# Patient Record
Sex: FEMALE | Race: WHITE | NOT HISPANIC OR LATINO | URBAN - METROPOLITAN AREA
[De-identification: names, ages, dates, MRNs, and addresses within clinical notes are randomized per-mention and may not be internally consistent; named-entity substitution may affect disease eponyms.]

---

## 2021-03-04 ENCOUNTER — IMMUNIZATIONS (OUTPATIENT)
Dept: FAMILY MEDICINE CLINIC | Facility: HOSPITAL | Age: 76
End: 2021-03-04

## 2021-03-04 DIAGNOSIS — Z23 ENCOUNTER FOR IMMUNIZATION: Primary | ICD-10-CM

## 2021-03-04 PROCEDURE — 91300 SARS-COV-2 / COVID-19 MRNA VACCINE (PFIZER-BIONTECH) 30 MCG: CPT

## 2021-03-04 PROCEDURE — 0001A SARS-COV-2 / COVID-19 MRNA VACCINE (PFIZER-BIONTECH) 30 MCG: CPT

## 2021-03-23 ENCOUNTER — IMMUNIZATIONS (OUTPATIENT)
Dept: FAMILY MEDICINE CLINIC | Facility: HOSPITAL | Age: 76
End: 2021-03-23

## 2021-03-23 DIAGNOSIS — Z23 ENCOUNTER FOR IMMUNIZATION: Primary | ICD-10-CM

## 2021-03-23 PROCEDURE — 91300 SARS-COV-2 / COVID-19 MRNA VACCINE (PFIZER-BIONTECH) 30 MCG: CPT

## 2021-03-23 PROCEDURE — 0002A SARS-COV-2 / COVID-19 MRNA VACCINE (PFIZER-BIONTECH) 30 MCG: CPT

## 2023-04-27 ENCOUNTER — HOSPITAL ENCOUNTER (INPATIENT)
Facility: HOSPITAL | Age: 78
LOS: 15 days | End: 2023-05-13
Attending: STUDENT IN AN ORGANIZED HEALTH CARE EDUCATION/TRAINING PROGRAM | Admitting: SURGERY

## 2023-04-27 ENCOUNTER — APPOINTMENT (EMERGENCY)
Dept: RADIOLOGY | Facility: HOSPITAL | Age: 78
End: 2023-04-27

## 2023-04-27 ENCOUNTER — APPOINTMENT (OUTPATIENT)
Dept: RADIOLOGY | Facility: HOSPITAL | Age: 78
End: 2023-04-27
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
## 2023-04-27 DIAGNOSIS — Z45.02 AICD DISCHARGE: ICD-10-CM

## 2023-04-27 DIAGNOSIS — L89.150 PRESSURE INJURY OF SACRAL REGION, UNSTAGEABLE (HCC): ICD-10-CM

## 2023-04-27 DIAGNOSIS — S42.201A CLOSED FRACTURE OF PROXIMAL END OF RIGHT HUMERUS, UNSPECIFIED FRACTURE MORPHOLOGY, INITIAL ENCOUNTER: Primary | ICD-10-CM

## 2023-04-27 DIAGNOSIS — S22.41XA FRACTURE OF MULTIPLE RIBS OF RIGHT SIDE: ICD-10-CM

## 2023-04-27 PROBLEM — S42.309A HUMERUS FRACTURE: Status: ACTIVE | Noted: 2023-04-27

## 2023-04-27 PROBLEM — G89.11 ACUTE PAIN DUE TO TRAUMA: Status: ACTIVE | Noted: 2023-04-27

## 2023-04-27 PROBLEM — Z95.2 H/O MECHANICAL AORTIC VALVE REPLACEMENT: Status: ACTIVE | Noted: 2023-04-27

## 2023-04-27 PROBLEM — S01.111A LACERATION OF RIGHT EYEBROW: Status: ACTIVE | Noted: 2023-04-27

## 2023-04-27 LAB
ALBUMIN SERPL BCP-MCNC: 3.6 G/DL (ref 3.5–5)
ALP SERPL-CCNC: 123 U/L (ref 46–116)
ALT SERPL W P-5'-P-CCNC: 24 U/L (ref 12–78)
ANION GAP SERPL CALCULATED.3IONS-SCNC: 4 MMOL/L (ref 4–13)
AST SERPL W P-5'-P-CCNC: 34 U/L (ref 5–45)
BASE EXCESS BLDA CALC-SCNC: 2 MMOL/L (ref -2–3)
BASOPHILS # BLD AUTO: 0.02 THOUSANDS/ÂΜL (ref 0–0.1)
BASOPHILS NFR BLD AUTO: 1 % (ref 0–1)
BILIRUB SERPL-MCNC: 0.97 MG/DL (ref 0.2–1)
BUN SERPL-MCNC: 23 MG/DL (ref 5–25)
CA-I BLD-SCNC: 1.23 MMOL/L (ref 1.12–1.32)
CALCIUM SERPL-MCNC: 10 MG/DL (ref 8.3–10.1)
CFFMA (FUNCTIONAL FIBRINOGEN MAX AMPLITUDE): 20.1 MM (ref 15–32)
CHLORIDE SERPL-SCNC: 104 MMOL/L (ref 96–108)
CKLY30: 0.3 % (ref 0–2.6)
CKR(REACTION TIME): 7.6 MIN (ref 4.6–9.1)
CO2 SERPL-SCNC: 26 MMOL/L (ref 21–32)
CREAT SERPL-MCNC: 0.82 MG/DL (ref 0.6–1.3)
CRTMA(RAPIDTEG MAX AMPLITUDE): 55.9 MM (ref 52–70)
EOSINOPHIL # BLD AUTO: 0.06 THOUSAND/ÂΜL (ref 0–0.61)
EOSINOPHIL NFR BLD AUTO: 2 % (ref 0–6)
ERYTHROCYTE [DISTWIDTH] IN BLOOD BY AUTOMATED COUNT: 14.5 % (ref 11.6–15.1)
GFR SERPL CREATININE-BSD FRML MDRD: 69 ML/MIN/1.73SQ M
GLUCOSE SERPL-MCNC: 120 MG/DL (ref 65–140)
GLUCOSE SERPL-MCNC: 121 MG/DL (ref 65–140)
GLUCOSE SERPL-MCNC: 121 MG/DL (ref 65–140)
GLUCOSE SERPL-MCNC: 151 MG/DL (ref 65–140)
HCO3 BLDA-SCNC: 27.1 MMOL/L (ref 24–30)
HCT VFR BLD AUTO: 36.1 % (ref 34.8–46.1)
HCT VFR BLD CALC: 37 % (ref 34.8–46.1)
HGB BLD-MCNC: 12.2 G/DL (ref 11.5–15.4)
HGB BLDA-MCNC: 12.6 G/DL (ref 11.5–15.4)
HOLD SPECIMEN: NORMAL
HOLD SPECIMEN: YES
INR PPP: 2.99 (ref 0.84–1.19)
LYMPHOCYTES # BLD AUTO: 0.7 THOUSANDS/ÂΜL (ref 0.6–4.47)
LYMPHOCYTES NFR BLD AUTO: 17 % (ref 14–44)
MCH RBC QN AUTO: 34 PG (ref 26.8–34.3)
MCHC RBC AUTO-ENTMCNC: 33.8 G/DL (ref 31.4–37.4)
MCV RBC AUTO: 101 FL (ref 82–98)
MONOCYTES # BLD AUTO: 0.28 THOUSAND/ÂΜL (ref 0.17–1.22)
MONOCYTES NFR BLD AUTO: 7 % (ref 4–12)
NEUTROPHILS # BLD AUTO: 2.93 THOUSANDS/ÂΜL (ref 1.85–7.62)
NEUTS SEG NFR BLD AUTO: 73 % (ref 43–75)
NRBC BLD AUTO-RTO: 0 /100 WBCS
PCO2 BLD: 28 MMOL/L (ref 21–32)
PCO2 BLD: 41.6 MM HG (ref 42–50)
PH BLD: 7.42 [PH] (ref 7.3–7.4)
PLATELET # BLD AUTO: 121 THOUSANDS/UL (ref 149–390)
PMV BLD AUTO: 10.8 FL (ref 8.9–12.7)
PO2 BLD: 26 MM HG (ref 35–45)
POTASSIUM BLD-SCNC: 4.4 MMOL/L (ref 3.5–5.3)
POTASSIUM SERPL-SCNC: 4.2 MMOL/L (ref 3.5–5.3)
PROT SERPL-MCNC: 8.1 G/DL (ref 6.4–8.4)
PROTHROMBIN TIME: 31.3 SECONDS (ref 11.6–14.5)
RBC # BLD AUTO: 3.59 MILLION/UL (ref 3.81–5.12)
SAO2 % BLD FROM PO2: 50 % (ref 60–85)
SODIUM BLD-SCNC: 139 MMOL/L (ref 136–145)
SODIUM SERPL-SCNC: 134 MMOL/L (ref 135–147)
SPECIMEN SOURCE: ABNORMAL
WBC # BLD AUTO: 4.03 THOUSAND/UL (ref 4.31–10.16)

## 2023-04-27 RX ORDER — HEPARIN SODIUM 5000 [USP'U]/ML
5000 INJECTION, SOLUTION INTRAVENOUS; SUBCUTANEOUS EVERY 8 HOURS SCHEDULED
Status: DISCONTINUED | OUTPATIENT
Start: 2023-04-27 | End: 2023-04-27

## 2023-04-27 RX ORDER — OXYBUTYNIN CHLORIDE 5 MG/1
5 TABLET, EXTENDED RELEASE ORAL DAILY
Status: DISCONTINUED | OUTPATIENT
Start: 2023-04-28 | End: 2023-05-13 | Stop reason: HOSPADM

## 2023-04-27 RX ORDER — WARFARIN SODIUM 5 MG/1
5 TABLET ORAL
Status: ON HOLD | COMMUNITY

## 2023-04-27 RX ORDER — ALLOPURINOL 100 MG/1
100 TABLET ORAL DAILY
Status: DISCONTINUED | OUTPATIENT
Start: 2023-04-28 | End: 2023-05-13 | Stop reason: HOSPADM

## 2023-04-27 RX ORDER — HYDROMORPHONE HCL IN WATER/PF 6 MG/30 ML
0.2 PATIENT CONTROLLED ANALGESIA SYRINGE INTRAVENOUS EVERY 4 HOURS PRN
Status: DISCONTINUED | OUTPATIENT
Start: 2023-04-27 | End: 2023-05-01

## 2023-04-27 RX ORDER — ATORVASTATIN CALCIUM 40 MG/1
40 TABLET, FILM COATED ORAL DAILY
Status: ON HOLD | COMMUNITY

## 2023-04-27 RX ORDER — METOPROLOL SUCCINATE 50 MG/1
50 TABLET, EXTENDED RELEASE ORAL DAILY
Status: DISCONTINUED | OUTPATIENT
Start: 2023-04-28 | End: 2023-05-03

## 2023-04-27 RX ORDER — INSULIN LISPRO 100 [IU]/ML
3 INJECTION, SOLUTION INTRAVENOUS; SUBCUTANEOUS
Status: DISCONTINUED | OUTPATIENT
Start: 2023-04-27 | End: 2023-04-28

## 2023-04-27 RX ORDER — MONTELUKAST SODIUM 10 MG/1
10 TABLET ORAL
Status: DISCONTINUED | OUTPATIENT
Start: 2023-04-28 | End: 2023-05-13 | Stop reason: HOSPADM

## 2023-04-27 RX ORDER — INSULIN LISPRO 100 [IU]/ML
3 INJECTION, SOLUTION INTRAVENOUS; SUBCUTANEOUS
Status: DISCONTINUED | OUTPATIENT
Start: 2023-04-28 | End: 2023-04-28

## 2023-04-27 RX ORDER — OXYCODONE HYDROCHLORIDE 5 MG/1
5 TABLET ORAL EVERY 4 HOURS PRN
Status: DISCONTINUED | OUTPATIENT
Start: 2023-04-27 | End: 2023-04-28

## 2023-04-27 RX ORDER — WARFARIN SODIUM 5 MG/1
5 TABLET ORAL
Status: DISCONTINUED | OUTPATIENT
Start: 2023-04-27 | End: 2023-04-29

## 2023-04-27 RX ORDER — OXYBUTYNIN CHLORIDE 5 MG/1
5 TABLET, EXTENDED RELEASE ORAL DAILY
Status: ON HOLD | COMMUNITY

## 2023-04-27 RX ORDER — LIDOCAINE 50 MG/G
1 PATCH TOPICAL DAILY
Status: DISCONTINUED | OUTPATIENT
Start: 2023-04-27 | End: 2023-05-13 | Stop reason: HOSPADM

## 2023-04-27 RX ORDER — ALLOPURINOL 100 MG/1
100 TABLET ORAL DAILY
Status: ON HOLD | COMMUNITY

## 2023-04-27 RX ORDER — ALENDRONATE SODIUM 70 MG/1
70 TABLET ORAL
Status: ON HOLD | COMMUNITY

## 2023-04-27 RX ORDER — GABAPENTIN 100 MG/1
100 CAPSULE ORAL
Status: DISCONTINUED | OUTPATIENT
Start: 2023-04-27 | End: 2023-05-13 | Stop reason: HOSPADM

## 2023-04-27 RX ORDER — LISINOPRIL 5 MG/1
5 TABLET ORAL DAILY
COMMUNITY
End: 2023-05-13

## 2023-04-27 RX ORDER — FUROSEMIDE 40 MG/1
40 TABLET ORAL DAILY
Status: DISCONTINUED | OUTPATIENT
Start: 2023-04-28 | End: 2023-05-13 | Stop reason: HOSPADM

## 2023-04-27 RX ORDER — FUROSEMIDE 20 MG/1
40 TABLET ORAL DAILY
Status: ON HOLD | COMMUNITY

## 2023-04-27 RX ORDER — MAGNESIUM 30 MG
64 TABLET ORAL 3 TIMES DAILY
Status: ON HOLD | COMMUNITY

## 2023-04-27 RX ORDER — WARFARIN SODIUM 5 MG/1
5 TABLET ORAL WEEKLY
Status: DISCONTINUED | OUTPATIENT
Start: 2023-04-28 | End: 2023-04-27 | Stop reason: SDUPTHER

## 2023-04-27 RX ORDER — DIPHENOXYLATE HYDROCHLORIDE AND ATROPINE SULFATE 2.5; .025 MG/1; MG/1
1 TABLET ORAL DAILY
Status: ON HOLD | COMMUNITY

## 2023-04-27 RX ORDER — ACETAMINOPHEN 325 MG/1
975 TABLET ORAL EVERY 8 HOURS SCHEDULED
Status: DISCONTINUED | OUTPATIENT
Start: 2023-04-27 | End: 2023-05-13 | Stop reason: HOSPADM

## 2023-04-27 RX ORDER — WARFARIN SODIUM 2.5 MG/1
2.5 TABLET ORAL 2 TIMES WEEKLY
Status: DISCONTINUED | OUTPATIENT
Start: 2023-04-29 | End: 2023-04-29

## 2023-04-27 RX ORDER — ONDANSETRON 2 MG/ML
4 INJECTION INTRAMUSCULAR; INTRAVENOUS EVERY 6 HOURS PRN
Status: DISCONTINUED | OUTPATIENT
Start: 2023-04-27 | End: 2023-05-13 | Stop reason: HOSPADM

## 2023-04-27 RX ORDER — DOCUSATE SODIUM 100 MG/1
100 CAPSULE, LIQUID FILLED ORAL 2 TIMES DAILY
Status: DISCONTINUED | OUTPATIENT
Start: 2023-04-27 | End: 2023-05-13 | Stop reason: HOSPADM

## 2023-04-27 RX ORDER — INSULIN LISPRO 100 [IU]/ML
1-6 INJECTION, SOLUTION INTRAVENOUS; SUBCUTANEOUS
Status: DISCONTINUED | OUTPATIENT
Start: 2023-04-27 | End: 2023-05-10

## 2023-04-27 RX ORDER — METOPROLOL SUCCINATE 50 MG/1
50 TABLET, EXTENDED RELEASE ORAL DAILY
COMMUNITY
End: 2023-05-13

## 2023-04-27 RX ORDER — MONTELUKAST SODIUM 10 MG/1
10 TABLET ORAL
Status: ON HOLD | COMMUNITY

## 2023-04-27 RX ORDER — CITALOPRAM 20 MG/1
20 TABLET ORAL DAILY
Status: ON HOLD | COMMUNITY

## 2023-04-27 RX ORDER — ATORVASTATIN CALCIUM 40 MG/1
40 TABLET, FILM COATED ORAL
Status: DISCONTINUED | OUTPATIENT
Start: 2023-04-27 | End: 2023-05-13 | Stop reason: HOSPADM

## 2023-04-27 RX ORDER — DILTIAZEM HYDROCHLORIDE 120 MG/1
120 CAPSULE, COATED, EXTENDED RELEASE ORAL DAILY
Status: DISCONTINUED | OUTPATIENT
Start: 2023-04-28 | End: 2023-05-03

## 2023-04-27 RX ORDER — INSULIN LISPRO 100 [IU]/ML
1-5 INJECTION, SOLUTION INTRAVENOUS; SUBCUTANEOUS
Status: DISCONTINUED | OUTPATIENT
Start: 2023-04-27 | End: 2023-05-13 | Stop reason: HOSPADM

## 2023-04-27 RX ORDER — LISINOPRIL 5 MG/1
5 TABLET ORAL DAILY
Status: DISCONTINUED | OUTPATIENT
Start: 2023-04-28 | End: 2023-04-30

## 2023-04-27 RX ORDER — DILTIAZEM HYDROCHLORIDE 120 MG/1
120 CAPSULE, COATED, EXTENDED RELEASE ORAL DAILY
COMMUNITY
End: 2023-05-13

## 2023-04-27 RX ORDER — FUROSEMIDE 20 MG/1
20 TABLET ORAL DAILY
Status: DISCONTINUED | OUTPATIENT
Start: 2023-04-28 | End: 2023-04-27

## 2023-04-27 RX ORDER — ENOXAPARIN SODIUM 100 MG/ML
30 INJECTION SUBCUTANEOUS EVERY 12 HOURS
Status: DISCONTINUED | OUTPATIENT
Start: 2023-04-27 | End: 2023-04-27

## 2023-04-27 RX ORDER — HYDROMORPHONE HCL/PF 1 MG/ML
0.5 SYRINGE (ML) INJECTION ONCE
Status: COMPLETED | OUTPATIENT
Start: 2023-04-27 | End: 2023-04-27

## 2023-04-27 RX ORDER — SENNOSIDES 8.6 MG
2 TABLET ORAL DAILY
Status: DISCONTINUED | OUTPATIENT
Start: 2023-04-27 | End: 2023-05-13 | Stop reason: HOSPADM

## 2023-04-27 RX ORDER — CITALOPRAM 20 MG/1
20 TABLET ORAL DAILY
Status: DISCONTINUED | OUTPATIENT
Start: 2023-04-28 | End: 2023-05-13 | Stop reason: HOSPADM

## 2023-04-27 RX ADMIN — INSULIN LISPRO 1 UNITS: 100 INJECTION, SOLUTION INTRAVENOUS; SUBCUTANEOUS at 21:36

## 2023-04-27 RX ADMIN — GABAPENTIN 100 MG: 100 CAPSULE ORAL at 21:35

## 2023-04-27 RX ADMIN — ATORVASTATIN CALCIUM 40 MG: 40 TABLET, FILM COATED ORAL at 17:47

## 2023-04-27 RX ADMIN — IOHEXOL 100 ML: 350 INJECTION, SOLUTION INTRAVENOUS at 13:29

## 2023-04-27 RX ADMIN — DOCUSATE SODIUM 100 MG: 100 CAPSULE, LIQUID FILLED ORAL at 17:47

## 2023-04-27 RX ADMIN — ACETAMINOPHEN 975 MG: 325 TABLET ORAL at 21:33

## 2023-04-27 RX ADMIN — LIDOCAINE 5% 1 PATCH: 700 PATCH TOPICAL at 17:47

## 2023-04-27 RX ADMIN — OXYCODONE HYDROCHLORIDE 5 MG: 5 TABLET ORAL at 17:47

## 2023-04-27 RX ADMIN — INSULIN DETEMIR 10 UNITS: 100 INJECTION, SOLUTION SUBCUTANEOUS at 21:35

## 2023-04-27 RX ADMIN — ACETAMINOPHEN 975 MG: 325 TABLET ORAL at 14:28

## 2023-04-27 RX ADMIN — HYDROMORPHONE HYDROCHLORIDE 0.5 MG: 1 INJECTION, SOLUTION INTRAMUSCULAR; INTRAVENOUS; SUBCUTANEOUS at 13:01

## 2023-04-27 RX ADMIN — WARFARIN SODIUM 5 MG: 5 TABLET ORAL at 21:33

## 2023-04-27 RX ADMIN — TETANUS TOXOID, REDUCED DIPHTHERIA TOXOID AND ACELLULAR PERTUSSIS VACCINE, ADSORBED 0.5 ML: 5; 2.5; 8; 8; 2.5 SUSPENSION INTRAMUSCULAR at 21:34

## 2023-04-27 NOTE — H&P
[address] [facility]  H&P  Name: Joycelyn Walls 68 y o  female I MRN: [id number]  Unit/Bed#: ED 06 I Date of Admission: 4/27/2023   Date of Service: 4/27/2023 I Hospital Day: 0      Assessment/Plan   Fall  Assessment & Plan  - mechanical fall   - sustained below stated injuries  - PT/OT evaluations  - Geriatrics evaluation  - CM for disposition planning    Humerus fracture  Assessment & Plan  - proximal humerus fracture on the left   - NVI distally  - pain control with multi modal analgesia  - orthopedics consultation placed, will f/u recommendations  - NWB LUE   - Sling to the LUE    Laceration of right eyebrow  Assessment & Plan  - two 1 cm lacerations to the R eye brow  - steri strips applied  - local wound care    Acute pain due to trauma  Assessment & Plan  - Acute pain secondary to traumatic injuries  - Multi modal pain regimen  - Bowel regimen as long as using opioids   - Continue to monitor pain and adjust regimen as indicated  H/O mechanical aortic valve replacement  Assessment & Plan  - hold coumadin for now pending evaluation for humerus fracture  - F/u INR  - Goal INR reportedly 2 5 to 3 5    Fracture of multiple ribs of right side  Assessment & Plan  - Multiple right-sided rib fractures (8th-9th), present on admission   - Continue rib fracture protocol   - Continue to encourage incentive spirometer use and adequate pulmonary hygiene  - Continue multimodal analgesic regimen     - Supplemental oxygen via nasal cannula as needed to maintain saturations greater than or equal to 94%  - PT and OT evaluation and treatment as indicated  - Outpatient follow-up in the trauma clinic for re-evaluation in approximately 2 weeks  Patient's daughters were at bedside and updated on the plan of care as well       Trauma Alert: Level B   Model of Arrival: Ambulance    Trauma Team: Attending Marcela Piedra and DENISSE Fuller  Consultants:     Orthopedics: routine consult; Epic consult order "placed and consultant notified (via phone/text @ time TT at 1330); History of Present Illness     Chief Complaint: \"My arm hurts\"  Mechanism:Fall     HPI:    Michelle Steel is a 68 y o  female who presents s/p mechanical fall while at the grocery store  She reports bending forward at the grocery store and continued to fall forward, striking her face on the ground  She believes her glasses cut her eyebrow  She did not lose consciousness  She is complaining of pain in the left arm and in the right chest wall  She has some bleeding from her right eyebrow as well  She reports taking coumadin for a h/o a mechanical aortic valve  She reports having some balance issues at baseline  She does use a cane for ambulation  Cervical Collar Clearance: The patient had a CT scan of the cervical spine demonstrating no acute injury  On exam, the patient had no midline point tenderness or paresthesias/numbness/weakness in the extremities  The patient had full range of motion (was then able to flex, extend, and rotate head laterally) without pain  There were no distracting injuries and the patient was not intoxicated  The patient's cervical spine was cleared radiologically and clinically  Cervical collar removed at this time  Traci Hylton PA-C  4/27/2023 2:51 PM       Review of Systems   Constitutional: Negative  HENT: Negative  Eyes: Negative  Respiratory: Negative  Negative for chest tightness  Cardiovascular: Negative  Negative for chest pain  Gastrointestinal: Negative  Negative for abdominal pain, nausea and vomiting  Genitourinary: Negative  Musculoskeletal: Negative for neck pain         + left arm pain  + R chest wall pain   Skin: Positive for wound  + bleeding from right eyelid wound   Neurological: Negative  Negative for dizziness, weakness, light-headedness, numbness and headaches  Psychiatric/Behavioral: Negative        12-point, complete review of systems " was reviewed and negative except as stated above  Historical Information     Past Medical History:   Diagnosis Date   • Asthma    • Diabetes ([facility] [address] )    • Hypertension      Past Surgical History:   Procedure Laterality Date   • MECHANICAL AORTIC VALVE REPLACEMENT     • REPLACEMENT TOTAL KNEE        Social History     Tobacco Use   • Smoking status: Never   • Smokeless tobacco: Never   Substance Use Topics   • Alcohol use: Yes     Comment: socially   • Drug use: Never     Last Tetanus: updated today, 4/27/23  Family History: Non-contributory    1  Before the illness or injury that brought you to the Emergency, did you need someone to help you on a regular basis? 1=No   2  Since the illness or injury that brought you to the Emergency, have you needed more help than usual to take care of yourself? 1=Yes   3  Have you been hospitalized for one or more nights during the past 6 months (excluding a stay in the Emergency Department)? 0=No   4  In general, do you see well? 0=Yes   5  In general, do you have serious problems with your memory? 0=No   6  Do you take more than three different medications everyday? 1=Yes   TOTAL   2     Did you order a geriatric consult if the score was 2 or greater?: yes     Meds/Allergies   all current active meds have been reviewed and PTA meds:   Prior to Admission Medications   Prescriptions Last Dose Informant Patient Reported? Taking?    alendronate (FOSAMAX) 70 mg tablet   Yes Yes   Sig: Take 70 mg by mouth every 7 days   allopurinol (ZYLOPRIM) 100 mg tablet   Yes Yes   Sig: Take 100 mg by mouth daily   atorvastatin (LIPITOR) 40 mg tablet   Yes Yes   Sig: Take 40 mg by mouth daily   citalopram (CeleXA) 20 mg tablet   Yes Yes   Sig: Take 20 mg by mouth daily   diltiazem (CARDIZEM CD) 120 mg 24 hr capsule   Yes Yes   Sig: Take 120 mg by mouth daily   furosemide (LASIX) 20 mg tablet   Yes Yes   Sig: Take 20 mg by mouth daily   lisinopril (ZESTRIL) 5 mg tablet   Yes Yes   Sig: Take 5 mg by mouth daily   metoprolol succinate (TOPROL-XL) 50 mg 24 hr tablet   Yes Yes   Sig: Take 50 mg by mouth daily   montelukast (SINGULAIR) 10 mg tablet   Yes Yes   Sig: Take 10 mg by mouth daily at bedtime   oxybutynin (DITROPAN-XL) 5 mg 24 hr tablet   Yes Yes   Sig: Take 5 mg by mouth daily   sitaGLIPtin-metFORMIN (JANUMET)  MG per tablet   Yes Yes   Sig: Take 1 tablet by mouth 2 (two) times a day with meals   warfarin (COUMADIN) 5 mg tablet   Yes Yes   Sig: Take 5 mg by mouth daily      Facility-Administered Medications: None     Allergies: fentanyl    Objective   Initial Vitals:   Temperature: 97 8 °F (36 6 °C) (04/27/23 1255)  Pulse: 84 (04/27/23 1255)  Respirations: 22 (04/27/23 1255)  Blood Pressure: 125/79 (04/27/23 1255)    Primary Survey:   Airway:        Status: patent;        Pre-hospital Interventions: none        Hospital Interventions: none  Breathing:        Pre-hospital Interventions: none       Effort: normal       Right breath sounds: normal       Left breath sounds: normal  Circulation:        Rhythm: regular       Rate: regular   Right Pulses Left Pulses    R radial: 2+  R femoral: 2+  R pedal: 2+  R carotid: 2+  R popliteal: 2+ L radial: 2+  L femoral: 2+  L pedal: 2+  L carotid: 2+  L popliteal: 2+   Disability:        GCS: Eye: 4; Verbal: 5 Motor: 6 Total: 15       Right Pupil: round;  reactive         Left Pupil:  round;  reactive      R Motor Strength L Motor Strength    R : 5/5  R dorsiflex: 5/5  R plantarflex: 5/5 L : 5/5  L dorsiflex: 5/5  L plantarflex: 5/5        Sensory:  No sensory deficit  Exposure:       Completed: Yes      Secondary Survey:  Physical Exam  Constitutional:       Appearance: Normal appearance  HENT:      Head: Normocephalic  Comments: + two 1 cm lacerations to the right eyebrow  Wounds were cleansed with sterile saline and no bleeding or oozing noted  Three steri strips were placed  No further bleeding noted and wounds were clean and dry        Right Ear: Tympanic membrane normal       Left Ear: Tympanic membrane normal       Nose: Nose normal       Mouth/Throat:      Mouth: Mucous membranes are moist    Eyes:      Pupils: Pupils are equal, round, and reactive to light  Cardiovascular:      Rate and Rhythm: Normal rate and regular rhythm  Pulses: Normal pulses  Pulmonary:      Effort: Pulmonary effort is normal  No respiratory distress  Breath sounds: Normal breath sounds  Abdominal:      General: Abdomen is flat  There is no distension  Palpations: Abdomen is soft  Tenderness: There is no abdominal tenderness  There is no guarding  Genitourinary:     General: Normal vulva  Musculoskeletal:         General: No swelling  Normal range of motion  Cervical back: Normal range of motion and neck supple  No tenderness  Comments: + tenderness and deformity of the left humerus  + right anterior chest wall tenderness   Skin:     General: Skin is warm and dry  Capillary Refill: Capillary refill takes less than 2 seconds  Neurological:      General: No focal deficit present  Mental Status: She is alert and oriented to person, place, and time  Sensory: No sensory deficit  Motor: No weakness     Psychiatric:         Behavior: Behavior normal          Invasive Devices     Peripheral Intravenous Line  Duration           Peripheral IV 04/27/23 Right Antecubital <1 day              Lab Results:   Results: I have personally reviewed all pertinent laboratory/tests results, BMP/CMP:   Lab Results   Component Value Date    SODIUM 134 (L) 04/27/2023    K 4 2 04/27/2023     04/27/2023    CO2 26 04/27/2023    CO2 28 04/27/2023    BUN 23 04/27/2023    CREATININE 0 82 04/27/2023    GLUCOSE 121 04/27/2023    CALCIUM 10 0 04/27/2023    AST 34 04/27/2023    ALT 24 04/27/2023    ALKPHOS 123 (H) 04/27/2023    EGFR 69 04/27/2023   , CBC:   Lab Results   Component Value Date    WBC 4 03 (L) 04/27/2023    HGB 12 2 04/27/2023 HCT 36 1 04/27/2023     (H) 04/27/2023     (L) 04/27/2023    MCH 34 0 04/27/2023    MCHC 33 8 04/27/2023    RDW 14 5 04/27/2023    MPV 10 8 04/27/2023    NRBC 0 04/27/2023    and Coagulation:   Lab Results   Component Value Date    INR 2 99 (H) 04/27/2023       Imaging Results: I have personally reviewed pertinent reports  FAST neg  XR shoulder 2+ vw left    Result Date: 4/27/2023  Impression: Obliquely oriented, overriding proximal humeral shaft fracture  Workstation performed: [id number]     XR humerus left    Result Date: 4/27/2023  Impression: Obliquely oriented, overriding proximal humeral shaft fracture  Workstation performed: [id number]     XR elbow 2 vw left    Result Date: 4/27/2023  Impression: Obliquely oriented, overriding proximal humeral shaft fracture  Workstation performed: [id number]     TRAUMA - CT head wo contrast    Result Date: 4/27/2023  Impression: No acute intracranial hemorrhage  No mass effect or midline shift  I personally discussed this study with Corrinne Sato on 4/27/2023 2:30 PM at 4/27/2023 2:30 PM  Workstation performed: [id number]     TRAUMA - CT spine cervical wo contrast    Result Date: 4/27/2023  Impression: No acute compression collapse of the vertebra   Multilevel degenerative disc disease 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 with severe central canal narrowing  Workstation performed: [id number]     TRAUMA - CT chest abdomen pelvis w contrast    Result Date: 4/27/2023  Impression: No solid visceral injury seen Comminuted fracture left humerus Incidentally noted is a incarcerated/partially obstructed periumbilic ventral hernia, (image 146 series 9) Septated cyst anterior aspect of the right kidney, consider evaluation with ultrasound or MRI for further characterization (image 136 series 9) Dilated ascending aorta measuring 4 6 cm, evaluation with cardiothoracic surgery on nonemergent basis Deformity of the right eighth and ninth rib may be due to motion, raise concern for fracture in appropriate clinical setting Evaluation of the right humerus is limited due to motion radiograph may be considered if concern for any right humerus injury  I personally discussed this study with [address] on 4/27/2023 2:28 PM at 4/27/2023 2:28 PM  Workstation performed: [id number]     XR Trauma multiple ([facility] trauma bay ONLY)    Result Date: 4/27/2023  Impression: Angulated proximal left humeral shaft fracture  The study was marked in [facility] for immediate notification  Workstation performed: [id number]       Other Studies: no new    Code Status: Level 1 - Full Code  Advance Directive and Living Will:      Power of :    POLST:    I have spent 40 minutes with Patient  today in which greater than 50% of this time was spent in counseling/coordination of care regarding Diagnostic results, Prognosis, Patient and family education, Impressions and Counseling / Coordination of care

## 2023-04-27 NOTE — ASSESSMENT & PLAN NOTE
- proximal humerus fracture on the left   - NVI distally  - pain control with multi modal analgesia  - orthopedics consultation placed, will f/u recommendations  - NWCHET LUE   - Sling to the LUE

## 2023-04-27 NOTE — TRAUMA DOCUMENTATION
Addendum: Patient did not arrive at [phone] [id number] as charted, entered in error  Pt  Arrived to trauma bay at trauma start time charted, [id number]

## 2023-04-27 NOTE — ASSESSMENT & PLAN NOTE
- Acute pain secondary to traumatic injuries  - Multi modal pain regimen  - Bowel regimen as long as using opioids   - Continue to monitor pain and adjust regimen as indicated

## 2023-04-27 NOTE — ASSESSMENT & PLAN NOTE
- Multiple right-sided rib fractures (8th-9th), present on admission   - Continue rib fracture protocol   - Continue to encourage incentive spirometer use and adequate pulmonary hygiene  - Continue multimodal analgesic regimen     - Supplemental oxygen via nasal cannula as needed to maintain saturations greater than or equal to 94%  - PT and OT evaluation and treatment as indicated  - Outpatient follow-up in the trauma clinic for re-evaluation in approximately 2 weeks

## 2023-04-27 NOTE — CONSULTS
Orthopedics   Jyoti Irby 68 y o  female MRN: [id number]  Unit/Bed#: ED 6      Chief Complaint:   left arm and shoulder pain    HPI:   68 y o  right hand dominant female status post mechanical fall complaining of left shoulder pain  Patient reports that she was bending down to reach an item she had dropped on the floor, which rolled away from her, and she tried to reach it while at the grocery store  + Headstrike, No LOC, Takes coumadin for Blood thinners  Pain is sharp in character, Located left proximal arm, acute in onset, constant in duration, severe in intensity  Exacerbating factors ROM and palpation, remitting factors rest  Nonradiating, no numbness, no tingling, no open wounds noted  No other complaints at this time  PMH significant for asthma, DM, HLD, HTN, aortic valve insufficiency s/p replacement  Occupation retired  Review Of Systems:   · Skin: See HPI  · Neuro: See HPI  · Musculoskeletal: See HPI  · 14 point review of systems negative except as stated above     Past Medical History:   No past medical history on file  Past Surgical History:   No past surgical history on file  Family History:  Family history reviewed and non-contributory  No family history on file      Social History:  Social History     Socioeconomic History   • Marital status: Not on file     Spouse name: Not on file   • Number of children: Not on file   • Years of education: Not on file   • Highest education level: Not on file   Occupational History   • Not on file   Tobacco Use   • Smoking status: Not on file   • Smokeless tobacco: Not on file   Substance and Sexual Activity   • Alcohol use: Not on file   • Drug use: Not on file   • Sexual activity: Not on file   Other Topics Concern   • Not on file   Social History Narrative   • Not on file     Social Determinants of Health     Financial Resource Strain: Not on file   Food Insecurity: Not on file   Transportation Needs: Not on file   Physical Activity: Not on file Stress: Not on file   Social Connections: Not on file   Intimate Partner Violence: Not on file   Housing Stability: Not on file       Allergies:   Not on File        Labs:  0   Lab Value Date/Time    HCT 37 04/27/2023 1303    HGB 12 6 04/27/2023 1303       Meds:  No current facility-administered medications for this encounter  No current outpatient medications on file  Blood Culture:   No results found for: BLOODCX    Wound Culture:   No results found for: WOUNDCULT    Ins and Outs:  No intake/output data recorded  Physical Exam:   /79   Pulse 80   Temp 97 8 °F (36 6 °C) (Tympanic)   Resp 20   Wt 84 6 kg (186 lb 8 2 oz)   SpO2 98%   Gen: No acute distress, resting comfortably in bed  HEENT: Eyes clear, moist mucus membranes, hearing intact  Respiratory: No audible wheezing or stridor  Cardiovascular: Well Perfused peripherally, 2+ distal pulse  Abdomen: nondistended, no peritoneal signs  Musculoskeletal: left upper extremity  · Skin intact, ecchymosis and swelling noted over the shoulder and proximal arm  · Tender to palpation over shoulder and upper arm  · Sensation intact to radial, ulna, median, musculocutaneous, and axillary nerve distributions  · Motor intact to  radial, ulnar, median, musculocutaneous, and axillary nerve distributions  · 2+ radial and ulnar pulse  · Musculature is soft and compressible, no pain with passive stretch    Radiology:   I personally reviewed the films  X-rays AP/Lateral and oblique views left humerus shows a spiral proximal humerus fracture with valgus angulation and extension to shaft  Assessment:  68 y o  female S/P mechanical fall with left proximal to midshaft humerus fracture  Plan:   · Sling placed  · NWB LUE in coaptation splint  · Analgesics for pain  · NonOp management of left humeral fracture  · There is no height or weight on file to calculate BMI     · Dispo: Ortho will follow    Olga Razo MD

## 2023-04-27 NOTE — ASSESSMENT & PLAN NOTE
- hold coumadin for now pending evaluation for humerus fracture  - F/u INR  - Goal INR reportedly 2 5 to 3 5

## 2023-04-27 NOTE — CONSULTS
Consultation - Geriatric Medicine   Ehsan Espana 68 y o  female MRN: [id number]  Unit/Bed#: ED 06 Encounter: [id number]      Assessment/Plan     Fall  Assessment & Plan  -s/p mechanical fall at grocery store on 4/27 +headstrike with no LOC  -trauma Xray series and CTs remarkable for left proximal humeral shaft fx, right 8th and 9th rib fx  -hx of 1 mechanical fall in the past 3 years ago, s/p vertebroplasty for vertebral fx sustained in that fall     Plan:   · PT/OT consult   · Injury management per trauma, orthopedics teams     Fracture of multiple ribs of right side  Assessment & Plan  -management per trauma, orthopedics teams    H/O mechanical aortic valve replacement  Assessment & Plan  -on warfarin 5mg 5 days/week and 2 5mg on Weds, Sat   -INR on arrival: 2 99     Plan:  · Trend PT-INR   · Management per primary team    Humerus fracture  Assessment & Plan  -splint in place for left proximal humeral shaft fx     Plan:  · Management per orthopedics team          Home medication review    Personally confirmed with patient  History of Present Illness   Physician Requesting Consult: Erick Estevez DO  Reason for Consult / Principal Problem: Fall  Hx and PE limited by: N/A  Additional history obtained from: Chart review and patient evaluation      HPI: Ehsan Espana is a 68y o  year old female who presents after a mechanical fall earlier today  Pt explains she was at the grocery store, and went to reach forward for a carton of berries, but lost her balance  She tried to regain her balance by grabbing onto her shopping cart, but it rolled out from under her, causing her to fall onto her right side  There was a headstrike but no LOC, and she sustained a facial laceration due to her glasses breaking during the fall  Pt reports she has not had a fall in over 3 years, last fall was also mechanical  S/p vertebroplasty for vertebral fractures sustained in that fall   At baseline, she ambulates with a ceron without any difficulty  She lives in a 1 floor apartment, but frequently stays with her long-time partner in a 3-story house  She carries out all of her activities of daily living on her own, including shopping, meal prep, bathing and dressing, and cleaning  She also drives  Social support network includes two daughters who live nearby  Seen at bedside with her daughters present  Complains of pain in left arm, ribs  Inpatient consult to Gerontology  Consult performed by: Elaine Andrade DO  Consult ordered by: Wesley Bowman PA-C          Inpatient consult to Gerontology     Performed by  Elaine Andrade DO     Authorized by Wesley Bowman PA-C              Review of Systems   Constitutional: Negative for chills and fever  HENT: Negative for ear pain and sore throat  Eyes: Negative for pain and visual disturbance  Respiratory: Negative for cough and shortness of breath  Cardiovascular: Negative for chest pain and palpitations  Gastrointestinal: Negative for abdominal pain and vomiting  Genitourinary: Negative for dysuria and hematuria  Musculoskeletal: Positive for arthralgias, back pain and myalgias  Skin: Positive for wound  Negative for color change and rash  Neurological: Negative for seizures and syncope  All other systems reviewed and are negative        Historical Information   Past Medical History:   Diagnosis Date   • Asthma    • Diabetes ([facility] [address] )    • Hypertension      Past Surgical History:   Procedure Laterality Date   • MECHANICAL AORTIC VALVE REPLACEMENT     • REPLACEMENT TOTAL KNEE       Social History   Social History     Substance and Sexual Activity   Alcohol Use Yes    Comment: socially     Social History     Substance and Sexual Activity   Drug Use Never     Social History     Tobacco Use   Smoking Status Never   Smokeless Tobacco Never     Family History: non-contributory    Meds/Allergies   all current active meds have been reviewed    Not on File    Objective   No intake or output data in the 24 hours ending 04/27/23 1544  Invasive Devices     Peripheral Intravenous Line  Duration           Peripheral IV 04/27/23 Right Antecubital <1 day                Physical Exam  Constitutional:       General: She is not in acute distress  Appearance: She is not ill-appearing or toxic-appearing  HENT:      Head:        Right Ear: External ear normal       Left Ear: External ear normal       Nose: Nose normal    Eyes:      Conjunctiva/sclera: Conjunctivae normal    Pulmonary:      Effort: No respiratory distress  Abdominal:      General: Bowel sounds are normal       Palpations: Abdomen is soft  Tenderness: There is no abdominal tenderness  There is no guarding  Musculoskeletal:      Comments: Left arm splint in place   Skin:     Findings: Bruising and lesion present  Neurological:      General: No focal deficit present  Mental Status: She is alert and oriented to person, place, and time  GCS: GCS eye subscore is 4  GCS verbal subscore is 5  GCS motor subscore is 6  Psychiatric:         Attention and Perception: Attention and perception normal          Mood and Affect: Mood and affect normal          Speech: Speech normal          Behavior: Behavior normal          Lab Results:   I have personally reviewed pertinent lab results including the following:  CBC, CMP, PT-INR    I have personally reviewed the following imaging study reports in PACS:  XR Trauma series, CT spine, CT Abd/Pelv, XR left humerus       Personal interpretation of imaging studies mentioned above:       Therapies:   PT: consult pending   OT: consult pending     VTE Prophylaxis: Heparin    Code Status: Level 1 - Full Code  Advance Directive and Living Will:      Power of :    POLST:      Family and Social Support: Two daughters live nearby, both present at bedside   No data recorded    Goals of Care: Stabilize for discharge to home pending PT/OT evals

## 2023-04-27 NOTE — ASSESSMENT & PLAN NOTE
-s/p mechanical fall at grocery store on 4/27 +headstrike with no LOC  -trauma Xray series and CTs remarkable for left proximal humeral shaft fx, right 8th and 9th rib fx  -hx of 1 mechanical fall in the past 3 years ago, s/p vertebroplasty for vertebral fx sustained in that fall     Plan:   · PT/OT consult   · Injury management per trauma, orthopedics teams

## 2023-04-27 NOTE — ASSESSMENT & PLAN NOTE
- mechanical fall   - sustained below stated injuries  - PT/OT evaluations  - Geriatrics evaluation  - CM for disposition planning

## 2023-04-27 NOTE — PROCEDURES
POC FAST US    Date/Time: 4/27/2023 1:30 PM  Performed by: Nishant Reynoso PA-C  Authorized by: Nishant Reynoso PA-C     Patient location:  Trauma  Procedure details:     Exam Type:  Diagnostic    Indications: blunt abdominal trauma and blunt chest trauma      Assess for:  Intra-abdominal fluid and pericardial effusion    Technique: FAST      Views obtained:  Heart - Pericardial sac, LUQ - Splenorenal space, Suprapubic - Pouch of Alexi and RUQ - Styles's Pouch    Image quality: diagnostic      Image availability:  Images available in PACS and video obtained  FAST Findings:     RUQ (Hepatorenal) free fluid: absent      LUQ (Splenorenal) free fluid: absent      Suprapubic free fluid: absent      Cardiac wall motion: identified      Pericardial effusion: absent    Interpretation:     Impressions: negative

## 2023-04-27 NOTE — DISCHARGE INSTR - AVS FIRST PAGE
Discharge Instructions - Orthopedics  Ehsan Espana 68 y o  female MRN: [id number]  Unit/Bed#: [facility] 629-01    Weight Bearing Status:                                           Non-weight bearing left upper extremity  Pain:  Continue analgesics as directed    Dressing Instructions:   Please keep clean, dry and intact until follow up     Appt Instructions: If you do not have your appointment, please call the clinic at [phone] t  Otherwise followup as scheduled     Contact the office sooner if you experience any increased numbness/tingling in the extremities          MEDICATION INSTRUCTIONS:  -Take amiodarone 400 mg twice daily through 5/11, then decrease to 200 mg daily moving forward starting 5/12  -Decrease Toprol-XL to 25 mg daily  -Discontinue diltiazem

## 2023-04-27 NOTE — ED PROVIDER NOTES
Emergency Department Airway Evaluation and Management Form    History  Obtained from: EMS/Patient   Review of patient's allergies indicates no known allergies  Chief Complaint:  Trauma Alert    HPI: Pt is a 68 y o  female presents s/p mechanical fall at store  Patient struck head on the ground  Complaining of shoulder/arm pain  +coumadin      I have reviewed and agree with the history as documented  Physical Exam    Vitals:    04/27/23 1300   BP: 144/94   Pulse: 79   Resp: 20   Temp:    SpO2: 98%     Supplemental Oxygen:none    GCS: 15    Neuro: Alert and oriented  Psych: not combative, not anxious, cooperative for exam  Neck: In collar, No JVD, No midline tenderness  Cardio:  Normal  Respiratory: Normal  Mouth:  Normal  Pharynx: Normal    Monitor:  NSR      ED Medications    No current facility-administered medications for this encounter  No current outpatient medications on file        Intubation    No intubation required    Final Diagnosis:  Closed Head Injury  Arm Injury    ED Provider  Electronically Signed by         Paulie Nevarez MD  04/27/23 1369

## 2023-04-27 NOTE — RESPIRATORY THERAPY NOTE
"RT Protocol Note  Buel Class 68 y o  female MRN: [id number]  Unit/Bed#: [facility] 629-01 Encounter: [id number]    Assessment    Active Problems:    Fall    Humerus fracture    Acute pain due to trauma    H/O mechanical aortic valve replacement    Laceration of right eyebrow    Fracture of multiple ribs of right side      Home Pulmonary Medications:  None       Past Medical History:   Diagnosis Date    Asthma     Diabetes ([address] )     Hypertension      Social History     Socioeconomic History    Marital status:      Spouse name: None    Number of children: None    Years of education: None    Highest education level: None   Occupational History    None   Tobacco Use    Smoking status: Never    Smokeless tobacco: Never   Substance and Sexual Activity    Alcohol use: Yes     Comment: socially    Drug use: Never    Sexual activity: None   Other Topics Concern    None   Social History Narrative    None     Social Determinants of Health     Financial Resource Strain: Not on file   Food Insecurity: Not on file   Transportation Needs: Not on file   Physical Activity: Not on file   Stress: Not on file   Social Connections: Not on file   Intimate Partner Violence: Not on file   Housing Stability: Not on file       Subjective         Objective    Physical Exam:   Assessment Type: Assess only  General Appearance: Alert, Awake  Respiratory Pattern: Normal  Chest Assessment: Chest expansion symmetrical  Bilateral Breath Sounds: Clear    Vitals:  Blood pressure 120/70, pulse 78, temperature 99 1 °F (37 3 °C), resp  rate 18, height 4' 10\" (1 473 m), weight 84 6 kg (186 lb 8 2 oz), SpO2 98 %  Imaging and other studies: I have personally reviewed pertinent reports  Plan       Airway Clearance Plan: Incentive Spirometer     Resp Comments: Pt had a fall in a store with multiple fractures including rib fractures  Pt shown how to use IS and pt displayed proper technique   Will continue with is per acp and rib " fracture protocol

## 2023-04-27 NOTE — ASSESSMENT & PLAN NOTE
-on warfarin 5mg 5 days/week and 2 5mg on Weds, Sat   -INR on arrival: 2 99     Plan:  · Trend PT-INR   · Management per primary team

## 2023-04-27 NOTE — CASE MANAGEMENT
Case Management Progress Note    Patient name Michelle Spindle  Location TR 02/TR 02 MRN [id number]  : 1945 Date 2023       LOS (days): 0  Geometric Mean LOS (GMLOS) (days):   Days to GMLOS:        OBJECTIVE:        Current admission status: Emergency  Preferred Pharmacy: No Pharmacies Listed  Primary Care Provider: No primary care provider on file  Primary Insurance:   Secondary Insurance:     PROGRESS NOTE:    CM responded to trauma alert  Pt was brought to the ED via [facility] s/p fall at grocery store  Pt c/o + head strike, -LOC, right cheek hematoma, left upper extremity pain, and right flank pain  Pt's friend, who was with her shopping, is en route

## 2023-04-27 NOTE — PLAN OF CARE
Problem: Potential for Falls  Goal: Patient will remain free of falls  Description: INTERVENTIONS:  - Educate patient/family on patient safety including physical limitations  - Instruct patient to call for assistance with activity   - Consult OT/PT to assist with strengthening/mobility   - Keep Call bell within reach  - Keep bed low and locked with side rails adjusted as appropriate  - Keep care items and personal belongings within reach  - Initiate and maintain comfort rounds  - Make Fall Risk Sign visible to staff  - Apply yellow socks and bracelet for high fall risk patients  - Consider moving patient to room near nurses station  Outcome: Progressing     Problem: RESPIRATORY - ADULT  Goal: Achieves optimal ventilation and oxygenation  Description: INTERVENTIONS:  - Assess for changes in respiratory status  - Assess for changes in mentation and behavior  - Position to facilitate oxygenation and minimize respiratory effort  - Oxygen administered by appropriate delivery if ordered  - Initiate smoking cessation education as indicated  - Encourage broncho-pulmonary hygiene including cough, deep breathe, Incentive Spirometry  - Assess the need for suctioning and aspirate as needed  - Assess and instruct to report SOB or any respiratory difficulty  - Respiratory Therapy support as indicated  Outcome: Progressing     Problem: SKIN/TISSUE INTEGRITY - ADULT  Goal: Incision(s), wounds(s) or drain site(s) healing without S/S of infection  Description: INTERVENTIONS  - Assess and document dressing, incision, wound bed, drain sites and surrounding tissue  - Provide patient and family education  Outcome: Progressing     Problem: MUSCULOSKELETAL - ADULT  Goal: Maintain or return mobility to safest level of function  Description: INTERVENTIONS:  - Assess patient's ability to carry out ADLs; assess patient's baseline for ADL function and identify physical deficits which impact ability to perform ADLs (bathing, care of mouth/teeth, toileting, grooming, dressing, etc )  - Assess/evaluate cause of self-care deficits   - Assess range of motion  - Assess patient's mobility  - Assess patient's need for assistive devices and provide as appropriate  - Encourage maximum independence but intervene and supervise when necessary  - Involve family in performance of ADLs  - Assess for home care needs following discharge   - Consider OT consult to assist with ADL evaluation and planning for discharge  - Provide patient education as appropriate  Outcome: Progressing  Goal: Maintain proper alignment of affected body part  Description: INTERVENTIONS:  - Support, maintain and protect limb and body alignment  - Provide patient/ family with appropriate education  Outcome: Progressing     Problem: PAIN - ADULT  Goal: Verbalizes/displays adequate comfort level or baseline comfort level  Description: Interventions:  - Encourage patient to monitor pain and request assistance  - Assess pain using appropriate pain scale  - Administer analgesics based on type and severity of pain and evaluate response  - Implement non-pharmacological measures as appropriate and evaluate response  - Consider cultural and social influences on pain and pain management  - Notify physician/advanced practitioner if interventions unsuccessful or patient reports new pain  Outcome: Progressing     Problem: SAFETY ADULT  Goal: Patient will remain free of falls  Description: INTERVENTIONS:  - Educate patient/family on patient safety including physical limitations  - Instruct patient to call for assistance with activity   - Consult OT/PT to assist with strengthening/mobility   - Keep Call bell within reach  - Keep bed low and locked with side rails adjusted as appropriate  - Keep care items and personal belongings within reach  - Initiate and maintain comfort rounds  - Make Fall Risk Sign visible to staff  - Apply yellow socks and bracelet for high fall risk patients  - Consider moving patient to room near nurses station  Outcome: Progressing  Goal: Maintain or return to baseline ADL function  Description: INTERVENTIONS:  -  Assess patient's ability to carry out ADLs; assess patient's baseline for ADL function and identify physical deficits which impact ability to perform ADLs (bathing, care of mouth/teeth, toileting, grooming, dressing, etc )  - Assess/evaluate cause of self-care deficits   - Assess range of motion  - Assess patient's mobility; develop plan if impaired  - Assess patient's need for assistive devices and provide as appropriate  - Encourage maximum independence but intervene and supervise when necessary  - Involve family in performance of ADLs  - Assess for home care needs following discharge   - Consider OT consult to assist with ADL evaluation and planning for discharge  - Provide patient education as appropriate  Outcome: Progressing  Goal: Maintains/Returns to pre admission functional level  Description: INTERVENTIONS:  - Perform BMAT or MOVE assessment daily    - Set and communicate daily mobility goal to care team and patient/family/caregiver  - Collaborate with rehabilitation services on mobility goals if consulted  - Perform Range of Motion 3 times a day  - Reposition patient every 2 hours    - Dangle patient 3 times a day  - Stand patient 3 times a day  - Ambulate patient 3 times a day  - Out of bed to chair 3 times a day   - Out of bed for meals 3 times a day  - Out of bed for toileting  - Record patient progress and toleration of activity level   Outcome: Progressing     Problem: DISCHARGE PLANNING  Goal: Discharge to home or other facility with appropriate resources  Description: INTERVENTIONS:  - Identify barriers to discharge w/patient and caregiver  - Arrange for needed discharge resources and transportation as appropriate  - Identify discharge learning needs (meds, wound care, etc )  - Arrange for interpretive services to assist at discharge as needed  - Refer to Case Management Department for coordinating discharge planning if the patient needs post-hospital services based on physician/advanced practitioner order or complex needs related to functional status, cognitive ability, or social support system  Outcome: Progressing     Problem: Knowledge Deficit  Goal: Patient/family/caregiver demonstrates understanding of disease process, treatment plan, medications, and discharge instructions  Description: Complete learning assessment and assess knowledge base    Interventions:  - Provide teaching at level of understanding  - Provide teaching via preferred learning methods  Outcome: Progressing

## 2023-04-28 ENCOUNTER — APPOINTMENT (OUTPATIENT)
Dept: RADIOLOGY | Facility: HOSPITAL | Age: 78
End: 2023-04-28

## 2023-04-28 PROBLEM — E11.9 TYPE 2 DIABETES MELLITUS (HCC): Status: ACTIVE | Noted: 2023-04-28

## 2023-04-28 LAB
ANION GAP SERPL CALCULATED.3IONS-SCNC: 2 MMOL/L (ref 4–13)
APTT PPP: 40 SECONDS (ref 23–37)
BASOPHILS # BLD AUTO: 0.02 THOUSANDS/ÂΜL (ref 0–0.1)
BASOPHILS NFR BLD AUTO: 0 % (ref 0–1)
BUN SERPL-MCNC: 20 MG/DL (ref 5–25)
CALCIUM SERPL-MCNC: 10 MG/DL (ref 8.3–10.1)
CHLORIDE SERPL-SCNC: 107 MMOL/L (ref 96–108)
CO2 SERPL-SCNC: 26 MMOL/L (ref 21–32)
CREAT SERPL-MCNC: 0.52 MG/DL (ref 0.6–1.3)
EOSINOPHIL # BLD AUTO: 0.08 THOUSAND/ÂΜL (ref 0–0.61)
EOSINOPHIL NFR BLD AUTO: 2 % (ref 0–6)
ERYTHROCYTE [DISTWIDTH] IN BLOOD BY AUTOMATED COUNT: 14.6 % (ref 11.6–15.1)
GFR SERPL CREATININE-BSD FRML MDRD: 92 ML/MIN/1.73SQ M
GLUCOSE SERPL-MCNC: 105 MG/DL (ref 65–140)
GLUCOSE SERPL-MCNC: 139 MG/DL (ref 65–140)
GLUCOSE SERPL-MCNC: 161 MG/DL (ref 65–140)
GLUCOSE SERPL-MCNC: 58 MG/DL (ref 65–140)
GLUCOSE SERPL-MCNC: 60 MG/DL (ref 65–140)
GLUCOSE SERPL-MCNC: 88 MG/DL (ref 65–140)
HCT VFR BLD AUTO: 32.8 % (ref 34.8–46.1)
HGB BLD-MCNC: 10.9 G/DL (ref 11.5–15.4)
IMM GRANULOCYTES # BLD AUTO: 0.03 THOUSAND/UL (ref 0–0.2)
IMM GRANULOCYTES NFR BLD AUTO: 1 % (ref 0–2)
INR PPP: 2.68 (ref 0.84–1.19)
LYMPHOCYTES # BLD AUTO: 0.95 THOUSANDS/ÂΜL (ref 0.6–4.47)
LYMPHOCYTES NFR BLD AUTO: 18 % (ref 14–44)
MCH RBC QN AUTO: 33.9 PG (ref 26.8–34.3)
MCHC RBC AUTO-ENTMCNC: 33.2 G/DL (ref 31.4–37.4)
MCV RBC AUTO: 102 FL (ref 82–98)
MONOCYTES # BLD AUTO: 0.58 THOUSAND/ÂΜL (ref 0.17–1.22)
MONOCYTES NFR BLD AUTO: 11 % (ref 4–12)
NEUTROPHILS # BLD AUTO: 3.7 THOUSANDS/ÂΜL (ref 1.85–7.62)
NEUTS SEG NFR BLD AUTO: 68 % (ref 43–75)
NRBC BLD AUTO-RTO: 0 /100 WBCS
PLATELET # BLD AUTO: 107 THOUSANDS/UL (ref 149–390)
PMV BLD AUTO: 10.6 FL (ref 8.9–12.7)
POTASSIUM SERPL-SCNC: 3.5 MMOL/L (ref 3.5–5.3)
PROTHROMBIN TIME: 28.7 SECONDS (ref 11.6–14.5)
RBC # BLD AUTO: 3.22 MILLION/UL (ref 3.81–5.12)
SODIUM SERPL-SCNC: 135 MMOL/L (ref 135–147)
WBC # BLD AUTO: 5.36 THOUSAND/UL (ref 4.31–10.16)

## 2023-04-28 RX ORDER — METHOCARBAMOL 500 MG/1
500 TABLET, FILM COATED ORAL EVERY 6 HOURS SCHEDULED
Status: DISCONTINUED | OUTPATIENT
Start: 2023-04-28 | End: 2023-04-29

## 2023-04-28 RX ORDER — OXYCODONE HYDROCHLORIDE 5 MG/1
5 TABLET ORAL EVERY 4 HOURS PRN
Status: DISCONTINUED | OUTPATIENT
Start: 2023-04-28 | End: 2023-05-13 | Stop reason: HOSPADM

## 2023-04-28 RX ADMIN — WARFARIN SODIUM 5 MG: 5 TABLET ORAL at 17:34

## 2023-04-28 RX ADMIN — DOCUSATE SODIUM 100 MG: 100 CAPSULE, LIQUID FILLED ORAL at 08:06

## 2023-04-28 RX ADMIN — OXYCODONE HYDROCHLORIDE 5 MG: 5 TABLET ORAL at 14:07

## 2023-04-28 RX ADMIN — METOPROLOL SUCCINATE 50 MG: 50 TABLET, EXTENDED RELEASE ORAL at 08:06

## 2023-04-28 RX ADMIN — ATORVASTATIN CALCIUM 40 MG: 40 TABLET, FILM COATED ORAL at 17:34

## 2023-04-28 RX ADMIN — HYDROMORPHONE HYDROCHLORIDE 0.2 MG: 0.2 INJECTION, SOLUTION INTRAMUSCULAR; INTRAVENOUS; SUBCUTANEOUS at 15:38

## 2023-04-28 RX ADMIN — ACETAMINOPHEN 975 MG: 325 TABLET ORAL at 21:01

## 2023-04-28 RX ADMIN — OXYBUTYNIN 5 MG: 5 TABLET, FILM COATED, EXTENDED RELEASE ORAL at 08:05

## 2023-04-28 RX ADMIN — INSULIN DETEMIR 10 UNITS: 100 INJECTION, SOLUTION SUBCUTANEOUS at 21:00

## 2023-04-28 RX ADMIN — INSULIN LISPRO 1 UNITS: 100 INJECTION, SOLUTION INTRAVENOUS; SUBCUTANEOUS at 21:01

## 2023-04-28 RX ADMIN — ACETAMINOPHEN 975 MG: 325 TABLET ORAL at 13:54

## 2023-04-28 RX ADMIN — FUROSEMIDE 40 MG: 40 TABLET ORAL at 08:06

## 2023-04-28 RX ADMIN — GABAPENTIN 100 MG: 100 CAPSULE ORAL at 21:02

## 2023-04-28 RX ADMIN — LIDOCAINE 5% 1 PATCH: 700 PATCH TOPICAL at 08:06

## 2023-04-28 RX ADMIN — STANDARDIZED SENNA CONCENTRATE 17.2 MG: 8.6 TABLET ORAL at 08:06

## 2023-04-28 RX ADMIN — METHOCARBAMOL 500 MG: 500 TABLET ORAL at 17:33

## 2023-04-28 RX ADMIN — OXYCODONE HYDROCHLORIDE 5 MG: 5 TABLET ORAL at 04:50

## 2023-04-28 RX ADMIN — MONTELUKAST 10 MG: 10 TABLET, FILM COATED ORAL at 21:01

## 2023-04-28 RX ADMIN — LISINOPRIL 5 MG: 5 TABLET ORAL at 08:06

## 2023-04-28 RX ADMIN — CITALOPRAM HYDROBROMIDE 20 MG: 20 TABLET ORAL at 08:06

## 2023-04-28 RX ADMIN — HYDROMORPHONE HYDROCHLORIDE 0.2 MG: 0.2 INJECTION, SOLUTION INTRAMUSCULAR; INTRAVENOUS; SUBCUTANEOUS at 08:06

## 2023-04-28 RX ADMIN — DILTIAZEM HYDROCHLORIDE 120 MG: 120 CAPSULE, COATED, EXTENDED RELEASE ORAL at 08:06

## 2023-04-28 RX ADMIN — DOCUSATE SODIUM 100 MG: 100 CAPSULE, LIQUID FILLED ORAL at 17:34

## 2023-04-28 RX ADMIN — ACETAMINOPHEN 975 MG: 325 TABLET ORAL at 05:11

## 2023-04-28 RX ADMIN — Medication 7.5 MG: at 20:59

## 2023-04-28 RX ADMIN — ALLOPURINOL 100 MG: 100 TABLET ORAL at 08:06

## 2023-04-28 NOTE — UTILIZATION REVIEW
Initial Clinical Review    Admission: Date/Time/Statement:   Admission Orders (From admission, onward)     Ordered        04/27/23 1449  Place in Observation  Once                      Orders Placed This Encounter   Procedures   • Place in Observation     Standing Status:   Standing     Number of Occurrences:   1     Order Specific Question:   Level of Care     Answer:   Med Surg [16]     Order Specific Question:   Bed Type     Answer:   Trauma [7]     ED Arrival Information     Expected   -    Arrival   4/27/2023 12:45    Acuity   Emergent            Means of arrival   Ambulance    Escorted by   [facility]    Service   Trauma    Admission type   Emergency            Arrival complaint   Trauma           Chief Complaint   Patient presents with   • Trauma       Initial Presentation: 68 y o  female to ED presents for mechanical Fall with left arm and right chest wall pain  Per pt, she was bending forward at the grocery store and continued to fall forward, striking her face on the ground  She believes her glasses cut her eyebrow  No LOC  On Coumadin for history of mechanical aortic valve  Per pt, she had balance issues at baseline  Uses cane for ambulation  Admit Observation level of care Fall with Humerus fracture, Fracture of multiple right side ribs, Laceration of right eyebrow, Acute pain due to trauma  Two 1 cm lacerations to R eyebrow, steri strips applied  Orthopedic consult  Incentive spirometry and pulmonary consult  Multimodal pain control  4/27  Orthopedic cons; S/p mechanical fall with Left proximal to midshaft humerus fracture  NWB LUE in coaptation splint  Analgesics for pain  Non operative management of left humeral fracture  4/28  Progress notes; Pain control  PT/OT for safe discharge plan  APS; Continue current regimen  Start Robaxin q8h       ED Triage Vitals   Temperature Pulse Respirations Blood Pressure SpO2   04/27/23 1255 04/27/23 1255 04/27/23 1255 04/27/23 1255 04/27/23 1255   97 8 °F (36 6 °C) 84 22 125/79 97 %      Temp Source Heart Rate Source Patient Position - Orthostatic VS BP Location FiO2 (%)   04/27/23 1255 04/27/23 1255 04/27/23 1255 -- --   Tympanic Monitor Lying        Pain Score       04/27/23 1428       6          Wt Readings from Last 1 Encounters:   04/27/23 85 3 kg (188 lb)     Additional Vital Signs:   04/28/23 08:04:32 -- 82 -- 119/68 85 96 % None (Room air) --   04/28/23 06:30:01 98 9 °F (37 2 °C) 80 18 96/59 71 95 % -- --   04/28/23 03:05:15 98 2 °F (36 8 °C) 78 12 95/60 72 96 % -- --   04/27/23 23:01:26 98 7 °F (37 1 °C) 79 14 96/60 72 94 % -- --   04/27/23 2000 -- -- -- -- -- -- None (Room air) --   04/27/23 19:58:35 99 4 °F (37 4 °C) 80 -- 98/59 72 90 % -- --   04/27/23 19:43:04 100 2 °F (37 9 °C) 81 12 88/51   Abnormal  63   Abnormal  91 % -- --   04/27/23 1919 -- -- -- -- -- 94 % None (Room air)      Pertinent Labs/Diagnostic Test Results:         Results from last 7 days   Lab Units 04/28/23  0635 04/27/23  1306 04/27/23  1303   WBC Thousand/uL 5 36 4 03*  --    HEMOGLOBIN g/dL 10 9* 12 2  --    I STAT HEMOGLOBIN g/dl  --   --  12 6   HEMATOCRIT % 32 8* 36 1  --    HEMATOCRIT, ISTAT %  --   --  37   PLATELETS Thousands/uL 107* 121*  --    NEUTROS ABS Thousands/µL 3 70 2 93  --          Results from last 7 days   Lab Units 04/28/23  0508 04/27/23  1306 04/27/23  1303   SODIUM mmol/L 135 134*  --    POTASSIUM mmol/L 3 5 4 2  --    CHLORIDE mmol/L 107 104  --    CO2 mmol/L 26 26  --    CO2, I-STAT mmol/L  --   --  28   ANION GAP mmol/L 2* 4  --    BUN mg/dL 20 23  --    CREATININE mg/dL 0 52* 0 82  --    EGFR ml/min/1 73sq m 92 69  --    CALCIUM mg/dL 10 0 10 0  --    CALCIUM, IONIZED, ISTAT mmol/L  --   --  1 23     Results from last 7 days   Lab Units 04/27/23  1306   AST U/L 34   ALT U/L 24   ALK PHOS U/L 123*   TOTAL PROTEIN g/dL 8 1   ALBUMIN g/dL 3 6   TOTAL BILIRUBIN mg/dL 0 97     Results from last 7 days   Lab Units 04/28/23  0801 04/28/23  0731 04/27/23 2049 04/27/23  1749   POC GLUCOSE mg/dl 88 58* 151* 120     Results from last 7 days   Lab Units 04/28/23  0508 04/27/23  1306   GLUCOSE RANDOM mg/dL 60* 121       Results from last 7 days   Lab Units 04/27/23  1303   PH, ROZINA I-STAT  7 422*   PCO2, ROZINA ISTAT mm HG 41 6*   PO2, ROZINA ISTAT mm HG 26 0*   HCO3, ROZINA ISTAT mmol/L 27 1   I STAT BASE EXC mmol/L 2   I STAT O2 SAT % 50*                 Results from last 7 days   Lab Units 04/28/23  0508 04/27/23  1306   PROTIME seconds 28 7* 31 3*   INR  2 68* 2 99*   PTT seconds 40*  --        ED Treatment:   Medication Administration from 04/27/2023 1244 to 04/27/2023 1710       Date/Time Order Dose Route Action     04/27/2023 1301 EDT HYDROmorphone (DILAUDID) injection 0 5 mg 0 5 mg Intravenous Given     04/27/2023 1329 EDT iohexol (OMNIPAQUE) 350 MG/ML injection (MULTI-DOSE) 100 mL 100 mL Intravenous Given     04/27/2023 1428 EDT acetaminophen (TYLENOL) tablet 975 mg 975 mg Oral Given        Past Medical History:   Diagnosis Date   • Asthma    • Diabetes ([facility] [address] )    • Hypertension      Present on Admission:  **None**      Admitting Diagnosis: Fall [W19  XXXA]  Fracture of multiple ribs of right side [S22 41XA]  Closed fracture of proximal end of right humerus, unspecified fracture morphology, initial encounter [S42 201A]  Other injury of unspecified body region, initial encounter [T14  8XXA]  Age/Sex: 68 y o  female     Admission Orders:  Scheduled Medications:  acetaminophen, 975 mg, Oral, Q8H DAVY  allopurinol, 100 mg, Oral, Daily  atorvastatin, 40 mg, Oral, Daily With Dinner  citalopram, 20 mg, Oral, Daily  diltiazem, 120 mg, Oral, Daily  docusate sodium, 100 mg, Oral, BID  furosemide, 40 mg, Oral, Daily  gabapentin, 100 mg, Oral, HS  insulin detemir, 10 Units, Subcutaneous, HS  insulin lispro, 1-5 Units, Subcutaneous, HS  insulin lispro, 1-6 Units, Subcutaneous, TID AC  lidocaine, 1 patch, Topical, Daily  lisinopril, 5 mg, Oral, Daily  metoprolol succinate, 50 mg, Oral, Daily  montelukast, 10 mg, Oral, HS  oxybutynin, 5 mg, Oral, Daily  senna, 2 tablet, Oral, Daily  [START ON 4/29/2023] warfarin, 2 5 mg, Oral, Once per day on Wed Sat  warfarin, 5 mg, Oral, Once per day on Sun Mon Tue Thu Fri      Continuous IV Infusions:     PRN Meds:  HYDROmorphone, 0 2 mg, Intravenous, Q4H PRN  naloxone, 0 04 mg, Intravenous, Q1MIN PRN  ondansetron, 4 mg, Intravenous, Q6H PRN  oxyCODONE, 5 mg, Oral, Q4H PRN  oxyCODONE, 2 5 mg, Oral, Q4H PRN      Neuro checks q4h  IP CONSULT TO ORTHOPEDIC SURGERY  IP CONSULT TO GERONTOLOGY  IP CONSULT TO CASE MANAGEMENT  IP CONSULT TO ACUTE PAIN SERVICE    Network Utilization Review Department  ATTENTION: Please call with any questions or concerns to [phone] and carefully listen to the prompts so that you are directed to the right person  All voicemails are confidential   Merle Eugene all requests for admission clinical reviews, approved or denied determinations and any other requests to dedicated fax number below belonging to the campus where the patient is receiving treatment   List of dedicated fax numbers for the Facilities:  [address] [place] [address] DENIALS (Administrative/Medical Necessity) [phone]   [address] (Maternity/NICU/Pediatrics) [phone]   [address] [phone]   [facility] 77 [phone]   [address] [place] [address] Umair Oakes 28 [phone]   [facility] 134 [address] [facility] [phone]

## 2023-04-28 NOTE — PROGRESS NOTES
Progress Note - Orthopedics   Emma Lopez 68 y o  female MRN: [id number]  [facility]      Subjective:    68 y  o female with left proximal humerus fracture with extension into humeral shaft  No acute events, no new orthopedic complaints  Patient doing well  Pain well controlled  Patient requesting to be discharged home  Patient describing gas pains on exam this morning      Labs:  0   Lab Value Date/Time    HCT 36 1 04/27/2023 1306    HCT 37 04/27/2023 1303    HGB 12 2 04/27/2023 1306    HGB 12 6 04/27/2023 1303    INR 2 99 (H) 04/27/2023 1306    WBC 4 03 (L) 04/27/2023 1306       Meds:    Current Facility-Administered Medications:   •  acetaminophen (TYLENOL) tablet 975 mg, 975 mg, Oral, Q8H [address], Nirali Fuller PA-C, 975 mg at 04/27/23 2133  •  [START ON 4/28/2023] allopurinol (ZYLOPRIM) tablet 100 mg, 100 mg, Oral, Daily, Nirali Fuller PA-C  •  atorvastatin (LIPITOR) tablet 40 mg, 40 mg, Oral, Daily With Dinner, Brittany Fuller PA-C, 40 mg at 04/27/23 1747  •  [START ON 4/28/2023] citalopram (CeleXA) tablet 20 mg, 20 mg, Oral, Daily, Nirali Fuller PA-C  •  [START ON 4/28/2023] diltiazem (CARDIZEM CD) 24 hr capsule 120 mg, 120 mg, Oral, Daily, Nirali Fuller PA-C  •  docusate sodium (COLACE) capsule 100 mg, 100 mg, Oral, BID, Nirali Fuller PA-C, 100 mg at 04/27/23 1747  •  [START ON 4/28/2023] furosemide (LASIX) tablet 40 mg, 40 mg, Oral, Daily, Nirali Fuller PA-C  •  gabapentin (NEURONTIN) capsule 100 mg, 100 mg, Oral, HS, Nirali Fuller PA-C, 100 mg at 04/27/23 2135  •  HYDROmorphone HCl (DILAUDID) injection 0 2 mg, 0 2 mg, Intravenous, Q4H PRN, Jean Marie Shaw PA-C  •  insulin detemir (LEVEMIR) subcutaneous injection 10 Units, 10 Units, Subcutaneous, HS, Nirali Fuller PA-C, 10 Units at 04/27/23 2135  •  insulin lispro (HumaLOG) 100 units/mL subcutaneous injection 1-5 Units, 1-5 Units, Subcutaneous, HS, Jasmeet Bang PA-C, 1 Units at 04/27/23 2136  •  insulin lispro (HumaLOG) 100 units/mL subcutaneous injection 1-6 Units, 1-6 Units, Subcutaneous, TID AC **AND** Fingerstick Glucose (POCT), , , TID AC, Nirali Arreguin PA-C  •  [START ON 4/28/2023] insulin lispro (HumaLOG) 100 units/mL subcutaneous injection 3 Units, 3 Units, Subcutaneous, Daily With Breakfast, Nirali Fuller PA-C  •  insulin lispro (HumaLOG) 100 units/mL subcutaneous injection 3 Units, 3 Units, Subcutaneous, Daily With Lunch, Aure Fuller PA-C  •  insulin lispro (HumaLOG) 100 units/mL subcutaneous injection 3 Units, 3 Units, Subcutaneous, Daily With Dinner, Aure Fuller PA-C  •  lidocaine (LIDODERM) 5 % patch 1 patch, 1 patch, Topical, Daily, Nirali Fuller PA-C, 1 patch at 04/27/23 1747  •  [START ON 4/28/2023] lisinopril (ZESTRIL) tablet 5 mg, 5 mg, Oral, Daily, Nirali Fuller PA-C  •  [START ON 4/28/2023] metoprolol succinate (TOPROL-XL) 24 hr tablet 50 mg, 50 mg, Oral, Daily, Nirali Fuller PA-C  •  [START ON 4/28/2023] montelukast (SINGULAIR) tablet 10 mg, 10 mg, Oral, HS, Nirali Fuller PA-C  •  naloxone (NARCAN) 0 04 mg/mL syringe 0 04 mg, 0 04 mg, Intravenous, Q1MIN PRN, Jasmeet Bang PA-C  •  ondansetron [facility]) injection 4 mg, 4 mg, Intravenous, Q6H PRN, Jasmeet Bang PA-C  •  [START ON 4/28/2023] oxybutynin (DITROPAN-XL) 24 hr tablet 5 mg, 5 mg, Oral, Daily, Nirali Fuller PA-C  •  oxyCODONE (ROXICODONE) IR tablet 5 mg, 5 mg, Oral, Q4H PRN, Jasmeet Bang PA-C, 5 mg at 04/27/23 1747  •  oxyCODONE (ROXICODONE) split tablet 2 5 mg, 2 5 mg, Oral, Q4H PRN, Jasmeet Bang PA-C  •  senna (SENOKOT) tablet 17 2 mg, 2 tablet, Oral, Daily, Nirali Fuller PA-C  •  [START ON 4/29/2023] warfarin (COUMADIN) tablet 2 5 mg, 2 5 mg, Oral, Once per day on Wed Sat, Angle Pierson MD  •  warfarin (COUMADIN) tablet 5 mg, 5 mg, Oral, Once per day on Sun Mon Tue Thu Fri, Cherylene Bishop, MD, 5 mg at 04/27/23 2133    Blood Culture:   No results found for: BLOODCX    Wound Culture:   No results found for: WOUNDCULT    Ins and Outs:  No intake/output data recorded  Physical:  Vitals:    04/27/23 1958   BP: 98/59   Pulse: 80   Resp:    Temp: 99 4 °F (37 4 °C)   SpO2: 90%     Musculoskeletal: left Upper Extremity  · Skin intact surrounding coaptation splint  No erythema or ecchymosis  · Dressing C/D/I  · TTP proximal humerus and shoulder  · Sensation intact to median/radial/ulnar nerve distribution   · Motor intact anterior interosseous nerve/posterior interosseous nerve/median/radial/ulnar nerve distributions  · Digits warm and well perfused  · Capillary refill < 2 seconds    Assessment:    68 y  o female with left proximal humerus fracture with extension into humeral shaft  Patient doing well  Patient will benefit from non-operative management of this fracture      Plan:  · NWB LUE in coaptation splint  · PT/OT  · Pain control  · DVT ppx per primary  · Rest of care per primary  · Dispo: Ortho will follow    Jessica Morris MD

## 2023-04-28 NOTE — PLAN OF CARE
Problem: Potential for Falls  Goal: Patient will remain free of falls  Description: INTERVENTIONS:  - Educate patient/family on patient safety including physical limitations  - Instruct patient to call for assistance with activity   - Consult OT/PT to assist with strengthening/mobility   - Keep Call bell within reach  - Keep bed low and locked with side rails adjusted as appropriate  - Keep care items and personal belongings within reach  - Initiate and maintain comfort rounds  - Make Fall Risk Sign visible to staff  - Offer Toileting every 2 Hours, in advance of need  - Initiate/Maintain bed alarm  - Obtain necessary fall risk management equipment:   - Apply yellow socks and bracelet for high fall risk patients  - Consider moving patient to room near nurses station  Outcome: Progressing     Problem: RESPIRATORY - ADULT  Goal: Achieves optimal ventilation and oxygenation  Description: INTERVENTIONS:  - Assess for changes in respiratory status  - Assess for changes in mentation and behavior  - Position to facilitate oxygenation and minimize respiratory effort  - Oxygen administered by appropriate delivery if ordered  - Initiate smoking cessation education as indicated  - Encourage broncho-pulmonary hygiene including cough, deep breathe, Incentive Spirometry  - Assess the need for suctioning and aspirate as needed  - Assess and instruct to report SOB or any respiratory difficulty  - Respiratory Therapy support as indicated  Outcome: Progressing     Problem: SKIN/TISSUE INTEGRITY - ADULT  Goal: Incision(s), wounds(s) or drain site(s) healing without S/S of infection  Description: INTERVENTIONS  - Assess and document dressing, incision, wound bed, drain sites and surrounding tissue  - Provide patient and family education  - Perform skin care/dressing changes every shift  Outcome: Progressing     Problem: MUSCULOSKELETAL - ADULT  Goal: Maintain or return mobility to safest level of function  Description: INTERVENTIONS:  - Assess patient's ability to carry out ADLs; assess patient's baseline for ADL function and identify physical deficits which impact ability to perform ADLs (bathing, care of mouth/teeth, toileting, grooming, dressing, etc )  - Assess/evaluate cause of self-care deficits   - Assess range of motion  - Assess patient's mobility  - Assess patient's need for assistive devices and provide as appropriate  - Encourage maximum independence but intervene and supervise when necessary  - Involve family in performance of ADLs  - Assess for home care needs following discharge   - Consider OT consult to assist with ADL evaluation and planning for discharge  - Provide patient education as appropriate  Outcome: Progressing  Goal: Maintain proper alignment of affected body part  Description: INTERVENTIONS:  - Support, maintain and protect limb and body alignment  - Provide patient/ family with appropriate education  Outcome: Progressing     Problem: PAIN - ADULT  Goal: Verbalizes/displays adequate comfort level or baseline comfort level  Description: Interventions:  - Encourage patient to monitor pain and request assistance  - Assess pain using appropriate pain scale  - Administer analgesics based on type and severity of pain and evaluate response  - Implement non-pharmacological measures as appropriate and evaluate response  - Consider cultural and social influences on pain and pain management  - Notify physician/advanced practitioner if interventions unsuccessful or patient reports new pain  Outcome: Progressing     Problem: SAFETY ADULT  Goal: Patient will remain free of falls  Description: INTERVENTIONS:  - Educate patient/family on patient safety including physical limitations  - Instruct patient to call for assistance with activity   - Consult OT/PT to assist with strengthening/mobility   - Keep Call bell within reach  - Keep bed low and locked with side rails adjusted as appropriate  - Keep care items and personal belongings within reach  - Initiate and maintain comfort rounds  - Make Fall Risk Sign visible to staff  - Offer Toileting every 2 Hours, in advance of need  - Initiate/Maintain bed alarm  - Obtain necessary fall risk management equipment:   - Apply yellow socks and bracelet for high fall risk patients  - Consider moving patient to room near nurses station  Outcome: Progressing  Goal: Maintain or return to baseline ADL function  Description: INTERVENTIONS:  -  Assess patient's ability to carry out ADLs; assess patient's baseline for ADL function and identify physical deficits which impact ability to perform ADLs (bathing, care of mouth/teeth, toileting, grooming, dressing, etc )  - Assess/evaluate cause of self-care deficits   - Assess range of motion  - Assess patient's mobility; develop plan if impaired  - Assess patient's need for assistive devices and provide as appropriate  - Encourage maximum independence but intervene and supervise when necessary  - Involve family in performance of ADLs  - Assess for home care needs following discharge   - Consider OT consult to assist with ADL evaluation and planning for discharge  - Provide patient education as appropriate  Outcome: Progressing  Goal: Maintains/Returns to pre admission functional level  Description: INTERVENTIONS:  - Perform BMAT or MOVE assessment daily    - Set and communicate daily mobility goal to care team and patient/family/caregiver  - Collaborate with rehabilitation services on mobility goals if consulted  - Perform Range of Motion 3 times a day  - Reposition patient every 2 hours    - Dangle patient 3 times a day  - Stand patient 3 times a day  - Ambulate patient 3 times a day  - Out of bed to chair 3 times a day   - Out of bed for meals 3 times a day  - Out of bed for toileting  - Record patient progress and toleration of activity level   Outcome: Progressing     Problem: DISCHARGE PLANNING  Goal: Discharge to home or other facility with appropriate resources  Description: INTERVENTIONS:  - Identify barriers to discharge w/patient and caregiver  - Arrange for needed discharge resources and transportation as appropriate  - Identify discharge learning needs (meds, wound care, etc )  - Arrange for interpretive services to assist at discharge as needed  - Refer to Case Management Department for coordinating discharge planning if the patient needs post-hospital services based on physician/advanced practitioner order or complex needs related to functional status, cognitive ability, or social support system  Outcome: Progressing     Problem: Knowledge Deficit  Goal: Patient/family/caregiver demonstrates understanding of disease process, treatment plan, medications, and discharge instructions  Description: Complete learning assessment and assess knowledge base    Interventions:  - Provide teaching at level of understanding  - Provide teaching via preferred learning methods  Outcome: Progressing     Problem: MOBILITY - ADULT  Goal: Maintain or return to baseline ADL function  Description: INTERVENTIONS:  -  Assess patient's ability to carry out ADLs; assess patient's baseline for ADL function and identify physical deficits which impact ability to perform ADLs (bathing, care of mouth/teeth, toileting, grooming, dressing, etc )  - Assess/evaluate cause of self-care deficits   - Assess range of motion  - Assess patient's mobility; develop plan if impaired  - Assess patient's need for assistive devices and provide as appropriate  - Encourage maximum independence but intervene and supervise when necessary  - Involve family in performance of ADLs  - Assess for home care needs following discharge   - Consider OT consult to assist with ADL evaluation and planning for discharge  - Provide patient education as appropriate  Outcome: Progressing  Goal: Maintains/Returns to pre admission functional level  Description: INTERVENTIONS:  - Perform BMAT or MOVE assessment daily    - Set and communicate daily mobility goal to care team and patient/family/caregiver  - Collaborate with rehabilitation services on mobility goals if consulted  - Perform Range of Motion 3 times a day  - Reposition patient every 2 hours    - Dangle patient 3 times a day  - Stand patient 3 times a day  - Ambulate patient 3 times a day  - Out of bed to chair 3 times a day   - Out of bed for meals 3 times a day  - Out of bed for toileting  - Record patient progress and toleration of activity level   Outcome: Progressing

## 2023-04-28 NOTE — PLAN OF CARE
Problem: Potential for Falls  Goal: Patient will remain free of falls  Description: INTERVENTIONS:  - Educate patient/family on patient safety including physical limitations  - Instruct patient to call for assistance with activity   - Consult OT/PT to assist with strengthening/mobility   - Keep Call bell within reach  - Keep bed low and locked with side rails adjusted as appropriate  - Keep care items and personal belongings within reach  - Initiate and maintain comfort rounds  - Make Fall Risk Sign visible to staff  - Offer Toileting every two hours, in advance of need  - Initiate/Maintain bed  alarm  - Obtain necessary fall risk management equipment  - Apply yellow socks and bracelet for high fall risk patients  - Consider moving patient to room near nurses station  Outcome: Progressing     Problem: PAIN - ADULT  Goal: Verbalizes/displays adequate comfort level or baseline comfort level  Description: Interventions:  - Encourage patient to monitor pain and request assistance  - Assess pain using appropriate pain scale  - Administer analgesics based on type and severity of pain and evaluate response  - Implement non-pharmacological measures as appropriate and evaluate response  - Consider cultural and social influences on pain and pain management  - Notify physician/advanced practitioner if interventions unsuccessful or patient reports new pain  Outcome: Progressing     Problem: INFECTION - ADULT  Goal: Absence or prevention of progression during hospitalization  Description: INTERVENTIONS:  - Assess and monitor for signs and symptoms of infection  - Monitor lab/diagnostic results  - Monitor all insertion sites, i e  indwelling lines, tubes, and drains  - [place] appropriate cooling/warming therapies per order  - Administer medications as ordered  - Instruct and encourage patient and family to use good hand hygiene technique  - Identify and instruct in appropriate isolation precautions for identified infection/condition  Outcome: Progressing     Problem: RESPIRATORY - ADULT  Goal: Achieves optimal ventilation and oxygenation  Description: INTERVENTIONS:  - Assess for changes in respiratory status  - Assess for changes in mentation and behavior  - Position to facilitate oxygenation and minimize respiratory effort  - Oxygen administered by appropriate delivery if ordered  - Initiate smoking cessation education as indicated  - Encourage broncho-pulmonary hygiene including cough, deep breathe, Incentive Spirometry  - Assess and instruct to report SOB or any respiratory difficulty  - Respiratory Therapy support as indicated  Outcome: Progressing

## 2023-04-28 NOTE — OCCUPATIONAL THERAPY NOTE
Occupational Therapy Evaluation     Patient Name: Elayne Yoon  YCAMF'U Date: 4/28/2023  Problem List  Active Problems:    Fall    Humerus fracture    Acute pain due to trauma    H/O mechanical aortic valve replacement    Laceration of right eyebrow    Fracture of multiple ribs of right side    Type 2 diabetes mellitus ([facility] )    Past Medical History  Past Medical History:   Diagnosis Date    Asthma     Diabetes ([facility] )     Hypertension      Past Surgical History  Past Surgical History:   Procedure Laterality Date    MECHANICAL AORTIC VALVE REPLACEMENT      REPLACEMENT TOTAL KNEE           04/28/23 1425   OT Last Visit   OT Visit Date 04/28/23   Note Type   Note type Evaluation   Pain Assessment   Pain Assessment Tool 0-10   Pain Score 10 - Worst Possible Pain   Pain Location/Orientation Orientation: Left; Location: Shoulder; Location: Rib Cage   Hospital Pain Intervention(s) Repositioned; Ambulation/increased activity; Emotional support;Relaxation technique   Restrictions/Precautions   Weight Bearing Precautions Per Order Yes   RUE Weight Bearing Per Order WBAT   LUE Weight Bearing Per Order (S)  NWB   RLE Weight Bearing Per Order WBAT   LLE Weight Bearing Per Order WBAT   Braces or Orthoses Sling;Splint   Other Precautions Fall Risk;Pain;WBS;Multiple lines   Home Living   Type of [facility] One level   Additional Comments pt reports she has her own single level apt but frequently stays with s/o in 3 story home with 2nd floor bed/bath   Prior Function   Level of [place] Independent with ADLs; Independent with functional mobility; Independent with IADLS   Lives With Alone   Receives Help From Family   IADLs Independent with meal prep; Independent with medication management   Falls in the last 6 months 1 to 4   Vocational Retired   Comments pt lives alone in her own apt however reports she frequently stays with s/o in multilevel home with 2nd floor bed/bath   Lifestyle   Autonomy I adls and mobility - "i iadls - shares homemaking with s/o   Reciprocal Relationships supportive family - reports she has 2 local dtrs and her s/o also has local dtr who are able to assist   Service to Others retired   Intrinsic Gratification enjoys spending time with friends   Subjective   Subjective \"I'm a 10 again\"   ADL   Eating Assistance 5  Supervision/Setup   Grooming Assistance 4  Minimal Assistance   UB Bathing Assistance 2  Maximal Assistance   LB [address] 2  Maximal Assistance   [address] 2  Maximal Assistance    [facility] 2  Maximal [address]  2  Maximal Assistance   Bed Mobility   Additional Comments oob in chair   Transfers   Sit to Stand 3  Moderate assistance   Stand to Sit 3  Moderate assistance   Functional Mobility   Functional Mobility 3  Moderate assistance   Additional items SPC   Balance   Static Sitting Fair   Dynamic Sitting Fair -   Static Standing Poor +   Dynamic Standing Poor   Ambulatory Poor   Activity Tolerance   Activity Tolerance Patient limited by fatigue;Patient limited by pain   RUE Assessment   RUE Assessment WFL   LUE Assessment   LUE Assessment X  (in splint/sling)   Cognition   Overall Cognitive Status WFL   Assessment   Limitation Decreased ADL status; Decreased UE ROM; Decreased endurance;Decreased self-care trans;Decreased high-level ADLs; Non-func L UE   Prognosis Good   Assessment Pt is a 68 y o  female who was admitted to [facility] on 4/27/2023 with L humeral fx and L rib fx s/p fall   Pt's problem list also includes PMH of DM, HTN, previous surgery and asthma  At baseline pt was completing adls and mobility independently with use of SPC - I iadls  Pt lives alone in apt however spends majority of her time with s/o in 3 story home with 2nd floor bed/bath  Currently pt requires max assist for overall ADLS and mod assist for functional mobility/transfers   Pt currently presents with impairments in the following categories -steps " to enter environment, difficulty performing ADLS, difficulty performing IADLS  and environment activity tolerance, endurance, standing balance/tolerance, UE strength and UE ROM  These impairments, as well as pt's fatigue, pain, orthopedic restricitions , WBS , risk for falls and home environment  limit pt's ability to safely engage in all baseline areas of occupation, includingbathing, dressing, toileting, functional mobility/transfers, community mobility, laundry , house maintenance, meal prep, cleaning, social participation  and leisure activities  From OT standpoint, recommend inpt rehab upon D/C  OT will continue to follow to address the below stated goals  Goals   Patient Goals have less pain   LTG Time Frame 10-14   Long Term Goal #1 refer to established goals below   Plan   Treatment Interventions ADL retraining;Functional transfer training;UE strengthening/ROM; Endurance training;Patient/family training;Equipment evaluation/education; Compensatory technique education; Activityengagement   Goal Expiration Date 05/12/23   OT Frequency 2-3x/wk   Recommendation   OT Discharge Recommendation Post acute rehabilitation services   AM-PAC Daily Activity Inpatient   Lower Body Dressing 2   Bathing 2   Toileting 2   Upper Body Dressing 2   Grooming 3   Eating 3   Daily Activity Raw Score 14   Daily Activity Standardized Score (Calc for Raw Score >=11) 33 39   AM-PAC Applied Cognition Inpatient   Following a Speech/Presentation 4   Understanding Ordinary Conversation 4   Taking Medications 4   Remembering Where Things Are Placed or Put Away 4   Remembering List of 4-5 Errands 3   Taking Care of Complicated Tasks 3   Applied Cognition Raw Score 22   Applied Cognition Standardized Score 47 83   End of Consult   Education Provided Yes   Patient Position at End of Consult Bedside chair; All needs within reach   Nurse Communication Nurse aware of consult       OCCUPATIONAL THERAPY GOALS:      *S feeding/grooming after setup  *Min a adls after setup with use of 1 handed techniques PRN  *Min a toileting and clothing management  *Min a bed mobility with fair to fair+ sitting balance on EOB to engage in light grooming/self care and enjoyable activities  *Min a transfers on/off all surfaces with fair to fair+ balance/safety  *Demonstrate fair to fair+ carryover with safe use of AD and carryover with NWB LUE during functional tasks  *Increase dynamic balance to fair for improved safety during participation in adls and iadl tasks   *Increase activity tolerance to 25-30 min for participation in adls and enjoyable activities  *Assess DME needs  *Assist with safe d/c recommendations        The patient's raw score on the AM-PAC Daily Activity Inpatient Short Form is 14  A raw score of less than 19 suggests the patient may benefit from discharge to post-acute rehabilitation services  Please refer to the recommendation of the Occupational Therapist for safe discharge planning        [facility]

## 2023-04-28 NOTE — PROGRESS NOTES
Progress Note - Geriatric Medicine   Corazon Francis 68 y o  female MRN: [id number]  Unit/Bed#: [facility] 629-01 Encounter: [id number]      Assessment/Plan:    Ambulatory dysfunction with fall  -reportedly mechanical fall 4/27  -injuries as below   -injuries as outlined below  -at increased risk future falls, cont fall precautions  -PT/OT following     Right sided rib fractures (8 and 9)  -s/p fall as above  -noted on CT chest obtained on admission  -no pneumothorax noted  -currently requiring supplemental O2 not required at baseline   -continue acute pain  -encourage aggressive pulm toilet and ISS    Left humerus fracture   -s/p fall as above  -s/p reduction and splint by Ortho on admit  -NWB LUE in splint and sling  -Ortho rec non-op management   -acute pain control  -neurovascular checks per protocol     Acute pain due to trauma   -continue acute multimodal pain control  -Tylenol scheduled, Oxy 5mg and 7 5mg Q4H PRN  -dilaudid for breakthrough  -recommend addition of robaxin for antispasmodic effect  -bowel regimen to re    Hx mechanical aortic valve replacement   -maintained on chronic systemic anticoagulation with coumadin   -INR 2 68    Impaired Vision  -recommend use of corrective lenses at all appropriate times  -encourage adequate lighting and encourage use of assistance with ambulation  -keep personal belongings close to person to avoid reaching  -encourage appropriate footwear at all times  -consider large font for printed materials provided to patient     Delirium precautions  -Patient is high risk of delirium due to age, fall, traumatic injuries, acute pain, hospitalization   -continue delirium precautions  -maintain normal sleep/wake cycle  -minimize overnight interruptions, group overnight vitals/labs/nursing checks as possible  -dim lights, close blinds and turn off tv to minimize stimulation and encourage sleep environment in evenings  -ensure that pain is well controlled   -monitor for fecal and urinary "retention which may precipitate delirium  -encourage early mobilization and ambulation with assist once cleared to safely do so   -provide frequent reorientation and redirection as indicated and appropriate  -Minimize use of medications which may precipitate or worsen delirium such as tramadol, benzodiazepine, anticholinergics, and benadryl whenever possible  -encourage hydration and nutrition   -redirect unwanted behaviors as first line    Frailty syndrome in geriatric patient  -Clinical frailty scale stage IV, vulnerable   -Multifactorial including age, DM-II, hx valve replacement and multiple chronic medical co-morbidities now fall and traumatic injuries in elderly individual with limited physiologic and metabolic reserves   -Continue optimization of chronic conditions and address acute metabolic derangements as arise     Care coordination: rounded with Elysia Perdomo (RN) and Destini Hines (Trauma AP)    Subjective:     Jennifer Molina is seen and examined at bedside, she reports severe right sided rib pain and spasm worse with deep breathing and repositioning with minimal improvement on current analgesic regimen  Review of Systems   Constitutional: Negative  Negative for appetite change, chills and fever  HENT: Negative  Eyes: Negative  Respiratory: Positive for shortness of breath  Cardiovascular: Negative  Gastrointestinal: Negative  Genitourinary: Negative  Musculoskeletal: Positive for arthralgias and gait problem  Left arm and right rib pain   Skin: Negative  Neurological: Positive for weakness (generalized )  Hematological: Bruises/bleeds easily (due to coumadin )  Psychiatric/Behavioral: Positive for sleep disturbance (due to pain)  All other systems reviewed and are negative  Objective:     Vitals: Blood pressure 112/75, pulse 80, temperature (!) 97 3 °F (36 3 °C), resp  rate 19, height 4' 10 5\" (1 486 m), weight 85 3 kg (188 lb), SpO2 97 %  ,Body mass index is 38 62 " kg/m²  Intake/Output Summary (Last 24 hours) at 4/28/2023 1514  Last data filed at 4/28/2023 1256  Gross per 24 hour   Intake 960 ml   Output --   Net 960 ml     Current Medications: Reviewed    Physical Exam:   Physical Exam  Vitals and nursing note reviewed  Constitutional:       Comments: Elderly female appears unvomfortable   HENT:      Head: Normocephalic  Comments: Extensive right facial ecchymosis      Nose: Nose normal       Comments: O2 via NC      Mouth/Throat:      Mouth: Mucous membranes are dry  Comments: Dentition in tact   Eyes:      General:         Right eye: No discharge  Left eye: No discharge  Conjunctiva/sclera: Conjunctivae normal    Neck:      Comments: Trachea midline, phonation normal  Cardiovascular:      Rate and Rhythm: Normal rate  Pulmonary:      Comments: Shallow respirations   Abdominal:      General: Bowel sounds are normal       Palpations: Abdomen is soft  Tenderness: There is no abdominal tenderness  Musculoskeletal:      Cervical back: Neck supple  Right lower leg: Edema present  Left lower leg: Edema present  Comments: LUE in sling    Skin:     General: Skin is warm and dry  Neurological:      Mental Status: She is alert  Mental status is at baseline  Comments: Awake and alert, oriented, answers questions appropriately   Psychiatric:      Comments: Mood and affect appropriate for circumstances         Invasive Devices     Peripheral Intravenous Line  Duration           Peripheral IV 04/27/23 Right Antecubital 1 day              Lab, Imaging and other studies: I have personally reviewed pertinent reports

## 2023-04-28 NOTE — PHYSICAL THERAPY NOTE
"Physical Therapy Evaluation     Patient's Name: Elayne Yoon    Admitting Diagnosis  Fall [W19  XXXA]  Fracture of multiple ribs of right side [S22 41XA]  Closed fracture of proximal end of right humerus, unspecified fracture morphology, initial encounter [S42 201A]  Other injury of unspecified body region, initial encounter [T14  8XXA]    Problem List  Patient Active Problem List   Diagnosis    Fall    Humerus fracture    Acute pain due to trauma    H/O mechanical aortic valve replacement    Laceration of right eyebrow    Fracture of multiple ribs of right side    Type 2 diabetes mellitus ([facility] [address] )       Past Medical History  Past Medical History:   Diagnosis Date    Asthma     Diabetes ([facility] [address] )     Hypertension        Past Surgical History  Past Surgical History:   Procedure Laterality Date    MECHANICAL AORTIC VALVE REPLACEMENT      REPLACEMENT TOTAL KNEE          04/28/23 1426   PT Last Visit   PT Visit Date 04/28/23   Note Type   Note type Evaluation   Pain Assessment   Pain Assessment Tool 0-10   Pain Score 10 - Worst Possible Pain   Pain Location/Orientation Orientation: Left; Location: Shoulder; Location: Rib Cage   Hospital Pain Intervention(s) Repositioned; Ambulation/increased activity   Restrictions/Precautions   Weight Bearing Precautions Per Order Yes   LUE Weight Bearing Per Order (S)  NWB   Braces or Orthoses Sling;Splint   Other Precautions Fall Risk;Pain   Home Living   Type of [address] One level   Prior Function   Level of [place] Independent with functional mobility   Lives With [facility] [address] in the last 6 months 1 to 4   Vocational Retired   Comments pt reports she has an apt without LIZ and on single floor   Reports she frequently stays with s/o in 3 sotry house with 2nd floor setup   General   Family/Caregiver Present No   Subjective   Subjective Pt willing and agreeable to PT sesssion \"I just wish the pain did not creep back up to 10\"   RLE Assessment " RLE Assessment   (grossly 3/5 with movement)   LLE Assessment   LLE Assessment   (grossly 3/5 with movement)   Coordination   Movements are Fluid and Coordinated 0   Coordination and Movement Description slow and guarded   Bed Mobility   Additional Comments Pt found resting in chair, left resting in chair as requested   Transfers   Sit to Stand 3  Moderate assistance   Stand to Sit 3  Moderate assistance   Ambulation/Elevation   Gait pattern Short stride; Excessively slow;Decreased foot clearance;R Foot drag;L Foot drag; Forward Flexion; Wide NAZIA;Shuffling   Gait Assistance 3  Moderate assist   Additional items Assist x 1   Assistive Device Straight cane   Distance 10   Balance   Static Sitting Fair   Dynamic Sitting Fair -   Static Standing Poor +   Dynamic Standing Poor   Ambulatory Poor   Endurance Deficit   Endurance Deficit Yes   Endurance Deficit Description limited by pain, SOB, [place], fatigue, and weakness compared to baseline mobility   Activity Tolerance   Activity Tolerance Patient limited by fatigue;Patient limited by pain   Medical Staff Made Aware OT and CM for D/C planning   Nurse Made Aware yes, nsg gave clearance to work with pt   Assessment   Prognosis Good   Problem List Decreased strength;Decreased range of motion;Decreased endurance; Impaired balance;Decreased mobility; Decreased coordination;Decreased cognition;Decreased safety awareness;Pain;Orthopedic restrictions   Assessment Pt is 68 y o  female seen for PT evaluation s/p admit to [facility]/27/2023 w/ fall with humerus fx and multiple rib fx  PT consulted to assess pt's functional mobility and d/c needs  Order placed for PT eval and tx, w/ up w/ A and NWB LUE order  Comorbidities affecting pt's physical performance at time of assessment include:  has a past medical history of Asthma, Diabetes ([address] ), and Hypertension  PTA, pt was has single level apartment although normally stays with s/o in 3 story home with 2nd floor setup  Personal factors affecting pt at time of IE include: inaccessible home environment, ambulating w/ assistive device, stairs to enter home, inability to ambulate household distances, unable to perform dynamic tasks in community, positive fall history, decreased initiation and engagement, impulsivity, unable to perform physical activity, limited insight into impairments, inability to perform IADLs and inability to perform ADLs  Please find objective findings from PT assessment regarding body systems outlined above with impairments and limitations including weakness, decreased ROM, impaired balance, decreased endurance, impaired coordination, gait deviations, pain, decreased activity tolerance, decreased functional mobility tolerance, decreased safety awareness, fall risk, SOB upon exertion and decreased cognition  Pt required increased time to complete transfers with decreased strength and balance  Ambulated with very slow, moderately unsteady gait  Noted decreased foot clearance, forward flexion, and hip ER  Activity tolerance limited by KING[place]ON and pain  May benefit from trial with more supportive one handed device as pt requires significant A with SPC  The following objective measures performed on IE also reveal limitations: The patient's AM-PAC Basic Mobility Inpatient Short Form Raw Score is 12, Standardized Score is 32 23  A standardized score less than 42 9 suggests the patient may benefit from discharge to post-acute rehabilitation services  Please also refer to the recommendation of the Physical Therapist for safe discharge planning  Pt's clinical presentation is currently unstable/unpredictable seen in pt's presentation of pain, ongoing medical workup  Pt to benefit from continued PT tx to address deficits as defined above and maximize level of functional independent mobility and consistency   From PT/mobility standpoint, recommendation at time of d/c would be post acute rehabilitation services pending progress Barriers to Discharge Inaccessible home environment;Decreased caregiver support   Goals   Patient Goals To decrease pain   STG Expiration Date 05/10/23   Short Term Goal #1 1  Complete bed mobility and transfers I to decrease need for caregiver in home  2  Ambulate 300' I to complete household and community mobility without A  3  Improve dynamic balance to good to decrease need for UE support during ambulation  4  Be educated & demonstate 12 steps to be able to enter home without A  Plan   Treatment/Interventions OT; Spoke to nursing;Spoke to case management;Gait training;Bed mobility; Patient/family training; Endurance training;LE strengthening/ROM; Functional transfer training   PT Frequency 3-5x/wk   Recommendation   PT Discharge Recommendation Post acute rehabilitation services   Equipment Recommended   (TBD)   AM-PAC Basic Mobility Inpatient   Turning in Flat Bed Without Bedrails 2   Lying on Back to Sitting on Edge of Flat Bed Without Bedrails 2   Moving Bed to Chair 2   Standing Up From Chair Using Arms 2   Walk in Room 2   Climb 3-5 Stairs With Railing 2   Basic Mobility Inpatient Raw Score 12   Basic Mobility Standardized Score 32 23   Highest Level Of Mobility   JH-HLM Goal 4: Move to chair/commode   JH-HLM Achieved 4: Move to chair/commode           Felipe Hernandez, PT

## 2023-04-28 NOTE — PLAN OF CARE
Problem: PHYSICAL THERAPY ADULT  Goal: Performs mobility at highest level of function for planned discharge setting  See evaluation for individualized goals  Description: Treatment/Interventions: OT, Spoke to nursing, Spoke to case management, Gait training, Bed mobility, Patient/family training, Endurance training, LE strengthening/ROM, Functional transfer training  Equipment Recommended:  (TBD)       See flowsheet documentation for full assessment, interventions and recommendations  Note: Prognosis: Good  Problem List: Decreased strength, Decreased range of motion, Decreased endurance, Impaired balance, Decreased mobility, Decreased coordination, Decreased cognition, Decreased safety awareness, Pain, Orthopedic restrictions  Assessment: Pt is 68 y o  female seen for PT evaluation s/p admit to [facility] on 4/27/2023 w/ fall with humerus fx and multiple rib fx  PT consulted to assess pt's functional mobility and d/c needs  Order placed for PT eval and tx, w/ up w/ A and NWB LUE order  Comorbidities affecting pt's physical performance at time of assessment include:  has a past medical history of Asthma, Diabetes ([facility] [address] ), and Hypertension  PTA, pt was has single level apartment although normally stays with s/o in 3 story home with 2nd floor setup  Personal factors affecting pt at time of IE include: inaccessible home environment, ambulating w/ assistive device, stairs to enter home, inability to ambulate household distances, unable to perform dynamic tasks in community, positive fall history, decreased initiation and engagement, impulsivity, unable to perform physical activity, limited insight into impairments, inability to perform IADLs and inability to perform ADLs   Please find objective findings from PT assessment regarding body systems outlined above with impairments and limitations including weakness, decreased ROM, impaired balance, decreased endurance, impaired coordination, gait deviations, pain, decreased activity tolerance, decreased functional mobility tolerance, decreased safety awareness, fall risk, SOB upon exertion and decreased cognition  Pt required increased time to complete transfers with decreased strength and balance  Ambulated with very slow, moderately unsteady gait  Noted decreased foot clearance, forward flexion, and hip ER  Activity tolerance limited by [place] and pain  May benefit from trial with more supportive one handed device as pt requires significant A with SPC  The following objective measures performed on IE also reveal limitations: The patient's AM-PAC Basic Mobility Inpatient Short Form Raw Score is 12, Standardized Score is 32 23  A standardized score less than 42 9 suggests the patient may benefit from discharge to post-acute rehabilitation services  Please also refer to the recommendation of the Physical Therapist for safe discharge planning  Pt's clinical presentation is currently unstable/unpredictable seen in pt's presentation of pain, ongoing medical workup  Pt to benefit from continued PT tx to address deficits as defined above and maximize level of functional independent mobility and consistency  From PT/mobility standpoint, recommendation at time of d/c would be post acute rehabilitation services pending progress  Barriers to Discharge: Inaccessible home environment, Decreased caregiver support     PT Discharge Recommendation: Post acute rehabilitation services    See flowsheet documentation for full assessment

## 2023-04-28 NOTE — PLAN OF CARE
Problem: OCCUPATIONAL THERAPY ADULT  Goal: Performs self-care activities at highest level of function for planned discharge setting  See evaluation for individualized goals  Description: Treatment Interventions: ADL retraining, Functional transfer training, UE strengthening/ROM, Endurance training, Patient/family training, Equipment evaluation/education, Compensatory technique education, Activityengagement          See flowsheet documentation for full assessment, interventions and recommendations  Note: Limitation: Decreased ADL status, Decreased UE ROM, Decreased endurance, Decreased self-care trans, Decreased high-level ADLs, Non-func L UE  Prognosis: Good  Assessment: Pt is a 68 y o  female who was admitted to [facility] on 4/27/2023 with L humeral fx and L rib fx s/p fall   Pt's problem list also includes PMH of DM, HTN, previous surgery and asthma  At baseline pt was completing adls and mobility independently with use of SPC - I iadls  Pt lives alone in apt however spends majority of her time with s/o in 3 story home with 2nd floor bed/bath  Currently pt requires max assist for overall ADLS and mod assist for functional mobility/transfers  Pt currently presents with impairments in the following categories -steps to enter environment, difficulty performing ADLS, difficulty performing IADLS  and environment activity tolerance, endurance, standing balance/tolerance, UE strength and UE ROM  These impairments, as well as pt's fatigue, pain, orthopedic restricitions , WBS , risk for falls and home environment  limit pt's ability to safely engage in all baseline areas of occupation, includingbathing, dressing, toileting, functional mobility/transfers, community mobility, laundry , house maintenance, meal prep, cleaning, social participation  and leisure activities  From OT standpoint, recommend inpt rehab upon D/C  OT will continue to follow to address the below stated goals       OT Discharge Recommendation: Post acute rehabilitation services

## 2023-04-28 NOTE — CONSULTS
Consult Note- Acute Pain Service   Evonne Blizzard 68 y o  female MRN: [id number]  Unit/Bed#: [facility] 629-01 Encounter: [id number]               Assessment/Plan     Assessment:   Patient Active Problem List   Diagnosis   • Fall   • Humerus fracture   • Acute pain due to trauma   • H/O mechanical aortic valve replacement   • Laceration of right eyebrow   • Fracture of multiple ribs of right side   • Type 2 diabetes mellitus (HCC)      Evonne Blizzard is a 68 y o  female with PMHx of mechanical AVR (on home coumadin, INR 2 68) who presented after mechanical fall found to have left proximal humerus fracture (nonoperative management per orthopedic surgery), multiple right sided rib fractures #8-9, laceration of right eyebrow, and     Today, patient was resting comfortably in the chair at bedside when I examined her  She reports that currently her pain is tolerable, noting the pain of her LUE is more bothersome than the rib fractures  She is currently tolerating her pain medications without side effects such as oversedation, mental fogging, nausea, or vomiting  She reports hx of oxycodone use in the past in the setting of back and neck pain  She remains on RA without respiratory distress  Demonstrated pulling 1000cc on IS  She is looking forward to being discharged home where she has help through her significant other and her daughters      Plan:    - discussed possible nerve blocks however patient is not interested at this time given hx of intermittent numbness and tingling with her extremities   - epidural is not indicated at this time, in addition patient on home coumadin   - consider increasing oxycodone to 5mg/7 5mg PO q4h PRN moderate/severe pain given hx of oxycodone use in the past for chronic neck/back pain   - continue dilaudid 0 2mg IV q4h PRN breakthrough pain    Multimodal Analgesia:   - continue Tylenol 975 mg PO q8hrs standing   - continue Gabapentin 100 mg PO QHS   - continue Lidocaine patches to affected areas 12 hours on, 12 hours off   - start robaxin 500mg PO q8h scheduled    - consider starting NSAID such as ibuprofen 600mg PO    Bowel Regimen:  - Docusate (Colace) 100 mg PO twice daily  - Senna 1 tablet PO qhs    APS will continue to follow  Please contact Acute Pain Service - [facility] via [employer] from [phone] with additional questions or concerns  See [employer] or Reed for additional contacts and after hours information  History of Present Illness    Admit Date:  4/27/2023  Hospital Day:  1 day  Primary Service:  Trauma  Attending Provider:  No att  providers found  Reason for Consult / Principal Problem: posttraumatic pain management    Current pain location(s): LUE  Pain Scale:   8/10  Quality: aching, soreness, throbbing  Current Analgesic regimen:  Oxycodone 2 5mg/5mg PO, dilaudid 0 2mg IV PRN, Tylenol, gabapentin qhs, lidocaine patch    Pain History: none  Pain Management Provider:  none    I have reviewed the patient's controlled substance dispensing history in the Prescription Drug Monitoring Program in compliance with the [facility] regulations before prescribing any controlled substances  Inpatient consult to Acute Pain Service (see Comments)  Consult performed by: Dozier Prader, MD  Consult ordered by: Radha Schneider PA-C        Review of Systems   Constitutional: Positive for fatigue  Negative for chills, diaphoresis and fever  HENT: Negative for nosebleeds, sore throat, tinnitus and trouble swallowing  Eyes: Negative for photophobia and visual disturbance  Respiratory: Negative for cough, choking, chest tightness, shortness of breath, wheezing and stridor  Cardiovascular: Positive for chest pain  Negative for leg swelling  Gastrointestinal: Negative for abdominal distention, abdominal pain, diarrhea, nausea and vomiting  Endocrine: Negative for polydipsia, polyphagia and polyuria  Genitourinary: Negative for flank pain and hematuria     Musculoskeletal: Positive for arthralgias  Negative for back pain, neck pain and neck stiffness  Skin: Positive for wound  Neurological: Negative for seizures, syncope, speech difficulty and weakness  Psychiatric/Behavioral: Negative for confusion and hallucinations  The patient is not nervous/anxious and is not hyperactive  All other systems reviewed and are negative        Historical Information   Past Medical History:   Diagnosis Date   • Asthma    • Diabetes ([facility] [address] )    • Hypertension      Past Surgical History:   Procedure Laterality Date   • MECHANICAL AORTIC VALVE REPLACEMENT     • REPLACEMENT TOTAL KNEE       Social History   Social History     Substance and Sexual Activity   Alcohol Use Yes    Comment: socially     Social History     Substance and Sexual Activity   Drug Use Never     Social History     Tobacco Use   Smoking Status Never   Smokeless Tobacco Never     Family History: non-contributory    Meds/Allergies   all current active meds have been reviewed and current meds:   Current Facility-Administered Medications   Medication Dose Route Frequency   • acetaminophen (TYLENOL) tablet 975 mg  975 mg Oral Q8H [address]   • allopurinol (ZYLOPRIM) tablet 100 mg  100 mg Oral Daily   • atorvastatin (LIPITOR) tablet 40 mg  40 mg Oral Daily With Dinner   • citalopram (CeleXA) tablet 20 mg  20 mg Oral Daily   • diltiazem (CARDIZEM CD) 24 hr capsule 120 mg  120 mg Oral Daily   • docusate sodium (COLACE) capsule 100 mg  100 mg Oral BID   • furosemide (LASIX) tablet 40 mg  40 mg Oral Daily   • gabapentin (NEURONTIN) capsule 100 mg  100 mg Oral HS   • HYDROmorphone HCl (DILAUDID) injection 0 2 mg  0 2 mg Intravenous Q4H PRN   • insulin detemir (LEVEMIR) subcutaneous injection 10 Units  10 Units Subcutaneous HS   • insulin lispro (HumaLOG) 100 units/mL subcutaneous injection 1-5 Units  1-5 Units Subcutaneous HS   • insulin lispro (HumaLOG) 100 units/mL subcutaneous injection 1-6 Units  1-6 Units Subcutaneous TID AC   • lidocaine (LIDODERM) 5 % patch 1 patch  1 patch Topical Daily   • lisinopril (ZESTRIL) tablet 5 mg  5 mg Oral Daily   • metoprolol succinate (TOPROL-XL) 24 hr tablet 50 mg  50 mg Oral Daily   • montelukast (SINGULAIR) tablet 10 mg  10 mg Oral HS   • naloxone (NARCAN) 0 04 mg/mL syringe 0 04 mg  0 04 mg Intravenous Q1MIN PRN   • ondansetron (ZOFRAN) injection 4 mg  4 mg Intravenous Q6H PRN   • oxybutynin (DITROPAN-XL) 24 hr tablet 5 mg  5 mg Oral Daily   • oxyCODONE (ROXICODONE) IR tablet 5 mg  5 mg Oral Q4H PRN   • oxyCODONE (ROXICODONE) split tablet 2 5 mg  2 5 mg Oral Q4H PRN   • senna (SENOKOT) tablet 17 2 mg  2 tablet Oral Daily   • [START ON 4/29/2023] warfarin (COUMADIN) tablet 2 5 mg  2 5 mg Oral Once per day on Wed Sat   • warfarin (COUMADIN) tablet 5 mg  5 mg Oral Once per day on Sun Mon Tue Thu Fri       Allergies   Allergen Reactions   • Fentanyl Confusion       Objective   Temp:  [97 8 °F (36 6 °C)-100 2 °F (37 9 °C)] 98 9 °F (37 2 °C)  HR:  [78-84] 82  Resp:  [12-22] 18  BP: ()/(51-94) 119/68    Intake/Output Summary (Last 24 hours) at 4/28/2023 0930  Last data filed at 4/28/2023 1060  Gross per 24 hour   Intake 480 ml   Output --   Net 480 ml       Physical Exam  Vitals and nursing note reviewed  Constitutional:       General: She is not in acute distress  Appearance: Normal appearance  She is not ill-appearing, toxic-appearing or diaphoretic  HENT:      Head: Normocephalic and atraumatic  Nose: Nose normal       Mouth/Throat:      Mouth: Mucous membranes are moist       Pharynx: Oropharynx is clear  Eyes:      General: No scleral icterus  Conjunctiva/sclera: Conjunctivae normal       Comments: Laceration over right eyebrow   Cardiovascular:      Rate and Rhythm: Normal rate and regular rhythm  Pulses: Normal pulses  Heart sounds: Murmur heard  Pulmonary:      Effort: Pulmonary effort is normal  No respiratory distress  Breath sounds: Normal breath sounds  No wheezing     Chest: Chest wall: Tenderness present  Abdominal:      General: Abdomen is flat  There is no distension  Palpations: Abdomen is soft  Tenderness: There is no abdominal tenderness  Musculoskeletal:         General: Swelling and signs of injury present  Cervical back: Normal range of motion and neck supple  No rigidity or tenderness  Right lower leg: No edema  Left lower leg: No edema  Comments: LUE in sling   Skin:     General: Skin is warm and dry  Coloration: Skin is not jaundiced or pale  Neurological:      General: No focal deficit present  Mental Status: She is alert and oriented to person, place, and time  Mental status is at baseline  Sensory: No sensory deficit  Motor: No weakness  Psychiatric:         Mood and Affect: Mood normal          Behavior: Behavior normal          Thought Content: Thought content normal          Judgment: Judgment normal          Lab Results:   I have personally reviewed pertinent labs  , CBC:   Lab Results   Component Value Date    WBC 5 36 04/28/2023    HGB 10 9 (L) 04/28/2023    HCT 32 8 (L) 04/28/2023     (H) 04/28/2023     (L) 04/28/2023    MCH 33 9 04/28/2023    MCHC 33 2 04/28/2023    RDW 14 6 04/28/2023    MPV 10 6 04/28/2023    NRBC 0 04/28/2023   , CMP:   Lab Results   Component Value Date    SODIUM 135 04/28/2023    K 3 5 04/28/2023     04/28/2023    CO2 26 04/28/2023    CO2 28 04/27/2023    BUN 20 04/28/2023    CREATININE 0 52 (L) 04/28/2023    GLUCOSE 121 04/27/2023    CALCIUM 10 0 04/28/2023    AST 34 04/27/2023    ALT 24 04/27/2023    ALKPHOS 123 (H) 04/27/2023    EGFR 92 04/28/2023   , PT/PTT:  Lab Results   Component Value Date    PTT 40 (H) 04/28/2023       Imaging Studies: I have personally reviewed pertinent reports  EKG, Pathology, and Other Studies: I have personally reviewed pertinent reports        Counseling / Coordination of Care  Total floor / unit time spent today Level 1 = 20 minutes  Greater than 50% of total time was spent with the patient and / or family counseling and / or coordination of care  Please note that the APS provides consultative services regarding pain management only  With the exception of ketamine and epidural infusions and except when indicated, final decisions regarding starting or changing doses of analgesic medications are at the discretion of the consulting service  Off hours consultation and/or medication management is generally not available      Savita Ludwig MD  Acute Pain Service

## 2023-04-28 NOTE — PROGRESS NOTES
[facility]  Progress Note  Name: Sally Barajas  MRN: [id number]  Unit/Bed#: [facility] 020-94 I Date of Admission: 4/27/2023   Date of Service: 4/28/2023 I Hospital Day: 1    Assessment/Plan   Type 2 diabetes mellitus [facility])  Assessment & Plan  No results found for: HGBA1C    Recent Labs     04/27/23  1749 04/27/23  2049 04/28/23  0731 04/28/23  0801   POCGLU 120 151* 58* 88       Blood Sugar Average: Last 72 hrs:  (P) 104 25     - Continue current medication regimen   - Outpatient follow-up with PCP  Fracture of multiple ribs of right side  Assessment & Plan  - Multiple right-sided rib fractures (8th-9th), present on admission   - Continue rib fracture protocol   - Continue to encourage incentive spirometer use and adequate pulmonary hygiene  - Continue multimodal analgesic regimen     - Supplemental oxygen via nasal cannula as needed to maintain saturations greater than or equal to 94%  - PT and OT evaluation and treatment as indicated  - Outpatient follow-up in the trauma clinic for re-evaluation in approximately 2 weeks  Laceration of right eyebrow  Assessment & Plan  - two 1 cm lacerations to the R eye brow  - steri strips applied  - local wound care    H/O mechanical aortic valve replacement  Assessment & Plan  - hold coumadin for now pending evaluation for humerus fracture  - F/u INR  - Goal INR reportedly 2 5 to 3 5    Acute pain due to trauma  Assessment & Plan  - Acute pain secondary to traumatic injuries  - Multi modal pain regimen  - Bowel regimen as long as using opioids   - Continue to monitor pain and adjust regimen as indicated        Humerus fracture  Assessment & Plan  - proximal humerus fracture on the left   - NVI distally  - pain control with multi modal analgesia  - orthopedics consultation placed, will f/u recommendations  - NWB LUE   - Sling to the LUE    Fall  Assessment & Plan  - mechanical fall   - sustained below stated injuries  - PT/OT evaluations  - Geriatrics evaluation  - CM for disposition planning       DVT prophylaxis: SCDs and Coumadin  PT and OT: eval and treat    Disposition: D/C planning  Follow-up PT and OT  Prn pain management  TRAUMA TERTIARY SURVEY NOTE    Code status:  Level 1 - Full Code    Consultants: IP CONSULT TO ORTHOPEDIC SURGERY  IP CONSULT TO GERONTOLOGY  IP CONSULT TO CASE MANAGEMENT  IP CONSULT TO ACUTE PAIN SERVICE    Subjective   Transfer from: not a transfer    Mechanism of Injury:Fall     Chief Complaint: No new complaints    HPI/Last 24 hour events: Patient offering no new complaints  States she is doing well today  Objective   Vitals:   Temp:  [97 8 °F (36 6 °C)-100 2 °F (37 9 °C)] 98 9 °F (37 2 °C)  HR:  [78-84] 82  Resp:  [12-22] 18  BP: ()/(51-94) 119/68    I/O       04/26 0701  04/27 0700 04/27 0701 04/28 0700 04/28 0701 04/29 0700           Unmeasured Urine Occurrence  1 x            Physical Exam:   GENERAL APPEARANCE: NAD  NEURO: GCS 15  HEENT: Normocephalic  CV: RRR  LUNGS: CTA b/l  GI: Non-tender, non-distended  : no brothers  MSK: moving all extremities, sling on RUE; sensation  SKIN: warm, dry, intact    Invasive Devices     Peripheral Intravenous Line  Duration           Peripheral IV 04/27/23 Right Antecubital <1 day                   1  Before the illness or injury that brought you to the Emergency, did you need someone to help you on a regular basis? 0=No   2  Since the illness or injury that brought you to the Emergency, have you needed more help than usual to take care of yourself? 1=Yes   3  Have you been hospitalized for one or more nights during the past 6 months (excluding a stay in the Emergency Department)? 1=Yes   4  In general, do you see well? 0=Yes   5  In general, do you have serious problems with your memory? 0=No   6  Do you take more than three different medications everyday?  1=Yes   TOTAL   3     Did you order a geriatric consult if the score was 2 or greater?: yes PIC Score  PIC Pain Score: 1 (4/28/2023  8:17 AM)  PIC Incentive Spirometry Score: 3 (4/28/2023  8:17 AM)  PIC Cough Description: 2 (4/28/2023  8:17 AM)  [facility] Total Score: 6 (4/28/2023  8:17 AM)       If the Total PIC Score </=5, did you consult APS and evaluate patient for further intervention?: no      Pain:    Incentive Spirometry  Cough  3 = Controlled  4 = Above goal volume 3 = Strong  2 = Moderate  3 = Goal to alert volume 2 = Weak  1 = Severe  2 = Below alert volume 1 = Absent     1 = Unable to perform IS      Lab Results:   Results: I have personally reviewed all pertinent laboratory/tests results, BMP/CMP:   Lab Results   Component Value Date    SODIUM 135 04/28/2023    K 3 5 04/28/2023     04/28/2023    CO2 26 04/28/2023    CO2 28 04/27/2023    BUN 20 04/28/2023    CREATININE 0 52 (L) 04/28/2023    GLUCOSE 121 04/27/2023    CALCIUM 10 0 04/28/2023    AST 34 04/27/2023    ALT 24 04/27/2023    ALKPHOS 123 (H) 04/27/2023    EGFR 92 04/28/2023   , CBC:   Lab Results   Component Value Date    WBC 5 36 04/28/2023    HGB 10 9 (L) 04/28/2023    HCT 32 8 (L) 04/28/2023     (H) 04/28/2023     (L) 04/28/2023    MCH 33 9 04/28/2023    MCHC 33 2 04/28/2023    RDW 14 6 04/28/2023    MPV 10 6 04/28/2023    NRBC 0 04/28/2023    and Coagulation:   Lab Results   Component Value Date    INR 2 68 (H) 04/28/2023       Imaging Results: I have personally reviewed pertinent reports      Chest Xray(s): negative for acute findings   FAST exam(s): N/A   CT Scan(s): positive for acute findings: rib fractures   Additional Xray(s): positive for acute findings: left humeral fracture     Other Studies: no other studies

## 2023-04-28 NOTE — ASSESSMENT & PLAN NOTE
No results found for: HGBA1C    Recent Labs     04/27/23  1749 04/27/23  2049 04/28/23  0731 04/28/23  0801   POCGLU 120 151* 58* 88       Blood Sugar Average: Last 72 hrs:  (P) 104 25     - Continue current medication regimen   - Outpatient follow-up with PCP

## 2023-04-28 NOTE — UTILIZATION REVIEW
Continued Stay Review    Observation on 4/27 @ 1449 and changed to Inpatient on 4/28 @ 1630  Pt requiring continued stay for Fall with Humerus fracture, Fracture of multiple right side ribs, Laceration of right eyebrow, Acute pain due to trauma/ Pain control and safe d/c plan  Admission Orders (From admission, onward)     Ordered        04/28/23 1630  Inpatient Admission  Once            04/27/23 1449  Place in Observation  Once                      Orders Placed This Encounter   Procedures   • Inpatient Admission     Standing Status:   Standing     Number of Occurrences:   1     Order Specific Question:   Level of Care     Answer:   Med Surg [16]     Order Specific Question:   Estimated length of stay     Answer:   More than 2 Midnights     Order Specific Question:   Certification     Answer:   I certify that inpatient services are medically necessary for this patient for a duration of greater than two midnights  See H&P and MD Progress Notes for additional information about the patient's course of treatment  Date: 4/28                         Current Patient Class: Inpatient  Current Level of Care: Med surg    HPI:77 y o  female initially admitted on 4/28    Assessment/Plan:   4/28  Progress notes; Continue incentive spirometry and pulmonary toilet  Pain control  PT/OT for safe discharge plan  Non-operative management of this fracture per Orthopedic  APS; Per pt, currently her pain is tolerable, noting the pain of her LUE is more bothersome than the rib fractures  Pulling 1000 cc on incentive spirometry  Continue current regimen  Start Robaxin q8h  No indication for Epidural at this time, pt on home Coumadin   Pt     Vital Signs:   04/28/23 1547 -- -- -- -- -- 96 % 36 4 L/min Nasal cannula --   04/28/23 14:11:14 97 3 °F (36 3 °C)   Abnormal  -- 19 112/75 87 -- -- -- -- --   04/28/23 10:18:44 98 °F (36 7 °C) 80 18 115/69 84 97 % -- -- --      Pertinent Labs/Diagnostic Results:       Results from last 7 days   Lab Units 04/28/23  0635 04/27/23  1306 04/27/23  1303   WBC Thousand/uL 5 36 4 03*  --    HEMOGLOBIN g/dL 10 9* 12 2  --    I STAT HEMOGLOBIN g/dl  --   --  12 6   HEMATOCRIT % 32 8* 36 1  --    HEMATOCRIT, ISTAT %  --   --  37   PLATELETS Thousands/uL 107* 121*  --    NEUTROS ABS Thousands/µL 3 70 2 93  --          Results from last 7 days   Lab Units 04/28/23  0508 04/27/23  1306 04/27/23  1303   SODIUM mmol/L 135 134*  --    POTASSIUM mmol/L 3 5 4 2  --    CHLORIDE mmol/L 107 104  --    CO2 mmol/L 26 26  --    CO2, I-STAT mmol/L  --   --  28   ANION GAP mmol/L 2* 4  --    BUN mg/dL 20 23  --    CREATININE mg/dL 0 52* 0 82  --    EGFR ml/min/1 73sq m 92 69  --    CALCIUM mg/dL 10 0 10 0  --    CALCIUM, IONIZED, ISTAT mmol/L  --   --  1 23     Results from last 7 days   Lab Units 04/27/23  1306   AST U/L 34   ALT U/L 24   ALK PHOS U/L 123*   TOTAL PROTEIN g/dL 8 1   ALBUMIN g/dL 3 6   TOTAL BILIRUBIN mg/dL 0 97     Results from last 7 days   Lab Units 04/28/23  1121 04/28/23  0801 04/28/23  0731 04/27/23  2049 04/27/23  1749   POC GLUCOSE mg/dl 105 88 58* 151* 120     Results from last 7 days   Lab Units 04/28/23  0508 04/27/23  1306   GLUCOSE RANDOM mg/dL 60* 121         Results from last 7 days   Lab Units 04/27/23  1303   PH, ROZINA I-STAT  7 422*   PCO2, ROZINA ISTAT mm HG 41 6*   PO2, ROZINA ISTAT mm HG 26 0*   HCO3, ROZINA ISTAT mmol/L 27 1   I STAT BASE EXC mmol/L 2   I STAT O2 SAT % 50*                 Results from last 7 days   Lab Units 04/28/23  0508 04/27/23  1306   PROTIME seconds 28 7* 31 3*   INR  2 68* 2 99*   PTT seconds 40*  --        Medications:   Scheduled Medications:  acetaminophen, 975 mg, Oral, Q8H DAVY  allopurinol, 100 mg, Oral, Daily  atorvastatin, 40 mg, Oral, Daily With Dinner  citalopram, 20 mg, Oral, Daily  diltiazem, 120 mg, Oral, Daily  docusate sodium, 100 mg, Oral, BID  furosemide, 40 mg, Oral, Daily  gabapentin, 100 mg, Oral, HS  insulin detemir, 10 Units, Subcutaneous, HS  insulin lispro, 1-5 Units, Subcutaneous, HS  insulin lispro, 1-6 Units, Subcutaneous, TID AC  lidocaine, 1 patch, Topical, Daily  lisinopril, 5 mg, Oral, Daily  methocarbamol, 500 mg, Oral, Q6H DAVY  metoprolol succinate, 50 mg, Oral, Daily  montelukast, 10 mg, Oral, HS  oxybutynin, 5 mg, Oral, Daily  senna, 2 tablet, Oral, Daily  [START ON 4/29/2023] warfarin, 2 5 mg, Oral, Once per day on Wed Sat  warfarin, 5 mg, Oral, Once per day on Sun Mon Tue Thu Fri      Continuous IV Infusions:     PRN Meds:  HYDROmorphone, 0 2 mg, Intravenous, Q4H PRN  naloxone, 0 04 mg, Intravenous, Q1MIN PRN  ondansetron, 4 mg, Intravenous, Q6H PRN  oxyCODONE, 5 mg, Oral, Q4H PRN  oxyCODONE, 7 5 mg, Oral, Q4H PRN        Discharge Plan: D    Network Utilization Review Department  ATTENTION: Please call with any questions or concerns to [phone] and carefully listen to the prompts so that you are directed to the right person  All voicemails are confidential   Merle Eugene all requests for admission clinical reviews, approved or denied determinations and any other requests to dedicated fax number below belonging to the campus where the patient is receiving treatment   List of dedicated fax numbers for the Facilities:  [address] DENIALS (Administrative/Medical Necessity) [phone]   [address] (Maternity/NICU/Pediatrics) [phone]   [address] [phone]   [facility] [address] [phone]   [address] [address] [address] [place] [place] [address] [address] [facility] [phone]   [facility] [phone]   [address] [phone]

## 2023-04-29 LAB
ANION GAP SERPL CALCULATED.3IONS-SCNC: 3 MMOL/L (ref 4–13)
BASOPHILS # BLD AUTO: 0.02 THOUSANDS/ÂΜL (ref 0–0.1)
BASOPHILS NFR BLD AUTO: 0 % (ref 0–1)
BUN SERPL-MCNC: 25 MG/DL (ref 5–25)
CALCIUM SERPL-MCNC: 9.2 MG/DL (ref 8.3–10.1)
CHLORIDE SERPL-SCNC: 107 MMOL/L (ref 96–108)
CO2 SERPL-SCNC: 24 MMOL/L (ref 21–32)
CREAT SERPL-MCNC: 0.76 MG/DL (ref 0.6–1.3)
EOSINOPHIL # BLD AUTO: 0.09 THOUSAND/ÂΜL (ref 0–0.61)
EOSINOPHIL NFR BLD AUTO: 2 % (ref 0–6)
ERYTHROCYTE [DISTWIDTH] IN BLOOD BY AUTOMATED COUNT: 14.6 % (ref 11.6–15.1)
GFR SERPL CREATININE-BSD FRML MDRD: 75 ML/MIN/1.73SQ M
GLUCOSE SERPL-MCNC: 110 MG/DL (ref 65–140)
GLUCOSE SERPL-MCNC: 116 MG/DL (ref 65–140)
GLUCOSE SERPL-MCNC: 127 MG/DL (ref 65–140)
GLUCOSE SERPL-MCNC: 133 MG/DL (ref 65–140)
GLUCOSE SERPL-MCNC: 139 MG/DL (ref 65–140)
HCT VFR BLD AUTO: 39 % (ref 34.8–46.1)
HCT VFR BLD AUTO: 40.5 % (ref 34.8–46.1)
HGB BLD-MCNC: 12.2 G/DL (ref 11.5–15.4)
HGB BLD-MCNC: 12.9 G/DL (ref 11.5–15.4)
IMM GRANULOCYTES # BLD AUTO: 0.03 THOUSAND/UL (ref 0–0.2)
IMM GRANULOCYTES NFR BLD AUTO: 1 % (ref 0–2)
INR PPP: 4.3 (ref 0.84–1.19)
LYMPHOCYTES # BLD AUTO: 0.57 THOUSANDS/ÂΜL (ref 0.6–4.47)
LYMPHOCYTES NFR BLD AUTO: 10 % (ref 14–44)
MCH RBC QN AUTO: 33.1 PG (ref 26.8–34.3)
MCHC RBC AUTO-ENTMCNC: 31.3 G/DL (ref 31.4–37.4)
MCV RBC AUTO: 106 FL (ref 82–98)
MONOCYTES # BLD AUTO: 0.61 THOUSAND/ÂΜL (ref 0.17–1.22)
MONOCYTES NFR BLD AUTO: 10 % (ref 4–12)
NEUTROPHILS # BLD AUTO: 4.52 THOUSANDS/ÂΜL (ref 1.85–7.62)
NEUTS SEG NFR BLD AUTO: 77 % (ref 43–75)
NRBC BLD AUTO-RTO: 0 /100 WBCS
PLATELET # BLD AUTO: 87 THOUSANDS/UL (ref 149–390)
PMV BLD AUTO: 11.2 FL (ref 8.9–12.7)
POTASSIUM SERPL-SCNC: 4 MMOL/L (ref 3.5–5.3)
PROTHROMBIN TIME: 41.5 SECONDS (ref 11.6–14.5)
RBC # BLD AUTO: 3.69 MILLION/UL (ref 3.81–5.12)
SODIUM SERPL-SCNC: 134 MMOL/L (ref 135–147)
WBC # BLD AUTO: 5.84 THOUSAND/UL (ref 4.31–10.16)

## 2023-04-29 RX ORDER — SODIUM CHLORIDE, SODIUM GLUCONATE, SODIUM ACETATE, POTASSIUM CHLORIDE, MAGNESIUM CHLORIDE, SODIUM PHOSPHATE, DIBASIC, AND POTASSIUM PHOSPHATE .53; .5; .37; .037; .03; .012; .00082 G/100ML; G/100ML; G/100ML; G/100ML; G/100ML; G/100ML; G/100ML
500 INJECTION, SOLUTION INTRAVENOUS ONCE
Status: COMPLETED | OUTPATIENT
Start: 2023-04-29 | End: 2023-04-29

## 2023-04-29 RX ORDER — WARFARIN SODIUM 2.5 MG/1
2.5 TABLET ORAL
Status: DISCONTINUED | OUTPATIENT
Start: 2023-04-29 | End: 2023-04-30

## 2023-04-29 RX ORDER — METHOCARBAMOL 500 MG/1
250 TABLET, FILM COATED ORAL EVERY 6 HOURS SCHEDULED
Status: DISCONTINUED | OUTPATIENT
Start: 2023-04-29 | End: 2023-05-13 | Stop reason: HOSPADM

## 2023-04-29 RX ADMIN — Medication 7.5 MG: at 17:30

## 2023-04-29 RX ADMIN — WARFARIN SODIUM 2.5 MG: 2.5 TABLET ORAL at 17:25

## 2023-04-29 RX ADMIN — HYDROMORPHONE HYDROCHLORIDE 0.2 MG: 0.2 INJECTION, SOLUTION INTRAMUSCULAR; INTRAVENOUS; SUBCUTANEOUS at 09:13

## 2023-04-29 RX ADMIN — ACETAMINOPHEN 975 MG: 325 TABLET ORAL at 15:22

## 2023-04-29 RX ADMIN — MONTELUKAST 10 MG: 10 TABLET, FILM COATED ORAL at 21:51

## 2023-04-29 RX ADMIN — ATORVASTATIN CALCIUM 40 MG: 40 TABLET, FILM COATED ORAL at 17:25

## 2023-04-29 RX ADMIN — SODIUM CHLORIDE, SODIUM GLUCONATE, SODIUM ACETATE, POTASSIUM CHLORIDE, MAGNESIUM CHLORIDE, SODIUM PHOSPHATE, DIBASIC, AND POTASSIUM PHOSPHATE 500 ML: .53; .5; .37; .037; .03; .012; .00082 INJECTION, SOLUTION INTRAVENOUS at 02:02

## 2023-04-29 RX ADMIN — METHOCARBAMOL 250 MG: 500 TABLET ORAL at 21:53

## 2023-04-29 RX ADMIN — STANDARDIZED SENNA CONCENTRATE 17.2 MG: 8.6 TABLET ORAL at 08:51

## 2023-04-29 RX ADMIN — GABAPENTIN 100 MG: 100 CAPSULE ORAL at 21:50

## 2023-04-29 RX ADMIN — METHOCARBAMOL 250 MG: 500 TABLET ORAL at 15:22

## 2023-04-29 RX ADMIN — DOCUSATE SODIUM 100 MG: 100 CAPSULE, LIQUID FILLED ORAL at 08:51

## 2023-04-29 RX ADMIN — ACETAMINOPHEN 975 MG: 325 TABLET ORAL at 05:29

## 2023-04-29 RX ADMIN — ACETAMINOPHEN 975 MG: 325 TABLET ORAL at 21:50

## 2023-04-29 RX ADMIN — OXYCODONE HYDROCHLORIDE 5 MG: 5 TABLET ORAL at 13:02

## 2023-04-29 RX ADMIN — METHOCARBAMOL 500 MG: 500 TABLET ORAL at 05:29

## 2023-04-29 RX ADMIN — LIDOCAINE 5% 1 PATCH: 700 PATCH TOPICAL at 08:50

## 2023-04-29 RX ADMIN — CITALOPRAM HYDROBROMIDE 20 MG: 20 TABLET ORAL at 08:51

## 2023-04-29 RX ADMIN — OXYCODONE HYDROCHLORIDE 5 MG: 5 TABLET ORAL at 05:29

## 2023-04-29 RX ADMIN — ALLOPURINOL 100 MG: 100 TABLET ORAL at 08:51

## 2023-04-29 RX ADMIN — SODIUM CHLORIDE, SODIUM GLUCONATE, SODIUM ACETATE, POTASSIUM CHLORIDE, MAGNESIUM CHLORIDE, SODIUM PHOSPHATE, DIBASIC, AND POTASSIUM PHOSPHATE 500 ML: .53; .5; .37; .037; .03; .012; .00082 INJECTION, SOLUTION INTRAVENOUS at 11:23

## 2023-04-29 RX ADMIN — INSULIN DETEMIR 10 UNITS: 100 INJECTION, SOLUTION SUBCUTANEOUS at 21:50

## 2023-04-29 RX ADMIN — OXYBUTYNIN 5 MG: 5 TABLET, FILM COATED, EXTENDED RELEASE ORAL at 08:50

## 2023-04-29 RX ADMIN — HYDROMORPHONE HYDROCHLORIDE 0.2 MG: 0.2 INJECTION, SOLUTION INTRAMUSCULAR; INTRAVENOUS; SUBCUTANEOUS at 19:50

## 2023-04-29 RX ADMIN — DOCUSATE SODIUM 100 MG: 100 CAPSULE, LIQUID FILLED ORAL at 17:25

## 2023-04-29 NOTE — PLAN OF CARE
Problem: Potential for Falls  Goal: Patient will remain free of falls  Description: INTERVENTIONS:  - Educate patient/family on patient safety including physical limitations  - Instruct patient to call for assistance with activity   - Consult OT/PT to assist with strengthening/mobility   - Keep Call bell within reach  - Keep bed low and locked with side rails adjusted as appropriate  - Keep care items and personal belongings within reach  - Initiate and maintain comfort rounds  - Make Fall Risk Sign visible to staff  - Offer Toileting every 2 Hours, in advance of need  - Initiate/Maintain bed alarm  - Obtain necessary fall risk management equipment:   - Apply yellow socks and bracelet for high fall risk patients  - Consider moving patient to room near nurses station  Outcome: Progressing     Problem: RESPIRATORY - ADULT  Goal: Achieves optimal ventilation and oxygenation  Description: INTERVENTIONS:  - Assess for changes in respiratory status  - Assess for changes in mentation and behavior  - Position to facilitate oxygenation and minimize respiratory effort  - Oxygen administered by appropriate delivery if ordered  - Initiate smoking cessation education as indicated  - Encourage broncho-pulmonary hygiene including cough, deep breathe, Incentive Spirometry  - Assess the need for suctioning and aspirate as needed  - Assess and instruct to report SOB or any respiratory difficulty  - Respiratory Therapy support as indicated  Outcome: Progressing        Problem: MUSCULOSKELETAL - ADULT  Goal: Maintain or return mobility to safest level of function  Description: INTERVENTIONS:  - Assess patient's ability to carry out ADLs; assess patient's baseline for ADL function and identify physical deficits which impact ability to perform ADLs (bathing, care of mouth/teeth, toileting, grooming, dressing, etc )  - Assess/evaluate cause of self-care deficits   - Assess range of motion  - Assess patient's mobility  - Assess patient's need for assistive devices and provide as appropriate  - Encourage maximum independence but intervene and supervise when necessary  - Involve family in performance of ADLs  - Assess for home care needs following discharge   - Consider OT consult to assist with ADL evaluation and planning for discharge  - Provide patient education as appropriate  Outcome: Progressing  Goal: Maintain proper alignment of affected body part  Description: INTERVENTIONS:  - Support, maintain and protect limb and body alignment  - Provide patient/ family with appropriate education  Outcome: Progressing     Problem: PAIN - ADULT  Goal: Verbalizes/displays adequate comfort level or baseline comfort level  Description: Interventions:  - Encourage patient to monitor pain and request assistance  - Assess pain using appropriate pain scale  - Administer analgesics based on type and severity of pain and evaluate response  - Implement non-pharmacological measures as appropriate and evaluate response  - Consider cultural and social influences on pain and pain management  - Notify physician/advanced practitioner if interventions unsuccessful or patient reports new pain  Outcome: Progressing     Problem: SAFETY ADULT  Goal: Patient will remain free of falls  Description: INTERVENTIONS:  - Educate patient/family on patient safety including physical limitations  - Instruct patient to call for assistance with activity   - Consult OT/PT to assist with strengthening/mobility   - Keep Call bell within reach  - Keep bed low and locked with side rails adjusted as appropriate  - Keep care items and personal belongings within reach  - Initiate and maintain comfort rounds  - Make Fall Risk Sign visible to staff  - Offer Toileting every 2 Hours, in advance of need  - Initiate/Maintain bedalarm  - Obtain necessary fall risk management equipment:   - Apply yellow socks and bracelet for high fall risk patients  - Consider moving patient to room near nurses station  Outcome: Progressing  Goal: Maintain or return to baseline ADL function  Description: INTERVENTIONS:  -  Assess patient's ability to carry out ADLs; assess patient's baseline for ADL function and identify physical deficits which impact ability to perform ADLs (bathing, care of mouth/teeth, toileting, grooming, dressing, etc )  - Assess/evaluate cause of self-care deficits   - Assess range of motion  - Assess patient's mobility; develop plan if impaired  - Assess patient's need for assistive devices and provide as appropriate  - Encourage maximum independence but intervene and supervise when necessary  - Involve family in performance of ADLs  - Assess for home care needs following discharge   - Consider OT consult to assist with ADL evaluation and planning for discharge  - Provide patient education as appropriate  Outcome: Progressing  Goal: Maintains/Returns to pre admission functional level  Description: INTERVENTIONS:  - Perform BMAT or MOVE assessment daily    - Set and communicate daily mobility goal to care team and patient/family/caregiver  - Collaborate with rehabilitation services on mobility goals if consulted  - Perform Range of Motion 3 times a day  - Reposition patient every 2 hours    - Dangle patient 3 times a day  - Stand patient 3 times a day  - Ambulate patient 3 times a day  - Out of bed to chair 3 times a day   - Out of bed for meals 3 times a day  - Out of bed for toileting  - Record patient progress and toleration of activity level   Outcome: Progressing     Problem: DISCHARGE PLANNING  Goal: Discharge to home or other facility with appropriate resources  Description: INTERVENTIONS:  - Identify barriers to discharge w/patient and caregiver  - Arrange for needed discharge resources and transportation as appropriate  - Identify discharge learning needs (meds, wound care, etc )  - Arrange for interpretive services to assist at discharge as needed  - Refer to Case Management Department for coordinating discharge planning if the patient needs post-hospital services based on physician/advanced practitioner order or complex needs related to functional status, cognitive ability, or social support system  Outcome: Progressing     Problem: Knowledge Deficit  Goal: Patient/family/caregiver demonstrates understanding of disease process, treatment plan, medications, and discharge instructions  Description: Complete learning assessment and assess knowledge base  Interventions:  - Provide teaching at level of understanding  - Provide teaching via preferred learning methods  Outcome: Progressing     Problem: MOBILITY - ADULT  Goal: Maintain or return to baseline ADL function  Description: INTERVENTIONS:  -  Assess patient's ability to carry out ADLs; assess patient's baseline for ADL function and identify physical deficits which impact ability to perform ADLs (bathing, care of mouth/teeth, toileting, grooming, dressing, etc )  - Assess/evaluate cause of self-care deficits   - Assess range of motion  - Assess patient's mobility; develop plan if impaired  - Assess patient's need for assistive devices and provide as appropriate  - Encourage maximum independence but intervene and supervise when necessary  - Involve family in performance of ADLs  - Assess for home care needs following discharge   - Consider OT consult to assist with ADL evaluation and planning for discharge  - Provide patient education as appropriate  Outcome: Progressing  Goal: Maintains/Returns to pre admission functional level  Description: INTERVENTIONS:  - Perform BMAT or MOVE assessment daily    - Set and communicate daily mobility goal to care team and patient/family/caregiver  - Collaborate with rehabilitation services on mobility goals if consulted  - Perform Range of Motion 3 times a day  - Reposition patient every 2 hours    - Dangle patient 3 times a day  - Stand patient 3 times a day  - Ambulate patient 3 times a day  - Out of bed to chair 3 times a day   - Out of bed for meals 3 times a day  - Out of bed for toileting  - Record patient progress and toleration of activity level   Outcome: Progressing     Problem: INFECTION - ADULT  Goal: Absence or prevention of progression during hospitalization  Description: INTERVENTIONS:  - Assess and monitor for signs and symptoms of infection  - Monitor lab/diagnostic results  - Monitor all insertion sites, i e  indwelling lines, tubes, and drains  - Monitor endotracheal if appropriate and nasal secretions for changes in amount and color  - [place] appropriate cooling/warming therapies per order  - Administer medications as ordered  - Instruct and encourage patient and family to use good hand hygiene technique  - Identify and instruct in appropriate isolation precautions for identified infection/condition  Outcome: Progressing  Goal: Absence of fever/infection during neutropenic period  Description: INTERVENTIONS:  - Monitor WBC    Outcome: Progressing

## 2023-04-29 NOTE — PROGRESS NOTES
[facility]  Progress Note  Name: Macy Nguyen  MRN: [id number]  [facility] Date of Admission: 4/27/2023   Date of Service: 4/29/2023 I Hospital Day: 2    Assessment/Plan   Type 2 diabetes mellitus [facility])  Assessment & Plan  No results found for: HGBA1C    Recent Labs     04/28/23  1658 04/28/23  2047 04/29/23  0849 04/29/23  1105   POCGLU 139 161* 116 133       Blood Sugar Average: Last 72 hrs:  (P) 119     - Continue current medication regimen   - Outpatient follow-up with PCP  Fracture of multiple ribs of right side  Assessment & Plan  - Multiple right-sided rib fractures (8th-9th), present on admission   - Continue rib fracture protocol   - Continue to encourage incentive spirometer use and adequate pulmonary hygiene  - Continue multimodal analgesic regimen     - Supplemental oxygen via nasal cannula as needed to maintain saturations greater than or equal to 94%  - PT and OT evaluation and treatment as indicated  - Outpatient follow-up in the trauma clinic for re-evaluation in approximately 2 weeks  Laceration of right eyebrow  Assessment & Plan  - two 1 cm lacerations to the R eye brow  - steri strips applied  - local wound care    H/O mechanical aortic valve replacement  Assessment & Plan  - hold coumadin for now pending evaluation for humerus fracture  - F/u INR  - Goal INR reportedly 2 5 to 3 5    Acute pain due to trauma  Assessment & Plan  - Acute pain secondary to traumatic injuries  - Multi modal pain regimen  - Bowel regimen as long as using opioids   - Continue to monitor pain and adjust regimen as indicated        Humerus fracture  Assessment & Plan  - proximal humerus fracture on the left   - NVI distally  - pain control with multi modal analgesia  - orthopedics consultation placed, will f/u recommendations  - NWB LUE   - Sling to the LUE    Fall  Assessment & Plan  - mechanical fall   - sustained below stated injuries  - PT/OT evaluations  - Geriatrics evaluation  - CM for disposition planning       DVT Prophylaxis: SCDs and Coumadin  PT and OT: eval and treat    Disposition: Family updated at bedside today  We will continue to trend blood pressures  Blood pressures this morning appear stable with appropriate maps  Small fluid bolus given this afternoon; patient responded appropriately with systolic in 284H  We will continue to monitor exam and assess for any acute changes  We will repeat labs in AM   Continue current level of care  Subjective   Chief Complaint: No new complaints    Subjective: Patient offering no new complaints and on presentation  Currently resting out of bed to chair  Objective   Vitals:   Temp:  [98 °F (36 7 °C)-99 1 °F (37 3 °C)] 98 2 °F (36 8 °C)  HR:  [48-85] 85  Resp:  [19-20] 20  BP: ()/(47-71) 124/71    I/O       04/27 0701  04/28 0700 04/28 0701  04/29 0700 04/29 0701 04/30 0700    P  O   960     I V  (mL/kg)   500 (5 9)    Total Intake(mL/kg)  960 (11 3) 500 (5 9)    Net  +960 +500           Unmeasured Urine Occurrence 1 x 2 x            Physical Exam:   GENERAL APPEARANCE: No acute distress  NEURO: GCS 15  HEENT: Normocephalic  CV: Regular rate and rhythm  LUNGS: CTA bilaterally  GI: Nontender, nondistended  : No [place]  MSK: +2 pulses on extremities  SKIN: Warm, dry, intact    Invasive Devices     Peripheral Intravenous Line  Duration           Peripheral IV 04/27/23 Right Antecubital 2 days                 PIC Score  PIC Pain Score: 1 (4/29/2023  1:02 PM)  PIC Incentive Spirometry Score: 3 (4/28/2023  4:00 PM)  PIC Cough Description: 3 (4/28/2023  4:00 PM)  PIC Total Score: 7 (4/28/2023  4:00 PM)       If the Total PIC Score </=5, did you consult APS and evaluate patient for further intervention?: yes      Pain:    Incentive Spirometry  Cough  3 = Controlled  4 = Above goal volume 3 = Strong  2 = Moderate  3 = Goal to alert volume 2 = Weak  1 = Severe  2 = Below alert volume 1 = Absent     1 = Unable to perform IS         Lab Results:   Results: I have personally reviewed all pertinent laboratory/tests results, BMP/CMP:   Lab Results   Component Value Date    SODIUM 134 (L) 04/29/2023    K 4 0 04/29/2023     04/29/2023    CO2 24 04/29/2023    BUN 25 04/29/2023    CREATININE 0 76 04/29/2023    CALCIUM 9 2 04/29/2023    EGFR 75 04/29/2023   , CBC:   Lab Results   Component Value Date    WBC 5 84 04/29/2023    HGB 12 2 04/29/2023    HCT 39 0 04/29/2023     (H) 04/29/2023    PLT 87 (L) 04/29/2023    MCH 33 1 04/29/2023    MCHC 31 3 (L) 04/29/2023    RDW 14 6 04/29/2023    MPV 11 2 04/29/2023    NRBC 0 04/29/2023    and Coagulation:   Lab Results   Component Value Date    INR 4 30 (H) 04/29/2023     Imaging: I have personally reviewed pertinent reports       Other Studies: no other studies

## 2023-04-29 NOTE — ASSESSMENT & PLAN NOTE
No results found for: HGBA1C    Recent Labs     04/28/23  1658 04/28/23  2047 04/29/23  0849 04/29/23  1105   POCGLU 139 161* 116 133       Blood Sugar Average: Last 72 hrs:  (P) 119     - Continue current medication regimen   - Outpatient follow-up with PCP

## 2023-04-29 NOTE — PLAN OF CARE
Problem: Potential for Falls  Goal: Patient will remain free of falls  Description: INTERVENTIONS:  - Educate patient/family on patient safety including physical limitations  - Instruct patient to call for assistance with activity   - Consult OT/PT to assist with strengthening/mobility   - Keep Call bell within reach  - Keep bed low and locked with side rails adjusted as appropriate  - Keep care items and personal belongings within reach  - Initiate and maintain comfort rounds  - Make Fall Risk Sign visible to staff  - Offer Toileting every 2 Hours, in advance of need  - Initiate/Maintain bed alarm  - Obtain necessary fall risk management equipment:   - Apply yellow socks and bracelet for high fall risk patients  - Consider moving patient to room near nurses station  Outcome: Progressing     Problem: RESPIRATORY - ADULT  Goal: Achieves optimal ventilation and oxygenation  Description: INTERVENTIONS:  - Assess for changes in respiratory status  - Assess for changes in mentation and behavior  - Position to facilitate oxygenation and minimize respiratory effort  - Oxygen administered by appropriate delivery if ordered  - Initiate smoking cessation education as indicated  - Encourage broncho-pulmonary hygiene including cough, deep breathe, Incentive Spirometry  - Assess the need for suctioning and aspirate as needed  - Assess and instruct to report SOB or any respiratory difficulty  - Respiratory Therapy support as indicated  Outcome: Progressing     Problem: SKIN/TISSUE INTEGRITY - ADULT  Goal: Incision(s), wounds(s) or drain site(s) healing without S/S of infection  Description: INTERVENTIONS  - Assess and document dressing, incision, wound bed, drain sites and surrounding tissue  - Provide patient and family education  - Perform skin care/dressing changes every shift  Outcome: Progressing     Problem: MUSCULOSKELETAL - ADULT  Goal: Maintain or return mobility to safest level of function  Description: INTERVENTIONS:  - Assess patient's ability to carry out ADLs; assess patient's baseline for ADL function and identify physical deficits which impact ability to perform ADLs (bathing, care of mouth/teeth, toileting, grooming, dressing, etc )  - Assess/evaluate cause of self-care deficits   - Assess range of motion  - Assess patient's mobility  - Assess patient's need for assistive devices and provide as appropriate  - Encourage maximum independence but intervene and supervise when necessary  - Involve family in performance of ADLs  - Assess for home care needs following discharge   - Consider OT consult to assist with ADL evaluation and planning for discharge  - Provide patient education as appropriate  Outcome: Progressing  Goal: Maintain proper alignment of affected body part  Description: INTERVENTIONS:  - Support, maintain and protect limb and body alignment  - Provide patient/ family with appropriate education  Outcome: Progressing     Problem: PAIN - ADULT  Goal: Verbalizes/displays adequate comfort level or baseline comfort level  Description: Interventions:  - Encourage patient to monitor pain and request assistance  - Assess pain using appropriate pain scale  - Administer analgesics based on type and severity of pain and evaluate response  - Implement non-pharmacological measures as appropriate and evaluate response  - Consider cultural and social influences on pain and pain management  - Notify physician/advanced practitioner if interventions unsuccessful or patient reports new pain  Outcome: Progressing     Problem: SAFETY ADULT  Goal: Patient will remain free of falls  Description: INTERVENTIONS:  - Educate patient/family on patient safety including physical limitations  - Instruct patient to call for assistance with activity   - Consult OT/PT to assist with strengthening/mobility   - Keep Call bell within reach  - Keep bed low and locked with side rails adjusted as appropriate  - Keep care items and personal belongings within reach  - Initiate and maintain comfort rounds  - Make Fall Risk Sign visible to staff  - Offer Toileting every 2 Hours, in advance of need  - Initiate/Maintain bed alarm  - Obtain necessary fall risk management equipment:   - Apply yellow socks and bracelet for high fall risk patients  - Consider moving patient to room near nurses station  Outcome: Progressing  Goal: Maintain or return to baseline ADL function  Description: INTERVENTIONS:  -  Assess patient's ability to carry out ADLs; assess patient's baseline for ADL function and identify physical deficits which impact ability to perform ADLs (bathing, care of mouth/teeth, toileting, grooming, dressing, etc )  - Assess/evaluate cause of self-care deficits   - Assess range of motion  - Assess patient's mobility; develop plan if impaired  - Assess patient's need for assistive devices and provide as appropriate  - Encourage maximum independence but intervene and supervise when necessary  - Involve family in performance of ADLs  - Assess for home care needs following discharge   - Consider OT consult to assist with ADL evaluation and planning for discharge  - Provide patient education as appropriate  Outcome: Progressing  Goal: Maintains/Returns to pre admission functional level  Description: INTERVENTIONS:  - Perform BMAT or MOVE assessment daily    - Set and communicate daily mobility goal to care team and patient/family/caregiver  - Collaborate with rehabilitation services on mobility goals if consulted  - Perform Range of Motion 3 times a day  - Reposition patient every 2 hours    - Dangle patient 3 times a day  - Stand patient 3 times a day  - Ambulate patient 3 times a day  - Out of bed to chair 3 times a day   - Out of bed for meals 3 times a day  - Out of bed for toileting  - Record patient progress and toleration of activity level   Outcome: Progressing     Problem: DISCHARGE PLANNING  Goal: Discharge to home or other facility with appropriate resources  Description: INTERVENTIONS:  - Identify barriers to discharge w/patient and caregiver  - Arrange for needed discharge resources and transportation as appropriate  - Identify discharge learning needs (meds, wound care, etc )  - Arrange for interpretive services to assist at discharge as needed  - Refer to Case Management Department for coordinating discharge planning if the patient needs post-hospital services based on physician/advanced practitioner order or complex needs related to functional status, cognitive ability, or social support system  Outcome: Progressing     Problem: Knowledge Deficit  Goal: Patient/family/caregiver demonstrates understanding of disease process, treatment plan, medications, and discharge instructions  Description: Complete learning assessment and assess knowledge base    Interventions:  - Provide teaching at level of understanding  - Provide teaching via preferred learning methods  Outcome: Progressing     Problem: MOBILITY - ADULT  Goal: Maintain or return to baseline ADL function  Description: INTERVENTIONS:  -  Assess patient's ability to carry out ADLs; assess patient's baseline for ADL function and identify physical deficits which impact ability to perform ADLs (bathing, care of mouth/teeth, toileting, grooming, dressing, etc )  - Assess/evaluate cause of self-care deficits   - Assess range of motion  - Assess patient's mobility; develop plan if impaired  - Assess patient's need for assistive devices and provide as appropriate  - Encourage maximum independence but intervene and supervise when necessary  - Involve family in performance of ADLs  - Assess for home care needs following discharge   - Consider OT consult to assist with ADL evaluation and planning for discharge  - Provide patient education as appropriate  Outcome: Progressing  Goal: Maintains/Returns to pre admission functional level  Description: INTERVENTIONS:  - Perform BMAT or MOVE assessment daily    - Set and communicate daily mobility goal to care team and patient/family/caregiver  - Collaborate with rehabilitation services on mobility goals if consulted  - Perform Range of Motion 3 times a day  - Reposition patient every 2 hours    - Dangle patient 3 times a day  - Stand patient 3 times a day  - Ambulate patient 3 times a day  - Out of bed to chair 3 times a day   - Out of bed for meals 3 times a day  - Out of bed for toileting  - Record patient progress and toleration of activity level   Outcome: Progressing     Problem: INFECTION - ADULT  Goal: Absence or prevention of progression during hospitalization  Description: INTERVENTIONS:  - Assess and monitor for signs and symptoms of infection  - Monitor lab/diagnostic results  - Monitor all insertion sites, i e  indwelling lines, tubes, and drains  - Monitor endotracheal if appropriate and nasal secretions for changes in amount and color  - [place] appropriate cooling/warming therapies per order  - Administer medications as ordered  - Instruct and encourage patient and family to use good hand hygiene technique  - Identify and instruct in appropriate isolation precautions for identified infection/condition  Outcome: Progressing  Goal: Absence of fever/infection during neutropenic period  Description: INTERVENTIONS:  - Monitor WBC    Outcome: Progressing

## 2023-04-29 NOTE — PLAN OF CARE
Problem: Potential for Falls  Goal: Patient will remain free of falls  Description: INTERVENTIONS:  - Educate patient/family on patient safety including physical limitations  - Instruct patient to call for assistance with activity   - Consult OT/PT to assist with strengthening/mobility   - Keep Call bell within reach  - Keep bed low and locked with side rails adjusted as appropriate  - Keep care items and personal belongings within reach  - Initiate and maintain comfort rounds  - Make Fall Risk Sign visible to staff  - Offer Toileting every two hours, in advance of need  - Initiate/Maintain bed  alarm  - Obtain necessary fall risk management equipment  - Apply yellow socks and bracelet for high fall risk patients  - Consider moving patient to room near nurses station  Outcome: Progressing     Problem: RESPIRATORY - ADULT  Goal: Achieves optimal ventilation and oxygenation  Description: INTERVENTIONS:  - Assess for changes in respiratory status  - Assess for changes in mentation and behavior  - Position to facilitate oxygenation and minimize respiratory effort  - Oxygen administered by appropriate delivery if ordered  - Encourage broncho-pulmonary hygiene including cough, deep breathe, Incentive Spirometry  - Assess the need for suctioning and aspirate as needed  - Assess and instruct to report SOB or any respiratory difficulty  - Respiratory Therapy support as indicated  Outcome: Progressing     Problem: PAIN - ADULT  Goal: Verbalizes/displays adequate comfort level or baseline comfort level  Description: Interventions:  - Encourage patient to monitor pain and request assistance  - Assess pain using appropriate pain scale  - Administer analgesics based on type and severity of pain and evaluate response  - Implement non-pharmacological measures as appropriate and evaluate response  - Consider cultural and social influences on pain and pain management  - Notify physician/advanced practitioner if interventions unsuccessful or patient reports new pain  Outcome: Progressing     Problem: SAFETY ADULT  Goal: Patient will remain free of falls  Description: INTERVENTIONS:  - Educate patient/family on patient safety including physical limitations  - Instruct patient to call for assistance with activity   - Consult OT/PT to assist with strengthening/mobility   - Keep Call bell within reach  - Keep bed low and locked with side rails adjusted as appropriate  - Keep care items and personal belongings within reach  - Initiate and maintain comfort rounds  - Make Fall Risk Sign visible to staff  - Offer Toileting every two hours, in advance of need  - Initiate/Maintain bed  alarm  - Obtain necessary fall risk management equipment  - Apply yellow socks and bracelet for high fall risk patients  - Consider moving patient to room near nurses station  Outcome: Progressing

## 2023-04-30 LAB
ANION GAP SERPL CALCULATED.3IONS-SCNC: 1 MMOL/L (ref 4–13)
BASOPHILS # BLD AUTO: 0.03 THOUSANDS/ÂΜL (ref 0–0.1)
BASOPHILS NFR BLD AUTO: 0 % (ref 0–1)
BUN SERPL-MCNC: 20 MG/DL (ref 5–25)
CALCIUM SERPL-MCNC: 8.6 MG/DL (ref 8.3–10.1)
CHLORIDE SERPL-SCNC: 104 MMOL/L (ref 96–108)
CO2 SERPL-SCNC: 27 MMOL/L (ref 21–32)
CREAT SERPL-MCNC: 0.56 MG/DL (ref 0.6–1.3)
EOSINOPHIL # BLD AUTO: 0.1 THOUSAND/ÂΜL (ref 0–0.61)
EOSINOPHIL NFR BLD AUTO: 2 % (ref 0–6)
ERYTHROCYTE [DISTWIDTH] IN BLOOD BY AUTOMATED COUNT: 14.3 % (ref 11.6–15.1)
GFR SERPL CREATININE-BSD FRML MDRD: 90 ML/MIN/1.73SQ M
GLUCOSE SERPL-MCNC: 124 MG/DL (ref 65–140)
GLUCOSE SERPL-MCNC: 133 MG/DL (ref 65–140)
GLUCOSE SERPL-MCNC: 159 MG/DL (ref 65–140)
GLUCOSE SERPL-MCNC: 195 MG/DL (ref 65–140)
GLUCOSE SERPL-MCNC: 62 MG/DL (ref 65–140)
GLUCOSE SERPL-MCNC: 94 MG/DL (ref 65–140)
HCT VFR BLD AUTO: 33 % (ref 34.8–46.1)
HGB BLD-MCNC: 10.8 G/DL (ref 11.5–15.4)
IMM GRANULOCYTES # BLD AUTO: 0.05 THOUSAND/UL (ref 0–0.2)
IMM GRANULOCYTES NFR BLD AUTO: 1 % (ref 0–2)
INR PPP: 6.34 (ref 0.84–1.19)
LYMPHOCYTES # BLD AUTO: 0.64 THOUSANDS/ÂΜL (ref 0.6–4.47)
LYMPHOCYTES NFR BLD AUTO: 10 % (ref 14–44)
MCH RBC QN AUTO: 33.6 PG (ref 26.8–34.3)
MCHC RBC AUTO-ENTMCNC: 32.7 G/DL (ref 31.4–37.4)
MCV RBC AUTO: 103 FL (ref 82–98)
MONOCYTES # BLD AUTO: 0.66 THOUSAND/ÂΜL (ref 0.17–1.22)
MONOCYTES NFR BLD AUTO: 10 % (ref 4–12)
NEUTROPHILS # BLD AUTO: 5.25 THOUSANDS/ÂΜL (ref 1.85–7.62)
NEUTS SEG NFR BLD AUTO: 77 % (ref 43–75)
NRBC BLD AUTO-RTO: 0 /100 WBCS
PLATELET # BLD AUTO: 94 THOUSANDS/UL (ref 149–390)
PMV BLD AUTO: 11.2 FL (ref 8.9–12.7)
POTASSIUM SERPL-SCNC: 4.1 MMOL/L (ref 3.5–5.3)
PROTHROMBIN TIME: 56.1 SECONDS (ref 11.6–14.5)
RBC # BLD AUTO: 3.21 MILLION/UL (ref 3.81–5.12)
SODIUM SERPL-SCNC: 132 MMOL/L (ref 135–147)
WBC # BLD AUTO: 6.73 THOUSAND/UL (ref 4.31–10.16)

## 2023-04-30 RX ADMIN — METHOCARBAMOL 250 MG: 500 TABLET ORAL at 17:23

## 2023-04-30 RX ADMIN — CITALOPRAM HYDROBROMIDE 20 MG: 20 TABLET ORAL at 08:38

## 2023-04-30 RX ADMIN — ALLOPURINOL 100 MG: 100 TABLET ORAL at 08:38

## 2023-04-30 RX ADMIN — INSULIN LISPRO 1 UNITS: 100 INJECTION, SOLUTION INTRAVENOUS; SUBCUTANEOUS at 21:58

## 2023-04-30 RX ADMIN — LIDOCAINE 5% 1 PATCH: 700 PATCH TOPICAL at 08:40

## 2023-04-30 RX ADMIN — GABAPENTIN 100 MG: 100 CAPSULE ORAL at 21:57

## 2023-04-30 RX ADMIN — METHOCARBAMOL 250 MG: 500 TABLET ORAL at 05:54

## 2023-04-30 RX ADMIN — Medication 7.5 MG: at 01:34

## 2023-04-30 RX ADMIN — INSULIN DETEMIR 10 UNITS: 100 INJECTION, SOLUTION SUBCUTANEOUS at 21:57

## 2023-04-30 RX ADMIN — METHOCARBAMOL 250 MG: 500 TABLET ORAL at 12:19

## 2023-04-30 RX ADMIN — MONTELUKAST 10 MG: 10 TABLET, FILM COATED ORAL at 21:58

## 2023-04-30 RX ADMIN — DOCUSATE SODIUM 100 MG: 100 CAPSULE, LIQUID FILLED ORAL at 17:23

## 2023-04-30 RX ADMIN — ACETAMINOPHEN 975 MG: 325 TABLET ORAL at 05:54

## 2023-04-30 RX ADMIN — Medication 7.5 MG: at 16:19

## 2023-04-30 RX ADMIN — Medication 7.5 MG: at 06:45

## 2023-04-30 RX ADMIN — ATORVASTATIN CALCIUM 40 MG: 40 TABLET, FILM COATED ORAL at 16:20

## 2023-04-30 RX ADMIN — ACETAMINOPHEN 975 MG: 325 TABLET ORAL at 16:20

## 2023-04-30 RX ADMIN — INSULIN LISPRO 1 UNITS: 100 INJECTION, SOLUTION INTRAVENOUS; SUBCUTANEOUS at 17:22

## 2023-04-30 RX ADMIN — OXYBUTYNIN 5 MG: 5 TABLET, FILM COATED, EXTENDED RELEASE ORAL at 08:38

## 2023-04-30 NOTE — ASSESSMENT & PLAN NOTE
- hold coumadin for now pending evaluation for humerus fracture  - F/u INR  - Goal INR reportedly 2 5 to 3 5  - INR currently 6 35; will hold Coumadin at this time

## 2023-04-30 NOTE — PLAN OF CARE
Problem: Potential for Falls  Goal: Patient will remain free of falls  Description: INTERVENTIONS:  - Educate patient/family on patient safety including physical limitations  - Instruct patient to call for assistance with activity   - Consult OT/PT to assist with strengthening/mobility   - Keep Call bell within reach  - Keep bed low and locked with side rails adjusted as appropriate  - Keep care items and personal belongings within reach  - Initiate and maintain comfort rounds  - Make Fall Risk Sign visible to staff  - Offer Toileting every two hours, in advance of need  - Initiate/Maintain bed  alarm  - Obtain necessary fall risk management equipment  - Apply yellow socks and bracelet for high fall risk patients  - Consider moving patient to room near nurses station  Outcome: Progressing     Problem: RESPIRATORY - ADULT  Goal: Achieves optimal ventilation and oxygenation  Description: INTERVENTIONS:  - Assess for changes in respiratory status  - Assess for changes in mentation and behavior  - Position to facilitate oxygenation and minimize respiratory effort  - Oxygen administered by appropriate delivery if ordered  - Initiate smoking cessation education as indicated  - Encourage broncho-pulmonary hygiene including cough, deep breathe, Incentive Spirometry  - Assess and instruct to report SOB or any respiratory difficulty  - Respiratory Therapy support as indicated  Outcome: Progressing     Problem: PAIN - ADULT  Goal: Verbalizes/displays adequate comfort level or baseline comfort level  Description: Interventions:  - Encourage patient to monitor pain and request assistance  - Assess pain using appropriate pain scale  - Administer analgesics based on type and severity of pain and evaluate response  - Implement non-pharmacological measures as appropriate and evaluate response  - Consider cultural and social influences on pain and pain management  - Notify physician/advanced practitioner if interventions unsuccessful or patient reports new pain  Outcome: Progressing     Problem: INFECTION - ADULT  Goal: Absence or prevention of progression during hospitalization  Description: INTERVENTIONS:  - Assess and monitor for signs and symptoms of infection  - Monitor lab/diagnostic results  - Monitor all insertion sites, i e  indwelling lines, tubes, and drains  - Administer medications as ordered  - Instruct and encourage patient and family to use good hand hygiene technique  - Identify and instruct in appropriate isolation precautions for identified infection/condition  Outcome: Progressing

## 2023-04-30 NOTE — PROGRESS NOTES
[facility] [facility]  Progress Note  Name: Reji Bowers  MRN: [id number]  [facility] Date of Admission: 4/27/2023   Date of Service: 4/30/2023 I Hospital Day: 3    Assessment/Plan   Type 2 diabetes mellitus [facility])  Assessment & Plan  No results found for: HGBA1C    Recent Labs     04/29/23  2139 04/30/23  0734 04/30/23  0841 04/30/23  1113   POCGLU 127 62* 94 124       Blood Sugar Average: Last 72 hrs:  (P) 115 5     - Continue current medication regimen   - Outpatient follow-up with PCP  Fracture of multiple ribs of right side  Assessment & Plan  - Multiple right-sided rib fractures (8th-9th), present on admission   - Continue rib fracture protocol   - Continue to encourage incentive spirometer use and adequate pulmonary hygiene  - Continue multimodal analgesic regimen     - Supplemental oxygen via nasal cannula as needed to maintain saturations greater than or equal to 94%  - PT and OT evaluation and treatment as indicated  - Outpatient follow-up in the trauma clinic for re-evaluation in approximately 2 weeks  Laceration of right eyebrow  Assessment & Plan  - two 1 cm lacerations to the R eye brow  - steri strips applied  - local wound care    H/O mechanical aortic valve replacement  Assessment & Plan  - hold coumadin for now pending evaluation for humerus fracture  - F/u INR  - Goal INR reportedly 2 5 to 3 5  - INR currently 6 35; will hold Coumadin at this time    Acute pain due to trauma  Assessment & Plan  - Acute pain secondary to traumatic injuries  - Multi modal pain regimen  - Bowel regimen as long as using opioids   - Continue to monitor pain and adjust regimen as indicated        Humerus fracture  Assessment & Plan  - proximal humerus fracture on the left   - NVI distally  - pain control with multi modal analgesia  - orthopedics consultation placed, will f/u recommendations  - NWB LUE   - Sling to the LUE    Fall  Assessment & Plan  - mechanical fall   - sustained below stated injuries  - PT/OT evaluations  - Geriatrics evaluation  - CM for disposition planning       DVT prophylaxis: SCDs and Coumadin  PT and OT: eval and treat    Disposition: Placement pending  Will hold Coumadin at this time secondary to supratherapeutic INR  Continue prn pain support  Subjective   Chief Complaint: No complaints  Subjective: Patient offering no new complaints  Resting in bed  No pain at this time  Objective   Vitals:   Temp:  [98 °F (36 7 °C)-98 5 °F (36 9 °C)] 98 4 °F (36 9 °C)  HR:  [79-94] 86  Resp:  [18-22] 18  BP: (108-124)/(66-74) 112/66    I/O       04/28 0701 04/29 0700 04/29 0701 04/30 0700 04/30 0701  05/01 0700    P  O  960  480    I V  (mL/kg)  500 (5 9)     Total Intake(mL/kg) 960 (11 3) 500 (5 9) 480 (5 6)    Net +960 +500 +480           Unmeasured Urine Occurrence 2 x 4 x     Unmeasured Stool Occurrence  1 x            Physical Exam:   GENERAL APPEARANCE: NAD  NEURO: GCS 15  HEENT: Normocephalic  CV: RRR  LUNGS: CTA b/l  GI: non-tender, non-distended  : no brothers  MSK: moving all extremities, Sling on LUE  SKIN: warm, dry, intact    Invasive Devices     Peripheral Intravenous Line  Duration           Peripheral IV 04/27/23 Right Antecubital 2 days                 PIC Score  PIC Pain Score: 2 (4/30/2023  6:45 AM)  PIC Incentive Spirometry Score: 3 (4/30/2023  4:00 AM)  PIC Cough Description: 3 (4/30/2023  4:00 AM)  PIC Total Score: 8 (4/30/2023  4:00 AM)       If the Total PIC Score </=5, did you consult APS and evaluate patient for further intervention?: no      Pain:    Incentive Spirometry  Cough  3 = Controlled  4 = Above goal volume 3 = Strong  2 = Moderate  3 = Goal to alert volume 2 = Weak  1 = Severe  2 = Below alert volume 1 = Absent     1 = Unable to perform IS         Lab Results:   Results: I have personally reviewed all pertinent laboratory/tests results, BMP/CMP:   Lab Results   Component Value Date    SODIUM 132 (L) 04/30/2023 K 4 1 04/30/2023     04/30/2023    CO2 27 04/30/2023    BUN 20 04/30/2023    CREATININE 0 56 (L) 04/30/2023    CALCIUM 8 6 04/30/2023    EGFR 90 04/30/2023   , CBC:   Lab Results   Component Value Date    WBC 6 73 04/30/2023    HGB 10 8 (L) 04/30/2023    HCT 33 0 (L) 04/30/2023     (H) 04/30/2023    PLT 94 (L) 04/30/2023    MCH 33 6 04/30/2023    MCHC 32 7 04/30/2023    RDW 14 3 04/30/2023    MPV 11 2 04/30/2023    NRBC 0 04/30/2023    and Coagulation:   Lab Results   Component Value Date    INR 6 34 (HH) 04/30/2023     Imaging: I have personally reviewed pertinent reports       Other Studies: no other studies

## 2023-04-30 NOTE — ASSESSMENT & PLAN NOTE
No results found for: HGBA1C    Recent Labs     04/29/23  2139 04/30/23  0734 04/30/23  0841 04/30/23  1113   POCGLU 127 62* 94 124       Blood Sugar Average: Last 72 hrs:  (P) 115 5     - Continue current medication regimen   - Outpatient follow-up with PCP

## 2023-05-01 LAB
ABO GROUP BLD: NORMAL
ANION GAP SERPL CALCULATED.3IONS-SCNC: -1 MMOL/L (ref 4–13)
BASOPHILS # BLD AUTO: 0.02 THOUSANDS/ÂΜL (ref 0–0.1)
BASOPHILS NFR BLD AUTO: 0 % (ref 0–1)
BLD GP AB SCN SERPL QL: NEGATIVE
BUN SERPL-MCNC: 15 MG/DL (ref 5–25)
CALCIUM SERPL-MCNC: 8.5 MG/DL (ref 8.3–10.1)
CHLORIDE SERPL-SCNC: 106 MMOL/L (ref 96–108)
CO2 SERPL-SCNC: 29 MMOL/L (ref 21–32)
CREAT SERPL-MCNC: 0.49 MG/DL (ref 0.6–1.3)
EOSINOPHIL # BLD AUTO: 0.08 THOUSAND/ÂΜL (ref 0–0.61)
EOSINOPHIL NFR BLD AUTO: 1 % (ref 0–6)
ERYTHROCYTE [DISTWIDTH] IN BLOOD BY AUTOMATED COUNT: 14.4 % (ref 11.6–15.1)
GFR SERPL CREATININE-BSD FRML MDRD: 94 ML/MIN/1.73SQ M
GLUCOSE SERPL-MCNC: 111 MG/DL (ref 65–140)
GLUCOSE SERPL-MCNC: 125 MG/DL (ref 65–140)
GLUCOSE SERPL-MCNC: 129 MG/DL (ref 65–140)
GLUCOSE SERPL-MCNC: 137 MG/DL (ref 65–140)
GLUCOSE SERPL-MCNC: 147 MG/DL (ref 65–140)
GLUCOSE SERPL-MCNC: 60 MG/DL (ref 65–140)
GLUCOSE SERPL-MCNC: 79 MG/DL (ref 65–140)
GLUCOSE SERPL-MCNC: 93 MG/DL (ref 65–140)
HCT VFR BLD AUTO: 35.7 % (ref 34.8–46.1)
HGB BLD-MCNC: 11.8 G/DL (ref 11.5–15.4)
IMM GRANULOCYTES # BLD AUTO: 0.04 THOUSAND/UL (ref 0–0.2)
IMM GRANULOCYTES NFR BLD AUTO: 1 % (ref 0–2)
INR PPP: 4.23 (ref 0.84–1.19)
LYMPHOCYTES # BLD AUTO: 0.6 THOUSANDS/ÂΜL (ref 0.6–4.47)
LYMPHOCYTES NFR BLD AUTO: 8 % (ref 14–44)
MCH RBC QN AUTO: 33.7 PG (ref 26.8–34.3)
MCHC RBC AUTO-ENTMCNC: 33.1 G/DL (ref 31.4–37.4)
MCV RBC AUTO: 102 FL (ref 82–98)
MONOCYTES # BLD AUTO: 0.54 THOUSAND/ÂΜL (ref 0.17–1.22)
MONOCYTES NFR BLD AUTO: 7 % (ref 4–12)
NEUTROPHILS # BLD AUTO: 6.25 THOUSANDS/ÂΜL (ref 1.85–7.62)
NEUTS SEG NFR BLD AUTO: 83 % (ref 43–75)
NRBC BLD AUTO-RTO: 0 /100 WBCS
PLATELET # BLD AUTO: 129 THOUSANDS/UL (ref 149–390)
PMV BLD AUTO: 10.4 FL (ref 8.9–12.7)
POTASSIUM SERPL-SCNC: 4.1 MMOL/L (ref 3.5–5.3)
PROTHROMBIN TIME: 41 SECONDS (ref 11.6–14.5)
RBC # BLD AUTO: 3.5 MILLION/UL (ref 3.81–5.12)
RH BLD: POSITIVE
SODIUM SERPL-SCNC: 134 MMOL/L (ref 135–147)
SPECIMEN EXPIRATION DATE: NORMAL
WBC # BLD AUTO: 7.53 THOUSAND/UL (ref 4.31–10.16)

## 2023-05-01 RX ORDER — SODIUM CHLORIDE, SODIUM GLUCONATE, SODIUM ACETATE, POTASSIUM CHLORIDE, MAGNESIUM CHLORIDE, SODIUM PHOSPHATE, DIBASIC, AND POTASSIUM PHOSPHATE .53; .5; .37; .037; .03; .012; .00082 G/100ML; G/100ML; G/100ML; G/100ML; G/100ML; G/100ML; G/100ML
500 INJECTION, SOLUTION INTRAVENOUS ONCE
Status: COMPLETED | OUTPATIENT
Start: 2023-05-01 | End: 2023-05-01

## 2023-05-01 RX ADMIN — METHOCARBAMOL 250 MG: 500 TABLET ORAL at 06:35

## 2023-05-01 RX ADMIN — CITALOPRAM HYDROBROMIDE 20 MG: 20 TABLET ORAL at 08:11

## 2023-05-01 RX ADMIN — LIDOCAINE 5% 1 PATCH: 700 PATCH TOPICAL at 08:12

## 2023-05-01 RX ADMIN — METHOCARBAMOL 250 MG: 500 TABLET ORAL at 23:47

## 2023-05-01 RX ADMIN — ALLOPURINOL 100 MG: 100 TABLET ORAL at 08:11

## 2023-05-01 RX ADMIN — INSULIN DETEMIR 7 UNITS: 100 INJECTION, SOLUTION SUBCUTANEOUS at 22:03

## 2023-05-01 RX ADMIN — Medication 7.5 MG: at 08:14

## 2023-05-01 RX ADMIN — STANDARDIZED SENNA CONCENTRATE 17.2 MG: 8.6 TABLET ORAL at 08:10

## 2023-05-01 RX ADMIN — FUROSEMIDE 40 MG: 40 TABLET ORAL at 08:11

## 2023-05-01 RX ADMIN — METHOCARBAMOL 250 MG: 500 TABLET ORAL at 17:22

## 2023-05-01 RX ADMIN — METHOCARBAMOL 250 MG: 500 TABLET ORAL at 01:00

## 2023-05-01 RX ADMIN — OXYBUTYNIN 5 MG: 5 TABLET, FILM COATED, EXTENDED RELEASE ORAL at 08:10

## 2023-05-01 RX ADMIN — GABAPENTIN 100 MG: 100 CAPSULE ORAL at 22:03

## 2023-05-01 RX ADMIN — ACETAMINOPHEN 975 MG: 325 TABLET ORAL at 08:09

## 2023-05-01 RX ADMIN — ACETAMINOPHEN 975 MG: 325 TABLET ORAL at 01:00

## 2023-05-01 RX ADMIN — Medication 7.5 MG: at 13:16

## 2023-05-01 RX ADMIN — METHOCARBAMOL 250 MG: 500 TABLET ORAL at 13:16

## 2023-05-01 RX ADMIN — ACETAMINOPHEN 975 MG: 325 TABLET ORAL at 16:37

## 2023-05-01 RX ADMIN — METOPROLOL SUCCINATE 50 MG: 50 TABLET, EXTENDED RELEASE ORAL at 08:10

## 2023-05-01 RX ADMIN — ATORVASTATIN CALCIUM 40 MG: 40 TABLET, FILM COATED ORAL at 16:37

## 2023-05-01 RX ADMIN — SODIUM CHLORIDE, SODIUM GLUCONATE, SODIUM ACETATE, POTASSIUM CHLORIDE, MAGNESIUM CHLORIDE, SODIUM PHOSPHATE, DIBASIC, AND POTASSIUM PHOSPHATE 500 ML: .53; .5; .37; .037; .03; .012; .00082 INJECTION, SOLUTION INTRAVENOUS at 20:18

## 2023-05-01 RX ADMIN — ACETAMINOPHEN 975 MG: 325 TABLET ORAL at 23:47

## 2023-05-01 RX ADMIN — Medication 7.5 MG: at 17:22

## 2023-05-01 RX ADMIN — DILTIAZEM HYDROCHLORIDE 120 MG: 120 CAPSULE, COATED, EXTENDED RELEASE ORAL at 08:11

## 2023-05-01 RX ADMIN — MONTELUKAST 10 MG: 10 TABLET, FILM COATED ORAL at 22:03

## 2023-05-01 NOTE — CASE MANAGEMENT
Case Management Discharge Planning Note    Patient name Chilo Cordova  Location [address] [place] [address] [address] 396[address]79 MRN [id number]  : 1945 Date 2023       Current Admission Date: 2023  Current Admission Diagnosis:Fall   Patient Active Problem List    Diagnosis Date Noted   • Type 2 diabetes mellitus ([facility] [address] ) 2023   • Fall 2023   • Humerus fracture 2023   • Acute pain due to trauma 2023   • H/O mechanical aortic valve replacement 2023   • Laceration of right eyebrow 2023   • Fracture of multiple ribs of right side 2023      LOS (days): 4  Geometric Mean LOS (GMLOS) (days):   Days to GMLOS:     OBJECTIVE:  Risk of Unplanned Readmission Score: 10 62         Current admission status: Inpatient   Preferred Pharmacy:   [place] [address] [address] [address]  Phone: [phone] Fax: [phone]    Primary Care Provider: Tommie Desir DO    Primary Insurance: [employer] REP  Secondary Insurance:     DISCHARGE DETAILS:                    Pt's preference is [facility] or [facility] for rehab  CM placed referrals for both  CM will follow up    Pt is stable and can d/c

## 2023-05-01 NOTE — PROGRESS NOTES
Progress Note - Geriatric Medicine   Blas Casillas 68 y o  female MRN: [id number]  Unit/Bed#: [facility] 629-01 Encounter: [id number]      Assessment/Plan:    Ambulatory dysfunction with fall  -reportedly mechanical fall PTA 4/27  -injuries as outlined below   -remains at increased risk future falls, cont fall precautions   -PT/OT following      Right sided rib fractures (8 and 9)  -s/p fall as above  -noted on CT chest obtained on admission  -no pneumothorax noted  -currently requiring supplemental O2 not required at baseline   -continue acute pain, improved with topical lido patch and robaxin   -encourage aggressive pulm toilet and ISS     Left humerus fracture   -s/p fall as above  -s/p reduction and splint by Ortho on admission   -NWB LUE in splint and sling  -Ortho evaluated and recommend non-op management    -continue neurovascular checks per protocol   -acute pain now better controlled     Acute pain due to trauma   -continue acute multimodal pain control  -Tylenol scheduled, Oxy 5mg and 7 5mg Q4H PRN  -dilaudid for breakthrough  -noted improvement with addition of topical lido patch and Robaxin   -bowel regimen to reduce risk constipation      Hx mechanical aortic valve replacement   -maintained on chronic systemic anticoagulation with coumadin   -currently supratherapeutic at 4 23, dose on hold, f/u INR      Impaired Vision  -encourage use of corrective lenses at all appropriate times  -encourage adequate lighting and encourage use of assistance with ambulation  -keep personal belongings close to person to avoid reaching  -encourage appropriate footwear at all times  -consider large font for printed materials provided to patient      High risk developing delirium  -Patient is high risk of delirium due to age, fall, traumatic injuries, acute pain, and hospitalization   -continue delirium precautions  -maintain normal sleep/wake cycle  -dim lights, close blinds and turn off tv to minimize stimulation and encourage "sleep environment in evenings  -monitor for fecal and urinary retention which may precipitate delirium, no BM recorded during admission   -encourage hydration and nutrition   -redirect unwanted behaviors as first line     Frailty syndrome in geriatric patient  -Clinical frailty scale stage IV, vulnerable   -Multifactorial including age, DM-II, hx valve replacement and multiple chronic medical co-morbidities now fall and traumatic injuries in elderly individual with limited physiologic and metabolic reserves   -Continue optimization of chronic conditions and address acute metabolic derangements as arise     Care coordination: rounded with Michael Arroyo (RN)    Subjective:     Coral Rene is seen and examined at bedside where she is sitting resting with her family at her side, her appetite is improving and pain is now well controlled, she offers no additional acute complaints at this time  Review of Systems   Constitutional: Negative  Negative for appetite change  HENT: Negative  Eyes: Negative  Respiratory: Negative  Cardiovascular: Negative  Gastrointestinal: Negative  Musculoskeletal: Negative  Skin: Negative  Neurological: Negative for numbness  Hematological: Negative  Psychiatric/Behavioral: Negative  Negative for sleep disturbance  All other systems reviewed and are negative  Objective:     Vitals: Blood pressure 130/74, pulse 85, temperature 98 4 °F (36 9 °C), resp  rate 17, height 4' 10 5\" (1 486 m), weight 85 3 kg (188 lb), SpO2 95 %  ,Body mass index is 38 62 kg/m²  Intake/Output Summary (Last 24 hours) at 5/1/2023 1524  Last data filed at 5/1/2023 1319  Gross per 24 hour   Intake 478 ml   Output 400 ml   Net 78 ml     Current Medications: Reviewed    Physical Exam:   Physical Exam  Vitals and nursing note reviewed  Constitutional:       General: She is not in acute distress  Appearance: Normal appearance  She is not toxic-appearing  HENT:      Head: Normocephalic        " Comments: Right face ecchymosis      Nose: Nose normal       Mouth/Throat:      Mouth: Mucous membranes are dry  Eyes:      General: No scleral icterus  Right eye: No discharge  Left eye: No discharge  Conjunctiva/sclera: Conjunctivae normal    Neck:      Comments: Phonation norm   Cardiovascular:      Rate and Rhythm: Normal rate  Pulmonary:      Effort: Pulmonary effort is normal  No respiratory distress  Breath sounds: No wheezing  Comments: O2 via NC  Abdominal:      General: There is no distension  Palpations: Abdomen is soft  Tenderness: There is no abdominal tenderness  Musculoskeletal:      Cervical back: Neck supple  Comments: Obese body habitus     LUE in sling    Skin:     General: Skin is warm and dry  Coloration: Skin is pale  Neurological:      Mental Status: She is alert  Comments: Awake and alert, oriented, answers questions appropriately, speech clear fluent    Psychiatric:         Mood and Affect: Mood normal          Behavior: Behavior normal         Invasive Devices     Peripheral Intravenous Line  Duration           Peripheral IV 04/27/23 Right Antecubital 4 days          Drain  Duration           External Urinary Catheter <1 day              Lab, Imaging and other studies: I have personally reviewed pertinent reports

## 2023-05-01 NOTE — ASSESSMENT & PLAN NOTE
- hold coumadin for now pending evaluation for humerus fracture  - F/u INR  - Goal INR reportedly 2 5 to 3 5  - INR currently 4 23; will hold Coumadin at this time

## 2023-05-01 NOTE — [facility] ADMISSION
Patients case reviewed with [facility] MD and patient is approved pending medical clearance/stabilization of INR as well as updated PT/OT notes for prior insurance auth  CM updated regarding same

## 2023-05-01 NOTE — CASE MANAGEMENT
Case Management Discharge Planning Note    Patient name Thomas Alert  Location [address] 629/PPHP 197-56 MRN [id number]  : 1945 Date 2023       Current Admission Date: 2023  Current Admission Diagnosis:Fall   Patient Active Problem List    Diagnosis Date Noted   • Type 2 diabetes mellitus ([facility] [address] ) 2023   • Fall 2023   • Humerus fracture 2023   • Acute pain due to trauma 2023   • H/O mechanical aortic valve replacement 2023   • Laceration of right eyebrow 2023   • Fracture of multiple ribs of right side 2023      LOS (days): 4  Geometric Mean LOS (GMLOS) (days): 2 70  Days to GMLOS:-0 2     OBJECTIVE:  Risk of Unplanned Readmission Score: 10 53         Current admission status: Inpatient   Preferred Pharmacy:   [address] [address]  Phone: [phone] Fax: [phone]    Primary Care Provider: Nicole Peck DO    Primary Insurance: [employer] [facility]  Secondary Insurance:     DISCHARGE DETAILS:    [facility] will clinically approve   Pt will need updated therapy notes tomorrow morning, and then CM will send for auth

## 2023-05-01 NOTE — [facility] ADMISSION
Referral received for admission to [facility]  Will continue to follow and notify CM when determination has been made

## 2023-05-01 NOTE — PLAN OF CARE
Problem: Potential for Falls  Goal: Patient will remain free of falls  Description: INTERVENTIONS:  - Educate patient/family on patient safety including physical limitations  - Instruct patient to call for assistance with activity   - Consult OT/PT to assist with strengthening/mobility   - Keep Call bell within reach  - Keep bed low and locked with side rails adjusted as appropriate  - Keep care items and personal belongings within reach  - Initiate and maintain comfort rounds  - Make Fall Risk Sign visible to staff  - Offer Toileting every  Hours, in advance of need  - Initiate/Maintain alarm  - Obtain necessary fall risk management equipment:   - Apply yellow socks and bracelet for high fall risk patients  - Consider moving patient to room near nurses station  Outcome: Progressing     Problem: RESPIRATORY - ADULT  Goal: Achieves optimal ventilation and oxygenation  Description: INTERVENTIONS:  - Assess for changes in respiratory status  - Assess for changes in mentation and behavior  - Position to facilitate oxygenation and minimize respiratory effort  - Oxygen administered by appropriate delivery if ordered  - Initiate smoking cessation education as indicated  - Encourage broncho-pulmonary hygiene including cough, deep breathe, Incentive Spirometry  - Assess the need for suctioning and aspirate as needed  - Assess and instruct to report SOB or any respiratory difficulty  - Respiratory Therapy support as indicated  Outcome: Progressing     Problem: SKIN/TISSUE INTEGRITY - ADULT  Goal: Incision(s), wounds(s) or drain site(s) healing without S/S of infection  Description: INTERVENTIONS  - Assess and document dressing, incision, wound bed, drain sites and surrounding tissue  - Provide patient and family education  - Perform skin care/dressing changes every   Outcome: Progressing     Problem: MUSCULOSKELETAL - ADULT  Goal: Maintain or return mobility to safest level of function  Description: INTERVENTIONS:  - Assess patient's ability to carry out ADLs; assess patient's baseline for ADL function and identify physical deficits which impact ability to perform ADLs (bathing, care of mouth/teeth, toileting, grooming, dressing, etc )  - Assess/evaluate cause of self-care deficits   - Assess range of motion  - Assess patient's mobility  - Assess patient's need for assistive devices and provide as appropriate  - Encourage maximum independence but intervene and supervise when necessary  - Involve family in performance of ADLs  - Assess for home care needs following discharge   - Consider OT consult to assist with ADL evaluation and planning for discharge  - Provide patient education as appropriate  Outcome: Progressing  Goal: Maintain proper alignment of affected body part  Description: INTERVENTIONS:  - Support, maintain and protect limb and body alignment  - Provide patient/ family with appropriate education  Outcome: Progressing     Problem: PAIN - ADULT  Goal: Verbalizes/displays adequate comfort level or baseline comfort level  Description: Interventions:  - Encourage patient to monitor pain and request assistance  - Assess pain using appropriate pain scale  - Administer analgesics based on type and severity of pain and evaluate response  - Implement non-pharmacological measures as appropriate and evaluate response  - Consider cultural and social influences on pain and pain management  - Notify physician/advanced practitioner if interventions unsuccessful or patient reports new pain  Outcome: Progressing     Problem: SAFETY ADULT  Goal: Patient will remain free of falls  Description: INTERVENTIONS:  - Educate patient/family on patient safety including physical limitations  - Instruct patient to call for assistance with activity   - Consult OT/PT to assist with strengthening/mobility   - Keep Call bell within reach  - Keep bed low and locked with side rails adjusted as appropriate  - Keep care items and personal belongings within reach  - Initiate and maintain comfort rounds  - Make Fall Risk Sign visible to staff  - Offer Toileting every  Hours, in advance of need  - Initiate/Maintain alarm  - Obtain necessary fall risk management equipment:   - Apply yellow socks and bracelet for high fall risk patients  - Consider moving patient to room near nurses station  Outcome: Progressing  Goal: Maintain or return to baseline ADL function  Description: INTERVENTIONS:  -  Assess patient's ability to carry out ADLs; assess patient's baseline for ADL function and identify physical deficits which impact ability to perform ADLs (bathing, care of mouth/teeth, toileting, grooming, dressing, etc )  - Assess/evaluate cause of self-care deficits   - Assess range of motion  - Assess patient's mobility; develop plan if impaired  - Assess patient's need for assistive devices and provide as appropriate  - Encourage maximum independence but intervene and supervise when necessary  - Involve family in performance of ADLs  - Assess for home care needs following discharge   - Consider OT consult to assist with ADL evaluation and planning for discharge  - Provide patient education as appropriate  Outcome: Progressing  Goal: Maintains/Returns to pre admission functional level  Description: INTERVENTIONS:  - Perform BMAT or MOVE assessment daily    - Set and communicate daily mobility goal to care team and patient/family/caregiver  - Collaborate with rehabilitation services on mobility goals if consulted  - Perform Range of Motion  times a day  - Reposition patient every  hours    - Dangle patient  times a day  - Stand patient  times a day  - Ambulate patient times a day  - Out of bed to chair  times a day   - Out of bed for meals  times a day  - Out of bed for toileting  - Record patient progress and toleration of activity level   Outcome: Progressing     Problem: DISCHARGE PLANNING  Goal: Discharge to home or other facility with appropriate resources  Description: INTERVENTIONS:  - Identify barriers to discharge w/patient and caregiver  - Arrange for needed discharge resources and transportation as appropriate  - Identify discharge learning needs (meds, wound care, etc )  - Arrange for interpretive services to assist at discharge as needed  - Refer to Case Management Department for coordinating discharge planning if the patient needs post-hospital services based on physician/advanced practitioner order or complex needs related to functional status, cognitive ability, or social support system  Outcome: Progressing     Problem: Knowledge Deficit  Goal: Patient/family/caregiver demonstrates understanding of disease process, treatment plan, medications, and discharge instructions  Description: Complete learning assessment and assess knowledge base    Interventions:  - Provide teaching at level of understanding  - Provide teaching via preferred learning methods  Outcome: Progressing     Problem: MOBILITY - ADULT  Goal: Maintain or return to baseline ADL function  Description: INTERVENTIONS:  -  Assess patient's ability to carry out ADLs; assess patient's baseline for ADL function and identify physical deficits which impact ability to perform ADLs (bathing, care of mouth/teeth, toileting, grooming, dressing, etc )  - Assess/evaluate cause of self-care deficits   - Assess range of motion  - Assess patient's mobility; develop plan if impaired  - Assess patient's need for assistive devices and provide as appropriate  - Encourage maximum independence but intervene and supervise when necessary  - Involve family in performance of ADLs  - Assess for home care needs following discharge   - Consider OT consult to assist with ADL evaluation and planning for discharge  - Provide patient education as appropriate  Outcome: Progressing  Goal: Maintains/Returns to pre admission functional level  Description: INTERVENTIONS:  - Perform BMAT or MOVE assessment daily    - Set and communicate daily mobility goal to care team and patient/family/caregiver  - Collaborate with rehabilitation services on mobility goals if consulted  - Perform Range of Motion  times a day  - Reposition patient every  hours    - Dangle patient  times a day  - Stand patient  times a day  - Ambulate patient  times a day  - Out of bed to chair  times a day   - Out of bed for meals  times a day  - Out of bed for toileting  - Record patient progress and toleration of activity level   Outcome: Progressing     Problem: INFECTION - ADULT  Goal: Absence or prevention of progression during hospitalization  Description: INTERVENTIONS:  - Assess and monitor for signs and symptoms of infection  - Monitor lab/diagnostic results  - Monitor all insertion sites, i e  indwelling lines, tubes, and drains  - Monitor endotracheal if appropriate and nasal secretions for changes in amount and color  - [place] appropriate cooling/warming therapies per order  - Administer medications as ordered  - Instruct and encourage patient and family to use good hand hygiene technique  - Identify and instruct in appropriate isolation precautions for identified infection/condition  Outcome: Progressing  Goal: Absence of fever/infection during neutropenic period  Description: INTERVENTIONS:  - Monitor WBC    Outcome: Progressing

## 2023-05-01 NOTE — PROGRESS NOTES
Progress Note - Acute Pain Service    Antonio Class 68 y o  female MRN: [id number]  Unit/Bed#: [facility] 629-01 Encounter: [id number]      Assessment:   Active Problems:    Fall    Humerus fracture    Acute pain due to trauma    H/O mechanical aortic valve replacement    Laceration of right eyebrow    Fracture of multiple ribs of right side    Type 2 diabetes mellitus ([address] )    Antonio Class is a 68 y o  female with a past medical history significant for type 2 diabetes mellitus with most recent hemoglobin A1c of 6 4%, mechanical aortic valve replacement with home warfarin therapy with current supratherapeutic INR who presents after mechanical fall found to have left proximal humerus fracture which is nonoperative per orthopedic, multiple right-sided rib fractures numbers 8 through 9 and a right eyebrow laceration  Patient was evaluated by the acute pain service per rib fracture protocol  At the time it was discussed that patient is not a candidate for thoracic epidural given warfarin therapy with therapeutic INR, which has since become supratherapeutic  Patient was not interested in peripheral nerve blocks at the time  On evaluation, patient appears to be resting comfortably in bed  She reports the pain in her left upper extremity is worse than the pain in her chest   She feels as if she is able to breathe well though does report some mild pain with deep inspiration  She is able to cough  Her oxygen demands are going down and she is now on 1 L by nasal cannula  Incentive spirometry is 750 cc which is appropriate for the patient that is 4 foot 10 5 inches      Plan:   Continue acetaminophen 975 mg p o  every 8 hours scheduled  Continue oxycodone 5 mg p o  every 4 hours as needed for moderate pain  Continue oxycodone 7 5 mg p o  every 4 hours as needed for severe pain  Discontinue intravenous hydromorphone  Continue gabapentin 100 mg p o  at bedtime  Continue methocarbamol 250 mg p o  every 6 hours scheduled  Continue naloxone as needed for opioid reversal/respiratory depression  Continue lidocaine patch x1 to the right chest wall 12 hours on/12 hours off  Bowel regimen per primary team to avoid opioid-induced constipation  At this time, risks of thoracic epidural outweigh benefits given supratherapeutic INR and good pain control with oral opioid analgesics and multimodal regimen  APS will sign off at this time  Thank you for the consult  All opioids and other analgesics to be written at discretion of primary team  Please contact Acute Pain Service - [facility] via [employer] from [phone] with additional questions or concerns  See [employer] or [facility] for additional contacts and after hours information  Pain History  Current pain location(s): Primarily left upper extremity but also some pain in the right chest wall with deep inspiration  Pain Scale:   7  Quality: Sore  24 hour history: Patient is remained hemodynamically stable with some mild tachycardia at times and has been afebrile but did have a Tmax of 100 3 °F   Patient is currently requiring 1 L of supplemental oxygen via nasal cannula  Labs from today significant for sodium 134 (135 when corrected for hyperglycemia), creatinine 0 49 with EGFR 94, INR 4 23, thrombocytopenia with platelet count 241  Patient has had 3 bowel movements in the past day  Opioid requirement previous 24 hours:   Oxycodone 50 mg p o  Meds/Allergies   all current active meds have been reviewed    Allergies   Allergen Reactions   • Fentanyl Confusion       Objective     Temp:  [98 3 °F (36 8 °C)-100 3 °F (37 9 °C)] 100 3 °F (37 9 °C)  HR:  [] 88  Resp:  [16-19] 17  BP: (112-146)/() 131/74    Physical Exam  Vitals and nursing note reviewed  Constitutional:       General: She is not in acute distress  Appearance: Normal appearance  She is not ill-appearing or toxic-appearing  HENT:      Head: Normocephalic and atraumatic     Eyes:      General: No scleral icterus  Conjunctiva/sclera: Conjunctivae normal    Cardiovascular:      Rate and Rhythm: Normal rate  Pulmonary:      Effort: Pulmonary effort is normal  No respiratory distress  Chest:      Chest wall: Tenderness present  Musculoskeletal:         General: Tenderness (LUE) present  Skin:     General: Skin is warm and dry  Neurological:      Mental Status: She is alert  Comments: Awake, alert and oriented to person place time situation  POSS 1   Psychiatric:         Thought Content: Thought content normal          Lab Results:   Results from last 7 days   Lab Units 05/01/23  0636   WBC Thousand/uL 7 53   HEMOGLOBIN g/dL 11 8   HEMATOCRIT % 35 7   PLATELETS Thousands/uL 129*      Results from last 7 days   Lab Units 05/01/23  0809 04/28/23  0508 04/27/23  1306 04/27/23  1303   POTASSIUM mmol/L 4 1   < > 4 2  --    CHLORIDE mmol/L 106   < > 104  --    CO2 mmol/L 29   < > 26  --    CO2, I-STAT mmol/L  --   --   --  28   BUN mg/dL 15   < > 23  --    CREATININE mg/dL 0 49*   < > 0 82  --    CALCIUM mg/dL 8 5   < > 10 0  --    ALK PHOS U/L  --   --  123*  --    ALT U/L  --   --  24  --    AST U/L  --   --  34  --    GLUCOSE, ISTAT mg/dl  --   --   --  121    < > = values in this interval not displayed  Imaging Studies: I have personally reviewed pertinent reports  EKG, Pathology, and Other Studies: I have personally reviewed pertinent reports  Please note that the APS provides consultative services regarding pain management only  With the exception of ketamine and epidural infusions and except when indicated, final decisions regarding starting or changing doses of analgesic medications are at the discretion of the consulting service  Off hours consultation and/or medication management is generally not available      Bill Santiago MD  Acute Pain Service

## 2023-05-01 NOTE — PROGRESS NOTES
[facility]  Progress Note  Name: Rasheed Hemphill  MRN: [id number]  Unit/Bed#: [facility] 988-06 I Date of Admission: 4/27/2023   Date of Service: 5/1/2023 I Hospital Day: 4    Assessment/Plan   Type 2 diabetes mellitus [facility])  Assessment & Plan  No results found for: HGBA1C    Recent Labs     04/30/23 2057 05/01/23  0601 05/01/23  0618 05/01/23  0633   POCGLU 195* 60* 79 111       Blood Sugar Average: Last 72 hrs:  (P) 822 [id number]     - Continue current medication regimen   - Outpatient follow-up with PCP  Fracture of multiple ribs of right side  Assessment & Plan  - Multiple right-sided rib fractures (8th-9th), present on admission   - Continue rib fracture protocol   - Continue to encourage incentive spirometer use and adequate pulmonary hygiene  - Continue multimodal analgesic regimen     - Supplemental oxygen via nasal cannula as needed to maintain saturations greater than or equal to 94%  - PT and OT evaluation and treatment as indicated  - Outpatient follow-up in the trauma clinic for re-evaluation in approximately 2 weeks  Laceration of right eyebrow  Assessment & Plan  - two 1 cm lacerations to the R eye brow  - steri strips applied  - local wound care    H/O mechanical aortic valve replacement  Assessment & Plan  - hold coumadin for now pending evaluation for humerus fracture  - F/u INR  - Goal INR reportedly 2 5 to 3 5  - INR currently 4 23; will hold Coumadin at this time    Acute pain due to trauma  Assessment & Plan  - Acute pain secondary to traumatic injuries  - Multi modal pain regimen  - Bowel regimen as long as using opioids   - Continue to monitor pain and adjust regimen as indicated        Humerus fracture  Assessment & Plan  - proximal humerus fracture on the left   - NVI distally  - pain control with multi modal analgesia  - orthopedics consultation placed, will f/u recommendations  - NWB LUE   - Sling to the LUE    Fall  Assessment & "Plan  - mechanical fall   - sustained below stated injuries  - PT/OT evaluations  - Geriatrics evaluation  - CM for disposition planning     DVT prophylaxis: SCDs and Coumadin  PT and OT: eval and treat    Disposition: Will hold Coumadin this AM; can look to restart tomorrow; pending INR  Reduce Basal insulin to 7 units tonight and continue to hold the meal time insulin  Awaiting placement  Subjective   Chief Complaint: \"No new complaints  \"    Subjective: Patient offering no new complaints today on presentation  Reports that she is feeling well  Objective   Vitals:   Temp:  [98 3 °F (36 8 °C)-100 3 °F (37 9 °C)] 98 4 °F (36 9 °C)  HR:  [] 85  Resp:  [16-19] 17  BP: (113-146)/() 130/74    I/O       04/29 0701  04/30 0700 04/30 0701  05/01 0700 05/01 0701  05/02 0700    P  O   1080     I V  (mL/kg) 500 (5 9)      Total Intake(mL/kg) 500 (5 9) 1080 (12 7)     Urine (mL/kg/hr)   400 (7 1)    Total Output   400    Net +500 +1080 -400           Unmeasured Urine Occurrence 4 x 4 x     Unmeasured Stool Occurrence 1 x 3 x            Physical Exam:   GENERAL APPEARANCE: NAD  NEURO: GCS 15  HEENT: Normocephalic  CV: RRR  LUNGS: CTA b/l  GI: Non-tender, non-distended  : no brothers  MSK: moving all extremities  SKIN: warm, dry, intact    Invasive Devices     Peripheral Intravenous Line  Duration           Peripheral IV 04/27/23 Right Antecubital 3 days          Drain  Duration           External Urinary Catheter <1 day                 PIC Score  PIC Pain Score: 2 (5/1/2023  8:14 AM)  PIC Incentive Spirometry Score: 2 (4/30/2023  4:00 PM)  PIC Cough Description: 3 (4/30/2023  4:00 PM)  PIC Total Score: 6 (4/30/2023  4:00 PM)       If the Total PIC Score </=5, did you consult APS and evaluate patient for further intervention?: yes      Pain:    Incentive Spirometry  Cough  3 = Controlled  4 = Above goal volume 3 = Strong  2 = Moderate  3 = Goal to alert volume 2 = Weak  1 = Severe  2 = Below alert volume 1 = " Absent     1 = Unable to perform IS         Lab Results:   Results: I have personally reviewed all pertinent laboratory/tests results, BMP/CMP:   Lab Results   Component Value Date    SODIUM 134 (L) 05/01/2023    K 4 1 05/01/2023     05/01/2023    CO2 29 05/01/2023    BUN 15 05/01/2023    CREATININE 0 49 (L) 05/01/2023    CALCIUM 8 5 05/01/2023    EGFR 94 05/01/2023   , CBC:   Lab Results   Component Value Date    WBC 7 53 05/01/2023    HGB 11 8 05/01/2023    HCT 35 7 05/01/2023     (H) 05/01/2023     (L) 05/01/2023    MCH 33 7 05/01/2023    MCHC 33 1 05/01/2023    RDW 14 4 05/01/2023    MPV 10 4 05/01/2023    NRBC 0 05/01/2023    and Coagulation:   Lab Results   Component Value Date    INR 4 23 (H) 05/01/2023     Imaging: I have personally reviewed pertinent reports       Other Studies: no other studies

## 2023-05-01 NOTE — ASSESSMENT & PLAN NOTE
No results found for: HGBA1C    Recent Labs     04/30/23 2057 05/01/23  0601 05/01/23  0618 05/01/23  0633   POCGLU 195* 60* 79 111       Blood Sugar Average: Last 72 hrs:  (P) 630 [id number]     - Continue current medication regimen   - Outpatient follow-up with PCP

## 2023-05-02 ENCOUNTER — APPOINTMENT (INPATIENT)
Dept: RADIOLOGY | Facility: HOSPITAL | Age: 78
End: 2023-05-02

## 2023-05-02 LAB
ABO GROUP BLD: NORMAL
BASE EXCESS BLDA CALC-SCNC: 1 MMOL/L (ref -2–3)
CA-I BLD-SCNC: 1.2 MMOL/L (ref 1.12–1.32)
ERYTHROCYTE [DISTWIDTH] IN BLOOD BY AUTOMATED COUNT: 14.4 % (ref 11.6–15.1)
GLUCOSE SERPL-MCNC: 128 MG/DL (ref 65–140)
GLUCOSE SERPL-MCNC: 149 MG/DL (ref 65–140)
GLUCOSE SERPL-MCNC: 150 MG/DL (ref 65–140)
GLUCOSE SERPL-MCNC: 178 MG/DL (ref 65–140)
GLUCOSE SERPL-MCNC: 191 MG/DL (ref 65–140)
GLUCOSE SERPL-MCNC: 94 MG/DL (ref 65–140)
HCO3 BLDA-SCNC: 29.3 MMOL/L (ref 24–30)
HCT VFR BLD AUTO: 31.3 % (ref 34.8–46.1)
HCT VFR BLD CALC: 36 % (ref 34.8–46.1)
HGB BLD-MCNC: 10.4 G/DL (ref 11.5–15.4)
HGB BLDA-MCNC: 12.2 G/DL (ref 11.5–15.4)
INR PPP: 2.73 (ref 0.84–1.19)
MCH RBC QN AUTO: 33.4 PG (ref 26.8–34.3)
MCHC RBC AUTO-ENTMCNC: 33.2 G/DL (ref 31.4–37.4)
MCV RBC AUTO: 101 FL (ref 82–98)
PCO2 BLD: 31 MMOL/L (ref 21–32)
PCO2 BLD: 61.2 MM HG (ref 42–50)
PH BLD: 7.29 [PH] (ref 7.3–7.4)
PLATELET # BLD AUTO: 122 THOUSANDS/UL (ref 149–390)
PMV BLD AUTO: 10.2 FL (ref 8.9–12.7)
PO2 BLD: 17 MM HG (ref 35–45)
POTASSIUM BLD-SCNC: 5.3 MMOL/L (ref 3.5–5.3)
PROTHROMBIN TIME: 29.2 SECONDS (ref 11.6–14.5)
RBC # BLD AUTO: 3.11 MILLION/UL (ref 3.81–5.12)
RH BLD: POSITIVE
SAO2 % BLD FROM PO2: 19 % (ref 60–85)
SODIUM BLD-SCNC: 134 MMOL/L (ref 136–145)
SPECIMEN SOURCE: ABNORMAL
WBC # BLD AUTO: 8.78 THOUSAND/UL (ref 4.31–10.16)

## 2023-05-02 RX ORDER — METOPROLOL TARTRATE 5 MG/5ML
2.5 INJECTION INTRAVENOUS ONCE
Status: COMPLETED | OUTPATIENT
Start: 2023-05-03 | End: 2023-05-02

## 2023-05-02 RX ORDER — WARFARIN SODIUM 5 MG/1
5 TABLET ORAL
Status: DISCONTINUED | OUTPATIENT
Start: 2023-05-02 | End: 2023-05-05

## 2023-05-02 RX ADMIN — METHOCARBAMOL 250 MG: 500 TABLET ORAL at 05:15

## 2023-05-02 RX ADMIN — Medication 7.5 MG: at 08:49

## 2023-05-02 RX ADMIN — METHOCARBAMOL 250 MG: 500 TABLET ORAL at 12:27

## 2023-05-02 RX ADMIN — OXYBUTYNIN 5 MG: 5 TABLET, FILM COATED, EXTENDED RELEASE ORAL at 08:41

## 2023-05-02 RX ADMIN — ACETAMINOPHEN 975 MG: 325 TABLET ORAL at 17:16

## 2023-05-02 RX ADMIN — OXYCODONE HYDROCHLORIDE 5 MG: 5 TABLET ORAL at 18:17

## 2023-05-02 RX ADMIN — METOPROLOL TARTRATE 2.5 MG: 1 INJECTION, SOLUTION INTRAVENOUS at 23:52

## 2023-05-02 RX ADMIN — METHOCARBAMOL 250 MG: 500 TABLET ORAL at 17:15

## 2023-05-02 RX ADMIN — ACETAMINOPHEN 975 MG: 325 TABLET ORAL at 08:41

## 2023-05-02 RX ADMIN — GABAPENTIN 100 MG: 100 CAPSULE ORAL at 22:31

## 2023-05-02 RX ADMIN — OXYCODONE HYDROCHLORIDE 5 MG: 5 TABLET ORAL at 22:49

## 2023-05-02 RX ADMIN — WARFARIN SODIUM 5 MG: 5 TABLET ORAL at 17:15

## 2023-05-02 RX ADMIN — MONTELUKAST 10 MG: 10 TABLET, FILM COATED ORAL at 22:31

## 2023-05-02 RX ADMIN — ALLOPURINOL 100 MG: 100 TABLET ORAL at 08:42

## 2023-05-02 RX ADMIN — CITALOPRAM HYDROBROMIDE 20 MG: 20 TABLET ORAL at 08:42

## 2023-05-02 RX ADMIN — INSULIN DETEMIR 7 UNITS: 100 INJECTION, SOLUTION SUBCUTANEOUS at 22:31

## 2023-05-02 RX ADMIN — INSULIN LISPRO 1 UNITS: 100 INJECTION, SOLUTION INTRAVENOUS; SUBCUTANEOUS at 12:27

## 2023-05-02 RX ADMIN — ATORVASTATIN CALCIUM 40 MG: 40 TABLET, FILM COATED ORAL at 17:15

## 2023-05-02 RX ADMIN — LIDOCAINE 5% 1 PATCH: 700 PATCH TOPICAL at 08:43

## 2023-05-02 NOTE — CASE MANAGEMENT
Edward Moore 50 received request for authorization from Care Manager    Authorization request for: [address] Name: [facility] [place] NPI: [id number]  Facility MD: Stefani Aldrich NPI: [id number]  Authorization initiated by contacting insurance: Robert Roberson Via: [employer]   Pending Reference #: [id number]   Clinicals submitted via: Vinny Real

## 2023-05-02 NOTE — ASSESSMENT & PLAN NOTE
No results found for: HGBA1C    Recent Labs     05/01/23  1722 05/01/23  2053 05/02/23  0751 05/02/23  1146   POCGLU 93 147* 94 178*       Blood Sugar Average: Last 72 hrs:  (P) 120 [id number]     - Continue current medication regimen   - Outpatient follow-up with PCP

## 2023-05-02 NOTE — PHYSICAL THERAPY NOTE
"   PT Treatment       05/02/23 0932   PT Last Visit   PT Visit Date 05/02/23   Note Type   Note Type Treatment   Pain Assessment   Pain Assessment Tool 0-10   Pain Score No Pain   Restrictions/Precautions   Weight Bearing Precautions Per Order Yes   LUE Weight Bearing Per Order (S)  NWB   Braces or Orthoses Sling;Splint   Other Precautions Pain; Fall Risk   General   Chart Reviewed Yes   Response to Previous Treatment Patient with no complaints from previous session  Family/Caregiver Present No   Cognition   Overall Cognitive Status WFL   Arousal/Participation Alert   Attention Within functional limits   Orientation Level Oriented X4   Memory Within functional limits   Following Commands Follows all commands and directions without difficulty   Comments pleasant, cooperative, motivated   Subjective   Subjective \"I want to get better quickly\"   Bed Mobility   Additional Comments patient received in chair with RN present because patient requesting to use bathroom  post treatment session patient in chair with OT present  Transfers   Sit to Stand 3  Moderate assistance   Additional items Assist x 1; Increased time required;Verbal cues   Stand to Sit 3  Moderate assistance   Additional items Assist x 1; Increased time required;Verbal cues   Toilet transfer 3  Moderate assistance   Additional items Assist x 1; Increased time required;Armrests;Commode   Additional Comments multiple sit<-->stand transfers from chair and commode  1 from chair, 2 from commode, 2 from chair all with mod-AX1 (patient pushing from arm rest with R UE and therapist assisting under R UE and waist)  VC to encourage rocking to gain momentum  Patient able to maintain stance approx 30 seconds each time with min-AX1 with use of SPC in R UE while sherley-care performed/underwear and pad donned/soft care cushion placed on chair  Ambulation/Elevation   Gait pattern Excessively slow; Short stride;Decreased foot clearance; Foward flexed   Gait Assistance 3  " Moderate assist   Additional items Assist x 1  (SBA of 2nd person for safety)   Assistive Device [facility]   Distance 5 feet x 2 (chair<-->bathroom)   Ambulation/Elevation Additional Comments VC during gait training: reduced speed for improved safety (patient rushing to bathroom) + increase LE clearance   Balance   Static Sitting Fair   Static Standing Poor +   Ambulatory Poor   Endurance Deficit   Endurance Deficit Yes   Endurance Deficit Description fatigue   Activity Tolerance   Activity Tolerance Patient tolerated treatment well   Nurse Made Aware RN present upon arrival and PCA SBA for ambulation back to chair   Exercises   Neuro re-ed 4 sit<-->stand transfers w/ use of SPC and VC as described above   Assessment   Prognosis Good   Problem List Decreased strength;Decreased endurance; Impaired balance;Decreased mobility;Obesity;Orthopedic restrictions   Assessment PT initiated treatment session in order to assist patient in achieving goals to improve transfers, gait training, and overall activity tolerance  Patient expressing goal to utilize bathroom and deferred bedside commode- motivated to ambulate into bathroom  She required mod-AX1 for sit<-->stand transfers and for ambulation  Patient tolerated walking 5 feet x 2 with use of SPC  Gait is unsteady with reduced speed  Throughout treatment session patient required both verbal and tactile cuing to improve safety, efficiency, and mechanics of mobility in addition to hands on assistance for all aspects of functional mobility  Additionally, she required increased time to execute specific mobility tasks with rest breaks in between secondary to gross fatigue and weakness  PT d/c recommendation is for rehab  Patient will continue to benefit from continued skilled PT this admission to achieve maximal function and safety     Goals   Patient Goals to be independent again   STG Expiration Date 05/10/23   Plan   Treatment/Interventions Functional transfer training;LE "strengthening/ROM; Therapeutic exercise; Endurance training;Patient/family training;Equipment eval/education; Bed mobility;Gait training;Spoke to nursing   Progress Progressing toward goals   PT Frequency 3-5x/wk   Recommendation   PT Discharge Recommendation Post acute rehabilitation services   Equipment Recommended [facility] Basic Mobility Inpatient   Turning in Flat Bed Without Bedrails 2   Lying on Back to Sitting on Edge of Flat Bed Without Bedrails 2   Moving Bed to Chair 2   Standing Up From Chair Using Arms 2   Walk in Room 2   Climb 3-5 Stairs With Railing 2   Basic Mobility Inpatient Raw Score 12   Basic Mobility Standardized Score 32 23   Highest Level Of Mobility   -[facility] Goal 4: Move to chair/commode   -[facility] Achieved 6: Walk 10 steps or more        05/02/23 0932   PT Last Visit   PT Visit Date 05/02/23   Note Type   Note Type Treatment   Pain Assessment   Pain Assessment Tool 0-10   Pain Score No Pain   Restrictions/Precautions   Weight Bearing Precautions Per Order Yes   LUE Weight Bearing Per Order (S)  NWB   Braces or Orthoses Sling;Splint   Other Precautions Pain; Fall Risk   General   Chart Reviewed Yes   Response to Previous Treatment Patient with no complaints from previous session  Family/Caregiver Present No   Cognition   Overall Cognitive Status WFL   Arousal/Participation Alert   Attention Within functional limits   Orientation Level Oriented X4   Memory Within functional limits   Following Commands Follows all commands and directions without difficulty   Comments pleasant, cooperative, motivated   Subjective   Subjective \"I want to get better quickly\"   Bed Mobility   Additional Comments patient received in chair with RN present because patient requesting to use bathroom  post treatment session patient in chair with OT present  Transfers   Sit to Stand 3  Moderate assistance   Additional items Assist x 1; Increased time required;Verbal cues   Stand to Sit 3  Moderate assistance " Additional items Assist x 1; Increased time required;Verbal cues   Toilet transfer 3  Moderate assistance   Additional items Assist x 1; Increased time required;Armrests;Commode   Additional Comments multiple sit<-->stand transfers from chair and commode  1 from chair, 2 from commode, 2 from chair all with mod-AX1 (patient pushing from arm rest with R UE and therapist assisting under R UE and waist)  VC to encourage rocking to gain momentum  Patient able to maintain stance approx 30 seconds each time with min-AX1 with use of SPC in R UE while sherley-care performed/underwear and pad donned/soft care cushion placed on chair  Ambulation/Elevation   Gait pattern Excessively slow; Short stride;Decreased foot clearance; Foward flexed   Gait Assistance 3  Moderate assist   Additional items Assist x 1  (SBA of 2nd person for safety)   Assistive Device [facility]   Distance 5 feet x 2 (chair<-->bathroom)   Ambulation/Elevation Additional Comments VC during gait training: reduced speed for improved safety (patient rushing to bathroom) + increase LE clearance   Balance   Static Sitting Fair   Static Standing Poor +   Ambulatory Poor   Endurance Deficit   Endurance Deficit Yes   Endurance Deficit Description fatigue   Activity Tolerance   Activity Tolerance Patient tolerated treatment well   Nurse Made Aware RN present upon arrival and PCA SBA for ambulation back to chair   Exercises   Neuro re-ed 4 sit<-->stand transfers w/ use of SPC and VC as described above   Assessment   Prognosis Good   Problem List Decreased strength;Decreased endurance; Impaired balance;Decreased mobility;Obesity;Orthopedic restrictions   Assessment PT initiated treatment session in order to assist patient in achieving goals to improve transfers, gait training, and overall activity tolerance  Patient expressing goal to utilize bathroom and deferred bedside commode- motivated to ambulate into bathroom   She required mod-AX1 for sit<-->stand transfers and for ambulation  Patient tolerated walking 5 feet x 2 with use of SPC  Gait is unsteady with reduced speed  Throughout treatment session patient required both verbal and tactile cuing to improve safety, efficiency, and mechanics of mobility in addition to hands on assistance for all aspects of functional mobility  Additionally, she required increased time to execute specific mobility tasks with rest breaks in between secondary to gross fatigue and weakness  PT d/c recommendation is for rehab  Patient will continue to benefit from continued skilled PT this admission to achieve maximal function and safety  Goals   Patient Goals to be independent again   STG Expiration Date 05/10/23   Plan   Treatment/Interventions Functional transfer training;LE strengthening/ROM; Therapeutic exercise; Endurance training;Patient/family training;Equipment eval/education; Bed mobility;Gait training;Spoke to nursing   Progress Progressing toward goals   PT Frequency 3-5x/wk   Recommendation   PT Discharge Recommendation Post acute rehabilitation services   Equipment Recommended Cane   AM-PAC Basic Mobility Inpatient   Turning in Flat Bed Without Bedrails 2   Lying on Back to Sitting on Edge of Flat Bed Without Bedrails 2   Moving Bed to Chair 2   Standing Up From Chair Using Arms 2   Walk in Room 2   Climb 3-5 Stairs With Railing 2   Basic Mobility Inpatient Raw Score 12   Basic Mobility Standardized Score 32 23   Highest Level Of Mobility   [facility] Goal 4: Move to chair/commode   [facility] Achieved 6: Walk 10 steps or more       The patient's AM-PAC Basic Mobility Inpatient Standardized Score is less than 42 9, suggesting this patient may benefit from discharge to post-acute rehabilitation services  Please also refer to the recommendation of the Physical Therapist for safe discharge planning      NICOL MCNEILL PT, DPT

## 2023-05-02 NOTE — PROGRESS NOTES
[facility]  Progress Note  Name: Sally Barajas  MRN: [id number]  Unit/Bed#: [facility] 090-81 I Date of Admission: 4/27/2023   Date of Service: 5/2/2023 I Hospital Day: 5    Assessment/Plan   Type 2 diabetes mellitus [facility])  Assessment & Plan  No results found for: HGBA1C    Recent Labs     05/01/23  1722 05/01/23  2053 05/02/23  0751 05/02/23  1146   POCGLU 93 147* 94 178*       Blood Sugar Average: Last 72 hrs:  (P) 120 [id number]     - Continue current medication regimen   - Outpatient follow-up with PCP  Fracture of multiple ribs of right side  Assessment & Plan  - Multiple right-sided rib fractures (8th-9th), present on admission   - Continue rib fracture protocol   - Continue to encourage incentive spirometer use and adequate pulmonary hygiene  - Continue multimodal analgesic regimen     - Supplemental oxygen via nasal cannula as needed to maintain saturations greater than or equal to 94%  - PT and OT evaluation and treatment as indicated  - Outpatient follow-up in the trauma clinic for re-evaluation in approximately 2 weeks  Laceration of right eyebrow  Assessment & Plan  - two 1 cm lacerations to the R eye brow  - steri strips applied  - local wound care    H/O mechanical aortic valve replacement  Assessment & Plan  - Coumadin held while being evaluated for humerus fracture  - Goal INR reportedly 2 5 to 3 5  - INR initially supratherapeutic, now improved, INR 2 73 (from 4 23)  - Restart home dose of coumadin tonight 5mg daily    Acute pain due to trauma  Assessment & Plan  - Acute pain secondary to traumatic injuries  - Multi modal pain regimen  - Bowel regimen as long as using opioids   - Continue to monitor pain and adjust regimen as indicated        Humerus fracture  Assessment & Plan  - proximal humerus fracture on the left   - NVI distally  - pain control with multi modal analgesia  - orthopedics consultation, appreciate recommendations  - Non-op  - NWB LUE  - Sling to the LUE    Fall  Assessment & Plan  - mechanical fall   - sustained below stated injuries  - PT/OT evaluations  - Geriatrics evaluation  - CM for disposition planning             Bowel Regimen: Colace/Senokot  VTE Prophylaxis:Warfarin (Coumadin)     Disposition: Accepted to [facility], awaiting insurance authorization    Subjective     Subjective: One episode of hypotension overnight 80's/50's, resolved after half of a 500cc crystalloid bolus, remained asymptomatic from this, tolerating diabetic diet without nausea or vomiting, having bowel function, pain controlled, denies numbness and weakness in LUE     Objective   Vitals:   Temp:  [97 7 °F (36 5 °C)-98 3 °F (36 8 °C)] 98 2 °F (36 8 °C)  HR:  [64-90] 76  Resp:  [16] 16  BP: ()/(45-78) 102/78    I/O       04/30 0701  05/01 0700 05/01 0701  05/02 0700 05/02 0701  05/03 0700    P  O  1080 118     I V  (mL/kg)       Total Intake(mL/kg) 1080 (12 7) 118 (1 4)     Urine (mL/kg/hr)  400 (0 2)     Total Output  400     Net +1080 -282            Unmeasured Urine Occurrence 4 x      Unmeasured Stool Occurrence 3 x             Physical Exam:   Gen:    NAD  CV:      warm, well-perfused  Lungs: No respiratory distress  Abd:     soft, NT/ND  Ext:      LUE in coaptation splint and sling, 2+ radial pulse  Neuro: A&Ox3, motor and sensory intact in LUE      Invasive Devices     Peripheral Intravenous Line  Duration           Peripheral IV 04/27/23 Right Antecubital 5 days          Drain  Duration           External Urinary Catheter 1 day                 PIC Score  PIC Pain Score: 3 (5/2/2023  9:32 AM)       If the Total PIC Score </=5, did you consult APS and evaluate patient for further intervention?: yes      Pain:    Incentive Spirometry  Cough  3 = Controlled  4 = Above goal volume 3 = Strong  2 = Moderate  3 = Goal to alert volume 2 = Weak  1 = Severe  2 = Below alert volume 1 = Absent     1 = Unable to perform IS         Lab Results: Results: I have personally reviewed all pertinent laboratory/tests results  Imaging: I have personally reviewed pertinent reports       Other Studies: None

## 2023-05-02 NOTE — CASE MANAGEMENT
Case Management Discharge Planning Note    Patient name Imtiaz Noguera  Location [address] 6[address]/[facility] 429-80 MRN [id number]  : 1945 Date 2023       Current Admission Date: 2023  Current Admission Diagnosis:Fall   Patient Active Problem List    Diagnosis Date Noted   • Type 2 diabetes mellitus ([address] ) 2023   • Fall 2023   • Humerus fracture 2023   • Acute pain due to trauma 2023   • H/O mechanical aortic valve replacement 2023   • Laceration of right eyebrow 2023   • Fracture of multiple ribs of right side 2023      LOS (days): 5  Geometric Mean LOS (GMLOS) (days): 2 70  Days to GMLOS:-1 2     OBJECTIVE:  Risk of Unplanned Readmission Score: 10 7         Current admission status: Inpatient   Preferred Pharmacy:   [place] [address] [address] [address]  Phone: [phone] Fax: [phone]    Primary Care Provider: Rosana Silvestre DO    Primary Insurance: [facility]  Secondary Insurance:     DISCHARGE DETAILS:    Authorization submitted for acute rehab

## 2023-05-02 NOTE — ASSESSMENT & PLAN NOTE
- proximal humerus fracture on the left   - NVI distally  - pain control with multi modal analgesia  - orthopedics consultation, appreciate recommendations  - Non-op  - NWB LUE  - Sling to the LUE

## 2023-05-02 NOTE — ASSESSMENT & PLAN NOTE
- Coumadin held while being evaluated for humerus fracture  - Goal INR reportedly 2 5 to 3 5  - INR initially supratherapeutic, now improved, INR 2 73 (from 4 23)  - Restart home dose of coumadin tonight 5mg daily

## 2023-05-02 NOTE — PROGRESS NOTES
Progress Note - Geriatric Medicine   Thomas Alert 68 y o  female MRN: [id number]  Unit/Bed#: [facility] 629-01 Encounter: [id number]      Assessment/Plan:    Ambulatory dysfunction with fall  -reportedly mechanical fall PTA 4/27  -injuries as outlined below   -pt at increased risk future falls, cont fall precautions and preventive measures as possible  -following recovery from acute injuries consider community based fall prevention program such as matter of balance or silver sneakers in effort to reduce risk of future falls   -PT/OT following      Right sided rib fractures (8 and 9)  -s/p fall as above  -noted on CT chest obtained on admission, no pneumothorax   -initially requiring supplemental O2 now saturating well on room air   -acute pain remains well controlled with current regimen, taper as improves with healing   -continue to encourage aggressive pulm toilet and ISS     Left humerus fracture   -s/p fall as above  -s/p reduction and splint by Ortho on admission   -NWB LUE in splint and sling  -Ortho evaluated and recommend non-op management    -continue neurovascular checks per protocol   -acute pain now remains controlled      Acute pain due to trauma   -continue acute multimodal pain control  -Tylenol scheduled, Oxy 5mg and 7 5mg Q4H PRN  -dilaudid for breakthrough dc'd last week as not requiring   -significant improvement with addition of topical lido patch and Robaxin   -bowel regimen to reduce risk constipation, last BM recorded 4/30     Hx mechanical aortic valve replacement   -maintained on chronic systemic anticoagulation with coumadin   -INR had been supratherapeutic earlier in admit likely due to other changes in medication regimen including addition of scheduled tylenol  -monitor INR closely with any medication or dietary changes      Impaired Vision  -encourage use of corrective lenses at all appropriate times  -consider large font for printed materials provided to patient      High risk of developing "delirium  -Patient remains high risk of delirium due to age, fall, traumatic injuries, acute pain, hospitalization and disrupted daily routine   -continue delirium precautions  -maintain normal sleep/wake cycle  -reorient frequently as appropriate      Frailty syndrome in geriatric patient  -Clinical frailty scale stage IV, vulnerable   -Multifactorial including age, DM-II, hx valve replacement and multiple chronic medical co-morbidities now fall and traumatic injuries in elderly individual with limited physiologic and metabolic reserves   -continue optimization of chronic conditions and address acute metabolic derangements as arise   -good psychosocial supports     Care coordination: rounded with Maria Guadalupe (RN)    Subjective:     Dayana Mcbride is seen and examined at bedside where she is sitting resting, she reports feeling markedly improved overall since getting washed up earlier this morning  Pain continues to be well controlled on current regimen and appetite is good, no other acute complaints, nursing reports no acute events overnight  Review of Systems   Constitutional: Negative  Negative for appetite change, chills and fever  HENT: Negative  Eyes: Negative  Respiratory: Negative  Negative for shortness of breath  Cardiovascular: Negative  Gastrointestinal: Negative  Genitourinary: Negative  Musculoskeletal:        Left arm and right rib pain remain well controlled with current regimen    Skin: Negative  Neurological: Negative for dizziness, weakness, light-headedness and headaches  Hematological: Bruises/bleeds easily (due to coumadin )  Psychiatric/Behavioral: Negative  Negative for sleep disturbance  All other systems reviewed and are negative  Objective:     Vitals: Blood pressure 99/76, pulse 90, temperature 98 3 °F (36 8 °C), resp  rate 16, height 4' 10 5\" (1 486 m), weight 85 3 kg (188 lb), SpO2 97 %  ,Body mass index is 38 62 kg/m²        Intake/Output Summary (Last 24 hours) " at 5/2/2023 1030  Last data filed at 5/1/2023 1319  Gross per 24 hour   Intake 118 ml   Output --   Net 118 ml       Current Medications: Reviewed    Physical Exam:   Physical Exam  Vitals and nursing note reviewed  Constitutional:       General: She is not in acute distress  Appearance: She is not toxic-appearing  HENT:      Head: Normocephalic  Comments: Facial ecchymosis improving      Nose: Nose normal       Mouth/Throat:      Mouth: Mucous membranes are moist    Eyes:      General: No scleral icterus  Right eye: No discharge  Left eye: No discharge  Conjunctiva/sclera: Conjunctivae normal    Neck:      Comments: Phonation norm   Cardiovascular:      Rate and Rhythm: Normal rate  Pulmonary:      Effort: Pulmonary effort is normal  No respiratory distress  Breath sounds: No wheezing  Comments: No accessory muscle use, sat well on room air   Abdominal:      Tenderness: There is no abdominal tenderness  Comments: Bowel sounds present    Musculoskeletal:      Cervical back: Neck supple  Comments: LUE in sling        Skin:     General: Skin is warm and dry  Neurological:      Mental Status: She is alert  Mental status is at baseline  Comments: Awake and alert, oriented, answers questions appropriately   Psychiatric:         Mood and Affect: Mood normal          Behavior: Behavior normal         Invasive Devices     Peripheral Intravenous Line  Duration           Peripheral IV 04/27/23 Right Antecubital 4 days          Drain  Duration           External Urinary Catheter 1 day              Lab, Imaging and other studies: I have personally reviewed pertinent reports

## 2023-05-02 NOTE — PLAN OF CARE
Problem: OCCUPATIONAL THERAPY ADULT  Goal: Performs self-care activities at highest level of function for planned discharge setting  See evaluation for individualized goals  Description: Treatment Interventions: ADL retraining, Functional transfer training, UE strengthening/ROM, Endurance training, Patient/family training, Equipment evaluation/education, Compensatory technique education, Activityengagement          See flowsheet documentation for full assessment, interventions and recommendations  Outcome: Progressing  Note: Limitation: Decreased ADL status, Decreased UE ROM, Decreased endurance, Decreased self-care trans, Decreased high-level ADLs, Non-func L UE  Prognosis: Good  Assessment: PT PARTICIPATED IN AM SESSION AND WAS SEEN FOCUSING ON ADLS SEATED IN CHAIR AFTER WLAKING WITH P T    PT REQUIRED MOD ASST FOR BATHING AND MAX ASST FOR BOTH UB AND LB DRESSING  PT HAS A STOCKENETTE SLING WRIST TO NECK WITH RIGID ELBOW SLING MAKING IT DIFFICULT FOR DRESSING  PT USED R UE FOR ADLS AS ABLE, INITIATED INSTRUCTING PT IN ONE HANDED TECHNIQUES  PT REQUIRES MIN TO MOD FOR HEAVIER GROOMING TASKS AND SBA FOR LIGHT IE HAIR COMBING AND ORAL AFTER SET UP MINUS DENTURE CARE  PT WITH AROM L HAND AND L UE WAS POSITIONED  WITH SLIGHT ELEVATION L FOREARM AND HAND  MINIMAL SWELLING NTOED  PT IS COOPERATIVE MOTIVATED AND AGREEABLE TO PARTICIPATE IN TASKS  WILL FOLLOW FOCUSING ON GOALS FROM IE   PT WITH STERI STRIPS R EYE/ EYE BROW     OT Discharge Recommendation: Post acute rehabilitation services     April A Storm

## 2023-05-02 NOTE — PLAN OF CARE
Problem: PHYSICAL THERAPY ADULT  Goal: Performs mobility at highest level of function for planned discharge setting  See evaluation for individualized goals  Description: Treatment/Interventions: OT, Spoke to nursing, Spoke to case management, Gait training, Bed mobility, Patient/family training, Endurance training, LE strengthening/ROM, Functional transfer training  Equipment Recommended:  (TBD)       See flowsheet documentation for full assessment, interventions and recommendations  Outcome: Progressing  Note: Prognosis: Good  Problem List: Decreased strength, Decreased endurance, Impaired balance, Decreased mobility, Obesity, Orthopedic restrictions  Assessment: PT initiated treatment session in order to assist patient in achieving goals to improve transfers, gait training, and overall activity tolerance  Patient expressing goal to utilize bathroom and deferred bedside commode- motivated to ambulate into bathroom  She required mod-AX1 for sit<-->stand transfers and for ambulation  Patient tolerated walking 5 feet x 2 with use of SPC  Gait is unsteady with reduced speed  Throughout treatment session patient required both verbal and tactile cuing to improve safety, efficiency, and mechanics of mobility in addition to hands on assistance for all aspects of functional mobility  Additionally, she required increased time to execute specific mobility tasks with rest breaks in between secondary to gross fatigue and weakness  PT d/c recommendation is for rehab  Patient will continue to benefit from continued skilled PT this admission to achieve maximal function and safety  Barriers to Discharge: Inaccessible home environment, Decreased caregiver support     PT Discharge Recommendation: Post acute rehabilitation services    See flowsheet documentation for full assessment

## 2023-05-02 NOTE — ARC ADMISSION
Auth can be submitted for ARC  Our NPI is  and Dr Tan Yancey is   CM made aware through [address]      Benefits checked: $0 copay and 0% co-insurance

## 2023-05-02 NOTE — OCCUPATIONAL THERAPY NOTE
Occupational Therapy Treatment Note:      05/02/23 1010   OT Last Visit   OT Visit Date 05/02/23   Note Type   Note Type Treatment   Pain Assessment   Pain Assessment Tool FLACC   Pain Rating: FLACC (Rest) - Face 0   Pain Rating: FLACC (Rest) - Legs 0   Pain Rating: FLACC (Rest) - Activity 0   Pain Rating: FLACC (Rest) - Cry 0   Pain Rating: FLACC (Rest) - Consolability 0   Score: FLACC (Rest) 0   Pain Rating: FLACC (Activity) - Face 1  (when moving l ue during bathing process)   Pain Rating: FLACC (Activity) - Legs 0   Pain Rating: FLACC (Activity) - Activity 0   Pain Rating: FLACC (Activity) - Cry 0   Pain Rating: FLACC (Activity) - Consolability 0   Score: FLACC (Activity) 1   Restrictions/Precautions   LUE Weight Bearing Per Order NWB   Braces or Orthoses Sling;Splint   Other Precautions Fall Risk   ADL   Where Assessed Chair   Grooming Assistance 4  Minimal Assistance   Grooming Comments asdst to brush partial, s oral care otherwise  she required asst to apply deodoraNT AND POWDER UNDER B BREASTS  UB Bathing Assistance 3  Moderate Assistance   LB Bathing Assistance 3  Moderate Assistance   UB Dressing Assistance 3  Moderate Assistance   LB Dressing Assistance 2  Maximal Assistance   Toileting Assistance  2  Maximal Assistance   Toileting Comments PT REPORTS URGENCY OF URINE PTA   Cognition   Overall Cognitive Status WFL   Activity Tolerance   Activity Tolerance Patient tolerated treatment well   Assessment   Assessment PT PARTICIPATED IN AM SESSION AND WAS SEEN FOCUSING ON ADLS SEATED IN CHAIR AFTER WLAKING WITH P T    PT REQUIRED MOD ASST FOR BATHING AND MAX ASST FOR BOTH UB AND LB DRESSING  PT HAS A STOCKENETTE SLING WRIST TO NECK WITH RIGID ELBOW SLING MAKING IT DIFFICULT FOR DRESSING  PT USED R UE FOR ADLS AS ABLE, INITIATED INSTRUCTING PT IN ONE HANDED TECHNIQUES  PT REQUIRES MIN TO MOD FOR HEAVIER GROOMING TASKS AND SBA FOR LIGHT IE HAIR COMBING AND ORAL AFTER SET UP MINUS DENTURE CARE    PT WITH AROM L HAND AND L UE WAS POSITIONED  WITH SLIGHT ELEVATION L FOREARM AND HAND  MINIMAL SWELLING NTOED  PT IS COOPERATIVE MOTIVATED AND AGREEABLE TO PARTICIPATE IN TASKS  WILL FOLLOW FOCUSING ON GOALS FROM IE  PT WITH STERI STRIPS R EYE/ EYE BROW   Plan   Treatment Interventions ADL retraining;Functional transfer training; Endurance training;Patient/family training;Equipment evaluation/education; Activityengagement   Goal Expiration Date 05/12/23   OT Treatment Day 1   OT Frequency 2-3x/wk   Recommendation   OT Discharge Recommendation Post acute rehabilitation services   AM-PAC Daily Activity Inpatient   Lower Body Dressing 2   Bathing 2   Toileting 2   Upper Body Dressing 2   Grooming 3   Eating 3   Daily Activity Raw Score 14   Daily Activity Standardized Score (Calc for Raw Score >=11) 33 39   AM-PAC Applied Cognition Inpatient   Following a Speech/Presentation 4   Understanding Ordinary Conversation 4   Taking Medications 4   Remembering Where Things Are Placed or Put Away 4   Remembering List of 4-5 Errands 4   Taking Care of Complicated Tasks 3   Applied Cognition Raw Score 23   Applied Cognition Standardized Score 53 08     April A Storm

## 2023-05-03 ENCOUNTER — APPOINTMENT (INPATIENT)
Dept: NON INVASIVE DIAGNOSTICS | Facility: HOSPITAL | Age: 78
End: 2023-05-03

## 2023-05-03 LAB
ANION GAP SERPL CALCULATED.3IONS-SCNC: 2 MMOL/L (ref 4–13)
ANION GAP SERPL CALCULATED.3IONS-SCNC: 6 MMOL/L (ref 4–13)
AORTIC ROOT: 3.4 CM
APICAL FOUR CHAMBER EJECTION FRACTION: 69 %
ASCENDING AORTA: 4.8 CM
ATRIAL RATE: 187 BPM
ATRIAL RATE: 340 BPM
ATRIAL RATE: 68 BPM
ATRIAL RATE: 72 BPM
ATRIAL RATE: 78 BPM
BACTERIA UR QL AUTO: ABNORMAL /HPF
BASOPHILS # BLD AUTO: 0.02 THOUSANDS/ÂΜL (ref 0–0.1)
BASOPHILS NFR BLD AUTO: 0 % (ref 0–1)
BILIRUB UR QL STRIP: NEGATIVE
BUN SERPL-MCNC: 19 MG/DL (ref 5–25)
BUN SERPL-MCNC: 19 MG/DL (ref 5–25)
CALCIUM SERPL-MCNC: 7.7 MG/DL (ref 8.3–10.1)
CALCIUM SERPL-MCNC: 9.7 MG/DL (ref 8.3–10.1)
CHLORIDE SERPL-SCNC: 105 MMOL/L (ref 96–108)
CHLORIDE SERPL-SCNC: 99 MMOL/L (ref 96–108)
CLARITY UR: ABNORMAL
CO2 SERPL-SCNC: 24 MMOL/L (ref 21–32)
CO2 SERPL-SCNC: 24 MMOL/L (ref 21–32)
COLOR UR: YELLOW
CREAT SERPL-MCNC: 0.45 MG/DL (ref 0.6–1.3)
CREAT SERPL-MCNC: 0.62 MG/DL (ref 0.6–1.3)
DOP CALC LVOT AREA: 3.46 CM2
DOP CALC LVOT DIAMETER: 2.1 CM
E WAVE DECELERATION TIME: 267 MS
EOSINOPHIL # BLD AUTO: 0.09 THOUSAND/ÂΜL (ref 0–0.61)
EOSINOPHIL NFR BLD AUTO: 1 % (ref 0–6)
ERYTHROCYTE [DISTWIDTH] IN BLOOD BY AUTOMATED COUNT: 14.2 % (ref 11.6–15.1)
ERYTHROCYTE [DISTWIDTH] IN BLOOD BY AUTOMATED COUNT: 14.4 % (ref 11.6–15.1)
FRACTIONAL SHORTENING: 22 % (ref 28–44)
GFR SERPL CREATININE-BSD FRML MDRD: 87 ML/MIN/1.73SQ M
GFR SERPL CREATININE-BSD FRML MDRD: 96 ML/MIN/1.73SQ M
GLUCOSE SERPL-MCNC: 103 MG/DL (ref 65–140)
GLUCOSE SERPL-MCNC: 111 MG/DL (ref 65–140)
GLUCOSE SERPL-MCNC: 119 MG/DL (ref 65–140)
GLUCOSE SERPL-MCNC: 130 MG/DL (ref 65–140)
GLUCOSE SERPL-MCNC: 154 MG/DL (ref 65–140)
GLUCOSE SERPL-MCNC: 176 MG/DL (ref 65–140)
GLUCOSE SERPL-MCNC: 191 MG/DL (ref 65–140)
GLUCOSE UR STRIP-MCNC: NEGATIVE MG/DL
HCT VFR BLD AUTO: 29.8 % (ref 34.8–46.1)
HCT VFR BLD AUTO: 32.5 % (ref 34.8–46.1)
HGB BLD-MCNC: 10.6 G/DL (ref 11.5–15.4)
HGB BLD-MCNC: 9.9 G/DL (ref 11.5–15.4)
HGB UR QL STRIP.AUTO: ABNORMAL
IMM GRANULOCYTES # BLD AUTO: 0.02 THOUSAND/UL (ref 0–0.2)
IMM GRANULOCYTES NFR BLD AUTO: 0 % (ref 0–2)
INR PPP: 2.56 (ref 0.84–1.19)
INTERVENTRICULAR SEPTUM IN DIASTOLE (PARASTERNAL SHORT AXIS VIEW): 1.2 CM
INTERVENTRICULAR SEPTUM: 1.2 CM (ref 0.6–1.1)
KETONES UR STRIP-MCNC: NEGATIVE MG/DL
LAAS-AP4: 26.3 CM2
LACTATE SERPL-SCNC: 1.3 MMOL/L (ref 0.5–2)
LACTATE SERPL-SCNC: 4.4 MMOL/L (ref 0.5–2)
LEFT ATRIUM SIZE: 5.9 CM
LEFT INTERNAL DIMENSION IN SYSTOLE: 3.9 CM (ref 2.1–4)
LEFT VENTRICULAR INTERNAL DIMENSION IN DIASTOLE: 5 CM (ref 3.5–6)
LEFT VENTRICULAR POSTERIOR WALL IN END DIASTOLE: 1.2 CM
LEFT VENTRICULAR STROKE VOLUME: 54 ML
LEUKOCYTE ESTERASE UR QL STRIP: ABNORMAL
LVSV (TEICH): 54 ML
LYMPHOCYTES # BLD AUTO: 1.13 THOUSANDS/ÂΜL (ref 0.6–4.47)
LYMPHOCYTES NFR BLD AUTO: 17 % (ref 14–44)
MAGNESIUM SERPL-MCNC: 1.7 MG/DL (ref 1.6–2.6)
MCH RBC QN AUTO: 33.9 PG (ref 26.8–34.3)
MCH RBC QN AUTO: 34.1 PG (ref 26.8–34.3)
MCHC RBC AUTO-ENTMCNC: 32.6 G/DL (ref 31.4–37.4)
MCHC RBC AUTO-ENTMCNC: 33.2 G/DL (ref 31.4–37.4)
MCV RBC AUTO: 103 FL (ref 82–98)
MCV RBC AUTO: 104 FL (ref 82–98)
MONOCYTES # BLD AUTO: 0.18 THOUSAND/ÂΜL (ref 0.17–1.22)
MONOCYTES NFR BLD AUTO: 3 % (ref 4–12)
MV E'TISSUE VEL-SEP: 6 CM/S
MV PEAK A VEL: 0.55 M/S
MV PEAK E VEL: 78 CM/S
MV STENOSIS PRESSURE HALF TIME: 77 MS
MV VALVE AREA P 1/2 METHOD: 2.86 CM2
NEUTROPHILS # BLD AUTO: 5.3 THOUSANDS/ÂΜL (ref 1.85–7.62)
NEUTS SEG NFR BLD AUTO: 79 % (ref 43–75)
NITRITE UR QL STRIP: POSITIVE
NON-SQ EPI CELLS URNS QL MICRO: ABNORMAL /HPF
NRBC BLD AUTO-RTO: 0 /100 WBCS
P AXIS: 224 DEGREES
PH UR STRIP.AUTO: 5.5 [PH]
PHOSPHATE SERPL-MCNC: 2.9 MG/DL (ref 2.3–4.1)
PLATELET # BLD AUTO: 119 THOUSANDS/UL (ref 149–390)
PLATELET # BLD AUTO: 142 THOUSANDS/UL (ref 149–390)
PMV BLD AUTO: 10.7 FL (ref 8.9–12.7)
PMV BLD AUTO: 10.9 FL (ref 8.9–12.7)
POTASSIUM SERPL-SCNC: 4.2 MMOL/L (ref 3.5–5.3)
POTASSIUM SERPL-SCNC: 4.5 MMOL/L (ref 3.5–5.3)
PR INTERVAL: 308 MS
PROT UR STRIP-MCNC: ABNORMAL MG/DL
PROTHROMBIN TIME: 27.8 SECONDS (ref 11.6–14.5)
QRS AXIS: 102 DEGREES
QRS AXIS: 121 DEGREES
QRS AXIS: 124 DEGREES
QRS AXIS: 131 DEGREES
QRS AXIS: 147 DEGREES
QRSD INTERVAL: 80 MS
QRSD INTERVAL: 92 MS
QRSD INTERVAL: 96 MS
QRSD INTERVAL: 96 MS
QRSD INTERVAL: 98 MS
QT INTERVAL: 270 MS
QT INTERVAL: 278 MS
QT INTERVAL: 370 MS
QT INTERVAL: 376 MS
QT INTERVAL: 379 MS
QTC INTERVAL: 424 MS
QTC INTERVAL: 431 MS
QTC INTERVAL: 432 MS
QTC INTERVAL: 472 MS
QTC INTERVAL: 478 MS
RA PRESSURE ESTIMATED: 8 MMHG
RBC # BLD AUTO: 2.9 MILLION/UL (ref 3.81–5.12)
RBC # BLD AUTO: 3.13 MILLION/UL (ref 3.81–5.12)
RBC #/AREA URNS AUTO: ABNORMAL /HPF
RIGHT ATRIUM AREA SYSTOLE A4C: 33.9 CM2
RIGHT VENTRICLE ID DIMENSION: 6.9 CM
RV PSP: 25 MMHG
SL CV LV EF: 60
SL CV PED ECHO LEFT VENTRICLE DIASTOLIC VOLUME (MOD BIPLANE) 2D: 119 ML
SL CV PED ECHO LEFT VENTRICLE SYSTOLIC VOLUME (MOD BIPLANE) 2D: 64 ML
SODIUM SERPL-SCNC: 129 MMOL/L (ref 135–147)
SODIUM SERPL-SCNC: 131 MMOL/L (ref 135–147)
SP GR UR STRIP.AUTO: 1.01 (ref 1–1.03)
T WAVE AXIS: -1 DEGREES
T WAVE AXIS: -2 DEGREES
T WAVE AXIS: 0 DEGREES
T WAVE AXIS: 16 DEGREES
T WAVE AXIS: 6 DEGREES
TR MAX PG: 17 MMHG
TR PEAK VELOCITY: 2.1 M/S
TRICUSPID ANNULAR PLANE SYSTOLIC EXCURSION: 0.9 CM
TRICUSPID VALVE PEAK REGURGITATION VELOCITY: 2.08 M/S
UROBILINOGEN UR STRIP-ACNC: <2 MG/DL
VENTRICULAR RATE: 178 BPM
VENTRICULAR RATE: 184 BPM
VENTRICULAR RATE: 78 BPM
VENTRICULAR RATE: 79 BPM
VENTRICULAR RATE: 79 BPM
WBC # BLD AUTO: 6.74 THOUSAND/UL (ref 4.31–10.16)
WBC # BLD AUTO: 9.57 THOUSAND/UL (ref 4.31–10.16)
WBC #/AREA URNS AUTO: ABNORMAL /HPF

## 2023-05-03 RX ORDER — DILTIAZEM HYDROCHLORIDE 120 MG/1
120 CAPSULE, COATED, EXTENDED RELEASE ORAL DAILY
Status: DISCONTINUED | OUTPATIENT
Start: 2023-05-03 | End: 2023-05-08

## 2023-05-03 RX ORDER — AMIODARONE HYDROCHLORIDE 200 MG/1
200 TABLET ORAL
Status: DISCONTINUED | OUTPATIENT
Start: 2023-05-03 | End: 2023-05-04

## 2023-05-03 RX ORDER — VANCOMYCIN HYDROCHLORIDE 1 G/200ML
1000 INJECTION, SOLUTION INTRAVENOUS EVERY 12 HOURS
Status: DISCONTINUED | OUTPATIENT
Start: 2023-05-03 | End: 2023-05-03

## 2023-05-03 RX ORDER — MAGNESIUM SULFATE HEPTAHYDRATE 40 MG/ML
2 INJECTION, SOLUTION INTRAVENOUS ONCE
Status: COMPLETED | OUTPATIENT
Start: 2023-05-03 | End: 2023-05-04

## 2023-05-03 RX ORDER — METOPROLOL SUCCINATE 50 MG/1
50 TABLET, EXTENDED RELEASE ORAL DAILY
Status: DISCONTINUED | OUTPATIENT
Start: 2023-05-03 | End: 2023-05-08

## 2023-05-03 RX ORDER — MAGNESIUM SULFATE HEPTAHYDRATE 40 MG/ML
2 INJECTION, SOLUTION INTRAVENOUS ONCE
Status: COMPLETED | OUTPATIENT
Start: 2023-05-03 | End: 2023-05-03

## 2023-05-03 RX ORDER — WARFARIN SODIUM 2.5 MG/1
2.5 TABLET ORAL
Status: DISCONTINUED | OUTPATIENT
Start: 2023-05-03 | End: 2023-05-05

## 2023-05-03 RX ADMIN — INSULIN LISPRO 1 UNITS: 100 INJECTION, SOLUTION INTRAVENOUS; SUBCUTANEOUS at 21:46

## 2023-05-03 RX ADMIN — FUROSEMIDE 40 MG: 40 TABLET ORAL at 08:29

## 2023-05-03 RX ADMIN — CEFEPIME 2000 MG: 2 INJECTION, POWDER, FOR SOLUTION INTRAVENOUS at 05:18

## 2023-05-03 RX ADMIN — DOCUSATE SODIUM 100 MG: 100 CAPSULE, LIQUID FILLED ORAL at 08:39

## 2023-05-03 RX ADMIN — VANCOMYCIN HYDROCHLORIDE 1500 MG: 1 INJECTION, POWDER, LYOPHILIZED, FOR SOLUTION INTRAVENOUS at 02:07

## 2023-05-03 RX ADMIN — METOPROLOL SUCCINATE 50 MG: 50 TABLET, EXTENDED RELEASE ORAL at 08:31

## 2023-05-03 RX ADMIN — METHOCARBAMOL 250 MG: 500 TABLET ORAL at 19:31

## 2023-05-03 RX ADMIN — DILTIAZEM HYDROCHLORIDE 120 MG: 120 CAPSULE, COATED, EXTENDED RELEASE ORAL at 08:28

## 2023-05-03 RX ADMIN — OXYBUTYNIN 5 MG: 5 TABLET, FILM COATED, EXTENDED RELEASE ORAL at 08:41

## 2023-05-03 RX ADMIN — AMIODARONE HYDROCHLORIDE 200 MG: 200 TABLET ORAL at 16:11

## 2023-05-03 RX ADMIN — GABAPENTIN 100 MG: 100 CAPSULE ORAL at 21:48

## 2023-05-03 RX ADMIN — CEFTRIAXONE SODIUM 1000 MG: 10 INJECTION, POWDER, FOR SOLUTION INTRAVENOUS at 12:30

## 2023-05-03 RX ADMIN — STANDARDIZED SENNA CONCENTRATE 17.2 MG: 8.6 TABLET ORAL at 08:28

## 2023-05-03 RX ADMIN — INSULIN DETEMIR 7 UNITS: 100 INJECTION, SOLUTION SUBCUTANEOUS at 21:47

## 2023-05-03 RX ADMIN — ATORVASTATIN CALCIUM 40 MG: 40 TABLET, FILM COATED ORAL at 16:11

## 2023-05-03 RX ADMIN — METHOCARBAMOL 250 MG: 500 TABLET ORAL at 08:29

## 2023-05-03 RX ADMIN — AMIODARONE HYDROCHLORIDE 200 MG: 200 TABLET ORAL at 12:16

## 2023-05-03 RX ADMIN — ACETAMINOPHEN 975 MG: 325 TABLET ORAL at 00:07

## 2023-05-03 RX ADMIN — METHOCARBAMOL 250 MG: 500 TABLET ORAL at 01:49

## 2023-05-03 RX ADMIN — METHOCARBAMOL 250 MG: 500 TABLET ORAL at 12:16

## 2023-05-03 RX ADMIN — LIDOCAINE 5% 1 PATCH: 700 PATCH TOPICAL at 09:36

## 2023-05-03 RX ADMIN — CITALOPRAM HYDROBROMIDE 20 MG: 20 TABLET ORAL at 08:28

## 2023-05-03 RX ADMIN — MONTELUKAST 10 MG: 10 TABLET, FILM COATED ORAL at 21:47

## 2023-05-03 RX ADMIN — MAGNESIUM SULFATE HEPTAHYDRATE 2 G: 40 INJECTION, SOLUTION INTRAVENOUS at 11:06

## 2023-05-03 RX ADMIN — DOCUSATE SODIUM 100 MG: 100 CAPSULE, LIQUID FILLED ORAL at 19:31

## 2023-05-03 RX ADMIN — MAGNESIUM SULFATE HEPTAHYDRATE 2 G: 40 INJECTION, SOLUTION INTRAVENOUS at 02:35

## 2023-05-03 RX ADMIN — ACETAMINOPHEN 975 MG: 325 TABLET ORAL at 16:11

## 2023-05-03 RX ADMIN — Medication 7.5 MG: at 07:17

## 2023-05-03 RX ADMIN — Medication 7.5 MG: at 17:07

## 2023-05-03 RX ADMIN — INSULIN LISPRO 1 UNITS: 100 INJECTION, SOLUTION INTRAVENOUS; SUBCUTANEOUS at 18:09

## 2023-05-03 RX ADMIN — WARFARIN SODIUM 2.5 MG: 2.5 TABLET ORAL at 19:31

## 2023-05-03 RX ADMIN — AMIODARONE HYDROCHLORIDE 1 MG/MIN: 50 INJECTION, SOLUTION INTRAVENOUS at 14:09

## 2023-05-03 RX ADMIN — ACETAMINOPHEN 975 MG: 325 TABLET ORAL at 07:17

## 2023-05-03 RX ADMIN — Medication 7.5 MG: at 12:16

## 2023-05-03 RX ADMIN — Medication 7.5 MG: at 21:48

## 2023-05-03 RX ADMIN — ALLOPURINOL 100 MG: 100 TABLET ORAL at 08:28

## 2023-05-03 NOTE — CASE MANAGEMENT
[employer] Acute [employer]hab e-mail group was messaged to check authorization  DCS lead was also made aware of authorization status  Authorization pending according to [employer]  CM notified

## 2023-05-03 NOTE — PROGRESS NOTES
Progress Note - Trauma ICU Transfer   and Tertiary Survery   Imtiaz Noguera 68 y o  female [id number]   Unit/Bed#: ICU 04 Encounter: [id number]     Assessment & Plan   Summary of Diagnosed Injuries:   · Mechanical fall   · Left proximal humeral shaft fracture  · Right rib fractures 8-9   · VF/VT s/p ICD shock      PLAN:  Neuro: Multimodal pain regimen - Tylenol 975mg q8h, gabapentin 100mg qHS, robaxin 250mg q6h, lidoderm patch, oxycodone 5-7 5mg q4h PRN,   CV: Cardiology consulted, ICD settings adjusted  Amiodarone infusion 1mg/min + amiodarone 200mg TID, diltiazem 120mg q24h, metoprolol succinate 50mg daily, lasix 40mg daily, optimize electrolytes: K > 4 0, Mag > 2 0, telemetry monitoring  Coumadin for mAVR  Pulm: Monitor on room air  Maintain SpO2 > 90%, pulm toilet, rib fracture protocol  Singulair daily  GI: Bowel regimen, diabetic diet  : BMP in MA  Heme: Warfarin for mAVR, CBC in AM  ID: Ceftriaxone x 3 days  Trend WBC and fever curve  MSK: ROB JUAREZ, PT/OT     VTE Prophylaxis: Warfarin (Coumadin)     Disposition: MS Tele    Code status:  Level 1 - Full Code    Consultants:  IP CONSULT TO ORTHOPEDIC SURGERY  IP CONSULT TO GERONTOLOGY  IP CONSULT TO CASE MANAGEMENT  IP CONSULT TO ACUTE PAIN SERVICE  IP CONSULT TO CARDIOLOGY  IP CONSULT TO PHARMACY     Subjective     Mechanism of Injury: Fall     Reason for ICU admission: ICD shock     Summary of ICU clinical course: Cardiology consulted for ICD interrogation  Given additional electrolytes, started on amiodarone infusion and oral amio  Resumed home AV cathy blockers  Recent or scheduled procedures: None     Outstanding/pending diagnostics: none        Objective   Vitals:   Temp:  [97 8 °F (36 6 °C)-102 5 °F (39 2 °C)] 97 9 °F (36 6 °C)  HR:  [70-96] 74  Resp:  [16-38] 26  BP: ()/() 101/51    I/O       05/01 0701  05/02 0700 05/02 0701  05/03 0700 05/03 0701  05/04 0700    P  O  118  180    I V  (mL/kg)   30 (0 4)    IV Piggyback   100    Total Intake(mL/kg) 118 (1 4)  310 (3 6)    Urine (mL/kg/hr) 400 (0 2) 250 (0 1) 600 (0 7)    Total Output 400 250 600    Net -282 -250 -290           Unmeasured Urine Occurrence  1 x 2 x           Physical Exam  Vitals reviewed  Constitutional:       General: She is not in acute distress  Appearance: Normal appearance  She is normal weight  HENT:      Head: Normocephalic  Eyes:      Conjunctiva/sclera: Conjunctivae normal       Pupils: Pupils are equal, round, and reactive to light  Cardiovascular:      Rate and Rhythm: Normal rate and regular rhythm  Pulses:           Radial pulses are 2+ on the right side and 2+ on the left side  Dorsalis pedis pulses are 2+ on the right side and 2+ on the left side  Heart sounds: Normal heart sounds  Pulmonary:      Effort: Pulmonary effort is normal       Breath sounds: Examination of the right-lower field reveals decreased breath sounds  Examination of the left-lower field reveals decreased breath sounds  Decreased breath sounds present  Abdominal:      General: There is no distension  Palpations: Abdomen is soft  Tenderness: There is no abdominal tenderness  Musculoskeletal:      Cervical back: Full passive range of motion without pain  Skin:     General: Skin is warm and dry  Capillary Refill: Capillary refill takes less than 2 seconds  Neurological:      General: No focal deficit present  Mental Status: She is alert  GCS: GCS eye subscore is 4  GCS verbal subscore is 5  GCS motor subscore is 6  Motor: Motor function is intact  Psychiatric:         Behavior: Behavior is cooperative  Invasive Devices     Peripheral Intravenous Line  Duration           Peripheral IV 05/03/23 Right;Upper;Ventral (anterior) Arm <1 day    Peripheral IV 05/03/23 Right;Ventral (anterior) Foot <1 day          Drain  Duration           External Urinary Catheter 2 days               Rationale for remaining devices:     1  Before the illness or injury that brought you to the Emergency, did you need someone to help you on a regular basis? 0=No   2  Since the illness or injury that brought you to the Emergency, have you needed more help than usual to take care of yourself? 1=Yes   3  Have you been hospitalized for one or more nights during the past 6 months (excluding a stay in the Emergency Department)? 0=No   4  In general, do you see well? 0=Yes   5  In general, do you have serious problems with your memory? 0=No   6  Do you take more than three different medications everyday?  1=Yes   TOTAL   2     Did you order a geriatric consult if the score was 2 or greater?: yes     PIC Score  PIC Pain Score: 1 (5/3/2023  5:07 PM)  PIC Incentive Spirometry Score: 2 (5/3/2023  9:00 AM)  PIC Cough Description: 1 (5/3/2023  9:00 AM)  PIC Total Score: 5 (5/3/2023  9:00 AM)       If the Total PIC Score </=5, did you consult APS and evaluate patient for further intervention?: yes      Pain:    Incentive Spirometry  Cough  3 = Controlled  4 = Above goal volume 3 = Strong  2 = Moderate  3 = Goal to alert volume 2 = Weak  1 = Severe  2 = Below alert volume 1 = Absent     1 = Unable to perform IS      Lab Results:   BMP/CMP:   Lab Results   Component Value Date    SODIUM 131 (L) 05/03/2023    K 4 2 05/03/2023     05/03/2023    CO2 24 05/03/2023    CO2 31 05/02/2023    BUN 19 05/03/2023    CREATININE 0 45 (L) 05/03/2023    GLUCOSE 149 (H) 05/02/2023    CALCIUM 7 7 (L) 05/03/2023    EGFR 96 05/03/2023   , CBC:   Lab Results   Component Value Date    WBC 9 57 05/03/2023    HGB 9 9 (L) 05/03/2023    HCT 29 8 (L) 05/03/2023     (H) 05/03/2023     (L) 05/03/2023    MCH 34 1 05/03/2023    MCHC 33 2 05/03/2023    RDW 14 2 05/03/2023    MPV 10 9 05/03/2023    NRBC 0 05/02/2023    and Coagulation:   Lab Results   Component Value Date    INR 2 56 (H) 05/03/2023         Code Status: Level 1 - Full Code       Patient seen and evaluated by Critical Care today and deemed to be appropriate for transfer to Med Surg with Telemetry  Spoke to Dr Abdias Retana from Trauma service regarding transfer  Critical Care can be contacted via [employer] with any questions or concerns

## 2023-05-03 NOTE — CONSULTS
[address]  Consult: Critical Care  Name: Stephan Byrne 68 y o  female I MRN: [id number]  Unit/Bed#: [facility] 882-55 I Date of Admission: 4/27/2023   Date of Service: 5/3/2023 I Hospital Day: 6      Consults  Assessment/Plan   Neuro:   · Diagnosis: analgesia/sedation  · Plan: scheduled tylenol robaxin gabapentin lidoderm patch  · PRN oxys  · Delirium precautions       CV:   · Diagnosis: Hx mechanical aortic valve replacement with AICD - pt was rapid response on floors had event 5/2 @2300 felt her device fire  Had SVT which converted to Afib with sustained runs of Vtach, then became junctional tachycardia  · Plan: tele monitoring to assess for further arrhythmias  · On coumadin  · Restart metoprolol and diltiazem   · Cardiology to interrogate device - consult in   · F/u trop, lactic, cbc, bmp, coags  · Pt febrile - monitor hemodynamics for signs of shock  · Diagnosis: HTN  · Plan: restart metoprolol and diltiazem  · Diagnosis: HLD  · Plan: statin      Pulm:  · Diagnosis: hx asthma   · Plan: o2 sat >92%   Pt on 2L NC  · singulair  · Encourage deep inspirations as tolerated 2/2 rib fx      GI:   · Diagnosis: no acute issues  · Plan: carb controlled diet   · PRN zofran for nausea      :   · Diagnosis: no acute issues  · Plan: f/u BMP, UA  · Monitor Is/Os      F/E/N:   · Diagnosis: FEN  · Plan: replete e-lytes as needed  · Carb controlled diet      Heme/Onc:   · Diagnosis: DVT ppx  · Plan: SCDs  · Pt on coumadin for aortic valve replacement      Endo:   · Diagnosis: T2DM  · Plan: SSI      ID:   · Diagnosis: febrile 102 5 on 5/3  · Plan: f/u cxr, UA, blood cx  · Start empiric vanc and cefepime - deescalate as able pending cx and infectious w/u  · Monitor fever and wbc  · Tylenol for anti-pyretics      MSK/Skin:   · Diagnosis: s/p fall  · Rib fxs  · Humerus fx  · Plan: PT/OT   · Analgesia   · Fall precautions       Disposition: Stepdown Level 1     History of Present Illness     HPI: "Heide Maher is a 68 y o  female admitted to the medical floor status post fall with humerus fracture who presents from floor with arrhythmia  Patient was a rapid response states that she felt her AICD fire  Was shivering during evaluation  Found to be in SVT on the monitor which then progressed to A-fib with runs of V  tach  Patient then was in junctional tachycardia  Received 2 5mg metoprolol which converted pt to sinus rhythm  Cardiology consulted to interrogate AICD  Patient is hemodynamically stable in the unit currently  Arrhythmia resolved  On telemetry monitoring overnight  Patient endorses chills described as \"shaking episodes\" however denies preceding chest pain palpitation shortness of breath lightheadedness dizziness nausea or any other complaints  Pt febrile, infectious workup started, empiric abx initiated     History obtained from chart review and the patient  Review of Systems   Constitutional: Positive for chills  Negative for fever  Respiratory: Negative for shortness of breath  Cardiovascular: Negative for chest pain  Felt AICD fire   Gastrointestinal: Negative for abdominal pain  Neurological: Negative for syncope  All other systems reviewed and are negative      Historical Information   Past Medical History:  No date: Asthma  No date: Diabetes ([facility] [address] )  No date: Hypertension Past Surgical History:  No date: MECHANICAL AORTIC VALVE REPLACEMENT  No date: REPLACEMENT TOTAL KNEE   Current Outpatient Medications   Medication Instructions   • alendronate (FOSAMAX) 70 mg, Oral, Every 7 days   • allopurinol (ZYLOPRIM) 100 mg, Oral, Daily   • atorvastatin (LIPITOR) 40 mg, Oral, Daily   • citalopram (CELEXA) 20 mg, Oral, Daily   • diltiazem (CARDIZEM CD) 120 mg, Oral, Daily   • furosemide (LASIX) 40 mg, Oral, Daily   • lisinopril (ZESTRIL) 5 mg, Oral, Daily   • magnesium 64 mg, Oral, 3 times daily, Two pills in the AM, one at lunch, and two with dinner   • metoprolol succinate " (TOPROL-XL) 50 mg, Oral, Daily   • montelukast (SINGULAIR) 10 mg, Oral, Daily at bedtime   • multivitamin (THERAGRAN) TABS 1 tablet, Oral, Daily   • oxybutynin (DITROPAN-XL) 5 mg, Oral, Daily   • sitaGLIPtin-metFORMIN (JANUMET)  MG per tablet 1 tablet, Oral, 2 times daily with meals   • warfarin (COUMADIN) 5 mg, Oral, Daily (warfarin), 2 5mg on Wed and Sat, 5mg all other days    Allergies   Allergen Reactions   • Fentanyl Confusion      Social History     Tobacco Use   • Smoking status: Never   • Smokeless tobacco: Never   Substance Use Topics   • Alcohol use: Yes     Comment: socially   • Drug use: Never    Family History   Problem Relation Age of Onset   • Hypertension Other           Objective                            Vitals I/O      Most Recent Min/Max in 24hrs   Temp (!) 102 5 °F (39 2 °C) Temp  Min: 97 8 °F (36 6 °C)  Max: 102 5 °F (39 2 °C)   Pulse 96 Pulse  Min: 71  Max: 96   Resp 16 Resp  Min: 16  Max: 16   /54 BP  Min: 99/76  Max: 174/110   O2 Sat 100 % SpO2  Min: 92 %  Max: 100 %    No intake or output data in the 24 hours ending 05/03/23 0046      Diet Beau/CHO Controlled; Consistent Carbohydrate Diet Level 1 (4 carb servings/60 grams CHO/meal)     Invasive Monitoring Physical exam    Physical Exam  Constitutional:       Appearance: Normal appearance  She is obese  HENT:      Head: Normocephalic  Comments: Ecchymosis to right side of face     Mouth/Throat:      Mouth: Mucous membranes are moist    Eyes:      Extraocular Movements: Extraocular movements intact  Cardiovascular:      Rate and Rhythm: Normal rate and regular rhythm  Pulmonary:      Effort: Pulmonary effort is normal       Breath sounds: Normal breath sounds  Abdominal:      Palpations: Abdomen is soft  Tenderness: There is no abdominal tenderness  Musculoskeletal:         General: Normal range of motion  Cervical back: Normal range of motion  Skin:     General: Skin is warm and dry     Neurological: General: No focal deficit present  Mental Status: She is alert and oriented to person, place, and time  Diagnostic Studies    EKG: n/a  Imaging: CXR I have personally reviewed pertinent reports  and I have personally reviewed pertinent films in PACS     Medications:  Scheduled PRN   acetaminophen, 975 mg, Q8H [facility]  allopurinol, 100 mg, Daily  atorvastatin, 40 mg, Daily With Dinner  citalopram, 20 mg, Daily  diltiazem, 120 mg, Daily  docusate sodium, 100 mg, BID  furosemide, 40 mg, Daily  gabapentin, 100 mg, HS  insulin detemir, 7 Units, HS  insulin lispro, 1-5 Units, HS  insulin lispro, 1-6 Units, TID AC  lidocaine, 1 patch, Daily  methocarbamol, 250 mg, Q6H [facility]  metoprolol succinate, 50 mg, Daily  montelukast, 10 mg, HS  oxybutynin, 5 mg, Daily  senna, 2 tablet, Daily  warfarin, 5 mg, Once per day on Sun Mon Tue Thu Fri      naloxone, 0 04 mg, Q1MIN PRN  ondansetron, 4 mg, Q6H PRN  oxyCODONE, 5 mg, Q4H PRN  oxyCODONE, 7 5 mg, Q4H PRN       Continuous          Labs:      CBC    Recent Labs     05/01/23  0636 05/02/23  1225 05/02/23  2338   WBC 7 53 8 78  --    HGB 11 8 10 4* 12 2   HCT 35 7 31 3* 36   * 122*  --      BMP    Recent Labs     05/01/23  0809 05/02/23  2338   SODIUM 134*  --    K 4 1  --      --    CO2 29 31   AGAP -1*  --    BUN 15  --    CREATININE 0 49*  --    CALCIUM 8 5  --        Coags    Recent Labs     05/01/23  0636 05/02/23  1225   INR 4 23* 2 73*        Additional Electrolytes  Recent Labs     05/02/23  2338   CAIONIZED 1 20          Blood Gas    No recent results  No recent results LFTs  No recent results    Infectious  No recent results  Glucose  Recent Labs     05/01/23  0809   GLUC 137              Critical Care Time Delivered: Upon my evaluation, this patient had a high probability of imminent or life-threatening deterioration due to Arrhythmia, which required my direct attention, intervention, and personal management    I have personally provided 20 minutes of critical care time, exclusive of procedures, teaching, family meetings, and any prior time recorded by providers other than myself       Kimberly Lainez, DO

## 2023-05-03 NOTE — RAPID RESPONSE
Rapid Response Note  Rexanne Oppenheim 68 y o  female MRN: [id number]  [facility] Encounter: [id number]    Rapid Response Notification(s):   Response called date/time:  5/2/2023 11:22 PM  Response team arrival date/time:  5/2/2023 11:23 PM  Response end date/time:  5/3/2023 12:09 AM  Level of care:  ICU  Rapid response location:  [facility]  Primary reason for rapid response call: Other (comment) (AICD fired and shivering)    Rapid Response Intervention(s):   Airway:  None  Breathing:  None  Circulation:  Electrocardiogram  Fluids administered:  None  Medications administered: Other (comment) (Metoprolol)       Assessment:   · AICD fired, pt  In SVT on monitor, which then became A-fib with runs of V-tach, and then accelerated junctional  Cards called to interrogate AICD, Full set of labs sent, pt  Upgraded to Step down level 1  Plan:   · Upgrade to step down level on  Please see ICU admit note  Rapid Response Outcome:   Transfer:  Transfer to stepdown 1  Primary service notified of transfer: Yes    Code Status: Level 1 (Full Code)      Family notified of transfer: no  Family member contacted: Not called during rapid[facility]     Background/Situation:   Rexanne Oppenheim is a 68 y o  female who presents s/p mechanical fall while at the grocery store  She reports bending forward at the grocery store and continued to fall forward, striking her face on the ground  She believes her glasses cut her eyebrow  She did not lose consciousness  She is complaining of pain in the left arm and in the right chest wall  She has some bleeding from her right eyebrow as well  She reports taking coumadin for a h/o a mechanical aortic valve  She reports having some balance issues at baseline  She does use a cane for ambulation  Review of Systems   Constitutional: Positive for chills  HENT: Negative  Eyes: Negative  Respiratory: Negative      Cardiovascular:        PT  States she felt her AICD "fire   Gastrointestinal: Negative  Endocrine: Negative  Genitourinary: Negative  Musculoskeletal: Negative  Neurological: Positive for tremors  Hematological: Negative  Psychiatric/Behavioral: Negative  Objective:   Vitals:    05/02/23 2236 05/02/23 2325 05/02/23 2335 05/03/23 0000   BP: 109/75 (!) 164/133 (!) 174/110 104/54   Pulse: 84 95 71 80   Resp: 16      Temp: 97 9 °F (36 6 °C) 97 8 °F (36 6 °C)  (!) 102 5 °F (39 2 °C)   TempSrc: Oral   Oral   SpO2: 97% 93% 100%    Weight:       Height:         Physical Exam  Constitutional:       Appearance: She is obese  HENT:      Head: Normocephalic and atraumatic  Nose: Nose normal       Mouth/Throat:      Mouth: Mucous membranes are dry  Pharynx: Oropharynx is clear  Eyes:      Pupils: Pupils are equal, round, and reactive to light  Cardiovascular:      Rate and Rhythm: Tachycardia present  Rhythm irregular  Pulses: Normal pulses  Heart sounds: Normal heart sounds  Pulmonary:      Effort: Pulmonary effort is normal    Abdominal:      Palpations: Abdomen is soft  Comments: Obese   Musculoskeletal:         General: Swelling present  Right lower leg: Edema present  Left lower leg: Edema present  Skin:     Capillary Refill: Capillary refill takes less than 2 seconds  Neurological:      Mental Status: She is alert and oriented to person, place, and time  Portions of the record may have been created with voice recognition software  Occasional wrong word or \"sound a like\" substitutions may have occurred due to the inherent limitations of voice recognition software  Read the chart carefully and recognize, using context, where substitutions have occurred      JOSEPHINE Burleson    "

## 2023-05-03 NOTE — PLAN OF CARE
Problem: Potential for Falls  Goal: Patient will remain free of falls  Description: INTERVENTIONS:  - Educate patient/family on patient safety including physical limitations  - Instruct patient to call for assistance with activity   - Consult OT/PT to assist with strengthening/mobility   - Keep Call bell within reach  - Keep bed low and locked with side rails adjusted as appropriate  - Keep care items and personal belongings within reach  - Initiate and maintain comfort rounds  - Make Fall Risk Sign visible to staff  - Offer Toileting every 2 Hours, in advance of need  - Initiate/Maintain bedalarm  - Obtain necessary fall risk management equipment:   - Apply yellow socks and bracelet for high fall risk patients  - Consider moving patient to room near nurses station  Outcome: Progressing     Problem: RESPIRATORY - ADULT  Goal: Achieves optimal ventilation and oxygenation  Description: INTERVENTIONS:  - Assess for changes in respiratory status  - Assess for changes in mentation and behavior  - Position to facilitate oxygenation and minimize respiratory effort  - Oxygen administered by appropriate delivery if ordered  - Initiate smoking cessation education as indicated  - Encourage broncho-pulmonary hygiene including cough, deep breathe, Incentive Spirometry  - Assess the need for suctioning and aspirate as needed  - Assess and instruct to report SOB or any respiratory difficulty  - Respiratory Therapy support as indicated  Outcome: Progressing     Problem: MUSCULOSKELETAL - ADULT  Goal: Maintain or return mobility to safest level of function  Description: INTERVENTIONS:  - Assess patient's ability to carry out ADLs; assess patient's baseline for ADL function and identify physical deficits which impact ability to perform ADLs (bathing, care of mouth/teeth, toileting, grooming, dressing, etc )  - Assess/evaluate cause of self-care deficits   - Assess range of motion  - Assess patient's mobility  - Assess patient's need for assistive devices and provide as appropriate  - Encourage maximum independence but intervene and supervise when necessary  - Involve family in performance of ADLs  - Assess for home care needs following discharge   - Consider OT consult to assist with ADL evaluation and planning for discharge  - Provide patient education as appropriate  Outcome: Progressing  Goal: Maintain proper alignment of affected body part  Description: INTERVENTIONS:  - Support, maintain and protect limb and body alignment  - Provide patient/ family with appropriate education  Outcome: Progressing     Problem: PAIN - ADULT  Goal: Verbalizes/displays adequate comfort level or baseline comfort level  Description: Interventions:  - Encourage patient to monitor pain and request assistance  - Assess pain using appropriate pain scale  - Administer analgesics based on type and severity of pain and evaluate response  - Implement non-pharmacological measures as appropriate and evaluate response  - Consider cultural and social influences on pain and pain management  - Notify physician/advanced practitioner if interventions unsuccessful or patient reports new pain  Outcome: Progressing     Goal: Maintain or return to baseline ADL function  Description: INTERVENTIONS:  -  Assess patient's ability to carry out ADLs; assess patient's baseline for ADL function and identify physical deficits which impact ability to perform ADLs (bathing, care of mouth/teeth, toileting, grooming, dressing, etc )  - Assess/evaluate cause of self-care deficits   - Assess range of motion  - Assess patient's mobility; develop plan if impaired  - Assess patient's need for assistive devices and provide as appropriate  - Encourage maximum independence but intervene and supervise when necessary  - Involve family in performance of ADLs  - Assess for home care needs following discharge   - Consider OT consult to assist with ADL evaluation and planning for discharge  - Provide patient education as appropriate  Outcome: Progressing  Goal: Maintains/Returns to pre admission functional level  Description: INTERVENTIONS:  - Perform BMAT or MOVE assessment daily    - Set and communicate daily mobility goal to care team and patient/family/caregiver  - Collaborate with rehabilitation services on mobility goals if consulted  - Perform Range of Motion 3 times a day  - Reposition patient every 2 hours  - Dangle patient 3 times a day  - Stand patient 3 times a day  - Ambulate patient 3 times a day  - Out of bed to chair 3 times a day   - Out of bed for meals 3 times a day  - Out of bed for toileting  - Record patient progress and toleration of activity level   Outcome: Progressing     Problem: DISCHARGE PLANNING  Goal: Discharge to home or other facility with appropriate resources  Description: INTERVENTIONS:  - Identify barriers to discharge w/patient and caregiver  - Arrange for needed discharge resources and transportation as appropriate  - Identify discharge learning needs (meds, wound care, etc )  - Arrange for interpretive services to assist at discharge as needed  - Refer to Case Management Department for coordinating discharge planning if the patient needs post-hospital services based on physician/advanced practitioner order or complex needs related to functional status, cognitive ability, or social support system  Outcome: Progressing     Problem: Knowledge Deficit  Goal: Patient/family/caregiver demonstrates understanding of disease process, treatment plan, medications, and discharge instructions  Description: Complete learning assessment and assess knowledge base    Interventions:  - Provide teaching at level of understanding  - Provide teaching via preferred learning methods  Outcome: Progressing     Problem: MOBILITY - ADULT  Goal: Maintain or return to baseline ADL function  Description: INTERVENTIONS:  -  Assess patient's ability to carry out ADLs; assess patient's baseline for ADL function and identify physical deficits which impact ability to perform ADLs (bathing, care of mouth/teeth, toileting, grooming, dressing, etc )  - Assess/evaluate cause of self-care deficits   - Assess range of motion  - Assess patient's mobility; develop plan if impaired  - Assess patient's need for assistive devices and provide as appropriate  - Encourage maximum independence but intervene and supervise when necessary  - Involve family in performance of ADLs  - Assess for home care needs following discharge   - Consider OT consult to assist with ADL evaluation and planning for discharge  - Provide patient education as appropriate  Outcome: Progressing     Problem: INFECTION - ADULT  Goal: Absence or prevention of progression during hospitalization  Description: INTERVENTIONS:  - Assess and monitor for signs and symptoms of infection  - Monitor lab/diagnostic results  - Monitor all insertion sites, i e  indwelling lines, tubes, and drains  - Monitor endotracheal if appropriate and nasal secretions for changes in amount and color  - [place] appropriate cooling/warming therapies per order  - Administer medications as ordered  - Instruct and encourage patient and family to use good hand hygiene technique  - Identify and instruct in appropriate isolation precautions for identified infection/condition  Outcome: Progressing  Goal: Absence of fever/infection during neutropenic period  Description: INTERVENTIONS:  - Monitor WBC    Outcome: Progressing

## 2023-05-03 NOTE — PROGRESS NOTES
Jennifer Spicer is a 68 y o  female who is currently ordered Vancomycin IV with management by the Pharmacy Consult service  Relevant clinical data and objective / subjective history reviewed  Vancomycin Assessment:  Indication and Goal AUC/Trough: Bacteremia (goal -600, trough >10)  Clinical Status:  Micro:   pending  Renal Function:  SCr: 0 49 mg/dL  CrCl: 89 4 mL/min  Renal replacement: Not on dialysis  Days of Therapy: 1  Current Dose: 1500mg loading dose followed by 1000mg iv q12  Vancomycin Plan:  New Dosing:    Estimated AUC: 412 mcg*hr/mL  Estimated Trough: 12 4 mcg/mL  Next Level: 05/04/23 0600 AM labs  Renal Function Monitoring: Daily BMP and [place] will continue to follow closely for s/sx of nephrotoxicity, infusion reactions and appropriateness of therapy  BMP and CBC will be ordered per protocol  We will continue to follow the patient’s culture results and clinical progress daily      Jos Eastman, Pharmacist

## 2023-05-03 NOTE — PROGRESS NOTES
Pastoral Care Progress Note    5/3/2023  Patient: May Rivero : 1945  Admission Date & Time: 2023 1253  MRN: [id number] CSN: [id number]                     Rapid response protocol  Med staff at bedside  No family present  Follow up as needed or requested

## 2023-05-03 NOTE — PLAN OF CARE
Problem: Potential for Falls  Goal: Patient will remain free of falls  Description: INTERVENTIONS:  - Educate patient/family on patient safety including physical limitations  - Instruct patient to call for assistance with activity   - Consult OT/PT to assist with strengthening/mobility   - Keep Call bell within reach  - Keep bed low and locked with side rails adjusted as appropriate  - Keep care items and personal belongings within reach  - Initiate and maintain comfort rounds  - Make Fall Risk Sign visible to staff  - Offer Toileting every 2 Hours, in advance of need  - Initiate/Maintain alarm  - Obtain necessary fall risk management equipment:   - Apply yellow socks and bracelet for high fall risk patients  - Consider moving patient to room near nurses station  Outcome: Progressing     Problem: RESPIRATORY - ADULT  Goal: Achieves optimal ventilation and oxygenation  Description: INTERVENTIONS:  - Assess for changes in respiratory status  - Assess for changes in mentation and behavior  - Position to facilitate oxygenation and minimize respiratory effort  - Oxygen administered by appropriate delivery if ordered  - Initiate smoking cessation education as indicated  - Encourage broncho-pulmonary hygiene including cough, deep breathe, Incentive Spirometry  - Assess the need for suctioning and aspirate as needed  - Assess and instruct to report SOB or any respiratory difficulty  - Respiratory Therapy support as indicated  Outcome: Progressing     Problem: MUSCULOSKELETAL - ADULT  Goal: Maintain or return mobility to safest level of function  Description: INTERVENTIONS:  - Assess patient's ability to carry out ADLs; assess patient's baseline for ADL function and identify physical deficits which impact ability to perform ADLs (bathing, care of mouth/teeth, toileting, grooming, dressing, etc )  - Assess/evaluate cause of self-care deficits   - Assess range of motion  - Assess patient's mobility  - Assess patient's need for assistive devices and provide as appropriate  - Encourage maximum independence but intervene and supervise when necessary  - Involve family in performance of ADLs  - Assess for home care needs following discharge   - Consider OT consult to assist with ADL evaluation and planning for discharge  - Provide patient education as appropriate  Outcome: Progressing  Goal: Maintain proper alignment of affected body part  Description: INTERVENTIONS:  - Support, maintain and protect limb and body alignment  - Provide patient/ family with appropriate education  Outcome: Progressing     Problem: SAFETY ADULT  Goal: Patient will remain free of falls  Description: INTERVENTIONS:  - Educate patient/family on patient safety including physical limitations  - Instruct patient to call for assistance with activity   - Consult OT/PT to assist with strengthening/mobility   - Keep Call bell within reach  - Keep bed low and locked with side rails adjusted as appropriate  - Keep care items and personal belongings within reach  - Initiate and maintain comfort rounds  - Make Fall Risk Sign visible to staff  - Offer Toileting every 2 Hours, in advance of need  - Initiate/Maintain alarm  - Obtain necessary fall risk management equipment:   - Apply yellow socks and bracelet for high fall risk patients  - Consider moving patient to room near nurses station  Outcome: Progressing     Problem: DISCHARGE PLANNING  Goal: Discharge to home or other facility with appropriate resources  Description: INTERVENTIONS:  - Identify barriers to discharge w/patient and caregiver  - Arrange for needed discharge resources and transportation as appropriate  - Identify discharge learning needs (meds, wound care, etc )  - Arrange for interpretive services to assist at discharge as needed  - Refer to Case Management Department for coordinating discharge planning if the patient needs post-hospital services based on physician/advanced practitioner order or complex needs related to functional status, cognitive ability, or social support system  Outcome: Progressing     Problem: Prexisting or High Potential for Compromised Skin Integrity  Goal: Skin integrity is maintained or improved  Description: INTERVENTIONS:  - Identify patients at risk for skin breakdown  - Assess and monitor skin integrity  - Assess and monitor nutrition and hydration status  - Monitor labs   - Assess for incontinence   - Turn and reposition patient  - Assist with mobility/ambulation  - Relieve pressure over bony prominences  - Avoid friction and shearing  - Provide appropriate hygiene as needed including keeping skin clean and dry  - Evaluate need for skin moisturizer/barrier cream  - Collaborate with interdisciplinary team   - Patient/family teaching  - Consider wound care consult   Outcome: Progressing

## 2023-05-03 NOTE — QUICK NOTE
Notified by nursing at 11:19PM that patient began to shiver, complained of feeling cold and feeling as though her AICD shocked her  Rapid response called at 11:22PM     On my evaluation, patient denied chest pain, or SOB, complaining only of shivering  Temp recorded to be 98F  , HR initially inappropriately reading 45 on bedside monitor  Patient awake alert and mentating  iStat showed slight hypercarbia, otherwise  wnl  Patient placed on telemetry, narrow complex tachycardia visible, reading HR in 180s  Patient given 2 5mg IV metoprolol with near immediate response and conversion to NSR, HR 69-70  Cardiology contacted for STAT pacemaker interrogation and recommendations  Patient upgrade to Level 1 stepdown

## 2023-05-03 NOTE — CONSULTS
Consultation - Electrophysiology Cardiology   Blas Casillas 68 y o  female MRN: [id number]  Unit/Bed#: ICU 04 Encounter: [id number]      Assessment/PLAN: Blas Casillas is a 68y o  year old female past medical history of aortic stenosis status post mechanical aortic valve placed in 2011, VT/VF status post single-chamber ICD placed in 2011 intra-abdominal, hypertension, paroxysmal atrial fibrillation that presented as a trauma evaluation after mechanical fall in the grocery store  Electrophysiology has been consulted for VF status post ICD shock  VT/VF status post successful shock: Appears patient possibly had some a-fib RVR that may have degenerated into VT and then subsequent VF status post a successful shock    -Personally reprogrammed her device to have a VT monitoring zone at 160 with ATP therapies with burst cycle length at 81  Also, I increased her VF zone to 200  Patient has a single-chamber abdominal [place] ICD (patient stated they have difficult left sided access when they attempted to place the device; however it is unclear why they did not place the device on the right)  -Would obtain transthoracic echo considering episode as above and help identify any other participating factor, especially considering she has had a mechanical aortic valve  Per recent transthoracic echo reports it sounds like her mechanical aortic valve was functioning well  Also, a TTE should be done considering fevers, chills, and rigors especially in a patient with a mechanical aortic valve and abdominal ICD  -Follow-up blood cultures  -Antibiotics per primary team  -Would restart her home metoprolol 50 mg daily  -Would start Amio load for antiarrhythmics (please start oral Amio 200 mg 3 times a day plus Amio drip for crossover  No need for Amio IV bolus)  -Continue warfarin with a goal of 2 5-3 5  Her warfarin will have to be closely monitored outpatient consider we are starting Amio      Paroxysmal Atrial fibrillation: -unsure of her longterm burden   -currently on tele appears to be in sinus   -continue AC as above   -BB and AAD as above    DEVICE:  -Joanna   -single chamber ICD abdominal   -Personally reprogrammed her device to have a VT monitoring zone at 160 with ATP therapies with burst cycle length at 81  Also, I increased her VF zone to 200  Patient has a single-chamber abdominal [place] ICD (patient stated they have difficult left sided access when they attempted to place the device; however it is unclear why they did not place the device on the right)    AS s/p Mechanical AVR:  -INR goal 2 5-3 5         History of Present Illness   Physician Requesting Consult: Gina Israel,*  Reason for Consult / Principal Problem: VT/VF s/p Shock   HPI: Corazon Francis is a 68y o  year old female past medical history of aortic stenosis status post mechanical aortic valve placed in 2011, VT/VF status post single-chamber ICD placed in 2011 intra-abdominal, hypertension, paroxysmal atrial fibrillation that presented as a trauma evaluation after mechanical fall in the grocery store  Electrophysiology has been consulted for VF status post ICD shock  She states that she was walking in the grocery store and trying to reach for 9 home that was on her reach  Subsequently she tripped and fractured her left humerus and multiple ribs  She was brought in via EMS and was a trauma evaluation  She was on the floor when a rapid response was called and she was found to have VT and then subsequent VF with status post successful ICD shock  Before this hospitalization she states over the past couple of days that she was having some chills and rigors  Denies nausea/vomiting, abdominal pain, diarrhea, cough, chest pain, shortness of breath, PND, orthopnea, presyncopal symptoms, syncopal episodes      She states that she was possibly stopped on her beta-blocker by her cardiologist   She thinks it was stopped possibly due to her blood "pressure but she is unsure  I do not see any recent notes that actually detail this medication change  Inpatient consult to Cardiology     Performed by  Jose Espinoza MD     Authorized by Last Soni MD              Review of Systems:  Review of Systems    14 systems reviewed and negative with the exception of the above and the following    Historical Information   Past Medical History:   Diagnosis Date   • Asthma    • Diabetes ([address] )    • Hypertension      Past Surgical History:   Procedure Laterality Date   • MECHANICAL AORTIC VALVE REPLACEMENT     • REPLACEMENT TOTAL KNEE       Social History     Substance and Sexual Activity   Alcohol Use Yes    Comment: socially     Social History     Substance and Sexual Activity   Drug Use Never     Social History     Tobacco Use   Smoking Status Never   Smokeless Tobacco Never     Family History: non-contributory    Meds/Allergies   all current active meds have been reviewed  Allergies   Allergen Reactions   • Fentanyl Confusion       Objective   Vitals: Blood pressure 99/67, pulse 70, temperature 97 9 °F (36 6 °C), temperature source Oral, resp  rate (!) 25, height 4' 10\" (1 473 m), weight 85 3 kg (188 lb), SpO2 97 %  , Body mass index is 39 29 kg/m² ,   Orthostatic Blood Pressures    Flowsheet Row Most Recent Value   Blood Pressure 99/67 filed at 05/03/2023 1122   Patient Position - Orthostatic VS Lying filed at 05/03/2023 0400            Intake/Output Summary (Last 24 hours) at 5/3/2023 1214  Last data filed at 5/3/2023 1001  Gross per 24 hour   Intake 180 ml   Output 450 ml   Net -270 ml       Invasive Devices     Peripheral Intravenous Line  Duration           Peripheral IV 05/03/23 Right;Upper;Ventral (anterior) Arm <1 day    Peripheral IV 05/03/23 Right;Ventral (anterior) Foot <1 day          Drain  Duration           External Urinary Catheter 2 days                    Physical Exam:  Physical Exam    Gen: No acute distress  HEENT: Right eye laceration, " anicteric, mucous membranes moist  Neck: supple, no jugular venous distention, or carotid bruit  Heart: JVP 6, regular, normal s1 and s2, no murmur/rub or gallop  Lungs :clear to auscultation bilaterally, no rales/rhonchi or wheeze  Abdomen: soft nontender, normoactive bowel sounds, no organomegaly  Ext:Left arm cast  warm and perfused, normal femoral pulses, no edema, clubbing  Skin: warm, no rashes  Neuro: AAO x 3, no focal findings  Psychiatric: normal affect  Musculoskeletal: no obvious joint deformities  Lab Results:     No results found for: CKTOTAL, CKMB, CKMBINDEX, TROPONINI    Lab Results   Component Value Date    GLUCOSE 149 (H) 05/02/2023    CALCIUM 7 7 (L) 05/03/2023    K 4 2 05/03/2023    CO2 24 05/03/2023     05/03/2023    BUN 19 05/03/2023    CREATININE 0 45 (L) 05/03/2023       Lab Results   Component Value Date    WBC 9 57 05/03/2023    HGB 9 9 (L) 05/03/2023    HCT 29 8 (L) 05/03/2023     (H) 05/03/2023     (L) 05/03/2023       No results found for: CHOL  No results found for: HDL  No results found for: LDLCALC  No results found for: TRIG    Lab Results   Component Value Date    ALT 24 04/27/2023    AST 34 04/27/2023       Results from last 7 days   Lab Units 05/03/23  0410   INR  2 56*         Imaging: I have personally reviewed pertinent reports        EKG:

## 2023-05-03 NOTE — CODE DOCUMENTATION
Pt's HR still very irregular, MD's bedside, decision made to upgrade pt to SD 1 and transfer to the ICU

## 2023-05-04 PROBLEM — N39.0 UTI (URINARY TRACT INFECTION): Status: ACTIVE | Noted: 2023-05-04

## 2023-05-04 PROBLEM — I49.9 VENTRICULAR ARRHYTHMIA: Status: ACTIVE | Noted: 2023-05-04

## 2023-05-04 LAB
GLUCOSE SERPL-MCNC: 129 MG/DL (ref 65–140)
GLUCOSE SERPL-MCNC: 138 MG/DL (ref 65–140)
GLUCOSE SERPL-MCNC: 168 MG/DL (ref 65–140)
GLUCOSE SERPL-MCNC: 194 MG/DL (ref 65–140)

## 2023-05-04 RX ORDER — AMIODARONE HYDROCHLORIDE 200 MG/1
400 TABLET ORAL 2 TIMES DAILY WITH MEALS
Status: COMPLETED | OUTPATIENT
Start: 2023-05-04 | End: 2023-05-11

## 2023-05-04 RX ORDER — AMIODARONE HYDROCHLORIDE 200 MG/1
200 TABLET ORAL
Status: DISCONTINUED | OUTPATIENT
Start: 2023-05-12 | End: 2023-05-13 | Stop reason: HOSPADM

## 2023-05-04 RX ORDER — CALCIUM CARBONATE 200(500)MG
500 TABLET,CHEWABLE ORAL 3 TIMES DAILY PRN
Status: DISCONTINUED | OUTPATIENT
Start: 2023-05-04 | End: 2023-05-13 | Stop reason: HOSPADM

## 2023-05-04 RX ADMIN — METHOCARBAMOL 250 MG: 500 TABLET ORAL at 11:41

## 2023-05-04 RX ADMIN — ALLOPURINOL 100 MG: 100 TABLET ORAL at 08:38

## 2023-05-04 RX ADMIN — DOCUSATE SODIUM 100 MG: 100 CAPSULE, LIQUID FILLED ORAL at 17:32

## 2023-05-04 RX ADMIN — Medication 7.5 MG: at 20:08

## 2023-05-04 RX ADMIN — METOPROLOL SUCCINATE 50 MG: 50 TABLET, EXTENDED RELEASE ORAL at 08:38

## 2023-05-04 RX ADMIN — CEFTRIAXONE SODIUM 1000 MG: 10 INJECTION, POWDER, FOR SOLUTION INTRAVENOUS at 13:58

## 2023-05-04 RX ADMIN — DILTIAZEM HYDROCHLORIDE 120 MG: 120 CAPSULE, COATED, EXTENDED RELEASE ORAL at 08:38

## 2023-05-04 RX ADMIN — AMIODARONE HYDROCHLORIDE 200 MG: 200 TABLET ORAL at 11:41

## 2023-05-04 RX ADMIN — ACETAMINOPHEN 975 MG: 325 TABLET ORAL at 08:38

## 2023-05-04 RX ADMIN — GABAPENTIN 100 MG: 100 CAPSULE ORAL at 22:14

## 2023-05-04 RX ADMIN — INSULIN LISPRO 1 UNITS: 100 INJECTION, SOLUTION INTRAVENOUS; SUBCUTANEOUS at 17:35

## 2023-05-04 RX ADMIN — Medication 7.5 MG: at 05:22

## 2023-05-04 RX ADMIN — ATORVASTATIN CALCIUM 40 MG: 40 TABLET, FILM COATED ORAL at 17:32

## 2023-05-04 RX ADMIN — AMIODARONE HYDROCHLORIDE 1 MG/MIN: 50 INJECTION, SOLUTION INTRAVENOUS at 06:13

## 2023-05-04 RX ADMIN — AMIODARONE HYDROCHLORIDE 200 MG: 200 TABLET ORAL at 08:37

## 2023-05-04 RX ADMIN — WARFARIN SODIUM 5 MG: 5 TABLET ORAL at 17:32

## 2023-05-04 RX ADMIN — METHOCARBAMOL 250 MG: 500 TABLET ORAL at 05:23

## 2023-05-04 RX ADMIN — Medication 7.5 MG: at 15:39

## 2023-05-04 RX ADMIN — CITALOPRAM HYDROBROMIDE 20 MG: 20 TABLET ORAL at 08:38

## 2023-05-04 RX ADMIN — DOCUSATE SODIUM 100 MG: 100 CAPSULE, LIQUID FILLED ORAL at 08:38

## 2023-05-04 RX ADMIN — Medication 7.5 MG: at 11:41

## 2023-05-04 RX ADMIN — AMIODARONE HYDROCHLORIDE 400 MG: 200 TABLET ORAL at 17:32

## 2023-05-04 RX ADMIN — INSULIN LISPRO 2 UNITS: 100 INJECTION, SOLUTION INTRAVENOUS; SUBCUTANEOUS at 11:42

## 2023-05-04 RX ADMIN — ACETAMINOPHEN 975 MG: 325 TABLET ORAL at 23:38

## 2023-05-04 RX ADMIN — OXYBUTYNIN 5 MG: 5 TABLET, FILM COATED, EXTENDED RELEASE ORAL at 08:37

## 2023-05-04 RX ADMIN — FUROSEMIDE 40 MG: 40 TABLET ORAL at 08:38

## 2023-05-04 RX ADMIN — CALCIUM CARBONATE (ANTACID) CHEW TAB 500 MG 500 MG: 500 CHEW TAB at 22:14

## 2023-05-04 RX ADMIN — LIDOCAINE 5% 1 PATCH: 700 PATCH TOPICAL at 08:37

## 2023-05-04 RX ADMIN — MONTELUKAST 10 MG: 10 TABLET, FILM COATED ORAL at 22:14

## 2023-05-04 RX ADMIN — INSULIN DETEMIR 7 UNITS: 100 INJECTION, SOLUTION SUBCUTANEOUS at 22:13

## 2023-05-04 RX ADMIN — METHOCARBAMOL 250 MG: 500 TABLET ORAL at 00:13

## 2023-05-04 RX ADMIN — ACETAMINOPHEN 975 MG: 325 TABLET ORAL at 15:39

## 2023-05-04 RX ADMIN — METHOCARBAMOL 250 MG: 500 TABLET ORAL at 23:38

## 2023-05-04 RX ADMIN — ACETAMINOPHEN 975 MG: 325 TABLET ORAL at 00:12

## 2023-05-04 RX ADMIN — METHOCARBAMOL 250 MG: 500 TABLET ORAL at 17:32

## 2023-05-04 NOTE — CASE MANAGEMENT
Case Management Discharge Planning Note    Patient name Faye Carranza  [facility] [id number]  : 1945 Date 2023       Current Admission Date: 2023  Current Admission Diagnosis:Fall   Patient Active Problem List    Diagnosis Date Noted   • UTI (urinary tract infection) 2023   • Ventricular arrhythmia 2023   • Type 2 diabetes mellitus ([address] ) 2023   • Fall 2023   • Humerus fracture 2023   • Acute pain due to trauma 2023   • H/O mechanical aortic valve replacement 2023   • Laceration of right eyebrow 2023   • Fracture of multiple ribs of right side 2023      LOS (days): 7  Geometric Mean LOS (GMLOS) (days): 2 70  Days to GMLOS:-3 3     OBJECTIVE:  Risk of Unplanned Readmission Score: 18 68         Current admission status: Inpatient   Preferred Pharmacy:   [place] [address] [address] [address]  Phone: [phone] Fax: [phone]    Primary Care Provider: Noe Donahue DO    Primary Insurance: Tammy Armendariz [facility]  Secondary Insurance:     DISCHARGE DETAILS: Per P2P team, due to medical instability, P2P on hold

## 2023-05-04 NOTE — PLAN OF CARE
Problem: Potential for Falls  Goal: Patient will remain free of falls  Description: INTERVENTIONS:  - Educate patient/family on patient safety including physical limitations  - Instruct patient to call for assistance with activity   - Consult OT/PT to assist with strengthening/mobility   - Keep Call bell within reach  - Keep bed low and locked with side rails adjusted as appropriate  - Keep care items and personal belongings within reach  - Initiate and maintain comfort rounds  - Make Fall Risk Sign visible to staff  - Offer Toileting every 2 Hours, in advance of need  - Initiate/Maintain bed alarm  - Obtain necessary fall risk management equipment:   - Apply yellow socks and bracelet for high fall risk patients  - Consider moving patient to room near nurses station  Outcome: Progressing     Problem: RESPIRATORY - ADULT  Goal: Achieves optimal ventilation and oxygenation  Description: INTERVENTIONS:  - Assess for changes in respiratory status  - Assess for changes in mentation and behavior  - Position to facilitate oxygenation and minimize respiratory effort  - Oxygen administered by appropriate delivery if ordered  - Initiate smoking cessation education as indicated  - Encourage broncho-pulmonary hygiene including cough, deep breathe, Incentive Spirometry  - Assess the need for suctioning and aspirate as needed  - Assess and instruct to report SOB or any respiratory difficulty  - Respiratory Therapy support as indicated  Outcome: Progressing     Problem: SKIN/TISSUE INTEGRITY - ADULT  Goal: Incision(s), wounds(s) or drain site(s) healing without S/S of infection  Description: INTERVENTIONS  - Assess and document dressing, incision, wound bed, drain sites and surrounding tissue  - Provide patient and family education  - Perform skin care/dressing changes every shift  Outcome: Progressing     Problem: MUSCULOSKELETAL - ADULT  Goal: Maintain or return mobility to safest level of function  Description: INTERVENTIONS:  - Assess patient's ability to carry out ADLs; assess patient's baseline for ADL function and identify physical deficits which impact ability to perform ADLs (bathing, care of mouth/teeth, toileting, grooming, dressing, etc )  - Assess/evaluate cause of self-care deficits   - Assess range of motion  - Assess patient's mobility  - Assess patient's need for assistive devices and provide as appropriate  - Encourage maximum independence but intervene and supervise when necessary  - Involve family in performance of ADLs  - Assess for home care needs following discharge   - Consider OT consult to assist with ADL evaluation and planning for discharge  - Provide patient education as appropriate  Outcome: Progressing  Goal: Maintain proper alignment of affected body part  Description: INTERVENTIONS:  - Support, maintain and protect limb and body alignment  - Provide patient/ family with appropriate education  Outcome: Progressing     Problem: PAIN - ADULT  Goal: Verbalizes/displays adequate comfort level or baseline comfort level  Description: Interventions:  - Encourage patient to monitor pain and request assistance  - Assess pain using appropriate pain scale  - Administer analgesics based on type and severity of pain and evaluate response  - Implement non-pharmacological measures as appropriate and evaluate response  - Consider cultural and social influences on pain and pain management  - Notify physician/advanced practitioner if interventions unsuccessful or patient reports new pain  Outcome: Progressing     Problem: SAFETY ADULT  Goal: Patient will remain free of falls  Description: INTERVENTIONS:  - Educate patient/family on patient safety including physical limitations  - Instruct patient to call for assistance with activity   - Consult OT/PT to assist with strengthening/mobility   - Keep Call bell within reach  - Keep bed low and locked with side rails adjusted as appropriate  - Keep care items and personal belongings within reach  - Initiate and maintain comfort rounds  - Make Fall Risk Sign visible to staff  - Offer Toileting every 2 Hours, in advance of need  - Initiate/Maintain bed alarm  - Obtain necessary fall risk management equipment:   - Apply yellow socks and bracelet for high fall risk patients  - Consider moving patient to room near nurses station  Outcome: Progressing  Goal: Maintain or return to baseline ADL function  Description: INTERVENTIONS:  -  Assess patient's ability to carry out ADLs; assess patient's baseline for ADL function and identify physical deficits which impact ability to perform ADLs (bathing, care of mouth/teeth, toileting, grooming, dressing, etc )  - Assess/evaluate cause of self-care deficits   - Assess range of motion  - Assess patient's mobility; develop plan if impaired  - Assess patient's need for assistive devices and provide as appropriate  - Encourage maximum independence but intervene and supervise when necessary  - Involve family in performance of ADLs  - Assess for home care needs following discharge   - Consider OT consult to assist with ADL evaluation and planning for discharge  - Provide patient education as appropriate  Outcome: Progressing  Goal: Maintains/Returns to pre admission functional level  Description: INTERVENTIONS:  - Perform BMAT or MOVE assessment daily    - Set and communicate daily mobility goal to care team and patient/family/caregiver  - Collaborate with rehabilitation services on mobility goals if consulted  - Perform Range of Motion 3 times a day  - Reposition patient every 2 hours    - Dangle patient 3 times a day  - Stand patient 3 times a day  - Ambulate patient 3 times a day  - Out of bed to chair 3 times a day   - Out of bed for meals 3 times a day  - Out of bed for toileting  - Record patient progress and toleration of activity level   Outcome: Progressing     Problem: DISCHARGE PLANNING  Goal: Discharge to home or other facility with appropriate resources  Description: INTERVENTIONS:  - Identify barriers to discharge w/patient and caregiver  - Arrange for needed discharge resources and transportation as appropriate  - Identify discharge learning needs (meds, wound care, etc )  - Arrange for interpretive services to assist at discharge as needed  - Refer to Case Management Department for coordinating discharge planning if the patient needs post-hospital services based on physician/advanced practitioner order or complex needs related to functional status, cognitive ability, or social support system  Outcome: Progressing     Problem: Knowledge Deficit  Goal: Patient/family/caregiver demonstrates understanding of disease process, treatment plan, medications, and discharge instructions  Description: Complete learning assessment and assess knowledge base    Interventions:  - Provide teaching at level of understanding  - Provide teaching via preferred learning methods  Outcome: Progressing     Problem: MOBILITY - ADULT  Goal: Maintain or return to baseline ADL function  Description: INTERVENTIONS:  -  Assess patient's ability to carry out ADLs; assess patient's baseline for ADL function and identify physical deficits which impact ability to perform ADLs (bathing, care of mouth/teeth, toileting, grooming, dressing, etc )  - Assess/evaluate cause of self-care deficits   - Assess range of motion  - Assess patient's mobility; develop plan if impaired  - Assess patient's need for assistive devices and provide as appropriate  - Encourage maximum independence but intervene and supervise when necessary  - Involve family in performance of ADLs  - Assess for home care needs following discharge   - Consider OT consult to assist with ADL evaluation and planning for discharge  - Provide patient education as appropriate  Outcome: Progressing  Goal: Maintains/Returns to pre admission functional level  Description: INTERVENTIONS:  - Perform BMAT or MOVE assessment daily    - Set and communicate daily mobility goal to care team and patient/family/caregiver  - Collaborate with rehabilitation services on mobility goals if consulted  - Perform Range of Motion 3 times a day  - Reposition patient every 2 hours    - Dangle patient 3 times a day  - Stand patient 3 times a day  - Ambulate patient 3 times a day  - Out of bed to chair 3 times a day   - Out of bed for meals 3 times a day  - Out of bed for toileting  - Record patient progress and toleration of activity level   Outcome: Progressing     Problem: INFECTION - ADULT  Goal: Absence or prevention of progression during hospitalization  Description: INTERVENTIONS:  - Assess and monitor for signs and symptoms of infection  - Monitor lab/diagnostic results  - Monitor all insertion sites, i e  indwelling lines, tubes, and drains  - Monitor endotracheal if appropriate and nasal secretions for changes in amount and color  - Casey appropriate cooling/warming therapies per order  - Administer medications as ordered  - Instruct and encourage patient and family to use good hand hygiene technique  - Identify and instruct in appropriate isolation precautions for identified infection/condition  Outcome: Progressing  Goal: Absence of fever/infection during neutropenic period  Description: INTERVENTIONS:  - Monitor WBC    Outcome: Progressing     Problem: Prexisting or High Potential for Compromised Skin Integrity  Goal: Skin integrity is maintained or improved  Description: INTERVENTIONS:  - Identify patients at risk for skin breakdown  - Assess and monitor skin integrity  - Assess and monitor nutrition and hydration status  - Monitor labs   - Assess for incontinence   - Turn and reposition patient  - Assist with mobility/ambulation  - Relieve pressure over bony prominences  - Avoid friction and shearing  - Provide appropriate hygiene as needed including keeping skin clean and dry  - Evaluate need for skin moisturizer/barrier cream  - Collaborate with interdisciplinary team   - Patient/family teaching  - Consider wound care consult   Outcome: Progressing

## 2023-05-04 NOTE — ASSESSMENT & PLAN NOTE
- Hx of VT/VF s/p single chamber ICD  - ICD shock from VT episode 5/2 and admitted to ICU   - amiodarone gtt and oral amiodarone  - ICD zone adjusted per cards   -Cardiology recommendation appreciated

## 2023-05-04 NOTE — PLAN OF CARE
Problem: PHYSICAL THERAPY ADULT  Goal: Performs mobility at highest level of function for planned discharge setting  See evaluation for individualized goals  Description: Treatment/Interventions: OT, Spoke to nursing, Spoke to case management, Gait training, Bed mobility, Patient/family training, Endurance training, LE strengthening/ROM, Functional transfer training  Equipment Recommended:  (TBD)       See flowsheet documentation for full assessment, interventions and recommendations  Outcome: Progressing  Note: Prognosis: Good  Problem List: Decreased strength, Decreased endurance, Impaired balance, Decreased mobility, Obesity, Orthopedic restrictions  Assessment: Pt continues to perform all tasks with Ax1 throughout, but fatigues quickly due to increased effort required to perform all tasks while doing so with only unilateral device  Pt would benefit from trial wth NBQC next session to improve stability while also limiting weight of AD that would likely limit pt's ability to advance device by herself  pt unable to attain fully upright posture during session which also places her at higher risk for falls  She remains appropraite for rehab at d/c to address deficits & reduce long term risk for future falls  Barriers to Discharge: Inaccessible home environment, Decreased caregiver support     PT Discharge Recommendation: Post acute rehabilitation services    See flowsheet documentation for full assessment

## 2023-05-04 NOTE — PHYSICAL THERAPY NOTE
Physical Therapy Progress Note     05/04/23 1535   PT Last Visit   PT Visit Date 05/04/23   Note Type   Note Type Treatment   Pain Assessment   Pain Assessment Tool FLACC   Pain Rating: FLACC (Rest) - Face 0   Pain Rating: FLACC (Rest) - Legs 0   Pain Rating: FLACC (Rest) - Activity 0   Pain Rating: FLACC (Rest) - Cry 1   Pain Rating: FLACC (Rest) - Consolability 0   Score: FLACC (Rest) 1   Pain Rating: FLACC (Activity) - Face 2   Pain Rating: FLACC (Activity) - Legs 1   Pain Rating: FLACC (Activity) - Activity 1   Pain Rating: FLACC (Activity) - Cry 1   Pain Rating: FLACC (Activity) - Consolability 1   Score: FLACC (Activity) 6   Restrictions/Precautions   LUE Weight Bearing Per Order NWB   Braces or Orthoses Sling   Other Precautions WBS;Pain; Fall Risk;Telemetry   Subjective   Subjective pt encountered seated in recliner, pleasant and agreeable to treatment  Reports tolerable pain overall  Feels most secure when someone is helping her under her R arm when attempting sit to stand transfers  Transfers   Sit to Stand 3  Moderate assistance   Additional items Assist x 1; Armrests; Increased time required   Stand to Sit 3  Moderate assistance   Additional items Assist x 1; Armrests; Increased time required   Ambulation/Elevation   Gait pattern Step to;Short stride; Foward flexed; Inconsistent yanet; Shuffling;Decreased foot clearance; Improper Weight shift; Poor UE support   Gait Assistance 3  Moderate assist   Additional items Assist x 1   Assistive Device SPC  (trial quad cane next session)   Distance 15' with frequent standing rests   Balance   Static Sitting Fair   Static Standing Poor +   Ambulatory Poor   Activity Tolerance   Activity Tolerance Patient tolerated treatment well;Patient limited by fatigue;Patient limited by pain   Nurse [address] [place] [address],[facility]  2800, RN   Assessment   Prognosis Good   Problem List Decreased strength;Decreased endurance; Impaired balance;Decreased mobility;Obesity;Orthopedic restrictions Assessment Pt continues to perform all tasks with Ax1 throughout, but fatigues quickly due to increased effort required to perform all tasks while doing so with only unilateral device  Pt would benefit from trial wt NBQC next session to improve stability while also limiting weight of AD that would likely limit pt's ability to advance device by herself  pt unable to attain fully upright posture during session which also places her at higher risk for falls  She remains appropraite for rehab at d/c to address deficits & reduce long term risk for future falls  Goals   Patient Goals to get better   STG Expiration Date 05/10/23   PT Treatment Day 1   Plan   Treatment/Interventions Functional transfer training;LE strengthening/ROM; Therapeutic exercise; Endurance training;Patient/family training;Bed mobility; Equipment eval/education;Gait training;Elevations   Progress Progressing toward goals   PT Frequency 3-5x/wk   Recommendation   PT Discharge Recommendation Post acute rehabilitation services   Equipment Recommended Cane  (trial NBQC next session)   AM-PAC Basic Mobility Inpatient   Turning in Flat Bed Without Bedrails 2   Lying on Back to Sitting on Edge of Flat Bed Without Bedrails 2   Moving Bed to Chair 2   Standing Up From Chair Using Arms 2   Walk in Room 2   Climb 3-5 Stairs With Railing 2   Basic Mobility Inpatient Raw Score 12   Basic Mobility Standardized Score 32 23   Highest Level Of Mobility   JH-HLM Goal 4: Move to chair/commode   JH-HLM Achieved 6: Walk 10 steps or more       Omar Herbert PTA    An [facility] Basic Mobility Raw Score less than 17 suggests pt would benefit from post acute rehab  Please also refer to the recommendation of the Physical Therapist for safe discharge planning

## 2023-05-04 NOTE — ASSESSMENT & PLAN NOTE
- Coumadin held while being evaluated for humerus fracture  - Goal INR reportedly 2 5 to 3 5  - INR initially supratherapeutic, now improved, INR 2 56  (from 4 23)   - Morning INR pending  - Restart home dose of coumadin 5mg daily

## 2023-05-04 NOTE — RESTORATIVE TECHNICIAN NOTE
Restorative Technician Note      Patient Name: Chilo Cordova     Restorative Tech Visit Date: 05/04/23  Note Type: Mobility  Patient Position Upon Consult: Supine    Pt declined mobility at this time; requested to mobilize after lunch instead      Denisse Moore Restorative Technician

## 2023-05-04 NOTE — CASE MANAGEMENT
Support Center has received intent to deny  Denial received for: Acute Rehab   Ins: [employer]   Called in by Ins  Rep: Miko Piotr P#: [phone]  Facility: [address] [place] [address]   Pending#: [id number]   Denial Reason: Does not meet CMS guidelines   Peer to Peer phone#: [phone] opt   2  Deadline: 05/04 1630  Care Manager notified: Mateusz Cerda

## 2023-05-04 NOTE — CASE MANAGEMENT
Case Management Discharge Planning Note    Patient name Elayne Yoon  Location [address] [place] [place] [address] 613/[facility] [id number] MRN [id number]  : 1945 Date 2023       Current Admission Date: 2023  Current Admission Diagnosis:Fall   Patient Active Problem List    Diagnosis Date Noted   • UTI (urinary tract infection) 2023   • Ventricular arrhythmia 2023   • Type 2 diabetes mellitus ([address] ) 2023   • Fall 2023   • Humerus fracture 2023   • Acute pain due to trauma 2023   • H/O mechanical aortic valve replacement 2023   • Laceration of right eyebrow 2023   • Fracture of multiple ribs of right side 2023      LOS (days): 7  Geometric Mean LOS (GMLOS) (days): 2 70  Days to GMLOS:-3 1     OBJECTIVE:  Risk of Unplanned Readmission Score: 18 63         Current admission status: Inpatient   Preferred Pharmacy:   [place] [id number] [address] [address]  Phone: [phone] Fax: [phone]    Primary Care Provider: Lindy Langley DO    Primary Insurance: [place] [facility]  Secondary Insurance:     DISCHARGE DETAILS: P2P information sent to P2P team via email to complete

## 2023-05-04 NOTE — ASSESSMENT & PLAN NOTE
- Febrile and positive urinalysis    Overnight afebrile  - Started on cefepime and vanco and now de-escalated to rocephin

## 2023-05-04 NOTE — ASSESSMENT & PLAN NOTE
No results found for: HGBA1C    Recent Labs     05/03/23  0732 05/03/23  1157 05/03/23  1700 05/03/23  2118   POCGLU 103 130 176* 191*       Blood Sugar Average: Last 72 hrs:  (P) 250 [id number]     - Continue current medication regimen   - Outpatient follow-up with PCP

## 2023-05-04 NOTE — PROGRESS NOTES
Progress Note - Cardiology   Maureen Duran 68 y o  female MRN: [id number]  Unit/Bed#: [facility] 089-17 Encounter: [id number]    Assessment/Plan:  Maureen Duran is a 68y o  year old female past medical history of aortic stenosis status post mechanical aortic valve placed in 2011, VT/VF status post single-chamber ICD placed in 2011 intra-abdominal, hypertension, paroxysmal atrial fibrillation that presented as a trauma evaluation after mechanical fall in the grocery store  Electrophysiology has been consulted for VF status post ICD shock           VT/VF status post successful shock: Appears patient possibly had some a-fib RVR that may have degenerated into VT and then subsequent VF status post a successful shock    -Personally reprogrammed her device to have a VT monitoring zone at 160 with ATP therapies with burst cycle length at 81  Also, I increased her VF zone to 200  Patient has a single-chamber abdominal [place] ICD (patient stated they have difficult left sided access when they attempted to place the device; however it is unclear why they did not place the device on the right)  -Would obtain transthoracic echo considering episode as above and help identify any other participating factor, especially considering she has had a mechanical aortic valve  Per recent transthoracic echo reports it sounds like her mechanical aortic valve was functioning well  Also, a TTE should be done considering fevers, chills, and rigors especially in a patient with a mechanical aortic valve and abdominal ICD  -Follow-up blood cultures  -Antibiotics per primary team  -Would restart her home metoprolol 50 mg daily  -can transition to just oral amio 400 mg BID for 1 week  After she can be maintained on 200 mg daily   -Continue warfarin with a goal of 2 5-3 5    Her warfarin will have to be closely monitored outpatient consider we are starting Amio      Paroxysmal Atrial fibrillation:   -unsure of her longterm burden   -currently on tele "appears to be in sinus   -continue AC as above   -BB and AAD as above     DEVICE:  -[place]   -single chamber ICD abdominal   -Personally reprogrammed her device to have a VT monitoring zone at 160 with ATP therapies with burst cycle length at 81  Also, I increased her VF zone to 200  Patient has a single-chamber abdominal [place] ICD (patient stated they have difficult left sided access when they attempted to place the device; however it is unclear why they did not place the device on the right)     AS s/p Mechanical AVR:  -INR goal 2 5-3 5      Recommendations Today:  -okay to stop IV amio and finish amio load as above     Subjective/Objective   No events overnight and no ectopy  Denies fevers/chills, nausea/vomiting, abdominal pain, diarrhea, cough, chest pain, shortness of breath, PND, orthopnea, presyncopal symptoms, syncopal episodes  Patient in a fib in the 62s  Vitals: BP (!) 111/49   Pulse 64   Temp 98 6 °F (37 °C)   Resp 16   Ht 4' 10\" (1 473 m)   Wt 85 3 kg (188 lb)   SpO2 95%   BMI 39 29 kg/m²   Vitals:    05/03/23 1100 05/03/23 1122   Weight: 85 3 kg (188 lb) 85 3 kg (188 lb)     Orthostatic Blood Pressures    Flowsheet Row Most Recent Value   Blood Pressure 111/49 filed at 05/04/2023 1024   Patient Position - Orthostatic VS Lying filed at 05/03/2023 0400            Intake/Output Summary (Last 24 hours) at 5/4/2023 1329  Last data filed at 5/4/2023 1024  Gross per 24 hour   Intake 300 ml   Output 1190 ml   Net -890 ml       Invasive Devices     Peripheral Intravenous Line  Duration           Peripheral IV 05/04/23 Dorsal (posterior); Right Hand <1 day          Drain  Duration           External Urinary Catheter 3 days                Review of Systems: Negative     Physical Exam:     Gen: No acute distress  HEENT: Right eye laceration, anicteric, mucous membranes moist  Neck: supple, no jugular venous distention, or carotid bruit  Heart: JVP 6, regular, normal s1 and s2, no murmur/rub or " gallop  Lungs :clear to auscultation bilaterally, no rales/rhonchi or wheeze  Abdomen: soft nontender, normoactive bowel sounds, no organomegaly  Ext:Left arm cast  warm and perfused, normal femoral pulses, no edema, clubbing  Skin: warm, no rashes  Neuro: AAO x 3, no focal findings  Psychiatric: normal affect  Musculoskeletal: no obvious joint deformities  Lab Results: I have personally reviewed pertinent lab results  Imaging: I have personally reviewed pertinent reports  EKG:   VTE Pharmacologic Prophylaxis: Sequential compression device (Venodyne)   VTE Mechanical Prophylaxis: sequential compression device    Counseling / Coordination of Care  Total Critical Care time spent 45 minutes excluding procedures, teaching and family updates

## 2023-05-04 NOTE — PROGRESS NOTES
[facility] [facility]  Progress Note  Name: Yfn Guadarrama  MRN: [id number]  Unit/Bed#: [facility] 699-93 I Date of Admission: 4/27/2023   Date of Service: 5/4/2023 I Hospital Day: 7    Assessment/Plan   Fall  Assessment & Plan  - mechanical fall   - sustained below stated injuries  - PT/OT evaluations  - Geriatrics evaluation  - CM for disposition planning    Humerus fracture  Assessment & Plan  - proximal humerus fracture on the left   - NVI distally  - pain control with multi modal analgesia  - orthopedics consultation, appreciate recommendations  - Non-op  - NWB LUE  - Sling to the LUE    Laceration of right eyebrow  Assessment & Plan  - two 1 cm lacerations to the R eye brow  - steri strips applied  - local wound care    Acute pain due to trauma  Assessment & Plan  - Acute pain secondary to traumatic injuries  - Multi modal pain regimen  - Bowel regimen as long as using opioids   - Continue to monitor pain and adjust regimen as indicated        H/O mechanical aortic valve replacement  Assessment & Plan  - Coumadin held while being evaluated for humerus fracture  - Goal INR reportedly 2 5 to 3 5  - INR initially supratherapeutic, now improved, INR 2 56  (from 4 23)   - Morning INR pending  - Restart home dose of coumadin 5mg daily    Ventricular arrhythmia  Assessment & Plan  - Hx of VT/VF s/p single chamber ICD  - ICD shock from VT episode 5/2 and admitted to ICU   - amiodarone gtt and oral amiodarone  - ICD zone adjusted per cards   -Cardiology recommendation appreciated    UTI (urinary tract infection)  Assessment & Plan  - Febrile and positive urinalysis    Overnight afebrile  - Started on cefepime and vanco and now de-escalated to rocephin    Type 2 diabetes mellitus [facility])  Assessment & Plan  No results found for: HGBA1C    Recent Labs     05/03/23  0732 05/03/23  1157 05/03/23  1700 05/03/23  2118   POCGLU 103 130 176* 191*       Blood Sugar Average: Last 72 hrs:  (P) 430 [id number]     - Continue current medication regimen   - Outpatient follow-up with PCP  Fracture of multiple ribs of right side  Assessment & Plan  - Multiple right-sided rib fractures (8th-9th), present on admission   - Continue rib fracture protocol   - Continue to encourage incentive spirometer use and adequate pulmonary hygiene  - Continue multimodal analgesic regimen     - Supplemental oxygen via nasal cannula as needed to maintain saturations greater than or equal to 94%  - PT and OT evaluation and treatment as indicated  - Outpatient follow-up in the trauma clinic for re-evaluation in approximately 2 weeks  VTE Prophylaxis:Warfarin (Coumadin)     Disposition: Continue Trauma inpatient     Subjective   Chief Complaint: rib fracture, humeral fracture, VT    Subjective: Patient slept well overnight  Pain is well controlled with current pain regiment  Currently without any complaints  Objective   Vitals:   Temp:  [97 9 °F (36 6 °C)-99 1 °F (37 3 °C)] 97 9 °F (36 6 °C)  HR:  [54-82] 71  Resp:  [16-27] 16  BP: ()/() 146/68    I/O       05/02 0701  05/03 0700 05/03 0701  05/04 0700    P  O   180    I V  (mL/kg)  30 (0 4)    IV Piggyback  100    Total Intake(mL/kg)  310 (3 6)    Urine (mL/kg/hr) 250 (0 1) 800 (0 4)    Total Output 250 800    Net -250 -490          Unmeasured Urine Occurrence 1 x 2 x           Physical exam  General:  Resting comfortably in bed in no acute distress  Neuro: GCS 15 (Eye 4, Verbal 5, Motor 6)  HENT:  Normocephalic and atraumatic, PERRL  Heart:  RRR, pulses intact  Lungs:  Bilateral breath sounds present  Abdomen: Bowel sounds present, soft  Skin:   Warm, dry skin/Incision site clean dry and intact  Bruises in the right face as well as left elbow  Patient has on a sling in the left arm  Neurovascularly intact  Invasive Devices     Peripheral Intravenous Line  Duration           Peripheral IV 05/04/23 Dorsal (posterior); Right Hand <1 day          Drain  Duration           External Urinary Catheter 3 days                 PIC Score  PIC Pain Score: 2 (5/3/2023  9:48 PM)  PIC Incentive Spirometry Score: 2 (5/3/2023  9:00 AM)  PIC Cough Description: 1 (5/3/2023  9:00 AM)  PIC Total Score: 5 (5/3/2023  9:00 AM)       If the Total PIC Score </=5, did you consult APS and evaluate patient for further intervention?: yes      Pain:    Incentive Spirometry  Cough  3 = Controlled  4 = Above goal volume 3 = Strong  2 = Moderate  3 = Goal to alert volume 2 = Weak  1 = Severe  2 = Below alert volume 1 = Absent     1 = Unable to perform IS         Lab Results: Results: I have personally reviewed all pertinent laboratory/tests results  Imaging: I have personally reviewed pertinent reports       Other Studies:

## 2023-05-05 LAB
ANION GAP SERPL CALCULATED.3IONS-SCNC: 5 MMOL/L (ref 4–13)
BACTERIA UR CULT: ABNORMAL
BUN SERPL-MCNC: 30 MG/DL (ref 5–25)
CALCIUM SERPL-MCNC: 8.8 MG/DL (ref 8.3–10.1)
CHLORIDE SERPL-SCNC: 97 MMOL/L (ref 96–108)
CO2 SERPL-SCNC: 27 MMOL/L (ref 21–32)
CREAT SERPL-MCNC: 0.75 MG/DL (ref 0.6–1.3)
ERYTHROCYTE [DISTWIDTH] IN BLOOD BY AUTOMATED COUNT: 14.3 % (ref 11.6–15.1)
GFR SERPL CREATININE-BSD FRML MDRD: 77 ML/MIN/1.73SQ M
GLUCOSE SERPL-MCNC: 113 MG/DL (ref 65–140)
GLUCOSE SERPL-MCNC: 139 MG/DL (ref 65–140)
GLUCOSE SERPL-MCNC: 150 MG/DL (ref 65–140)
GLUCOSE SERPL-MCNC: 80 MG/DL (ref 65–140)
GLUCOSE SERPL-MCNC: 86 MG/DL (ref 65–140)
HCT VFR BLD AUTO: 32 % (ref 34.8–46.1)
HGB BLD-MCNC: 10.8 G/DL (ref 11.5–15.4)
INR PPP: 3.81 (ref 0.84–1.19)
MCH RBC QN AUTO: 34.2 PG (ref 26.8–34.3)
MCHC RBC AUTO-ENTMCNC: 33.8 G/DL (ref 31.4–37.4)
MCV RBC AUTO: 101 FL (ref 82–98)
PLATELET # BLD AUTO: 156 THOUSANDS/UL (ref 149–390)
PMV BLD AUTO: 10.4 FL (ref 8.9–12.7)
POTASSIUM SERPL-SCNC: 4.6 MMOL/L (ref 3.5–5.3)
PROTHROMBIN TIME: 37.8 SECONDS (ref 11.6–14.5)
RBC # BLD AUTO: 3.16 MILLION/UL (ref 3.81–5.12)
SODIUM SERPL-SCNC: 129 MMOL/L (ref 135–147)
WBC # BLD AUTO: 5.72 THOUSAND/UL (ref 4.31–10.16)

## 2023-05-05 RX ORDER — WARFARIN SODIUM 2.5 MG/1
2.5 TABLET ORAL
Status: DISCONTINUED | OUTPATIENT
Start: 2023-05-05 | End: 2023-05-06

## 2023-05-05 RX ADMIN — DOCUSATE SODIUM 100 MG: 100 CAPSULE, LIQUID FILLED ORAL at 17:18

## 2023-05-05 RX ADMIN — ALLOPURINOL 100 MG: 100 TABLET ORAL at 08:52

## 2023-05-05 RX ADMIN — ATORVASTATIN CALCIUM 40 MG: 40 TABLET, FILM COATED ORAL at 17:19

## 2023-05-05 RX ADMIN — FUROSEMIDE 40 MG: 40 TABLET ORAL at 08:53

## 2023-05-05 RX ADMIN — METHOCARBAMOL 250 MG: 500 TABLET ORAL at 12:31

## 2023-05-05 RX ADMIN — Medication 7.5 MG: at 00:44

## 2023-05-05 RX ADMIN — Medication 7.5 MG: at 13:46

## 2023-05-05 RX ADMIN — ACETAMINOPHEN 975 MG: 325 TABLET ORAL at 23:40

## 2023-05-05 RX ADMIN — WARFARIN SODIUM 2.5 MG: 2.5 TABLET ORAL at 17:19

## 2023-05-05 RX ADMIN — Medication 7.5 MG: at 08:52

## 2023-05-05 RX ADMIN — Medication 7.5 MG: at 19:24

## 2023-05-05 RX ADMIN — Medication 7.5 MG: at 23:40

## 2023-05-05 RX ADMIN — AMIODARONE HYDROCHLORIDE 400 MG: 200 TABLET ORAL at 17:18

## 2023-05-05 RX ADMIN — INSULIN DETEMIR 7 UNITS: 100 INJECTION, SOLUTION SUBCUTANEOUS at 21:25

## 2023-05-05 RX ADMIN — METHOCARBAMOL 250 MG: 500 TABLET ORAL at 17:19

## 2023-05-05 RX ADMIN — DILTIAZEM HYDROCHLORIDE 120 MG: 120 CAPSULE, COATED, EXTENDED RELEASE ORAL at 08:53

## 2023-05-05 RX ADMIN — METOPROLOL SUCCINATE 50 MG: 50 TABLET, EXTENDED RELEASE ORAL at 08:52

## 2023-05-05 RX ADMIN — LIDOCAINE 5% 1 PATCH: 700 PATCH TOPICAL at 08:52

## 2023-05-05 RX ADMIN — Medication 7.5 MG: at 04:46

## 2023-05-05 RX ADMIN — GABAPENTIN 100 MG: 100 CAPSULE ORAL at 21:25

## 2023-05-05 RX ADMIN — ACETAMINOPHEN 975 MG: 325 TABLET ORAL at 08:56

## 2023-05-05 RX ADMIN — METHOCARBAMOL 250 MG: 500 TABLET ORAL at 23:40

## 2023-05-05 RX ADMIN — INSULIN LISPRO 1 UNITS: 100 INJECTION, SOLUTION INTRAVENOUS; SUBCUTANEOUS at 21:25

## 2023-05-05 RX ADMIN — METHOCARBAMOL 250 MG: 500 TABLET ORAL at 05:42

## 2023-05-05 RX ADMIN — ACETAMINOPHEN 975 MG: 325 TABLET ORAL at 17:18

## 2023-05-05 RX ADMIN — CITALOPRAM HYDROBROMIDE 20 MG: 20 TABLET ORAL at 08:53

## 2023-05-05 RX ADMIN — DOCUSATE SODIUM 100 MG: 100 CAPSULE, LIQUID FILLED ORAL at 08:53

## 2023-05-05 RX ADMIN — OXYBUTYNIN 5 MG: 5 TABLET, FILM COATED, EXTENDED RELEASE ORAL at 08:52

## 2023-05-05 RX ADMIN — MONTELUKAST 10 MG: 10 TABLET, FILM COATED ORAL at 21:25

## 2023-05-05 RX ADMIN — AMIODARONE HYDROCHLORIDE 400 MG: 200 TABLET ORAL at 08:56

## 2023-05-05 RX ADMIN — CEFTRIAXONE SODIUM 1000 MG: 10 INJECTION, POWDER, FOR SOLUTION INTRAVENOUS at 12:42

## 2023-05-05 NOTE — ASSESSMENT & PLAN NOTE
No results found for: HGBA1C    Recent Labs     05/04/23  1020 05/04/23  1631 05/04/23  2140 05/05/23  0857   POCGLU 194* 168* 138 80       Blood Sugar Average: Last 72 hrs:  (P) 144 6     - Continue current medication regimen   - SSI  - Outpatient follow-up with PCP

## 2023-05-05 NOTE — PROGRESS NOTES
Progress Note - Geriatric Medicine   Tristan Hernandez 68 y o  female MRN: [id number]  Unit/Bed#: [facility] 613-01 Encounter: [id number]      Assessment/Plan:    VT s/p ICD shock   -EP on consult - felt to be from holding metoprolol  -continue rate control regimen as directed by Cardiology/EP    UTI  -UA positive for WBC, bacteria and nitrites   -UCx positive for E   Coli  -currently day 3/3 of Rocephin     Ambulatory dysfunction with fall  -reportedly mechanical fall PTA 4/27  -injuries as outlined below   -remains high risk future falls, cont fall precautions and preventive measures as possible  -following recovery from acute injuries consider community based fall prevention program such as matter of balance or silver sneakers in effort to reduce risk of future falls   -PT/OT following      Right sided rib fractures (8 and 9)  -s/p fall as above  -noted on CT chest obtained on admission, no pneumothorax   -initially requiring supplemental O2 now saturating well on room air   -acute pain remains well controlled with current regimen, taper as improves with healing   -continue to encourage aggressive pulm toilet and ISS     Left humerus fracture   -s/p fall as above  -s/p reduction and splint by Ortho on admission   -NWB LUE in splint and sling  -Ortho evaluated and recommend non-op management    -continue neurovascular checks per protocol   -acute pain now remains controlled      Acute pain due to trauma   -continue acute multimodal pain control  -Tylenol scheduled, Oxy 5mg and 7 5mg Q4H PRN  -dilaudid for breakthrough dc'd last week as not requiring   -significant improvement with addition of topical lido patch and Robaxin   -bowel regimen to reduce risk constipation     Hx mechanical aortic valve replacement   -maintained on chronic systemic anticoagulation with coumadin   -INR had been supratherapeutic earlier in admit likely due to other changes in medication regimen including addition of scheduled tylenol  -monitor INR "closely with any medication or dietary changes jodee now with new start Amio     Impaired Vision  -encourage use of corrective lenses at all appropriate times  -consider large font for printed materials provided to patient      High risk of developing delirium  -continue delirium precautions  -maintain normal sleep/wake cycle  -reorient frequently as appropriate      Frailty syndrome in geriatric patient  -Clinical frailty scale stage IV, vulnerable   -Multifactorial including age, DM-II, hx valve replacement and multiple chronic medical co-morbidities now fall and traumatic injuries in elderly individual with limited physiologic and metabolic reserves   -continue optimization of chronic conditions and address acute metabolic derangements as arise     Care coordination: rounded with Robin Nye (RN)     Subjective:     Kimberli Roth is seen and examined at bedside, her pain is now well controlled and she feels that she is improving overall and is hopeful for dc to rehab in coming days, she offers no additional complaints, nursing reports no acute events overnight  Review of Systems   Constitutional: Negative  HENT: Negative  Respiratory: Negative  Negative for shortness of breath  Cardiovascular: Negative for chest pain  Gastrointestinal: Negative  Musculoskeletal: Positive for gait problem  Left arm sore with movement    Right ribs sore      Skin: Negative  Neurological: Negative for dizziness and light-headedness  Hematological: Bruises/bleeds easily  Psychiatric/Behavioral: Negative for sleep disturbance  All other systems reviewed and are negative  Objective:     Vitals: Blood pressure 120/57, pulse 65, temperature 98 2 °F (36 8 °C), resp  rate 18, height 4' 10\" (1 473 m), weight 85 3 kg (188 lb), SpO2 97 %  ,Body mass index is 39 29 kg/m²        Intake/Output Summary (Last 24 hours) at 5/5/2023 1320  Last data filed at 5/5/2023 1307  Gross per 24 hour   Intake 1411 99 ml   Output 200 ml " Net 1211 99 ml     Current Medications: Reviewed    Physical Exam:   Physical Exam  Vitals and nursing note reviewed  Constitutional:       Appearance: She is obese  She is not toxic-appearing  HENT:      Head: Normocephalic  Comments: Facial ecchymosis improving      Nose: Nose normal       Mouth/Throat:      Mouth: Mucous membranes are moist    Eyes:      Conjunctiva/sclera: Conjunctivae normal    Neck:      Comments: Trachea midline, phonation normal   Cardiovascular:      Rate and Rhythm: Normal rate  Pulmonary:      Effort: No respiratory distress  Breath sounds: No wheezing  Abdominal:      General: Bowel sounds are normal       Palpations: Abdomen is soft  Musculoskeletal:      Cervical back: Neck supple  Comments: Obese body habitus, L arm in sling    Skin:     General: Skin is warm and dry  Neurological:      Mental Status: She is alert  Mental status is at baseline  Comments: Awake alert and oriented    Psychiatric:      Comments: Pleasant and cooperative         Invasive Devices     Peripheral Intravenous Line  Duration           Peripheral IV 05/04/23 Dorsal (posterior); Right Hand 1 day          Drain  Duration           External Urinary Catheter 4 days              Lab, Imaging and other studies: I have personally reviewed pertinent reports

## 2023-05-05 NOTE — PLAN OF CARE
Problem: SKIN/TISSUE INTEGRITY - ADULT  Goal: Incision(s), wounds(s) or drain site(s) healing without S/S of infection  Description: INTERVENTIONS  - Assess and document dressing, incision, wound bed, drain sites and surrounding tissue  - Provide patient and family education  - Perform skin care/dressing changes every   Problem: MUSCULOSKELETAL - ADULT  Goal: Maintain or return mobility to safest level of function  Description: INTERVENTIONS:  - Assess patient's ability to carry out ADLs; assess patient's baseline for ADL function and identify physical deficits which impact ability to perform ADLs (bathing, care of mouth/teeth, toileting, grooming, dressing, etc )  - Assess/evaluate cause of self-care deficits   - Assess range of motion  - Assess patient's mobility  - Assess patient's need for assistive devices and provide as appropriate  - Encourage maximum independence but intervene and supervise when necessary  - Involve family in performance of ADLs  - Assess for home care needs following discharge   - Consider OT consult to assist with ADL evaluation and planning for discharge  - Provide patient education as appropriate  Outcome: Progressing     Problem: PAIN - ADULT  Goal: Verbalizes/displays adequate comfort level or baseline comfort level  Description: Interventions:  - Encourage patient to monitor pain and request assistance  - Assess pain using appropriate pain scale  - Administer analgesics based on type and severity of pain and evaluate response  - Implement non-pharmacological measures as appropriate and evaluate response  - Consider cultural and social influences on pain and pain management  - Notify physician/advanced practitioner if interventions unsuccessful or patient reports new pain  Outcome: Progressing     Outcome: Progressing

## 2023-05-05 NOTE — PROGRESS NOTES
[facility]  Progress Note  Name: Ruthe Aschoff  MRN: [id number]  [facility] Date of Admission: 4/27/2023   Date of Service: 5/5/2023 I Hospital Day: 8    Assessment/Plan   Ventricular arrhythmia  Assessment & Plan  - Hx of VT/VF s/p single chamber ICD  - ICD shock from VT episode 5/2 and admitted to ICU   - amiodarone gtt and oral amiodarone  - ICD zone adjusted per cards   -Cardiology recommendation appreciated  -Restarted home dose metoprolol  -Amiodarone gtt discontinued, oral amio 400 mg BID for 1 week  After she can be maintained on 200 mg daily   -Continue home dose Warfarin, dose decreased to 2 5 mg daily in setting of supratherapeutic INR    UTI (urinary tract infection)  Assessment & Plan  - Febrile and positive urinalysis    Overnight afebrile  - Started on cefepime and vanco and now de-escalated to rocephin, last dose today, 5/5    Type 2 diabetes mellitus ([facility] [address] )  Assessment & Plan  No results found for: HGBA1C    Recent Labs     05/04/23  1020 05/04/23  1631 05/04/23  2140 05/05/23  0857   POCGLU 194* 168* 138 80       Blood Sugar Average: Last 72 hrs:  (P) 144 6     - Continue current medication regimen   - SSI  - Outpatient follow-up with PCP  Fracture of multiple ribs of right side  Assessment & Plan  - Multiple right-sided rib fractures (8th-9th), present on admission   - Continue rib fracture protocol   - Continue to encourage incentive spirometer use and adequate pulmonary hygiene  Pulling 750 cc on IS    - APS consult, appreciate recommendations  - Continue multimodal analgesic regimen     - Supplemental oxygen via nasal cannula as needed to maintain saturations greater than or equal to 94%  - PT and OT evaluation and treatment as indicated-recommending rehab placement, pending peer to peer today  - Outpatient follow-up in the trauma clinic for re-evaluation in approximately 2 weeks        Laceration of right eyebrow  Assessment & Plan  - two 1 cm lacerations to the R eye brow  - steri strips applied  - local wound care    H/O mechanical aortic valve replacement  Assessment & Plan  - Coumadin held while being evaluated for humerus fracture  - Goal INR reportedly 2 5 to 3 5  - INR initially supratherapeutic, now improved, INR 2 56  (from 4 23)   - Morning INR 3 81  - We will decrease home dose of Coumadin to 2 5 mg daily in setting of supratherapeutic INR  - Recheck INR in the morning    Acute pain due to trauma  Assessment & Plan  - Acute pain secondary to traumatic injuries  - Multi modal pain regimen  - Bowel regimen as long as using opioids   - Continue to monitor pain and adjust regimen as indicated  Humerus fracture  Assessment & Plan  - proximal humerus fracture on the left   - NVI distally  - pain control with multi modal analgesia  - orthopedics consultation, appreciate recommendations  - Non-op  - NWB LUE  - Sling to the LUE  - Multimodal pain control  - PT/OT recommending rehab    Fall  Assessment & Plan  - mechanical fall   - sustained below stated injuries  - PT/OT evaluations - recommending rehab  - Geriatrics evaluation, appreciate recommendations  - CM for disposition planning             Bowel Regimen: Senna, colace  VTE Prophylaxis:Reason for no pharmacologic prophylaxis anticoagulated with warfarin     Disposition: Rehab placement, pending [id number] for insurance auth    Subjective   Chief Complaint: No new complaints    Subjective: Patient states she is doing well today and her pain is well controlled on oral pain medications  She denies chest pain or shortness of breath  She is tolerating a diet, denies abdominal pain, nausea, vomiting  Motivated for discharge to rehab  Objective   Vitals:   Temp:  [97 6 °F (36 4 °C)-98 3 °F (36 8 °C)] 98 2 °F (36 8 °C)  HR:  [50-65] 65  Resp:  [16-18] 18  BP: ()/(51-78) 120/57    I/O       05/03 0701  05/04 0700 05/04 0701  05/05 0700 05/05 0701  05/06 0700    P  O  180 480 240    I V  (mL/kg) 30 (0 4) 822 (9 6)     IV Piggyback 100 50     Total Intake(mL/kg) 310 (3 6) 1352 (15 8) 240 (2 8)    Urine (mL/kg/hr) 800 (0 4) 750 (0 4)     Total Output 800 750     Net -490 +602 +240           Unmeasured Urine Occurrence 2 x             Physical Exam:   GENERAL APPEARANCE: Patient in no acute distress  HEENT: ecchymosis of right cheek; right eyebrow laceration healing, steristrips in place; PERRL, EOMs intact; Mucous membranes moist  NECK / BACK: ROM normal  CV: Regular rate and rhythm; no murmur/gallops/rubs appreciated  CHEST / LUNGS: Clear to auscultation; no wheezes/rales/rhonci  ABD: NABS; soft; non-distended; non-tender  : voiding  EXT: LUE in long arm splint and sling; +2 pulses bilaterally lower extremities; cap refill <2 seconds; no edema  NEURO: GCS 15; no focal neurologic deficits; bilateral UE and LE neurovascularly intact  SKIN: Warm, dry and well perfused; no rash; no jaundice  Invasive Devices     Peripheral Intravenous Line  Duration           Peripheral IV 05/04/23 Dorsal (posterior); Right Hand 1 day          Drain  Duration           External Urinary Catheter 4 days                 PIC Score  PIC Pain Score: 2 (5/5/2023  8:52 AM)  PIC Incentive Spirometry Score: 2 (5/4/2023 12:00 PM)  PIC Cough Description: 3 (5/4/2023 12:00 PM)  PIC Total Score: 7 (5/4/2023 12:00 PM)       If the Total PIC Score </=5, did you consult APS and evaluate patient for further intervention?: yes      Pain:    Incentive Spirometry  Cough  3 = Controlled  4 = Above goal volume 3 = Strong  2 = Moderate  3 = Goal to alert volume 2 = Weak  1 = Severe  2 = Below alert volume 1 = Absent     1 = Unable to perform IS         Lab Results:   Results: I have personally reviewed all pertinent laboratory/tests results, BMP/CMP:   Lab Results   Component Value Date    SODIUM 129 (L) 05/05/2023    K 4 6 05/05/2023    CL 97 05/05/2023    CO2 27 05/05/2023    BUN 30 (H) 05/05/2023    CREATININE 0 75 05/05/2023    CALCIUM 8 8 05/05/2023    EGFR 77 05/05/2023   , CBC:   Lab Results   Component Value Date    WBC 5 72 05/05/2023    HGB 10 8 (L) 05/05/2023    HCT 32 0 (L) 05/05/2023     (H) 05/05/2023     05/05/2023    MCH 34 2 05/05/2023    MCHC 33 8 05/05/2023    RDW 14 3 05/05/2023    MPV 10 4 05/05/2023    and Coagulation:   Lab Results   Component Value Date    INR 3 81 (H) 05/05/2023     Imaging: I have personally reviewed pertinent reports       Other Studies: None

## 2023-05-05 NOTE — PLAN OF CARE
Problem: Potential for Falls  Goal: Patient will remain free of falls  Description: INTERVENTIONS:  - Educate patient/family on patient safety including physical limitations  - Instruct patient to call for assistance with activity   - Consult OT/PT to assist with strengthening/mobility   - Keep Call bell within reach  - Keep bed low and locked with side rails adjusted as appropriate  - Keep care items and personal belongings within reach  - Initiate and maintain comfort rounds  - Make Fall Risk Sign visible to staff  - Offer Toileting every 2 Hours, in advance of need  - Initiate/Maintain on alarm  - Obtain necessary fall risk management equipment:   - Apply yellow socks and bracelet for high fall risk patients  - Consider moving patient to room near nurses station  Outcome: Progressing     Problem: RESPIRATORY - ADULT  Goal: Achieves optimal ventilation and oxygenation  Description: INTERVENTIONS:  - Assess for changes in respiratory status  - Assess for changes in mentation and behavior  - Position to facilitate oxygenation and minimize respiratory effort  - Oxygen administered by appropriate delivery if ordered  - Initiate smoking cessation education as indicated  - Encourage broncho-pulmonary hygiene including cough, deep breathe, Incentive Spirometry  - Assess the need for suctioning and aspirate as needed  - Assess and instruct to report SOB or any respiratory difficulty  - Respiratory Therapy support as indicated  Outcome: Progressing     Problem: SKIN/TISSUE INTEGRITY - ADULT  Goal: Incision(s), wounds(s) or drain site(s) healing without S/S of infection  Description: INTERVENTIONS  - Assess and document dressing, incision, wound bed, drain sites and surrounding tissue  - Provide patient and family education  - Perform skin care/dressing changes every 2  Problem: PAIN - ADULT  Goal: Verbalizes/displays adequate comfort level or baseline comfort level  Description: Interventions:  - Encourage patient to monitor pain and request assistance  - Assess pain using appropriate pain scale  - Administer analgesics based on type and severity of pain and evaluate response  - Implement non-pharmacological measures as appropriate and evaluate response  - Consider cultural and social influences on pain and pain management  - Notify physician/advanced practitioner if interventions unsuccessful or patient reports new pain  Outcome: Progressing     Problem: SAFETY ADULT  Goal: Patient will remain free of falls  Description: INTERVENTIONS:  - Educate patient/family on patient safety including physical limitations  - Instruct patient to call for assistance with activity   - Consult OT/PT to assist with strengthening/mobility   - Keep Call bell within reach  - Keep bed low and locked with side rails adjusted as appropriate  - Keep care items and personal belongings within reach  - Initiate and maintain comfort rounds  - Make Fall Risk Sign visible to staff  - Offer Toileting every 2 Hours, in advance of need  - Initiate/Maintain on alarm  - Obtain necessary fall risk management equipment:   - Apply yellow socks and bracelet for high fall risk patients  - Consider moving patient to room near nurses station  Outcome: Progressing  Goal: Maintain or return to baseline ADL function  Description: INTERVENTIONS:  - Educate patient/family on patient safety including physical limitations  - Instruct patient to call for assistance with activity   - Consult OT/PT to assist with strengthening/mobility   - Keep Call bell within reach  - Keep bed low and locked with side rails adjusted as appropriate  - Keep care items and personal belongings within reach  - Initiate and maintain comfort rounds  - Make Fall Risk Sign visible to staff  - Offer Toileting every 2 Hours, in advance of need  - Initiate/Maintain on alarm  - Obtain necessary fall risk management equipment:  - Apply yellow socks and bracelet for high fall risk patients  - Consider moving patient to room near nurses station  Outcome: Progressing  Goal: Maintains/Returns to pre admission functional level  Description: INTERVENTIONS:  - Perform BMAT or MOVE assessment daily    - Set and communicate daily mobility goal to care team and patient/family/caregiver  - Collaborate with rehabilitation services on mobility goals if consulted  - Perform Range of Motion 3 times a day  - Reposition patient every 2 hours    - Dangle patient 3 times a day  - Stand patient 3 times a day  - Ambulate patient 3 times a day  - Out of bed to chair 3 times a day   - Out of bed for meals 3 times a day  - Out of bed for toileting  - Record patient progress and toleration of activity level   Outcome: Progressing     Outcome: Progressing

## 2023-05-05 NOTE — PLAN OF CARE
Problem: OCCUPATIONAL THERAPY ADULT  Goal: Performs self-care activities at highest level of function for planned discharge setting  See evaluation for individualized goals  Description: Treatment Interventions: ADL retraining, Functional transfer training, UE strengthening/ROM, Endurance training, Patient/family training, Equipment evaluation/education, Compensatory technique education, Activityengagement          See flowsheet documentation for full assessment, interventions and recommendations  Outcome: Progressing  Note: Limitation: Decreased ADL status, Decreased UE ROM, Decreased endurance, Decreased self-care trans, Decreased high-level ADLs, Non-func L UE  Prognosis: Good  Assessment: pt participated in pm ot session and was seen focusing on grooming and sit to from stand / standing balance at recliner chair with no device  pt with poor ability to stand longer than 15 seconds, felt unsteady and needed time and cues to decrease nbos  pt is max asst ub and lb adls and sba simple grooming min asst heavier grooming   will follow focusing on goals from ie     OT Discharge Recommendation: Post acute rehabilitation services     April A Storm

## 2023-05-05 NOTE — OCCUPATIONAL THERAPY NOTE
Occupational Therapy Treatment Note:      05/05/23 1555   OT Last Visit   OT Visit Date 05/05/23   Note Type   Note Type Treatment   Pain Assessment   Pain Assessment Tool 0-10   Pain Score No Pain   Restrictions/Precautions   LUE Weight Bearing Per Order NWB  (pt in splint /sling l ue)   Other Precautions WBS; Fall Risk   ADL   Where Assessed Chair   Grooming Assistance 5  Supervision/Setup   Grooming Deficit   (oral care)   UB Dressing Assistance 2  Maximal Assistance   LB Dressing Assistance 2  Maximal Assistance   Toileting Assistance  1  Total Assistance   Toileting Comments purewick use for urine   Functional Standing Tolerance   Time pt demonstrates poor balance and was able to stand with mod asst x 1 x 2 attempts tolerating 10 secondseach  pt requires increased time to being feet closer together inorder to balance self  pt with forward posture and limited arom of r shoulder  pt reprots standing hunched for years   Transfers   Sit to Stand 3  Moderate assistance   Additional items Assist x 1  (mod vc's for technique and sequence)   Stand to Sit 3  Moderate assistance   Additional items Assist x 1   Cognition   Arousal/Participation Alert   Attention Within functional limits   Comments pt reports no interest in trying a nbqc, has attempted in her hx and reports did not do well with same   Activity Tolerance   Activity Tolerance Patient tolerated treatment well   Assessment   Assessment pt participated in pm ot session and was seen focusing on grooming and sit to from stand / standing balance at recliner chair with no device  pt with poor ability to stand longer than 15 seconds, felt unsteady and needed time and cues to decrease nbos  pt is max asst ub and lb adls and sba simple grooming min asst heavier grooming  will follow focusing on goals from ie   Plan   Treatment Interventions ADL retraining;Functional transfer training; Endurance training;Patient/family training; Activityengagement   Goal Expiration Date 05/12/23   OT Treatment Day 2   OT Frequency 2-3x/wk   Recommendation   OT Discharge Recommendation Post acute rehabilitation services   AM-PAC Daily Activity Inpatient   Lower Body Dressing 2   Bathing 2   Toileting 2   Upper Body Dressing 2   Grooming 2   Eating 3   Daily Activity Raw Score 13   Daily Activity Standardized Score (Calc for Raw Score >=11) 32 03   AM-PAC Applied Cognition Inpatient   Following a Speech/Presentation 4   Understanding Ordinary Conversation 4   Taking Medications 3   Remembering Where Things Are Placed or Put Away 4   Remembering List of 4-5 Errands 4   Taking Care of Complicated Tasks 3   Applied Cognition Raw Score 22   Applied Cognition Standardized Score 47 83     April A Storm

## 2023-05-05 NOTE — TREATMENT PLAN
Treatment Team - Electrophysiology  Thomas Alert 68 y o  female MRN: [id number]  Unit/Bed#: [facility] 613-01 Encounter: [id number]      Assessment:  1  Ventricular tachycardia/ventricular fibrillation  2  Appropriate ICD shocks  3  Paroxysmal atrial fibrillation  4  Abdominal [place] [employer] single-chamber ICD in situ  5  Severe aortic stenosis status post mechanical AVR    Plan:  Patient has been followed for ventricular tachycardia  Please see cardiology fellow notes from prior days  Device has been reprogrammed to include VT monitoring zones with ATP therapy instead of just only VF zone  Patient has been loaded on amiodarone  Review of telemetry shows no significant bradycardia  Amiodarone should be continued at current dose of 400 mg twice daily for 1 week and then decrease to 200 mg daily  Would continue rate control medication metoprolol succinate 50 mg daily  Will need to monitor INR closely and adjust given INR of 3 81 today trending somewhat upwards  Electrophysiology will sign off at this time and recheck to the primary team with updates  She does have follow-up in our office on 6/20

## 2023-05-05 NOTE — ASSESSMENT & PLAN NOTE
- Coumadin held while being evaluated for humerus fracture  - Goal INR reportedly 2 5 to 3 5  - INR initially supratherapeutic, now improved, INR 2 56  (from 4 23)   - Morning INR 3 81  - We will decrease home dose of Coumadin to 2 5 mg daily in setting of supratherapeutic INR  - Recheck INR in the morning

## 2023-05-05 NOTE — ASSESSMENT & PLAN NOTE
- Hx of VT/VF s/p single chamber ICD  - ICD shock from VT episode 5/2 and admitted to ICU   - amiodarone gtt and oral amiodarone  - ICD zone adjusted per cards   -Cardiology recommendation appreciated  -Restarted home dose metoprolol  -Amiodarone gtt discontinued, oral amio 400 mg BID for 1 week   After she can be maintained on 200 mg daily   -Continue home dose Warfarin, dose decreased to 2 5 mg daily in setting of supratherapeutic INR

## 2023-05-05 NOTE — ASSESSMENT & PLAN NOTE
- Multiple right-sided rib fractures (8th-9th), present on admission   - Continue rib fracture protocol   - Continue to encourage incentive spirometer use and adequate pulmonary hygiene  Pulling 750 cc on IS    - APS consult, appreciate recommendations  - Continue multimodal analgesic regimen     - Supplemental oxygen via nasal cannula as needed to maintain saturations greater than or equal to 94%  - PT and OT evaluation and treatment as indicated-recommending rehab placement, pending peer to peer today  - Outpatient follow-up in the trauma clinic for re-evaluation in approximately 2 weeks

## 2023-05-05 NOTE — ASSESSMENT & PLAN NOTE
- mechanical fall   - sustained below stated injuries  - PT/OT evaluations - recommending rehab  - Geriatrics evaluation, appreciate recommendations  - CM for disposition planning

## 2023-05-05 NOTE — ASSESSMENT & PLAN NOTE
- proximal humerus fracture on the left   - NVI distally  - pain control with multi modal analgesia  - orthopedics consultation, appreciate recommendations  - Non-op  - NWB LUE  - Sling to the LUE  - Multimodal pain control  - PT/OT recommending rehab

## 2023-05-05 NOTE — ASSESSMENT & PLAN NOTE
- Febrile and positive urinalysis    Overnight afebrile  - Started on cefepime and vanco and now de-escalated to rocephin, last dose today, 5/5

## 2023-05-06 LAB
ANION GAP SERPL CALCULATED.3IONS-SCNC: 2 MMOL/L (ref 4–13)
BUN SERPL-MCNC: 33 MG/DL (ref 5–25)
CALCIUM SERPL-MCNC: 8.7 MG/DL (ref 8.3–10.1)
CHLORIDE SERPL-SCNC: 96 MMOL/L (ref 96–108)
CO2 SERPL-SCNC: 28 MMOL/L (ref 21–32)
CREAT SERPL-MCNC: 0.88 MG/DL (ref 0.6–1.3)
ERYTHROCYTE [DISTWIDTH] IN BLOOD BY AUTOMATED COUNT: 14.4 % (ref 11.6–15.1)
GFR SERPL CREATININE-BSD FRML MDRD: 63 ML/MIN/1.73SQ M
GLUCOSE SERPL-MCNC: 124 MG/DL (ref 65–140)
GLUCOSE SERPL-MCNC: 139 MG/DL (ref 65–140)
GLUCOSE SERPL-MCNC: 168 MG/DL (ref 65–140)
GLUCOSE SERPL-MCNC: 177 MG/DL (ref 65–140)
GLUCOSE SERPL-MCNC: 63 MG/DL (ref 65–140)
GLUCOSE SERPL-MCNC: 65 MG/DL (ref 65–140)
GLUCOSE SERPL-MCNC: 74 MG/DL (ref 65–140)
HCT VFR BLD AUTO: 31.4 % (ref 34.8–46.1)
HGB BLD-MCNC: 10.1 G/DL (ref 11.5–15.4)
INR PPP: 4.84 (ref 0.84–1.19)
MCH RBC QN AUTO: 33.3 PG (ref 26.8–34.3)
MCHC RBC AUTO-ENTMCNC: 32.2 G/DL (ref 31.4–37.4)
MCV RBC AUTO: 104 FL (ref 82–98)
OSMOLALITY UR/SERPL-RTO: 280 MMOL/KG (ref 282–298)
PLATELET # BLD AUTO: 173 THOUSANDS/UL (ref 149–390)
PMV BLD AUTO: 10.9 FL (ref 8.9–12.7)
POTASSIUM SERPL-SCNC: 4.9 MMOL/L (ref 3.5–5.3)
PROTHROMBIN TIME: 45.5 SECONDS (ref 11.6–14.5)
RBC # BLD AUTO: 3.03 MILLION/UL (ref 3.81–5.12)
SODIUM 24H UR-SCNC: <5 MOL/L
SODIUM SERPL-SCNC: 126 MMOL/L (ref 135–147)
WBC # BLD AUTO: 5.48 THOUSAND/UL (ref 4.31–10.16)

## 2023-05-06 RX ORDER — SODIUM CHLORIDE 9 MG/ML
100 INJECTION, SOLUTION INTRAVENOUS CONTINUOUS
Status: DISCONTINUED | OUTPATIENT
Start: 2023-05-06 | End: 2023-05-07

## 2023-05-06 RX ADMIN — Medication 7.5 MG: at 12:16

## 2023-05-06 RX ADMIN — GABAPENTIN 100 MG: 100 CAPSULE ORAL at 21:36

## 2023-05-06 RX ADMIN — Medication 7.5 MG: at 16:36

## 2023-05-06 RX ADMIN — ATORVASTATIN CALCIUM 40 MG: 40 TABLET, FILM COATED ORAL at 17:36

## 2023-05-06 RX ADMIN — OXYBUTYNIN 5 MG: 5 TABLET, FILM COATED, EXTENDED RELEASE ORAL at 08:20

## 2023-05-06 RX ADMIN — LIDOCAINE 5% 1 PATCH: 700 PATCH TOPICAL at 08:19

## 2023-05-06 RX ADMIN — SODIUM CHLORIDE 100 ML/HR: 0.9 INJECTION, SOLUTION INTRAVENOUS at 23:19

## 2023-05-06 RX ADMIN — STANDARDIZED SENNA CONCENTRATE 8.6 MG: 8.6 TABLET ORAL at 08:20

## 2023-05-06 RX ADMIN — ACETAMINOPHEN 975 MG: 325 TABLET ORAL at 15:36

## 2023-05-06 RX ADMIN — METHOCARBAMOL 250 MG: 500 TABLET ORAL at 12:16

## 2023-05-06 RX ADMIN — FUROSEMIDE 40 MG: 40 TABLET ORAL at 08:21

## 2023-05-06 RX ADMIN — DOCUSATE SODIUM 100 MG: 100 CAPSULE, LIQUID FILLED ORAL at 17:36

## 2023-05-06 RX ADMIN — Medication 7.5 MG: at 05:34

## 2023-05-06 RX ADMIN — INSULIN LISPRO 1 UNITS: 100 INJECTION, SOLUTION INTRAVENOUS; SUBCUTANEOUS at 21:43

## 2023-05-06 RX ADMIN — METHOCARBAMOL 250 MG: 500 TABLET ORAL at 23:19

## 2023-05-06 RX ADMIN — INSULIN DETEMIR 7 UNITS: 100 INJECTION, SOLUTION SUBCUTANEOUS at 21:43

## 2023-05-06 RX ADMIN — AMIODARONE HYDROCHLORIDE 400 MG: 200 TABLET ORAL at 16:36

## 2023-05-06 RX ADMIN — ACETAMINOPHEN 975 MG: 325 TABLET ORAL at 08:21

## 2023-05-06 RX ADMIN — AMIODARONE HYDROCHLORIDE 400 MG: 200 TABLET ORAL at 08:21

## 2023-05-06 RX ADMIN — DOCUSATE SODIUM 100 MG: 100 CAPSULE, LIQUID FILLED ORAL at 08:21

## 2023-05-06 RX ADMIN — METOPROLOL SUCCINATE 50 MG: 50 TABLET, EXTENDED RELEASE ORAL at 08:20

## 2023-05-06 RX ADMIN — MONTELUKAST 10 MG: 10 TABLET, FILM COATED ORAL at 21:36

## 2023-05-06 RX ADMIN — METHOCARBAMOL 250 MG: 500 TABLET ORAL at 05:34

## 2023-05-06 RX ADMIN — ACETAMINOPHEN 975 MG: 325 TABLET ORAL at 23:19

## 2023-05-06 RX ADMIN — DILTIAZEM HYDROCHLORIDE 120 MG: 120 CAPSULE, COATED, EXTENDED RELEASE ORAL at 08:21

## 2023-05-06 RX ADMIN — METHOCARBAMOL 250 MG: 500 TABLET ORAL at 17:35

## 2023-05-06 RX ADMIN — ALLOPURINOL 100 MG: 100 TABLET ORAL at 08:21

## 2023-05-06 RX ADMIN — CITALOPRAM HYDROBROMIDE 20 MG: 20 TABLET ORAL at 08:21

## 2023-05-06 NOTE — ASSESSMENT & PLAN NOTE
No results found for: HGBA1C    Recent Labs     05/05/23  1644 05/05/23  2058 05/06/23  0708 05/06/23  0734   POCGLU 113 150* 63* 74       Blood Sugar Average: Last 72 hrs:  (P) 111 [id number]     - Continue current medication regimen   - SSI  - Outpatient follow-up with PCP

## 2023-05-06 NOTE — ASSESSMENT & PLAN NOTE
- Coumadin held while being evaluated for humerus fracture  - Goal INR reportedly 2 5 to 3 5  - INR supratherapeutic   - Morning INR 4 84 (from 3 81), received 2 5mg dose yesterday, plan to hold coumadin today  - Recheck INR in the morning

## 2023-05-06 NOTE — CASE MANAGEMENT
Case Management Discharge Planning Note    Patient name Mario Alberto Coleman  Location [address] 613/PPHP 184-96 MRN [id number]  : 1945 Date 2023       Current Admission Date: 2023  Current Admission Diagnosis:Fall   Patient Active Problem List    Diagnosis Date Noted   • UTI (urinary tract infection) 2023   • Ventricular arrhythmia 2023   • Type 2 diabetes mellitus ([address] ) 2023   • Fall 2023   • Humerus fracture 2023   • Acute pain due to trauma 2023   • H/O mechanical aortic valve replacement 2023   • Laceration of right eyebrow 2023   • Fracture of multiple ribs of right side 2023      LOS (days): 9  Geometric Mean LOS (GMLOS) (days): 2 70  Days to GMLOS:-5 2     OBJECTIVE:  Risk of Unplanned Readmission Score: 16 42         Current admission status: Inpatient   [facility]  [address] [address]  Phone: [phone] Fax: [phone]    Primary Care Provider: Tiago Dumont DO    Primary Insurance: [employer] [facility]  Secondary Insurance:     DISCHARGE DETAILS:    Pt medically stable for d/c    CM met with pt, her spouse and dtr  Pt would like CM to attempt an authorization for [facility] once more (was denied last week but peer to peer wasn't completed as pt wasn't medically stable)  If denied again, pt then would like to d/c to [facility] for SNF  Pt would like CM to get auth for SNF immediately if acute is denied and doesn't want peer to peer completed  CM sent the information to  support to submit for auth but no decision will be made over the weekend

## 2023-05-06 NOTE — ASSESSMENT & PLAN NOTE
- Multiple right-sided rib fractures (8th-9th), present on admission   - Continue rib fracture protocol   - Continue to encourage incentive spirometer use and adequate pulmonary hygiene  Pulling 800 cc on IS    - APS consult, appreciate recommendations  - Continue multimodal analgesic regimen     - Supplemental oxygen via nasal cannula as needed to maintain saturations greater than or equal to 94%  - PT and OT evaluation and treatment as indicated-recommending rehab placement, pending peer to peer today  - Outpatient follow-up in the trauma clinic for re-evaluation in approximately 2 weeks

## 2023-05-06 NOTE — ASSESSMENT & PLAN NOTE
- Hx of VT/VF s/p single chamber ICD  - ICD shock from VT episode 5/2 and admitted to ICU   - ICD zone adjusted per cards   -Cardiology recommendations appreciated  -Restarted home dose metoprolol  -Amiodarone gtt discontinued, oral amio 400 mg BID for 1 week   After she can be maintained on 200 mg daily   -Continue home dose Warfarin, held 5/6 for INR 4 84

## 2023-05-06 NOTE — PLAN OF CARE
Problem: Potential for Falls  Goal: Patient will remain free of falls  Description: INTERVENTIONS:  - Educate patient/family on patient safety including physical limitations  - Instruct patient to call for assistance with activity   - Consult OT/PT to assist with strengthening/mobility   - Keep Call bell within reach  - Keep bed low and locked with side rails adjusted as appropriate  - Keep care items and personal belongings within reach  - Initiate and maintain comfort rounds  - Make Fall Risk Sign visible to staff  - Offer Toileting every 2 Hours, in advance of need  - Initiate/Maintain on alarm  - Obtain necessary fall risk management equipment:   - Apply yellow socks and bracelet for high fall risk patients  - Consider moving patient to room near nurses station  Outcome: Progressing     Problem: RESPIRATORY - ADULT  Goal: Achieves optimal ventilation and oxygenation  Description: INTERVENTIONS:  - Assess for changes in respiratory status  - Assess for changes in mentation and behavior  - Position to facilitate oxygenation and minimize respiratory effort  - Oxygen administered by appropriate delivery if ordered  - Initiate smoking cessation education as indicated  - Encourage broncho-pulmonary hygiene including cough, deep breathe, Incentive Spirometry  - Assess the need for suctioning and aspirate as needed  - Assess and instruct to report SOB or any respiratory difficulty  - Respiratory Therapy support as indicated  Outcome: Progressing     Problem: SKIN/TISSUE INTEGRITY - ADULT  Goal: Incision(s), wounds(s) or drain site(s) healing without S/S of infection  Description: INTERVENTIONS  - Assess and document dressing, incision, wound bed, drain sites and surrounding tissue  - Provide patient and family education  - Perform skin care/dressing changes as ordered  Outcome: Progressing     Problem: MUSCULOSKELETAL - ADULT  Goal: Maintain or return mobility to safest level of function  Description: INTERVENTIONS:  - Assess patient's ability to carry out ADLs; assess patient's baseline for ADL function and identify physical deficits which impact ability to perform ADLs (bathing, care of mouth/teeth, toileting, grooming, dressing, etc )  - Assess/evaluate cause of self-care deficits   - Assess range of motion  - Assess patient's mobility  - Assess patient's need for assistive devices and provide as appropriate  - Encourage maximum independence but intervene and supervise when necessary  - Involve family in performance of ADLs  - Assess for home care needs following discharge   - Consider OT consult to assist with ADL evaluation and planning for discharge  - Provide patient education as appropriate  Outcome: Progressing  Goal: Maintain proper alignment of affected body part  Description: INTERVENTIONS:  - Support, maintain and protect limb and body alignment  - Provide patient/ family with appropriate education  Outcome: Progressing     Problem: PAIN - ADULT  Goal: Verbalizes/displays adequate comfort level or baseline comfort level  Description: Interventions:  - Encourage patient to monitor pain and request assistance  - Assess pain using appropriate pain scale  - Administer analgesics based on type and severity of pain and evaluate response  - Implement non-pharmacological measures as appropriate and evaluate response  - Consider cultural and social influences on pain and pain management  - Notify physician/advanced practitioner if interventions unsuccessful or patient reports new pain  Outcome: Progressing     Problem: SAFETY ADULT  Goal: Patient will remain free of falls  Description: INTERVENTIONS:  - Educate patient/family on patient safety including physical limitations  - Instruct patient to call for assistance with activity   - Consult OT/PT to assist with strengthening/mobility   - Keep Call bell within reach  - Keep bed low and locked with side rails adjusted as appropriate  - Keep care items and personal belongings within reach  - Initiate and maintain comfort rounds  - Make Fall Risk Sign visible to staff  - Offer Toileting every 2 Hours, in advance of need  - Initiate/Maintain on alarm  - Obtain necessary fall risk management equipment:   - Apply yellow socks and bracelet for high fall risk patients  - Consider moving patient to room near nurses station  Outcome: Progressing  Goal: Maintain or return to baseline ADL function  Description: INTERVENTIONS:  - Educate patient/family on patient safety including physical limitations  - Instruct patient to call for assistance with activity   - Consult OT/PT to assist with strengthening/mobility   - Keep Call bell within reach  - Keep bed low and locked with side rails adjusted as appropriate  - Keep care items and personal belongings within reach  - Initiate and maintain comfort rounds  - Make Fall Risk Sign visible to staff  - Offer Toileting every 2 Hours, in advance of need  - Initiate/Maintain on alarm  - Obtain necessary fall risk management equipment:  - Apply yellow socks and bracelet for high fall risk patients  - Consider moving patient to room near nurses station  Outcome: Progressing  Goal: Maintains/Returns to pre admission functional level  Description: INTERVENTIONS:  - Perform BMAT or MOVE assessment daily    - Set and communicate daily mobility goal to care team and patient/family/caregiver  - Collaborate with rehabilitation services on mobility goals if consulted  - Perform Range of Motion 3 times a day  - Reposition patient every 2 hours    - Dangle patient 3 times a day  - Stand patient 3 times a day  - Ambulate patient 3 times a day  - Out of bed to chair 3 times a day   - Out of bed for meals 3 times a day  - Out of bed for toileting  - Record patient progress and toleration of activity level   Outcome: Progressing     Problem: DISCHARGE PLANNING  Goal: Discharge to home or other facility with appropriate resources  Description: INTERVENTIONS:  - Identify barriers to discharge w/patient and caregiver  - Arrange for needed discharge resources and transportation as appropriate  - Identify discharge learning needs (meds, wound care, etc )  - Arrange for interpretive services to assist at discharge as needed  - Refer to Case Management Department for coordinating discharge planning if the patient needs post-hospital services based on physician/advanced practitioner order or complex needs related to functional status, cognitive ability, or social support system  Outcome: Progressing     Problem: Knowledge Deficit  Goal: Patient/family/caregiver demonstrates understanding of disease process, treatment plan, medications, and discharge instructions  Description: Complete learning assessment and assess knowledge base    Interventions:  - Provide teaching at level of understanding  - Provide teaching via preferred learning methods  Outcome: Progressing     Problem: MOBILITY - ADULT  Goal: Maintain or return to baseline ADL function  Description: INTERVENTIONS:  - Educate patient/family on patient safety including physical limitations  - Instruct patient to call for assistance with activity   - Consult OT/PT to assist with strengthening/mobility   - Keep Call bell within reach  - Keep bed low and locked with side rails adjusted as appropriate  - Keep care items and personal belongings within reach  - Initiate and maintain comfort rounds  - Make Fall Risk Sign visible to staff  - Offer Toileting every 2 Hours, in advance of need  - Initiate/Maintain on alarm  - Obtain necessary fall risk management equipment:  - Apply yellow socks and bracelet for high fall risk patients  - Consider moving patient to room near nurses station  Outcome: Progressing  Goal: Maintains/Returns to pre admission functional level  Description: INTERVENTIONS:  - Perform BMAT or MOVE assessment daily    - Set and communicate daily mobility goal to care team and patient/family/caregiver  - Collaborate with rehabilitation services on mobility goals if consulted  - Perform Range of Motion 3 times a day  - Reposition patient every 2 hours    - Dangle patient 3 times a day  - Stand patient 3 times a day  - Ambulate patient 3 times a day  - Out of bed to chair 3 times a day   - Out of bed for meals 3 times a day  - Out of bed for toileting  - Record patient progress and toleration of activity level   Outcome: Progressing     Problem: INFECTION - ADULT  Goal: Absence or prevention of progression during hospitalization  Description: INTERVENTIONS:  - Assess and monitor for signs and symptoms of infection  - Monitor lab/diagnostic results  - Monitor all insertion sites, i e  indwelling lines, tubes, and drains  - Monitor endotracheal if appropriate and nasal secretions for changes in amount and color  - Wayne appropriate cooling/warming therapies per order  - Administer medications as ordered  - Instruct and encourage patient and family to use good hand hygiene technique  - Identify and instruct in appropriate isolation precautions for identified infection/condition  Outcome: Progressing  Goal: Absence of fever/infection during neutropenic period  Description: INTERVENTIONS:  - Monitor WBC    Outcome: Progressing     Problem: Prexisting or High Potential for Compromised Skin Integrity  Goal: Skin integrity is maintained or improved  Description: INTERVENTIONS:  - Identify patients at risk for skin breakdown  - Assess and monitor skin integrity  - Assess and monitor nutrition and hydration status  - Monitor labs   - Assess for incontinence   - Turn and reposition patient  - Assist with mobility/ambulation  - Relieve pressure over bony prominences  - Avoid friction and shearing  - Provide appropriate hygiene as needed including keeping skin clean and dry  - Evaluate need for skin moisturizer/barrier cream  - Collaborate with interdisciplinary team   - Patient/family teaching  - Consider wound care consult   Outcome: Progressing

## 2023-05-06 NOTE — ASSESSMENT & PLAN NOTE
- Febrile and positive urinalysis    Overnight afebrile  - Started on cefepime and vanco and de-escalated to rocephin, last dose yesterday, 5/5

## 2023-05-06 NOTE — CASE MANAGEMENT
Case Management Discharge Planning Note    Patient name Nhan Ochoa  Location [address] [facility] [id number]  : 1945 Date 2023       Current Admission Date: 2023  Current Admission Diagnosis:Fall   Patient Active Problem List    Diagnosis Date Noted   • UTI (urinary tract infection) 2023   • Ventricular arrhythmia 2023   • Type 2 diabetes mellitus ([address] ) 2023   • Fall 2023   • Humerus fracture 2023   • Acute pain due to trauma 2023   • H/O mechanical aortic valve replacement 2023   • Laceration of right eyebrow 2023   • Fracture of multiple ribs of right side 2023      LOS (days): 9  Geometric Mean LOS (GMLOS) (days): 2 70  Days to GMLOS:-5 3     OBJECTIVE:  Risk of Unplanned Readmission Score: 16 42         Current admission status: Inpatient   Preferred Pharmacy:   [facility] [address] [address]  Phone: [phone] Fax: [phone]    Primary Care Provider: Mary Bowles DO    Primary Insurance: [facility]  Secondary Insurance:     DISCHARGE DETAILS:    Cm met with pt and her dtr  Pt's dtr is adamant that the pt's primary payor source is Medicare and Marianne Duong is secondary  SHLOMO sent the cards to billing to clarify

## 2023-05-06 NOTE — PROGRESS NOTES
[address] [facility]  Progress Note  Name: Rachel Costello  MRN: [id number]  [facility] Date of Admission: 4/27/2023   Date of Service: 5/6/2023 I Hospital Day: 9    Assessment/Plan   Ventricular arrhythmia  Assessment & Plan  - Hx of VT/VF s/p single chamber ICD  - ICD shock from VT episode 5/2 and admitted to ICU   - ICD zone adjusted per cards   -Cardiology recommendations appreciated  -Restarted home dose metoprolol  -Amiodarone gtt discontinued, oral amio 400 mg BID for 1 week  After she can be maintained on 200 mg daily   -Continue home dose Warfarin, held 5/6 for INR 4 84    UTI (urinary tract infection)  Assessment & Plan  - Febrile and positive urinalysis    Overnight afebrile  - Started on cefepime and vanco and de-escalated to rocephin, last dose yesterday, 5/5    Type 2 diabetes mellitus [facility])  Assessment & Plan  No results found for: HGBA1C    Recent Labs     05/05/23  1644 05/05/23  2058 05/06/23  0708 05/06/23  0734   POCGLU 113 150* 63* 74       Blood Sugar Average: Last 72 hrs:  (P) 996 [id number]     - Continue current medication regimen   - SSI  - Outpatient follow-up with PCP  Fracture of multiple ribs of right side  Assessment & Plan  - Multiple right-sided rib fractures (8th-9th), present on admission   - Continue rib fracture protocol   - Continue to encourage incentive spirometer use and adequate pulmonary hygiene  Pulling 800 cc on IS    - APS consult, appreciate recommendations  - Continue multimodal analgesic regimen     - Supplemental oxygen via nasal cannula as needed to maintain saturations greater than or equal to 94%  - PT and OT evaluation and treatment as indicated-recommending rehab placement, pending peer to peer today  - Outpatient follow-up in the trauma clinic for re-evaluation in approximately 2 weeks        Laceration of right eyebrow  Assessment & Plan  - two 1 cm lacerations to the R eye brow  - steri strips applied  - local wound care    H/O mechanical aortic valve replacement  Assessment & Plan  - Coumadin held while being evaluated for humerus fracture  - Goal INR reportedly 2 5 to 3 5  - INR supratherapeutic   - Morning INR 4 84 (from 3 81), received 2 5mg dose yesterday, plan to hold coumadin today  - Recheck INR in the morning    Acute pain due to trauma  Assessment & Plan  - Acute pain secondary to traumatic injuries  - Multi modal pain regimen  - Bowel regimen as long as using opioids   - Continue to monitor pain and adjust regimen as indicated  Humerus fracture  Assessment & Plan  - proximal humerus fracture on the left   - NVI distally  - pain control with multi modal analgesia  - orthopedics consultation, appreciate recommendations  - Non-op  - NWB LUE  - Sling to the LUE  - Multimodal pain control  - PT/OT recommending rehab    Fall  Assessment & Plan  - mechanical fall   - sustained below stated injuries  - PT/OT evaluations - recommending rehab  - Geriatrics evaluation, appreciate recommendations  - CM for disposition planning             Bowel Regimen: Colace, Senokot  VTE Prophylaxis:Reason for no pharmacologic prophylaxis Supratherapeutic INR, will restart once normalizes     Disposition: Recommended rehab, P2P on hold due to medical instability, likely will be stable for D/C 5/7    Subjective     Subjective: One episode of hypotension overnight 87/50 at 10:30pm, remained asymptomatic and resolved without treatment, tolerating diet without nausea or vomiting, passing flatus but denies BM, denies motor or sensory deficits     Objective   Vitals:   Temp:  [97 8 °F (36 6 °C)-99 3 °F (37 4 °C)] 98 5 °F (36 9 °C)  HR:  [51-63] 63  Resp:  [14-18] 14  BP: ()/(50-80) 118/63    I/O       05/04 0701  05/05 0700 05/05 0701  05/06 0700 05/06 0701  05/07 0700    P  O  480 658 240    I V  (mL/kg) 822 (9 6)      IV Piggyback 50      Total Intake(mL/kg) 1352 (15 8) 658 (7 7) 240 (2 8)    Urine (mL/kg/hr) 750 (0 4) 350 (0 2) 200 (0 6)    Total Output 750 350 200    Net +602 +308 +40                  Physical Exam:   Gen:    NAD  CV:      warm, well-perfused  Lungs: No respiratory distress  Abd:     soft, NT/ND  Ext:      LUE in sling, 2+ radial pulse  Neuro: A&Ox3, motor and sensory grossly intact      Invasive Devices     Peripheral Intravenous Line  Duration           Peripheral IV 05/04/23 Dorsal (posterior); Right Hand 2 days          Drain  Duration           External Urinary Catheter 5 days                 PIC Score  PIC Pain Score: 2 (5/6/2023  8:00 AM)  PIC Incentive Spirometry Score: 3 (5/6/2023  8:00 AM)  PIC Cough Description: 3 (5/6/2023  8:00 AM)  PIC Total Score: 8 (5/6/2023  8:00 AM)       If the Total PIC Score </=5, did you consult APS and evaluate patient for further intervention?: yes      Pain:    Incentive Spirometry  Cough  3 = Controlled  4 = Above goal volume 3 = Strong  2 = Moderate  3 = Goal to alert volume 2 = Weak  1 = Severe  2 = Below alert volume 1 = Absent     1 = Unable to perform IS         Lab Results: Results: I have personally reviewed all pertinent laboratory/tests results  Imaging: I have personally reviewed pertinent reports       Other Studies: None

## 2023-05-07 LAB
GLUCOSE SERPL-MCNC: 116 MG/DL (ref 65–140)
GLUCOSE SERPL-MCNC: 151 MG/DL (ref 65–140)
GLUCOSE SERPL-MCNC: 159 MG/DL (ref 65–140)
GLUCOSE SERPL-MCNC: 202 MG/DL (ref 65–140)
INR PPP: 4.42 (ref 0.84–1.19)
PROTHROMBIN TIME: 42.4 SECONDS (ref 11.6–14.5)

## 2023-05-07 RX ADMIN — DOCUSATE SODIUM 100 MG: 100 CAPSULE, LIQUID FILLED ORAL at 17:41

## 2023-05-07 RX ADMIN — STANDARDIZED SENNA CONCENTRATE 8.6 MG: 8.6 TABLET ORAL at 09:40

## 2023-05-07 RX ADMIN — Medication 7.5 MG: at 09:40

## 2023-05-07 RX ADMIN — ATORVASTATIN CALCIUM 40 MG: 40 TABLET, FILM COATED ORAL at 17:41

## 2023-05-07 RX ADMIN — DILTIAZEM HYDROCHLORIDE 120 MG: 120 CAPSULE, COATED, EXTENDED RELEASE ORAL at 09:41

## 2023-05-07 RX ADMIN — AMIODARONE HYDROCHLORIDE 400 MG: 200 TABLET ORAL at 09:41

## 2023-05-07 RX ADMIN — AMIODARONE HYDROCHLORIDE 400 MG: 200 TABLET ORAL at 17:41

## 2023-05-07 RX ADMIN — INSULIN LISPRO 1 UNITS: 100 INJECTION, SOLUTION INTRAVENOUS; SUBCUTANEOUS at 21:43

## 2023-05-07 RX ADMIN — METHOCARBAMOL 250 MG: 500 TABLET ORAL at 23:57

## 2023-05-07 RX ADMIN — METHOCARBAMOL 250 MG: 500 TABLET ORAL at 17:41

## 2023-05-07 RX ADMIN — ACETAMINOPHEN 975 MG: 325 TABLET ORAL at 09:40

## 2023-05-07 RX ADMIN — LIDOCAINE 5% 1 PATCH: 700 PATCH TOPICAL at 09:41

## 2023-05-07 RX ADMIN — INSULIN LISPRO 1 UNITS: 100 INJECTION, SOLUTION INTRAVENOUS; SUBCUTANEOUS at 12:09

## 2023-05-07 RX ADMIN — INSULIN LISPRO 2 UNITS: 100 INJECTION, SOLUTION INTRAVENOUS; SUBCUTANEOUS at 17:41

## 2023-05-07 RX ADMIN — DOCUSATE SODIUM 100 MG: 100 CAPSULE, LIQUID FILLED ORAL at 09:41

## 2023-05-07 RX ADMIN — ALLOPURINOL 100 MG: 100 TABLET ORAL at 09:41

## 2023-05-07 RX ADMIN — METHOCARBAMOL 250 MG: 500 TABLET ORAL at 12:09

## 2023-05-07 RX ADMIN — ACETAMINOPHEN 975 MG: 325 TABLET ORAL at 23:57

## 2023-05-07 RX ADMIN — GABAPENTIN 100 MG: 100 CAPSULE ORAL at 21:44

## 2023-05-07 RX ADMIN — Medication 7.5 MG: at 15:15

## 2023-05-07 RX ADMIN — CITALOPRAM HYDROBROMIDE 20 MG: 20 TABLET ORAL at 09:41

## 2023-05-07 RX ADMIN — METOPROLOL SUCCINATE 50 MG: 50 TABLET, EXTENDED RELEASE ORAL at 09:41

## 2023-05-07 RX ADMIN — MONTELUKAST 10 MG: 10 TABLET, FILM COATED ORAL at 21:44

## 2023-05-07 RX ADMIN — ACETAMINOPHEN 975 MG: 325 TABLET ORAL at 15:15

## 2023-05-07 RX ADMIN — OXYBUTYNIN 5 MG: 5 TABLET, FILM COATED, EXTENDED RELEASE ORAL at 09:40

## 2023-05-07 RX ADMIN — INSULIN DETEMIR 7 UNITS: 100 INJECTION, SOLUTION SUBCUTANEOUS at 21:43

## 2023-05-07 RX ADMIN — METHOCARBAMOL 250 MG: 500 TABLET ORAL at 05:23

## 2023-05-07 NOTE — ASSESSMENT & PLAN NOTE
- Multiple right-sided rib fractures (8th-9th), present on admission   - Continue rib fracture protocol   - Continue to encourage incentive spirometer use and adequate pulmonary hygiene  - APS consult, appreciate recommendations  - Continue multimodal analgesic regimen     - Supplemental oxygen via nasal cannula as needed to maintain saturations greater than or equal to 94%  - PT and OT evaluation and treatment as indicated-recommending rehab placement, pending peer to peer today  - Outpatient follow-up in the trauma clinic for re-evaluation in approximately 2 weeks

## 2023-05-07 NOTE — ASSESSMENT & PLAN NOTE
- Coumadin held while being evaluated for humerus fracture  - Goal INR reportedly 2 5 to 3 5  - INR supratherapeutic   - Morning INR 4  4(from 3 81) plan to hold coumadin today  - Recheck INR in the morning

## 2023-05-07 NOTE — ASSESSMENT & PLAN NOTE
- Febrile and positive urinalysis    Overnight afebrile  - Started on cefepime and vanco and de-escalated to rocephin, last dose 5/5

## 2023-05-07 NOTE — ASSESSMENT & PLAN NOTE
No results found for: HGBA1C    Recent Labs     05/06/23  1642 05/06/23  2141 05/06/23  2153 05/07/23  0728   POCGLU 139 177* 168* 116       Blood Sugar Average: Last 72 hrs:  (P) 630 [id number]     - Continue current medication regimen   - SSI  - Outpatient follow-up with PCP

## 2023-05-07 NOTE — ASSESSMENT & PLAN NOTE
- Hx of VT/VF s/p single chamber ICD  - ICD shock from VT episode 5/2 and admitted to ICU   - ICD zone adjusted per cards   -Cardiology recommendations appreciated  -Restarted home dose metoprolol  -Amiodarone gtt discontinued, oral amio 400 mg BID for 1 week   After she can be maintained on 200 mg daily   -Continue home dose Warfarin, holding due to INR this morning 4 4

## 2023-05-07 NOTE — PLAN OF CARE
Problem: Potential for Falls  Goal: Patient will remain free of falls  Description: INTERVENTIONS:  - Educate patient/family on patient safety including physical limitations  - Instruct patient to call for assistance with activity   - Consult OT/PT to assist with strengthening/mobility   - Keep Call bell within reach  - Keep bed low and locked with side rails adjusted as appropriate  - Keep care items and personal belongings within reach  - Initiate and maintain comfort rounds  - Make Fall Risk Sign visible to staff  - Offer Toileting every 2 Hours, in advance of need  - Initiate/Maintain on alarm  - Obtain necessary fall risk management equipment:   - Apply yellow socks and bracelet for high fall risk patients  - Consider moving patient to room near nurses station  Outcome: Progressing     Problem: RESPIRATORY - ADULT  Goal: Achieves optimal ventilation and oxygenation  Description: INTERVENTIONS:  - Assess for changes in respiratory status  - Assess for changes in mentation and behavior  - Position to facilitate oxygenation and minimize respiratory effort  - Oxygen administered by appropriate delivery if ordered  - Initiate smoking cessation education as indicated  - Encourage broncho-pulmonary hygiene including cough, deep breathe, Incentive Spirometry  - Assess the need for suctioning and aspirate as needed  - Assess and instruct to report SOB or any respiratory difficulty  - Respiratory Therapy support as indicated  Outcome: Progressing     Problem: SKIN/TISSUE INTEGRITY - ADULT  Goal: Incision(s), wounds(s) or drain site(s) healing without S/S of infection  Description: INTERVENTIONS  - Assess and document dressing, incision, wound bed, drain sites and surrounding tissue  - Provide patient and family education  - Perform skin care/dressing changes every shift or as needed  Outcome: Progressing     Problem: MUSCULOSKELETAL - ADULT  Goal: Maintain or return mobility to safest level of function  Description: INTERVENTIONS:  - Assess patient's ability to carry out ADLs; assess patient's baseline for ADL function and identify physical deficits which impact ability to perform ADLs (bathing, care of mouth/teeth, toileting, grooming, dressing, etc )  - Assess/evaluate cause of self-care deficits   - Assess range of motion  - Assess patient's mobility  - Assess patient's need for assistive devices and provide as appropriate  - Encourage maximum independence but intervene and supervise when necessary  - Involve family in performance of ADLs  - Assess for home care needs following discharge   - Consider OT consult to assist with ADL evaluation and planning for discharge  - Provide patient education as appropriate  Outcome: Progressing  Goal: Maintain proper alignment of affected body part  Description: INTERVENTIONS:  - Support, maintain and protect limb and body alignment  - Provide patient/ family with appropriate education  Outcome: Progressing     Problem: PAIN - ADULT  Goal: Verbalizes/displays adequate comfort level or baseline comfort level  Description: Interventions:  - Encourage patient to monitor pain and request assistance  - Assess pain using appropriate pain scale  - Administer analgesics based on type and severity of pain and evaluate response  - Implement non-pharmacological measures as appropriate and evaluate response  - Consider cultural and social influences on pain and pain management  - Notify physician/advanced practitioner if interventions unsuccessful or patient reports new pain  Outcome: Progressing     Problem: SAFETY ADULT  Goal: Patient will remain free of falls  Description: INTERVENTIONS:  - Educate patient/family on patient safety including physical limitations  - Instruct patient to call for assistance with activity   - Consult OT/PT to assist with strengthening/mobility   - Keep Call bell within reach  - Keep bed low and locked with side rails adjusted as appropriate  - Keep care items and personal belongings within reach  - Initiate and maintain comfort rounds  - Make Fall Risk Sign visible to staff  - Offer Toileting every 2 Hours, in advance of need  - Initiate/Maintain on alarm  - Obtain necessary fall risk management equipment:   - Apply yellow socks and bracelet for high fall risk patients  - Consider moving patient to room near nurses station  Outcome: Progressing  Goal: Maintain or return to baseline ADL function  Description: INTERVENTIONS:  - Educate patient/family on patient safety including physical limitations  - Instruct patient to call for assistance with activity   - Consult OT/PT to assist with strengthening/mobility   - Keep Call bell within reach  - Keep bed low and locked with side rails adjusted as appropriate  - Keep care items and personal belongings within reach  - Initiate and maintain comfort rounds  - Make Fall Risk Sign visible to staff  - Offer Toileting every 2 Hours, in advance of need  - Initiate/Maintain on alarm  - Obtain necessary fall risk management equipment:  - Apply yellow socks and bracelet for high fall risk patients  - Consider moving patient to room near nurses station  Outcome: Progressing  Goal: Maintains/Returns to pre admission functional level  Description: INTERVENTIONS:  - Perform BMAT or MOVE assessment daily    - Set and communicate daily mobility goal to care team and patient/family/caregiver  - Collaborate with rehabilitation services on mobility goals if consulted  - Perform Range of Motion 3 times a day  - Reposition patient every 2 hours    - Dangle patient 3 times a day  - Stand patient 3 times a day  - Ambulate patient 3 times a day  - Out of bed to chair 3 times a day   - Out of bed for meals 3 times a day  - Out of bed for toileting  - Record patient progress and toleration of activity level   Outcome: Progressing     Problem: DISCHARGE PLANNING  Goal: Discharge to home or other facility with appropriate resources  Description: INTERVENTIONS:  - Identify barriers to discharge w/patient and caregiver  - Arrange for needed discharge resources and transportation as appropriate  - Identify discharge learning needs (meds, wound care, etc )  - Arrange for interpretive services to assist at discharge as needed  - Refer to Case Management Department for coordinating discharge planning if the patient needs post-hospital services based on physician/advanced practitioner order or complex needs related to functional status, cognitive ability, or social support system  Outcome: Progressing     Problem: Knowledge Deficit  Goal: Patient/family/caregiver demonstrates understanding of disease process, treatment plan, medications, and discharge instructions  Description: Complete learning assessment and assess knowledge base    Interventions:  - Provide teaching at level of understanding  - Provide teaching via preferred learning methods  Outcome: Progressing     Problem: MOBILITY - ADULT  Goal: Maintain or return to baseline ADL function  Description: INTERVENTIONS:  - Educate patient/family on patient safety including physical limitations  - Instruct patient to call for assistance with activity   - Consult OT/PT to assist with strengthening/mobility   - Keep Call bell within reach  - Keep bed low and locked with side rails adjusted as appropriate  - Keep care items and personal belongings within reach  - Initiate and maintain comfort rounds  - Make Fall Risk Sign visible to staff  - Offer Toileting every 2 Hours, in advance of need  - Initiate/Maintain on alarm  - Obtain necessary fall risk management equipment:  - Apply yellow socks and bracelet for high fall risk patients  - Consider moving patient to room near nurses station  Outcome: Progressing  Goal: Maintains/Returns to pre admission functional level  Description: INTERVENTIONS:  - Perform BMAT or MOVE assessment daily    - Set and communicate daily mobility goal to care team and patient/family/caregiver  - Collaborate with rehabilitation services on mobility goals if consulted  - Perform Range of Motion 3 times a day  - Reposition patient every 2 hours    - Dangle patient 3 times a day  - Stand patient 3 times a day  - Ambulate patient 3 times a day  - Out of bed to chair 3 times a day   - Out of bed for meals 3 times a day  - Out of bed for toileting  - Record patient progress and toleration of activity level   Outcome: Progressing     Problem: INFECTION - ADULT  Goal: Absence or prevention of progression during hospitalization  Description: INTERVENTIONS:  - Assess and monitor for signs and symptoms of infection  - Monitor lab/diagnostic results  - Monitor all insertion sites, i e  indwelling lines, tubes, and drains  - Monitor endotracheal if appropriate and nasal secretions for changes in amount and color  - [place] appropriate cooling/warming therapies per order  - Administer medications as ordered  - Instruct and encourage patient and family to use good hand hygiene technique  - Identify and instruct in appropriate isolation precautions for identified infection/condition  Outcome: Progressing  Goal: Absence of fever/infection during neutropenic period  Description: INTERVENTIONS:  - Monitor WBC    Outcome: Progressing     Problem: Prexisting or High Potential for Compromised Skin Integrity  Goal: Skin integrity is maintained or improved  Description: INTERVENTIONS:  - Identify patients at risk for skin breakdown  - Assess and monitor skin integrity  - Assess and monitor nutrition and hydration status  - Monitor labs   - Assess for incontinence   - Turn and reposition patient  - Assist with mobility/ambulation  - Relieve pressure over bony prominences  - Avoid friction and shearing  - Provide appropriate hygiene as needed including keeping skin clean and dry  - Evaluate need for skin moisturizer/barrier cream  - Collaborate with interdisciplinary team   - Patient/family teaching  - Consider wound care consult   Outcome: Progressing

## 2023-05-07 NOTE — PROGRESS NOTES
[facility]  Progress Note  Name: Marci Pond  MRN: [id number]  Unit/Bed#: [facility] 152-88 I Date of Admission: 4/27/2023   Date of Service: 5/7/2023 I Hospital Day: 10    Assessment/Plan   Fall  Assessment & Plan  - mechanical fall   - sustained below stated injuries  - PT/OT evaluations - recommending rehab  - Geriatrics evaluation, appreciate recommendations  - CM for disposition planning    Humerus fracture  Assessment & Plan  - proximal humerus fracture on the left   - NVI distally  - pain control with multi modal analgesia  - orthopedics consultation, appreciate recommendations  - Non-op  - NWB LUE  - Sling to the LUE  - Multimodal pain control  - PT/OT recommending rehab    Laceration of right eyebrow  Assessment & Plan  - two 1 cm lacerations to the R eye brow  - steri strips applied  - local wound care    Acute pain due to trauma  Assessment & Plan  - Acute pain secondary to traumatic injuries  - Multi modal pain regimen  - Bowel regimen as long as using opioids   - Continue to monitor pain and adjust regimen as indicated  H/O mechanical aortic valve replacement  Assessment & Plan  - Coumadin held while being evaluated for humerus fracture  - Goal INR reportedly 2 5 to 3 5  - INR supratherapeutic   - Morning INR 4  4(from 3 81) plan to hold coumadin today  - Recheck INR in the morning    Ventricular arrhythmia  Assessment & Plan  - Hx of VT/VF s/p single chamber ICD  - ICD shock from VT episode 5/2 and admitted to ICU   - ICD zone adjusted per cards   -Cardiology recommendations appreciated  -Restarted home dose metoprolol  -Amiodarone gtt discontinued, oral amio 400 mg BID for 1 week   After she can be maintained on 200 mg daily   -Continue home dose Warfarin, holding due to INR this morning 4 4    UTI (urinary tract infection)  Assessment & Plan  - Febrile and positive urinalysis    Overnight afebrile  - Started on cefepime and vanco and de-escalated to rocephin, last dose 5/5    Type 2 diabetes mellitus [facility])  Assessment & Plan  No results found for: HGBA1C    Recent Labs     05/06/23  1642 05/06/23  2141 05/06/23  2153 05/07/23  0728   POCGLU 139 177* 168* 116       Blood Sugar Average: Last 72 hrs:  (P) 768 [id number]     - Continue current medication regimen   - SSI  - Outpatient follow-up with PCP  Fracture of multiple ribs of right side  Assessment & Plan  - Multiple right-sided rib fractures (8th-9th), present on admission   - Continue rib fracture protocol   - Continue to encourage incentive spirometer use and adequate pulmonary hygiene  - APS consult, appreciate recommendations  - Continue multimodal analgesic regimen     - Supplemental oxygen via nasal cannula as needed to maintain saturations greater than or equal to 94%  - PT and OT evaluation and treatment as indicated-recommending rehab placement, pending peer to peer today  - Outpatient follow-up in the trauma clinic for re-evaluation in approximately 2 weeks  VTE Prophylaxis:Warfarin (Coumadin)     Disposition: Rehab placement     Subjective   Patient slept well  No complaints at this time  Pain is under control  Objective   Vitals:   Temp:  [98 2 °F (36 8 °C)-99 4 °F (37 4 °C)] 98 7 °F (37 1 °C)  HR:  [53-65] 57  Resp:  [16] 16  BP: ()/(49-56) 108/56    I/O       05/05 0701  05/06 0700 05/06 0701  05/07 0700 05/07 0701  05/08 0700    P  O  658 240     I V  (mL/kg)       IV Piggyback       Total Intake(mL/kg) 658 (7 7) 240 (2 8)     Urine (mL/kg/hr) 350 (0 2) 400 (0 2)     Total Output 350 400     Net +308 -160            Unmeasured Urine Occurrence  1 x            Physical exam  General:  Resting comfortably in bed in no acute distress  Neuro: GCS 15 (Eye 4, Verbal 5, Motor 6)  HENT:  Normocephalic and atraumatic, PERRL  Heart:  RRR, pulses intact  Lungs:  Bilateral breath sounds present  Abdomen:   Bowel sounds present, soft  Skin:   Warm, dry skin/Incision site clean dry and intact   - Left arm in sling   - Bruises in right face improved     Invasive Devices     Drain  Duration           External Urinary Catheter 6 days                 PIC Score  PIC Pain Score: 2 (5/7/2023  9:40 AM)  PIC Incentive Spirometry Score: 2 (5/6/2023  4:00 PM)  PIC Cough Description: 3 (5/6/2023  4:00 PM)  PIC Total Score: 7 (5/6/2023  4:00 PM)       If the Total PIC Score </=5, did you consult APS and evaluate patient for further intervention?: yes      Pain:    Incentive Spirometry  Cough  3 = Controlled  4 = Above goal volume 3 = Strong  2 = Moderate  3 = Goal to alert volume 2 = Weak  1 = Severe  2 = Below alert volume 1 = Absent     1 = Unable to perform IS         Lab Results: Results: I have personally reviewed all pertinent laboratory/tests results  Imaging: I have personally reviewed pertinent reports       Other Studies:

## 2023-05-07 NOTE — CASE MANAGEMENT
Case Management Discharge Planning Note    Patient name Kim Rdz  Location [address]/[facility] 185-99 MRN [id number]  : 1945 Date 2023       Current Admission Date: 2023  Current Admission Diagnosis:Fall   Patient Active Problem List    Diagnosis Date Noted   • UTI (urinary tract infection) 2023   • Ventricular arrhythmia 2023   • Type 2 diabetes mellitus ([address] ) 2023   • Fall 2023   • Humerus fracture 2023   • Acute pain due to trauma 2023   • H/O mechanical aortic valve replacement 2023   • Laceration of right eyebrow 2023   • Fracture of multiple ribs of right side 2023      LOS (days): 10  Geometric Mean LOS (GMLOS) (days): 2 70  Days to GMLOS:-6 2     OBJECTIVE:  Risk of Unplanned Readmission Score: 16 66         Current admission status: Inpatient   Preferred Pharmacy:   [address], [address] [address]  Phone: [phone] Fax: [phone]    Primary Care Provider: Omayra Jacobs DO    Primary Insurance: Madeleine Casanova W Matti ESTRADA  Secondary Insurance:     DISCHARGE DETAILS:    CM received email from financial counselors explaining that pt's [employer] is primary, not [employer]  CM email this to pt's dtr [address] [facility]  [address] [facility] continues to believe the hospital is incorrect in this assessment  Cm asked financial counselors to contact the pt's dtr to discuss in further detail

## 2023-05-08 PROBLEM — R00.1 BRADYCARDIA: Status: ACTIVE | Noted: 2023-05-08

## 2023-05-08 LAB
BACTERIA BLD CULT: NORMAL
BACTERIA BLD CULT: NORMAL
GLUCOSE SERPL-MCNC: 106 MG/DL (ref 65–140)
GLUCOSE SERPL-MCNC: 125 MG/DL (ref 65–140)
GLUCOSE SERPL-MCNC: 131 MG/DL (ref 65–140)
GLUCOSE SERPL-MCNC: 175 MG/DL (ref 65–140)
INR PPP: 3.46 (ref 0.84–1.19)
PROTHROMBIN TIME: 35.1 SECONDS (ref 11.6–14.5)

## 2023-05-08 RX ORDER — METOPROLOL SUCCINATE 25 MG/1
25 TABLET, EXTENDED RELEASE ORAL DAILY
Status: DISCONTINUED | OUTPATIENT
Start: 2023-05-09 | End: 2023-05-13 | Stop reason: HOSPADM

## 2023-05-08 RX ADMIN — METHOCARBAMOL 250 MG: 500 TABLET ORAL at 11:26

## 2023-05-08 RX ADMIN — DILTIAZEM HYDROCHLORIDE 120 MG: 120 CAPSULE, COATED, EXTENDED RELEASE ORAL at 08:51

## 2023-05-08 RX ADMIN — AMIODARONE HYDROCHLORIDE 400 MG: 200 TABLET ORAL at 08:52

## 2023-05-08 RX ADMIN — FUROSEMIDE 40 MG: 40 TABLET ORAL at 08:52

## 2023-05-08 RX ADMIN — STANDARDIZED SENNA CONCENTRATE 17.2 MG: 8.6 TABLET ORAL at 08:52

## 2023-05-08 RX ADMIN — OXYCODONE HYDROCHLORIDE 5 MG: 5 TABLET ORAL at 13:38

## 2023-05-08 RX ADMIN — Medication 7.5 MG: at 08:53

## 2023-05-08 RX ADMIN — LIDOCAINE 5% 1 PATCH: 700 PATCH TOPICAL at 08:53

## 2023-05-08 RX ADMIN — INSULIN LISPRO 1 UNITS: 100 INJECTION, SOLUTION INTRAVENOUS; SUBCUTANEOUS at 11:28

## 2023-05-08 RX ADMIN — AMIODARONE HYDROCHLORIDE 400 MG: 200 TABLET ORAL at 17:21

## 2023-05-08 RX ADMIN — OXYBUTYNIN 5 MG: 5 TABLET, FILM COATED, EXTENDED RELEASE ORAL at 08:52

## 2023-05-08 RX ADMIN — METHOCARBAMOL 250 MG: 500 TABLET ORAL at 05:15

## 2023-05-08 RX ADMIN — DOCUSATE SODIUM 100 MG: 100 CAPSULE, LIQUID FILLED ORAL at 17:22

## 2023-05-08 RX ADMIN — DOCUSATE SODIUM 100 MG: 100 CAPSULE, LIQUID FILLED ORAL at 08:53

## 2023-05-08 RX ADMIN — CITALOPRAM HYDROBROMIDE 20 MG: 20 TABLET ORAL at 08:52

## 2023-05-08 RX ADMIN — INSULIN DETEMIR 7 UNITS: 100 INJECTION, SOLUTION SUBCUTANEOUS at 21:34

## 2023-05-08 RX ADMIN — ATORVASTATIN CALCIUM 40 MG: 40 TABLET, FILM COATED ORAL at 17:21

## 2023-05-08 RX ADMIN — MONTELUKAST 10 MG: 10 TABLET, FILM COATED ORAL at 21:33

## 2023-05-08 RX ADMIN — GABAPENTIN 100 MG: 100 CAPSULE ORAL at 21:34

## 2023-05-08 RX ADMIN — ALLOPURINOL 100 MG: 100 TABLET ORAL at 08:52

## 2023-05-08 RX ADMIN — METHOCARBAMOL 250 MG: 500 TABLET ORAL at 17:19

## 2023-05-08 RX ADMIN — ACETAMINOPHEN 975 MG: 325 TABLET ORAL at 17:19

## 2023-05-08 RX ADMIN — ACETAMINOPHEN 975 MG: 325 TABLET ORAL at 08:51

## 2023-05-08 RX ADMIN — METOPROLOL SUCCINATE 50 MG: 50 TABLET, EXTENDED RELEASE ORAL at 08:52

## 2023-05-08 NOTE — PLAN OF CARE
Problem: OCCUPATIONAL THERAPY ADULT  Goal: Performs self-care activities at highest level of function for planned discharge setting  See evaluation for individualized goals  Description: Treatment Interventions: ADL retraining, Functional transfer training, UE strengthening/ROM, Endurance training, Patient/family training, Equipment evaluation/education, Compensatory technique education, Activityengagement          See flowsheet documentation for full assessment, interventions and recommendations  Outcome: Progressing  Note: Limitation: Decreased ADL status, Decreased UE ROM, Decreased endurance, Decreased self-care trans, Decreased high-level ADLs, Non-func L UE  Prognosis: Good  Assessment: PT PARTICIPATED IN AM OT SESSION AND WAS SEEN FOCUSING ON BED MOBILITY  PER NSG PT WITH BRADYCARDIA LAST ANALY  PA WITH CARDIOLGY HELD OOB THIS DATE DUE TO SAME   PLAN TO CONTINUE TX AS APPROPRAITE IN FUTURE     OT Discharge Recommendation: Post acute rehabilitation services        April A Storm

## 2023-05-08 NOTE — ASSESSMENT & PLAN NOTE
No results found for: HGBA1C    Recent Labs     05/07/23  0728 05/07/23  1129 05/07/23  1631 05/07/23  2110   POCGLU 116 151* 202* 159*       Blood Sugar Average: Last 72 hrs:  (P) 132 5     - Continue current medication regimen   - SSI  - Outpatient follow-up with PCP

## 2023-05-08 NOTE — PLAN OF CARE
Problem: RESPIRATORY - ADULT  Goal: Achieves optimal ventilation and oxygenation  Description: INTERVENTIONS:  - Assess for changes in respiratory status  - Assess for changes in mentation and behavior  - Position to facilitate oxygenation and minimize respiratory effort  - Oxygen administered by appropriate delivery if ordered  - Initiate smoking cessation education as indicated  - Encourage broncho-pulmonary hygiene including cough, deep breathe, Incentive Spirometry  - Assess the need for suctioning and aspirate as needed  - Assess and instruct to report SOB or any respiratory difficulty  - Respiratory Therapy support as indicated  Outcome: Progressing     Problem: SKIN/TISSUE INTEGRITY - ADULT  Goal: Incision(s), wounds(s) or drain site(s) healing without S/S of infection  Description: INTERVENTIONS  - Assess and document dressing, incision, wound bed, drain sites and surrounding tissue  - Provide patient and family education  - Perform skin care/dressing changes every   Outcome: Progressing     Problem: MUSCULOSKELETAL - ADULT  Goal: Maintain or return mobility to safest level of function  Description: INTERVENTIONS:  - Assess patient's ability to carry out ADLs; assess patient's baseline for ADL function and identify physical deficits which impact ability to perform ADLs (bathing, care of mouth/teeth, toileting, grooming, dressing, etc )  - Assess/evaluate cause of self-care deficits   - Assess range of motion  - Assess patient's mobility  - Assess patient's need for assistive devices and provide as appropriate  - Encourage maximum independence but intervene and supervise when necessary  - Involve family in performance of ADLs  - Assess for home care needs following discharge   - Consider OT consult to assist with ADL evaluation and planning for discharge  - Provide patient education as appropriate  Outcome: Progressing

## 2023-05-08 NOTE — PROGRESS NOTES
[facility]  Progress Note  Name: Gill Rodrigues  MRN: [id number]  Unit/Bed#: [facility] 531-01 I Date of Admission: 4/27/2023   Date of Service: 5/8/2023 I Hospital Day: 11    Assessment/Plan   Bradycardia  Assessment & Plan  - HR in the 40-50s  - Appears to be junctional bradycardia on telemetry  - EP consulted and will see patient today  - patient currently on metoprolol, diltiazem, and amiodarone which may be contributing to bradycardia    Ventricular arrhythmia  Assessment & Plan  - Hx of VT/VF s/p single chamber ICD  - ICD shock from VT episode 5/2 and admitted to ICU   - ICD zone adjusted per cards   -Cardiology recommendations appreciated  -Restarted home dose metoprolol  -Amiodarone gtt discontinued, oral amio 400 mg BID for 1 week  After she can be maintained on 200 mg daily   -Continue home dose Warfarin, holding due to INR this morning 4 4    UTI (urinary tract infection)  Assessment & Plan  - Febrile and positive urinalysis    Overnight afebrile  - Started on cefepime and vanco and de-escalated to ceftriaxone, completed 5 days of treatment    Type 2 diabetes mellitus [facility])  Assessment & Plan  No results found for: HGBA1C    Recent Labs     05/07/23  0728 05/07/23  1129 05/07/23  1631 05/07/23  2110   POCGLU 116 151* 202* 159*       Blood Sugar Average: Last 72 hrs:  (P) 132 5     - Continue current medication regimen   - SSI  - Outpatient follow-up with PCP  Fracture of multiple ribs of right side  Assessment & Plan  - Multiple right-sided rib fractures (8th-9th), present on admission   - Continue rib fracture protocol   - Continue to encourage incentive spirometer use and adequate pulmonary hygiene  - APS consult, appreciate recommendations  - Continue multimodal analgesic regimen     - Supplemental oxygen via nasal cannula as needed to maintain saturations greater than or equal to 94%    - PT and OT evaluation and treatment as indicated-recommending rehab placement, pending peer to peer today  - Outpatient follow-up in the trauma clinic for re-evaluation in approximately 2 weeks  Laceration of right eyebrow  Assessment & Plan  - two 1 cm lacerations to the R eye brow  - steri strips applied  - local wound care    H/O mechanical aortic valve replacement  Assessment & Plan  - Coumadin held while being evaluated for humerus fracture  - Goal INR reportedly 2 5 to 3 5  - INR supratherapeutic   - Morning INR 3 46 from 4 42, continue to hold coumadin today  - Recheck INR in the morning    Acute pain due to trauma  Assessment & Plan  - Acute pain secondary to traumatic injuries  - Multi modal pain regimen  - Bowel regimen as long as using opioids   - Continue to monitor pain and adjust regimen as indicated  Humerus fracture  Assessment & Plan  - proximal humerus fracture on the left   - NVI distally  - pain control with multi modal analgesia  - orthopedics consultation, appreciate recommendations  - Non-op  - NWB LUE  - Sling to the LUE  - Multimodal pain control  - PT/OT recommending rehab    Fall  Assessment & Plan  - mechanical fall   - sustained below stated injuries  - PT/OT evaluations - recommending rehab  - Geriatrics evaluation, appreciate recommendations  - CM for disposition planning           Bowel Regimen: senna  VTE Prophylaxis:Warfarin (Coumadin)     Disposition: pending CM    Subjective   Subjective: feeling weak but otherwise ok  She denies chest pain, dyspnea, or lightheadedness  Objective   Vitals:   Temp:  [97 3 °F (36 3 °C)-99 9 °F (37 7 °C)] 99 9 °F (37 7 °C)  HR:  [46-54] 47  Resp:  [16-18] 16  BP: ()/(50-56) 98/50    I/O       05/06 0701  05/07 0700 05/07 0701  05/08 0700 05/08 0701  05/09 0700    P  O  240 465     Total Intake(mL/kg) 240 (2 8) 465 (5 5)     Urine (mL/kg/hr) 400 (0 2) 850 (0 4)     Total Output 400 850     Net -160 -385            Unmeasured Urine Occurrence 1 x             Physical Exam:   General: awake and alert  CV: bradycardia, murmur in aortic area  Pulm: CTAB  Abd: soft and non-tender  Neuro: follows commands in all extremities  Skin: no new rashes    Invasive Devices     Drain  Duration           External Urinary Catheter 7 days                 PIC Score  PIC Pain Score: 2 (5/8/2023  1:38 PM)  PIC Incentive Spirometry Score: 3 (5/7/2023  4:00 PM)  PIC Cough Description: 3 (5/7/2023  4:00 PM)  PIC Total Score: 8 (5/7/2023  4:00 PM)       If the Total PIC Score </=5, did you consult APS and evaluate patient for further intervention?: yes      Pain:    Incentive Spirometry  Cough  3 = Controlled  4 = Above goal volume 3 = Strong  2 = Moderate  3 = Goal to alert volume 2 = Weak  1 = Severe  2 = Below alert volume 1 = Absent     1 = Unable to perform IS         Lab Results: Results: I have personally reviewed all pertinent laboratory/tests results  Imaging: I have personally reviewed pertinent reports       Other Studies: none

## 2023-05-08 NOTE — PROGRESS NOTES
Consultation - Electrophysiology Cardiology   Jennifer Spicer 68 y o  female MRN: [id number]  Unit/Bed#: [facility] 951-89 Encounter: [id number]      Assessment/PLAN: Jennifer Spicer is a 68year old female with past medical history of aortic stenosis status post mechanical AVR in 2011, VT/VF status post [facility] Scientific single-chamber ICD (abdominal) initially placed in 1997 at [address] s/p generator change in 2011, HTN, PAF on warfarin who presented as a trauma evaluation after mechanical fall in the grocery store  Electrophysiology evaluated the patient for VF status post ICD shock  Called to reevaluate patient today for bradycardia   Review of telemetry and EKG documents underlying Afib with junctional escape in 40s    #VT/VF status post successful shock: Appears patient possibly had some a-fib RVR that may have degenerated into VT and then subsequent VF status post a successful shock    -device reinterrogated 5/8 - appears to be AF with RVR s/p inappropriate shock (rate ~195 bpm)  -device reprogrammed 5/8 with VT monitor only zone at 160 bpm (no therapies) and VF zone with shocks at 210 bpm  -continue amiodarone 400 mg po bid through 5/11, then 200 mg once daily starting on 5/12  -Toprol XL decreased to 25 mg daily due to bradycardia (see below)    #Afib with SVR/heart block and junctional escape in 40s  -patient relatively asymptomatic and hemodynamically stable  -currently on dilt 120 mg, Toprol XL 50 mg, and amio loading  -D/C diltiazem  -decrease Toprol XL to 25 mg daily   -continue amiodarone loading (see above)  -reprogram device from VVI 40 to VVIR   -continue warfarin for stroke prevention     #[place] [employer] Single Chamber ICD in abdomen, initially implanted 1997 at [address], generator change 2011  -patient reports they had difficulty with access when attempting to place device  -Echo 5/3/2022 - EF 60%, severely dilated RV with moderately reduced function, LA and RA moderately dilated, mechanical AVR with mild AI, mild MR, severe TR  -device interrogation today reveals low R waves at 1 5, impedence and threshold stable  -device reprogrammed to VVIR  bpm (from VVI 40), VT/VF zones adjusted as per above, and RV output increased to 2 5 V  -consider RV lead revision at time of generator change in 1-1 5 years  -F/U with EP at [facility] versus [facility] EP - will need to discuss with patient    #Severe AS s/p Mechanical AVR:  -Continue warfarin with a goal of 2 5-3 5  -monitor INR closely given amiodarone initiation      #S/p mechanical fall with right rib fractures, left humerus fracture, eyebrow laceration  -non-operative management  -left arm sling  -trauma/ortho managing patient  -PT recommends rehab     #UTI  -completed antibiotic course      History of Present Illness   Physician Requesting Consult: David Wood MD  Reason for Consult / Principal Problem: VT/VF s/p Shock   HPI: Kim Rdz is a 68y o  year old female past medical history of aortic stenosis status post mechanical aortic valve placed in 2011, VT/VF status post single-chamber ICD placed in 2011 intra-abdominal, hypertension, paroxysmal atrial fibrillation that presented as a trauma evaluation after mechanical fall in the grocery store  Electrophysiology has been consulted for VF status post ICD shock  She states that she was walking in the grocery store and trying to reach for 9 home that was on her reach  Subsequently she tripped and fractured her left humerus and multiple ribs  She was brought in via EMS and was a trauma evaluation  She was on the floor when a rapid response was called and she was found to have VT and then subsequent VF with status post successful ICD shock  Before this hospitalization she states over the past couple of days that she was having some chills and rigors  Denies nausea/vomiting, abdominal pain, diarrhea, cough, chest pain, shortness of breath, PND, orthopnea, presyncopal symptoms, syncopal episodes      She states that she was possibly stopped on her beta-blocker by her cardiologist   She thinks it was stopped possibly due to her blood pressure but she is unsure  I do not see any recent notes that actually detail this medication change  We were asked to evaluate the patient today for bradycardia  Her HR has remained in the 40s  Currently, she is feeling well  She denies chest pain, SOB, lightheadedness, dizziness, orthopnea, or PND  Device Interrogation 5/8/2023:  [facility] single chamber ICD (abdomen)  Initial implant 1997 at [facility], gen change 2011  Battery life estimated at 1 5 years  R waves low at 1 5   Impedence stable  Threshold 1 1 V @ 0 4 ms  Arrhythmia logbook reviewed - probable AF with RVR s/p ICD shock  Reprogramming changes:  RV output increased to 2 5 V  Mode changed from VVI 40 bpm to VVIR   VT1 zone changed to monitor only at 160 bpm   bpm with shocks    EKG 5/8: junctional rhythm, probable underlying Afib with heart block    Telemetry: underlying Afib with junctional escape in 40s    Echo 5/3/2023:   •  Left Ventricle: Left ventricular cavity size is normal  Wall thickness is mildly increased  LVEF 60%  Systolic function is normal  Wall motion is normal  Diastolic function is abnormal   •  Right Ventricle: Right ventricular cavity size is severely dilated  Systolic function is moderately reduced  •  Left Atrium: The atrium is moderately dilated  •  Right Atrium: The atrium is moderately dilated  •  Aortic Valve: There is a mechanical valve  There is mild regurgitation  •  Mitral Valve: There is mild regurgitation  •  Tricuspid Valve: There is severe regurgitation  The estimated right ventricular systolic pressure is 68 09 mmHg  •  Aorta: There is an aneurysm in the ascending aorta  4 8cm      Review of Systems:  Review of Systems   Constitutional: Negative  HENT: Negative  Eyes: Negative  Respiratory: Negative for chest tightness, shortness of breath and wheezing  "  Cardiovascular: Negative for chest pain and palpitations  Gastrointestinal: Negative for abdominal distention, nausea and vomiting  Endocrine: Negative  Genitourinary: Negative  Neurological: Negative for dizziness, syncope, weakness and light-headedness  Hematological: Negative  14 systems reviewed and negative with the exception of the above and the following    Historical Information   Past Medical History:   Diagnosis Date   • Asthma    • Diabetes ([facility] [address] )    • Hypertension      Past Surgical History:   Procedure Laterality Date   • MECHANICAL AORTIC VALVE REPLACEMENT     • REPLACEMENT TOTAL KNEE       Social History     Substance and Sexual Activity   Alcohol Use Yes    Comment: socially     Social History     Substance and Sexual Activity   Drug Use Never     Social History     Tobacco Use   Smoking Status Never   Smokeless Tobacco Never     Family History: non-contributory    Meds/Allergies   all current active meds have been reviewed  Allergies   Allergen Reactions   • Fentanyl Confusion       Objective   Vitals: Blood pressure 123/56, pulse (!) 51, temperature 98 °F (36 7 °C), resp  rate 18, height 4' 10\" (1 473 m), weight 85 3 kg (188 lb), SpO2 99 %  , Body mass index is 39 29 kg/m² ,   Orthostatic Blood Pressures    Flowsheet Row Most Recent Value   Blood Pressure 123/56 filed at 05/08/2023 1038   Patient Position - Orthostatic VS Lying filed at 05/03/2023 0400            Intake/Output Summary (Last 24 hours) at 5/8/2023 1147  Last data filed at 5/8/2023 1050  Gross per 24 hour   Intake 540 ml   Output 850 ml   Net -310 ml       Invasive Devices     Drain  Duration           External Urinary Catheter 7 days                Physical Exam:    Gen: No acute distress  HEENT: Right eye laceration, anicteric, mucous membranes moist  Neck: supple, no jugular venous distention, or carotid bruit  Heart: JVP 6, regular, normal s1 and s2, no murmur/rub or gallop  Lungs :clear to auscultation " bilaterally, no rales/rhonchi or wheeze  Abdomen: soft nontender, normoactive bowel sounds, no organomegaly  Generator palpable in abdomen  Ext: Left arm cast  warm and perfused, normal femoral pulses, no edema, clubbing  Skin: warm, no rashes  Neuro: AAO x 3, no focal findings  Psychiatric: normal affect  Musculoskeletal: no obvious joint deformities  Lab Results:     No results found for: CKTOTAL, CKMB, CKMBINDEX, TROPONINI    Lab Results   Component Value Date    GLUCOSE 149 (H) 05/02/2023    CALCIUM 8 7 05/06/2023    K 4 9 05/06/2023    CO2 28 05/06/2023    CL 96 05/06/2023    BUN 33 (H) 05/06/2023    CREATININE 0 88 05/06/2023       Lab Results   Component Value Date    WBC 5 48 05/06/2023    HGB 10 1 (L) 05/06/2023    HCT 31 4 (L) 05/06/2023     (H) 05/06/2023     05/06/2023       No results found for: CHOL  No results found for: HDL  No results found for: LDLCALC  No results found for: TRIG    Lab Results   Component Value Date    ALT 24 04/27/2023    AST 34 04/27/2023       Results from last 7 days   Lab Units 05/08/23  0458   INR  3 46*       Imaging: I have personally reviewed pertinent reports

## 2023-05-08 NOTE — PLAN OF CARE
Problem: PHYSICAL THERAPY ADULT  Goal: Performs mobility at highest level of function for planned discharge setting  See evaluation for individualized goals  Description: Treatment/Interventions: OT, Spoke to nursing, Spoke to case management, Gait training, Bed mobility, Patient/family training, Endurance training, LE strengthening/ROM, Functional transfer training  Equipment Recommended:  (TBD)       See flowsheet documentation for full assessment, interventions and recommendations  Outcome: Progressing  Note: Prognosis: Good  Problem List: Decreased strength, Decreased endurance, Impaired balance, Decreased mobility, Obesity, Orthopedic restrictions  Assessment: pt participated in limited session for bed mobiltiy to assist wound care nurses with skin assessment after cardiology team member requested hold on OOB mobility after session initiated by therapy ziara   Pt requires increased assist to perform bed mobiitly comapred to baseline due to acute injuries & pain when attempting to perform the same  OOB not appropriate at this time, but will be attempted again once pt cleared for increased activity pending medical stability  Co-treatmetn with CROSS appropriate due to unstable medical status & limited mobiilty in recent sessions, necessitating skilled interventions  PT POC & d/c plan emain appropriate once pt medically appropriate  Barriers to Discharge: Inaccessible home environment, Decreased caregiver support     PT Discharge Recommendation: Post acute rehabilitation services    See flowsheet documentation for full assessment

## 2023-05-08 NOTE — CONSULTS
Consultation - [address] 68 y o  female MRN: [id number]  Unit/Bed#: [facility] 613-01 Encounter: [id number]    Assessment:  Pressure ulcer of the sacral region, unstageable   Ambulatory dysfunction   Type 2 DM without long term use of insulin   Urinary incontinence   Obesity       Plan:  • Evolving DTI to the b/l sacro-buttocks, hospital acquired  Once wound is fully evolved can consider debridement of the wound  Will recommend triad paste, due to urinary incontinence  • No s/s of infection present  • Will recommend preventative nursing skin care measures  • Pressure relief  Will recommend nutrition consult and p-500 mattress for additional support  • Patient verbalized understanding of plan of care  • [place] text wound care team with questions or concerns  • Routine wound care follow-up while admitted  AVS updated  • Follow-up with the [facility] wound care center as an outpatient, ambulatory referral placed  History of Present Illness:   Patient is a 68year old female who is admitted to the hospital s/p fall with humerus fracture and rib fractures  History of - aortic valve replacement, asthma, HTN and DM  Wound care consulted for sacral wound  Patient seen in bed, alert and oriented x 4, cooperative for the exam  Dependent for care  Patient states the skin to the sacrum/buttocks is sore with care, which began over the weekend  Primary RN reports wound was noticed over the weekend, but was not present on Friday  Patient is guarded with turning due to pain per nursing  Calazime cream in place at time of the assessment  No reported fever or chills  Subjective:    Review of Systems   Constitutional: Positive for activity change and fatigue  Negative for chills and fever  HENT: Negative for congestion and sneezing  Respiratory: Negative for cough  Musculoskeletal: Positive for gait problem  Skin: Positive for wound  Sacrum    Psychiatric/Behavioral: Negative for agitation  Historical Information   Past Medical History:   Diagnosis Date   • Asthma    • Diabetes ([address] )    • Hypertension      Past Surgical History:   Procedure Laterality Date   • MECHANICAL AORTIC VALVE REPLACEMENT     • REPLACEMENT TOTAL KNEE       Social History   Social History     Substance and Sexual Activity   Alcohol Use Yes    Comment: socially     Social History     Substance and Sexual Activity   Drug Use Never     E-Cigarette/Vaping     E-Cigarette/Vaping Substances     Social History     Tobacco Use   Smoking Status Never   Smokeless Tobacco Never     Family History:   Family History   Problem Relation Age of Onset   • Hypertension Other        Meds/Allergies   current meds:   Current Facility-Administered Medications   Medication Dose Route Frequency   • acetaminophen (TYLENOL) tablet 975 mg  975 mg Oral Q8H [facility]   • allopurinol (ZYLOPRIM) tablet 100 mg  100 mg Oral Daily   • [START ON 5/12/2023] amiodarone tablet 200 mg  200 mg Oral Daily With Breakfast   • amiodarone tablet 400 mg  400 mg Oral BID With Meals   • atorvastatin (LIPITOR) tablet 40 mg  40 mg Oral Daily With Dinner   • calcium carbonate (TUMS) chewable tablet 500 mg  500 mg Oral TID PRN   • citalopram (CeleXA) tablet 20 mg  20 mg Oral Daily   • diltiazem (CARDIZEM CD) 24 hr capsule 120 mg  120 mg Oral Daily   • docusate sodium (COLACE) capsule 100 mg  100 mg Oral BID   • furosemide (LASIX) tablet 40 mg  40 mg Oral Daily   • gabapentin (NEURONTIN) capsule 100 mg  100 mg Oral HS   • insulin detemir (LEVEMIR) subcutaneous injection 7 Units  7 Units Subcutaneous HS   • insulin lispro (HumaLOG) 100 units/mL subcutaneous injection 1-5 Units  1-5 Units Subcutaneous HS   • insulin lispro (HumaLOG) 100 units/mL subcutaneous injection 1-6 Units  1-6 Units Subcutaneous TID AC   • lidocaine (LIDODERM) 5 % patch 1 patch  1 patch Topical Daily   • methocarbamol (ROBAXIN) tablet 250 mg  250 mg Oral Q6H [facility]   • metoprolol succinate (TOPROL-XL) 24 hr tablet "50 mg  50 mg Oral Daily   • montelukast (SINGULAIR) tablet 10 mg  10 mg Oral HS   • naloxone (NARCAN) 0 04 mg/mL syringe 0 04 mg  0 04 mg Intravenous Q1MIN PRN   • ondansetron (ZOFRAN) injection 4 mg  4 mg Intravenous Q6H PRN   • oxybutynin (DITROPAN-XL) 24 hr tablet 5 mg  5 mg Oral Daily   • oxyCODONE (ROXICODONE) IR tablet 5 mg  5 mg Oral Q4H PRN   • oxyCODONE (ROXICODONE) split tablet 7 5 mg  7 5 mg Oral Q4H PRN   • senna (SENOKOT) tablet 17 2 mg  2 tablet Oral Daily     Allergies   Allergen Reactions   • Fentanyl Confusion       Objective   Vitals: Blood pressure 123/56, pulse (!) 51, temperature 98 °F (36 7 °C), resp  rate 18, height 4' 10\" (1 473 m), weight 85 3 kg (188 lb), SpO2 99 %  Wounds:     Wound 05/07/23 Pressure Injury Buttocks Right;Upper (Active)   Wound Image       05/08/23 0944   Wound Length (cm) 12 cm 05/08/23 0944   Wound Width (cm) 5 cm 05/08/23 0944   Wound Depth (cm) 0 2 cm 05/08/23 0944   Wound Surface Area (cm^2) 60 cm^2 05/08/23 0944   Wound Volume (cm^3) 12 cm^3 05/08/23 0944   Calculated Wound Volume (cm^3) 12 cm^3 05/08/23 0944       Physical Exam  Constitutional:       General: She is awake  She is not in acute distress  Appearance: She is obese  She is not diaphoretic  HENT:      Head: Normocephalic and atraumatic  Right Ear: External ear normal       Left Ear: External ear normal    Eyes:      Conjunctiva/sclera: Conjunctivae normal    Pulmonary:      Effort: Pulmonary effort is normal  No respiratory distress  Genitourinary:     Comments: Pure-wick in place, no fecal incontinence  Musculoskeletal:      Comments: Max assist of one with turning for the assessment  Foam wedges are in use for repositioning  L arm in sling    Skin:     General: Skin is warm and dry  Findings: Wound present  No erythema  Comments: 1  B/l sacro-buttocks - evolving DTI   Open aspect with full thickness skin loss, with noted linear distrubution of the " "discoloration/wounds, all aspects measured together  Wound presents as approx: 10% moist pink/red tissue, 20% moist yellow slough, and 70% intact non-blanchable skin and moist purple colored tissue in the full thickness aspect  Small serous sangineous drainage  Edges fragile and attached without maceration  Marta-wound is intact with blanchable pink/hyperpigmented skin  2  B/l heels are dry and intact without redness or wounds  No induration, fluctuance, odor, warmth/temperature differences, redness, or purulence noted to the above mentioned wounds and skin areas assessed  New dressings applied as noted above  Patient tolerated assessment well- wound is mildly tender with palpation and cleansing  Neurological:      Mental Status: She is alert and oriented to person, place, and time  Gait: Gait abnormal    Psychiatric:         Mood and Affect: Mood normal          Behavior: Behavior normal  Behavior is cooperative  Lab, Imaging and other studies: I have personally reviewed pertinent reports  Code Status: Level 1 - Full Code      Counseling / Coordination of Care  Total time spent today:    Total time (face-to-face and non-face-to-face) spent on today's visit was 15 minutes  This includes preparation for the visits (H&P on 4/27/23, and trauma note on 5/7/23) performance of a medically appropriate history and examination, and orders for medications/treatments or testing  Discussed assessment findings, and plan of care/recommendations with patients JOSEPHINE Lott CWON      Portions of the record may have been created with voice recognition software  Occasional wrong word or \"sound a like\" substitutions may have occurred due to the inherent limitations of voice recognition software    Read the chart carefully and recognize, using context, where substitutions have occurred      "

## 2023-05-08 NOTE — OCCUPATIONAL THERAPY NOTE
Occupational Therapy Treatment Note:      05/08/23 0950   OT Last Visit   OT Visit Date 05/08/23   Note Type   Note Type Treatment   Pain Assessment   Pain Assessment Tool 0-10   Pain Score No Pain   Restrictions/Precautions   LUE Weight Bearing Per Order NWB   Braces or Orthoses Sling  (L UE)   Bed Mobility   Rolling R   (MOD ASST TO ROLL R FOR WOUND NURSE TO DO SKIN CHECK)   Activity Tolerance   Activity Tolerance Patient tolerated treatment well   Assessment   Assessment PT PARTICIPATED IN AM OT SESSION AND WAS SEEN FOCUSING ON BED MOBILITY  PER NSG PT WITH BRADYCARDIA LAST ANALY  PA WITH CARDIOLGY HELD OOB THIS DATE DUE TO SAME  PLAN TO CONTINUE TX AS APPROPRAITE IN FUTURE   Plan   Treatment Interventions ADL retraining;Functional transfer training; Endurance training;Patient/family training; Activityengagement   Goal Expiration Date 05/12/23   OT Treatment Day 3   OT Frequency 2-3x/wk   Recommendation   OT Discharge Recommendation Post acute rehabilitation services   AM-PAC Daily Activity Inpatient   Lower Body Dressing 2   Bathing 2   Toileting 2   Upper Body Dressing 2   Grooming 2   Eating 3   Daily Activity Raw Score 13   Daily Activity Standardized Score (Calc for Raw Score >=11) 32 03   AM-PAC Applied Cognition Inpatient   Following a Speech/Presentation 4   Understanding Ordinary Conversation 4   Taking Medications 4   Remembering Where Things Are Placed or Put Away 4   Remembering List of 4-5 Errands 4   Taking Care of Complicated Tasks 3   Applied Cognition Raw Score 23   Applied Cognition Standardized Score 53 08     April A Storm

## 2023-05-08 NOTE — ASSESSMENT & PLAN NOTE
- HR in the 40-50s  - Appears to be junctional bradycardia on telemetry  - EP consulted and will see patient today  - patient currently on metoprolol, diltiazem, and amiodarone which may be contributing to bradycardia

## 2023-05-08 NOTE — DISCHARGE INSTR - OTHER ORDERS
Cleanse b/l sacro-buttocks with soap and water, pat dry, and apply triad paste to the open wound and cover with Allevyn foam dressing  Bao dressing with T  Change every other day and as needed for soilage/dislodgement

## 2023-05-08 NOTE — PHYSICAL THERAPY NOTE
Physical Therapy Progress Note     05/08/23 0954   PT Last Visit   PT Visit Date 05/08/23   Note Type   Note Type Treatment   Pain Assessment   Pain Assessment Tool FLACC   Pain Rating: FLACC (Rest) - Face 1   Pain Rating: FLACC (Rest) - Legs 0   Pain Rating: FLACC (Rest) - Activity 0   Pain Rating: FLACC (Rest) - Cry 1   Pain Rating: FLACC (Rest) - Consolability 0   Score: FLACC (Rest) 2   Pain Rating: FLACC (Activity) - Face 2   Pain Rating: FLACC (Activity) - Legs 1   Pain Rating: FLACC (Activity) - Activity 1   Pain Rating: FLACC (Activity) - Cry 1   Pain Rating: FLACC (Activity) - Consolability 0   Score: FLACC (Activity) 5   Restrictions/Precautions   LUE Weight Bearing Per Order NWB   Braces or Orthoses Sling   Other Precautions WBS;Pain; Fall Risk; Chair Alarm; Bed Alarm   Subjective   Subjective Pt encountered supine in bed, reports no new complaints snce last session  did report limited sleep though due to nurses keeping close eye on her HR overnight  RN cleared for PT session & reported pt was already up to [address] [place] [address] in the AM   Cardiology PA assessed pt just prior to attempting OOB mobiilty & requested hold on the same due to ongoing HR monitoring  Bed Mobility   Rolling R 3  Moderate assistance   Additional items Assist x 1; Increased time required   Supine to Sit Unable to assess   Activity Tolerance   Activity Tolerance Treatment limited secondary to medical complications (Comment)   Nurse Paulette Castaneda, RN   Assessment   Prognosis Good   Problem List Decreased strength;Decreased endurance; Impaired balance;Decreased mobility;Obesity;Orthopedic restrictions   Assessment pt participated in limited session for bed mobiltiy to assist wound care nurses with skin assessment after cardiology team member requested hold on OOB mobility after session initiated by therapy zaira   Pt requires increased assist to perform bed mobiitly comapred to baseline due to acute injuries & pain when attempting to perform the same  OOB not appropriate at this time, but will be attempted again once pt cleared for increased activity pending medical stability  Co-treatmetn with CROSS appropriate due to unstable medical status & limited mobiilty in recent sessions, necessitating skilled interventions  PT POC & d/c plan emain appropriate once pt medically appropriate  Goals   Patient Goals to feel better   STG Expiration Date 05/10/23   PT Treatment Day 2   Plan   Treatment/Interventions Functional transfer training;LE strengthening/ROM; Therapeutic exercise; Endurance training;Patient/family training;Equipment eval/education; Bed mobility;Gait training   Progress Slow progress, medical status limitations   PT Frequency 3-5x/wk   Recommendation   PT Discharge Recommendation Post acute rehabilitation services   Equipment Recommended Cane  (continue to assess least restrictive 1 handed device)   AM-PAC Basic Mobility Inpatient   Turning in Flat Bed Without Bedrails 2   Lying on Back to Sitting on Edge of Flat Bed Without Bedrails 2   Moving Bed to Chair 2   Standing Up From Chair Using Arms 2   Walk in Room 2   Climb 3-5 Stairs With Railing 1   Basic Mobility Inpatient Raw Score 11   Basic Mobility Standardized Score 30 25   Highest Level Of Mobility   JH-HLM Goal 4: Move to chair/commode   JH-HLM Achieved 2: Bed activities/Dependent transfer       Sherron Six, PTA    [facility] Basic Mobility Raw Score less than 17 suggests pt would benefit from post acute rehab  Please also refer to the recommendation of the Physical Therapist for safe discharge planning

## 2023-05-08 NOTE — ASSESSMENT & PLAN NOTE
- Febrile and positive urinalysis    Overnight afebrile  - Started on cefepime and vanco and de-escalated to ceftriaxone, completed 5 days of treatment

## 2023-05-08 NOTE — CASE MANAGEMENT
Case Management Discharge Planning Note    Patient name Kim Rdz  Location [address] 613/[facility]HP 364-71 MRN [id number]  : 1945 Date 2023       Current Admission Date: 2023  Current Admission Diagnosis:Fall   Patient Active Problem List    Diagnosis Date Noted   • Bradycardia 2023   • UTI (urinary tract infection) 2023   • Ventricular arrhythmia 2023   • Type 2 diabetes mellitus ([address] ) 2023   • Fall 2023   • Humerus fracture 2023   • Acute pain due to trauma 2023   • H/O mechanical aortic valve replacement 2023   • Laceration of right eyebrow 2023   • Fracture of multiple ribs of right side 2023      LOS (days): 11  Geometric Mean LOS (GMLOS) (days): 2 70  Days to GMLOS:-7 2     OBJECTIVE:  Risk of Unplanned Readmission Score: 16 9         Current admission status: Inpatient   Preferred Pharmacy:   [facility]  [address] [facility], [facility] [address] [place] [id number]  Phone: [phone] Fax: [phone]    Primary Care Provider: Omayra Jacobs DO    Primary Insurance: Madeleine Ruffin [facility]  Secondary Insurance:     DISCHARGE DETAILS:    CM received call from pt's [facility] (who had the pt's dtr on the other line)  CM explained that Chiquis Snuffer was denied last week, but the peer to peer wasn't completed bc the pt wasn't medically stable  Today pt is medically stable, and auth was submitted for [facility] as the pt and her dtr want that level of care  [facility] confirmed with CM, and dtr, that [facility] is primary  Pt's dtr understands  Beuford Aime is pending and cm will follow up with new therapy notes

## 2023-05-08 NOTE — CASE MANAGEMENT
[facility] [address] received request for authorization from Care Manager    Authorization request for: [address] Name:  Pavan Brown NPI: [id number]  Facility MD:   Kenneth Phillip  NPI: [id number]  Authorization initiated by contacting insurance: Chris Rey Via: [employer]  Pending Reference #: [id number]   Clinicals submitted via: [employer]

## 2023-05-08 NOTE — ASSESSMENT & PLAN NOTE
- Coumadin held while being evaluated for humerus fracture  - Goal INR reportedly 2 5 to 3 5  - INR supratherapeutic   - Morning INR 3 46 from 4 42, continue to hold coumadin today  - Recheck INR in the morning

## 2023-05-09 PROBLEM — E87.1 HYPONATREMIA: Status: ACTIVE | Noted: 2023-05-09

## 2023-05-09 LAB
ANION GAP SERPL CALCULATED.3IONS-SCNC: 5 MMOL/L (ref 4–13)
ATRIAL RATE: 42 BPM
ATRIAL RATE: 46 BPM
BUN SERPL-MCNC: 32 MG/DL (ref 5–25)
CALCIUM SERPL-MCNC: 8 MG/DL (ref 8.3–10.1)
CHLORIDE SERPL-SCNC: 96 MMOL/L (ref 96–108)
CO2 SERPL-SCNC: 24 MMOL/L (ref 21–32)
CREAT SERPL-MCNC: 0.68 MG/DL (ref 0.6–1.3)
GFR SERPL CREATININE-BSD FRML MDRD: 84 ML/MIN/1.73SQ M
GLUCOSE SERPL-MCNC: 107 MG/DL (ref 65–140)
GLUCOSE SERPL-MCNC: 121 MG/DL (ref 65–140)
GLUCOSE SERPL-MCNC: 127 MG/DL (ref 65–140)
GLUCOSE SERPL-MCNC: 72 MG/DL (ref 65–140)
GLUCOSE SERPL-MCNC: 84 MG/DL (ref 65–140)
INR PPP: 2.77 (ref 0.84–1.19)
POTASSIUM SERPL-SCNC: 4.3 MMOL/L (ref 3.5–5.3)
PROTHROMBIN TIME: 29.5 SECONDS (ref 11.6–14.5)
QRS AXIS: 135 DEGREES
QRS AXIS: 139 DEGREES
QRSD INTERVAL: 94 MS
QRSD INTERVAL: 94 MS
QT INTERVAL: 508 MS
QT INTERVAL: 534 MS
QTC INTERVAL: 429 MS
QTC INTERVAL: 456 MS
SODIUM SERPL-SCNC: 125 MMOL/L (ref 135–147)
T WAVE AXIS: 37 DEGREES
T WAVE AXIS: 42 DEGREES
VENTRICULAR RATE: 43 BPM
VENTRICULAR RATE: 44 BPM

## 2023-05-09 RX ORDER — WARFARIN SODIUM 2.5 MG/1
2.5 TABLET ORAL
Status: DISCONTINUED | OUTPATIENT
Start: 2023-05-09 | End: 2023-05-13 | Stop reason: HOSPADM

## 2023-05-09 RX ORDER — SODIUM CHLORIDE 1 G/1
1 TABLET ORAL
Status: DISCONTINUED | OUTPATIENT
Start: 2023-05-09 | End: 2023-05-12

## 2023-05-09 RX ADMIN — ACETAMINOPHEN 975 MG: 325 TABLET ORAL at 18:13

## 2023-05-09 RX ADMIN — METHOCARBAMOL 250 MG: 500 TABLET ORAL at 18:13

## 2023-05-09 RX ADMIN — MONTELUKAST 10 MG: 10 TABLET, FILM COATED ORAL at 22:09

## 2023-05-09 RX ADMIN — METHOCARBAMOL 250 MG: 500 TABLET ORAL at 23:53

## 2023-05-09 RX ADMIN — ACETAMINOPHEN 975 MG: 325 TABLET ORAL at 10:09

## 2023-05-09 RX ADMIN — OXYCODONE HYDROCHLORIDE 5 MG: 5 TABLET ORAL at 10:05

## 2023-05-09 RX ADMIN — METHOCARBAMOL 250 MG: 500 TABLET ORAL at 11:15

## 2023-05-09 RX ADMIN — METHOCARBAMOL 250 MG: 500 TABLET ORAL at 00:13

## 2023-05-09 RX ADMIN — ACETAMINOPHEN 975 MG: 325 TABLET ORAL at 23:53

## 2023-05-09 RX ADMIN — FUROSEMIDE 40 MG: 40 TABLET ORAL at 10:01

## 2023-05-09 RX ADMIN — ATORVASTATIN CALCIUM 40 MG: 40 TABLET, FILM COATED ORAL at 18:14

## 2023-05-09 RX ADMIN — AMIODARONE HYDROCHLORIDE 400 MG: 200 TABLET ORAL at 18:14

## 2023-05-09 RX ADMIN — OXYBUTYNIN 5 MG: 5 TABLET, FILM COATED, EXTENDED RELEASE ORAL at 10:01

## 2023-05-09 RX ADMIN — INSULIN DETEMIR 7 UNITS: 100 INJECTION, SOLUTION SUBCUTANEOUS at 22:09

## 2023-05-09 RX ADMIN — AMIODARONE HYDROCHLORIDE 400 MG: 200 TABLET ORAL at 10:09

## 2023-05-09 RX ADMIN — DOCUSATE SODIUM 100 MG: 100 CAPSULE, LIQUID FILLED ORAL at 10:01

## 2023-05-09 RX ADMIN — ACETAMINOPHEN 975 MG: 325 TABLET ORAL at 00:13

## 2023-05-09 RX ADMIN — METOPROLOL SUCCINATE 25 MG: 25 TABLET, FILM COATED, EXTENDED RELEASE ORAL at 10:01

## 2023-05-09 RX ADMIN — OXYCODONE HYDROCHLORIDE 5 MG: 5 TABLET ORAL at 20:47

## 2023-05-09 RX ADMIN — METHOCARBAMOL 250 MG: 500 TABLET ORAL at 05:15

## 2023-05-09 RX ADMIN — CITALOPRAM HYDROBROMIDE 20 MG: 20 TABLET ORAL at 10:00

## 2023-05-09 RX ADMIN — GABAPENTIN 100 MG: 100 CAPSULE ORAL at 22:09

## 2023-05-09 RX ADMIN — WARFARIN SODIUM 2.5 MG: 2.5 TABLET ORAL at 18:14

## 2023-05-09 RX ADMIN — ALLOPURINOL 100 MG: 100 TABLET ORAL at 10:01

## 2023-05-09 RX ADMIN — LIDOCAINE 5% 1 PATCH: 700 PATCH TOPICAL at 10:01

## 2023-05-09 RX ADMIN — SODIUM CHLORIDE 1 G: 1 TABLET ORAL at 11:14

## 2023-05-09 RX ADMIN — SODIUM CHLORIDE 1 G: 1 TABLET ORAL at 18:13

## 2023-05-09 NOTE — ASSESSMENT & PLAN NOTE
- Warfarin held while being evaluated for humerus fracture  - Goal INR reportedly 2 5 to 3 5  - Difficulty managing her INR as she seems very sensitive to 5 mg (which is her home dose 5/7 days a week) as she became supratherapeutic  - Will start warfarin 2 5 mg daily  - Recheck INR in the morning

## 2023-05-09 NOTE — CONSULTS
RD consulted re: sacral wound  Follow up note documented in flow sheet  Summary/ plan:  Patient tolerating meal intake with mostly 100% meal completion; prefers small meals; did choose protein foods with lunch and dinner today; encouraged same; Agreeable to glucerna supplement that was scheduled BID for additional protein/ calories

## 2023-05-09 NOTE — PLAN OF CARE
Problem: Potential for Falls  Goal: Patient will remain free of falls  Description: INTERVENTIONS:  - Educate patient/family on patient safety including physical limitations  - Instruct patient to call for assistance with activity   - Consult OT/PT to assist with strengthening/mobility   - Keep Call bell within reach  - Keep bed low and locked with side rails adjusted as appropriate  - Keep care items and personal belongings within reach  - Initiate and maintain comfort rounds  - Make Fall Risk Sign visible to staff  - Offer Toileting every 2 Hours, in advance of need  - Initiate/Maintain on alarm  - Obtain necessary fall risk management equipment:   - Apply yellow socks and bracelet for high fall risk patients  - Consider moving patient to room near nurses station  Outcome: Progressing     Problem: SKIN/TISSUE INTEGRITY - ADULT  Goal: Incision(s), wounds(s) or drain site(s) healing without S/S of infection  Description: INTERVENTIONS  - Assess and document dressing, incision, wound bed, drain sites and surrounding tissue  - Provide patient and family education  - Perform skin care/dressing changes ever  Outcome: Progressing     Problem: MUSCULOSKELETAL - ADULT  Goal: Maintain or return mobility to safest level of function  Description: INTERVENTIONS:  - Assess patient's ability to carry out ADLs; assess patient's baseline for ADL function and identify physical deficits which impact ability to perform ADLs (bathing, care of mouth/teeth, toileting, grooming, dressing, etc )  - Assess/evaluate cause of self-care deficits   - Assess range of motion  - Assess patient's mobility  - Assess patient's need for assistive devices and provide as appropriate  - Encourage maximum independence but intervene and supervise when necessary  - Involve family in performance of ADLs  - Assess for home care needs following discharge   - Consider OT consult to assist with ADL evaluation and planning for discharge  - Provide patient education as appropriate  Outcome: Progressing  Goal: Maintain proper alignment of affected body part  Description: INTERVENTIONS:  - Support, maintain and protect limb and body alignment  - Provide patient/ family with appropriate education  Outcome: Progressing

## 2023-05-09 NOTE — ASSESSMENT & PLAN NOTE
No results found for: HGBA1C    Recent Labs     05/08/23  0716 05/08/23  1039 05/08/23  1600 05/08/23  2120   POCGLU 106 175* 131 125       Blood Sugar Average: Last 72 hrs:  (P) 210 [id number]     - Continue current medication regimen   - SSI  - Outpatient follow-up with PCP

## 2023-05-09 NOTE — QUICK NOTE
Patient briefly seen and evaluated  She is feeling much better today following the device programming changes made yesterday (mainly increasing her lower pacing rate to 60)  Telemetry shows that she has not had recurrent arrhythmias, and is predominantly paced in the 60s  Recommend continuing her ongoing regimen as outlined yesterday, with amiodarone 400 mg twice daily through 5/11 followed by a decreased maintenance dose of 200 mg daily starting 5/12  Her Toprol-XL has been decreased to 25 mg daily, and her Cardizem was discontinued  Dr Kiara Wu personally reached out to her primary cardiologist (Dr Gustabo Rene) regarding outpatient follow-up  [facility] general cardiology needs, but we will follow her from an EP and device standpoint  She has follow-up arranged with me next month  She is agreeable with this plan  She is being started on amiodarone, recommend repeating thyroid studies this admission  Upon chart review, these were last checked 9/2021, at which time they were normal   I will also recheck LFTs  These will be ordered for tomorrow morning, but should be drawn today if she is being discharged  EP will sign off at this time, please contact us with questions or concerns

## 2023-05-09 NOTE — ASSESSMENT & PLAN NOTE
- Na is slowly downtrending, now to 125 today  - Presumed secondary to furosemide and possibly poor PO intake  - Will start 1 g NaCl tabs TID

## 2023-05-09 NOTE — ASSESSMENT & PLAN NOTE
- Hx of VT/VF s/p single chamber ICD  - ICD shock from VT episode 5/2 and admitted to ICU   - ICD zone adjusted per cards   - Cardiology recommendations appreciated  - Continue 25 mg metoprolol daily  - Amiodarone gtt discontinued, oral amio 400 mg BID for 1 week   After she can be maintained on 200 mg daily   - Continue home dose Warfarin when INR is therapeutic

## 2023-05-09 NOTE — ASSESSMENT & PLAN NOTE
- HR in the 40-50s  - Appears to be junctional bradycardia on telemetry  - EP consulted and discontinued diltiazem and adjusted metoprolol to 25 mg daily  - After medication adjustments, HR is paced in the 50-60s

## 2023-05-09 NOTE — PROGRESS NOTES
[facility]  Progress Note  Name: Eryn Mckeon  MRN: [id number]  Unit/Bed#: [facility] 210-14 I Date of Admission: 4/27/2023   Date of Service: 5/9/2023 I Hospital Day: 12    Assessment/Plan   Hyponatremia  Assessment & Plan  - Na is slowly downtrending, now to 125 today  - Presumed secondary to furosemide and possibly poor PO intake  - Will start 1 g NaCl tabs TID    Bradycardia  Assessment & Plan  - HR in the 40-50s  - Appears to be junctional bradycardia on telemetry  - EP consulted and discontinued diltiazem and adjusted metoprolol to 25 mg daily  - After medication adjustments, HR is paced in the 50-60s    Ventricular arrhythmia  Assessment & Plan  - Hx of VT/VF s/p single chamber ICD  - ICD shock from VT episode 5/2 and admitted to ICU   - ICD zone adjusted per cards   - Cardiology recommendations appreciated  - Continue 25 mg metoprolol daily  - Amiodarone gtt discontinued, oral amio 400 mg BID for 1 week  After she can be maintained on 200 mg daily   - Continue home dose Warfarin when INR is therapeutic    UTI (urinary tract infection)  Assessment & Plan  - Started on cefepime and vanco and de-escalated to ceftriaxone, completed 5 days of treatment    Type 2 diabetes mellitus [facility])  Assessment & Plan  No results found for: HGBA1C    Recent Labs     05/08/23  0716 05/08/23  1039 05/08/23  1600 05/08/23  2120   POCGLU 106 175* 131 125       Blood Sugar Average: Last 72 hrs:  (P) 797 [id number]     - Continue current medication regimen   - SSI  - Outpatient follow-up with PCP  Fracture of multiple ribs of right side  Assessment & Plan  - Multiple right-sided rib fractures (8th-9th), present on admission   - Continue rib fracture protocol   - Continue to encourage incentive spirometer use and adequate pulmonary hygiene    - APS consult, appreciate recommendations  - Continue multimodal analgesic regimen     - Supplemental oxygen via nasal cannula as needed to maintain saturations greater than or equal to 94%  - PT and OT evaluation and treatment as indicated-recommending rehab placement, pending peer to peer today  - Outpatient follow-up in the trauma clinic for re-evaluation in approximately 2 weeks  Laceration of right eyebrow  Assessment & Plan  - two 1 cm lacerations to the R eye brow  - steri strips applied  - local wound care    H/O mechanical aortic valve replacement  Assessment & Plan  - Warfarin held while being evaluated for humerus fracture  - Goal INR reportedly 2 5 to 3 5  - Difficulty managing her INR as she seems very sensitive to 5 mg (which is her home dose 5/7 days a week) as she became supratherapeutic  - Will start warfarin 2 5 mg daily  - Recheck INR in the morning    Acute pain due to trauma  Assessment & Plan  - Acute pain secondary to traumatic injuries  - Multi modal pain regimen  - Bowel regimen as long as using opioids   - Continue to monitor pain and adjust regimen as indicated  Humerus fracture  Assessment & Plan  - proximal humerus fracture on the left   - NVI distally  - pain control with multi modal analgesia  - orthopedics consultation, appreciate recommendations  - Non-op  - NWB LUE  - Sling to the LUE  - Multimodal pain control  - PT/OT recommending rehab    Fall  Assessment & Plan  - mechanical fall   - sustained below stated injuries  - PT/OT evaluations - recommending rehab  - Geriatrics evaluation, appreciate recommendations  - CM for disposition planning           Bowel Regimen: senna  VTE Prophylaxis:Warfarin (Coumadin)     Disposition: to rehab pending CM    Subjective   Subjective: feeling the same  No major concerns  Diltizem discontinued yesterday and metoprolol changed to 25 mg daily       Objective   Vitals:   Temp:  [97 7 °F (36 5 °C)-99 9 °F (37 7 °C)] 98 2 °F (36 8 °C)  HR:  [47-60] 60  Resp:  [16-18] 18  BP: ()/(50-74) 115/74    I/O       05/07 0701  05/08 0700 05/08 0701 05/09 0700 05/09 0701  05/10 0700 P O  465 480     Total Intake(mL/kg) 465 (5 5) 480 (5 6)     Urine (mL/kg/hr) 850 (0 4)      Total Output 850      Net -385 +480            Unmeasured Urine Occurrence  2 x            Physical Exam:   General: awake and alert  HEENT: eyebrow laceration scabbed and without bleeding or erythema  CV: RRR  Pulm: CTAB  Abd: soft and non-tender  Neuro: follows commands in all extremities  Skin: no new rashes    Invasive Devices     Peripheral Intravenous Line  Duration           Peripheral IV 05/08/23 Proximal;Right;Ventral (anterior) Forearm <1 day          Drain  Duration           External Urinary Catheter 8 days                 PIC Score  PIC Pain Score: 2 (5/9/2023 10:05 AM)       If the Total PIC Score </=5, did you consult APS and evaluate patient for further intervention?: no      Pain:    Incentive Spirometry  Cough  3 = Controlled  4 = Above goal volume 3 = Strong  2 = Moderate  3 = Goal to alert volume 2 = Weak  1 = Severe  2 = Below alert volume 1 = Absent     1 = Unable to perform IS         Lab Results: Results: I have personally reviewed all pertinent laboratory/tests results  Imaging: I have personally reviewed pertinent reports       Other Studies: none

## 2023-05-09 NOTE — CASE MANAGEMENT
Support Center has received intent to deny     Denial received for: Acute Rehab  Facility:# [facility] [place]   Denial #:[id number]  Denial Reason: no medical complexities, can receive care at lower LOC  Peer to Peer phone#:   [phone] opt 2    Deadline: 5/9 @ 4:30p  Fast Appeal P: [phone]  Will approve a SNF if initiated within 48 hours  Care Manager notified: Andrzej Estrada to Physician Advisors Team

## 2023-05-09 NOTE — PLAN OF CARE
Problem: SKIN/TISSUE INTEGRITY - ADULT  Goal: Incision(s), wounds(s) or drain site(s) healing without S/S of infection  Description: INTERVENTIONS  - Assess and document dressing, incision, wound bed, drain sites and surrounding tissue  - Provide patient and family education  - Perform skin care/dressing changes every   Problem: MUSCULOSKELETAL - ADULT  Goal: Maintain or return mobility to safest level of function  Description: INTERVENTIONS:  - Assess patient's ability to carry out ADLs; assess patient's baseline for ADL function and identify physical deficits which impact ability to perform ADLs (bathing, care of mouth/teeth, toileting, grooming, dressing, etc )  - Assess/evaluate cause of self-care deficits   - Assess range of motion  - Assess patient's mobility  - Assess patient's need for assistive devices and provide as appropriate  - Encourage maximum independence but intervene and supervise when necessary  - Involve family in performance of ADLs  - Assess for home care needs following discharge   - Consider OT consult to assist with ADL evaluation and planning for discharge  - Provide patient education as appropriate  Outcome: Progressing  Goal: Maintain proper alignment of affected body part  Description: INTERVENTIONS:  - Support, maintain and protect limb and body alignment  - Provide patient/ family with appropriate education  Outcome: Progressing     Outcome: Progressing

## 2023-05-09 NOTE — PLAN OF CARE
Problem: Potential for Falls  Goal: Patient will remain free of falls  Description: INTERVENTIONS:  - Educate patient/family on patient safety including physical limitations  - Instruct patient to call for assistance with activity   - Consult OT/PT to assist with strengthening/mobility   - Keep Call bell within reach  - Keep bed low and locked with side rails adjusted as appropriate  - Keep care items and personal belongings within reach  - Initiate and maintain comfort rounds  - Make Fall Risk Sign visible to staff  - Offer Toileting every 2 Hours, in advance of need  - Initiate/Maintain on alarm  - Obtain necessary fall risk management equipment:   - Apply yellow socks and bracelet for high fall risk patients  - Consider moving patient to room near nurses station  Outcome: Progressing     Problem: SKIN/TISSUE INTEGRITY - ADULT  Goal: Incision(s), wounds(s) or drain site(s) healing without S/S of infection  Description: INTERVENTIONS  - Assess and document dressing, incision, wound bed, drain sites and surrounding tissue  - Provide patient and family education  - Perform skin care/dressing changes every   Outcome: Progressing     Problem: MUSCULOSKELETAL - ADULT  Goal: Maintain or return mobility to safest level of function  Description: INTERVENTIONS:  - Assess patient's ability to carry out ADLs; assess patient's baseline for ADL function and identify physical deficits which impact ability to perform ADLs (bathing, care of mouth/teeth, toileting, grooming, dressing, etc )  - Assess/evaluate cause of self-care deficits   - Assess range of motion  - Assess patient's mobility  - Assess patient's need for assistive devices and provide as appropriate  - Encourage maximum independence but intervene and supervise when necessary  - Involve family in performance of ADLs  - Assess for home care needs following discharge   - Consider OT consult to assist with ADL evaluation and planning for discharge  - Provide patient education as appropriate  Outcome: Progressing  Goal: Maintain proper alignment of affected body part  Description: INTERVENTIONS:  - Support, maintain and protect limb and body alignment  - Provide patient/ family with appropriate education  Outcome: Progressing     Problem: MOBILITY - ADULT  Goal: Maintain or return to baseline ADL function  Description: INTERVENTIONS:  - Educate patient/family on patient safety including physical limitations  - Instruct patient to call for assistance with activity   - Consult OT/PT to assist with strengthening/mobility   - Keep Call bell within reach  - Keep bed low and locked with side rails adjusted as appropriate  - Keep care items and personal belongings within reach  - Initiate and maintain comfort rounds  - Make Fall Risk Sign visible to staff  - Offer Toileting every 2 Hours, in advance of need  - Initiate/Maintain on alarm  - Obtain necessary fall risk management equipment:  - Apply yellow socks and bracelet for high fall risk patients  - Consider moving patient to room near nurses station  Outcome: Progressing  Goal: Maintains/Returns to pre admission functional level  Description: INTERVENTIONS:  - Perform BMAT or MOVE assessment daily    - Set and communicate daily mobility goal to care team and patient/family/caregiver  - Collaborate with rehabilitation services on mobility goals if consulted  - Perform Range of Motion  times a day  - Reposition patient every  hours    - Dangle patient  times a day  - Stand patient  times a day  - Ambulate patient  times a day  - Out of bed to chair  times a day   - Out of bed for meals times a day  - Out of bed for toileting  - Record patient progress and toleration of activity level   Outcome: Progressing

## 2023-05-10 PROBLEM — R93.89 ABNORMAL CT SCAN: Status: ACTIVE | Noted: 2023-05-10

## 2023-05-10 LAB
ALBUMIN SERPL BCP-MCNC: 2.1 G/DL (ref 3.5–5)
ALP SERPL-CCNC: 165 U/L (ref 46–116)
ALT SERPL W P-5'-P-CCNC: 26 U/L (ref 12–78)
ANION GAP SERPL CALCULATED.3IONS-SCNC: 2 MMOL/L (ref 4–13)
AST SERPL W P-5'-P-CCNC: 56 U/L (ref 5–45)
BILIRUB SERPL-MCNC: 1 MG/DL (ref 0.2–1)
BUN SERPL-MCNC: 27 MG/DL (ref 5–25)
CALCIUM ALBUM COR SERPL-MCNC: 9.4 MG/DL (ref 8.3–10.1)
CALCIUM SERPL-MCNC: 7.9 MG/DL (ref 8.3–10.1)
CHLORIDE SERPL-SCNC: 100 MMOL/L (ref 96–108)
CO2 SERPL-SCNC: 28 MMOL/L (ref 21–32)
CREAT SERPL-MCNC: 0.73 MG/DL (ref 0.6–1.3)
GFR SERPL CREATININE-BSD FRML MDRD: 79 ML/MIN/1.73SQ M
GLUCOSE SERPL-MCNC: 111 MG/DL (ref 65–140)
GLUCOSE SERPL-MCNC: 129 MG/DL (ref 65–140)
GLUCOSE SERPL-MCNC: 131 MG/DL (ref 65–140)
GLUCOSE SERPL-MCNC: 171 MG/DL (ref 65–140)
GLUCOSE SERPL-MCNC: 62 MG/DL (ref 65–140)
GLUCOSE SERPL-MCNC: 69 MG/DL (ref 65–140)
INR PPP: 2.64 (ref 0.84–1.19)
POTASSIUM SERPL-SCNC: 4.9 MMOL/L (ref 3.5–5.3)
PROT SERPL-MCNC: 6.2 G/DL (ref 6.4–8.4)
PROTHROMBIN TIME: 28.5 SECONDS (ref 11.6–14.5)
SODIUM SERPL-SCNC: 130 MMOL/L (ref 135–147)
T4 FREE SERPL-MCNC: 1.28 NG/DL (ref 0.76–1.46)
TSH SERPL DL<=0.05 MIU/L-ACNC: 4.65 UIU/ML (ref 0.45–4.5)

## 2023-05-10 RX ORDER — INSULIN LISPRO 100 [IU]/ML
1-5 INJECTION, SOLUTION INTRAVENOUS; SUBCUTANEOUS
Status: DISCONTINUED | OUTPATIENT
Start: 2023-05-10 | End: 2023-05-13 | Stop reason: HOSPADM

## 2023-05-10 RX ADMIN — SODIUM CHLORIDE 1 G: 1 TABLET ORAL at 11:26

## 2023-05-10 RX ADMIN — Medication 7.5 MG: at 06:30

## 2023-05-10 RX ADMIN — ACETAMINOPHEN 975 MG: 325 TABLET ORAL at 09:17

## 2023-05-10 RX ADMIN — LIDOCAINE 5% 1 PATCH: 700 PATCH TOPICAL at 09:20

## 2023-05-10 RX ADMIN — SODIUM CHLORIDE 1 G: 1 TABLET ORAL at 18:17

## 2023-05-10 RX ADMIN — WARFARIN SODIUM 2.5 MG: 2.5 TABLET ORAL at 18:21

## 2023-05-10 RX ADMIN — OXYCODONE HYDROCHLORIDE 5 MG: 5 TABLET ORAL at 18:22

## 2023-05-10 RX ADMIN — ALLOPURINOL 100 MG: 100 TABLET ORAL at 09:18

## 2023-05-10 RX ADMIN — GABAPENTIN 100 MG: 100 CAPSULE ORAL at 22:30

## 2023-05-10 RX ADMIN — DOCUSATE SODIUM 100 MG: 100 CAPSULE, LIQUID FILLED ORAL at 09:17

## 2023-05-10 RX ADMIN — AMIODARONE HYDROCHLORIDE 400 MG: 200 TABLET ORAL at 18:16

## 2023-05-10 RX ADMIN — METHOCARBAMOL 250 MG: 500 TABLET ORAL at 06:31

## 2023-05-10 RX ADMIN — SODIUM CHLORIDE 1 G: 1 TABLET ORAL at 09:18

## 2023-05-10 RX ADMIN — INSULIN DETEMIR 5 UNITS: 100 INJECTION, SOLUTION SUBCUTANEOUS at 22:32

## 2023-05-10 RX ADMIN — OXYBUTYNIN 5 MG: 5 TABLET, FILM COATED, EXTENDED RELEASE ORAL at 09:17

## 2023-05-10 RX ADMIN — STANDARDIZED SENNA CONCENTRATE 17.2 MG: 8.6 TABLET ORAL at 09:17

## 2023-05-10 RX ADMIN — METHOCARBAMOL 250 MG: 500 TABLET ORAL at 18:21

## 2023-05-10 RX ADMIN — CITALOPRAM HYDROBROMIDE 20 MG: 20 TABLET ORAL at 09:18

## 2023-05-10 RX ADMIN — ATORVASTATIN CALCIUM 40 MG: 40 TABLET, FILM COATED ORAL at 18:17

## 2023-05-10 RX ADMIN — ACETAMINOPHEN 975 MG: 325 TABLET ORAL at 18:16

## 2023-05-10 RX ADMIN — METHOCARBAMOL 250 MG: 500 TABLET ORAL at 11:26

## 2023-05-10 RX ADMIN — MONTELUKAST 10 MG: 10 TABLET, FILM COATED ORAL at 22:26

## 2023-05-10 RX ADMIN — Medication 7.5 MG: at 22:26

## 2023-05-10 RX ADMIN — AMIODARONE HYDROCHLORIDE 400 MG: 200 TABLET ORAL at 09:18

## 2023-05-10 RX ADMIN — INSULIN LISPRO 1 UNITS: 100 INJECTION, SOLUTION INTRAVENOUS; SUBCUTANEOUS at 22:30

## 2023-05-10 RX ADMIN — METOPROLOL SUCCINATE 25 MG: 25 TABLET, FILM COATED, EXTENDED RELEASE ORAL at 09:17

## 2023-05-10 NOTE — PLAN OF CARE
Problem: OCCUPATIONAL THERAPY ADULT  Goal: Performs self-care activities at highest level of function for planned discharge setting  See evaluation for individualized goals  Description: Treatment Interventions: ADL retraining, Functional transfer training, UE strengthening/ROM, Endurance training, Patient/family training, Equipment evaluation/education, Compensatory technique education, Activityengagement          See flowsheet documentation for full assessment, interventions and recommendations  Outcome: Progressing  Note: Limitation: Decreased ADL status, Decreased UE ROM, Decreased endurance, Decreased self-care trans, Decreased high-level ADLs, Non-func L UE  Prognosis: Good  Assessment: PT SEEN FOR OT TX SESSION WITH FOCUS ON TOILETING, UB DRESSING AND OVERALL ACTIVITY TOLERANCE  PT COMPLETED TOILETING ON BEDSIDE COMMODE WITH MOD A FOR TXFS AND ASSIST FOR PERIANAL HYGIENE  PT COMPLETED LIMITED FUNCTIONAL MOBILITY WITH USE OF SPC FROM BED->CHAIR WITH MOD A  IN BEDSIDE CHAIR, PT COMPLETED UB DRESSING INCLUDING DOFFING/DONNING HOSPITAL GOWN WITH MAX A 2' SPLINTED LUE AND LIMITED BUE ROM 2' ARTHRITIS  PT DEMONSTRATED G CARRY OVER OF LEARNED COMPENSATORY DRESSING TECHNIQUES AND LUE NWB STATUS T/O SESSION  INITIAL OT GOALS REMAIN APPROPRIATE  GOAL DATE EXTENDED +14 DAYS  CONT TO RECOMMEND INPT REHAB UPON D/C       OT Discharge Recommendation: Post acute rehabilitation services

## 2023-05-10 NOTE — PLAN OF CARE
Problem: PHYSICAL THERAPY ADULT  Goal: Performs mobility at highest level of function for planned discharge setting  See evaluation for individualized goals  Description: Treatment/Interventions: OT, Spoke to nursing, Spoke to case management, Gait training, Bed mobility, Patient/family training, Endurance training, LE strengthening/ROM, Functional transfer training  Equipment Recommended:  (TBD)       See flowsheet documentation for full assessment, interventions and recommendations  Note: Prognosis: Good  Problem List: Decreased strength, Decreased range of motion, Decreased endurance, Impaired balance, Decreased mobility, Decreased coordination, Obesity, Orthopedic restrictions, Decreased skin integrity  Assessment: Pt seen for PT session consisting of transfer training from commode + recliner; short distance gt training w/ SPC and therex  pt continues to require modA x1 for sit<>stand transitions from both commode and recliner w/ increased time and cues  additional assist of another for proper pericare during toileting 2* limitations/ barrier of L UE NWB in splint; pt declining trial of SBQC for gt training as she reports usign one prior w/ poor results  Therex tolerated well in chair w/ pt reports she has been completing on her own  At this tiem PT continues to recommend rehab on d/c   POC + STG remain appropriate w/ extended time frame of 14 days  Barriers to Discharge: Inaccessible home environment     PT Discharge Recommendation: Post acute rehabilitation services    See flowsheet documentation for full assessment

## 2023-05-10 NOTE — PROGRESS NOTES
[facility]  Progress Note  Name: Robby Rene  MRN: [id number]  [facility] Date of Admission: 4/[facility]/2023   Date of Service: 5/10/2023 I Hospital Day: 13    Assessment/Plan   Abnormal CT scan  Assessment & Plan  - 4/27 CT CAP:    - Incidentally noted is a incarcerated/partially obstructed periumbilic ventral hernia, (image 146 series 9)   - Septated cyst anterior aspect of the right kidney, consider evaluation with ultrasound or MRI for further characterization (image 136 series 9)   - Dilated ascending aorta measuring 4 6 cm, evaluation with cardiothoracic surgery on nonemergent basis    Hyponatremia  Assessment & Plan  - Na is slowly downtrending with lowest 125 on 5/9  - Presumed secondary to furosemide and possibly poor PO intake  - Will start 1 g NaCl tabs TID    Bradycardia  Assessment & Plan  - HR in the 40-50s  - Appears to be junctional bradycardia on telemetry  - EP consulted and discontinued diltiazem and adjusted metoprolol to 25 mg daily  - After medication adjustments, HR is paced in the 50-60s    Ventricular arrhythmia  Assessment & Plan  - Hx of VT/VF s/p single chamber ICD  - ICD shock from VT episode 5/2 and admitted to ICU   - ICD zone adjusted per cards   - Cardiology recommendations appreciated  - Continue 25 mg metoprolol daily  - Amiodarone gtt discontinued, oral amio 400 mg BID for 1 week   After she can be maintained on 200 mg daily   - Continue 2 5 mg warfarin daily    UTI (urinary tract infection)  Assessment & Plan  - Started on cefepime and vanco and de-escalated to ceftriaxone, completed 5 days of treatment    Type 2 diabetes mellitus [facility])  Assessment & Plan  No results found for: HGBA1C    Recent Labs     05/09/23  1704 05/09/23  2142 05/10/23  0729 05/10/23  1132   POCGLU 107 121 69 129       Blood Sugar Average: Last 72 hrs:  (P) 630 [id number]     - Continue current medication regimen   - SSI  - Outpatient follow-up with PCP       Fracture of multiple ribs of right side  Assessment & Plan  - Multiple right-sided rib fractures (8th-9th), present on admission   - Continue rib fracture protocol   - Continue to encourage incentive spirometer use and adequate pulmonary hygiene  - APS consult, appreciate recommendations  - Continue multimodal analgesic regimen     - Supplemental oxygen via nasal cannula as needed to maintain saturations greater than or equal to 94%  - PT and OT evaluation and treatment as indicated-recommending rehab placement, pending peer to peer today  - Outpatient follow-up in the trauma clinic for re-evaluation in approximately 2 weeks  Laceration of right eyebrow  Assessment & Plan  - two 1 cm lacerations to the R eye brow  - steri strips applied  - local wound care    H/O mechanical aortic valve replacement  Assessment & Plan  - Warfarin held while being evaluated for humerus fracture  - Goal INR reportedly 2 5 to 3 5  - Difficulty managing her INR as she seems very sensitive to 5 mg (which is her home dose 5/7 days a week) as she became supratherapeutic  - Will start warfarin 2 5 mg daily  - Recheck INR in the morning    Acute pain due to trauma  Assessment & Plan  - Acute pain secondary to traumatic injuries  - Multi modal pain regimen  - Bowel regimen as long as using opioids   - Continue to monitor pain and adjust regimen as indicated        Humerus fracture  Assessment & Plan  - proximal humerus fracture on the left   - NVI distally  - pain control with multi modal analgesia  - orthopedics consultation, appreciate recommendations  - Non-op  - NWB LUE  - Sling to the LUE  - Multimodal pain control  - PT/OT recommending rehab    Fall  Assessment & Plan  - mechanical fall   - sustained below stated injuries  - PT/OT evaluations - recommending rehab  - Geriatrics evaluation, appreciate recommendations  - CM for disposition planning             Bowel Regimen: senna  VTE Prophylaxis:Warfarin (Coumadin) Disposition: rehab or SNF pending insurance decision    Subjective   Subjective: feeling well today  Hoping to be discharged soon to next level of care  Objective   Vitals:   Temp:  [97 5 °F (36 4 °C)-97 8 °F (36 6 °C)] 97 5 °F (36 4 °C)  HR:  [60] 60  Resp:  [16] 16  BP: (104-105)/(55) 105/55    I/O       05/08 0701  05/09 0700 05/09 0701  05/10 0700 05/10 0701  05/11 0700    P  O  480 598 240    Total Intake(mL/kg) 480 (5 6) 598 (7) 240 (2 8)    Urine (mL/kg/hr)  1350 (0 7) 900 (1 7)    Stool   0    Total Output  1350 900    Net +480 -752 -660           Unmeasured Urine Occurrence 2 x 1 x 2 x    Unmeasured Stool Occurrence  1 x 0 x           Physical Exam:   General: awake and alert  HEENT: right eyebrow laceration healing well  CV: RRR  Pulm: CTAB  Abd: soft and non-tender, reducible ventral hernia  Neuro: follows commands in all extremities  MSK: left arm in splint and sling  Skin: no new rashes    Invasive Devices     Peripheral Intravenous Line  Duration           Peripheral IV 05/08/23 Proximal;Right;Ventral (anterior) Forearm 1 day          Drain  Duration           External Urinary Catheter 9 days                 PIC Score  PIC Pain Score: 2 (5/10/2023  6:30 AM)       If the Total PIC Score </=5, did you consult APS and evaluate patient for further intervention?: yes      Pain:    Incentive Spirometry  Cough  3 = Controlled  4 = Above goal volume 3 = Strong  2 = Moderate  3 = Goal to alert volume 2 = Weak  1 = Severe  2 = Below alert volume 1 = Absent     1 = Unable to perform IS         Lab Results: Results: I have personally reviewed all pertinent laboratory/tests results  Imaging: I have personally reviewed pertinent reports       Other Studies: none

## 2023-05-10 NOTE — OCCUPATIONAL THERAPY NOTE
Occupational Therapy Progress Note     Patient Name: Faye Carranza  [id number] Date: 5/10/2023  Problem List  Active Problems:    Fall    Humerus fracture    Acute pain due to trauma    H/O mechanical aortic valve replacement    Laceration of right eyebrow    Fracture of multiple ribs of right side    Type 2 diabetes mellitus (HCC)    UTI (urinary tract infection)    Ventricular arrhythmia    Bradycardia    Hyponatremia    Abnormal CT scan        05/10/23 1445   OT Last Visit   OT Visit Date 05/10/23   Note Type   Note Type Treatment   Pain Assessment   Pain Assessment Tool 0-10   Pain Score 6   Pain Location/Orientation Orientation: Left; Location: Arm   Hospital Pain Intervention(s) Repositioned; Ambulation/increased activity; Emotional support   Restrictions/Precautions   Weight Bearing Precautions Per Order Yes   LUE Weight Bearing Per Order NWB  (IN SPLINT)   Braces or Orthoses Splint   Other Precautions WBS; Fall Risk;Pain   Lifestyle   Autonomy I adls and mobility - i iadls - shares homemaking with s/o   Reciprocal Relationships supportive family - reports she has 2 local dtrs and her s/o also has local dtr who are able to assist   Service to Others retired   Intrinsic Gratification enjoys spending time with friends   ADL   UB Dressing Assistance 2  Maximal Assistance   UB Dressing Deficit Thread RUE; Thread LUE;Pull around back; Fasteners   Toileting Assistance  2  Maximal Assistance   Toileting Deficit Setup;Supervison/safety; Increased time to complete; Bedside commode;Perineal hygiene   Transfers   Sit to Stand 3  Moderate assistance   Additional items Assist x 1; Increased time required;Verbal cues   Stand to Sit 3  Moderate assistance   Additional items Assist x 1; Increased time required;Verbal cues   Toilet transfer 3  Moderate assistance   Additional items Assist x 1; Increased time required;Verbal cues; Commode   Functional Mobility   Functional Mobility 3  Moderate assistance   Additional items SPC   Toilet Transfers   Toilet Transfer From [employer] Type To and from   Toilet Transfer to [employer] Transfers Moderate assistance   Cognition   Overall Cognitive Status WFL   Arousal/Participation Alert; Cooperative   Attention Attends with cues to redirect   Orientation Level Oriented X4   Memory Within functional limits   Following Commands Follows multistep commands without difficulty   Activity Tolerance   Activity Tolerance Patient tolerated treatment well;Patient limited by fatigue   Assessment   Assessment PT SEEN FOR OT TX SESSION WITH FOCUS ON TOILETING, UB DRESSING AND OVERALL ACTIVITY TOLERANCE  PT COMPLETED TOILETING ON BEDSIDE COMMODE WITH MOD A FOR TXFS AND ASSIST FOR PERIANAL HYGIENE  PT COMPLETED LIMITED FUNCTIONAL MOBILITY WITH USE OF SPC FROM BED->CHAIR WITH MOD A  IN BEDSIDE CHAIR, PT COMPLETED UB DRESSING INCLUDING DOFFING/DONNING HOSPITAL GOWN WITH MAX A 2' SPLINTED LUE AND LIMITED BUE ROM 2' ARTHRITIS  PT DEMONSTRATED G CARRY OVER OF LEARNED COMPENSATORY DRESSING TECHNIQUES AND LUE NWB STATUS T/O SESSION  INITIAL OT GOALS REMAIN APPROPRIATE  GOAL DATE EXTENDED +14 DAYS  CONT TO RECOMMEND INPT REHAB UPON D/C  Plan   Treatment Interventions ADL retraining;Functional transfer training; Endurance training;Cognitive reorientation;Patient/family training;Equipment evaluation/education; Compensatory technique education; Energy conservation; Activityengagement   Goal Expiration Date 05/24/23   OT Treatment Day 4   OT Frequency 2-3x/wk   Recommendation   OT Discharge Recommendation Post acute rehabilitation services   AM-PAC Daily Activity Inpatient   Lower Body Dressing 2   Bathing 2   Toileting 2   Upper Body Dressing 2   Grooming 3   Eating 3   Daily Activity Raw Score 14   Daily Activity Standardized Score (Calc for Raw Score >=11) 33 39   AM-PAC Applied Cognition Inpatient   Following a Speech/Presentation 4   Understanding Ordinary Conversation 4   Taking Medications 4   Remembering Where Things Are Placed or Put Away 4   Remembering List of 4-5 Errands 4   Taking Care of Complicated Tasks 3   Applied Cognition Raw Score 23   Applied Cognition Standardized Score 53 08       Documentation completed by DANIE Santacruz, OTR/L  [address] [place] [address] Certified ID# [id number]

## 2023-05-10 NOTE — ASSESSMENT & PLAN NOTE
- Hx of VT/VF s/p single chamber ICD  - ICD shock from VT episode 5/2 and admitted to ICU   - ICD zone adjusted per cards   - Cardiology recommendations appreciated  - Continue 25 mg metoprolol daily  - Amiodarone gtt discontinued, oral amio 400 mg BID for 1 week   After she can be maintained on 200 mg daily   - Continue 2 5 mg warfarin daily

## 2023-05-10 NOTE — ASSESSMENT & PLAN NOTE
- 4/27 CT CAP:    - Incidentally noted is a incarcerated/partially obstructed periumbilic ventral hernia, (image 146 series 9)   - Septated cyst anterior aspect of the right kidney, consider evaluation with ultrasound or MRI for further characterization (image 136 series 9)   - Dilated ascending aorta measuring 4 6 cm, evaluation with cardiothoracic surgery on nonemergent basis

## 2023-05-10 NOTE — ASSESSMENT & PLAN NOTE
No results found for: HGBA1C    Recent Labs     05/09/23  1704 05/09/23  2142 05/10/23  0729 05/10/23  1132   POCGLU 107 121 69 129       Blood Sugar Average: Last 72 hrs:  (P) 295 [id number]     - Continue current medication regimen   - SSI  - Outpatient follow-up with PCP

## 2023-05-10 NOTE — RESTORATIVE TECHNICIAN NOTE
Restorative Technician Note      Patient Name: Faye Carranza     Restorative Tech Visit Date: 05/10/23  Note Type: Mobility  Patient Position Upon Consult: Bedside chair  Activity Performed: Stood; Repositioned  Assistive Device: Cane  Patient Position at End of Consult: Bedside chair;  All needs within reach; Bed/Chair alarm activated    Candido Coley Restorative Technician

## 2023-05-10 NOTE — RESTORATIVE TECHNICIAN NOTE
Restorative Technician Note      Patient Name: Gifty Haley     Restorative Tech Visit Date: 05/10/23  Note Type: Mobility  Patient Position Upon Consult: Supine  Activity Performed: Transferred  Assistive Device: Cane  Patient Position at End of Consult: Bedside chair;  All needs within reach; Bed/Chair alarm activated      Bryson Carrillo, Restorative Technician

## 2023-05-10 NOTE — ASSESSMENT & PLAN NOTE
- Na is slowly downtrending with lowest 125 on 5/9  - Presumed secondary to furosemide and possibly poor PO intake  - Will start 1 g NaCl tabs TID

## 2023-05-10 NOTE — CASE MANAGEMENT
Case Management Discharge Planning Note    Patient name Elayne Yoon  Location [address] [place] [address] [facility] [id number]  : 1945 Date 5/10/2023       Current Admission Date: 2023  Current Admission Diagnosis:Fall   Patient Active Problem List    Diagnosis Date Noted   • Abnormal CT scan 05/10/2023   • Hyponatremia 2023   • Bradycardia 2023   • UTI (urinary tract infection) 2023   • Ventricular arrhythmia 2023   • Type 2 diabetes mellitus ([address] ) 2023   • Fall 2023   • Humerus fracture 2023   • Acute pain due to trauma 2023   • H/O mechanical aortic valve replacement 2023   • Laceration of right eyebrow 2023   • Fracture of multiple ribs of right side 2023      LOS (days): 13  Geometric Mean LOS (GMLOS) (days): 2 70  Days to GMLOS:-9     OBJECTIVE:  Risk of Unplanned Readmission Score: 20 53         Current admission status: Inpatient   Preferred Pharmacy:   [facility] [address] [place] [id number]  Phone: [phone] Fax: [phone]    Primary Care Provider: Lindy Langley DO    Primary Insurance: Caryle Pinch [facility] REP  Secondary Insurance:     DISCHARGE DETAILS:      Peer to peer completed by Dr Claire Frausto  Pt remains denied  Threasa Jaylin feels no medical complexity and no need for acute rehab  CM spoke to pt's dtr Shannan and she would like to pursue a family/expedited appeal  CM will obtain this information and provide to pt's dtr     Pt's dtr's preference remains for [facility]; but will take the bed at [facility] if denied again

## 2023-05-10 NOTE — CASE MANAGEMENT
Case Management Discharge Planning Note    Patient name Kim Rdz  Location [address] [facility] [id number]  : 1945 Date 5/10/2023       Current Admission Date: 2023  Current Admission Diagnosis:Fall   Patient Active Problem List    Diagnosis Date Noted   • Abnormal CT scan 05/10/2023   • Hyponatremia 2023   • Bradycardia 2023   • UTI (urinary tract infection) 2023   • Ventricular arrhythmia 2023   • Type 2 diabetes mellitus ([address] ) 2023   • Fall 2023   • Humerus fracture 2023   • Acute pain due to trauma 2023   • H/O mechanical aortic valve replacement 2023   • Laceration of right eyebrow 2023   • Fracture of multiple ribs of right side 2023      LOS (days): 13  Geometric Mean LOS (GMLOS) (days): 2 70  Days to GMLOS:-9     OBJECTIVE:  Risk of Unplanned Readmission Score: 20 53         Current admission status: Inpatient   Preferred Pharmacy:   [place] [address] [address] [facility] [address] [place] [address] [address] [facility] [address]  Phone: [phone] Fax: [phone]    Primary Care Provider: Omayra Jacobs DO    Primary Insurance: Madeleine Ruffin [facility] REP  Secondary Insurance:     DISCHARGE DETAILS:    Pt's acute rehab auth denied, Peer to peer was offered and will be completed by Dr Josue Marroquin

## 2023-05-10 NOTE — PHYSICAL THERAPY NOTE
PHYSICAL THERAPY TREATMENT  NAME:  Heide Maher  DATE: 05/10/23    AGE:   68 y o  Mrn:   [id number]  ADMIT DX:  Fracture of multiple ribs of right side [S22 41XA]  Closed fracture of proximal end of right humerus, unspecified fracture morphology, initial encounter [S42 201A]  Other injury of unspecified body region, initial encounter [T14  8XXA]    Past Medical History:   Diagnosis Date    Asthma     Diabetes ([facility] [address] )     Hypertension      Past Surgical History:   Procedure Laterality Date    MECHANICAL AORTIC VALVE REPLACEMENT      REPLACEMENT TOTAL KNEE         Length Of Stay: 13     05/10/23 1432   PT Last Visit   PT Visit Date 05/10/23   Note Type   Note Type Treatment   End of Consult   Patient Position at End of Consult Bedside chair; All needs within reach;Bed/Chair alarm activated   Pain Assessment   Pain Assessment Tool 0-10   Pain Score 6   Pain Location/Orientation Orientation: Left;Orientation: Upper; Location: Arm   Pain Radiating Towards non   Restrictions/Precautions   LUE Weight Bearing Per Order NWB  (in splint)   Other Precautions WBS; Fall Risk;Pain; Chair Alarm   General   Chart Reviewed Yes   Response to Previous Treatment Patient with no complaints from previous session  Family/Caregiver Present No   Cognition   Overall Cognitive Status WFL   Arousal/Participation Alert; Cooperative   Attention Attends with cues to redirect   Orientation Level Oriented X4   Memory Within functional limits   Following Commands Follows multistep commands inconsistently   Comments cooperative   Subjective   Subjective pt encountered up on toilet- requesting assist for mobilizing back to chair and for hygiene   Bed Mobility   Additional Comments NT - pt up in relciner post session   Transfers   Sit to Stand 3  Moderate assistance   Additional items Assist x 1; Increased time required;Verbal cues   Stand to Sit 3  Moderate assistance   Additional items Assist x 1; Increased time required;Verbal cues   Toilet transfer 3  Moderate assistance  (requiring A of second for pericare/ hygiene - maintained NWB to LUE throughout)   Additional items Assist x 1; Increased time required;Commode   Ambulation/Elevation   Gait pattern Excessively slow; Step to; Foward flexed;Decreased foot clearance   Gait Assistance 3  Moderate assist   Additional items Assist x 1;Verbal cues; Tactile cues   Assistive Device [facility]   Distance 4' from commode back to recliner w/ further distances limited by pain + fatigue from sitting on toilet  Ambulation/Elevation Additional Comments pt decliing trial of [employer] [place] stating she has used on before w/ poor results   Balance   Static Sitting Fair   Dynamic Sitting Fair -   Static Standing Poor +   Dynamic Standing Poor   Ambulatory Poor  (SPC / NWB LUE)   Endurance Deficit   Endurance Deficit Yes   Endurance Deficit Description fatigue; pain ; obesity; SOB   Activity Tolerance   Activity Tolerance Patient limited by fatigue;Patient limited by pain   Medical Staff Made Aware OT + RN   Exercises   Hip Flexion Sitting;10 reps   Hip Abduction Sitting;10 reps   Hip Adduction Sitting;10 reps   Knee AROM Long Arc Quad Sitting;10 reps;Right;Left  (x3)   Ankle Pumps Sitting;25 reps;Right;Left  (x2)   Neuro re-ed additional sit<>stand x2 for carryover + repositioning  -   Assessment   Prognosis Good   Problem List Decreased strength;Decreased range of motion;Decreased endurance; Impaired balance;Decreased mobility; Decreased coordination;Obesity;Orthopedic restrictions;Decreased skin integrity   Assessment Pt seen for PT session consisting of transfer training from commode + recliner; short distance gt training w/ SPC and therex  pt continues to require modA x1 for sit<>stand transitions from both commode and recliner w/ increased time and cues   additional assist of another for proper pericare during toileting 2* limitations/ barrier of L UE NWB in splint; pt declining trial of [facility] for gt training as she reports usign one prior w/ poor results  Therex tolerated well in chair w/ pt reports she has been completing on her own  At this [facility] PT continues to recommend rehab on d/c   POC + STG remain appropriate w/ extended time frame of 14 days  Barriers to Discharge Inaccessible home environment   Goals   Patient Goals to continue to get better   STG Expiration Date 05/24/23   PT Treatment Day 3   Plan   Treatment/Interventions Functional transfer training;LE strengthening/ROM; Therapeutic exercise; Endurance training;Equipment eval/education;Patient/family training;Bed mobility;Gait training; Compensatory technique education;Spoke to nursing;Spoke to case management;OT   Progress Slow progress, decreased activity tolerance   PT Frequency 3-5x/wk   Recommendation   PT Discharge Recommendation Post acute rehabilitation services   Equipment Recommended Cane   AM-PAC Basic Mobility Inpatient   Turning in Flat Bed Without Bedrails 2   Lying on Back to Sitting on Edge of Flat Bed Without Bedrails 2   Moving Bed to Chair 2   Standing Up From Chair Using Arms 2   Walk in Room 2   Climb 3-5 Stairs With Railing 1   Basic Mobility Inpatient Raw Score 11   Basic Mobility Standardized Score 30 25   Highest Level Of Mobility   -[facility] Goal 4: Move to chair/commode   -HL Achieved 5: Stand (1 or more minutes)   End of Consult   Patient Position at End of Consult Bedside chair;Bed/Chair alarm activated; All needs within reach       The patient's AM-PAC Basic Mobility Inpatient Short Form Raw Score is 11  A Raw score of less than or equal to 16 suggests the patient may benefit from discharge to post-acute rehabilitation services  Please also refer to the recommendation of the Physical Therapist for safe discharge planning            Zo Wilcox, PT

## 2023-05-10 NOTE — PLAN OF CARE
Problem: Potential for Falls  Goal: Patient will remain free of falls  Description: INTERVENTIONS:  - Educate patient/family on patient safety including physical limitations  - Instruct patient to call for assistance with activity   - Consult OT/PT to assist with strengthening/mobility   - Keep Call bell within reach  - Keep bed low and locked with side rails adjusted as appropriate  - Keep care items and personal belongings within reach  - Initiate and maintain comfort rounds  - Make Fall Risk Sign visible to staff  - Offer Toileting every 2 Hours, in advance of need  - Initiate/Maintain on alarm  - Obtain necessary fall risk management equipment:   - Apply yellow socks and bracelet for high fall risk patients  - Consider moving patient to room near nurses station  Outcome: Progressing     Problem: RESPIRATORY - ADULT  Goal: Achieves optimal ventilation and oxygenation  Description: INTERVENTIONS:  - Assess for changes in respiratory status  - Assess for changes in mentation and behavior  - Position to facilitate oxygenation and minimize respiratory effort  - Oxygen administered by appropriate delivery if ordered  - Initiate smoking cessation education as indicated  - Encourage broncho-pulmonary hygiene including cough, deep breathe, Incentive Spirometry  - Assess the need for suctioning and aspirate as needed  - Assess and instruct to report SOB or any respiratory difficulty  - Respiratory Therapy support as indicated  Outcome: Progressing     Problem: SKIN/TISSUE INTEGRITY - ADULT  Goal: Incision(s), wounds(s) or drain site(s) healing without S/S of infection  Description: INTERVENTIONS  - Assess and document dressing, incision, wound bed, drain sites and surrounding tissue  - Provide patient and family education  - Perform skin care/dressing changes every   Outcome: Progressing     Problem: MUSCULOSKELETAL - ADULT  Goal: Maintain or return mobility to safest level of function  Description: INTERVENTIONS:  - Assess patient's ability to carry out ADLs; assess patient's baseline for ADL function and identify physical deficits which impact ability to perform ADLs (bathing, care of mouth/teeth, toileting, grooming, dressing, etc )  - Assess/evaluate cause of self-care deficits   - Assess range of motion  - Assess patient's mobility  - Assess patient's need for assistive devices and provide as appropriate  - Encourage maximum independence but intervene and supervise when necessary  - Involve family in performance of ADLs  - Assess for home care needs following discharge   - Consider OT consult to assist with ADL evaluation and planning for discharge  - Provide patient education as appropriate  Outcome: Progressing  Goal: Maintain proper alignment of affected body part  Description: INTERVENTIONS:  - Support, maintain and protect limb and body alignment  - Provide patient/ family with appropriate education  Outcome: Progressing     Problem: PAIN - ADULT  Goal: Verbalizes/displays adequate comfort level or baseline comfort level  Description: Interventions:  - Encourage patient to monitor pain and request assistance  - Assess pain using appropriate pain scale  - Administer analgesics based on type and severity of pain and evaluate response  - Implement non-pharmacological measures as appropriate and evaluate response  - Consider cultural and social influences on pain and pain management  - Notify physician/advanced practitioner if interventions unsuccessful or patient reports new pain  Outcome: Progressing     Problem: SAFETY ADULT  Goal: Patient will remain free of falls  Description: INTERVENTIONS:  - Educate patient/family on patient safety including physical limitations  - Instruct patient to call for assistance with activity   - Consult OT/PT to assist with strengthening/mobility   - Keep Call bell within reach  - Keep bed low and locked with side rails adjusted as appropriate  - Keep care items and personal belongings within reach  - Initiate and maintain comfort rounds  - Make Fall Risk Sign visible to staff  - Offer Toileting every 2 Hours, in advance of need  - Initiate/Maintain on alarm  - Obtain necessary fall risk management equipment:   - Apply yellow socks and bracelet for high fall risk patients  - Consider moving patient to room near nurses station  Outcome: Progressing  Goal: Maintain or return to baseline ADL function  Description: INTERVENTIONS:  - Educate patient/family on patient safety including physical limitations  - Instruct patient to call for assistance with activity   - Consult OT/PT to assist with strengthening/mobility   - Keep Call bell within reach  - Keep bed low and locked with side rails adjusted as appropriate  - Keep care items and personal belongings within reach  - Initiate and maintain comfort rounds  - Make Fall Risk Sign visible to staff  - Offer Toileting every 2 Hours, in advance of need  - Initiate/Maintain on alarm  - Obtain necessary fall risk management equipment:  - Apply yellow socks and bracelet for high fall risk patients  - Consider moving patient to room near nurses station  Outcome: Progressing  Goal: Maintains/Returns to pre admission functional level  Description: INTERVENTIONS:  - Perform BMAT or MOVE assessment daily    - Set and communicate daily mobility goal to care team and patient/family/caregiver  - Collaborate with rehabilitation services on mobility goals if consulted  - Perform Range of Motion  times a day  - Reposition patient every  hours    - Dangle patient  times a day  - Stand patient  times a day  - Ambulate patient  times a day  - Out of bed to chair  times a day   - Out of bed for meals  times a day  - Out of bed for toileting  - Record patient progress and toleration of activity level   Outcome: Progressing     Problem: DISCHARGE PLANNING  Goal: Discharge to home or other facility with appropriate resources  Description: INTERVENTIONS:  - Identify barriers to discharge w/patient and caregiver  - Arrange for needed discharge resources and transportation as appropriate  - Identify discharge learning needs (meds, wound care, etc )  - Arrange for interpretive services to assist at discharge as needed  - Refer to Case Management Department for coordinating discharge planning if the patient needs post-hospital services based on physician/advanced practitioner order or complex needs related to functional status, cognitive ability, or social support system  Outcome: Progressing     Problem: Knowledge Deficit  Goal: Patient/family/caregiver demonstrates understanding of disease process, treatment plan, medications, and discharge instructions  Description: Complete learning assessment and assess knowledge base    Interventions:  - Provide teaching at level of understanding  - Provide teaching via preferred learning methods  Outcome: Progressing     Problem: INFECTION - ADULT  Goal: Absence or prevention of progression during hospitalization  Description: INTERVENTIONS:  - Assess and monitor for signs and symptoms of infection  - Monitor lab/diagnostic results  - Monitor all insertion sites, i e  indwelling lines, tubes, and drains  - Monitor endotracheal if appropriate and nasal secretions for changes in amount and color  - [place] appropriate cooling/warming therapies per order  - Administer medications as ordered  - Instruct and encourage patient and family to use good hand hygiene technique  - Identify and instruct in appropriate isolation precautions for identified infection/condition  Outcome: Progressing  Goal: Absence of fever/infection during neutropenic period  Description: INTERVENTIONS:  - Monitor WBC    Outcome: Progressing     Problem: MOBILITY - ADULT  Goal: Maintain or return to baseline ADL function  Description: INTERVENTIONS:  - Educate patient/family on patient safety including physical limitations  - Instruct patient to call for assistance with activity   - Consult OT/PT to assist with strengthening/mobility   - Keep Call bell within reach  - Keep bed low and locked with side rails adjusted as appropriate  - Keep care items and personal belongings within reach  - Initiate and maintain comfort rounds  - Make Fall Risk Sign visible to staff  - Offer Toileting every 2 Hours, in advance of need  - Initiate/Maintain on alarm  - Obtain necessary fall risk management equipment:  - Apply yellow socks and bracelet for high fall risk patients  - Consider moving patient to room near nurses station  Outcome: Progressing  Goal: Maintains/Returns to pre admission functional level  Description: INTERVENTIONS:  - Perform BMAT or MOVE assessment daily    - Set and communicate daily mobility goal to care team and patient/family/caregiver  - Collaborate with rehabilitation services on mobility goals if consulted  - Perform Range of Motion  times a day  - Reposition patient every  hours    - Dangle patient  times a day  - Stand patient  times a day  - Ambulate patient  times a day  - Out of bed to chair  times a day   - Out of bed for meals times a day  - Out of bed for toileting  - Record patient progress and toleration of activity level   Outcome: Progressing     Problem: Prexisting or High Potential for Compromised Skin Integrity  Goal: Skin integrity is maintained or improved  Description: INTERVENTIONS:  - Identify patients at risk for skin breakdown  - Assess and monitor skin integrity  - Assess and monitor nutrition and hydration status  - Monitor labs   - Assess for incontinence   - Turn and reposition patient  - Assist with mobility/ambulation  - Relieve pressure over bony prominences  - Avoid friction and shearing  - Provide appropriate hygiene as needed including keeping skin clean and dry  - Evaluate need for skin moisturizer/barrier cream  - Collaborate with interdisciplinary team   - Patient/family teaching  - Consider wound care consult   Outcome: Progressing

## 2023-05-11 LAB
ANION GAP SERPL CALCULATED.3IONS-SCNC: 1 MMOL/L (ref 4–13)
BUN SERPL-MCNC: 18 MG/DL (ref 5–25)
CALCIUM SERPL-MCNC: 7.9 MG/DL (ref 8.3–10.1)
CHLORIDE SERPL-SCNC: 100 MMOL/L (ref 96–108)
CO2 SERPL-SCNC: 30 MMOL/L (ref 21–32)
CREAT SERPL-MCNC: 0.58 MG/DL (ref 0.6–1.3)
GFR SERPL CREATININE-BSD FRML MDRD: 89 ML/MIN/1.73SQ M
GLUCOSE SERPL-MCNC: 123 MG/DL (ref 65–140)
GLUCOSE SERPL-MCNC: 126 MG/DL (ref 65–140)
GLUCOSE SERPL-MCNC: 155 MG/DL (ref 65–140)
GLUCOSE SERPL-MCNC: 80 MG/DL (ref 65–140)
GLUCOSE SERPL-MCNC: 82 MG/DL (ref 65–140)
INR PPP: 2.63 (ref 0.84–1.19)
POTASSIUM SERPL-SCNC: 4.4 MMOL/L (ref 3.5–5.3)
PROTHROMBIN TIME: 28.3 SECONDS (ref 11.6–14.5)
SODIUM SERPL-SCNC: 131 MMOL/L (ref 135–147)

## 2023-05-11 RX ADMIN — MONTELUKAST 10 MG: 10 TABLET, FILM COATED ORAL at 21:00

## 2023-05-11 RX ADMIN — SODIUM CHLORIDE 1 G: 1 TABLET ORAL at 12:53

## 2023-05-11 RX ADMIN — AMIODARONE HYDROCHLORIDE 400 MG: 200 TABLET ORAL at 08:09

## 2023-05-11 RX ADMIN — WARFARIN SODIUM 2.5 MG: 2.5 TABLET ORAL at 17:46

## 2023-05-11 RX ADMIN — SODIUM CHLORIDE 1 G: 1 TABLET ORAL at 17:46

## 2023-05-11 RX ADMIN — DOCUSATE SODIUM 100 MG: 100 CAPSULE, LIQUID FILLED ORAL at 17:46

## 2023-05-11 RX ADMIN — ACETAMINOPHEN 975 MG: 325 TABLET ORAL at 08:09

## 2023-05-11 RX ADMIN — INSULIN DETEMIR 5 UNITS: 100 INJECTION, SOLUTION SUBCUTANEOUS at 21:04

## 2023-05-11 RX ADMIN — INSULIN LISPRO 1 UNITS: 100 INJECTION, SOLUTION INTRAVENOUS; SUBCUTANEOUS at 21:04

## 2023-05-11 RX ADMIN — SODIUM CHLORIDE 1 G: 1 TABLET ORAL at 08:09

## 2023-05-11 RX ADMIN — Medication 7.5 MG: at 22:08

## 2023-05-11 RX ADMIN — ATORVASTATIN CALCIUM 40 MG: 40 TABLET, FILM COATED ORAL at 16:09

## 2023-05-11 RX ADMIN — METHOCARBAMOL 250 MG: 500 TABLET ORAL at 17:46

## 2023-05-11 RX ADMIN — ONDANSETRON 4 MG: 2 INJECTION INTRAMUSCULAR; INTRAVENOUS at 17:46

## 2023-05-11 RX ADMIN — OXYBUTYNIN 5 MG: 5 TABLET, FILM COATED, EXTENDED RELEASE ORAL at 08:18

## 2023-05-11 RX ADMIN — CITALOPRAM HYDROBROMIDE 20 MG: 20 TABLET ORAL at 08:18

## 2023-05-11 RX ADMIN — ACETAMINOPHEN 975 MG: 325 TABLET ORAL at 16:08

## 2023-05-11 RX ADMIN — ALLOPURINOL 100 MG: 100 TABLET ORAL at 08:18

## 2023-05-11 RX ADMIN — METHOCARBAMOL 250 MG: 500 TABLET ORAL at 12:53

## 2023-05-11 RX ADMIN — CALCIUM CARBONATE (ANTACID) CHEW TAB 500 MG 500 MG: 500 CHEW TAB at 21:15

## 2023-05-11 RX ADMIN — Medication 7.5 MG: at 08:18

## 2023-05-11 RX ADMIN — Medication 7.5 MG: at 17:45

## 2023-05-11 RX ADMIN — METOPROLOL SUCCINATE 25 MG: 25 TABLET, FILM COATED, EXTENDED RELEASE ORAL at 08:18

## 2023-05-11 RX ADMIN — Medication 7.5 MG: at 12:52

## 2023-05-11 RX ADMIN — LIDOCAINE 5% 1 PATCH: 700 PATCH TOPICAL at 08:08

## 2023-05-11 RX ADMIN — GABAPENTIN 100 MG: 100 CAPSULE ORAL at 21:00

## 2023-05-11 RX ADMIN — METHOCARBAMOL 250 MG: 500 TABLET ORAL at 05:54

## 2023-05-11 NOTE — ASSESSMENT & PLAN NOTE
No results found for: HGBA1C    Recent Labs     05/10/23  1132 05/10/23  1641 05/10/23  2053 05/10/23  2229   POCGLU 129 111 131 171*       Blood Sugar Average: Last 72 hrs:  (P) 805 [id number]     - Continue current medication regimen   - SSI  - Outpatient follow-up with PCP

## 2023-05-11 NOTE — CASE MANAGEMENT
Case Management Discharge Planning Note    Patient name Evonne Blizzard  [facility] [address] [facility] [id number]  : 1945 Date 2023       Current Admission Date: 2023  Current Admission Diagnosis:Fall   Patient Active Problem List    Diagnosis Date Noted   • Abnormal CT scan 05/10/2023   • Hyponatremia 2023   • Bradycardia 2023   • UTI (urinary tract infection) 2023   • Ventricular arrhythmia 2023   • Type 2 diabetes mellitus ([facility] [address] ) 2023   • Fall 2023   • Humerus fracture 2023   • Acute pain due to trauma 2023   • H/O mechanical aortic valve replacement 2023   • Laceration of right eyebrow 2023   • Fracture of multiple ribs of right side 2023      LOS (days): 14  Geometric Mean LOS (GMLOS) (days): 2 70  Days to GMLOS:-10 3     OBJECTIVE:  Risk of Unplanned Readmission Score: 18 41         Current admission status: Inpatient   Preferred Pharmacy:   [place] [address] [address] [place] [id number]  Phone: [phone] Fax: [phone]    Primary Care Provider: Terry Herron DO    Primary Insurance: Andres Chang [facility] REP  Secondary Insurance:     DISCHARGE DETAILS:    Voicemail left for pt's dtr, Kathy Elder, to follow up on the family appeal

## 2023-05-11 NOTE — PROGRESS NOTES
Progress Note - Wound   Buel Class 68 y o  female MRN: [id number]  Unit/Bed#: [facility] 613-01 Encounter: [id number]      Assessment:  Pressure ulcer of the sacral region, unstageable   Ambulatory dysfunction   Type 2 DM without long term use of insulin   Urinary incontinence   Obesity     Plan:  • Evolving DTI to the b/l sacro-buttocks, hospital acquired  Wound continues to evolve  Noted full thickness skin loss with devitalized tissue apparent today  • Discussed risks/benefits of sharps debridement versus topical treatments with patient  • Patient desires to continue with topical treatments for now  She would like to think about it and discuss with family  • Patient also may not be able to tolerate laying on her side for procedure due to pain  • Continue with triad paste, will recommend foam dressing to cover  • No s/s of infection present  • Will recommend preventative nursing skin care measures  • Nutrition is following along   • Pressure relief  • Continue with low air loss P-500 mattress  • Patient verbalized understanding of plan of care  • [place] text wound care team with questions or concerns  • Will plan to see patient early next week for re-evaluation if still admitted  • Follow-up with the [facility] wound care center as an outpatient, ambulatory referral placed  Subjective:  Wound care to see patient for follow-up visit of sacral wound  Patient seen in bed, alert and oriented x 4, cooperative and agreeable for the assessment  Dependent for care, max assist of one with turning for the assessment  Mobility limited by pain due to multiples injuries from her fall  LUE in sling  Urinary incontinence managed via pure-wick  No fecal incontinence reported  Patient states that the low air loss mattress is very comfortable is improving the tenderness to her wound  Patient reports her wound is less painful  Denies fever, chills, or increased pain related to the wound       Objective:      Vitals: "Blood pressure 107/54, pulse 60, temperature 98 2 °F (36 8 °C), resp  rate 18, height 4' 10\" (1 473 m), weight 85 3 kg (188 lb), SpO2 100 %  ,Body mass index is 39 29 kg/m²  Focused Physical Exam:  1  B/l sacro-buttocks - evolving DTI  Open aspect to the sacrum with full thickness skin loss, with noted linear distrubution of the discoloration/wounds, all aspects measured together  Wound presents as approx: 30% moist yellow slough, and 70% intact non-blanchable skin and purple colored tissue in the full thickness aspect  Small serous sangineous drainage  Edges fragile and attached without maceration  Marta-wound is intact with blanchable pink/hyperpigmented skin  2  B/l heels are dry and intact without redness or wounds  No induration, fluctuance, odor, warmth/temperature differences, redness, or purulence noted to the above mentioned wounds and skin areas assessed  New dressings applied as noted above  Patient tolerated assessment well- wound is mildly tender with palpation and cleansing  Lab, Imaging and other studies: I have personally reviewed pertinent reports  Wound 05/07/23 Pressure Injury Buttocks Right;Upper (Active)   Wound Image       05/11/23 1057   Wound Length (cm) 10 cm 05/11/23 1057   Wound Width (cm) 5 cm 05/11/23 1057   Wound Depth (cm) 0 2 cm 05/11/23 1057   Wound Surface Area (cm^2) 50 cm^2 05/11/23 1057   Wound Volume (cm^3) 10 cm^3 05/11/23 1057   Calculated Wound Volume (cm^3) 10 cm^3 05/11/23 1057   Change in Wound Size % 16 67 05/11/23 1057         Total time spent today:    Total time (face-to-face and non-face-to-face) spent on today's visit was 15 minutes  This includes preparation for the visits (previous wound care notes, and trauma note on 5/11/23) performance of a medically appropriate history and examination, and orders for medications/treatments or testing  Discussed assessment findings, and plan of care/recommendations with patients MIRANDA Reynolds, " "JOSEPHINE, JAMESON      Portions of the record may have been created with voice recognition software  Occasional wrong word or \"sound a like\" substitutions may have occurred due to the inherent limitations of voice recognition software    Read the chart carefully and recognize, using context, where substitutions have occurred      "

## 2023-05-11 NOTE — ASSESSMENT & PLAN NOTE
- Hx of VT/VF s/p single chamber ICD  - ICD shock from VT episode 5/2 and admitted to ICU   - ICD zone adjusted per cards   - Cardiology recommendations appreciated  - Continue 25 mg metoprolol daily  - Amiodarone gtt discontinued; completed 1 week of oral amio 400 mg BID  - Continue 200 mg amiodarone daily   - Continue 2 5 mg warfarin daily

## 2023-05-11 NOTE — ASSESSMENT & PLAN NOTE
- 4/27 CT CAP:    - Incidentally noted is a incarcerated/partially obstructed periumbilic ventral hernia, (image 146 series 9)   - Septated cyst anterior aspect of the right kidney, consider evaluation with ultrasound or MRI for further characterization (image 136 series 9)   - Dilated ascending aorta measuring 4 6 cm, evaluation with cardiothoracic surgery on nonemergent basis  - Follow up with PCP and CT surgery outpatient for findings  - Patient informed of abnormal results on 5/11: she is aware of ventral hernia but not aortic aneurysm or renal cyst

## 2023-05-11 NOTE — INCIDENTAL FINDINGS
The following findings require follow up:  Radiographic finding      Finding: Periumbilical ventral hernia   Follow up required: general surgery (patient is already aware of this hernia and has opted for no surgery for now)   Follow up should be done within 6 week(s)     Finding: Septated cyst of right kidney    Follow up required: primary care for US or MRI   Follow up should be done within 6 week(s)     Finding: Ascending aorta 4 6 cm aneurysm   Follow up required: CT surgery for monitoring   Follow up should be done within 6 week(s)     Please notify the following clinician to assist with the follow up:   Dr Meryl Herbert (patient's PCP)    Patient was informed of incidental findings on CT scan  She is already aware of the ventral hernia but was not aware of the aortic aneurysm and renal cyst  I instructed her that these are non-emergent and can be followed up outside the hospital  She can follow up with her PCP for kidney cyst and CT surgery for the aneurysm

## 2023-05-11 NOTE — PROGRESS NOTES
Progress Note - Geriatric Medicine   Joycelyn Walls 68 y o  female MRN: [id number]  Unit/Bed#: [facility] 613-01 Encounter: [id number]      Assessment/Plan:    Ambulatory dysfunction with fall  -reportedly mechanical fall 4/27  -injuries as outlined below   -remains high risk future falls, cont fall precautions and preventive measures as possible  -following recovery from acute injuries consider community based fall prevention program such as matter of balance or silver sneakers in effort to reduce risk of future falls   -PT/OT following, recommended for rehab     Right sided rib fractures (8 and 9)  -s/p fall as above  -noted on CT chest obtained on admission, no pneumothorax   -no longer requiring supplemental O2, now sat well on room air    -acute pain remains well controlled with current regimen, taper as improves with healing   -continue to encourage aggressive pulm toilet and ISS     Left humerus fracture   -s/p fall as above  -s/p reduction and splint by Ortho on admission   -NWB LUE in splint and sling - loose and irritating patient, will f/u with ortho to see if can be re-wrapped   -continue neurovascular checks per protocol   -acute pain now remains controlled      Acute pain due to trauma   -continue acute multimodal pain control  -Tylenol scheduled, Oxy 5mg and 7 5mg Q4H PRN, not requiring frequently consider reducing to Q6H PRN  -no longer requiring dilaudid, dc'd  -significant improvement with addition of topical lido patch and Robaxin   -bowel regimen to reduce risk constipation     Hx mechanical aortic valve replacement   -maintained on chronic systemic anticoagulation with coumadin   -INR had been supratherapeutic earlier in admit likely due to other changes in medication regimen including addition of scheduled tylenol  -continue close monitoring INR jodee with other regimen changes to ensure stability     VT s/p ICD shock   -EP on consult - felt to be from holding metoprolol  -continue rate control regimen "as directed by Cardiology/EP     UTI  -UA positive for WBC, bacteria and nitrites   -UCx positive for E  Coli  -completed course of Rocephin      Impaired Vision  -encourage use of corrective lenses at all appropriate times  -consider large font for printed materials provided to patient      High risk of developing delirium  -continue delirium precautions  -maintain normal sleep/wake cycle  -reorient frequently as appropriate      Frailty syndrome in geriatric patient  -Clinical frailty scale stage IV, vulnerable   -Multifactorial including age, DM-II, hx valve replacement and multiple chronic medical co-morbidities now fall and traumatic injuries in elderly individual with limited physiologic and metabolic reserves   -continue optimization of chronic conditions and address acute metabolic derangements as arise      Care coordination: rounded with Glen Rivas (MIRANDA)    Subjective:     Mai Herbert is seen and examined at bedside where she is sitting resting, her pain is well controlled and she has been sleeping well and is awaiting short term rehab placement  Nursing reports no acute events overnight  Review of Systems   Constitutional: Negative  Negative for appetite change, chills and fever  HENT: Negative  Eyes: Negative  Respiratory: Negative  Cardiovascular: Negative  Gastrointestinal: Negative  Genitourinary: Negative  Musculoskeletal: Positive for gait problem  Left arm soreness    Skin: Negative  Neurological: Negative for dizziness, light-headedness, numbness and headaches  Hematological: Negative  Psychiatric/Behavioral: Negative  Negative for sleep disturbance  All other systems reviewed and are negative  Objective:     Vitals: Blood pressure 107/54, pulse 60, temperature 98 2 °F (36 8 °C), resp  rate 18, height 4' 10\" (1 473 m), weight 85 3 kg (188 lb), SpO2 100 %  ,Body mass index is 39 29 kg/m²        Intake/Output Summary (Last 24 hours) at 5/11/2023 1257  Last data filed " at 5/11/2023 0759  Gross per 24 hour   Intake 475 ml   Output 1725 ml   Net -1250 ml     Current Medications: Reviewed    Physical Exam:   Physical Exam  Vitals and nursing note reviewed  Constitutional:       General: She is not in acute distress  Appearance: Normal appearance  HENT:      Head: Normocephalic  Nose: Nose normal       Mouth/Throat:      Comments: Dentition intact  Eyes:      General:         Right eye: No discharge  Left eye: No discharge  Conjunctiva/sclera: Conjunctivae normal    Neck:      Comments: Trachea midline, phonation normal  Cardiovascular:      Rate and Rhythm: Normal rate and regular rhythm  Pulmonary:      Effort: Pulmonary effort is normal  No respiratory distress  Breath sounds: No wheezing  Comments: Saturating well on room air  Abdominal:      General: There is no distension  Palpations: Abdomen is soft  Tenderness: There is no abdominal tenderness  Musculoskeletal:      Cervical back: Neck supple  Right lower leg: No edema  Comments: Reduced overall muscle mass    LUE in splint    Skin:     General: Skin is warm and dry  Neurological:      Mental Status: She is alert  Mental status is at baseline  Comments: Awake and alert, oriented, answers questions appropriately   Psychiatric:         Mood and Affect: Mood normal          Behavior: Behavior normal         Invasive Devices     Peripheral Intravenous Line  Duration           Peripheral IV 05/08/23 Proximal;Right;Ventral (anterior) Forearm 2 days          Drain  Duration           External Urinary Catheter 10 days              Lab, Imaging and other studies: I have personally reviewed pertinent reports

## 2023-05-11 NOTE — PLAN OF CARE
Problem: PAIN - ADULT  Goal: Verbalizes/displays adequate comfort level or baseline comfort level  Description: Interventions:  - Encourage patient to monitor pain and request assistance  - Assess pain using appropriate pain scale  - Administer analgesics based on type and severity of pain and evaluate response  - Implement non-pharmacological measures as appropriate and evaluate response  - Consider cultural and social influences on pain and pain management  - Notify physician/advanced practitioner if interventions unsuccessful or patient reports new pain  Outcome: Progressing     Problem: MUSCULOSKELETAL - ADULT  Goal: Maintain proper alignment of affected body part  Description: INTERVENTIONS:  - Support, maintain and protect limb and body alignment  - Provide patient/ family with appropriate education  Outcome: Progressing     Problem: SKIN/TISSUE INTEGRITY - ADULT  Goal: Incision(s), wounds(s) or drain site(s) healing without S/S of infection  Description: INTERVENTIONS  - Assess and document dressing, incision, wound bed, drain sites and surrounding tissue  - Provide patient and family education  - Perform skin care/dressing changes every   Problem: Potential for Falls  Goal: Patient will remain free of falls  Description: INTERVENTIONS:  - Educate patient/family on patient safety including physical limitations  - Instruct patient to call for assistance with activity   - Consult OT/PT to assist with strengthening/mobility   - Keep Call bell within reach  - Keep bed low and locked with side rails adjusted as appropriate  - Keep care items and personal belongings within reach  - Initiate and maintain comfort rounds  - Make Fall Risk Sign visible to staff  - Offer Toileting every 2 Hours, in advance of need  - Initiate/Maintain on alarm  - Obtain necessary fall risk management equipment:   - Apply yellow socks and bracelet for high fall risk patients  - Consider moving patient to room near nurses station  Outcome: Progressing     Outcome: Progressing

## 2023-05-11 NOTE — PROGRESS NOTES
[facility]  Progress Note  Name: Kelli Pathak  MRN: [id number]  [facility] I Date of Admission: 4/27/2023   Date of Service: 5/11/2023 I Hospital Day: 14    Assessment/Plan   Abnormal CT scan  Assessment & Plan  - 4/27 CT CAP:    - Incidentally noted is a incarcerated/partially obstructed periumbilic ventral hernia, (image 146 series 9)   - Septated cyst anterior aspect of the right kidney, consider evaluation with ultrasound or MRI for further characterization (image 136 series 9)   - Dilated ascending aorta measuring 4 6 cm, evaluation with cardiothoracic surgery on nonemergent basis  - Follow up with PCP and CT surgery outpatient for findings  - Patient informed of abnormal results on 5/11: she is aware of ventral hernia but not aortic aneurysm or renal cyst    Hyponatremia  Assessment & Plan  - Na is slowly downtrending with lowest 125 on 5/9  - Presumed secondary to furosemide and possibly poor PO intake  - Will start 1 g NaCl tabs TID    Bradycardia  Assessment & Plan  - HR in the 40-50s  - Appears to be junctional bradycardia on telemetry  - EP consulted and discontinued diltiazem and adjusted metoprolol to 25 mg daily  - After medication adjustments, HR is paced in the 50-60s    Ventricular arrhythmia  Assessment & Plan  - Hx of VT/VF s/p single chamber ICD  - ICD shock from VT episode 5/2 and admitted to ICU   - ICD zone adjusted per cards   - Cardiology recommendations appreciated  - Continue 25 mg metoprolol daily  - Amiodarone gtt discontinued; completed 1 week of oral amio 400 mg BID  - Continue 200 mg amiodarone daily   - Continue 2 5 mg warfarin daily    UTI (urinary tract infection)  Assessment & Plan  - Started on cefepime and vanco and de-escalated to ceftriaxone, completed 5 days of treatment    Type 2 diabetes mellitus [facility])  Assessment & Plan  No results found for: HGBA1C    Recent Labs     05/10/23  1132 05/10/23  1641 05/10/23  2053 05/10/23  2229   POCGLU 129 111 131 171*       Blood Sugar Average: Last 72 hrs:  (P) 116 [id number]     - Continue current medication regimen   - SSI  - Outpatient follow-up with PCP  Fracture of multiple ribs of right side  Assessment & Plan  - Multiple right-sided rib fractures (8th-9th), present on admission   - Continue rib fracture protocol   - Continue to encourage incentive spirometer use and adequate pulmonary hygiene  - APS consult, appreciate recommendations  - Continue multimodal analgesic regimen     - Supplemental oxygen via nasal cannula as needed to maintain saturations greater than or equal to 94%  - PT and OT evaluation and treatment as indicated-recommending rehab placement, pending peer to peer today  - Outpatient follow-up in the trauma clinic for re-evaluation in approximately 2 weeks  Laceration of right eyebrow  Assessment & Plan  - two 1 cm lacerations to the R eye brow  - steri strips applied  - local wound care    H/O mechanical aortic valve replacement  Assessment & Plan  - Warfarin held while being evaluated for humerus fracture  - Goal INR reportedly 2 5 to 3 5  - Difficulty managing her INR as she seems very sensitive to 5 mg (which is her home dose 5/7 days a week) as she became supratherapeutic  - Continue warfarin 2 5 mg daily  - Recheck INR in the morning    Acute pain due to trauma  Assessment & Plan  - Acute pain secondary to traumatic injuries  - Multi modal pain regimen  - Bowel regimen as long as using opioids   - Continue to monitor pain and adjust regimen as indicated        Humerus fracture  Assessment & Plan  - proximal humerus fracture on the left   - NVI distally  - pain control with multi modal analgesia  - orthopedics consultation, appreciate recommendations  - Non-op  - NWB LUE  - Sling to the LUE  - Multimodal pain control  - PT/OT recommending rehab    Fall  Assessment & Plan  - mechanical fall   - sustained below stated injuries  - PT/OT evaluations - recommending rehab  - Geriatrics evaluation, appreciate recommendations  - CM for disposition planning           Bowel Regimen: senna  VTE Prophylaxis:Warfarin (Coumadin)     Disposition: rehab or SNF pending insurance    Subjective   Subjective: patient becoming upset with delays in placement  Her daughter is working on family appeal for insurance  Objective   Vitals:   Temp:  [98 2 °F (36 8 °C)-98 5 °F (36 9 °C)] 98 2 °F (36 8 °C)  HR:  [60] 60  Resp:  [17-18] 18  BP: (107-110)/(54-57) 107/54    I/O       05/09 0701  05/10 0700 05/10 0701  05/11 0700 05/11 0701  05/12 0700    P  O  598 715     Total Intake(mL/kg) 598 (7) 715 (8 4)     Urine (mL/kg/hr) 1350 (0 7) 1525 (0 7)     Stool  0     Total Output 1350 1525     Net -752 -810            Unmeasured Urine Occurrence 1 x 3 x     Unmeasured Stool Occurrence 1 x 1 x            Physical Exam:   General: awake and alert  HEENT: eyebrow laceration healing well  CV: RRR  Pulm: CTAB  Abd: soft and non-tender  Neuro: follows commands in all extremities  Skin: no new rashes      Invasive Devices     Peripheral Intravenous Line  Duration           Peripheral IV 05/08/23 Proximal;Right;Ventral (anterior) Forearm 2 days          Drain  Duration           External Urinary Catheter 10 days                 PIC Score  PIC Pain Score: 2 (5/11/2023  8:18 AM)  PIC Incentive Spirometry Score: 3 (5/11/2023  8:00 AM)  PIC Cough Description: 3 (5/11/2023  8:00 AM)  PIC Total Score: 8 (5/11/2023  8:00 AM)       If the Total PIC Score </=5, did you consult APS and evaluate patient for further intervention?: yes      Pain:    Incentive Spirometry  Cough  3 = Controlled  4 = Above goal volume 3 = Strong  2 = Moderate  3 = Goal to alert volume 2 = Weak  1 = Severe  2 = Below alert volume 1 = Absent     1 = Unable to perform IS         Lab Results: Results: I have personally reviewed all pertinent laboratory/tests results  Imaging: I have personally reviewed pertinent reports       Other Studies: none

## 2023-05-11 NOTE — ASSESSMENT & PLAN NOTE
- Warfarin held while being evaluated for humerus fracture  - Goal INR reportedly 2 5 to 3 5  - Difficulty managing her INR as she seems very sensitive to 5 mg (which is her home dose 5/7 days a week) as she became supratherapeutic  - Continue warfarin 2 5 mg daily  - Recheck INR in the morning

## 2023-05-12 LAB
ANION GAP SERPL CALCULATED.3IONS-SCNC: 0 MMOL/L (ref 4–13)
BUN SERPL-MCNC: 20 MG/DL (ref 5–25)
CALCIUM SERPL-MCNC: 8 MG/DL (ref 8.3–10.1)
CHLORIDE SERPL-SCNC: 104 MMOL/L (ref 96–108)
CO2 SERPL-SCNC: 30 MMOL/L (ref 21–32)
CREAT SERPL-MCNC: 0.67 MG/DL (ref 0.6–1.3)
GFR SERPL CREATININE-BSD FRML MDRD: 85 ML/MIN/1.73SQ M
GLUCOSE SERPL-MCNC: 119 MG/DL (ref 65–140)
GLUCOSE SERPL-MCNC: 128 MG/DL (ref 65–140)
GLUCOSE SERPL-MCNC: 155 MG/DL (ref 65–140)
GLUCOSE SERPL-MCNC: 66 MG/DL (ref 65–140)
GLUCOSE SERPL-MCNC: 78 MG/DL (ref 65–140)
INR PPP: 2.46 (ref 0.84–1.19)
POTASSIUM SERPL-SCNC: 5.2 MMOL/L (ref 3.5–5.3)
PROTHROMBIN TIME: 26.9 SECONDS (ref 11.6–14.5)
SODIUM SERPL-SCNC: 134 MMOL/L (ref 135–147)

## 2023-05-12 RX ORDER — SODIUM CHLORIDE 1 G/1
1 TABLET ORAL 2 TIMES DAILY WITH MEALS
Status: DISCONTINUED | OUTPATIENT
Start: 2023-05-12 | End: 2023-05-13 | Stop reason: HOSPADM

## 2023-05-12 RX ADMIN — ACETAMINOPHEN 975 MG: 325 TABLET ORAL at 09:11

## 2023-05-12 RX ADMIN — OXYBUTYNIN 5 MG: 5 TABLET, FILM COATED, EXTENDED RELEASE ORAL at 09:11

## 2023-05-12 RX ADMIN — MONTELUKAST 10 MG: 10 TABLET, FILM COATED ORAL at 22:02

## 2023-05-12 RX ADMIN — ACETAMINOPHEN 975 MG: 325 TABLET ORAL at 17:18

## 2023-05-12 RX ADMIN — OXYCODONE HYDROCHLORIDE 5 MG: 5 TABLET ORAL at 22:02

## 2023-05-12 RX ADMIN — METHOCARBAMOL 250 MG: 500 TABLET ORAL at 17:19

## 2023-05-12 RX ADMIN — SODIUM CHLORIDE 1 G: 1 TABLET ORAL at 17:18

## 2023-05-12 RX ADMIN — METOPROLOL SUCCINATE 25 MG: 25 TABLET, FILM COATED, EXTENDED RELEASE ORAL at 09:11

## 2023-05-12 RX ADMIN — DOCUSATE SODIUM 100 MG: 100 CAPSULE, LIQUID FILLED ORAL at 09:12

## 2023-05-12 RX ADMIN — Medication 7.5 MG: at 17:19

## 2023-05-12 RX ADMIN — METHOCARBAMOL 250 MG: 500 TABLET ORAL at 05:02

## 2023-05-12 RX ADMIN — SODIUM CHLORIDE 1 G: 1 TABLET ORAL at 09:13

## 2023-05-12 RX ADMIN — ONDANSETRON 4 MG: 2 INJECTION INTRAMUSCULAR; INTRAVENOUS at 17:18

## 2023-05-12 RX ADMIN — AMIODARONE HYDROCHLORIDE 200 MG: 200 TABLET ORAL at 09:13

## 2023-05-12 RX ADMIN — INSULIN DETEMIR 5 UNITS: 100 INJECTION, SOLUTION SUBCUTANEOUS at 22:01

## 2023-05-12 RX ADMIN — LIDOCAINE 5% 1 PATCH: 700 PATCH TOPICAL at 09:13

## 2023-05-12 RX ADMIN — INSULIN LISPRO 1 UNITS: 100 INJECTION, SOLUTION INTRAVENOUS; SUBCUTANEOUS at 22:02

## 2023-05-12 RX ADMIN — GABAPENTIN 100 MG: 100 CAPSULE ORAL at 22:02

## 2023-05-12 RX ADMIN — METHOCARBAMOL 250 MG: 500 TABLET ORAL at 00:04

## 2023-05-12 RX ADMIN — OXYCODONE HYDROCHLORIDE 5 MG: 5 TABLET ORAL at 09:20

## 2023-05-12 RX ADMIN — ACETAMINOPHEN 975 MG: 325 TABLET ORAL at 00:04

## 2023-05-12 RX ADMIN — ACETAMINOPHEN 975 MG: 325 TABLET ORAL at 23:34

## 2023-05-12 RX ADMIN — METHOCARBAMOL 250 MG: 500 TABLET ORAL at 23:34

## 2023-05-12 RX ADMIN — WARFARIN SODIUM 2.5 MG: 2.5 TABLET ORAL at 17:19

## 2023-05-12 RX ADMIN — METHOCARBAMOL 250 MG: 500 TABLET ORAL at 12:16

## 2023-05-12 RX ADMIN — ALLOPURINOL 100 MG: 100 TABLET ORAL at 09:12

## 2023-05-12 RX ADMIN — ONDANSETRON 4 MG: 2 INJECTION INTRAMUSCULAR; INTRAVENOUS at 09:20

## 2023-05-12 RX ADMIN — ATORVASTATIN CALCIUM 40 MG: 40 TABLET, FILM COATED ORAL at 17:19

## 2023-05-12 RX ADMIN — CITALOPRAM HYDROBROMIDE 20 MG: 20 TABLET ORAL at 09:12

## 2023-05-12 NOTE — CASE MANAGEMENT
Case Management Discharge Planning Note    Patient name Flavia Kong  Location [address] 613/PPHP 503-42 MRN [id number]  : 1945 Date 2023       Current Admission Date: 2023  Current Admission Diagnosis:Fall   Patient Active Problem List    Diagnosis Date Noted   • Abnormal CT scan 05/10/2023   • Hyponatremia 2023   • Bradycardia 2023   • UTI (urinary tract infection) 2023   • Ventricular arrhythmia 2023   • Type 2 diabetes mellitus ([facility] [address] ) 2023   • Fall 2023   • Humerus fracture 2023   • Acute pain due to trauma 2023   • H/O mechanical aortic valve replacement 2023   • Laceration of right eyebrow 2023   • Fracture of multiple ribs of right side 2023      LOS (days): 15  Geometric Mean LOS (GMLOS) (days): 2 70  Days to GMLOS:-11 1     OBJECTIVE:  Risk of Unplanned Readmission Score: 18 63         Current admission status: Inpatient   Preferred Pharmacy:   [facility] [address] [place] [address]  Phone: [phone] Fax: [phone]    Primary Care Provider: Lanre Kelley DO    Primary Insurance: Dinesh Elilott [facility] REP  Secondary Insurance:     DISCHARGE DETAILS:    SHLOMO spoke to rep Kody from [place], regarding the family appeal  She requested CM fax clinicals information to her   CM sent and will follow up

## 2023-05-12 NOTE — CASE MANAGEMENT
Case Management Discharge Planning Note    Patient name Buel Class  Location [address] 613/PPHP 157-91 MRN [id number]  : 1945 Date 2023       Current Admission Date: 2023  Current Admission Diagnosis:Fall   Patient Active Problem List    Diagnosis Date Noted   • Abnormal CT scan 05/10/2023   • Hyponatremia 2023   • Bradycardia 2023   • UTI (urinary tract infection) 2023   • Ventricular arrhythmia 2023   • Type 2 diabetes mellitus ([facility] [address] ) 2023   • Fall 2023   • Humerus fracture 2023   • Acute pain due to trauma 2023   • H/O mechanical aortic valve replacement 2023   • Laceration of right eyebrow 2023   • Fracture of multiple ribs of right side 2023      LOS (days): 15  Geometric Mean LOS (GMLOS) (days): 2 70  Days to GMLOS:-11 1     OBJECTIVE:  Risk of Unplanned Readmission Score: 18 63         Current admission status: Inpatient   Preferred Pharmacy:   [place] [address] [address] [address]  Phone: [phone] Fax: [phone]    Primary Care Provider: Kenzie Francois DO    Primary Insurance: [facility]  Secondary Insurance:     DISCHARGE DETAILS:      Cm spoke to Zack Leach [phone]  They requested CM fax them updated clinicals as well as a PMR note   CM had consult placed and requested PMR see asap

## 2023-05-12 NOTE — ASSESSMENT & PLAN NOTE
- Na is slowly downtrending with lowest 125 on 5/9  - Presumed secondary to furosemide and possibly poor PO intake  Oral intake is now improved and patient is back on home lasix     - Decrease salt tabs to 1 g BID  - f/u with PCP and BMP in one week

## 2023-05-12 NOTE — CONSULTS
PHYSICAL MEDICINE AND REHABILITATION CONSULT NOTE  Raoul Oneill 68 y o  female MRN: [id number]  Unit/Bed#: [facility] 613-01 Encounter: [id number]    Requested by (Physician/Service): Jennie Graham MD  Reason for Consultation:  Assessment of rehabilitation needs    Assessment:  Rehabilitation Diagnosis:   • LUE proximal humerus s/p reduction/splinting NWB  • Impaired mobility and self care    Recommendations:  Rehabilitation Plan:  • Continue PT/OT (SLP) while on acute care  • The patient is a candidate for acute inpatient rehabilitation able to tolerate 3 hours therapy/day 5 to 6 days week  Medical necessity includes hx of AVR on coumadin, VT/VT with ICD with recent shock and adjustment, acute pain due to trauma, diabetes type 2   • Covid-19 Testing: [facility] inpatient rehabilitation units require testing within 48 hours of all potential admissions at this time  *Re-testing is NOT required for patients recovering from COVID-19 infection if isolation has been discontinued per CDC criteria  Medical Co-morbidities Plan:  · Junctional bradycardia   · VT/VF with ICD  · AVR on coumadin  · Acute pain due to trauma  · Hyponatremia  · Diabetes type 2  · Multiple right side rib fractures   · DVT ppx: coumadin and SCD    Thank you for this consultation  Do not hesitate to contact service with further questions  JOSEPHINE Coles  PM&R    I have spent a total time of 30 minutes on 05/12/23 in caring for this patient including Patient and family education, Counseling / Coordination of care, Documenting in the medical record, Obtaining or reviewing history   and Communicating with other healthcare professionals   History of Present Illness:  Raoul Oneill is a 68 y o  female with a PMH of diabetes, asthma, mechanical aortic valve replacement on coumadin, ICD and HTN who presented to the [facility] on 4/27/2023 after falling   She was found to have left humerus fracture, right eyebrow "laceration, multiple right sided rib fractures  She underwent reduction and splint with orthopedics  She was bradycardic for which cardiology was consulted  Telemetry showed junctional bradycardia  EP recommended d/c diltiazem and metoprolol was adjusted to 25 mg daily  She also had an ICD shock from VT on 5/2 and required admission to ICU  Her ICD was adjusted and she was placed on an amiodarone drip which was transitioned to oral  She is NWB to the [facility]  Of note she was found to have incidental finding of periumbilical hernia, right kidney cyst and ascending aorta measuring 4 6 cm  She will need to follow up with PCP, CT surgery and cardiothoracic surgery on a non-emergent basis  PM&R are consulted for rehabilitation recommendations  The patient was seen in her room  She reports good pain control  She is hopeful to get to rehabilitation and is very motivated to get better and get back home  She denies any chest pain, SOB, double vision, blurred vision, weakness, numbness, tingling or chest pain  Review of Systems: 10 point ROS negative except for what is noted in HPI    Function:  Prior level of function and living situation: The patient lives alone in an apartment that is one level and was independent  She has a supportive son and daughter that she stays with at times  Current level of function:  Physical Therapy: Moderate assist x 1 for transfers, moderate assist for ambulation  Occupational Therapy: Maximal assist for UB dressing and toileting, patient NWB to [facility]  Speech Therapy:      Physical Exam:  /60   Pulse 60   Temp 98 5 °F (36 9 °C)   Resp 18   Ht 4' 10\" (1 473 m)   Wt 85 3 kg (188 lb)   SpO2 99%   BMI 39 29 kg/m²        Intake/Output Summary (Last 24 hours) at 5/12/2023 1110  Last data filed at 5/12/2023 0920  Gross per 24 hour   Intake 825 ml   Output --   Net 825 ml       Body mass index is 39 29 kg/m²        Physical Exam  Constitutional:       General: She is not in acute " distress  Appearance: She is not toxic-appearing  HENT:      Head: Normocephalic and atraumatic  Right Ear: External ear normal       Left Ear: External ear normal       Nose: Nose normal       Mouth/Throat:      Mouth: Mucous membranes are moist       Pharynx: Oropharynx is clear  Eyes:      Extraocular Movements: Extraocular movements intact  Pulmonary:      Effort: Pulmonary effort is normal  No respiratory distress  Abdominal:      General: There is no distension  Musculoskeletal:         General: Normal range of motion  Comments: LUE not tested due to NWB status, able to wiggle fingers, grasp   Skin:     Findings: Bruising present  Neurological:      Mental Status: She is alert and oriented to person, place, and time  Psychiatric:         Mood and Affect: Mood normal           Social History:    Social History     Socioeconomic History   • Marital status:      Spouse name: None   • Number of children: None   • Years of education: None   • Highest education level: None   Occupational History   • None   Tobacco Use   • Smoking status: Never   • Smokeless tobacco: Never   Substance and Sexual Activity   • Alcohol use: Yes     Comment: socially   • Drug use: Never   • Sexual activity: None   Other Topics Concern   • None   Social History Narrative   • None     Social Determinants of Health     Financial Resource Strain: Not on file   Food Insecurity: No Food Insecurity   • Worried About Running Out of Food in the Last Year: Never true   • Ran Out of Food in the Last Year: Never true   Transportation Needs: No Transportation Needs   • Lack of Transportation (Medical): No   • Lack of Transportation (Non-Medical):  No   Physical Activity: Not on file   Stress: Not on file   Social Connections: Not on file   Intimate Partner Violence: Not on file   Housing Stability: Low Risk    • Unable to Pay for Housing in the Last Year: No   • Number of Places Lived in the Last Year: 1   • Unstable Housing in the Last Year: No        Family History:    Family History   Problem Relation Age of Onset   • Hypertension Other          Medications:     Current Facility-Administered Medications:   •  acetaminophen (TYLENOL) tablet 975 mg, 975 mg, Oral, Q8H [address], JOSEPHINE Hernandez, 975 mg at 05/12/23 0911  •  allopurinol (ZYLOPRIM) tablet 100 mg, 100 mg, Oral, Daily, JOSEPHINE Ambrosio, 100 mg at 05/12/23 0927  •  amiodarone tablet 200 mg, 200 mg, Oral, Daily With Breakfast, JOSEPHINE Sexton, 200 mg at 05/12/23 4784  •  atorvastatin (LIPITOR) tablet 40 mg, 40 mg, Oral, Daily With Bazzi JOSEPHINE Gomez, 40 mg at 05/11/23 1609  •  calcium carbonate (TUMS) chewable tablet 500 mg, 500 mg, Oral, TID PRN, Yves Prieto MD, 500 mg at 05/11/23 2115  •  citalopram (CeleXA) tablet 20 mg, 20 mg, Oral, Daily, JOSEPHINE Ambrosio, 20 mg at 05/12/23 1782  •  docusate sodium (COLACE) capsule 100 mg, 100 mg, Oral, BID, JOSEPHINE Hernandez, 100 mg at 05/12/23 0522  •  furosemide (LASIX) tablet 40 mg, 40 mg, Oral, Daily, JOSEPHINE Ambrosio, 40 mg at 05/09/23 1001  •  gabapentin (NEURONTIN) capsule 100 mg, 100 mg, Oral, HS, JOSEPHINE Hernandez, 100 mg at 05/11/23 2100  •  insulin detemir (LEVEMIR) subcutaneous injection 5 Units, 5 Units, Subcutaneous, HS, Carmen Collier MD, 5 Units at 05/11/23 2104  •  insulin lispro (HumaLOG) 100 units/mL subcutaneous injection 1-5 Units, 1-5 Units, Subcutaneous, HS, JOSEPHINE Ambrosio, 1 Units at 05/11/23 2104  •  insulin lispro (HumaLOG) 100 units/mL subcutaneous injection 1-5 Units, 1-5 Units, Subcutaneous, TID AC **AND** Fingerstick Glucose (POCT), , , TID AC, Carmen Collier MD  •  lidocaine (LIDODERM) 5 % patch 1 patch, 1 patch, Topical, Daily, JOSEPHINE Ambrosio, 1 patch at 05/12/23 0913  •  methocarbamol (ROBAXIN) tablet 250 mg, 250 mg, Oral, Q6H [facility], JOSEPHINE Hernandez, 250 mg at 05/12/23 0502  •  metoprolol succinate (TOPROL-XL) 24 hr tablet 25 mg, 25 mg, Oral, Daily, Raj Contreras PA-C, 25 mg at 05/12/23 1567  •  montelukast (SINGULAIR) tablet 10 mg, 10 mg, Oral, HS, Rebeca Curtis, CRNP, 10 mg at 05/11/23 2100  •  naloxone (NARCAN) 0 04 mg/mL syringe 0 04 mg, 0 04 mg, Intravenous, Q1MIN PRN, , CRNP  •  ondansetron (ZOFRAN) injection 4 mg, 4 mg, Intravenous, Q6H PRN, , CRNP, 4 mg at 05/12/23 0920  •  oxybutynin (DITROPAN-XL) 24 hr tablet 5 mg, 5 mg, Oral, Daily, , CRNP, 5 mg at 05/12/23 0010  •  oxyCODONE (ROXICODONE) IR tablet 5 mg, 5 mg, Oral, Q4H PRN, , CRNP, 5 mg at 05/12/23 6199  •  oxyCODONE (ROXICODONE) split tablet 7 5 mg, 7 5 mg, Oral, Q4H PRN, , CRNP, 7 5 mg at 05/11/23 2208  •  senna (SENOKOT) tablet 17 2 mg, 2 tablet, Oral, Daily, , CRNP, 17 2 mg at 05/10/23 1674  •  sodium chloride tablet 1 g, 1 g, Oral, BID With Meals, Brittany Fuller PA-C  •  warfarin (COUMADIN) tablet 2 5 mg, 2 5 mg, Oral, Daily (warfarin), Beth Workman MD, 2 5 mg at 05/11/23 1746    Past Medical History:     Past Medical History:   Diagnosis Date   • Asthma    • Diabetes ([address] )    • Hypertension         Past Surgical History:     Past Surgical History:   Procedure Laterality Date   • MECHANICAL AORTIC VALVE REPLACEMENT     • REPLACEMENT TOTAL KNEE           Allergies:      Allergies   Allergen Reactions   • Fentanyl Confusion           LABORATORY RESULTS:      Lab Results   Component Value Date    HGB 10 1 (L) 05/06/2023    HCT 31 4 (L) 05/06/2023    WBC 5 48 05/06/2023     Lab Results   Component Value Date    BUN 20 05/12/2023    K 5 2 05/12/2023     05/12/2023    GLUCOSE 149 (H) 05/02/2023    CREATININE 0 67 05/12/2023     Lab Results   Component Value Date    PROTIME 26 9 (H) 05/12/2023    INR 2 46 (H) 05/12/2023        DIAGNOSTIC STUDIES: Reviewed  XR chest portable    Result Date: 4/29/2023  Impression: Cardiomegaly  Scarring or atelectasis in the left lung  Workstation performed: [id number]     XR shoulder 2+ vw left    Result Date: 4/27/2023  Impression: Obliquely oriented, overriding proximal humeral shaft fracture  Workstation performed: [id number]     XR humerus left    Result Date: 4/29/2023  Impression: Acute spiral fracture of the proximal humeral shaft with slightly more displacement and angulation  Workstation performed: [id number]     XR humerus left    Result Date: 4/27/2023  Impression: Obliquely oriented, overriding proximal humeral shaft fracture  Workstation performed: [id number]     XR elbow 2 vw left    Result Date: 4/27/2023  Impression: Obliquely oriented, overriding proximal humeral shaft fracture  Workstation performed: [id number]     TRAUMA - CT head wo contrast    Result Date: 4/27/2023  Impression: No acute intracranial hemorrhage  No mass effect or midline shift  I personally discussed this study with Yamel Quispe on 4/27/2023 2:30 PM at 4/27/2023 2:30 PM  Workstation performed: [id number]     TRAUMA - CT spine cervical wo contrast    Result Date: 4/27/2023  Impression: No acute compression collapse of the vertebra  Multilevel degenerative disc disease 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 with severe central canal narrowing  Workstation performed: [id number]     XR chest 1 view    Result Date: 4/27/2023  Impression: Angulated proximal left humeral shaft fracture  The study was marked in [facility] for immediate notification   Workstation performed: [id number]     TRAUMA - CT chest abdomen pelvis w contrast    Result Date: 4/27/2023  Impression: No solid visceral injury seen Comminuted fracture left humerus Incidentally noted is a incarcerated/partially obstructed periumbilic ventral hernia, (image 146 series 9) Septated cyst anterior aspect of the right kidney, consider evaluation with ultrasound or MRI for further characterization (image 136 series 9) Dilated ascending aorta measuring 4 6 cm, evaluation with cardiothoracic surgery on nonemergent basis Deformity of the right eighth and ninth rib may be due to motion, raise concern for fracture in appropriate clinical setting Evaluation of the right humerus is limited due to motion radiograph may be considered if concern for any right humerus injury  I personally discussed this study with [address] on 4/27/2023 2:28 PM at 4/27/2023 2:28 PM  Workstation performed: [id number]     XR Trauma multiple (SLB/RA trauma bay ONLY)    Result Date: 4/27/2023  Impression: Angulated proximal left humeral shaft fracture  The study was marked in [facility] for immediate notification    Workstation performed: [id number]

## 2023-05-12 NOTE — PROGRESS NOTES
PHYSICAL MEDICINE AND REHABILITATION   PREADMISSION ASSESSMENT     Projected [facility] Diagnoses:  Impairment of mobility, safety and Activities of Daily Living (ADLs) due to Debility:  16  Debility (Non-cardiac/Non-pulmonary)  Etiologic: LUE proximal humerus s/p reduction/splinting NWB  Date of Onset: 4/27/23   Date of surgery: n/a    PATIENT INFORMATION  Name: Corazon Francis Phone #: [phone] (home)   Address: [address] [place] [address]  YOB: 1945 Age: 68 y o  SS#   Marital Status:   Ethnicity: White  Employment Status: retired  Extended Emergency Contact Information  Primary Emergency Contact: LorneShannan  Mobile Phone: [phone]  Relation: Daughter  Secondary Emergency Contact: Yahaira Verde  Mobile Phone: [phone]  Relation: Daughter  Advance Directive: Level 1 Full Code (no ACP docs)     INSURANCE/COVERAGE:     Primary Payor: TONIEARSALAN  REP / Plan: [employer] PPO [address]  REP / Product Type: Medicare PPO /   Secondary Payer:Self Pay   Payer Contact:  Payer Contact:   Contact Phone:  Contact Phone:     Authorization #: ***  Coverage Dates:***  LCD: ***  MEDICARE #: [id number]  Medical Record #: [id number]    REFERRAL SOURCE:   Referring provider: Merle Vu MD  Referring facility: [address] [place] [address]   Room: [address] [place] [address] 61/[facility] 822-14  PCP: Sammy Flores DO PCP phone number: [phone]    MEDICAL INFORMATION  HPI: Per PM&R consult: Pt is a 68 y o  female with a PMH of diabetes, asthma, mechanical aortic valve replacement on coumadin, ICD and HTN who presented to the [address] [place] [address] on 4/27/2023 after falling  She was found to have left humerus fracture, right eyebrow laceration, multiple right sided rib fractures  She underwent reduction and splint with orthopedics  She was bradycardic for which cardiology was consulted  Telemetry showed junctional bradycardia   EP recommended d/c diltiazem and metoprolol was adjusted to 25 mg daily  She also had an ICD shock from VT on 5/2 and required admission to ICU  Her ICD was adjusted and she was placed on an amiodarone drip which was transitioned to oral  She is NWB to the LUE  Of note she was found to have incidental finding of periumbilical hernia, right kidney cyst and ascending aorta measuring 4 6 cm  She will need to follow up with PCP, CT surgery and cardiothoracic surgery on a non-emergent basis  PM&R are consulted for rehabilitation recommendations  PM&R consult: patient is a candidate for acute inpatient rehabilitation able to tolerate 3 hours therapy/day 5 to 6 days week  PT and OT have been consulted and are recommending post-acute rehab services  Patient's case has been reviewed with [facility] medical director, patient meets medical criteria for acute rehab and has demonstrated the ability to tolerate three or more hours of therapy per day  Patient is medically stable and ready for discharge to [facility]  Past Medical History:   Past Surgical History:    Allergies:     Past Medical History:   Diagnosis Date   • Asthma    • Diabetes ([facility] [address] )    • Hypertension     Past Surgical History:   Procedure Laterality Date   • MECHANICAL AORTIC VALVE REPLACEMENT     • REPLACEMENT TOTAL KNEE       Allergies   Allergen Reactions   • Fentanyl Confusion         Medical/functional conditions requiring inpatient rehabilitation: LUE proximal humerus s/p reduction/splinting NWB, Impaired mobility and self care, decreased mobility, decreased strength/endurance     Risk for medical/clinical complications: Risk for falls, Risk for skin breakdown secondary to decreased mobility, Risk for hyper/hypogylcemic episodes, Risk for junctional bradycardia, Risk for increased pain    Comorbidities:   • Junctional bradycardia   • VT/VF with ICD  • AVR on coumadin  • Acute pain due to trauma  • Hyponatremia  • Diabetes type 2  • Multiple right side rib fractures   • DVT ppx: coumadin and SCD    Surgeries in the last 100 days: none    CURRENT VITAL SIGNS:   Temp:  [98 3 °F (36 8 °C)-98 5 °F (36 9 °C)] 98 5 °F (36 9 °C)  HR:  [60-63] 60  Resp:  [18] 18  BP: ()/(42-60) 107/60   Intake/Output Summary (Last 24 hours) at 5/12/2023 1516  Last data filed at 5/12/2023 0920  Gross per 24 hour   Intake 600 ml   Output --   Net 600 ml        LABORATORY RESULTS:      Lab Results   Component Value Date    HGB 10 1 (L) 05/06/2023    HCT 31 4 (L) 05/06/2023    WBC 5 48 05/06/2023     Lab Results   Component Value Date    BUN 20 05/12/2023    K 5 2 05/12/2023     05/12/2023    GLUCOSE 149 (H) 05/02/2023    CREATININE 0 67 05/12/2023     Lab Results   Component Value Date    PROTIME 26 9 (H) 05/12/2023    INR 2 46 (H) 05/12/2023        DIAGNOSTIC STUDIES:  XR chest portable    Result Date: 4/29/2023  Impression: Cardiomegaly  Scarring or atelectasis in the left lung  Workstation performed: [id number]     XR shoulder 2+ vw left    Result Date: 4/27/2023  Impression: Obliquely oriented, overriding proximal humeral shaft fracture  Workstation performed: [id number]     XR humerus left    Result Date: 4/29/2023  Impression: Acute spiral fracture of the proximal humeral shaft with slightly more displacement and angulation  Workstation performed: [id number]     XR humerus left    Result Date: 4/27/2023  Impression: Obliquely oriented, overriding proximal humeral shaft fracture  Workstation performed: [id number]     XR elbow 2 vw left    Result Date: 4/27/2023  Impression: Obliquely oriented, overriding proximal humeral shaft fracture  Workstation performed: [id number]     TRAUMA - CT head wo contrast    Result Date: 4/27/2023  Impression: No acute intracranial hemorrhage  No mass effect or midline shift    I personally discussed this study with Saintclair Beam on 4/27/2023 2:30 PM at 4/27/2023 2:30 PM  Workstation performed: [id number]     TRAUMA - CT spine cervical wo contrast    Result Date: 4/27/2023  Impression: No acute compression collapse of the vertebra  Multilevel degenerative disc disease 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 with severe central canal narrowing  Workstation performed: [id number]     XR chest 1 view    Result Date: 4/27/2023  Impression: Angulated proximal left humeral shaft fracture  The study was marked in [facility] for immediate notification  Workstation performed: [id number]     TRAUMA - CT chest abdomen pelvis w contrast    Result Date: 4/27/2023  Impression: No solid visceral injury seen Comminuted fracture left humerus Incidentally noted is a incarcerated/partially obstructed periumbilic ventral hernia, (image 146 series 9) Septated cyst anterior aspect of the right kidney, consider evaluation with ultrasound or MRI for further characterization (image 136 series 9) Dilated ascending aorta measuring 4 6 cm, evaluation with cardiothoracic surgery on nonemergent basis Deformity of the right eighth and ninth rib may be due to motion, raise concern for fracture in appropriate clinical setting Evaluation of the right humerus is limited due to motion radiograph may be considered if concern for any right humerus injury  I personally discussed this study with Claudine Morgan 21 on 4/27/2023 2:28 PM at 4/27/2023 2:28 PM  Workstation performed: [id number]     XR Trauma multiple (SLB/SLRA trauma bay ONLY)    Result Date: 4/27/2023  Impression: Angulated proximal left humeral shaft fracture  The study was marked in [facility] for immediate notification    Workstation performed: [id number]       PRECAUTIONS/SPECIAL NEEDS:  Weight Bearing Precautions:  NWB LUE, Blood Sugar Management: Per MD orders, Edema Management, Safety Concerns, Pain Management, Dietary Restrictions: Beau/CHO controlled and Language Preference: English    MEDICATIONS:     Current Facility-Administered Medications:   •  acetaminophen (TYLENOL) tablet 975 mg, 975 mg, Oral, Q8H [address], Clance Bumpers JOSEPHINE Curtis, 975 mg at 05/12/23 3363  •  allopurinol (ZYLOPRIM) tablet 100 mg, 100 mg, Oral, Daily, JOSEPHINE Whaley, 100 mg at 05/12/23 0402  •  amiodarone tablet 200 mg, 200 mg, Oral, Daily With Breakfast, Yadira ThompsonJOSEPHINE, 200 mg at 05/12/23 2856  •  atorvastatin (LIPITOR) tablet 40 mg, 40 mg, Oral, Daily With Solano Gerold, JOSEPHINE, 40 mg at 05/11/23 1609  •  calcium carbonate (TUMS) chewable tablet 500 mg, 500 mg, Oral, TID PRN, Sha Rainey MD, 500 mg at 05/11/23 2115  •  citalopram (CeleXA) tablet 20 mg, 20 mg, Oral, Daily, JOSEPHINE Whaley, 20 mg at 05/12/23 2867  •  docusate sodium (COLACE) capsule 100 mg, 100 mg, Oral, BID, JOSEPHINE Whaley, 100 mg at 05/12/23 1182  •  furosemide (LASIX) tablet 40 mg, 40 mg, Oral, Daily, JOSEPHINE Whaley, 40 mg at 05/09/23 1001  •  gabapentin (NEURONTIN) capsule 100 mg, 100 mg, Oral, HS, JOSEPHINE Hernandez, 100 mg at 05/11/23 2100  •  insulin detemir (LEVEMIR) subcutaneous injection 5 Units, 5 Units, Subcutaneous, HS, Cecily Teresa MD, 5 Units at 05/11/23 2104  •  insulin lispro (HumaLOG) 100 units/mL subcutaneous injection 1-5 Units, 1-5 Units, Subcutaneous, HS, JOSEPHINE Whaley, 1 Units at 05/11/23 2104  •  insulin lispro (HumaLOG) 100 units/mL subcutaneous injection 1-5 Units, 1-5 Units, Subcutaneous, TID AC **AND** Fingerstick Glucose (POCT), , , TID AC, Cecily Teresa MD  •  lidocaine (LIDODERM) 5 % patch 1 patch, 1 patch, Topical, Daily, JOSEPHINE Whaley, 1 patch at 05/12/23 0913  •  methocarbamol (ROBAXIN) tablet 250 mg, 250 mg, Oral, Q6H [address], JOSEPHINE Hernandez, 250 mg at 05/12/23 1216  •  metoprolol succinate (TOPROL-XL) 24 hr tablet 25 mg, 25 mg, Oral, Daily, Michael Contreras PA-C, 25 mg at 05/12/23 0911  •  montelukast (SINGULAIR) tablet 10 mg, 10 mg, Oral, HS, JOSEPHINE Hernandez, 10 mg at 05/11/23 2100  •  naloxone (NARCAN) 0 04 mg/mL syringe 0 04 mg, 0 04 mg, Intravenous, Q1MIN PRN, Lisa Greaser, CRNP  •  ondansetron (ZOFRAN) injection 4 mg, 4 mg, Intravenous, Q6H PRN, MAHAD CamaraNP, 4 mg at 23 0920  •  oxybutynin (DITROPAN-XL) 24 hr tablet 5 mg, 5 mg, Oral, Daily, Lisa Greaser, CRNP, 5 mg at 23 1899  •  oxyCODONE (ROXICODONE) IR tablet 5 mg, 5 mg, Oral, Q4H PRN, Lisa Greaser, CRNP, 5 mg at 23 0920  •  oxyCODONE (ROXICODONE) split tablet 7 5 mg, 7 5 mg, Oral, Q4H PRN, Lisa Greaser, CRNP, 7 5 mg at 238  •  senna (SENOKOT) tablet 17 2 mg, 2 tablet, Oral, Daily, Lisa Greaser, CRNP, 17 2 mg at 05/10/23 1247  •  sodium chloride tablet 1 g, 1 g, Oral, BID With Meals, Davion Fuller PA-C  •  warfarin (COUMADIN) tablet 2 5 mg, 2 5 mg, Oral, Daily (warfarin), Yolanda Granger MD, 2 5 mg at 23 1746    SKIN INTEGRITY:   Pressure injury buttocks right upper    PRIOR LEVEL OF FUNCTION:  She lives in a(n) apartment  Fan Mann is  and lives alone  Self Care: Independent, Indoor Mobility: Independent, Stairs (in/outdoor): Independent and Cognition: Independent    FALLS IN THE LAST 6 MONTHS: 1-4    HOME ENVIRONMENT:  The living area: can live on one level  There are No steps to enter the home  The patient will not have 24 hour supervision/physical assistance available upon discharge      PREVIOUS DME:  Equipment in home (previous DME): None    FUNCTIONAL STATUS:***  Physical Therapy Occupational Therapy Speech Therapy          CARE SCORES:  Self Care:  Eating: {ARC Pre Admission Screenin}  Oral hygiene: {ARC Pre Admission Screenin}  Toilet hygiene: {ARC Pre Admission Screenin}  Shower/bathing self: {ARC Pre Admission Screenin}  Upper body dressing: {ARC Pre Admission Screenin}  Lower body dressing: {ARC Pre Admission Screenin}  Putting on/taking off footwear: {ARC Pre Admission Screenin}  Transfers:  Roll left and right: {ARC Pre Admission Screenin}  Sit to lying: {ARC Pre Admission Screenin}  Lying to sitting on side of bed: {ARC Pre Admission Screenin}  Sit to stand: {ARC Pre Admission Screenin}  Chair/bed to chair transfer: {ARC Pre Admission Screenin}  Toilet transfer: {ARC Pre Admission Screenin}  Mobility:  Walk 10 ft: {ARC Pre Admission Screenin}  Walk 50 ft with two turns: {ARC Pre Admission Screenin}  Walk 150ft: {ARC Pre Admission Screenin}    CURRENT GAP IN FUNCTION  Prior to Admission: Functional Status: Patient was independent with mobility/ambulation, transfers, ADL's, IADL's  Expected functional outcomes: It is expected that with skilled acute rehabilitation services the patient will progress to Independent for self care and Independent for mobility     Estimated length of stay: 10 to 14 days    Anticipated Post-Discharge Disposition/Treatment  Disposition: Return to previous home/apartment  Outpatient Services: Physical Therapy (PT) and Occupational Therapy (OT)    BARRIERS TO DISCHARGE  Weakness, Pain, Balance Difficulty, Fatigue, Home Accessibility, Caregiver Accessibility, Equipment Needs and Lives Alone    INTERVENTIONS FOR DISCHARGE  Adaptive equipment, Patient/Family/Caregiver Education, [employer], Arrange DME needs, Therapy exercises and Energy conservation education     REQUIRED THERAPY:  Patient will require PT and OT 90 minutes each per day, five days per week to achieve rehab goals       REQUIRED FUNCTIONAL AND MEDICAL MANAGEMENT FOR INPATIENT REHABILITATION:  Skin:  Monitor skin for breakdown secondary to decreased mobility, Pain Management: Overall pain is moderately controlled, Deep Vein Thrombosis (DVT) Prophylaxis:  Per MD orders, Diabetes Management: continue sliding scale insulin, patient to do finger sticks as ordered, SLIM to continue to manage diabetes, further internal medicine management of additional medical conditions while on ARC, PT/OT intervention, patient/family education and training, and any needed consults PRN  RECOMMENDED LEVEL OF CARE:    Pt is a 68 y o  female with a PMH of diabetes, asthma, mechanical aortic valve replacement on coumadin, ICD and HTN who presented to the [address] on 4/27/2023 after falling  She was found to have left humerus fracture, right eyebrow laceration, multiple right sided rib fractures  She underwent reduction and splint with orthopedics  She was bradycardic for which cardiology was consulted  Telemetry showed junctional bradycardia  EP recommended d/c diltiazem and metoprolol was adjusted to 25 mg daily  She also had an ICD shock from VT on 5/2 and required admission to ICU  Her ICD was adjusted and she was placed on an amiodarone drip which was transitioned to oral  She is NWB to the E  Of note she was found to have incidental finding of periumbilical hernia, right kidney cyst and ascending aorta measuring 4 6 cm  She will need to follow up with PCP, CT surgery and cardiothoracic surgery on a non-emergent basis  PM&R are consulted for rehabilitation recommendations  Patient lives alone in an apartment that is one level and was independent with all mobility, ADLs, and IADLs  She has a supportive son and daughter that she stays with at times  Pt is currently functioning below baseline needing *** A for ADLs and *** A for transfers/amb  Pt would benefit from [facility] admission to have close medical management while participating in 3 hours of therapy per day that will include physical and occupational therapy  Physical and occupational therapists will address functional mobility deficits and assist patient in improving their strength, endurance, ROM, and self care  Pt will have 24/7 nursing care to monitor routine vitals, blood sugars, I/Os, skin integrity, and overall condition   The rehab nursing staff will follow therapy recommendations to have 24 hour follow through during non-therapy hours  PM&R to maximize function and provide medical oversight  The MD will monitor patient co-morbidities while on the unit as well as order any additional tests, labs, and consults needed  The [facility] specialized interdisciplinary team will meet weekly to discuss patient overall medical status and rehab goals in preparation for D/C home  Inpatient acute rehab is recommended for patient to maximize overall strength, endurance, self care, and mobility for a safe and timely transition back home

## 2023-05-12 NOTE — PROGRESS NOTES
[facility]  Progress Note  Name: Ry Nelson  MRN: [id number]  Unit/Bed#: PPHP 553-10 I Date of Admission: 4/27/2023   Date of Service: 5/12/2023 I Hospital Day: 15    Assessment/Plan   Fall  Assessment & Plan  - mechanical fall   - sustained below stated injuries  - PT/OT evaluations - recommending rehab  - Geriatrics evaluation, appreciate recommendations  - CM for disposition planning  Family appealing denial to rehab  Humerus fracture  Assessment & Plan  - proximal humerus fracture on the left   - NVI distally  - pain control with multi modal analgesia  - orthopedics consultation, appreciate recommendations  - Non-op  - NWB LUE  - Sling to the LUE  - Multimodal pain control  - PT/OT recommending rehab  - f/u with orthopedics as an outpatient    Fracture of multiple ribs of right side  Assessment & Plan  - Multiple right-sided rib fractures (8th-9th), present on admission   - Continue rib fracture protocol   - Continue to encourage incentive spirometer use and adequate pulmonary hygiene  - APS consult, appreciate recommendations  - Continue multimodal analgesic regimen     - Supplemental oxygen via nasal cannula as needed to maintain saturations greater than or equal to 94%  - PT and OT evaluation and treatment as indicated-recommending rehab placement, pending peer to peer today  - Outpatient follow-up in the trauma clinic for re-evaluation in approximately 2 weeks  Laceration of right eyebrow  Assessment & Plan  - two 1 cm lacerations to the R eye brow  - steri strips applied  - local wound care    Acute pain due to trauma  Assessment & Plan  - Acute pain secondary to traumatic injuries  - Multi modal pain regimen  - Bowel regimen as long as using opioids   - Continue to monitor pain and adjust regimen as indicated        H/O mechanical aortic valve replacement  Assessment & Plan  - Warfarin held while being evaluated for humerus fracture  - Goal INR reportedly 2 5 to 3 5  - Difficulty managing her INR as she seems very sensitive to 5 mg (which is her home dose 5/7 days a week) as she became supratherapeutic  - Continue warfarin 2 5 mg daily  - INR 2 4 today, 5/12    Abnormal CT scan  Assessment & Plan  - 4/27 CT CAP:    - Incidentally noted is a incarcerated/partially obstructed periumbilic ventral hernia, (image 146 series 9)   - Septated cyst anterior aspect of the right kidney, consider evaluation with ultrasound or MRI for further characterization (image 136 series 9)   - Dilated ascending aorta measuring 4 6 cm, evaluation with cardiothoracic surgery on nonemergent basis  - Follow up with PCP and CT surgery outpatient for findings  - Patient informed of abnormal results on 5/11: she is aware of ventral hernia but not aortic aneurysm or renal cyst    Hyponatremia  Assessment & Plan  - Na is slowly downtrending with lowest 125 on 5/9  - Presumed secondary to furosemide and possibly poor PO intake  Oral intake is now improved and patient is back on home lasix     - Decrease salt tabs to 1 g BID  - f/u with PCP and BMP in one week    Bradycardia  Assessment & Plan  - HR in the 40-50s  - Appears to be junctional bradycardia on telemetry  - EP consulted and discontinued diltiazem and adjusted metoprolol to 25 mg daily  - After medication adjustments, HR is paced in the 50-60s    Ventricular arrhythmia  Assessment & Plan  - Hx of VT/VF s/p single chamber ICD  - ICD shock from VT episode 5/2 and admitted to ICU   - ICD zone adjusted per cards   - Cardiology recommendations appreciated  - Continue 25 mg metoprolol daily  - Amiodarone gtt discontinued; completed 1 week of oral amio 400 mg BID  - Continue 200 mg amiodarone daily   - Continue 2 5 mg warfarin daily    UTI (urinary tract infection)  Assessment & Plan  - Started on cefepime and vanco and de-escalated to ceftriaxone, completed 5 days of treatment    Type 2 diabetes mellitus ([facility] [address] )  Assessment & Plan  No "results found for: HGBA1C    Recent Labs     05/11/23  0707 05/11/23  1145 05/11/23  1635 05/11/23 2054   POCGLU 82 126 123 155*       Blood Sugar Average: Last 72 hrs:  (P) 651 [id number]     - Continue current medication regimen   - SSI  - Outpatient follow-up with PCP  Bowel Regimen: colace, senna  VTE Prophylaxis:Sequential compression device (Venodyne)  and Reason for no pharmacologic prophylaxis [place]     Disposition: continue med-surg status, rehab placement pending  Family doing an appeal      Subjective   Chief Complaint: \"I\"m just sore\"    Subjective: Pain in the left arm is controlled with pain medications  She would really like to go to rehab, as she doesn't know how she will manage at home  Family is doing an appeal  She reports the salt tabs make her nauseous  Objective   Vitals:   Temp:  [98 2 °F (36 8 °C)-98 5 °F (36 9 °C)] 98 2 °F (36 8 °C)  HR:  [60-63] 60  Resp:  [18] 18  BP: ()/(42-60) 113/51    I/O       05/10 0701  05/11 0700 05/11 0701  05/12 0700 05/12 0701  05/13 0700    P  O  715 465 360    Total Intake(mL/kg) 715 (8 4) 465 (5 5) 360 (4 2)    Urine (mL/kg/hr) 1525 (0 7) 1500 (0 7)     Stool 0 0     Total Output 1525 1500     Net -810 -1035 +360           Unmeasured Urine Occurrence 3 x 1 x     Unmeasured Stool Occurrence 1 x 0 x            Physical Exam:   GENERAL APPEARANCE: NAD  NEURO: GCS 15,non-focal  HEENT: NCAT  CV: decreased rate, regular, no MGR  LUNGS: CTA bilaterally  GI: soft,non-tender,non-distended  : voiding  MSK: + LUE in splint; NVI distally in LUE  SKIN: pink, warm, dry    Invasive Devices     Peripheral Intravenous Line  Duration           Peripheral IV 05/12/23 Right Antecubital <1 day          Drain  Duration           External Urinary Catheter 11 days                 PIC Score  PIC Pain Score: 2 (5/12/2023  5:18 PM)  PIC Incentive Spirometry Score: 3 (5/11/2023  4:08 PM)  PIC Cough Description: 3 (5/11/2023  4:08 PM)  PIC Total Score: 8 " (5/11/2023  4:08 PM)       If the Total PIC Score </=5, did you consult APS and evaluate patient for further intervention?: n/a      Pain:    Incentive Spirometry  Cough  3 = Controlled  4 = Above goal volume 3 = Strong  2 = Moderate  3 = Goal to alert volume 2 = Weak  1 = Severe  2 = Below alert volume 1 = Absent     1 = Unable to perform IS         Lab Results:   Results: I have personally reviewed all pertinent laboratory/tests results, BMP/CMP:   Lab Results   Component Value Date    SODIUM 134 (L) 05/12/2023    K 5 2 05/12/2023     05/12/2023    CO2 30 05/12/2023    BUN 20 05/12/2023    CREATININE 0 67 05/12/2023    CALCIUM 8 0 (L) 05/12/2023    EGFR 85 05/12/2023    and CBC: No results found for: WBC, HGB, HCT, MCV, PLT, ADJUSTEDWBC, MCH, MCHC, RDW, MPV, NRBC  Imaging: I have personally reviewed pertinent reports       Other Studies: no new

## 2023-05-12 NOTE — ASSESSMENT & PLAN NOTE
- Warfarin held while being evaluated for humerus fracture  - Goal INR reportedly 2 5 to 3 5  - Difficulty managing her INR as she seems very sensitive to 5 mg (which is her home dose 5/7 days a week) as she became supratherapeutic  - Continue warfarin 2 5 mg daily  - INR 2 4 today, 5/12

## 2023-05-12 NOTE — PLAN OF CARE
Problem: Potential for Falls  Goal: Patient will remain free of falls  Description: INTERVENTIONS:  - Educate patient/family on patient safety including physical limitations  - Instruct patient to call for assistance with activity   - Consult OT/PT to assist with strengthening/mobility   - Keep Call bell within reach  - Keep bed low and locked with side rails adjusted as appropriate  - Keep care items and personal belongings within reach  - Initiate and maintain comfort rounds  - Make Fall Risk Sign visible to staff  - Offer Toileting every 2 Hours, in advance of need  - Initiate/Maintain on alarm  - Obtain necessary fall risk management equipment:   - Apply yellow socks and bracelet for high fall risk patients  - Consider moving patient to room near nurses station  Outcome: Progressing     Problem: RESPIRATORY - ADULT  Goal: Achieves optimal ventilation and oxygenation  Description: INTERVENTIONS:  - Assess for changes in respiratory status  - Assess for changes in mentation and behavior  - Position to facilitate oxygenation and minimize respiratory effort  - Oxygen administered by appropriate delivery if ordered  - Initiate smoking cessation education as indicated  - Encourage broncho-pulmonary hygiene including cough, deep breathe, Incentive Spirometry  - Assess the need for suctioning and aspirate as needed  - Assess and instruct to report SOB or any respiratory difficulty  - Respiratory Therapy support as indicated  Outcome: Progressing     Problem: SKIN/TISSUE INTEGRITY - ADULT  Goal: Incision(s), wounds(s) or drain site(s) healing without S/S of infection  Description: INTERVENTIONS  - Assess and document dressing, incision, wound bed, drain sites and surrounding tissue  - Provide patient and family education  - Perform skin care/dressing changes every   Problem: SAFETY ADULT  Goal: Patient will remain free of falls  Description: INTERVENTIONS:  - Educate patient/family on patient safety including physical limitations  - Instruct patient to call for assistance with activity   - Consult OT/PT to assist with strengthening/mobility   - Keep Call bell within reach  - Keep bed low and locked with side rails adjusted as appropriate  - Keep care items and personal belongings within reach  - Initiate and maintain comfort rounds  - Make Fall Risk Sign visible to staff  - Offer Toileting every 2 Hours, in advance of need  - Initiate/Maintain on alarm  - Obtain necessary fall risk management equipment:   - Apply yellow socks and bracelet for high fall risk patients  - Consider moving patient to room near nurses station  Outcome: Progressing  Goal: Maintain or return to baseline ADL function  Description: INTERVENTIONS:  - Educate patient/family on patient safety including physical limitations  - Instruct patient to call for assistance with activity   - Consult OT/PT to assist with strengthening/mobility   - Keep Call bell within reach  - Keep bed low and locked with side rails adjusted as appropriate  - Keep care items and personal belongings within reach  - Initiate and maintain comfort rounds  - Make Fall Risk Sign visible to staff  - Offer Toileting every 2 Hours, in advance of need  - Initiate/Maintain on alarm  - Obtain necessary fall risk management equipment:  - Apply yellow socks and bracelet for high fall risk patients  - Consider moving patient to room near nurses station  Outcome: Progressing  Goal: Maintains/Returns to pre admission functional level  Description: INTERVENTIONS:  - Perform BMAT or MOVE assessment daily    - Set and communicate daily mobility goal to care team and patient/family/caregiver  - Collaborate with rehabilitation services on mobility goals if consulted  - Perform Range of Motion 3 times a day  - Reposition patient every 2 hours    - Dangle patient 3 times a day  - Stand patient 3 times a day  - Ambulate patient 3 times a day  - Out of bed to chair 3 times a day   - Out of bed for meals 3 times a day  - Out of bed for toileting  - Record patient progress and toleration of activity level   Outcome: Progressing     Outcome: Progressing     Problem: MUSCULOSKELETAL - ADULT  Goal: Maintain or return mobility to safest level of function  Description: INTERVENTIONS:  - Assess patient's ability to carry out ADLs; assess patient's baseline for ADL function and identify physical deficits which impact ability to perform ADLs (bathing, care of mouth/teeth, toileting, grooming, dressing, etc )  - Assess/evaluate cause of self-care deficits   - Assess range of motion  - Assess patient's mobility  - Assess patient's need for assistive devices and provide as appropriate  - Encourage maximum independence but intervene and supervise when necessary  - Involve family in performance of ADLs  - Assess for home care needs following discharge   - Consider OT consult to assist with ADL evaluation and planning for discharge  - Provide patient education as appropriate  Outcome: Progressing  Goal: Maintain proper alignment of affected body part  Description: INTERVENTIONS:  - Support, maintain and protect limb and body alignment  - Provide patient/ family with appropriate education  Outcome: Progressing

## 2023-05-12 NOTE — RESTORATIVE TECHNICIAN NOTE
Restorative Technician Note      Patient Name: Blas Casillas     Restorative Tech Visit Date: 05/12/23  Note Type: Mobility  Patient Position Upon Consult: Supine  Activity Performed: Repositioned  Patient Position at End of Consult: Supine; All needs within reach; Bed/Chair alarm activated    Pt declined OOB/ambulation at this time      Gage Johnson, Restorative Technician

## 2023-05-12 NOTE — ASSESSMENT & PLAN NOTE
No results found for: HGBA1C    Recent Labs     05/11/23  0707 05/11/23  1145 05/11/23  1635 05/11/23 2054   POCGLU 82 126 123 155*       Blood Sugar Average: Last 72 hrs:  (P) 807 [id number]     - Continue current medication regimen   - SSI  - Outpatient follow-up with PCP

## 2023-05-12 NOTE — RESTORATIVE TECHNICIAN NOTE
Restorative Technician Note      Patient Name: Corazon Francis     Restorative Tech Visit Date: 05/12/23  Note Type: Mobility  Patient Position Upon Consult: Supine  Activity Performed: Transferred (bedside commode)  Assistive Device: Cane  Patient Position at End of Consult: Supine;  All needs within reach; Bed/Chair alarm activated      Massimo Perez Restorative Technician

## 2023-05-12 NOTE — ASSESSMENT & PLAN NOTE
- proximal humerus fracture on the left   - NVI distally  - pain control with multi modal analgesia  - orthopedics consultation, appreciate recommendations  - Non-op  - NWB LUE  - Sling to the LUE  - Multimodal pain control  - PT/OT recommending rehab  - f/u with orthopedics as an outpatient

## 2023-05-12 NOTE — ASSESSMENT & PLAN NOTE
- mechanical fall   - sustained below stated injuries  - PT/OT evaluations - recommending rehab  - Geriatrics evaluation, appreciate recommendations  - CM for disposition planning  Family appealing denial to rehab

## 2023-05-12 NOTE — PLAN OF CARE
Problem: PAIN - ADULT  Goal: Verbalizes/displays adequate comfort level or baseline comfort level  Description: Interventions:  - Encourage patient to monitor pain and request assistance  - Assess pain using appropriate pain scale  - Administer analgesics based on type and severity of pain and evaluate response  - Implement non-pharmacological measures as appropriate and evaluate response  - Consider cultural and social influences on pain and pain management  - Notify physician/advanced practitioner if interventions unsuccessful or patient reports new pain  Outcome: Progressing     Problem: SAFETY ADULT  Goal: Patient will remain free of falls  Description: INTERVENTIONS:  - Educate patient/family on patient safety including physical limitations  - Instruct patient to call for assistance with activity   - Consult OT/PT to assist with strengthening/mobility   - Keep Call bell within reach  - Keep bed low and locked with side rails adjusted as appropriate  - Keep care items and personal belongings within reach  - Initiate and maintain comfort rounds  - Make Fall Risk Sign visible to staff  - Offer Toileting every 2 Hours, in advance of need  - Initiate/Maintain on alarm  - Obtain necessary fall risk management equipment:   - Apply yellow socks and bracelet for high fall risk patients  - Consider moving patient to room near nurses station  Outcome: Progressing

## 2023-05-13 ENCOUNTER — HOSPITAL ENCOUNTER (INPATIENT)
Facility: HOSPITAL | Age: 78
LOS: 19 days | Discharge: HOME HEALTH | DRG: 559 | End: 2023-06-01
Attending: PHYSICAL MEDICINE & REHABILITATION | Admitting: PHYSICAL MEDICINE & REHABILITATION
Payer: COMMERCIAL

## 2023-05-13 VITALS
BODY MASS INDEX: 39.47 KG/M2 | OXYGEN SATURATION: 98 % | WEIGHT: 188 LBS | HEART RATE: 60 BPM | SYSTOLIC BLOOD PRESSURE: 108 MMHG | RESPIRATION RATE: 16 BRPM | HEIGHT: 58 IN | DIASTOLIC BLOOD PRESSURE: 64 MMHG | TEMPERATURE: 98.7 F
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
## 2023-05-13 DIAGNOSIS — E87.1 HYPONATREMIA: ICD-10-CM

## 2023-05-13 DIAGNOSIS — L89.306 PRESSURE INJURY OF DEEP TISSUE OF BUTTOCK, UNSPECIFIED LATERALITY: ICD-10-CM

## 2023-05-13 DIAGNOSIS — Z78.9 IMPAIRED MOBILITY AND ACTIVITIES OF DAILY LIVING: ICD-10-CM

## 2023-05-13 DIAGNOSIS — I48.91 ATRIAL FIBRILLATION WITH RVR (HCC): ICD-10-CM

## 2023-05-13 DIAGNOSIS — U07.1 ACUTE COVID-19: ICD-10-CM

## 2023-05-13 DIAGNOSIS — Z74.09 IMPAIRED MOBILITY AND ACTIVITIES OF DAILY LIVING: ICD-10-CM

## 2023-05-13 DIAGNOSIS — S42.292D OTHER CLOSED DISPLACED FRACTURE OF PROXIMAL END OF LEFT HUMERUS WITH ROUTINE HEALING, SUBSEQUENT ENCOUNTER: Primary | ICD-10-CM

## 2023-05-13 PROBLEM — M80.00XD OSTEOPOROSIS WITH CURRENT PATHOLOGICAL FRACTURE WITH ROUTINE HEALING: Status: ACTIVE | Noted: 2023-05-13

## 2023-05-13 PROBLEM — E66.9 CLASS 2 OBESITY IN ADULT: Status: ACTIVE | Noted: 2023-05-13

## 2023-05-13 PROBLEM — N39.0 UTI (URINARY TRACT INFECTION): Status: RESOLVED | Noted: 2023-05-04 | Resolved: 2023-05-13

## 2023-05-13 PROBLEM — L89.300 PRESSURE INJURY OF BUTTOCK, UNSTAGEABLE (HCC): Status: ACTIVE | Noted: 2023-05-13

## 2023-05-13 PROBLEM — E66.812 CLASS 2 OBESITY IN ADULT: Status: ACTIVE | Noted: 2023-05-13

## 2023-05-13 PROBLEM — G47.33 OSA (OBSTRUCTIVE SLEEP APNEA): Status: ACTIVE | Noted: 2023-05-13

## 2023-05-13 PROBLEM — J45.909 ASTHMA: Status: ACTIVE | Noted: 2023-05-13

## 2023-05-13 LAB
ANION GAP SERPL CALCULATED.3IONS-SCNC: -1 MMOL/L (ref 4–13)
BUN SERPL-MCNC: 22 MG/DL (ref 5–25)
CALCIUM SERPL-MCNC: 8.3 MG/DL (ref 8.3–10.1)
CHLORIDE SERPL-SCNC: 104 MMOL/L (ref 96–108)
CO2 SERPL-SCNC: 29 MMOL/L (ref 21–32)
CREAT SERPL-MCNC: 0.77 MG/DL (ref 0.6–1.3)
FLUAV RNA RESP QL NAA+PROBE: NEGATIVE
FLUBV RNA RESP QL NAA+PROBE: NEGATIVE
GFR SERPL CREATININE-BSD FRML MDRD: 74 ML/MIN/1.73SQ M
GLUCOSE SERPL-MCNC: 115 MG/DL (ref 65–140)
GLUCOSE SERPL-MCNC: 135 MG/DL (ref 65–140)
GLUCOSE SERPL-MCNC: 71 MG/DL (ref 65–140)
GLUCOSE SERPL-MCNC: 81 MG/DL (ref 65–140)
INR PPP: 3.13 (ref 0.84–1.19)
POTASSIUM SERPL-SCNC: 5.1 MMOL/L (ref 3.5–5.3)
PROTHROMBIN TIME: 32.5 SECONDS (ref 11.6–14.5)
RSV RNA RESP QL NAA+PROBE: NEGATIVE
SARS-COV-2 RNA RESP QL NAA+PROBE: POSITIVE
SODIUM SERPL-SCNC: 132 MMOL/L (ref 135–147)

## 2023-05-13 PROCEDURE — 82948 REAGENT STRIP/BLOOD GLUCOSE: CPT

## 2023-05-13 PROCEDURE — 1124F ACP DISCUSS-NO DSCNMKR DOCD: CPT | Performed by: PHYSICAL MEDICINE & REHABILITATION

## 2023-05-13 PROCEDURE — 99223 1ST HOSP IP/OBS HIGH 75: CPT | Performed by: PHYSICAL MEDICINE & REHABILITATION

## 2023-05-13 RX ORDER — INSULIN LISPRO 100 [IU]/ML
1-5 INJECTION, SOLUTION INTRAVENOUS; SUBCUTANEOUS
Status: DISCONTINUED | OUTPATIENT
Start: 2023-05-14 | End: 2023-06-01 | Stop reason: HOSPADM

## 2023-05-13 RX ORDER — WARFARIN SODIUM 2.5 MG/1
2.5 TABLET ORAL
Status: DISCONTINUED | OUTPATIENT
Start: 2023-05-13 | End: 2023-05-14

## 2023-05-13 RX ORDER — OXYBUTYNIN CHLORIDE 5 MG/1
5 TABLET, EXTENDED RELEASE ORAL DAILY
Status: DISCONTINUED | OUTPATIENT
Start: 2023-05-14 | End: 2023-06-01 | Stop reason: HOSPADM

## 2023-05-13 RX ORDER — MONTELUKAST SODIUM 10 MG/1
10 TABLET ORAL
Status: DISCONTINUED | OUTPATIENT
Start: 2023-05-13 | End: 2023-06-01 | Stop reason: HOSPADM

## 2023-05-13 RX ORDER — SENNOSIDES 8.6 MG
17.2 TABLET ORAL DAILY
Qty: 28 TABLET | Refills: 0 | Status: ON HOLD
Start: 2023-05-14 | End: 2023-05-28

## 2023-05-13 RX ORDER — ATORVASTATIN CALCIUM 40 MG/1
40 TABLET, FILM COATED ORAL
Status: DISCONTINUED | OUTPATIENT
Start: 2023-05-14 | End: 2023-06-01 | Stop reason: HOSPADM

## 2023-05-13 RX ORDER — CALCIUM CARBONATE 500 MG/1
500 TABLET, CHEWABLE ORAL 3 TIMES DAILY PRN
Status: DISCONTINUED | OUTPATIENT
Start: 2023-05-13 | End: 2023-06-01 | Stop reason: HOSPADM

## 2023-05-13 RX ORDER — INSULIN LISPRO 100 [IU]/ML
1-5 INJECTION, SOLUTION INTRAVENOUS; SUBCUTANEOUS
Qty: 1.05 ML | Refills: 0 | Status: ON HOLD
Start: 2023-05-13 | End: 2023-05-20

## 2023-05-13 RX ORDER — ALBUTEROL SULFATE 90 UG/1
2 AEROSOL, METERED RESPIRATORY (INHALATION) EVERY 4 HOURS PRN
Status: DISCONTINUED | OUTPATIENT
Start: 2023-05-13 | End: 2023-06-01 | Stop reason: HOSPADM

## 2023-05-13 RX ORDER — AMIODARONE HYDROCHLORIDE 200 MG/1
200 TABLET ORAL
Status: DISCONTINUED | OUTPATIENT
Start: 2023-05-14 | End: 2023-06-01 | Stop reason: HOSPADM

## 2023-05-13 RX ORDER — METHOCARBAMOL 500 MG/1
250 TABLET, FILM COATED ORAL EVERY 6 HOURS SCHEDULED
Status: DISCONTINUED | OUTPATIENT
Start: 2023-05-14 | End: 2023-06-01 | Stop reason: HOSPADM

## 2023-05-13 RX ORDER — CITALOPRAM 20 MG/1
20 TABLET ORAL DAILY
Status: DISCONTINUED | OUTPATIENT
Start: 2023-05-14 | End: 2023-06-01 | Stop reason: HOSPADM

## 2023-05-13 RX ORDER — SENNOSIDES 8.6 MG
2 TABLET ORAL DAILY
Status: DISCONTINUED | OUTPATIENT
Start: 2023-05-14 | End: 2023-06-01 | Stop reason: HOSPADM

## 2023-05-13 RX ORDER — ACETAMINOPHEN 325 MG/1
975 TABLET ORAL EVERY 8 HOURS SCHEDULED
Status: DISCONTINUED | OUTPATIENT
Start: 2023-05-13 | End: 2023-06-01 | Stop reason: HOSPADM

## 2023-05-13 RX ORDER — SODIUM CHLORIDE 1 G/1
1 TABLET ORAL 2 TIMES DAILY WITH MEALS
Qty: 28 TABLET | Refills: 0 | Status: ON HOLD
Start: 2023-05-13 | End: 2023-05-27

## 2023-05-13 RX ORDER — ONDANSETRON 4 MG/1
4 TABLET, ORALLY DISINTEGRATING ORAL EVERY 6 HOURS PRN
Status: DISCONTINUED | OUTPATIENT
Start: 2023-05-13 | End: 2023-06-01 | Stop reason: HOSPADM

## 2023-05-13 RX ORDER — OXYCODONE HYDROCHLORIDE 5 MG/1
5 TABLET ORAL EVERY 4 HOURS PRN
Status: DISCONTINUED | OUTPATIENT
Start: 2023-05-13 | End: 2023-06-01 | Stop reason: HOSPADM

## 2023-05-13 RX ORDER — DOCUSATE SODIUM 100 MG/1
100 CAPSULE, LIQUID FILLED ORAL 2 TIMES DAILY
Status: DISCONTINUED | OUTPATIENT
Start: 2023-05-14 | End: 2023-06-01 | Stop reason: HOSPADM

## 2023-05-13 RX ORDER — MELATONIN
1000 DAILY
Status: DISCONTINUED | OUTPATIENT
Start: 2023-05-14 | End: 2023-06-01 | Stop reason: HOSPADM

## 2023-05-13 RX ORDER — METHOCARBAMOL 500 MG/1
250 TABLET, FILM COATED ORAL EVERY 6 HOURS SCHEDULED
Qty: 28 TABLET | Refills: 0 | Status: ON HOLD
Start: 2023-05-13 | End: 2023-05-27

## 2023-05-13 RX ORDER — METOPROLOL SUCCINATE 25 MG/1
25 TABLET, EXTENDED RELEASE ORAL DAILY
Qty: 14 TABLET | Refills: 0 | Status: ON HOLD
Start: 2023-05-14 | End: 2023-05-28

## 2023-05-13 RX ORDER — FUROSEMIDE 40 MG/1
40 TABLET ORAL DAILY
Status: DISCONTINUED | OUTPATIENT
Start: 2023-05-14 | End: 2023-06-01 | Stop reason: HOSPADM

## 2023-05-13 RX ORDER — GABAPENTIN 100 MG/1
100 CAPSULE ORAL
Status: DISCONTINUED | OUTPATIENT
Start: 2023-05-13 | End: 2023-06-01 | Stop reason: HOSPADM

## 2023-05-13 RX ORDER — AMIODARONE HYDROCHLORIDE 200 MG/1
200 TABLET ORAL
Qty: 28 TABLET | Refills: 0 | Status: ON HOLD
Start: 2023-05-14 | End: 2023-06-11

## 2023-05-13 RX ORDER — ACETAMINOPHEN 325 MG/1
975 TABLET ORAL EVERY 8 HOURS SCHEDULED
Qty: 63 TABLET | Refills: 0 | Status: ON HOLD
Start: 2023-05-13 | End: 2023-05-20

## 2023-05-13 RX ORDER — ALLOPURINOL 100 MG/1
100 TABLET ORAL DAILY
Status: DISCONTINUED | OUTPATIENT
Start: 2023-05-14 | End: 2023-06-01 | Stop reason: HOSPADM

## 2023-05-13 RX ORDER — CALCIUM CARBONATE 200(500)MG
500 TABLET,CHEWABLE ORAL 3 TIMES DAILY PRN
Qty: 40 TABLET | Refills: 0 | Status: ON HOLD
Start: 2023-05-13 | End: 2023-05-27

## 2023-05-13 RX ORDER — SODIUM CHLORIDE 1 G/1
1 TABLET ORAL 2 TIMES DAILY WITH MEALS
Status: DISCONTINUED | OUTPATIENT
Start: 2023-05-14 | End: 2023-05-24

## 2023-05-13 RX ORDER — LIDOCAINE 50 MG/G
1 PATCH TOPICAL DAILY
Status: DISCONTINUED | OUTPATIENT
Start: 2023-05-14 | End: 2023-06-01 | Stop reason: HOSPADM

## 2023-05-13 RX ORDER — GABAPENTIN 100 MG/1
100 CAPSULE ORAL
Qty: 14 CAPSULE | Refills: 0 | Status: ON HOLD
Start: 2023-05-13 | End: 2023-05-27

## 2023-05-13 RX ORDER — WARFARIN SODIUM 2.5 MG/1
2.5 TABLET ORAL
Status: DISCONTINUED | OUTPATIENT
Start: 2023-05-14 | End: 2023-05-13

## 2023-05-13 RX ORDER — INSULIN LISPRO 100 [IU]/ML
1-5 INJECTION, SOLUTION INTRAVENOUS; SUBCUTANEOUS
Status: DISCONTINUED | OUTPATIENT
Start: 2023-05-13 | End: 2023-06-01 | Stop reason: HOSPADM

## 2023-05-13 RX ORDER — INSULIN LISPRO 100 [IU]/ML
1-5 INJECTION, SOLUTION INTRAVENOUS; SUBCUTANEOUS
Qty: 0.35 ML | Refills: 0 | Status: ON HOLD
Start: 2023-05-13 | End: 2023-05-20

## 2023-05-13 RX ORDER — DOCUSATE SODIUM 100 MG/1
100 CAPSULE, LIQUID FILLED ORAL 2 TIMES DAILY
Qty: 28 CAPSULE | Refills: 0 | Status: ON HOLD
Start: 2023-05-13 | End: 2023-05-27

## 2023-05-13 RX ORDER — METOPROLOL SUCCINATE 25 MG/1
25 TABLET, EXTENDED RELEASE ORAL DAILY
Status: DISCONTINUED | OUTPATIENT
Start: 2023-05-14 | End: 2023-06-01 | Stop reason: HOSPADM

## 2023-05-13 RX ORDER — LIDOCAINE 50 MG/G
1 PATCH TOPICAL DAILY
Qty: 14 PATCH | Refills: 0 | Status: ON HOLD
Start: 2023-05-14 | End: 2023-05-28

## 2023-05-13 RX ADMIN — ATORVASTATIN CALCIUM 40 MG: 40 TABLET, FILM COATED ORAL at 16:42

## 2023-05-13 RX ADMIN — Medication 7.5 MG: at 16:42

## 2023-05-13 RX ADMIN — OXYBUTYNIN 5 MG: 5 TABLET, FILM COATED, EXTENDED RELEASE ORAL at 09:07

## 2023-05-13 RX ADMIN — METHOCARBAMOL 250 MG: 500 TABLET ORAL at 14:03

## 2023-05-13 RX ADMIN — ONDANSETRON 4 MG: 2 INJECTION INTRAMUSCULAR; INTRAVENOUS at 09:03

## 2023-05-13 RX ADMIN — LIDOCAINE 5% 1 PATCH: 700 PATCH TOPICAL at 09:03

## 2023-05-13 RX ADMIN — DOCUSATE SODIUM 100 MG: 100 CAPSULE, LIQUID FILLED ORAL at 09:07

## 2023-05-13 RX ADMIN — METOPROLOL SUCCINATE 25 MG: 25 TABLET, FILM COATED, EXTENDED RELEASE ORAL at 09:07

## 2023-05-13 RX ADMIN — SODIUM CHLORIDE 1 G: 1 TABLET ORAL at 16:42

## 2023-05-13 RX ADMIN — AMIODARONE HYDROCHLORIDE 200 MG: 200 TABLET ORAL at 09:07

## 2023-05-13 RX ADMIN — MONTELUKAST 10 MG: 10 TABLET, FILM COATED ORAL at 21:52

## 2023-05-13 RX ADMIN — INSULIN DETEMIR 5 UNITS: 100 INJECTION, SOLUTION SUBCUTANEOUS at 21:51

## 2023-05-13 RX ADMIN — ACETAMINOPHEN 975 MG: 325 TABLET ORAL at 22:32

## 2023-05-13 RX ADMIN — ACETAMINOPHEN 975 MG: 325 TABLET ORAL at 09:18

## 2023-05-13 RX ADMIN — CITALOPRAM HYDROBROMIDE 20 MG: 20 TABLET ORAL at 09:07

## 2023-05-13 RX ADMIN — GABAPENTIN 100 MG: 100 CAPSULE ORAL at 21:51

## 2023-05-13 RX ADMIN — Medication 7.5 MG: at 06:11

## 2023-05-13 RX ADMIN — ALLOPURINOL 100 MG: 100 TABLET ORAL at 09:07

## 2023-05-13 RX ADMIN — METHOCARBAMOL 250 MG: 500 TABLET ORAL at 05:50

## 2023-05-13 RX ADMIN — WARFARIN SODIUM 2.5 MG: 2.5 TABLET ORAL at 21:52

## 2023-05-13 RX ADMIN — SODIUM CHLORIDE 1 G: 1 TABLET ORAL at 09:07

## 2023-05-13 NOTE — ASSESSMENT & PLAN NOTE
On 5/2 developed Afib with RR which degenerated to VT then VF  She received shock from her AICD ([place], Single Chamber ICD)  Seen by Cards who adjusted her device, and recommended restarting home Metoprolol and Amio load  Was also on digoxin  - Toprol dose adjusted and digoxin discontinued after developing junctional bradycardia later in stay  Here: Toprol XL 25mg daily, Amio 200mg daily starting tomorrow for 28 days  Monitor cardiac status during therapies   Monitor lytes   Keep K > 4, Mag > 2  Will plan for outpatient f/u with EP

## 2023-05-13 NOTE — PLAN OF CARE
Problem: OCCUPATIONAL THERAPY ADULT  Goal: Performs self-care activities at highest level of function for planned discharge setting  See evaluation for individualized goals  Description: Treatment Interventions: ADL retraining, Functional transfer training, UE strengthening/ROM, Endurance training, Patient/family training, Equipment evaluation/education, Compensatory technique education, Activityengagement          See flowsheet documentation for full assessment, interventions and recommendations  Outcome: Progressing  Note: Limitation: Decreased ADL status, Decreased UE ROM, Decreased endurance, Decreased self-care trans, Decreased high-level ADLs, Non-func L UE  Prognosis: Good  Assessment: Patient participated in Skilled OT session this date with interventions consisting of ADL re training with the use of correct body mechnaics, Energy Conservation techniques, safety awareness and fall prevention techniques, one handed dressing technique,  therapeutic activities to: increase activity tolerance, increase dynamic sit/ stand balance during functional activity  and increase OOB/ sitting tolerance   Upon arrival patient was found supine in bed  Pt demonstrated the following tasks: MOD A sup <> sit, STS, and fnxl mobility with RW + SBA of another  Pt requires MAX A to doff gown, MOD A to don  Educated on dressing techniques  MAX A for LBD  Would benefit from trialing Patience Bobby during next session, pt with decreased reaching  Patient continues to be functioning below baseline level, occupational performance remains limited secondary to factors listed above and increased risk for falls and injury  From OT standpoint, recommendation at time of d/c would be STR  Patient to benefit from continued Occupational Therapy treatment while in the hospital to address deficits as defined above and maximize level of functional independence with ADLs and functional mobility  Pt was left after session with all current needs met   The patient's raw score on the AM-PAC Daily Activity Inpatient Short Form is 14  A raw score of less than 19 suggests the patient may benefit from discharge to post-acute rehabilitation services  Please refer to the recommendation of the Occupational Therapist for safe discharge planning       OT Discharge Recommendation: Post acute rehabilitation services

## 2023-05-13 NOTE — ASSESSMENT & PLAN NOTE
Initially per AICD interrogation with Afib RVR that degenerated into VT/VF  Unclear burden  Here: Toprol XL 25mg daily, Fully anticoagulated on warfarin (See mechanical AV), amiodarone 200mg daily  Monitor and adjust as appropriate  Currently examines in NSR  IM consulted to assist with management      Outpatient f/u with EP

## 2023-05-13 NOTE — ASSESSMENT & PLAN NOTE
Resolved  Na = 138    In acute care, [address] on 5/9  [place] by Trauma to be 2/2 poor PO intake and furosemide  Started on sodium tabs (weaning down)  Monitor and adjust as appropriate  IM consulted and assisting with management

## 2023-05-13 NOTE — CASE MANAGEMENT
Case Management Discharge Planning Note    Patient name Mario Alberto Coleman  Location [address] [place] [address] [id number] [id number]  : 1945 Date 2023       Current Admission Date: 2023  Current Admission Diagnosis:Fall   Patient Active Problem List    Diagnosis Date Noted   • Abnormal CT scan 05/10/2023   • Hyponatremia 2023   • Bradycardia 2023   • UTI (urinary tract infection) 2023   • Ventricular arrhythmia 2023   • Type 2 diabetes mellitus ([facility] [address] ) 2023   • Fall 2023   • Humerus fracture 2023   • Acute pain due to trauma 2023   • H/O mechanical aortic valve replacement 2023   • Laceration of right eyebrow 2023   • Fracture of multiple ribs of right side 2023      LOS (days): 16  Geometric Mean LOS (GMLOS) (days): 2 70  Days to GMLOS:-12 3     OBJECTIVE:  Risk of Unplanned Readmission Score: 16 52         Current admission status: Inpatient   Preferred Pharmacy:   [facility]  [address] [facility] [address] [place] [id number]  Phone: [phone] Fax: [phone]    Primary Care Provider: Tiago Dumont DO    Primary Insurance: Gillian Valdes [facility]  Secondary Insurance:     DISCHARGE DETAILS:    Contacts  Patient Contacts: Tung Apple (Daughter) [phone]  Relationship to Patient[de-identified] Family  Contact Method: Phone  Phone Number: [phone]  Reason/Outcome: Discharge Planning    Other Referral/Resources/Interventions Provided:  Interventions: Acute Rehab  Referral Comments: CM informed by CM support, insurance approved appeal  Pt now authorized to admit to [facility]  Auth information provided to [facility]  [address] [facility], [address] able to accept today  CM informed both pt and pt's daugher of same  Both remain in agreement with plan to admit to [facility]  COVID test required prior to transfer  COVID test ordered and pending  Pt will admit to room [facility]   CM updated bedside RN, trauma, and pt/daughter of same  Treatment Team Recommendation: Acute Rehab  Discharge Destination Plan[de-identified] Acute Rehab [facility])  Transport at Discharge : Other (Comment) (internal transport)  Dispatcher Contacted: Yes     Transported by [employer] and Unit #):  Central Transport  ETA of Transport (Date): 05/13/23  ETA of Transport (Time): 1600

## 2023-05-13 NOTE — H&P
PHYSICAL MEDICINE AND REHABILITATION H&P/ADMISSION NOTE  Shanda Felix 68 y o  female MRN: [id number]  Unit/Bed#: -01 Encounter: [id number]     Rehab Diagnosis: Impairment of mobility, safety and Activities of Daily Living (ADLs) due to Major Multiple Trauma:  14 9  Other Multiple Trauma Multiple rib fractures with acute pain, improved acute hypoxic respiratory failure, and L humeral fracture NWB in coaptation splint    History of Present Illness:   Shanda Felix is a 68 y o  female with medical history of Asthma, T2DM, Aortic valve replacement on coumadin, and HTN who presented to the [address] [place] [address] on 4/27 after falling at the grocery store - losing balance and falling forward when bent over  No LOC  Pain in L arm and R chest wall with R eyebrow lac  Trauma survey showed comminuted L proximal humerus fracture, and multiple R sided rib fractures  No PTX/hemothorax  Did require O2 (does not need at baseline)  Ortho was consulted and made patient NWB in coaptation splint and plan for Non-op management  APS was consulted for pain - they discussed nerve blocks, but patient declined  Geriatrics also consulted given patient's age  On 5/2, a rapid was called as patient felt her AICD fired  Found to have a narrow complex tachycardia with HR in the 180s,  Afib with RVR, degenerated to VT and then VF prior to AICD shock (after interrogation)  She was given IV metoprolol with immediate response and conversion to NSR and transferred to [facility]  EP was consulted and reprogrammed her device and recommended a TTE and restarting home Metoprolol and Amio load (BID for 1 week since 5/3, then transitioned to daily)  She was noted to febrile and infectious work-up was initiated  Ultimately found to have a UTI and given 5 days of CTX  After initiating amio, INR became supratherapeutic to 4 84, with improvement after her coumadin was held   She was found to have an evolving DTI of her sacro-buttocks on 5/8 and Wound Care was consulted for management  On 5/8 interrogation of her AICD showed AF with RVR at 195 with inappropriate shock rate, but also some junctional bradycardia - they adjusted her toprol xl, and discontinued diltiazem, and adjusted her device again  Her course was also complicated by hyponatremia (down to 125 on 5/9) and was felt 2/2 furosemide and PO intake  She was started on sodium tabs  In addition she was incidentally found to have an incarcerated/partially obstructed periumbilical ventral hernia and a septated R kidney cyst and aortic aneurysm - with recommendations for ultrasound/MRI for her R kidney cyst as an outpatient and outpatient f/u with CT surgery for her aneurysm  She is aware of the hernia and opted not to have surgery - Trauma recommended f/u in 6 weeks  She was initially denied by insurance, delaying her discharge, but after family appeal was approved  Admitted to [facility] on 5/13/2023  Subjective: She is in good spirits and ready to participate in therapy  Her main pain is in her L shoulder but it is tolerable  She is cautious and makes sure not to put weight in that arm  She is allowed to passively move that arm using her other arm  She premorbidly had issues with bilateral shoulder range of motion/weakness  She reports she never really had any falls until this one - and felt it was due to over-extended herself and misjudging her balance  She has been feeling great since the adjustments made to her AICD with no further issues  She denies issues with bowel/bladder - as long as she is allowed to be assisted to a commode  She prefers that to a bedpan or a jing  She denies any new lightheadedness/dizziness, CP, SOB, fevers, chills, N/V, abdominal pain  Last BM was 5/12  Review of Systems: A 10-point review of systems was performed  Negative except as listed above      Plan:     * Humerus fracture  Assessment & Plan  2/2 mechanical fall  Proximal humerus shaft spiral fracture, displaced and angulated  Ortho attempted closed reduction  Ultimately placed in coaptation splint on 4/27 and managed non-operatively  NWB  Pain Control as detailed below  No Vitamin D level in chart or unmerged chart recently - checking tomorrow  - At home was on daily Vitamin D supplement 1000 units  PT/OT 3-5 hours/day, 5-7 days/week  Due for 2 week follow-up 5/11  Placed consult to Ortho    CHAS (obstructive sleep apnea)  Assessment & Plan  Does not use CPAP at home  Consider Noc Ox overnight while here  Class 2 obesity in adult  Assessment & Plan  Nutrition consulted  Counseling    Osteoporosis with current pathological fracture with routine healing  Assessment & Plan  Managed by [facility] [employer] [facility] [employer] Endocrinology  History of L5 pathologic fracture  Has been on Alendronate once weekly since 2017  Would confirm with her what day she normally takes it prior to restarting  Will confirm that it is ok to restart with Orthopedics first, as some surgeons prefer to wait 6 weeks after acute fracture prior to restarting    - Per Ortho resident they will review with Dr Lanier Fitting tomorrow regarding his preferences  Per last Endocrine note in 6/2022, they were going to continue for 1 more year and then stop  She was due for DXA this May  Asthma  Assessment & Plan  No acute exacerbation  Home: Singulair 10mg QHS  Here: Same, PRN albuterol  No acute exacerbation  Monitor respiratory status    Pressure injury of buttock, unstageable (HCC)  Assessment & Plan  Has evolved and opened  Wound care following - continue local care  - Consulted them to continue following  EHOB/Waffle Cushion  Frequent offloading in chair and in bed  P500 mattress  Monitor closely  Atrial fibrillation with RVR ([address] )  Assessment & Plan  Initially per AICD interrogation with Afib RVR that degenerated into VT/VF  Unclear burden    Here: Toprol XL 25mg daily, Fully anticoagulated on warfarin (See mechanical AV), amiodarone 200mg daily  Monitor and adjust as appropriate  Currently examines in NSR  IM consulted to assist with management  Outpatient f/u with EP    Abnormal CT scan  Assessment & Plan  - 4/27 CT CAP:           - Incidentally noted is a incarcerated/partially obstructed periumbilic ventral hernia, (image 146 series 9)          - Septated cyst anterior aspect of the right kidney, consider evaluation with ultrasound or MRI for further characterization (image 136 series 9)          - Dilated ascending aorta measuring 4 6 cm, evaluation with cardiothoracic surgery on nonemergent basis  - Follow up with PCP and CT surgery outpatient for findings  - Patient informed of abnormal results on 5/11: she is aware of ventral hernia but not aortic aneurysm or renal cyst         Hyponatremia  Assessment & Plan  Jorge Alberto 125 on 5/9  Molino by Trauma to be 2/2 poor PO intake and furosemide  Started on sodium tabs (weaning down)  Monitor and adjust as appropriate  IM consulted and assisting with management  Bradycardia  Assessment & Plan  Developed junctional bradycardia in hospital after restarting home Toprol XL and digoxin  - Cards stopped digoxin  - Toprol XL decreased to 25mg daily  Monitor  Ventricular arrhythmia  Assessment & Plan  On 5/2 developed Afib with RR which degenerated to VT then VF  She received shock from her AICD ([employer], Single Chamber ICD)  Seen by Cards who adjusted her device, and recommended restarting home Metoprolol and Amio load  Was also on digoxin  - Toprol dose adjusted and digoxin discontinued after developing junctional bradycardia later in stay  Here: Toprol XL 25mg daily, Amio 200mg daily starting tomorrow for 28 days  Monitor cardiac status during therapies   Monitor lytes   Keep K > 4, Mag > 2  Will plan for outpatient f/u with EP    Type 2 diabetes mellitus [facility])  Assessment & Plan  No results found for: HGBA1C    Recent Labs     05/12/23  1700 05/12/23  2111 05/13/23  0816 05/13/23  1148   POCGLU 119 155* 71 115 Blood Sugar Average: Last 72 hr  Home: Sitagliptin-Metformin 50-50mg  Here: Levemir 5 units QHS, CDI/Accuchecks  Diabetic Diet  Nutrition Consult  Attempt to resume oral meds here and get off insulin  IM consulted to assist with management  Outpatient f/u with PCP  Fracture of multiple ribs of right side  Assessment & Plan  R 8th and 9th rib possible fractures  Rib fracture protocol  Pain control with lidoderm patch  Incentive Spirometer  Monitor respiratory status  Laceration of right eyebrow  Assessment & Plan  Healing, monitor  H/O mechanical aortic valve replacement  Assessment & Plan  On warfarin  Last week's dosages:    Date  INR  Dose  5/6  4 84  -    5/7  4 42  -  5/8  3 46  -  5/9  2 77  2 5mg  5/10  2 64  2 5mg  5/11  2 63  2 5mg  5/12  2 46  2 5mg  5/13  NT  2 5mg    On 2 5mg daily for now, adjust as appropriate  Had to decrease home regimen after she started amiodarone  IM consulted to assist with management  Acute pain due to trauma  Assessment & Plan  R rib fractures, L humerus fracture  - Lidoderm patch for ribs  - Oxycodone 5-7 5mg PRN  - Robaxin 250mg Q6hr scheduled  - Gabapentin 100mg QHS (may be able to discontinue while here)  - Tylenol 975mg Q8hrs scheduled  APS assisted inpatient  Adjust as appropriate while here  Health Maintenance  #Delirium/Sleep: At risk  Delirium precautions  Optimize sleep-wake, pain, bowel, bladder management  #Pain: Tylenol scheduled, Gabapentin 100mg QHS, Robaxin 250mg q6hr scheduled, Oxycodone 5-7 5mg PRN  #Bowel: Last BM 5/12 on Senna 2 tabs at night ,docusate BID, adding PRN miralax and suppository  #Bladder: Voiding and mostly continent  On oxybutynin - unclear why based on chart  Monitor for retention  Has been listed in her unmerged chart since 1/2023  #Skin/Pressure Injury Prevention: Turn Q2hr in bed, with weight shifts [id number] in wheelchair  Float heels in bed    #DVT Prophylaxis: Fully anticoagulated on warfarin  #GI Prophylaxis: PO Diet  #Code Status: Full Code  #FEN:  Diabetic Diet with Glucerna-Strawberry at breakfast/dinner  #Dispo:  ELOS 10-14 days with goals to discharge back home  Given restrictions, anticipate need for some assistance with ADLs  Her daughters feel her home set-up is more accessible, and they and her significant other can come over and provide whatever assistance she needs  I did discuss this with her daughter, Vahe Meade, in detail  75 minutes or greater spent for this encounter which included a combination of face-to-face time with the patient and non-face-to-face time which in part specifically includes management of All the issues noted above     Face-to-face time included extended discussion with patient regarding current condition, medical history, mood, medical/rehabilitation management, and disposition  Non face-to-face time included coordination of care with patient's co-managing AP and/or physician(s) thru communication and review of their recent documentation as well as reviewing vitals, bowel/bladder function, recent labs, diagnostic imaging, and notes from therapy, CM, and nursing  Drug regimen reviewed, all potential adverse effects identified and addressed:    Scheduled Meds:     Restrictions include: NWB LUE in splint  Fall precautions      Functional History/Home Set-up - Prior to Admission:     Lives in an apartment alone, but has been in her significant other's place in a [facility] with 2nd floor bed/bath  Independent with ADLs, functional mobility, and IADLs  Retired  Has 2 local daughters and her significant other who also has a local daughter who can assist    - One daughter doesn't work and they said between the two of them, they plan on being there for whatever their mother needs  - Vahe Meade works  Functional Status Upon Admission to [facility]:  Mobility: modA bed mobility, modA ambulation 3' x2 from bed to chair    Transfers: modA   ADLs: modA Ub dressing, maxA LB dressing    Physical Exam:  Temp:  [97 6 °F (36 4 °C)-98 7 °F (37 1 °C)] 98 7 °F (37 1 °C)  HR:  [60-70] 60  Resp:  [16-19] 16  BP: (100-108)/(50-64) 108/64  SpO2:  [92 %-98 %] 98 %    Gen: No acute distress, Well-nourished, well-appearing  HEENT: Moist mucus membranes, facial bruising, R eyebrow lac healed  Cardiovascular: Regular rate, rhythm, S1/S2  Distal pulses palpable  Has AICD  Heme/Extr: No edema  Pulmonary: Non-labored breathing  Lungs CTAB  : No brothers  GI: Soft, non-tender, non-distended  BS+  MSK: Evidence of prevoius knee surgery  L arm in coaptation splint  Good range of motion distally in that arm  Limited AROM in R shoulder which she states is premorbid  Has bilateral hallux valgus and rigid in that position  Integumentary: Skin is warm, dry  Diffuse ecchymoses  Heels intact  Sacral wound photo in chart  Neuro: AAOx3, Sensation intact to light touch throughout  Speech is intact  Appropriate to questioning  Tone is normal    MMT:   Strength:   Right  Left  Site  Right  Left  Site    3 NT S Ab: Shoulder Abductors  5  5  HF: Hip Flexors    5 ~4  EF: Elbow Flexors  5  5 KF: Knee Flexors    5  ~4 EE: Elbow Extensors  5  5  KE: Knee Extensors    5  5  WE: Wrist Extensors  5  5  DR: Dorsi Flexors    5  5  FF: Finger Flexors  5  5  PF: Plantar Flexors    5  5  HI: Hand Intrinsics  NA NA EHL: Extensor Hallucis Longus   Psych: Normal mood and affect         Laboratory:          Invalid input(s): PLTCT  Results from last 7 days   Lab Units 05/13/23  0510 05/12/23  0454 05/11/23  0728 05/10/23  0606   BUN mg/dL 22 20 18 27*   SODIUM mmol/L 132* 134* 131* 130*   POTASSIUM mmol/L 5 1 5 2 4 4 4 9   CHLORIDE mmol/L 104 104 100 100   CREATININE mg/dL 0 77 0 67 0 58* 0 73   AST U/L  --   --   --  56*   ALT U/L  --   --   --  26     Results from last 7 days   Lab Units 05/13/23  1501 05/12/23  0648 05/11/23  0728   PROTIME seconds 32 5* 26 9* 28 3*   INR  3 13* 2 46* 2 63*        Wt Readings from Last 1 "Encounters:   05/03/23 85 3 kg (188 lb)     Estimated body mass index is 39 29 kg/m² as calculated from the following:    Height as of 5/3/23: 4' 10\" (1 473 m)  Weight as of 5/3/23: 85 3 kg (188 lb)  Imaging: reviewed   4/27 XR Trauma Survey:  Angulated proximal left humeral shaft fracture  4/27 CTH:  No acute intracranial hemorrhage  No mass effect or midline shift  4/27 CT C-Spine:  No acute compression collapse of the vertebra  Multilevel degenerative disc disease     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 with severe central canal narrowing    4/27 CT CAP:  No solid visceral injury seen     Comminuted fracture left humerus     Incidentally noted is a incarcerated/partially obstructed periumbilic ventral hernia, (image 146 series 9)     Septated cyst anterior aspect of the right kidney, consider evaluation with ultrasound or MRI for further characterization (image 136 series 9)     Dilated ascending aorta measuring 4 6 cm, evaluation with cardiothoracic surgery on nonemergent basis     Deformity of the right eighth and ninth rib may be due to motion, raise concern for fracture in appropriate clinical setting     Evaluation of the right humerus is limited due to motion radiograph may be considered if concern for any right humerus injury    4/27 XR L Humerus:  Obliquely oriented, overriding proximal humeral shaft fracture  4/27 XR L Humerus again:  Acute spiral fracture of the proximal humeral shaft with slightly more displacement and angulation  5/3 XR Chest:  Cardiomegaly  No acute pulmonary disease  Rehabilitation Prognosis: good     Tolerance for three hours of therapy a day: good     Family/Patient Goals:  Patient/family's goals: Return to previous home/apartment  Patient will receive PT and OT 90 minutes each per day, five days per week to achieve rehab goals or participate in 900 minutes of therapy within a 7 day week period      Mobility Goals: Modified " [place]  Transfer Goals: Modified [place]  Activities of Daily Living (ADLs) Goals: Modified [place]    Discharge Planning:  Rehabilitation and discharge goals discussed with the patient and/or family  Case Managment and Social Work to review patient/family resources and to coordinate Discharge Planning  Estimated length of stay: 2 weeks    Patient and Family Education and Training:  Rehabilitation and discharge goals discussed with the patient and/or family  Patient/family education/training needs to be discussed in weekly team meeting  Equipment/DME needs: Therapy teams to assess and evaluate for additional equipment/DME needs throughout rehabilitation stay    Past Medical History:   Past Surgical History:   Family History:   Social history:   Past Medical History:   Diagnosis Date   • Asthma    • Diabetes ([facility] [address] )    • Hypertension     Past Surgical History:   Procedure Laterality Date   • MECHANICAL AORTIC VALVE REPLACEMENT     • REPLACEMENT TOTAL KNEE       Family History   Problem Relation Age of Onset   • Hypertension Other       Social History     Socioeconomic History   • Marital status:      Spouse name: Not on file   • Number of children: Not on file   • Years of education: Not on file   • Highest education level: Not on file   Occupational History   • Not on file   Tobacco Use   • Smoking status: Never   • Smokeless tobacco: Never   Substance and Sexual Activity   • Alcohol use: Yes     Comment: socially   • Drug use: Never   • Sexual activity: Not on file   Other Topics Concern   • Not on file   Social History Narrative   • Not on file     Social Determinants of Health     Financial Resource Strain: Not on file   Food Insecurity: No Food Insecurity   • Worried About Running Out of Food in the Last Year: Never true   • Ran Out of Food in the Last Year: Never true   Transportation Needs: No Transportation Needs   • Lack of Transportation (Medical):  No   • Lack of Transportation (Non-Medical): No   Physical Activity: Not on file   Stress: Not on file   Social Connections: Not on file   Intimate Partner Violence: Not on file   Housing Stability: Low Risk    • Unable to Pay for Housing in the Last Year: No   • Number of Places Lived in the Last Year: 1   • Unstable Housing in the Last Year: No          Current Medical Diagnosis Allergies   Patient Active Problem List   Diagnosis   • Fall   • Humerus fracture   • Acute pain due to trauma   • H/O mechanical aortic valve replacement   • Laceration of right eyebrow   • Fracture of multiple ribs of right side   • Type 2 diabetes mellitus (HCC)   • UTI (urinary tract infection)   • Ventricular arrhythmia   • Bradycardia   • Hyponatremia   • Abnormal CT scan    Allergies   Allergen Reactions   • Fentanyl Confusion           Medical Necessity Criteria for [facility] Admission: High risk given AICD with recent Afib with RVR, VT/VF/Junctional bradycardia, recent UTI, On coumadin and amio now with recent supratherapeutic INR (will need close monitoring), humeral fracture, acute pain, multiple rib fractures with acute hypoxic respiratory failure (improved at discharge), aortic aneurysm, possibly partially obstructed umbilical hernia, hyponatremia, T2DM, history of mechanical valve replacement on coumadin, impaired balance/mobility, risk of VTE and bleed, risk of delirium, sacral/buttock pressure injury  In addition, the preadmission screen, post-admission physical evaluation, overall plan of care and admissions order demonstrate a reasonable expectation that the following criteria were met at the time of admission to the [facility]  1  The patient requires active and ongoing therapeutic intervention of multiple therapy disciplines (physical therapy, occupational therapy, speech-language pathology, or prosthetics/orthotics), one of which is physical or occupational therapy      2  Patient requires an intensive rehabilitation therapy program, as defined in Chapter 1, section 110 2 2 of the CMS Medicare Policy Manual  This intensive rehabilitation therapy program will consist of at least 3 hours of therapy per day at least 5 days per week or at least 15 hours of intensive rehabilitation therapy within a 7 consecutive day period, beginning with the date of admission to the [facility]  3  The patient is reasonably expected to actively participate in, and benefit significantly from, the intensive rehabilitation therapy program as defined in Chapter 1, section 110 2 2 of the CMS Medicare Policy Manual at this time of admission to the [facility]  She can reasonably be expected to make measurable improvement (that will be of practical value to improve the patient’s functional capacity or adaptation to impairments) as a result of the rehabilitation treatment, as defined in section 110 3, and such improvement can be expected to be made within the prescribed period of time  As noted in the CMS Medicare Policy Manual, the patient need not be expected to achieve complete independence in the domain of self-care nor be expected to return to his or her prior level of functioning in order to meet this standard  4  The patient must require physician supervision by a rehabilitation physician  As such, a rehabilitation physician will conduct face-to-face visits with the patient at least 3 days per week throughout the patient’s stay in the [facility] to assess the patient both medically and functionally, as well as to modify the course of treatment as needed to maximize the patient’s capacity to benefit from the rehabilitation process    5  The patient requires an intensive and coordinated interdisciplinary approach to providing rehabilitation, as defined in Chapter 1, section 110 2 5 of the CMS Medicare Policy Manual  This will be achieved through periodic team conferences, conducted at least once in a 7-day period, and comprising of an interdisciplinary team of medical professionals consisting of: a rehabilitation physician, registered nurse,  and/or , and a licensed/certified therapist from each therapy discipline involved in treating the patient  German Stewart MD  Physical Medicine and Rehabilitation    ** Please Note: Fluency Direct voice to text software may have been used in the creation of this document   **

## 2023-05-13 NOTE — ASSESSMENT & PLAN NOTE
Managed by [facility] Endocrinology  History of L5 pathologic fracture  Has been on Alendronate once weekly since 2017  Would confirm with her what day she normally takes it prior to restarting  Will confirm that it is ok to restart with Orthopedics first, as some surgeons prefer to wait 6 weeks after acute fracture prior to restarting    - Per Dr Corby Lemon ok to resume at anytime  - She normally takes it on Thursdays  Would complete through 6/2023  Per last Endocrine note in 6/2022, they were going to continue for 1 more year and then stop  She was due for DXA this May

## 2023-05-13 NOTE — PHYSICAL THERAPY NOTE
Physical Therapy Progress Note     05/13/23 1345   PT Last Visit   PT Visit Date 05/13/23   Note Type   Note Type Treatment  (Simultaneous filing  User may not have seen previous data )   Pain Assessment   Pain Assessment Tool FLACC  (Simultaneous filing  User may not have seen previous data )   Pain Score 7   Pain Location/Orientation Orientation: Left; Location: Arm   Hospital Pain Intervention(s) Repositioned; Ambulation/increased activity   Pain Rating: FLACC (Rest) - Face 1   Pain Rating: FLACC (Rest) - Legs 0   Pain Rating: FLACC (Rest) - Activity 1   Pain Rating: FLACC (Rest) - Cry 1   Pain Rating: FLACC (Rest) - Consolability 0   Score: FLACC (Rest) 3   Pain Rating: FLACC (Activity) - Face 1   Pain Rating: FLACC (Activity) - Legs 1   Pain Rating: FLACC (Activity) - Activity 1   Pain Rating: FLACC (Activity) - Cry 1   Pain Rating: FLACC (Activity) - Consolability 0   Score: FLACC (Activity) 4   Restrictions/Precautions   Weight Bearing Precautions Per Order Yes   LUE Weight Bearing Per Order NWB  (Simultaneous filing  User may not have seen previous data )   Braces or Orthoses Sling  (Simultaneous filing  User may not have seen previous data )   Other Precautions WBS;Pain; Fall Risk  (Simultaneous filing  User may not have seen previous data )   Cognition   Overall Cognitive Status WFL   Arousal/Participation Responsive; Cooperative   Attention Attends with cues to redirect   Orientation Level Oriented X4   Memory Within functional limits   Following Commands Follows one step commands with increased time or repetition   Comments Pt cooperative t/o session  Pt does express fear and anxiety regarding falling  Provided encouragement and emotional support   Subjective   Subjective Pt encountered supine in bed, with family present  Reports no new complaints and has controlled pain overall, primarily located in LUE    Pt is anxious regarding OOB mobitly, but agreeable to attempt walking with encouragement & reassurances from family  Bed Mobility   Rolling R 3  Moderate assistance   Additional items Assist x 1   Rolling L 3  Moderate assistance   Additional items Assist x 1   Supine to Sit 3  Moderate assistance  (Simultaneous filing  User may not have seen previous data )   Additional items Assist x 2  (Simultaneous filing  User may not have seen previous data )   Sit to Supine 3  Moderate assistance  (Simultaneous filing  User may not have seen previous data )   Additional items Assist x 2  (Simultaneous filing  User may not have seen previous data )   Transfers   Sit to Stand 3  Moderate assistance  (Simultaneous filing  User may not have seen previous data )   Additional items Assist x 1; Increased time required  (prefers arm in arm on R side with additional support around hips  Simultaneous filing  User may not have seen previous data )   Stand to Sit 3  Moderate assistance  (Simultaneous filing  User may not have seen previous data )   Additional items Assist x 1; Increased time required;Verbal cues  (instructions for approach  Simultaneous filing  User may not have seen previous data )   Additional Comments transfers with Ax1 + SBA of another   Ambulation/Elevation   Gait pattern Excessively slow; Short stride; Foward flexed; Inconsistent yanet; Shuffling;Decreased foot clearance; Improper Weight shift;Decreased R stance;Poor UE support   Gait Assistance 3  Moderate assist   Additional items Assist x 1  (+ close chair follow)   Assistive Device [facility]   Distance 3'x2 bed to chair, 7' forwards in room   Balance   Static Sitting Fair   Static Standing Poor +   Ambulatory Poor   Activity Tolerance   Activity Tolerance Patient tolerated treatment well;Patient limited by fatigue;Patient limited by pain   Nurse [address], RN   Assessment   Prognosis Good   Problem List Decreased strength;Decreased range of motion;Decreased endurance; Impaired balance;Decreased mobility; Decreased coordination;Obesity;Orthopedic restrictions;Decreased skin integrity   Assessment Pt continues to require Ax1-2 for all mobiilty at this time due to impaired strength & balance, paired with acute NWB status LUE that limits pt's ability to self assist with all tasks  Pt is able to initiate all transfers, but require extensive assist for trunk control in standing & lateral balance when advancing LE s while stepping in all directions  no overt LOB noted, but pt is high fall risk due to deficits & inabiilty to use more appropriate AD at this time  Would benefit from trial of more appropriate 1 handed device, but pt resistant to doing so due to previous tripping accident she had sometime prior to admit  May benefit from trialing 1 handed RW if she does not have to pick it up herself, but anticipate this may also make her more anxious based on recent reluctance as previously noted  Plan to continue practicing funcitonal transfers & gait training with LRAD to maximize funcitonal independence & reduce long term risk for falls  PT POC & d/c recommendations remain appropriate at this time  Barriers to Discharge Inaccessible home environment;Decreased caregiver support   Goals   Patient Goals to go to acute rehab   STG Expiration Date 05/24/23   PT Treatment Day 4   Plan   Treatment/Interventions Functional transfer training;LE strengthening/ROM; Therapeutic exercise; Endurance training;Patient/family training;Equipment eval/education; Bed mobility;Gait training;Elevations   Progress Slow progress, decreased activity tolerance   PT Frequency 3-5x/wk   Recommendation   PT Discharge Recommendation Post acute rehabilitation services   Equipment Recommended Cecelia Fillers  (encouraged family to bring in pt's own SPC to improve confidence)   AM-PAC Basic Mobility Inpatient   Turning in Flat Bed Without Bedrails 2   Lying on Back to Sitting on Edge of Flat Bed Without Bedrails 1   Moving Bed to Chair 2   Standing Up From Chair Using Arms 2   Walk in Room 2   Climb 3-5 Stairs With Railing 1   Basic Mobility Inpatient Raw Score 10   Highest Level Of Mobility   JH-HLM Goal 4: Move to chair/commode   JH-HLM Achieved 6: Walk 10 steps or more     Rachael Dowd PTA    An [facility] Basic Mobility Raw Score less than 17 suggests pt would benefit from post acute rehab  Please also refer to the recommendation of the Physical Therapist for safe discharge planning

## 2023-05-13 NOTE — ASSESSMENT & PLAN NOTE
No results found for: HGBA1C    Recent Labs     05/12/23  1700 05/12/23  2111 05/13/23  0816 05/13/23  1148   POCGLU 119 155* 71 115       Blood Sugar Average: Last 72 hrs:  (P) 578 [id number]     - Continue current medication regimen   - SSI  - Outpatient follow-up with PCP

## 2023-05-13 NOTE — CASE MANAGEMENT
Received voicemail from [facility] [place] notifying that appeal has resulted in overturn decision, pt now approved for ARC      Edward Moore 50 has received approved authorization from insurance:    Harika Moreno received for: Acute Rehab  Facility: [facility] #: [id number]  Start of Care: 5/12  Next Review Date: 5/16    Continued Stay Care Coordinator: Tipton Best  P#: [phone]   [address] next review to: fax# [phone] (include confirmation of admission date)  Care Manager notified: Misael Anthony

## 2023-05-13 NOTE — CASE MANAGEMENT
Case Management Discharge Planning Note    Patient name Shilpa Velazquez  Location [address] [place] [address] [id number] [id number] MRN [id number]  : 1945 Date 2023       Current Admission Date: 2023  Current Admission Diagnosis:Fall   Patient Active Problem List    Diagnosis Date Noted   • Abnormal CT scan 05/10/2023   • Hyponatremia 2023   • Bradycardia 2023   • UTI (urinary tract infection) 2023   • Ventricular arrhythmia 2023   • Type 2 diabetes mellitus ([facility] [address] ) 2023   • Fall 2023   • Humerus fracture 2023   • Acute pain due to trauma 2023   • H/O mechanical aortic valve replacement 2023   • Laceration of right eyebrow 2023   • Fracture of multiple ribs of right side 2023      LOS (days): 16  Geometric Mean LOS (GMLOS) (days): 2 70  Days to GMLOS:-12 3     OBJECTIVE:  Risk of Unplanned Readmission Score: 16 52         Current admission status: Inpatient   Preferred Pharmacy:   [facility]  [address] [place] [id number]  Phone: [phone] Fax: [phone]    Primary Care Provider: Spenser Rosales DO    Primary Insurance: Bridgette Valderrama [facility]  Secondary Insurance:     DISCHARGE DETAILS:       Transported by (Company and Unit #):  [facility] [employer]  ETA of Transport (Date): 23  ETA of Transport (Time): [address] Name, [address] : [facility]  Receiving Facility/Agency Phone Number: [phone]

## 2023-05-13 NOTE — ASSESSMENT & PLAN NOTE
Developed junctional bradycardia in hospital after restarting home Toprol XL and digoxin  - Cards stopped digoxin  - Toprol XL decreased to 25mg daily  Monitor

## 2023-05-13 NOTE — OCCUPATIONAL THERAPY NOTE
Occupational Therapy Progress Note     Patient Name: Meggan Strickland  KODJN'O Date: 5/13/2023  Problem List  Active Problems:    Fall    Humerus fracture    Acute pain due to trauma    H/O mechanical aortic valve replacement    Laceration of right eyebrow    Fracture of multiple ribs of right side    Type 2 diabetes mellitus (HCC)    UTI (urinary tract infection)    Ventricular arrhythmia    Bradycardia    Hyponatremia    Abnormal CT scan            05/13/23 1345   OT Last Visit   OT Visit Date 05/13/23   Note Type   Note Type Treatment   Pain Assessment   Pain Assessment Tool 0-10   Pain Score 7   Pain Location/Orientation Orientation: Left; Location: Arm   Hospital Pain Intervention(s) Repositioned; Ambulation/increased activity   Restrictions/Precautions   Weight Bearing Precautions Per Order Yes   LUE Weight Bearing Per Order (S)  NWB   Braces or Orthoses Splint;Sling  (LUE splint)   Other Precautions Chair Alarm; Bed Alarm;Pain; Fall Risk;WBS   Lifestyle   Autonomy I adls and mobility - i iadls - shares homemaking with s/o   Reciprocal Relationships supportive family - reports she has 2 local dtrs and her s/o also has local dtr who are able to assist   Service to Others retired   [address]ie [address] enjoys spending time with friends   ADL   Where Assessed Chair   UB Dressing Assistance 3  Moderate Assistance   UB Dressing Deficit Thread RUE; Thread LUE   UB Dressing Comments MAX A to doff gown, MOD A to don   LB Dressing Assistance 2  Maximal Assistance   LB Dressing Deficit Don/doff L sock; Don/doff R sock   Bed Mobility   Supine to Sit 3  Moderate assistance   Additional items Assist x 1;HOB elevated; Bedrails; Increased time required  (A for trunk)   Sit to Supine 3  Moderate assistance   Additional items Assist x 1;HOB elevated; Bedrails; Increased time required;LE management   Transfers   Sit to Stand 3  Moderate assistance   Additional items Assist x 1; Increased time required   Stand to Sit 3  Moderate assistance   Additional items Assist x 1; Increased time required   Additional Comments transfers with Ax1 + SBA of another   Functional Mobility   Functional Mobility 3  Moderate assistance   Additional Comments Ax1 + SBA of another   Additional items SPC   Cognition   Overall Cognitive Status WFL   Arousal/Participation Responsive; Cooperative   Attention Attends with cues to redirect   Orientation Level Oriented X4   Memory Within functional limits   Following Commands Follows one step commands with increased time or repetition   Comments Pt cooperative t/o session  Pt does express fear and anxiety regarding falling  Provided encouragement and emotional support   Activity Tolerance   Activity Tolerance Patient limited by fatigue;Patient limited by pain   Medical Staff Made Aware PTA Jessica Ruiz, MIRANDA   Assessment   Assessment Patient participated in Skilled OT session this date with interventions consisting of ADL re training with the use of correct body mechnaics, Energy Conservation techniques, safety awareness and fall prevention techniques, one handed dressing technique,  therapeutic activities to: increase activity tolerance, increase dynamic sit/ stand balance during functional activity  and increase OOB/ sitting tolerance   Upon arrival patient was found supine in bed  Pt demonstrated the following tasks: MOD A sup <> sit, STS, and fnxl mobility with RW + SBA of another  Pt requires MAX A to lynda armijo, MOD A to don  Educated on dressing techniques  MAX A for LBD  Would benefit from trialing Toribio Anguiano during next session, pt with decreased reaching  Patient continues to be functioning below baseline level, occupational performance remains limited secondary to factors listed above and increased risk for falls and injury  From OT standpoint, recommendation at time of d/c would be STR    Patient to benefit from continued Occupational Therapy treatment while in the hospital to address deficits as defined above and maximize level of functional independence with ADLs and functional mobility  Pt was left after session with all current needs met  The patient's raw score on the AM-PAC Daily Activity Inpatient Short Form is 14  A raw score of less than 19 suggests the patient may benefit from discharge to post-acute rehabilitation services  Please refer to the recommendation of the Occupational Therapist for safe discharge planning  Plan   Treatment Interventions ADL retraining;Functional transfer training;UE strengthening/ROM; Endurance training;Patient/family training;Equipment evaluation/education; Compensatory technique education;Continued evaluation; Energy conservation; Activityengagement   Goal Expiration Date 05/24/23   OT Treatment Day 5   OT Frequency 2-3x/wk   Recommendation   OT Discharge Recommendation Post acute rehabilitation services   [facility] Daily Activity Inpatient   Lower Body Dressing 2   Bathing 2   Toileting 2   Upper Body Dressing 2   Grooming 3   Eating 3   Daily Activity Raw Score 14   Daily Activity Standardized Score (Calc for Raw Score >=11) 33 39   AM-[facility] Applied Cognition Inpatient   Following a Speech/Presentation 4   Understanding Ordinary Conversation 4   Taking Medications 4   Remembering Where Things Are Placed or Put Away 4   Remembering List of 4-5 Errands 4   Taking Care of Complicated Tasks 3   Applied Cognition Raw Score 23   Applied Cognition Standardized Score 53 08     Gabi Sanchez MS, OTR/L

## 2023-05-13 NOTE — DISCHARGE SUMMARY
[address] [facility]  Discharge- Flavia Kong 1945, 68 y o  female MRN: [id number]  Unit/Bed#: [facility] 679-96 Encounter: [id number]  Primary Care Provider: Lanre Kelley DO   Date and time admitted to hospital: 4/27/2023 12:53 PM    Abnormal CT scan  Assessment & Plan  - 4/27 CT CAP:    - Incidentally noted is a incarcerated/partially obstructed periumbilic ventral hernia, (image 146 series 9)   - Septated cyst anterior aspect of the right kidney, consider evaluation with ultrasound or MRI for further characterization (image 136 series 9)   - Dilated ascending aorta measuring 4 6 cm, evaluation with cardiothoracic surgery on nonemergent basis  - Follow up with PCP and CT surgery outpatient for findings  - Patient informed of abnormal results on 5/11: she is aware of ventral hernia but not aortic aneurysm or renal cyst    Hyponatremia  Assessment & Plan  - Na is slowly downtrending with lowest 125 on 5/9  - Presumed secondary to furosemide and possibly poor PO intake  Oral intake is now improved and patient is back on home lasix     - Decrease salt tabs to 1 g BID  - Na today 132 (134) Monitor BMP in am  - f/u with PCP and BMP in one week    Bradycardia  Assessment & Plan  - HR in the 40-50s  - Appears to be junctional bradycardia on telemetry  - EP consulted and discontinued diltiazem and adjusted metoprolol to 25 mg daily  - After medication adjustments, HR is paced in the 50-60s    Ventricular arrhythmia  Assessment & Plan  - Hx of VT/VF s/p single chamber ICD  - ICD shock from VT episode 5/2 and admitted to ICU   - ICD zone adjusted per cards   - Cardiology recommendations appreciated  - Continue 25 mg metoprolol daily  - Amiodarone gtt discontinued; completed 1 week of oral amio 400 mg BID  - Continue 200 mg amiodarone daily   - Continue 2 5 mg warfarin daily    UTI (urinary tract infection)  Assessment & Plan  - Started on cefepime and vanco and de-escalated to ceftriaxone, completed 5 days of treatment    Type 2 diabetes mellitus [facility])  Assessment & Plan  No results found for: HGBA1C    Recent Labs     05/12/23  1700 05/12/23  2111 05/13/23  0816 05/13/23  1148   POCGLU 119 155* 71 115       Blood Sugar Average: Last 72 hrs:  (P) 178 [id number]     - Continue current medication regimen   - SSI  - Outpatient follow-up with PCP  Fracture of multiple ribs of right side  Assessment & Plan  - Multiple right-sided rib fractures (8th-9th), present on admission   - Continue rib fracture protocol   - Continue to encourage incentive spirometer use and adequate pulmonary hygiene  - APS consult, appreciate recommendations  - Continue multimodal analgesic regimen     - Supplemental oxygen via nasal cannula as needed to maintain saturations greater than or equal to 94%  - PT and OT evaluation and treatment as indicated-recommending rehab placement, pending peer to peer today  - Outpatient follow-up in the trauma clinic for re-evaluation in approximately 2 weeks  Laceration of right eyebrow  Assessment & Plan  - two 1 cm lacerations to the R eye brow  - steri strips applied  - local wound care    H/O mechanical aortic valve replacement  Assessment & Plan  - Warfarin held while being evaluated for humerus fracture  - Goal INR reportedly 2 5 to 3 5  - Difficulty managing her INR as she seems very sensitive to 5 mg (which is her home dose 5/7 days a week) as she became supratherapeutic  - Continue warfarin 2 5 mg daily  - INR 2 4 today, 5/12    Acute pain due to trauma  Assessment & Plan  - Acute pain secondary to traumatic injuries  - Multi modal pain regimen  - Bowel regimen as long as using opioids   - Continue to monitor pain and adjust regimen as indicated        Humerus fracture  Assessment & Plan  - proximal humerus fracture on the left   - NVI distally  - pain control with multi modal analgesia  - orthopedics consultation, appreciate recommendations  - Non-op  - NWB LUE  - Sling to the LUE  - Multimodal pain control  - PT/OT recommending rehab  - f/u with orthopedics as an outpatient    [address]  - mechanical fall   - sustained below stated injuries  - PT/OT evaluations - recommending rehab  - Geriatrics evaluation, appreciate recommendations  - CM for disposition planning  Family appealing denial to rehab  Medical Problems     Resolved Problems  Date Reviewed: 5/12/2023   None         Admission Date:   Admission Orders (From admission, onward)     Ordered        04/28/23 1630  Inpatient Admission  Once            04/27/23 1449  Place in Observation  Once            04/27/23 1405  Inpatient Admission  Once,   Status:  Canceled                        Admitting Diagnosis: Fracture of multiple ribs of right side [S22 41XA]  Closed fracture of proximal end of right humerus, unspecified fracture morphology, initial encounter [S42 201A]  Other injury of unspecified body region, initial encounter [T14  8XXA]    HPI: Patient is a 26-year-old female who sustained a mechanical fall at a grocery store onto her face and left side  She was found to have a right eyebrow laceration, left humerus fracture, and rib fractures on the right 8 through 9  Procedures Performed:   Orders Placed This Encounter   Procedures   • Fast Ultrasound       Summary of Hospital Course: Patient was admitted to the trauma service  Orthopedics was consulted due to her left humerus fracture  Her fracture was treated conservatively, nonoperative management  She was made nonweightbearing on that left upper extremity  Acute pain service was consulted due to her rib fractures she was started on multimodal pain control  The patient developed an SVT,  A-fib with runs of V-tach/V Fib and then accelerated junctional rhythm  She did receive shocks from her AICD for the V-tach/V-fib  The patient was upgraded to a Step Down Level 1  Cardiology and EP were urgently consulted   Her AICD was "interrogated and the AICD was adjusted  Cardiology also adjusted her cardiac medications, Cardizem was discontinued and her metoprolol dose was decreased  She was begun on amiodarone and will need to follow up on outpatient with cardiology  She was found to have a UTI and was treated for this  She was begun back on her coumadin  During her hospitalization she also developed hyponatremia which was treated with Salt Tabs presumed from lasix  Her electrolytes will need  to be monitored  She began working with PT/OT  They recommended rehab  She will need to follow up with Orthopedics for her humerus fracture, and trauma clinic for her rib fractures  She will need to also follow up with cardiology  Significant Findings, Care, Treatment and Services Provided: as above    Complications: as above    Condition at Discharge: fair      Physical Exam:   /63 (BP Location: Right arm)   Pulse 70   Temp 98 4 °F (36 9 °C) (Oral)   Resp 19   Ht 4' 10\" (1 473 m)   Wt 85 3 kg (188 lb)   SpO2 94%   BMI 39 29 kg/m²     GENERAL APPEARANCE: NAD, appears to be comfortable lying bed  NEURO: Intact, GCS 15 Alert and Oriented x 3  HEENT: PERRL  CV: RRR  LUNGS: CTA B/L throughout  GI: Abdomen soft, NT, ND, +BS x 4  : Voiding  MSK: HSU's weaker on LUE due to pain   SKIN: Right eyebrow laceration well approximated      Discharge instructions/Information to patient and family:   See after visit summary for information provided to patient and family  Provisions for Follow-Up Care:  See after visit summary for information related to follow-up care and any pertinent home health orders  PCP: Cruzito Nava DO    Disposition: Skilled nursing facility at [facility]    Planned Readmission: No    Discharge Statement   I spent 30 minutes discharging the patient  This time was spent on the day of discharge  I had direct contact with the patient on the day of discharge   Additional documentation is required if more than 30 minutes were " spent on discharge  Discharge Medications:  See after visit summary for reconciled discharge medications provided to patient and family

## 2023-05-13 NOTE — TREATMENT PLAN
Individualized Plan of [facility] [facility] 68 y o  female MRN: [id number]  Unit/Bed#: -01 Encounter: [id number]     PATIENT INFORMATION  ADMISSION DATE: 5/13/2023  6:28 PM JULIO CATEGORY:Major Multiple Trauma:  14 9  Other Multiple Trauma Multiple rib fractures and L humerus fracture   ADMISSION DIAGNOSIS: Proximal humerus fracture [S42 209A]  COVID-19 [U07 1]  EXPECTED LOS: 2 weeks     MEDICAL/FUNCTIONAL PROGNOSIS  Based on my assessment of the patient's medical conditions and current functional status, the prognosis for attaining medical and functional goals or the IRF stay is:  Good    Medical Goals: Patient will be able to manage medical conditions and comorbid conditions with medications and follow up upon completion of rehab program       1  Adequate pain control  2  Prevention of VTE  3  Adequate secretion management, and monitoring of respiratory status  4  Bowel/bladder management  5  Maintain appropriate nutrition and hydration for healing and hemodynamic stability  6  Emotional adaptation to impairment  7  Monitor for polypharmacy  8  Fall prevention  9  Patient and family caregiver training/education  10  Skin protection and prevention of pressure injury  11  Incisional care to prevent infection/dehiscence  12  Prevention of delirium  13  Appropriate management of chronic comorbidities  14  Close monitoring of electrolytes and cardiac status given recent Afib/VF/VT/Junctional bradycardia  ANTICIPATED DISCHARGE DISPOSITION AND SERVICES  COMMUNITY SETTING: Home - independent/modified independent and Home - Assistance    Goal possible to her own home which is more accessible with possibly intermittent assistance from her daughters and SO  Her Significant other lives in [address] [place] [address] but has a full flight of stairs to bed/bath      ANTICIPATED FOLLOW-UP SERVICE:   Outpatient Therapy Services: PT and OT          DISCIPLINE SPECIFIC PLANS:  Required Disciplines & Services: Rehabillitation Nursing    REQUIRED THERAPY:  Therapy Hours per Day Days per Week Total Days   Physical Therapy 1 5 5 5   Occupational Therapy 1 5 5 5   NOTE: Additional therapy time(s) or changes to allocation of therapies as appropriate to meet patient needs and to achieve functional goals  Patient will participate in above therapy regimen consisting of PT and OT due to the following medical procedure/condition:Major Multiple Trauma:  14 9  Other Multiple Trauma See above    ANTICIPATED FUNCTIONAL OUTCOMES:  ADL:  mod Ind -Ricco   Bladder/Bowel:  premorbid   Transfers:  mod Ind   Locomotion:  mod Ind   Cognitive:  premorbid     DISCHARGE PLANNING NEEDS  Equipment needs: Discharge needs to be reviewed with team      REHAB ANTICIPATED PARTICIPATION RESTRICTIONS:  NWB LUE  In coaptation splint  May limit bathing and ADLs

## 2023-05-13 NOTE — ASSESSMENT & PLAN NOTE
- Na is slowly downtrending with lowest 125 on 5/9  - Presumed secondary to furosemide and possibly poor PO intake  Oral intake is now improved and patient is back on home lasix     - Decrease salt tabs to 1 g BID  - Na today 132 (134) Monitor BMP in am  - f/u with PCP and BMP in one week

## 2023-05-13 NOTE — PLAN OF CARE
Problem: Potential for Falls  Goal: Patient will remain free of falls  Description: INTERVENTIONS:  - Educate patient/family on patient safety including physical limitations  - Instruct patient to call for assistance with activity   - Consult OT/PT to assist with strengthening/mobility   - Keep Call bell within reach  - Keep bed low and locked with side rails adjusted as appropriate  - Keep care items and personal belongings within reach  - Initiate and maintain comfort rounds  - Make Fall Risk Sign visible to staff  - Offer Toileting every 2 Hours, in advance of need  - Initiate/Maintain on alarm  - Obtain necessary fall risk management equipment:   - Apply yellow socks and bracelet for high fall risk patients  - Consider moving patient to room near nurses station  Outcome: Progressing     Problem: RESPIRATORY - ADULT  Goal: Achieves optimal ventilation and oxygenation  Description: INTERVENTIONS:  - Assess for changes in respiratory status  - Assess for changes in mentation and behavior  - Position to facilitate oxygenation and minimize respiratory effort  - Oxygen administered by appropriate delivery if ordered  - Initiate smoking cessation education as indicated  - Encourage broncho-pulmonary hygiene including cough, deep breathe, Incentive Spirometry  - Assess the need for suctioning and aspirate as needed  - Assess and instruct to report SOB or any respiratory difficulty  - Respiratory Therapy support as indicated  Outcome: Progressing

## 2023-05-13 NOTE — ASSESSMENT & PLAN NOTE
2/2 mechanical fall  Proximal humerus shaft spiral fracture, displaced and angulated  Ortho attempted closed reduction  Ultimately placed in coaptation splint on 4/27 and managed non-operatively  NWCHET JUAREZ  Pain Control as detailed below  No Vitamin D level in chart or unmerged chart recently   - At home was on daily Vitamin D supplement 1000 units - restarted   - 5/14 Vitamind D level is 50 7   Due for 2 week follow-up  Imaging: Personally reviewed  Redemonstration of oblique spiral fracture of the shaft of the humerus with apex medial angulation  No glenohumeral dislocation  Appreciate orthopedics evaluating patient 5/15/2023  Replaced her splint with a Salgado brace  Cleared by orthopedics to provide range of motion to elbow and wrist without restriction as tolerated    Follow-up with orthopedics in 4 weeks around 6/15/2023

## 2023-05-13 NOTE — ASSESSMENT & PLAN NOTE
R 8th and 9th rib possible fractures  Rib fracture protocol  Pain control with lidoderm patch  Incentive Spirometer  Monitor respiratory status

## 2023-05-13 NOTE — ASSESSMENT & PLAN NOTE
Home: Sitagliptin-Metformin 50-50mg  Here: Discontinue Levemir due to hypoglycemia, continue sliding scale insulin and diabetic diet  Diabetic Diet  Nutrition Consult  Attempt to resume oral meds here and get off insulin  IM consulted to assist with management  Outpatient f/u with PCP

## 2023-05-13 NOTE — ASSESSMENT & PLAN NOTE
Managed on Coumadin  Goal INR 2 5-3 5  Last INR = 2 57  Ok to resume home doing of Coumadin  IM consulted to assist with management of Coumadin dosing

## 2023-05-13 NOTE — PLAN OF CARE
Problem: PHYSICAL THERAPY ADULT  Goal: Performs mobility at highest level of function for planned discharge setting  See evaluation for individualized goals  Description: Treatment/Interventions: OT, Spoke to nursing, Spoke to case management, Gait training, Bed mobility, Patient/family training, Endurance training, LE strengthening/ROM, Functional transfer training  Equipment Recommended:  (TBD)       See flowsheet documentation for full assessment, interventions and recommendations  Outcome: Progressing  Note: Prognosis: Good  Problem List: Decreased strength, Decreased range of motion, Decreased endurance, Impaired balance, Decreased mobility, Decreased coordination, Obesity, Orthopedic restrictions, Decreased skin integrity  Assessment: Pt continues to require Ax1-2 for all mobiilty at this time due to impaired strength & balance, paired with acute NWB status LUE that limits pt's ability to self assist with all tasks  Pt is able to initiate all transfers, but require extensive assist for trunk control in standing & lateral balance when advancing LE s while stepping in all directions  no overt LOB noted, but pt is high fall risk due to deficits & inabiilty to use more appropriate AD at this time  Would benefit from trial of more appropriate 1 handed device, but pt resistant to doing so due to previous tripping accident she had sometime prior to admit  May benefit from trialing 1 handed RW if she does not have to pick it up herself, but anticipate this may also make her more anxious based on recent reluctance as previously noted  Plan to continue practicing funcitonal transfers & gait training with LRAD to maximize funcitonal independence & reduce long term risk for falls  PT POC & d/c recommendations remain appropriate at this time    Barriers to Discharge: Inaccessible home environment, Decreased caregiver support     PT Discharge Recommendation: Post acute rehabilitation services    See flowsheet documentation for full assessment

## 2023-05-13 NOTE — PROGRESS NOTES
PHYSICAL MEDICINE AND REHABILITATION   PREADMISSION ASSESSMENT     Projected [facility] Diagnoses:  Impairment of mobility, safety and Activities of Daily Living (ADLs) due to Major Multiple Trauma:  14 9  Other Multiple Trauma left humeral fx, multiple rib fractures  Etiologic: LUE proximal humerus s/p reduction/splinting; multiple rib fractures  Date of Onset: 4/27/23   Date of surgery: n/a    PATIENT INFORMATION  Name: Jennifer Spicer Phone #: [phone] (home)   Address: [address] [place] [address]  YOB: 1945 Age: 68 y o  SS#   Marital Status:   Ethnicity: White  Employment Status: retired  Extended Emergency Contact Information  Primary Emergency Contact: [address] [place] [address] Rd Phone: [phone]  Relation: Daughter  Secondary Emergency Contact: Jaquan Zamudio  Mobile Phone: [phone]  Relation: Daughter  Advance Directive: Level 1 Full Code (no ACP docs)     INSURANCE/COVERAGE:     Primary Payor: JILLIAN  REP / Plan: Gage Good PPO [facility] REP / Product Type: Medicare PPO /   Secondary Payer:Self Pay   Payer Contact: Ken Godfrey Payer Contact:   Contact Phone: [phone] Contact Phone:     Authorization #: [id number]  Coverage Dates: 5/12/23-5/16/23  LCD: 5/16/23 Updates can be faxed to [phone] (include confirmation of admission date)  MEDICARE #: [id number]  Medical Record #: [id number]    REFERRAL SOURCE:   Referring provider: Alissa Simon MD  Referring facility: [address] [place] [address]   Room: [address] [place] [address] 613/[facility] [id number]  PCP: Flor Gomez DO PCP phone number: [phone]    MEDICAL INFORMATION  HPI: Per PM&R consult: Pt is a 68 y o  female with a PMH of diabetes, asthma, mechanical aortic valve replacement on coumadin, ICD and HTN who presented to the [address] [place] [address] on 4/27/2023 after falling   She was found to have left humerus fracture, right eyebrow laceration, multiple right sided rib fractures  She underwent reduction and splint with orthopedics  She was bradycardic for which cardiology was consulted  Telemetry showed junctional bradycardia  EP recommended d/c diltiazem and metoprolol was adjusted to 25 mg daily  She also had an ICD shock from VT on 5/2 and required admission to ICU  Her ICD was adjusted and she was placed on an amiodarone drip which was transitioned to oral  She is NWB to the LUE  Of note she was found to have incidental finding of periumbilical hernia, right kidney cyst and ascending aorta measuring 4 6 cm  She will need to follow up with PCP, CT surgery and cardiothoracic surgery on a non-emergent basis  PM&R was consulted for rehabilitation recommendations  PM&R consult: patient is a candidate for acute inpatient rehabilitation able to tolerate 3 hours therapy/day 5 to 6 days week  PT and OT have been consulted and are recommending post-acute rehab services  Patient's case has been reviewed with [facility] medical director, patient meets medical criteria for acute rehab and has demonstrated the ability to tolerate three or more hours of therapy per day  Patient is medically stable and ready for discharge to [facility]  Past Medical History:   Past Surgical History:    Allergies:     Past Medical History:   Diagnosis Date   • Asthma    • Diabetes ([address] )    • Hypertension     Past Surgical History:   Procedure Laterality Date   • MECHANICAL AORTIC VALVE REPLACEMENT     • REPLACEMENT TOTAL KNEE       Allergies   Allergen Reactions   • Fentanyl Confusion         Medical/functional conditions requiring inpatient rehabilitation: LUE proximal humerus s/p reduction/splinting NWB, rib fractures,junctional bradycardia, hyponatremia, acute pain due to trauma, type 2 DM, VT/VF with ICD, Impaired mobility and self care, decreased mobility, decreased strength/endurance     Risk for medical/clinical complications: Risk for falls, Risk for skin breakdown secondary to decreased mobility, Risk for hyper/hypogylcemic episodes, Risk for junctional bradycardia, Risk for increased pain    Comorbidities:   Asthma  HTN  Diabetes  Mechanical aortic valve replacement  ICD    Surgeries in the last 100 days: none    CURRENT VITAL SIGNS:   Temp:  [97 6 °F (36 4 °C)-98 6 °F (37 °C)] 98 4 °F (36 9 °C)  HR:  [60-70] 70  Resp:  [19] 19  BP: ()/(45-63) 108/63 No intake or output data in the 24 hours ending 05/13/23 1506     LABORATORY RESULTS:      Lab Results   Component Value Date    HGB 10 1 (L) 05/06/2023    HCT 31 4 (L) 05/06/2023    WBC 5 48 05/06/2023     Lab Results   Component Value Date    BUN 22 05/13/2023    K 5 1 05/13/2023     05/13/2023    GLUCOSE 149 (H) 05/02/2023    CREATININE 0 77 05/13/2023     Lab Results   Component Value Date    PROTIME 26 9 (H) 05/12/2023    INR 2 46 (H) 05/12/2023        DIAGNOSTIC STUDIES:  XR chest portable    Result Date: 4/29/2023  Impression: Cardiomegaly  Scarring or atelectasis in the left lung  Workstation performed: [id number]     XR shoulder 2+ vw left    Result Date: 4/27/2023  Impression: Obliquely oriented, overriding proximal humeral shaft fracture  Workstation performed: [id number]     XR humerus left    Result Date: 4/29/2023  Impression: Acute spiral fracture of the proximal humeral shaft with slightly more displacement and angulation  Workstation performed: [id number]     XR humerus left    Result Date: 4/27/2023  Impression: Obliquely oriented, overriding proximal humeral shaft fracture  Workstation performed: [id number]     XR elbow 2 vw left    Result Date: 4/27/2023  Impression: Obliquely oriented, overriding proximal humeral shaft fracture  Workstation performed: [id number]     TRAUMA - CT head wo contrast    Result Date: 4/27/2023  Impression: No acute intracranial hemorrhage  No mass effect or midline shift    I personally discussed this study with Kristian Hightower on 4/27/2023 2:30 PM at 4/27/2023 2:30 PM  Workstation performed: [id number]     TRAUMA - CT spine cervical wo contrast    Result Date: 4/27/2023  Impression: No acute compression collapse of the vertebra  Multilevel degenerative disc disease 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 with severe central canal narrowing  Workstation performed: [id number]     XR chest 1 view    Result Date: 4/27/2023  Impression: Angulated proximal left humeral shaft fracture  The study was marked in [facility] for immediate notification  Workstation performed: [id number]     TRAUMA - CT chest abdomen pelvis w contrast    Result Date: 4/27/2023  Impression: No solid visceral injury seen Comminuted fracture left humerus Incidentally noted is a incarcerated/partially obstructed periumbilic ventral hernia, (image 146 series 9) Septated cyst anterior aspect of the right kidney, consider evaluation with ultrasound or MRI for further characterization (image 136 series 9) Dilated ascending aorta measuring 4 6 cm, evaluation with cardiothoracic surgery on nonemergent basis Deformity of the right eighth and ninth rib may be due to motion, raise concern for fracture in appropriate clinical setting Evaluation of the right humerus is limited due to motion radiograph may be considered if concern for any right humerus injury  I personally discussed this study with [address] on 4/27/2023 2:28 PM at 4/27/2023 2:28 PM  Workstation performed: [id number]     XR Trauma multiple (SLB/SLRA trauma bay ONLY)    Result Date: 4/27/2023  Impression: Angulated proximal left humeral shaft fracture  The study was marked in [facility] for immediate notification    Workstation performed: [id number]       PRECAUTIONS/SPECIAL NEEDS:  Weight Bearing Precautions:  NWB LUE, Blood Sugar Management: Per MD orders, Edema Management, Safety Concerns, Pain Management, Dietary Restrictions: Beau/CHO controlled and Language Preference: English    MEDICATIONS:     Current Facility-Administered Medications:   • acetaminophen (TYLENOL) tablet 975 mg, 975 mg, Oral, Q8H [facility], JOSEPHINE Hernandez, 975 mg at 05/13/23 4707  •  allopurinol (ZYLOPRIM) tablet 100 mg, 100 mg, Oral, Daily, JOSEPHINE Justice, 100 mg at 05/13/23 8977  •  amiodarone tablet 200 mg, 200 mg, Oral, Daily With Breakfast, JOSEPHINE Quinones, 200 mg at 05/13/23 7125  •  atorvastatin (LIPITOR) tablet 40 mg, 40 mg, Oral, Daily With JOSEPHINE Timmons, 40 mg at 05/12/23 1719  •  calcium carbonate (TUMS) chewable tablet 500 mg, 500 mg, Oral, TID PRN, Herbert Guadalupe MD, 500 mg at 05/11/23 2115  •  citalopram (CeleXA) tablet 20 mg, 20 mg, Oral, Daily, JOSEPHINE Justice, 20 mg at 05/13/23 0907  •  docusate sodium (COLACE) capsule 100 mg, 100 mg, Oral, BID, JOSEPHINE Justice, 100 mg at 05/13/23 0907  •  furosemide (LASIX) tablet 40 mg, 40 mg, Oral, Daily, JOSEPHINE Justice, 40 mg at 05/09/23 1001  •  gabapentin (NEURONTIN) capsule 100 mg, 100 mg, Oral, HS, JOSEPHINE Hernandez, 100 mg at 05/12/23 2202  •  insulin detemir (LEVEMIR) subcutaneous injection 5 Units, 5 Units, Subcutaneous, HS, Ekta Laurent MD, 5 Units at 05/12/23 2201  •  insulin lispro (HumaLOG) 100 units/mL subcutaneous injection 1-5 Units, 1-5 Units, Subcutaneous, HS, JOSEPHINE Justice, 1 Units at 05/12/23 2202  •  insulin lispro (HumaLOG) 100 units/mL subcutaneous injection 1-5 Units, 1-5 Units, Subcutaneous, TID AC **AND** Fingerstick Glucose (POCT), , , TID AC, Ekta Laurent MD  •  lidocaine (LIDODERM) 5 % patch 1 patch, 1 patch, Topical, Daily, JOSEPHINE Justice, 1 patch at 05/13/23 2101  •  methocarbamol (ROBAXIN) tablet 250 mg, 250 mg, Oral, Q6H [facility], JOSEPHINE Hernandez, 250 mg at 05/13/23 1403  •  metoprolol succinate (TOPROL-XL) 24 hr tablet 25 mg, 25 mg, Oral, Daily, Yayo Contreras PA-C, 25 mg at 05/13/23 9282  •  montelukast (SINGULAIR) tablet 10 mg, 10 mg, Oral, HS, Adjosea Jenny JOSEPHINE Curtis, 10 mg at 05/12/23 2202  •  naloxone (NARCAN) 0 04 mg/mL syringe 0 04 mg, 0 04 mg, Intravenous, Q1MIN PRN, Yeimy Human, CRNP  •  ondansetron (ZOFRAN) injection 4 mg, 4 mg, Intravenous, Q6H PRN, Yeimy Human, CRNP, 4 mg at 05/13/23 4334  •  oxybutynin (DITROPAN-XL) 24 hr tablet 5 mg, 5 mg, Oral, Daily, Yeimy Human, CRNP, 5 mg at 05/13/23 1715  •  oxyCODONE (ROXICODONE) IR tablet 5 mg, 5 mg, Oral, Q4H PRN, Yeimy Human, CRNP, 5 mg at 05/12/23 2202  •  oxyCODONE (ROXICODONE) split tablet 7 5 mg, 7 5 mg, Oral, Q4H PRN, Yeimy Human, CRNP, 7 5 mg at 05/13/23 2060  •  senna (SENOKOT) tablet 17 2 mg, 2 tablet, Oral, Daily, Yeimy Human, CRNP, 17 2 mg at 05/10/23 4427  •  sodium chloride tablet 1 g, 1 g, Oral, BID With Meals, Jaylyn Fuller PA-C, 1 g at 05/13/23 1749  •  warfarin (COUMADIN) tablet 2 5 mg, 2 5 mg, Oral, Daily (warfarin), Jasmyn Mendoza MD, 2 5 mg at 05/12/23 1719    SKIN INTEGRITY:   Pressure injury buttocks right upper    PRIOR LEVEL OF FUNCTION:  She lives in [facility] apartment  Monae Santos is  and lives alone  Self Care: Independent, Indoor Mobility: Independent, Stairs (in/outdoor): Independent and Cognition: Independent    FALLS IN THE LAST 6 MONTHS: 1-4    HOME ENVIRONMENT:  The living area: can live on one level  There are No steps to enter the home  The patient will not have 24 hour supervision/physical assistance available upon discharge  PREVIOUS DME:  Equipment in home (previous DME): None    FUNCTIONAL STATUS:  Physical Therapy Occupational Therapy Speech Therapy   05/13/23 1345    PT Last Visit   PT Visit Date 05/13/23   Note Type   Note Type Treatment  (Simultaneous filing  User may not have seen previous data )   Pain Assessment   Pain Assessment Tool FLACC  (Simultaneous filing  User may not have seen previous data )   Pain Score 7   Pain Location/Orientation Orientation: Left; Location: Arm   Hospital Pain Intervention(s) Repositioned; Ambulation/increased activity   Pain Rating: FLACC (Rest) - Face 1   Pain Rating: FLACC (Rest) - Legs 0   Pain Rating: FLACC (Rest) - Activity 1   Pain Rating: FLACC (Rest) - Cry 1   Pain Rating: FLACC (Rest) - Consolability 0   Score: FLACC (Rest) 3   Pain Rating: FLACC (Activity) - Face 1   Pain Rating: FLACC (Activity) - Legs 1   Pain Rating: FLACC (Activity) - Activity 1   Pain Rating: FLACC (Activity) - Cry 1   Pain Rating: FLACC (Activity) - Consolability 0   Score: FLACC (Activity) 4   Restrictions/Precautions   Weight Bearing Precautions Per Order Yes   LUE Weight Bearing Per Order NWB  (Simultaneous filing  User may not have seen previous data )   Braces or Orthoses Sling  (Simultaneous filing  User may not have seen previous data )   Other Precautions WBS;Pain; Fall Risk  (Simultaneous filing  User may not have seen previous data )   Cognition   Overall Cognitive Status WFL   Arousal/Participation Responsive; Cooperative   Attention Attends with cues to redirect   Orientation Level Oriented X4   Memory Within functional limits   Following Commands Follows one step commands with increased time or repetition   Comments Pt cooperative t/o session  Pt does express fear and anxiety regarding falling  Provided encouragement and emotional support   Subjective   Subjective Pt encountered supine in bed, with family present  Reports no new complaints and has controlled pain overall, primarily located in LUE  Pt is anxious regarding OOB mobitly, but agreeable to attempt walking with encouragement & reassurances from family  Bed Mobility   Rolling R 3  Moderate assistance   Additional items Assist x 1   Rolling L 3  Moderate assistance   Additional items Assist x 1   Supine to Sit 3  Moderate assistance  (Simultaneous filing  User may not have seen previous data )   Additional items Assist x 2  (Simultaneous filing   User may not have seen previous data )   Sit to Supine 3  Moderate assistance  (Simultaneous filing  User may not have seen previous data )   Additional items Assist x 2  (Simultaneous filing  User may not have seen previous data )   Transfers   Sit to Stand 3  Moderate assistance  (Simultaneous filing  User may not have seen previous data )   Additional items Assist x 1; Increased time required  (prefers arm in arm on R side with additional support around hips  Simultaneous filing  User may not have seen previous data )   Stand to Sit 3  Moderate assistance  (Simultaneous filing  User may not have seen previous data )   Additional items Assist x 1; Increased time required;Verbal cues  (instructions for approach  Simultaneous filing  User may not have seen previous data )   Additional Comments transfers with Ax1 + SBA of another   Ambulation/Elevation   Gait pattern Excessively slow; Short stride; Foward flexed; Inconsistent yanet; Shuffling;Decreased foot clearance; Improper Weight shift;Decreased R stance;Poor UE support   Gait Assistance 3  Moderate assist   Additional items Assist x 1  (+ close chair follow)   Assistive Device [facility]   Distance 3'x2 bed to chair, 7' forwards in room   Balance   Static Sitting Fair   Static Standing Poor +   Ambulatory Poor   Activity Tolerance   Activity Tolerance Patient tolerated treatment well;Patient limited by fatigue;Patient limited by pain   Nurse [address], RN   Assessment   Prognosis Good   Problem List Decreased strength;Decreased range of motion;Decreased endurance; Impaired balance;Decreased mobility; Decreased coordination;Obesity;Orthopedic restrictions;Decreased skin integrity   Assessment Pt continues to require Ax1-2 for all mobiilty at this time due to impaired strength & balance, paired with acute NWB status LUE that limits pt's ability to self assist with all tasks    Pt is able to initiate all transfers, but require extensive assist for trunk control in standing & lateral balance when advancing LE s while stepping in all directions  no overt LOB noted, but pt is high fall risk due to deficits & inabiilty to use more appropriate AD at this time  Would benefit from trial of more appropriate 1 handed device, but pt resistant to doing so due to previous tripping accident she had sometime prior to admit  May benefit from trialing 1 handed RW if she does not have to pick it up herself, but anticipate this may also make her more anxious based on recent reluctance as previously noted  Plan to continue practicing funcitonal transfers & gait training with LRAD to maximize funcitonal independence & reduce long term risk for falls  PT POC & d/c recommendations remain appropriate at this time  05/13/23 1345    OT Last Visit   OT Visit Date 05/13/23   Note Type   Note Type Treatment   Pain Assessment   Pain Assessment Tool 0-10   Pain Score 7   Pain Location/Orientation Orientation: Left; Location: [facility]   Hospital Pain Intervention(s) Repositioned; Ambulation/increased activity   Restrictions/Precautions   Weight Bearing Precautions Per Order Yes   LUE Weight Bearing Per Order (S)  NWB   Braces or Orthoses Splint;Sling  (LUE splint)   Other Precautions Chair Alarm; Bed Alarm;Pain; Fall Risk;WBS   Lifestyle   Autonomy I adls and mobility - i iadls - shares homemaking with s/o   Reciprocal Relationships supportive family - reports she has 2 local dtrs and her s/o also has local dtr who are able to assist   Service to Others retired   [address] enjoys spending time with friends   ADL   Where Assessed Chair   UB Dressing Assistance 3  Moderate Assistance   UB Dressing Deficit Thread RUE; Thread LUE   UB Dressing Comments MAX A to doff gown, MOD A to don   LB Dressing Assistance 2  Maximal Assistance   LB Dressing Deficit Don/doff L sock; Don/doff R sock   Bed Mobility   Supine to Sit 3  Moderate assistance   Additional items Assist x 1;HOB elevated; Bedrails; Increased time required  (A for trunk)   Sit to Supine 3  Moderate assistance Additional items Assist x 1;HOB elevated; Bedrails; Increased time required;LE management   Transfers   Sit to Stand 3  Moderate assistance   Additional items Assist x 1; Increased time required   Stand to Sit 3  Moderate assistance   Additional items Assist x 1; Increased time required   Additional Comments transfers with Ax1 + SBA of another   Functional Mobility   Functional Mobility 3  Moderate assistance   Additional Comments Ax1 + SBA of another   Additional items SPC   Cognition   Overall Cognitive Status WFL   Arousal/Participation Responsive; Cooperative   Attention Attends with cues to redirect   Orientation Level Oriented X4   Memory Within functional limits   Following Commands Follows one step commands with increased time or repetition   Comments Pt cooperative t/o session  Pt does express fear and anxiety regarding falling  Provided encouragement and emotional support   Activity Tolerance   Activity Tolerance Patient limited by fatigue;Patient limited by pain   Medical Staff Made Aware PTA Jose Hudson, MIRANDA   Assessment   Assessment Patient participated in Skilled OT session this date with interventions consisting of ADL re training with the use of correct body mechnaics, Energy Conservation techniques, safety awareness and fall prevention techniques, one handed dressing technique,  therapeutic activities to: increase activity tolerance, increase dynamic sit/ stand balance during functional activity  and increase OOB/ sitting tolerance   Upon arrival patient was found supine in bed  Pt demonstrated the following tasks: MOD A sup <> sit, STS, and fnxl mobility with RW + SBA of another  Pt requires MAX A to doff gown, MOD A to don  Educated on dressing techniques  MAX A for LBD  Would benefit from trialing Mal El during next session, pt with decreased reaching   Patient continues to be functioning below baseline level, occupational performance remains limited secondary to factors listed above and increased risk for falls and injury  From OT standpoint, recommendation at time of d/c would be STR  Patient to benefit from continued Occupational Therapy treatment while in the hospital to address deficits as defined above and maximize level of functional independence with ADLs and functional mobility  Pt was left after session with all current needs met  The patient's raw score on the AM-PAC Daily Activity Inpatient Short Form is 14  A raw score of less than 19 suggests the patient may benefit from discharge to post-acute rehabilitation services  Please refer to the recommendation of the Occupational Therapist for safe discharge planning  CARE SCORES:  Self Care:  Eatin: Setup or clean-up assistance  Oral hygiene: 09: Not applicable  Toilet hygiene: 09: Not applicable  Shower/bathing self: 09: Not applicable  Upper body dressin: Substantial/maximal assistance  Lower body dressin: Substantial/maximal assistance  Putting on/taking off footwear: 02: Substantial/maximal assistance  Transfers:  Roll left and right: 03: Partial/moderate assistance  Sit to lyin: Partial/moderate assistance (mod x 2)  Lying to sitting on side of bed: 03: Partial/moderate assistance (mod x 2)  Sit to stand: 03: Partial/moderate assistance (mod x 1 + SBA of another)  Chair/bed to chair transfer: 03: Partial/moderate assistance (mod x 1 + SBA of another)  Toilet transfer: 09: Not applicable  Mobility:  Walk 10 ft: 03: Partial/moderate assistance (mod x 1 + close chair follow)  Walk 50 ft with two turns: 09: Not applicable  Walk 189CT: 09: Not applicable    CURRENT GAP IN FUNCTION  Prior to Admission: Functional Status: Patient was independent with mobility/ambulation, transfers, ADL's, IADL's  Expected functional outcomes:  It is expected that with skilled acute rehabilitation services the patient will progress to Independent for self care and Independent for mobility     Estimated length of stay: 10 to 14 days    Anticipated Post-Discharge Disposition/Treatment  Disposition: Return to previous home/apartment  Outpatient Services: Physical Therapy (PT) and Occupational Therapy (OT)    BARRIERS TO DISCHARGE  Weakness, Pain, Balance Difficulty, Fatigue, Home Accessibility, Caregiver Accessibility, Equipment Needs and Lives Alone    INTERVENTIONS FOR DISCHARGE  Adaptive equipment, Patient/Family/Caregiver Education, [employer], Arrange DME needs, Therapy exercises and Energy conservation education     REQUIRED THERAPY:  Patient will require PT and OT 90 minutes each per day, five days per week to achieve rehab goals  REQUIRED FUNCTIONAL AND MEDICAL MANAGEMENT FOR INPATIENT REHABILITATION:  Skin:  Monitor skin for breakdown secondary to decreased mobility, Pain Management: Overall pain is moderately controlled, Deep Vein Thrombosis (DVT) Prophylaxis:  Per MD orders, Diabetes Management: continue sliding scale insulin, patient to do finger sticks as ordered, SLIM to continue to manage diabetes, further internal medicine management of additional medical conditions while on ARC, PT/OT intervention, patient/family education and training, and any needed consults PRN  RECOMMENDED LEVEL OF CARE:    Pt is a 68 y o  female with a PMH of diabetes, asthma, mechanical aortic valve replacement on coumadin, ICD and HTN who [facility] the [facility] on 4/27/2023 after falling  She was found to have left humerus fracture, right eyebrow laceration, multiple right sided rib fractures  She underwent reduction and splint with orthopedics  She was bradycardic for which cardiology was consulted  Telemetry showed junctional bradycardia  EP recommended d/c diltiazem and metoprolol was adjusted to 25 mg daily  She also had an ICD shock from VT on 5/2 and required admission to ICU  Her ICD was adjusted and she was placed on an amiodarone drip which was transitioned to oral  She is NWB to the LUE   Of note she was found to have incidental finding of periumbilical hernia, right kidney cyst and ascending aorta measuring 4 6 cm  She will need to follow up with PCP, CT surgery and cardiothoracic surgery on a non-emergent basis  PM&R was consulted for rehabilitation recommendations  PM&R consult: patient is a candidate for acute inpatient rehabilitation able to tolerate 3 hours therapy/day 5 to 6 days week  PT and OT have been consulted and are recommending post-acute rehab services  Patient's case has been reviewed with [facility] medical director, patient meets medical criteria for acute rehab and has demonstrated the ability to tolerate three or more hours of therapy per day  Patient is medically stable and ready for discharge to [facility]  Patient lives alone in an apartment that is one level and was independent with all mobility, ADLs, and IADLs  She has a supportive son and daughter that she stays with at times  Pt is currently functioning below baseline needing mod to max A for ADLs and mod x 1-2 for bed mobility and mod x 1 + SBA of another for transfers  She ambulated 3 feet x 2 bed to chair plus 7 feet forward in room with mod x 1 + close chair follow  Gait pattern: excessively slow, short stride, forward flexed, inconsistent yanet, shuffling, decreased foot clearance, improper weight shift, decreased right stance and poor UE support  Pt would benefit from [facility] admission to have close medical management while participating in 3 hours of therapy per day that will include physical and occupational therapy  Physical and occupational therapists will address functional mobility deficits and assist patient in improving their strength, endurance, ROM, and self care  Pt will have 24/7 nursing care to monitor routine vitals, blood sugars, I/Os, skin integrity, and overall condition  The rehab nursing staff will follow therapy recommendations to have 24 hour follow through during non-therapy hours   PM&R to maximize function and provide medical oversight  The MD will monitor patient co-morbidities while on the unit as well as order any additional tests, labs, and consults needed  The [facility] specialized interdisciplinary team will meet weekly to discuss patient overall medical status and rehab goals in preparation for D/C home  Inpatient acute rehab is recommended for patient to maximize overall strength, endurance, self care, and mobility for a safe and timely transition back home

## 2023-05-13 NOTE — ASSESSMENT & PLAN NOTE
Present on admission  Much improved    1  Right and left abdominal fold pannus is resolved candidiasis rash   No open areas suggested to continue with the interdry sheets to keep the area dry and prevent skin breakdown   2  Bilateral groin and inner thigh candidiasis rash is resolved   3  Sacral wound - remains a unstageable due to the mixed wound bed of slough and pink tissue   Clydia Settles is 60% and 40% pink   Noted the wound bed to be improved and small in size   Reviewed the plan and shown the daughter on how to care for the area   4  Bilateral heels dry and intact   5  Right upper thigh area - hospital acquired DTI evolving linear line related to the mesh panties on the thigh area   Applied maxorb and a ABD to protect and then cut the elastic of the mesh panties to prevent from being tight   Discussed and reviewed the plan with the daughter      Skin care plans:  1-Hydraguard to bilateral  heels BID and PRN  2-Elevate heels to offload pressure  3-Ehob cushion in chair when out of bed  4-Moisturize skin daily with skin nourishing cream   5-Turn/reposition q2h or when medically stable for pressure re-distribution on skin  6  Low air loss mattress   7  Cleanse sacral buttocks with soap and water then apply triad paste then top with a small allevyn foam stephanie with a T for treatment change every other day and as needed for soilage or dislodgement    8  Cleanse bilateral abdominal pannus with soap and water then completely dry   Cut strips of the interdry and place flat in the skin folds of the abdominal area leaving 2 inches outside of the skin fold  Take interdry out and cleanse with with soap and water pat dry well and place back in the fold   Replace every 3 days or sooner if soiled   Do not use creams or powders on the skin with the interdry   9  Right upper thigh area - cleanse with soap and water then apply maxorb on open area then top with a ABD and secure with the mesh panties    Cut a slit in the panties to prevent from being tight on the thigh area change every other day or if soiled   10   Follow up at the wound center upon discharge - order is placed

## 2023-05-13 NOTE — ASSESSMENT & PLAN NOTE
No acute exacerbation  Home: Singulair 10mg QHS  Here: Same, PRN albuterol  No acute exacerbation  Monitor respiratory status

## 2023-05-13 NOTE — PLAN OF CARE
Problem: Potential for Falls  Goal: Patient will remain free of falls  Description: INTERVENTIONS:  - Educate patient/family on patient safety including physical limitations  - Instruct patient to call for assistance with activity   - Consult OT/PT to assist with strengthening/mobility   - Keep Call bell within reach  - Keep bed low and locked with side rails adjusted as appropriate  - Keep care items and personal belongings within reach  - Initiate and maintain comfort rounds  - Make Fall Risk Sign visible to staff  - Offer Toileting every 2 Hours, in advance of need  - Initiate/Maintain on alarm  - Obtain necessary fall risk management equipment:   - Apply yellow socks and bracelet for high fall risk patients  - Consider moving patient to room near nurses station  Outcome: Progressing     Problem: RESPIRATORY - ADULT  Goal: Achieves optimal ventilation and oxygenation  Description: INTERVENTIONS:  - Assess for changes in respiratory status  - Assess for changes in mentation and behavior  - Position to facilitate oxygenation and minimize respiratory effort  - Oxygen administered by appropriate delivery if ordered  - Initiate smoking cessation education as indicated  - Encourage broncho-pulmonary hygiene including cough, deep breathe, Incentive Spirometry  - Assess the need for suctioning and aspirate as needed  - Assess and instruct to report SOB or any respiratory difficulty  - Respiratory Therapy support as indicated  Outcome: Progressing     Problem: SKIN/TISSUE INTEGRITY - ADULT  Goal: Incision(s), wounds(s) or drain site(s) healing without S/S of infection  Description: INTERVENTIONS  - Assess and document dressing, incision, wound bed, drain sites and surrounding tissue  - Provide patient and family education  - Perform skin care/dressing changes every 2  Problem: SAFETY ADULT  Goal: Patient will remain free of falls  Description: INTERVENTIONS:  - Educate patient/family on patient safety including physical limitations  - Instruct patient to call for assistance with activity   - Consult OT/PT to assist with strengthening/mobility   - Keep Call bell within reach  - Keep bed low and locked with side rails adjusted as appropriate  - Keep care items and personal belongings within reach  - Initiate and maintain comfort rounds  - Make Fall Risk Sign visible to staff  - Offer Toileting every 2 Hours, in advance of need  - Initiate/Maintain on alarm  - Obtain necessary fall risk management equipment:   - Apply yellow socks and bracelet for high fall risk patients  - Consider moving patient to room near nurses station  Outcome: Progressing  Goal: Maintain or return to baseline ADL function  Description: INTERVENTIONS:  - Educate patient/family on patient safety including physical limitations  - Instruct patient to call for assistance with activity   - Consult OT/PT to assist with strengthening/mobility   - Keep Call bell within reach  - Keep bed low and locked with side rails adjusted as appropriate  - Keep care items and personal belongings within reach  - Initiate and maintain comfort rounds  - Make Fall Risk Sign visible to staff  - Offer Toileting every 2 Hours, in advance of need  - Initiate/Maintain on alarm  - Obtain necessary fall risk management equipment:  - Apply yellow socks and bracelet for high fall risk patients  - Consider moving patient to room near nurses station  Outcome: Progressing  Goal: Maintains/Returns to pre admission functional level  Description: INTERVENTIONS:  - Perform BMAT or MOVE assessment daily    - Set and communicate daily mobility goal to care team and patient/family/caregiver  - Collaborate with rehabilitation services on mobility goals if consulted  - Perform Range of Motion 3 times a day  - Reposition patient every 2 hours    - Dangle patient 3 times a day  - Stand patient 3 times a day  - Ambulate patient 3 times a day  - Out of bed to chair 3 times a day   - Out of bed for meals 3 times a day  - Out of bed for toileting  - Record patient progress and toleration of activity level   Outcome: Progressing     Outcome: Progressing     Problem: MUSCULOSKELETAL - ADULT  Goal: Maintain or return mobility to safest level of function  Description: INTERVENTIONS:  - Assess patient's ability to carry out ADLs; assess patient's baseline for ADL function and identify physical deficits which impact ability to perform ADLs (bathing, care of mouth/teeth, toileting, grooming, dressing, etc )  - Assess/evaluate cause of self-care deficits   - Assess range of motion  - Assess patient's mobility  - Assess patient's need for assistive devices and provide as appropriate  - Encourage maximum independence but intervene and supervise when necessary  - Involve family in performance of ADLs  - Assess for home care needs following discharge   - Consider OT consult to assist with ADL evaluation and planning for discharge  - Provide patient education as appropriate  Outcome: Progressing  Goal: Maintain proper alignment of affected body part  Description: INTERVENTIONS:  - Support, maintain and protect limb and body alignment  - Provide patient/ family with appropriate education  Outcome: Progressing

## 2023-05-13 NOTE — ASSESSMENT & PLAN NOTE
R rib fractures, L humerus fracture  - Lidoderm patch for ribs  - Oxycodone 5-7 5mg PRN  - Robaxin 250mg Q6hr scheduled  - Gabapentin 100mg QHS (may be able to discontinue while here)  - Tylenol 975mg Q8hrs scheduled  [facility] assisted inpatient  Adjust as appropriate while here

## 2023-05-13 NOTE — CASE MANAGEMENT
Case Management Discharge Planning Note    Patient name Joycelyn Walls  [facility] [id number]  : 1945 Date 2023       Current Admission Date: 2023  Current Admission Diagnosis:Fall   Patient Active Problem List    Diagnosis Date Noted   • Abnormal CT scan 05/10/2023   • Hyponatremia 2023   • Bradycardia 2023   • UTI (urinary tract infection) 2023   • Ventricular arrhythmia 2023   • Type 2 diabetes mellitus ([facility] [address] ) 2023   • Fall 2023   • Humerus fracture 2023   • Acute pain due to trauma 2023   • H/O mechanical aortic valve replacement 2023   • Laceration of right eyebrow 2023   • Fracture of multiple ribs of right side 2023      LOS (days): 16  Geometric Mean LOS (GMLOS) (days): 2 70  Days to GMLOS:-12 4     OBJECTIVE:  Risk of Unplanned Readmission Score: 19 13         Current admission status: Inpatient   Preferred Pharmacy:   [place] [address] [address] [facility], [address] [address]  Phone: [phone] Fax: [phone]    Primary Care Provider: Jennie Gatica DO    Primary Insurance: Mik Alt[facility]  Secondary Insurance:     DISCHARGE DETAILS:    Other Referral/Resources/Interventions Provided:  Referral Comments: per communication with ARMANI FLOWERS  [facility] admission's, pt is still able to admit to [facility] today with COVID + test result's, will require 10 days of isolation  Bedside RN informed of same

## 2023-05-14 ENCOUNTER — APPOINTMENT (OUTPATIENT)
Dept: RADIOLOGY | Facility: HOSPITAL | Age: 78
DRG: 559 | End: 2023-05-14
Payer: COMMERCIAL

## 2023-05-14 PROBLEM — U07.1 ACUTE COVID-19: Status: ACTIVE | Noted: 2023-05-14

## 2023-05-14 LAB
25(OH)D3 SERPL-MCNC: 50.7 NG/ML (ref 30–100)
ANION GAP SERPL CALCULATED.3IONS-SCNC: 1 MMOL/L (ref 4–13)
BASOPHILS # BLD AUTO: 0.04 THOUSANDS/ÂΜL (ref 0–0.1)
BASOPHILS NFR BLD AUTO: 1 % (ref 0–1)
BUN SERPL-MCNC: 24 MG/DL (ref 5–25)
CALCIUM SERPL-MCNC: 8.1 MG/DL (ref 8.3–10.1)
CHLORIDE SERPL-SCNC: 105 MMOL/L (ref 96–108)
CO2 SERPL-SCNC: 26 MMOL/L (ref 21–32)
CREAT SERPL-MCNC: 0.75 MG/DL (ref 0.6–1.3)
EOSINOPHIL # BLD AUTO: 0.16 THOUSAND/ÂΜL (ref 0–0.61)
EOSINOPHIL NFR BLD AUTO: 3 % (ref 0–6)
ERYTHROCYTE [DISTWIDTH] IN BLOOD BY AUTOMATED COUNT: 16.2 % (ref 11.6–15.1)
GFR SERPL CREATININE-BSD FRML MDRD: 77 ML/MIN/1.73SQ M
GLUCOSE SERPL-MCNC: 114 MG/DL (ref 65–140)
GLUCOSE SERPL-MCNC: 127 MG/DL (ref 65–140)
GLUCOSE SERPL-MCNC: 134 MG/DL (ref 65–140)
GLUCOSE SERPL-MCNC: 82 MG/DL (ref 65–140)
GLUCOSE SERPL-MCNC: 87 MG/DL (ref 65–140)
HCT VFR BLD AUTO: 34.7 % (ref 34.8–46.1)
HGB BLD-MCNC: 10.8 G/DL (ref 11.5–15.4)
IMM GRANULOCYTES # BLD AUTO: 0.05 THOUSAND/UL (ref 0–0.2)
IMM GRANULOCYTES NFR BLD AUTO: 1 % (ref 0–2)
LYMPHOCYTES # BLD AUTO: 0.78 THOUSANDS/ÂΜL (ref 0.6–4.47)
LYMPHOCYTES NFR BLD AUTO: 15 % (ref 14–44)
MAGNESIUM SERPL-MCNC: 2.3 MG/DL (ref 1.6–2.6)
MCH RBC QN AUTO: 32.6 PG (ref 26.8–34.3)
MCHC RBC AUTO-ENTMCNC: 31.1 G/DL (ref 31.4–37.4)
MCV RBC AUTO: 105 FL (ref 82–98)
MONOCYTES # BLD AUTO: 0.36 THOUSAND/ÂΜL (ref 0.17–1.22)
MONOCYTES NFR BLD AUTO: 7 % (ref 4–12)
NEUTROPHILS # BLD AUTO: 3.82 THOUSANDS/ÂΜL (ref 1.85–7.62)
NEUTS SEG NFR BLD AUTO: 73 % (ref 43–75)
NRBC BLD AUTO-RTO: 0 /100 WBCS
PLATELET # BLD AUTO: 208 THOUSANDS/UL (ref 149–390)
PMV BLD AUTO: 9.8 FL (ref 8.9–12.7)
POTASSIUM SERPL-SCNC: 4.9 MMOL/L (ref 3.5–5.3)
RBC # BLD AUTO: 3.31 MILLION/UL (ref 3.81–5.12)
SODIUM SERPL-SCNC: 132 MMOL/L (ref 135–147)
WBC # BLD AUTO: 5.21 THOUSAND/UL (ref 4.31–10.16)

## 2023-05-14 PROCEDURE — 82948 REAGENT STRIP/BLOOD GLUCOSE: CPT

## 2023-05-14 PROCEDURE — 80048 BASIC METABOLIC PNL TOTAL CA: CPT | Performed by: PHYSICAL MEDICINE & REHABILITATION

## 2023-05-14 PROCEDURE — 97167 OT EVAL HIGH COMPLEX 60 MIN: CPT

## 2023-05-14 PROCEDURE — 97535 SELF CARE MNGMENT TRAINING: CPT

## 2023-05-14 PROCEDURE — 85025 COMPLETE CBC W/AUTO DIFF WBC: CPT | Performed by: PHYSICAL MEDICINE & REHABILITATION

## 2023-05-14 PROCEDURE — 97530 THERAPEUTIC ACTIVITIES: CPT

## 2023-05-14 PROCEDURE — 99222 1ST HOSP IP/OBS MODERATE 55: CPT | Performed by: INTERNAL MEDICINE

## 2023-05-14 PROCEDURE — 83735 ASSAY OF MAGNESIUM: CPT | Performed by: PHYSICAL MEDICINE & REHABILITATION

## 2023-05-14 PROCEDURE — 73030 X-RAY EXAM OF SHOULDER: CPT

## 2023-05-14 PROCEDURE — 99232 SBSQ HOSP IP/OBS MODERATE 35: CPT | Performed by: PHYSICAL MEDICINE & REHABILITATION

## 2023-05-14 PROCEDURE — 73060 X-RAY EXAM OF HUMERUS: CPT

## 2023-05-14 PROCEDURE — 82306 VITAMIN D 25 HYDROXY: CPT | Performed by: PHYSICAL MEDICINE & REHABILITATION

## 2023-05-14 PROCEDURE — 97116 GAIT TRAINING THERAPY: CPT

## 2023-05-14 PROCEDURE — 97163 PT EVAL HIGH COMPLEX 45 MIN: CPT

## 2023-05-14 RX ORDER — WARFARIN SODIUM 2.5 MG/1
2.5 TABLET ORAL
Status: DISCONTINUED | OUTPATIENT
Start: 2023-05-14 | End: 2023-05-19

## 2023-05-14 RX ORDER — WARFARIN SODIUM 1 MG/1
2 TABLET ORAL
Status: DISCONTINUED | OUTPATIENT
Start: 2023-05-15 | End: 2023-05-14

## 2023-05-14 RX ORDER — NYSTATIN 100000 [USP'U]/G
POWDER TOPICAL 2 TIMES DAILY
Status: DISCONTINUED | OUTPATIENT
Start: 2023-05-14 | End: 2023-06-01 | Stop reason: HOSPADM

## 2023-05-14 RX ADMIN — WARFARIN SODIUM 2.5 MG: 2.5 TABLET ORAL at 17:18

## 2023-05-14 RX ADMIN — METHOCARBAMOL TABLETS 250 MG: 500 TABLET, COATED ORAL at 05:01

## 2023-05-14 RX ADMIN — METHOCARBAMOL TABLETS 250 MG: 500 TABLET, COATED ORAL at 23:59

## 2023-05-14 RX ADMIN — ATORVASTATIN CALCIUM 40 MG: 40 TABLET, FILM COATED ORAL at 17:18

## 2023-05-14 RX ADMIN — METOPROLOL SUCCINATE 25 MG: 25 TABLET, EXTENDED RELEASE ORAL at 08:38

## 2023-05-14 RX ADMIN — MONTELUKAST 10 MG: 10 TABLET, FILM COATED ORAL at 22:29

## 2023-05-14 RX ADMIN — OXYBUTYNIN 5 MG: 5 TABLET, FILM COATED, EXTENDED RELEASE ORAL at 08:38

## 2023-05-14 RX ADMIN — LIDOCAINE 5% 1 PATCH: 700 PATCH TOPICAL at 08:39

## 2023-05-14 RX ADMIN — ACETAMINOPHEN 975 MG: 325 TABLET ORAL at 08:38

## 2023-05-14 RX ADMIN — NYSTATIN: 100000 POWDER TOPICAL at 11:58

## 2023-05-14 RX ADMIN — FUROSEMIDE 40 MG: 40 TABLET ORAL at 08:38

## 2023-05-14 RX ADMIN — DOCUSATE SODIUM 100 MG: 100 CAPSULE, LIQUID FILLED ORAL at 08:38

## 2023-05-14 RX ADMIN — ACETAMINOPHEN 975 MG: 325 TABLET ORAL at 16:28

## 2023-05-14 RX ADMIN — NYSTATIN: 100000 POWDER TOPICAL at 17:19

## 2023-05-14 RX ADMIN — INSULIN DETEMIR 5 UNITS: 100 INJECTION, SOLUTION SUBCUTANEOUS at 22:29

## 2023-05-14 RX ADMIN — OXYCODONE HYDROCHLORIDE 5 MG: 5 TABLET ORAL at 12:06

## 2023-05-14 RX ADMIN — METHOCARBAMOL TABLETS 250 MG: 500 TABLET, COATED ORAL at 17:18

## 2023-05-14 RX ADMIN — SODIUM CHLORIDE 1 G: 1 TABLET ORAL at 17:19

## 2023-05-14 RX ADMIN — Medication 7.5 MG: at 22:33

## 2023-05-14 RX ADMIN — ALLOPURINOL 100 MG: 100 TABLET ORAL at 08:38

## 2023-05-14 RX ADMIN — ACETAMINOPHEN 975 MG: 325 TABLET ORAL at 22:33

## 2023-05-14 RX ADMIN — GABAPENTIN 100 MG: 100 CAPSULE ORAL at 22:29

## 2023-05-14 RX ADMIN — OXYCODONE HYDROCHLORIDE 5 MG: 5 TABLET ORAL at 17:19

## 2023-05-14 RX ADMIN — AMIODARONE HYDROCHLORIDE 200 MG: 200 TABLET ORAL at 08:38

## 2023-05-14 RX ADMIN — SODIUM CHLORIDE 1 G: 1 TABLET ORAL at 08:39

## 2023-05-14 RX ADMIN — SENNOSIDES 17.2 MG: 8.6 TABLET, FILM COATED ORAL at 08:38

## 2023-05-14 RX ADMIN — METHOCARBAMOL TABLETS 250 MG: 500 TABLET, COATED ORAL at 11:58

## 2023-05-14 RX ADMIN — Medication 1000 UNITS: at 08:38

## 2023-05-14 RX ADMIN — CITALOPRAM HYDROBROMIDE 20 MG: 20 TABLET ORAL at 08:38

## 2023-05-14 RX ADMIN — DOCUSATE SODIUM 100 MG: 100 CAPSULE, LIQUID FILLED ORAL at 17:18

## 2023-05-14 NOTE — PROGRESS NOTES
Physical Medicine and Rehabilitation Progress Note  Aide Robertson 68 y o  female MRN: [id number]  Unit/Bed#: -01 Encounter: [id number]    Chief Complaint: Bad night now that she is on isolation  Interval History: Overnight really struggled due to her chronic issues with urinary frequency/urgency and not being attended to in time - resulting in having to use the bed pan  She feels as though she is being punished  She otherwise has no new pain, CP, SOB  She has a junky, but strong cough and is able to bring up her secretions  No new N/V, abdominal pain, fevers, chills  Also for some reason INR not drawn today  Assessment/Plan - Continue plan of care as per primary attending, unless otherwise stated below:      * Humerus fracture  Assessment & Plan  2/2 mechanical fall  Proximal humerus shaft spiral fracture, displaced and angulated  Ortho attempted closed reduction  Ultimately placed in coaptation splint on 4/27 and managed non-operatively  NWB  Pain Control as detailed below  No Vitamin D level in chart or unmerged chart recently   - At home was on daily Vitamin D supplement 1000 units - restarted   - 5/14 Vitamind D level is 50 7   PT/OT 3-5 hours/day, 5-7 days/week  Due for 2 week follow-up 5/11  Placed consult to Ortho   - Imaging ordered, discussed with them they will see her on 5/15  Acute COVID-19  Assessment & Plan  Diagnosed on screening test on 5/13  Minimally symptomatic (does have a cough)  Afebrile and on RA  10 days isolation per protocol through 5/22/2023 (last day)  Monitor respiratory status   - Has chronic issues with urinary urgency/frequency  - Added Q4hr timed voids so we can help her get to the commode in a timely manner  CHAS (obstructive sleep apnea)  Assessment & Plan  Does not use CPAP at home  Consider Noc Ox overnight while here      Class 2 obesity in adult  Assessment & Plan  Nutrition consulted  Counseling    Osteoporosis with current pathological fracture with routine healing  Assessment & Plan  Managed by [address] [facility]  History of L5 pathologic fracture  Has been on Alendronate once weekly since 2017  Would confirm with her what day she normally takes it prior to restarting  Will confirm that it is ok to restart with Orthopedics first, as some surgeons prefer to wait 6 weeks after acute fracture prior to restarting    - Per Dr Allie Limon ok to resume at anytime  - She normally takes it on Thursdays  Would complete through 6/2023  Per last Endocrine note in 6/2022, they were going to continue for 1 more year and then stop  She was due for DXA this May  Asthma  Assessment & Plan  No acute exacerbation  Home: Singulair 10mg QHS  Here: Same, PRN albuterol  No acute exacerbation  Monitor respiratory status    Pressure injury of buttock, unstageable (HCC)  Assessment & Plan  Has evolved and opened  - Has some slough and hypergranulation  Wound care following - continue local care  - Consulted them to continue following  EHOB/Waffle Cushion  Frequent offloading in chair and in bed  P500 mattress  Monitor closely  Atrial fibrillation with RVR ([address] )  Assessment & Plan  Initially per AICD interrogation with Afib RVR that degenerated into VT/VF  Unclear burden  Here: Toprol XL 25mg daily, Fully anticoagulated on warfarin (See mechanical AV), amiodarone 200mg daily  Monitor and adjust as appropriate  Currently examines in NSR  IM consulted to assist with management      Outpatient f/u with EP    Abnormal CT scan  Assessment & Plan  - 4/27 CT CAP:           - Incidentally noted is a incarcerated/partially obstructed periumbilic ventral hernia, (image 146 series 9)          - Septated cyst anterior aspect of the right kidney, consider evaluation with ultrasound or MRI for further characterization (image 136 series 9)          - Dilated ascending aorta measuring 4 6 cm, evaluation with cardiothoracic surgery on nonemergent basis  - Follow up with PCP and CT surgery outpatient for findings  - Patient informed of abnormal results on 5/11: she is aware of ventral hernia but not aortic aneurysm or renal cyst         Hyponatremia  Assessment & Plan  Jorge Alberto 125 on 5/9 5/14 stable at 132  [place] by Trauma to be 2/2 poor PO intake and furosemide  Started on sodium tabs (weaning down)  Monitor and adjust as appropriate  IM consulted and assisting with management  Bradycardia  Assessment & Plan  Developed junctional bradycardia in hospital after restarting home Toprol XL and digoxin  - Cards stopped digoxin  - Toprol XL decreased to 25mg daily  Monitor  Ventricular arrhythmia  Assessment & Plan  On 5/2 developed Afib with RR which degenerated to VT then VF  She received shock from her AICD ([place], Single Chamber ICD)  Seen by Cards who adjusted her device, and recommended restarting home Metoprolol and Amio load  Was also on digoxin  - Toprol dose adjusted and digoxin discontinued after developing junctional bradycardia later in stay  Here: Toprol XL 25mg daily, Amio 200mg daily starting tomorrow for 28 days  Monitor cardiac status during therapies   Monitor lytes  Keep K > 4, Mag > 2  Will plan for outpatient f/u with EP    Type 2 diabetes mellitus [facility])  Assessment & Plan  No results found for: HGBA1C    Recent Labs     05/13/23  0816 05/13/23  1148 05/13/23  2028 05/14/23  0637   POCGLU 71 115 135 87       Blood Sugar Average: Last 72 hr  Home: Sitagliptin-Metformin 50-50mg  Here: Levemir 5 units QHS, CDI/Accuchecks  Diabetic Diet  Nutrition Consult  Attempt to resume oral meds here and get off insulin  IM consulted to assist with management  Outpatient f/u with PCP  Fracture of multiple ribs of right side  Assessment & Plan  R 8th and 9th rib possible fractures  Rib fracture protocol  Pain control with lidoderm patch  Incentive Spirometer  Monitor respiratory status  Laceration of right eyebrow  Assessment & Plan  Healing, monitor      H/O mechanical aortic valve replacement  Assessment & Plan  On warfarin  Last week's dosages:    Date  INR  Dose  5/6  4 84  -    5/7  4 42  -  5/8  3 46  -  5/9  2 77  2 5mg  5/10  2 64  2 5mg  5/11  2 63  2 5mg  5/12  2 46  2 5mg  5/13  NT  2 5mg  5/14  NT  2 5mg    On 2 5mg daily for now, adjust as appropriate  Had to decrease home regimen after she started amiodarone  IM consulted to assist with management  Acute pain due to trauma  Assessment & Plan  R rib fractures, L humerus fracture  - Lidoderm patch for ribs  - Oxycodone 5-7 5mg PRN  - Robaxin 250mg Q6hr scheduled  - Gabapentin 100mg QHS (may be able to discontinue while here)  - Tylenol 975mg Q8hrs scheduled  APS assisted inpatient  Adjust as appropriate while here  Health Maintenance  #Delirium/Sleep: At risk  Delirium precautions  Optimize sleep-wake, pain, bowel, bladder management  #Pain: Tylenol scheduled, Gabapentin 100mg QHS, Robaxin 250mg q6hr scheduled, Oxycodone 5-7 5mg PRN   #Bowel: Last BM 5/12 on Senna 2 tabs at night ,docusate BID, adding PRN miralax and suppository   #Bladder: Voiding and mostly continent  On oxybutynin - unclear why based on chart  Monitor for retention  Has been listed in her unmerged chart since 1/2023  #Skin/Pressure Injury Prevention: Turn Q2hr in bed, with weight shifts [id number] in wheelchair  Float heels in bed  #DVT Prophylaxis: Fully anticoagulated on warfarin   #GI Prophylaxis: PO Diet   #Code Status: Full Code   #FEN: Diabetic Diet with Glucerna-Strawberry at breakfast/dinner   #Dispo: ELOS 10-14 days with goals to discharge back home  Given restrictions, anticipate need for some assistance with ADLs  Her daughters feel her home set-up is more accessible, and they and her significant other can come over and provide whatever assistance she needs  I did discuss this with her daughter, Belkys Crockett, in detail  Total visit time: 35 minutes, with more than 50% spent counseling/coordinating care   Counseling includes discussion with patient re: progress in therapies, functional issues observed by therapy staff, and discussion with patient regarding their current medical state and wellbeing  Coordination of patient's care was performed in conjunction with Internal Medicine service to monitor patient's labs, vitals, and management of their comorbidities  Chayito Chen MD  Physical Medicine and Rehabilitation      Review of Systems: A 10 point review of systems was negative except for what is noted in the HPI          Current Facility-Administered Medications:   •  acetaminophen (TYLENOL) tablet 975 mg, 975 mg, Oral, Q8H [address], Maria Briones MD, 975 mg at 05/14/23 2329  •  albuterol (PROVENTIL HFA,VENTOLIN HFA) inhaler 2 puff, 2 puff, Inhalation, Q4H PRN, Maria Briones MD  •  allopurinol (ZYLOPRIM) tablet 100 mg, 100 mg, Oral, Daily, Maria Briones MD, 100 mg at 05/14/23 1685  •  amiodarone tablet 200 mg, 200 mg, Oral, Daily With Breakfast, Maria Briones MD, 200 mg at 05/14/23 8318  •  atorvastatin (LIPITOR) tablet 40 mg, 40 mg, Oral, Daily With Efren Allen MD  •  calcium carbonate (TUMS) chewable tablet 500 mg, 500 mg, Oral, TID PRN, Maria Briones MD  •  cholecalciferol (VITAMIN D3) tablet 1,000 Units, 1,000 Units, Oral, Daily, Maria Briones MD, 1,000 Units at 05/14/23 7268  •  citalopram (CeleXA) tablet 20 mg, 20 mg, Oral, Daily, Maria Briones MD, 20 mg at 05/14/23 0596  •  docusate sodium (COLACE) capsule 100 mg, 100 mg, Oral, BID, Maria Briones MD, 100 mg at 05/14/23 7578  •  furosemide (LASIX) tablet 40 mg, 40 mg, Oral, Daily, Maria Briones MD, 40 mg at 05/14/23 9746  •  gabapentin (NEURONTIN) capsule 100 mg, 100 mg, Oral, HS, Maria Briones MD, 100 mg at 05/13/23 2151  •  insulin detemir (LEVEMIR) subcutaneous injection 5 Units, 5 Units, Subcutaneous, HS, Maria Briones MD, 5 Units at 05/13/23 5018  •  insulin lispro (HumaLOG) 100 units/mL subcutaneous injection 1-5 Units, 1-5 Units, Subcutaneous, TID AC **AND** Fingerstick Glucose (POCT), , , TID AC, Grady Guzman MD  •  insulin lispro (HumaLOG) 100 units/mL subcutaneous injection 1-5 Units, 1-5 Units, Subcutaneous, HS, Grady Guzman MD  •  lidocaine (LIDODERM) 5 % patch 1 patch, 1 patch, Topical, Daily, Grady Guzman MD, 1 patch at 05/14/23 0839  •  methocarbamol (ROBAXIN) tablet 250 mg, 250 mg, Oral, Q6H [address], Grady Guzman MD, 250 mg at 05/14/23 0501  •  metoprolol succinate (TOPROL-XL) 24 hr tablet 25 mg, 25 mg, Oral, Daily, Grady Guzman MD, 25 mg at 05/14/23 0408  •  montelukast (SINGULAIR) tablet 10 mg, 10 mg, Oral, HS, Grady Guzman MD, 10 mg at 05/13/23 2152  •  naloxone (NARCAN) 0 04 mg/mL syringe 0 04 mg, 0 04 mg, Intravenous, Q1MIN PRN, Grady Guzman MD  •  ondansetron (ZOFRAN-ODT) dispersible tablet 4 mg, 4 mg, Oral, Q6H PRN, Grady Guzman MD  •  oxybutynin (DITROPAN-XL) 24 hr tablet 5 mg, 5 mg, Oral, Daily, Grady Guzman MD, 5 mg at 05/14/23 2177  •  oxyCODONE (ROXICODONE) IR tablet 5 mg, 5 mg, Oral, Q4H PRN, Grady Guzman MD  •  oxyCODONE (ROXICODONE) split tablet 7 5 mg, 7 5 mg, Oral, Q4H PRN, Grady Guzman MD  •  senna (SENOKOT) tablet 17 2 mg, 2 tablet, Oral, Daily, Grady Guzman MD, 17 2 mg at 05/14/23 0954  •  sodium chloride tablet 1 g, 1 g, Oral, BID With Meals, Grady Guzman MD  •  [START ON 5/15/2023] warfarin (COUMADIN) tablet 2 mg, 2 mg, Oral, Daily (warfarin), Amanda Pearl PA-C    Physical Exam:  Temp:  [98 3 °F (36 8 °C)-98 8 °F (37 1 °C)] 98 8 °F (37 1 °C)  HR:  [60-70] 60  Resp:  [16-19] 18  BP: (107-124)/(63-67) 124/67  SpO2:  [94 %-98 %] 96 %    Gen: No acute distress, Well-nourished, well-appearing  HEENT: Moist mucus membranes, Normocephalic/Atraumatic  Cardiovascular: Regular rate, rhythm, S1/S2  Distal pulses palpable  Heme/Extr: No edema  Pulmonary: Non-labored breathing  Lungs CTAB - junky, -productive, but strong cough  : No brothers  GI: Soft, non-tender, non-distended  BS+  MSK: L arm in splint  Integumentary: Skin is warm, dry  Sacral wound with what appears to be hypergranulation and some slough  Neuro: AAOx3, Speech is intact  Appropriate to questioning  Psych: Normal mood and affect  Laboratory:  Labs reviewed  Results from last 7 days   Lab Units 05/14/23  0502   HEMOGLOBIN g/dL 10 8*   HEMATOCRIT % 34 7*   WBC Thousand/uL 5 21     Results from last 7 days   Lab Units 05/14/23  0502 05/13/23  0510 05/12/23  0454 05/11/23  0728 05/10/23  0606   BUN mg/dL 24 22 20   < > 27*   SODIUM mmol/L 132* 132* 134*   < > 130*   POTASSIUM mmol/L 4 9 5 1 5 2   < > 4 9   CHLORIDE mmol/L 105 104 104   < > 100   CREATININE mg/dL 0 75 0 77 0 67   < > 0 73   AST U/L  --   --   --   --  56*   ALT U/L  --   --   --   --  26    < > = values in this interval not displayed  Results from last 7 days   Lab Units 05/13/23  1501 05/12/23  0648 05/11/23  0728   PROTIME seconds 32 5* 26 9* 28 3*   INR  3 13* 2 46* 2 63*        Diagnostic Studies: Reviewed, no new imaging   XR humerus left    (Results Pending)   XR shoulder 2+ vw left    (Results Pending)         ** Please Note: Fluency Direct voice to text software may have been used in the creation of this document   **

## 2023-05-14 NOTE — PROGRESS NOTES
"   05/14/23 1230   Patient Data   Rehab Impairment Major Multiple Trauma:  14 9  Other Multiple Trauma left humeral fx, multiple rib fractures   Etiologic Diagnosis LUE proximal humerus s/p reduction/splinting; multiple rib fractures   Date of Onset 04/24/23   Support System   Name Dahiana Brown  (who is 80year old and uses SPC for all mobilities)   Home Setup   Type of Home Apartment   Method of Entry   (small threshold to enter apt but stay majority of time with SO Bill with 2 curb like LIZ without rail)   First Floor Setup Available Yes   Available Equipment Roller Walker;Single Point Cane;Bedside Commode   Baseline Information   Transportation    Prior Device(s) Used [employer]   Prior Level of Function   Indoor-Mobility (Ambulation) 3  Independent - Patient completed the activities by him/herself, with or without an assistive device, with no assistance from a helper  Stairs 3  Independent - Patient completed the activities by him/herself, with or without an assistive device, with no assistance from a helper  Prior Device Used Z  None of the above  [facility])   Falls in the Last Year   Number of falls in the past 12 months 1   Type of Injury Associated with Fall Major injury  (resulted to this admission)   Psychosocial   Psychosocial (WDL) WDL   Restrictions/Precautions   Precautions Fall Risk;Bed/chair alarms;Supervision on toilet/commode;Pacemaker;Pain; Airborne/isolation;Contact/isolation   LUE Weight Bearing Per Order (S)  NWB   Pain Assessment   Pain Assessment Tool 0-10   Pain Score 6  (5-6/10)   Pain Location/Orientation Orientation: Left; Location: Arm   Pain Radiating Towards no   Pain Onset/Description Onset: Ongoing;Frequency: Constant/Continuous   Hospital Pain Intervention(s) Rest   Toilet Transfer   Findings demo'd urine leaking on the floor during last walk but declined to use the [facility] citing \" its baseline\"   performed hygiene prior to assisting pt to sit on the recliner   Roll Left " and Right   Comment NWB on L UE  max A of 1 bed rolling to Right side without rail, HOB 20 elevated - per pt has wedge at home + pillows to elevate HOB for unable to tolerate flat bed   Reason if not Attempted Medical concerns   Roll Left and Right CARE Score 88   Sit to Lying   Comment pt stayed in recliner with roho cushion at end of tx so will reassess next session   Lying to Sitting on Side of Bed   Type of Assistance Needed Physical assistance;Verbal cues   Physical Assistance Level 76% or more   Comment max of 1 HOB 20 deg without rail   Lying to Sitting on Side of Bed CARE Score 2   Sit to Stand   Type of Assistance Needed Physical assistance;Verbal cues; Adaptive equipment   Physical Assistance Level Total assistance   Comment mod of 2 initial standing but improved to max-mod of 1 at best using gait belt   Sit to Stand CARE Score 1   Picking Up Object   Type of Assistance Needed Physical assistance;Verbal cues; Adaptive equipment   Physical Assistance Level 51%-75%   Comment reacher - pen   Picking Up Object CARE Score 2   Car Transfer   Comment on airborne/contact precaution   Reason if not Attempted Medical concerns   Car Transfer CARE Score 88   Walk 10 Feet   Type of Assistance Needed Physical assistance;Verbal cues; Adaptive equipment   Physical Assistance Level Total assistance   Comment mod-min of 1 with HW + CFA of 2nd person for safety x 20' x 4 reps with rest breaks and initial VC for sequencing  at this time pt prefers to use the HW vs NBQC  Due to baseline dec R shoulder ROM/strength at times requires assist to advance HW due to its weight vs baseline AD of SPC which is lighter  Also demos fwd flexed posture which per pt is baseline with slow yanet  SpO2 at rest % while 91-93% after short distance amb without patient distress   demos occasional coughing as well   Walk 10 Feet CARE Score 1   Walk 50 Feet with Two Turns   Comment dec act tolerance due to fatigue   Reason if not Attempted "Safety concerns   Walk 50 Feet with Two Turns CARE Score 88   Walk 150 Feet   Reason if not Attempted Safety concerns   Walk 150 Feet CARE Score 88   Walking 10 Feet on Uneven Surfaces   Reason if not Attempted Safety concerns   Walking 10 Feet on Uneven Surfaces CARE Score 88   Wheel 50 Feet with Two Turns   Reason if not Attempted Activity not applicable   Wheel 50 Feet with Two Turns CARE Score 9   Wheel 150 Feet   Reason if not Attempted Activity not applicable   Wheel 730 Feet CARE Score 9   Curb or Single Stair   Comment weakness, pain, fatigue   Reason if not Attempted Safety concerns   1 Step (Curb) CARE Score 88   4 Steps   Reason if not Attempted Activity not applicable   4 Steps CARE Score 9   12 Steps   Reason if not Attempted Activity not applicable   12 Steps CARE Score 9   Comprehension   QI: Comprehension 4  Undestands: Clear comprehension without cues or repetitions   Expression   QI: Expression 4  Express complex messages without difficulty and with speech that is clear and easy to Fisher   Strength RLE   RLE Overall Strength 4-/5   Strength LLE   LLE Overall Strength 4-/5   Coordination   Movements are Fluid and Coordinated 0   Coordination and Movement Description slow and guarded movement   Sensation   Light Touch No apparent deficits   Propioception No apparent deficits   Cognition   Overall Cognitive Status WFL   Arousal/Participation Alert; Cooperative   Attention Within functional limits   Orientation Level Oriented X4   Memory Within functional limits   Following Commands Follows one step commands without difficulty   Therapeutic Exercise   Therapeutic Exercise/Activity trialed positioning in w/c and recliner with ROHO cushion and leg rest or 8\" block for LE support for comfort, due to pt's habitus required assist x 2 to reposition in recliner or chair  Pt agreed to sit on the recliner at end of tx     Discharge Information   Vocational Plan Retired/not working   Patient's Discharge Plan " d/c to apt or SO place   Patient's Rehab Expectations to get stronger and go home   Barriers to Discharge Home Limited Family Support;Decreased Endurance;Decreased Strength;Pain; Safety Considerations   Impressions Pt is a 68year old female [facility] s/p fall sustaining humeral fracture and multiple R ribs fractrure, Right eyebrow laceration  Pt is seen for high complexity eval with medical co-morbidities affecting functional progress include current airborne/contact precaution from acute covid-19 positive diagnosis, CHAS, asthma, osteoporosis, L UE NWB precaution,  hyponatremia, bradycardia, ventricular arrhythmia, UTI, DM2, arthritis, obesity, pressure injury and ICD  Please see medical chart for more comprehensive list  Personal factors affecting pt at time of IE include environmental barrier of 2 LIZ if to go home with SO vs small threshold at her apt,  limited family availability to provide physical assistance at d/c for SO is also elderly using a SPC himself  Pt also demos inability to complete functional household and community distance mobilities safely  Pt lives at home with SO majority of the time and was fully indep at baseline with SPC on even and uneven surfaces including steps negotiation  On eval pt presentation impacting functional performance include pain, body habitus, impaired UE/LE strength with noted dec bilat UE active ROM on shoulder, impaired standing balance/tolerance, fear of falling, dec endurance, gait dysfunction  Due to above mentioned medical co-morbidities and impairments/deficits pt is at high risk for falls, inc risk for hypo/hypertensive episode, at risk for additional  skin integrity, at risk for infection, at risk for hypo/hyperglycemia episode, at risk for PE & DVT and increase caregiver burden so unsafe to return home at this time  Pt requires 1-2 person assist to complete functional activities safely requiring use of HW, see above info for details of PT evaluation   Pt is a good rehab candidate with anticipated mod indep goals at least for household level surface mobilities using Least restrictive AD with ELOS of 3 weeks  Skilled PT will work on therapeutic exercises, therapeutic activities, NMR, therapeutic modalities and gait training to improve overall functional indep in order for pt to return home safely with reduce risk for falls     PT Therapy Minutes   PT Time In 1230   PT Time Out 1400   PT Total Time (minutes) 90   PT Mode of treatment - Individual (minutes) 90   PT Mode of treatment - Concurrent (minutes) 0   PT Mode of treatment - Group (minutes) 0   PT Mode of treatment - Co-treat (minutes) 0   PT Mode of Treatment - Total time(minutes) 90 minutes   PT Cumulative Minutes 90   Cumulative Minutes   Cumulative therapy minutes 190

## 2023-05-14 NOTE — PROGRESS NOTES
Occupational Therapy Evaluation           05/14/23 1000   Patient Data   Rehab Impairment Major Multiple Trauma:  14 9  Other Multiple Trauma left humeral fx, multiple rib fractures   Etiologic Diagnosis LUE proximal humerus s/p reduction/splinting; multiple rib fractures   Date of Onset 04/24/23   Support System   Name PTA pt has a 1st floor apartment with a threshold to enter, however she usually does not stay there  She stays with her boyfriend Kayy Gray at his house where there are 2 LIZ  He has a 1st floor with living room and 1/2 bathroom but there is also 1 LIZ bathroom  Relationship Pt reports Bill could be with her and assist in IADLS but he uses a cane himself and can provided limited physical assistance   Able to provide physical help? No   Home Setup   Type of Home Apartment   First Floor Setup Available   (at pt's apartment  OT asked if pt's boyfriend Kayy Gray could stay with her at her apartment, she thinks this may be an option )   Available Equipment [address]   (retired)   Transportation    Prior Device(s) [facility]   Prior IADL Participation   Money Management Identify Money;Estimate Costs;Estimate Change   Meal Preparation Full Participation   Laundry Full Participation   Home Cleaning Full Participation   Prior Level of Function   Self-Care 3  Independent - Patient completed the activities by him/herself, with or without an assistive device, with no assistance from a helper  Indoor-Mobility (Ambulation) 3  Independent - Patient completed the activities by him/herself, with or without an assistive device, with no assistance from a helper  Stairs 3  Independent - Patient completed the activities by him/herself, with or without an assistive device, with no assistance from a helper  Functional Cognition 3   Independent - Patient completed the activities by him/herself, with or without an assistive device, with no assistance from a helper  Prior Assistance Needed for   (none)   Prior Device Used Z  None of the above   Falls in the Last Year   Number of falls in the past 12 months 1   Type of Injury Associated with Fall Major injury   Patient Preference   Nickname (Patient Preference) Niesha   Psychosocial   Psychosocial (WDL) WDL   Restrictions/Precautions   Precautions Bed/chair alarms; Fall Risk;Supervision on toilet/commode;Pacemaker;Pain   LUE Weight Bearing Per Order NWB   Braces or Orthoses   (splint L UE, pt did not tolerate the sling well and has a wrap now around next to support at wrist)   Pain Assessment   Pain Assessment Tool 0-10   Pain Score 7   Pain Location/Orientation Orientation: Left; Location: Shoulder; Location: Arm   Hospital Pain Intervention(s) Repositioned;Relaxation technique   Eating Assessment   Type of Assistance Needed Set-up / clean-up   Physical Assistance Level No physical assistance   Eating CARE Score 5   Oral Hygiene   Type of Assistance Needed Physical assistance   Physical Assistance Level Total assistance   Comment if in stance  supervision while seated EOB   Oral Hygiene CARE Score 1   Tub/Shower Transfer   Reason Not Assessed Safety; Endurance;Balance;Medical;Sponge Bath   Shower/Bathe Self   Type of Assistance Needed Physical assistance   Physical Assistance Level Total assistance   Comment at baseline pt stands, would require assist x2 if in stance  while seated pt requires assist to bathe UB, LEs, under skin folds and feet  using quad cane on R UE pt stands with assist to bathe buttock/groin   overall total assist x1 person   Shower/Bathe Self CARE Score 1   Dressing/Undressing Clothing   Type of Assistance Needed Physical assistance   Physical Assistance Level Total assistance   Comment simulated hospital gown as tshirt, pt requires total assist  OT asked pt to have her family bring in extra large sized PJ dress or dress   Upper Body Dressing CARE Score 1   Type of Assistance Needed Physical assistance Physical Assistance Level Total assistance   Comment pt will require assist x2 for LB dressing  however currently no clothing that fits pt available and pt requests to only wear hospital gown at this time for ease   Lower Body Dressing CARE Score 1   Positioning Sit Edge Of Bed;Standing   Putting On/Taking Off Footwear   Type of Assistance Needed Physical assistance   Physical Assistance Level Total assistance   Putting On/Taking Off Footwear CARE Score 1   Toileting Hygiene   Type of Assistance Needed Physical assistance; Adaptive equipment   Physical Assistance Level Total assistance   Comment assist x1 for clothing management and hygiene   Toileting Hygiene CARE Score 1   Toilet Transfer   Type of Assistance Needed Physical assistance; Adaptive equipment   Physical Assistance Level 51%-75%   Comment mod assist x1 stand pivot to platform C using R UE quad cane  spoke with PT who will trial nesha walker as well  Toilet Transfer CARE Score 2   Transfer Bed/Chair/Wheelchair   Type of Assistance Needed Physical assistance; Adaptive equipment   Physical Assistance Level 51%-75%   Comment stand pivot with R UE on quad cane   Chair/Bed-to-Chair Transfer CARE Score 2   Sit to Lying   Type of Assistance Needed Physical assistance   Physical Assistance Level 76% or more   Sit to Lying CARE Score 2   Lying to Sitting on Side of Bed   Type of Assistance Needed Physical assistance   Physical Assistance Level Total assistance   Lying to Sitting on Side of Bed CARE Score 1   Sit to Stand   Type of Assistance Needed Physical assistance; Adaptive equipment   Physical Assistance Level 51%-75%   Sit to Stand CARE Score 2   Comprehension   QI: Comprehension 4  Undestands: Clear comprehension without cues or repetitions   Expression   QI: Expression 4   Express complex messages without difficulty and with speech that is clear and easy to understan   RUE Assessment   RUE Assessment WFL   LUE Assessment   LUE Assessment X  (L UE NWB in splint, WFL at hand)   Cognition   Overall Cognitive Status WFL   Arousal/Participation Alert   Attention Within functional limits   Orientation Level Oriented X4   Memory Within functional limits   Following Commands Follows one step commands without difficulty   Discharge Information   Patient's Discharge Plan d/c to either her apartment or significant other's house   Patient's Rehab Expectations to get stronger and decrease pain   Barriers to Discharge Home Limited Family Support; Unsafe Home Setup; Decreased Strength;Decreased Endurance;Pain; Safety Considerations   Impressions Pt is a 68 y o  female who was admitted to [address] [place] [address] on 5/13/2023  Pt's current problem list includes: LUE proximal humerus s/p reduction/splinting; multiple rib fractures s/p fall now + COVID  Comorbidities include - ICD, AVR, DM, HTN, asthma, AFIB  Pt's precautions include: NWB L UE in splint by ortho  Pt also has sacral pressure ulcer and decreased skin integrity at groin area and under pannus - nursing aware  At baseline pt was completing ADLS/IADLs independently and lives with her significant other Mia Saldana but she has her own apartment as well  Currently pt requires total assist for overall ADLS and mod/max assist for functional mobility/transfers  Pt currently presents with impairments in the following categories - activity tolerance, endurance and standing balance/tolerance  These impairments, as well as pt's fatigue, pain, orthopedic restricitions , WBS , decreased caregiver support, risk for falls, home environment and body habitus  limit pt's ability to safely engage in all baseline areas of occupation, including eating, grooming, bathing, dressing, toileting, functional mobility/transfers, laundry , driving, house maintenance, meal prep, social participation  and leisure activities     Pt presents with fair rehab potential  Pt is unsafe to D/C home at this time, recommending ELOS 3 weeks to achieve min assist level ADL goals and independence for mobility with least restrictive device  Spoke with PT who will trial sacral cut out cushion versus bariatric roho with goal to increase pt's OOB tolerance as at this time pt declines to sit OOB  PT also will trial hemiwalker     OT Therapy Minutes   OT Time In 1000   OT Time Out 1140   OT Total Time (minutes) 100   OT Mode of treatment - Individual (minutes) 100   OT Mode of treatment - Concurrent (minutes) 0   OT Mode of treatment - Group (minutes) 0   OT Mode of treatment - Co-treat (minutes) 0   OT Mode of Treatment - Total time(minutes) 100 minutes   OT Cumulative Minutes 100   Cumulative Minutes   Cumulative therapy minutes 100

## 2023-05-14 NOTE — CONSULTS
Internal Medicine Consultation Note    Patient: Milton Cox  Age/sex: 68 y o  female  Medical Record #: [id number]      ASSESSMENT PLAN    Lt proximal humerus fracture  • NWB to the Lt UE  • Sling to the Lt UE   • Pain control and therapy per PMR  • Follow-up with Orthopedics in 2 weeks  Rt 8-9 rib fx  •  Pain control per PMR  • Encourage IS   • O2 as needed  Rt eyebrow laceration  • Monitor lacerations  • Local wound care  DM type 2  • HA1C 6 4  • Home: Janumet  2x daily  • Here: Levemir 5U at bedtime/SSI  • Blood sugars currently well controlled  • Will monitor and add back medications as needed  History of mechanical AVR  • Goal INR 2 5 - 3 5  • INR 3 13  • Continue Coumadin 2 5mg daily  Pt takes 2 5mg Wednesday and Saturday and 5mg all other days of the week  • INR in AM     Hyponatremia  • Na 132  • Will continue to monitor  • Likely multifactorial     Ventricular arrhythmia  • One episode 5/2/23 and pt received a shock from ICD  • Continue metoprolol and amiodarone  • Continue Coumadin  Bradycardia  • Diltiazem discontinued and metoprolol dose lowered  • She will need outpt follow-up with Cardiology  [facility]  COVID-19  · Tested positive 5/13/23  · Pt has a cough  · Contact and airborne precautions for 10 days  Subjective/ HPI: Patient seen and examined  Milton Cox is a 68 y o  female, with diabetes type 2, asthma, mechanical aortic valve replacement on coumadin, ICD and HTN who presented to the [facility] on 4/27/2023 after falling  She was found to have left humerus fracture, right eyebrow laceration, multiple right sided rib fractures  She underwent reduction and splint with orthopedics  She was bradycardic for which cardiology was consulted  Telemetry showed junctional bradycardia  EP recommended d/c diltiazem and metoprolol was adjusted to 25 mg daily   She also had an ICD shock from VT on 5/2 and was transferred to ICU  Her ICD was adjusted and she was placed on an amiodarone drip which was transitioned to oral amiodarone  She is NWB to the LUE  She did have a UTI as well which was treated with abx  She was found to be COVID positive on testing for [facility] admission  She was determined to be stable for transfer to the [facility] 5/13/23  IM consulted for medical management  ROS:   A 10 point ROS was performed; negative except as noted above       Social History:    Substance Use History:   Social History     Substance and Sexual Activity   Alcohol Use Yes    Comment: socially     Social History     Tobacco Use   Smoking Status Never   Smokeless Tobacco Never     Social History     Substance and Sexual Activity   Drug Use Never       Family History:    Family History   Problem Relation Age of Onset   • Hypertension Other          Review of Scheduled Meds:  Current Facility-Administered Medications   Medication Dose Route Frequency Provider Last Rate   • acetaminophen  975 mg Oral [facility] Damien Browne MD     • albuterol  2 puff Inhalation Q4H PRN Damien Browne MD     • allopurinol  100 mg Oral Daily Damien Browne MD     • amiodarone  200 mg Oral Daily With Breakfast Damien Browne MD     • atorvastatin  40 mg Oral Daily With Areli Harmon MD     • calcium carbonate  500 mg Oral TID PRN Damien Browne MD     • cholecalciferol  1,000 Units Oral Daily Damien Browne MD     • citalopram  20 mg Oral Daily Damine Browne MD     • docusate sodium  100 mg Oral BID Damien Browne MD     • furosemide  40 mg Oral Daily Damien Browne MD     • gabapentin  100 mg Oral HS Damien Browne MD     • insulin detemir  5 Units Subcutaneous HS Damien Browne MD     • insulin lispro  1-5 Units Subcutaneous TID [facility] Damien Browne MD     • insulin lispro  1-5 Units Subcutaneous HS Damien Browne MD     • lidocaine  1 patch Topical Daily Damien Browne MD     • methocarbamol  250 mg Oral Q6H [address] Damien Browne MD     • metoprolol succinate  25 mg Oral Daily Sadie Laureano MD     • montelukast  10 mg Oral HS Sadie Laureano MD     • naloxone  0 04 mg Intravenous Q1MIN PRN Sadie Laureano MD     • ondansetron  4 mg Oral Q6H PRN Sadie Laureano MD     • oxybutynin  5 mg Oral Daily Sadie Laureano MD     • oxyCODONE  5 mg Oral Q4H PRN Sadie Laureano MD     • oxyCODONE  7 5 mg Oral Q4H PRN Sadie Laureano MD     • senna  2 tablet Oral Daily Sadie Laureano MD     • sodium chloride  1 g Oral BID With Meals Sadie Laureano MD     • warfarin  2 5 mg Oral Daily (warfarin) Sadie Laureano MD         Labs:     Results from last 7 days   Lab Units 05/14/23  0502   WBC Thousand/uL 5 21   HEMOGLOBIN g/dL 10 8*   HEMATOCRIT % 34 7*   PLATELETS Thousands/uL 208     Results from last 7 days   Lab Units 05/14/23  0502 05/13/23  0510   SODIUM mmol/L 132* 132*   POTASSIUM mmol/L 4 9 5 1   CHLORIDE mmol/L 105 104   CO2 mmol/L 26 29   BUN mg/dL 24 22   CREATININE mg/dL 0 75 0 77   CALCIUM mg/dL 8 1* 8 3         Results from last 7 days   Lab Units 05/13/23  1501 05/12/23  0648   INR  3 13* 2 46*        Results from last 7 days   Lab Units 05/14/23  0637 05/13/23  2028 05/13/23  1148   POC GLUCOSE mg/dl 87 135 115       Lab Results   Component Value Date    BLOODCX No Growth After 5 Days  05/03/2023    BLOODCX No Growth After 5 Days   05/03/2023    URINECX >100,000 cfu/ml Escherichia coli (A) 05/03/2023    URINECX 10,000-19,000 cfu/ml Proteus mirabilis (A) 05/03/2023    URINECX 80,000-89,000 cfu/ml Aerococcus urinae (A) 05/03/2023       Input and Output Summary (last 24 hours):     No intake or output data in the 24 hours ending 05/14/23 0835    Imaging:     XR humerus left    (Results Pending)   XR shoulder 2+ vw left    (Results Pending)       *Labs /Radiology studiesLabs reviewed  *Medications reviewed and reconciled as needed  *Please refer to order section for additional ordered labs studies  *Case discussed with primary attending during morning huddle case rounds    Vitals:   Temp (24hrs), Av 6 °F (37 °C), Min:98 3 °F (36 8 °C), Max:98 8 °F (37 1 °C)    Temp:  [98 3 °F (36 8 °C)-98 8 °F (37 1 °C)] 98 8 °F (37 1 °C)  HR:  [60-70] 60  Resp:  [16-19] 18  BP: (107-124)/(63-67) 124/67  SpO2:  [94 %-98 %] 96 %  Body mass index is 39 29 kg/m²  Physical Exam:   HEENT:  Normocephalic  Ecchymotic areas on face  CARDIAC:  regular rate and rhythm, S1, S2 normal, no murmur, click, rub or gallop  LUNGS:  normal air entry, lungs clear to auscultation  ABDOMEN:  soft, non-tender  Bowel sounds normal  No masses, no organomegaly  EXTREMITIES:  extremities normal, warm and well-perfused; no cyanosis, clubbing, or edema and Lt arm in sling  Periarea and sacral wounds present - pictures in media  NEURO:   mental status, speech normal, alert and oriented x3  PSYCH:  Alert and oriented, appropriate affect  Invasive Devices     Peripheral Intravenous Line  Duration           Peripheral IV 23 Right Antecubital 2 days                 VTE Pharmacologic Prophylaxis: Warfarin (Coumadin)  Code Status: Level 1 - Full Code  Current Length of Stay: 1 day(s)    Total floor / unit time spent today 1 hour with more than 50% spent counseling/coordinating care  Counseling includes discussion with patient re: progress  and discussion with patient of his/her current medical state/information  Coordination of patient's care was performed in conjunction with primary service  Time invested included review of patient's labs, vitals, and management of their comorbidities with continued monitoring  In addition, this patient was discussed with medical team including physician and advanced extenders  The care of the patient was extensively discussed and appropriate treatment plan was formulated unique for this patient by supervising physician unless stated otherwise in their attestation statement      ** Please Note: voice to text software may have been used in the creation of this document   Audio transcription errors may occur**

## 2023-05-14 NOTE — ASSESSMENT & PLAN NOTE
Diagnosed on screening test on 5/13  Minimally symptomatic (does have a cough)  Afebrile and on RA  10 days isolation per protocol through 5/22/2023 (last day)  Off precautions 5/23/2023  Monitor respiratory status   - Has chronic issues with urinary urgency/frequency     - Added Q4hr timed voids so we can help her get to the commode in a timely manner    -Stable and has not required oxygen   -Compliant with utilizing incentive spirometer

## 2023-05-14 NOTE — NURSING NOTE
Family were very concerned  on the phone about patient wound to sacrum   Stated why wound care nurse did not see patient  I explained to family that Wound care was done  by RN  Pictures was taken   Dr Rimma Del Cid saw the wound and wound care nurse will see patient tomorrow

## 2023-05-14 NOTE — NURSING NOTE
Patient asked to be assisted on bedside commode at 0640  Patient unable to wait for  to get on bedside commode due to urgency issues  Explained to patient safety/fall risk  Patient upset RN unable to get her on commode by herself  Offered bedpan and declined  Patient became angry and voided on bed pad while RN still in room and waiting for   Patient would also not wait for  to get her bed pad changed  RN changed patient's bed pad, did sherley-care, and changed patient's linen  Patient repositioned and left arm elevated on pillow again  Offered pain medication and declined  Performed comfort round and advised patient to call for assistance if she has to void again so that she can be assisted to bedside commode  Will continue to monitor patient

## 2023-05-14 NOTE — PROGRESS NOTES
OT LONG TERM GOALS        05/14/23 1000   Rehab Team Goals   ADL Team Goal Patient will require assist with ADLs with least restrictive device upon completion of rehab program   Rehab Team Interventions   OT Interventions Self Care;Home Management; Energy Conservation;Patient/Family Education; Therapeutic Exercise   Eating Goal   Eating Goal 05  Setup or clean-up assistance - [place] SETS UP or CLEANS UP, patient completes activity  [place] assists only prior to or following the activity  Status Ongoing; Target goal - three weeks   Interventions Optimal Position   Grooming Goal   Oral Hygiene Goal 06  Independent - Patient completes the activity by him/herself with no assistance from a helper  Environment Seated at [employer] at [employer]   Status Ongoing; Target goal - three weeks   Intervention Tolerance Work; Therapeutic Exercise;Balance Work;Assistive Device   Tub/Shower Transfer Goal   Method   (N/A - will add if appropriate to trial)   Bathing Goal   Shower/bathe self Goal 03  Partial/moderate assistance - [place] does less than half the effort  [place] lifts or holds trunk or limbs and provides more than half the effort  Environment Seated;Standing;Sponge Bath   Status Ongoing; Target goal - three weeks   Intervention ADL Training;Assistive Device; Therapeutic Exercise   Upper Body Dressing Goal   Upper body dressing Goal 03  Partial/moderate assistance - [place] does less than half the effort  [place] lifts or holds trunk or limbs and provides more than half the effort  Task Upper Body   Environment Seated   Status Ongoing; Target goal - three weeks   Intervention Therapeutic Exercise; Tolerance Work   Lower Body Dressing Goal   Lower body dressing Goal 05  Setup or clean-up assistance - [place] SETS UP or CLEANS UP, patient completes activity  [place] assists only prior to or following the activity  Putting on/taking off footwear Goal 03  Partial/moderate assistance - [place] does less than half the effort   [place] lifts or holds trunk or limbs and provides more than half the effort  Task Lower Body   Environment Seated;Standing   Status Ongoing; Target goal - three weeks   Intervention Assistive Device;Balance Work; Therapeutic Exercise; Tolerance Work   Toileting Transfer Goal   Toilet transfer Goal 06  Independent - Patient completes the activity by him/herself with no assistance from a helper  Assistive Device Bedside Commode  (least restrictive mobility device)   Status Ongoing; Target goal - three weeks   Intervention ADL Training;Balance Work;Assistive Device   Toileting Goal   Toileting hygiene Goal 03  Partial/moderate assistance - [place] does less than half the effort  [place] lifts or holds trunk or limbs and provides more than half the effort  Task Pants Up;Pants Down;Hygiene   Status Ongoing; Target goal - three weeks   Intervention ADL Training;Balance Work;Assistive Device

## 2023-05-15 PROBLEM — Z78.9 DIFFICULT INTRAVENOUS ACCESS: Status: ACTIVE | Noted: 2023-05-15

## 2023-05-15 LAB
ANION GAP SERPL CALCULATED.3IONS-SCNC: 0 MMOL/L (ref 4–13)
BASOPHILS # BLD AUTO: 0.03 THOUSANDS/ÂΜL (ref 0–0.1)
BASOPHILS NFR BLD AUTO: 1 % (ref 0–1)
BUN SERPL-MCNC: 24 MG/DL (ref 5–25)
CALCIUM SERPL-MCNC: 7.9 MG/DL (ref 8.3–10.1)
CHLORIDE SERPL-SCNC: 106 MMOL/L (ref 96–108)
CO2 SERPL-SCNC: 27 MMOL/L (ref 21–32)
CREAT SERPL-MCNC: 0.85 MG/DL (ref 0.6–1.3)
EOSINOPHIL # BLD AUTO: 0.07 THOUSAND/ÂΜL (ref 0–0.61)
EOSINOPHIL NFR BLD AUTO: 1 % (ref 0–6)
ERYTHROCYTE [DISTWIDTH] IN BLOOD BY AUTOMATED COUNT: 16.4 % (ref 11.6–15.1)
GFR SERPL CREATININE-BSD FRML MDRD: 66 ML/MIN/1.73SQ M
GLUCOSE SERPL-MCNC: 100 MG/DL (ref 65–140)
GLUCOSE SERPL-MCNC: 129 MG/DL (ref 65–140)
GLUCOSE SERPL-MCNC: 59 MG/DL (ref 65–140)
GLUCOSE SERPL-MCNC: 75 MG/DL (ref 65–140)
GLUCOSE SERPL-MCNC: 75 MG/DL (ref 65–140)
GLUCOSE SERPL-MCNC: 86 MG/DL (ref 65–140)
GLUCOSE SERPL-MCNC: 88 MG/DL (ref 65–140)
HCT VFR BLD AUTO: 30.8 % (ref 34.8–46.1)
HGB BLD-MCNC: 10.3 G/DL (ref 11.5–15.4)
IMM GRANULOCYTES # BLD AUTO: 0.03 THOUSAND/UL (ref 0–0.2)
IMM GRANULOCYTES NFR BLD AUTO: 1 % (ref 0–2)
INR PPP: 3.04 (ref 0.84–1.19)
LYMPHOCYTES # BLD AUTO: 0.6 THOUSANDS/ÂΜL (ref 0.6–4.47)
LYMPHOCYTES NFR BLD AUTO: 12 % (ref 14–44)
MCH RBC QN AUTO: 34.7 PG (ref 26.8–34.3)
MCHC RBC AUTO-ENTMCNC: 33.4 G/DL (ref 31.4–37.4)
MCV RBC AUTO: 104 FL (ref 82–98)
MONOCYTES # BLD AUTO: 0.29 THOUSAND/ÂΜL (ref 0.17–1.22)
MONOCYTES NFR BLD AUTO: 6 % (ref 4–12)
NEUTROPHILS # BLD AUTO: 3.87 THOUSANDS/ÂΜL (ref 1.85–7.62)
NEUTS SEG NFR BLD AUTO: 79 % (ref 43–75)
NRBC BLD AUTO-RTO: 0 /100 WBCS
PLATELET # BLD AUTO: 184 THOUSANDS/UL (ref 149–390)
PMV BLD AUTO: 9.8 FL (ref 8.9–12.7)
POTASSIUM SERPL-SCNC: 4.6 MMOL/L (ref 3.5–5.3)
PROTHROMBIN TIME: 31.7 SECONDS (ref 11.6–14.5)
RBC # BLD AUTO: 2.97 MILLION/UL (ref 3.81–5.12)
SODIUM SERPL-SCNC: 133 MMOL/L (ref 135–147)
WBC # BLD AUTO: 4.89 THOUSAND/UL (ref 4.31–10.16)

## 2023-05-15 PROCEDURE — 97535 SELF CARE MNGMENT TRAINING: CPT

## 2023-05-15 PROCEDURE — 80048 BASIC METABOLIC PNL TOTAL CA: CPT | Performed by: PHYSICIAN ASSISTANT

## 2023-05-15 PROCEDURE — 99232 SBSQ HOSP IP/OBS MODERATE 35: CPT

## 2023-05-15 PROCEDURE — 85025 COMPLETE CBC W/AUTO DIFF WBC: CPT | Performed by: PHYSICIAN ASSISTANT

## 2023-05-15 PROCEDURE — 99233 SBSQ HOSP IP/OBS HIGH 50: CPT | Performed by: PHYSICAL MEDICINE & REHABILITATION

## 2023-05-15 PROCEDURE — 99232 SBSQ HOSP IP/OBS MODERATE 35: CPT | Performed by: PHYSICIAN ASSISTANT

## 2023-05-15 PROCEDURE — 97530 THERAPEUTIC ACTIVITIES: CPT

## 2023-05-15 PROCEDURE — 85610 PROTHROMBIN TIME: CPT | Performed by: PHYSICIAN ASSISTANT

## 2023-05-15 PROCEDURE — 82948 REAGENT STRIP/BLOOD GLUCOSE: CPT

## 2023-05-15 PROCEDURE — NC001 PR NO CHARGE: Performed by: ORTHOPAEDIC SURGERY

## 2023-05-15 PROCEDURE — 97110 THERAPEUTIC EXERCISES: CPT

## 2023-05-15 RX ORDER — MICONAZOLE NITRATE 20 MG/G
CREAM TOPICAL 2 TIMES DAILY
Status: DISCONTINUED | OUTPATIENT
Start: 2023-05-15 | End: 2023-05-30

## 2023-05-15 RX ADMIN — METHOCARBAMOL TABLETS 250 MG: 500 TABLET, COATED ORAL at 23:00

## 2023-05-15 RX ADMIN — METHOCARBAMOL TABLETS 250 MG: 500 TABLET, COATED ORAL at 05:37

## 2023-05-15 RX ADMIN — MONTELUKAST 10 MG: 10 TABLET, FILM COATED ORAL at 22:15

## 2023-05-15 RX ADMIN — FUROSEMIDE 40 MG: 40 TABLET ORAL at 07:47

## 2023-05-15 RX ADMIN — ONDANSETRON 4 MG: 4 TABLET, ORALLY DISINTEGRATING ORAL at 16:43

## 2023-05-15 RX ADMIN — DOCUSATE SODIUM 100 MG: 100 CAPSULE, LIQUID FILLED ORAL at 07:47

## 2023-05-15 RX ADMIN — Medication 7.5 MG: at 13:10

## 2023-05-15 RX ADMIN — CITALOPRAM HYDROBROMIDE 20 MG: 20 TABLET ORAL at 07:47

## 2023-05-15 RX ADMIN — ACETAMINOPHEN 975 MG: 325 TABLET ORAL at 16:42

## 2023-05-15 RX ADMIN — MICONAZOLE NITRATE: 20 CREAM TOPICAL at 19:07

## 2023-05-15 RX ADMIN — LIDOCAINE 5% 1 PATCH: 700 PATCH TOPICAL at 08:30

## 2023-05-15 RX ADMIN — ALLOPURINOL 100 MG: 100 TABLET ORAL at 07:46

## 2023-05-15 RX ADMIN — METHOCARBAMOL TABLETS 250 MG: 500 TABLET, COATED ORAL at 12:09

## 2023-05-15 RX ADMIN — ATORVASTATIN CALCIUM 40 MG: 40 TABLET, FILM COATED ORAL at 16:43

## 2023-05-15 RX ADMIN — METOPROLOL SUCCINATE 25 MG: 25 TABLET, EXTENDED RELEASE ORAL at 07:47

## 2023-05-15 RX ADMIN — ONDANSETRON 4 MG: 4 TABLET, ORALLY DISINTEGRATING ORAL at 08:29

## 2023-05-15 RX ADMIN — GABAPENTIN 100 MG: 100 CAPSULE ORAL at 22:15

## 2023-05-15 RX ADMIN — SODIUM CHLORIDE 1 G: 1 TABLET ORAL at 07:10

## 2023-05-15 RX ADMIN — SODIUM CHLORIDE 1 G: 1 TABLET ORAL at 18:36

## 2023-05-15 RX ADMIN — Medication 7.5 MG: at 22:56

## 2023-05-15 RX ADMIN — OXYBUTYNIN 5 MG: 5 TABLET, FILM COATED, EXTENDED RELEASE ORAL at 07:48

## 2023-05-15 RX ADMIN — ACETAMINOPHEN 975 MG: 325 TABLET ORAL at 22:55

## 2023-05-15 RX ADMIN — ACETAMINOPHEN 975 MG: 325 TABLET ORAL at 07:46

## 2023-05-15 RX ADMIN — AMIODARONE HYDROCHLORIDE 200 MG: 200 TABLET ORAL at 07:46

## 2023-05-15 RX ADMIN — WARFARIN SODIUM 2.5 MG: 2.5 TABLET ORAL at 16:43

## 2023-05-15 RX ADMIN — DOCUSATE SODIUM 100 MG: 100 CAPSULE, LIQUID FILLED ORAL at 16:43

## 2023-05-15 RX ADMIN — METHOCARBAMOL TABLETS 250 MG: 500 TABLET, COATED ORAL at 16:48

## 2023-05-15 RX ADMIN — Medication 1000 UNITS: at 07:47

## 2023-05-15 RX ADMIN — NYSTATIN: 100000 POWDER TOPICAL at 08:29

## 2023-05-15 RX ADMIN — NYSTATIN: 100000 POWDER TOPICAL at 19:07

## 2023-05-15 NOTE — PROGRESS NOTES
05/15/23 1100   Pain Assessment   Pain Score 6   Pain Location/Orientation Location: Buttocks  (pressure sore)   Restrictions/Precautions   Precautions Bed/chair alarms; Fall Risk;Pressure Ulcer;Pain; Airborne/isolation;Supervision on toilet/commode   LUE Weight Bearing Per Order NWB   Braces or Orthoses Splint  (L UE)   Cognition   Overall Cognitive Status WFL   Subjective   Subjective Pt  reported having pain and bleeding from her buttocks sore but is agreeable to participate in therapy   Sit to Lying   Type of Assistance Needed Physical assistance   Physical Assistance Level Total assistance   Comment mod to max A B LE and assist of 2nd person for trunk control   Sit to Lying CARE Score 1   Lying to Sitting on Side of Bed   Type of Assistance Needed Physical assistance   Physical Assistance Level 76% or more   Comment HOB slightly elevated   Lying to Sitting on Side of Bed CARE Score 2   Sit to Stand   Type of Assistance Needed Physical assistance   Physical Assistance Level Total assistance   Comment mod to max A X 1-2 person   Sit to Stand CARE Score 1   Bed-Chair Transfer   Type of Assistance Needed Physical assistance   Physical Assistance Level Total assistance   Comment mod A X 1-2   Chair/Bed-to-Chair Transfer CARE Score 1   Transfer Bed/Chair/Wheelchair   Adaptive Equipment Michael Walker   Walk 10 Feet   Comment limited by fatigue and pain   Reason if not Attempted Safety concerns   Walk 10 Feet CARE Score 88   Walk 50 Feet with Two Turns   Reason if not Attempted Safety concerns   Walk 50 Feet with Two Turns CARE Score 88   Walk 150 Feet   Reason if not Attempted Safety concerns   Walk 150 Feet CARE Score 88   Ambulation   Primary Mode of Locomotion Prior to Admission Walk   Distance Walked (feet) 6 ft   Assist Device Michael Walker   Gait Pattern Step to; Improper weight shift; Slow Barbra; Wide NAZIA   Limitations Noted In Endurance   Provided Assistance with: Balance   Walk Assist Level Minimum Assist;Moderate Assist;Chair Follow   Findings Pt  noted to have bleeding/ blood dripping while upright and walking  Does the patient walk? 2  Yes   Assessment   Treatment Assessment Pt  engaged in very short 30 mins session and participated in bed mobility, very short distance walking and standing tolerance  Pt  inconsistent with sit to stand, sometimes with mod to max A x 1 and with fatigues, needed 2nd person   Pt  once standing is stable with HW and able to stand with CGA  Pt  able to walk 6 feet but noted to have blood dripping on her legs reporting that it is from her buttocks pressure sore  Pt  then assisted back to bed with pressure off loaded using 2 green wedges behind her L side, sidelying to the R  Pt  then set up with lunch with HOB elevated  Cont with POC as tolerated  Tx cont to be limited by pressure sore, dec skin integrity and pain  Problem List Decreased strength;Decreased endurance; Impaired balance;Decreased mobility; Decreased coordination;Decreased skin integrity   Barriers to Discharge Inaccessible home environment;Decreased caregiver support   PT Barriers   Functional Limitation Car transfers;Stair negotiation;Standing;Transfers; Walking   Plan   Treatment/Interventions Functional transfer training;LE strengthening/ROM; Elevations; Therapeutic exercise; Endurance training;Patient/family training;Equipment eval/education;Gait training;Bed mobility   Recommendation   PT Discharge Recommendation   (TBD)   PT Therapy Minutes   PT Time In 1100   PT Time Out 1130   PT Total Time (minutes) 30   PT Mode of treatment - Individual (minutes) 30   PT Mode of treatment - Concurrent (minutes) 0   PT Mode of treatment - Group (minutes) 0   PT Mode of treatment - Co-treat (minutes) 0   PT Mode of Treatment - Total time(minutes) 30 minutes   PT Cumulative Minutes 120   Therapy Time missed   Time missed?  No

## 2023-05-15 NOTE — PROGRESS NOTES
"Occupational Therapy Treatment Note         05/15/23 0700   Pain Assessment   Pain Assessment Tool 0-10   Pain Score No Pain   Restrictions/Precautions   Precautions Bed/chair alarms; Fall Risk;Supervision on toilet/commode; Airborne/isolation;Pacemaker;Pain  (impaired skin integrity)   LUE Weight Bearing Per Order (S)  NWB   Braces or Orthoses   (in L UE splint but per nursing ortho ordered another splint)   Lifestyle   Autonomy \"I didn't have a good night\"   Eating   Type of Assistance Needed Set-up / clean-up   Physical Assistance Level No physical assistance   Eating CARE Score 5   Shower/Bathe Self   Type of Assistance Needed Physical assistance   Physical Assistance Level Total assistance   Comment to thoroughly clean pannus and groin, complete in supine 2* body habitus and impaired skin integrity  At EOB pt requires assist to fully complete UB bathing and bathing of LEs   Shower/Bathe Self CARE Score 1   Tub/Shower Transfer   Reason Not Assessed Safety;Sponge Bath   Upper Body Dressing   Type of Assistance Needed Physical assistance   Physical Assistance Level Total assistance   Comment pt has night gowns, required total assist but able to don over L splint   Upper Body Dressing CARE Score 1   Lower Body Dressing   Type of Assistance Needed Physical assistance   Physical Assistance Level Total assistance   Comment pt would require total assist to thread clothing and then stand to manage over hips   did not don LB clothing at this time 2* impaired skin integirty at groin/buttock and skin care to be completed   Lower Body Dressing CARE Score 1   Putting On/Taking Off Footwear   Type of Assistance Needed Physical assistance   Physical Assistance Level Total assistance   Putting On/Taking Off Footwear CARE Score 1   Sit to Lying   Type of Assistance Needed Physical assistance   Physical Assistance Level 76% or more   Comment assist LEs   Sit to Lying CARE Score 2   Lying to Sitting on Side of Bed   Type of " Assistance Needed Physical assistance   Physical Assistance Level 76% or more   Lying to Sitting on Side of Bed CARE Score 2   Sit to Stand   Type of Assistance Needed Physical assistance   Physical Assistance Level 51%-75%   Comment with hemiwalker   Sit to Stand CARE Score 2   Bed-Chair Transfer   Type of Assistance Needed Physical assistance; Adaptive equipment   Physical Assistance Level 51%-75%   Comment stand pivot with R hemiwalker   Chair/Bed-to-Chair Transfer CARE Score 2   Toileting Hygiene   Type of Assistance Needed Physical assistance; Adaptive equipment   Physical Assistance Level Total assistance   Comment requires total assist for hygiene after using [facility] CARE Score 1   Toilet Transfer   Type of Assistance Needed Physical assistance; Adaptive equipment   Physical Assistance Level 51%-75%   Comment stand pivot to platform BSC using R UE with hemiwalker, hands on hips   Toilet Transfer CARE Score 2   Cognition   Overall Cognitive Status WFL   Arousal/Participation Alert   Attention Within functional limits   Orientation Level Oriented X4   Memory Within functional limits   Following Commands Follows one step commands without difficulty   Activity Tolerance   Activity Tolerance Patient tolerated treatment well   Medical Staff Made Aware MIRANDA Houser Speak present for skin checks and inspection of skin   Assessment   Treatment Assessment Pt participated in skilled OT tx session  See above for further details on functional performance  MIRANDA Houser Speak present and pt requires assist x2 at bed level to thoroughly clean skin folds in groin, under pannus and tops of thighs  Pt with poor skin integrity and open area/bleeding noted, RN took pictures and wound care consult has been placed  Obtained green wedges for room and pt is on specialty mattress and bariatric roho when OOB   Pt reporting difficulty transferring with staff overnight, pt able to stand pivot onto [facility] this morning with mod assist x1 and "hemiwalker, but then later in session became nervous when attempting to stand and did not attempt to fully stand stating \"I can't do it\", although physically she demonstrates ability to  At this time to meet pt's needs of urinary urgency overnight, recommend use of bed pan in order for staff to be able to safely assist pt  Recommend assist x2 present to use BSC during the day  Pt will continue to benefit from skilled OT intervention to address sit <?  Stand transfers with R UE boosting and then using nesha walker, stand pivot transfers, R UE strengthening, will clarify L UE ROM details as pt very guarded with L UE even distally, in order to maximize functional independence in ADLS, functional mobility/transfers, while decreasing burden of care  Pt left positioned in bed with call bell in reach and bed alarm on  Of note pt's family did bring in a few night gowns and one was able to be donned over pt's splint, which is to be removed today per ortho  At this time 2* pt's poor skin integrity at groin/buttock and pt not having any LB clothing to wear that fits, utilizing hospital gown for coverage  Could trial XL briefs  Prognosis Fair   Problem List Decreased strength;Decreased range of motion;Decreased endurance; Impaired balance;Decreased mobility;Orthopedic restrictions;Pain;Decreased skin integrity;Obesity   Barriers to Discharge Decreased caregiver support; Inaccessible home environment   Plan   Treatment/Interventions ADL retraining;Functional transfer training; Endurance training;Patient/family training;Equipment eval/education; Bed mobility; Compensatory technique education   Progress Slow progress, decreased activity tolerance   OT Therapy Minutes   OT Time In 0700   OT Time Out 0830   OT Total Time (minutes) 90   OT Mode of treatment - Individual (minutes) 90   OT Mode of treatment - Concurrent (minutes) 0   OT Mode of treatment - Group (minutes) 0   OT Mode of treatment - Co-treat (minutes) 0   OT Mode of " Treatment - Total time(minutes) 90 minutes   OT Cumulative Minutes 190   Therapy Time missed   Time missed?  No

## 2023-05-15 NOTE — ASSESSMENT & PLAN NOTE
Midline placed by IR on 5/15/23  Need for blood draws frequently to follow electrolytes, INR  Need for IV access given her recent ventricular arrhythmia and AICD firing, importance of optimizing electrolytes, current COVID 19 virus

## 2023-05-15 NOTE — DISCHARGE INSTR - OTHER ORDERS
Skin care plans:  1-Hydraguard to bilateral  heels BID and PRN  2-Elevate heels to offload pressure  3-Ehob cushion in chair when out of bed  4-Moisturize skin daily with skin nourishing cream   5-Turn/reposition q2h or when medically stable for pressure re-distribution on skin  6  Low air loss mattress   7  Cleanse sacral buttocks with soap and water then apply triad paste then top with a small allevyn foam stephanie with a T for treatment change every other day and as needed for soilage or dislodgement    8  Cleanse bilateral abdominal pannus with soap and water then completely dry   Cut strips of the interdry and place flat in the skin folds of the abdominal area leaving 2 inches outside of the skin fold  Take interdry out and cleanse with with soap and water pat dry well and place back in the fold   Replace every 3 days or sooner if soiled   Do not use creams or powders on the skin with the interdry   9  Right upper thigh area - cleanse with soap and water then apply maxorb on open area then top with a ABD and secure with the mesh panties   Cut a slit in the panties to prevent from being tight on the thigh area change every other day or if soiled   10   Follow up at the wound center upon discharge - order is placed

## 2023-05-15 NOTE — UTILIZATION REVIEW
NOTIFICATION OF ADMISSION DISCHARGE   This is a Notification of Discharge from [address]  Please be advised that this patient has been discharge from our facility  Below you will find the admission and discharge date and time including the patient’s disposition  UTILIZATION REVIEW CONTACT:  Mary Velázquez  Utilization   Network Utilization Review Department  Phone: [phone] x carefully listen to the prompts  All voicemails are confidential   Email: [email or web address]     ADMISSION INFORMATION  PRESENTATION DATE: 4/27/2023 12:53 PM  OBERVATION ADMISSION DATE:   INPATIENT ADMISSION DATE: 4/28/23  4:30 PM   DISCHARGE DATE: 5/13/2023  6:11 PM   DISPOSITION:[facility]    IMPORTANT INFORMATION:  Send all requests for admission clinical reviews, approved or denied determinations and any other requests to dedicated fax number below belonging to the campus where the patient is receiving treatment   List of dedicated fax numbers:  [address] DENIALS (Administrative/Medical Necessity) [phone]   [address] (Maternity/NICU/Pediatrics) [phone]   [facility] [phone]   [place] [address] [phone]   [address] [phone]   [address] [facility] [phone]   [address] [place] [address] [phone]   [address] [place] [address] 119 [phone]   [facility]  [phone]   [address] [facility] [phone]   [address] [facility] [address] [facility] [phone]

## 2023-05-15 NOTE — PROGRESS NOTES
Progress Note - Wound   Ni Quiroz 68 y o  female MRN: [id number]  Unit/Bed#: [facility] 451-01 Encounter: [id number]      Assessment:  Pressure ulcer of the sacral region, unstageable   Intertrigo candidiasis   Ambulatory dysfunction   Type 2 DM without long term use of insulin   Urinary incontinence   Obesity       Plan:  • Evolving DTI to the b/l sacro-buttocks  Wound is POA to [facility] unit  Wound continues to evolve  Noted full thickness skin loss with devitalized tissue  • Patient desires to defer bedside debridement at this time, she states she doesn't think she will be able to be on her side for that long due to pain  • Continue with Triad paste and foam dressing  • Pressure relief   • P-500 mattress  • Candidiasis rash noted to the b/l medial thighs and perineum, no open wound or active bleeding at time of assessment  · Will recommend pascual anti-fungal cream  • ITD with candidasis to the b/l abdominal pannus with partial thickness skin loss  • Will recommend InterDry sheets   · No s/s of infection present  • Will recommend to continue with preventative nursing skin care measures  • Nutrition is following along   • Patient verbalized understanding of plan of care  • [place] text wound care team with questions or concerns  • Routine wound care follow-up while admitted   • Follow-up with the [facility] wound care center as an outpatient, ambulatory referral placed  Subjective:  Wound care to see patient for follow-up visit of sacral wound  Patient has now been transferred to the [facility] unit for rehab and is now +Covid-19  Patient seen in bed on low air loss P-500 mattress, alert and oriented x 4, cooperative and agreeable for the assessment  Dependent for care, max assist of one with turning for the assessment  Mobility limited by pain due to multiples injuries from her fall  LUE in sling  No incontinence noted at time of the assessment  No pure-wick in use   Patient reports she is sore under her belly and her "perineum area  Patient states her sacral wound is less painful and continues to report that the P-500 mattress is comfortable  Denies fever, chills, or increased pain related to the wound  Objective:      Vitals: Blood pressure 139/65, pulse 60, temperature 98 4 °F (36 9 °C), temperature source Oral, resp  rate 18, height 4' 10\" (1 473 m), SpO2 94 %  ,Body mass index is 39 29 kg/m²  Focused Physical Exam:  1  B/l sacro-buttocks - evolving DTI  Open aspect to the sacrum with full thickness skin loss, with noted linear distrubution of the discoloration/wounds, all aspects measured together  Wound presents as approx: 30% moist yellow slough, 10% moist pink tissue, and 60% intact non-blanchable skin and purple colored tissue in the full thickness aspect  Small amount of serous sangineous drainage  Edges fragile and attached without maceration  Marta-wound is intact with blanchable pink/hyperpigmented skin  2  B/l heels are dry and intact without redness or wounds  3  B/l groin, medial thighs, and perineum is intact with moist pink colored denuded skin with candidiasis rash  Irregular borders and satellite lesions noted  No open aspect or drainage  4  B/l abdominal pannus with ITD with candidiasis  Partial thickness skin loss noted  R greater than L  Open wounds are 100% moist pink tissue  Small serous sanguinous drainage noted  Edges fragile and attached, no maceration  Skin to the skin folds is moist denuded and pink with irregular borders/satelitte lesions noted  No induration, fluctuance, odor, warmth/temperature differences, redness, or purulence noted to the above mentioned wounds and skin areas assessed  New dressings applied as noted above  Patient tolerated assessment well- wound is mildly tender with palpation and cleansing        Lab, Imaging and other studies: I have personally reviewed pertinent reports          Wound 05/07/23 Pressure Injury Buttocks Right;Upper (Active)   Wound Image       " "05/15/23 1152   Wound Length (cm) 9 cm 05/15/23 1152   Wound Width (cm) 5 cm 05/15/23 1152   Wound Depth (cm) 0 3 cm 05/15/23 1152   Wound Surface Area (cm^2) 45 cm^2 05/15/23 1152   Wound Volume (cm^3) 13 5 cm^3 05/15/23 1152   Calculated Wound Volume (cm^3) 13 5 cm^3 05/15/23 1152   Change in Wound Size % -12 5 05/15/23 1152   Wound 05/15/23 Abdomen Anterior; Left (Active)   Wound Image      Linear shaped wound at base of the skin fold  05/15/23 1223   Wound Length (cm) 0 3 cm 05/15/23 1203   Wound Width (cm) 4 cm 05/15/23 1203   Wound Depth (cm) 0 1 cm 05/15/23 1203   Wound Surface Area (cm^2) 1 2 cm^2 05/15/23 1203   Wound Volume (cm^3) 0 12 cm^3 05/15/23 1203   Calculated Wound Volume (cm^3) 0 12 cm^3 05/15/23 1203       Wound 05/15/23 Abdomen Right (Active)   Wound Image      Scattered partial thickness wounds, measured together  05/15/23 1223   Wound Length (cm) 6 cm 05/15/23 1223   Wound Width (cm) 14 cm 05/15/23 1223   Wound Depth (cm) 0 1 cm 05/15/23 1223   Wound Surface Area (cm^2) 84 cm^2 05/15/23 1223   Wound Volume (cm^3) 8 4 cm^3 05/15/23 1223   Calculated Wound Volume (cm^3) 8 4 cm^3 05/15/23 1223             Total time spent today:    Total time (face-to-face and non-face-to-face) spent on today's visit was 15 minutes  This includes preparation for the visits (internal medicine note on 5/15/23, PMR note on 5/14/23, and orthopedics note on 5/15/23 ) performance of a medically appropriate history and examination, and orders for medications/treatments or testing  Discussed assessment findings, and plan of care/recommendations with patients RN  Kath Stover, CRNP, CWON      Portions of the record may have been created with voice recognition software  Occasional wrong word or \"sound a like\" substitutions may have occurred due to the inherent limitations of voice recognition software    Read the chart carefully and recognize, using context, where substitutions have occurred      "

## 2023-05-15 NOTE — PROGRESS NOTES
PM&R PROGRESS NOTE:  Lluvia Laurent 68 y o  female MRN: [id number]  Unit/Bed#: -01 Encounter: [id number]        Rehab Diagnosis: Impairment of mobility, safety and Activities of Daily Living (ADLs) due to Major Multiple Trauma:  14 9  Other Multiple Trauma Multiple rib fractures with acute pain, improved acute hypoxic respiratory failure, and L humeral fracture NWB in coaptation splint       SUBJECTIVE: Patient seen face-to-face today in her room  Has a mild cough but denies shortness of breath  Happy with her new splint provided by orthopedics  States is more comfortable and less painful  Per nursing had some bleeding likely from a small on her labia which was cut with use of the pure wick  Wound care examined patient today with maceration of her skin on her buttocks and perineal area  Patient reports no pain in these areas currently  She is compliant with offloading and nursing also compliant with reminding patient and assisting patient utilizing wedges  Patient's appetite has improved  Today we discussed the importance of good nutrition and healing her skin additionally  Patient a very hard stick  Multiple attempts to obtain lab work were unsuccessful today  Order for midline request placed as benefits outweigh risks  Monitoring of electrolytes is paramount given her recent AICD firing  **Personally provided a medical and functional update to her daughter, Oneida Lorenz today with her understanding and appreciation  ASSESSMENT: Stable, progressing      PLAN:    Rehabilitation  • Functional deficits: Left upper extremity weakness, impaired mobility, impaired self-care, impaired endurance, impaired strength  • Continue current rehabilitation plan of care to maximize function      • Functional update:   o Evaluations in progress  • Estimated Discharge: TBD, estimated length of stay 2-3 weeks      DVT prophylaxis  • Managed on warfarin  • INR pending today    Bladder plan  • Continent  • Limit pure wick usage if possible due to suspected labial small cut    Bowel plan  • Continent      * Humerus fracture  Assessment & Plan  2/2 mechanical fall  Proximal humerus shaft spiral fracture, displaced and angulated  Ortho attempted closed reduction  Ultimately placed in coaptation splint on 4/27 and managed non-operatively  NWB LUE  Pain Control as detailed below  No Vitamin D level in chart or unmerged chart recently   - At home was on daily Vitamin D supplement 1000 units - restarted   - 5/14 Vitamind D level is 50 7   Due for 2 week follow-up  Imaging: Personally reviewed  Redemonstration of oblique spiral fracture of the shaft of the humerus with apex medial angulation  No glenohumeral dislocation  Appreciate orthopedics evaluating patient 5/15/2023  Replaced her splint with a Salgado brace  Cleared by orthopedics to provide range of motion to elbow and wrist without restriction as tolerated  Follow-up with orthopedics in 4 weeks around 6/15/2023    Ventricular arrhythmia  Assessment & Plan  On 5/2 developed Afib with RR which degenerated to VT then VF  She received shock from her AICD ([employer], Single Chamber ICD)  Seen by Cards who adjusted her device, and recommended restarting home Metoprolol and Amio load  Was also on digoxin  - Toprol dose adjusted and digoxin discontinued after developing junctional bradycardia later in stay  Here: Toprol XL 25mg daily, Amio 200mg daily starting tomorrow for 28 days  Monitor cardiac status during therapies   Monitor lytes  Keep K > 4, Mag > 2  Will plan for outpatient f/u with EP    Difficult intravenous access  Assessment & Plan  Consult to IR for MIDLINE placement   Need for blood draws frequently to follow electrolytes, INR  Need for IV access given her recent ventricular arrhythmia and AICD firing, importance of optimizing electrolytes, current COVID 19 virus      Acute COVID-19  Assessment & Plan  Diagnosed on screening test on 5/13  Minimally symptomatic (does have a cough)  Afebrile and on RA  10 days isolation per protocol through 5/22/2023 (last day)  Monitor respiratory status   - Has chronic issues with urinary urgency/frequency  - Added Q4hr timed voids so we can help her get to the commode in a timely manner    -Stable and has not required oxygen   -Compliant with utilizing incentive spirometer    CHAS (obstructive sleep apnea)  Assessment & Plan  Does not use CPAP at home  Consider Noc Ox overnight while here  Class 2 obesity in adult  Assessment & Plan  Nutrition consulted  Counseling    Osteoporosis with current pathological fracture with routine healing  Assessment & Plan  [facility]  History of L5 pathologic fracture  Has been on Alendronate once weekly since 2017  Would confirm with her what day she normally takes it prior to restarting  Will confirm that it is ok to restart with Orthopedics first, as some surgeons prefer to wait 6 weeks after acute fracture prior to restarting    - Per Dr Rupali Lindsay ok to resume at anytime  - She normally takes it on Thursdays  Would complete through 6/2023  Per last Endocrine note in 6/2022, they were going to continue for 1 more year and then stop  She was due for DXA this May  Asthma  Assessment & Plan  No acute exacerbation  Home: Singulair 10mg QHS  Here: Same, PRN albuterol  No acute exacerbation  Monitor respiratory status    Pressure injury of buttock, unstageable (HCC)  Assessment & Plan  Present on admission  Wound care following while at [address] [place] [address] Place local wound care as follows:  • Evolving DTI to the b/l sacro-buttocks  Wound is POA to ARC unit  Wound continues to evolve  Noted full thickness skin loss with devitalized tissue  ? Patient desires to defer bedside debridement at this time, she states she doesn't think she will be able to be on her side for that long due to pain  ? Continue with Triad paste and foam dressing  ?  Pressure relief ? P-500 mattress  • Candidiasis rash noted to the b/l medial thighs and perineum, no open wound or active bleeding at time of assessment  ? Will recommend pascual anti-fungal cream  • ITD with candidasis to the b/l abdominal pannus with partial thickness skin loss  ? Will recommend InterDry sheets     Frequent offloading in chair and in bed  P500 mattress  Optimize nutrition    Atrial fibrillation with RVR ([address] )  Assessment & Plan  Initially per AICD interrogation with Afib RVR that degenerated into VT/VF  Unclear burden  Here: Toprol XL 25mg daily, Fully anticoagulated on warfarin (See mechanical AV), amiodarone 200mg daily  Monitor and adjust as appropriate  Currently examines in NSR  IM consulted to assist with management  Outpatient f/u with EP    Abnormal CT scan  Assessment & Plan  - 4/27 CT CAP:           - Incidentally noted is a incarcerated/partially obstructed periumbilic ventral hernia, (image 146 series 9)          - Septated cyst anterior aspect of the right kidney, consider evaluation with ultrasound or MRI for further characterization (image 136 series 9)          - Dilated ascending aorta measuring 4 6 cm, evaluation with cardiothoracic surgery on nonemergent basis  - Follow up with PCP and CT surgery outpatient for findings  - Patient informed of abnormal results on 5/11: she is aware of ventral hernia but not aortic aneurysm or renal cyst         Hyponatremia  Assessment & Plan  Jorge Alberto 125 on 5/9 5/14 stable at 132  5/15: Difficult lab draw, pending  Alma by Trauma to be 2/2 poor PO intake and furosemide  Started on sodium tabs (weaning down)  Monitor and adjust as appropriate  IM consulted and assisting with management  Bradycardia  Assessment & Plan  Developed junctional bradycardia in hospital after restarting home Toprol XL and digoxin  - Cards stopped digoxin  - Toprol XL decreased to 25mg daily  Monitor      Type 2 diabetes mellitus ([facility] [address] )  Assessment & Plan    Home: "Sitagliptin-Metformin 50-50mg  Here: Discontinue Levemir due to hypoglycemia, continue sliding scale insulin and diabetic diet  Diabetic Diet  Nutrition Consult  Attempt to resume oral meds here and get off insulin  IM consulted to assist with management  Outpatient f/u with PCP  Fracture of multiple ribs of right side  Assessment & Plan  R 8th and 9th rib possible fractures  Rib fracture protocol  Pain control with lidoderm patch  Incentive Spirometer  Monitor respiratory status  Laceration of right eyebrow  Assessment & Plan  Healing, monitor  Steri-Strips in place    H/O mechanical aortic valve replacement  Assessment & Plan  On warfarin  Last week's dosages:    Date  INR  Dose  5/6  4 84  -    5/7  4 42  -  5/8  3 46  -  5/9  2 77  2 5mg  5/10  2 64  2 5mg  5/11  2 63  2 5mg  5/12  2 46  2 5mg  5/13  NT  2 5mg  5/14  NT  2 5mg  5/15  difficult lab draw    On 2 5mg daily for now, adjust as appropriate  Difficult lab draw 5/15/2023  Obtaining midline for ease of frequent blood draws  IM consulted to assist with management of Coumadin dosing  Acute pain due to trauma  Assessment & Plan  R rib fractures, L humerus fracture  - Lidoderm patch for ribs  - Oxycodone 5-7 5mg PRN  - Robaxin 250mg Q6hr scheduled  - Gabapentin 100mg QHS (may be able to discontinue while here)  - Tylenol 975mg Q8hrs scheduled  APS assisted inpatient  Adjust as appropriate while here  Appreciate IM consultants medical co-management  Labs, medications, and imaging personally reviewed  ROS:  A ten point review of systems was completed on 05/15/23 and pertinent positives are listed in subjective section  All other systems reviewed were negative  OBJECTIVE:   BP (!) 106/39 (BP Location: Right arm)   Pulse 60   Temp 98 3 °F (36 8 °C) (Oral)   Resp 17   Ht 4' 10\" (1 473 m)   SpO2 96%   BMI 39 29 kg/m²     Physical Exam  Vitals and nursing note reviewed     Constitutional:       General: She is not in " acute distress  Appearance: She is obese  HENT:      Head: Normocephalic  Comments: Laceration with Steri-Strips     Nose: Nose normal       Mouth/Throat:      Mouth: Mucous membranes are moist    Eyes:      Conjunctiva/sclera: Conjunctivae normal    Cardiovascular:      Rate and Rhythm: Normal rate and regular rhythm  Pulses: Normal pulses  Heart sounds: Normal heart sounds  Pulmonary:      Effort: Pulmonary effort is normal       Breath sounds: Normal breath sounds  No wheezing or rales  Comments: Occasional cough, semiproductive  Abdominal:      General: Bowel sounds are normal  There is no distension  Palpations: Abdomen is soft  Tenderness: There is no abdominal tenderness  Musculoskeletal:         General: Swelling (lue), tenderness and signs of injury present  Cervical back: Neck supple  Comments: Sarimiento Brace securely in place, patient more comfortable    Skin:     General: Skin is warm  Comments: See photos below -skin buttocks pannus, perineum   Neurological:      Mental Status: She is alert and oriented to person, place, and time  Comments: Proximal musculature weakness bilateral lower extremities graded as a 3 -/5  Left upper extremity not tested at the shoulder, 2/5 with elbow flexion elbow extension pain limited    Sensation to light touch intact  Neurovascular exam left upper extremity distally intact   Psychiatric:         Mood and Affect: Mood normal                                     Lab Results   Component Value Date    WBC 4 89 05/15/2023    HGB 10 3 (L) 05/15/2023    HCT 30 8 (L) 05/15/2023     (H) 05/15/2023     05/15/2023     Lab Results   Component Value Date    SODIUM 133 (L) 05/15/2023    K 4 6 05/15/2023     05/15/2023    CO2 27 05/15/2023    BUN 24 05/15/2023    CREATININE 0 85 05/15/2023    GLUC 100 05/15/2023    CALCIUM 7 9 (L) 05/15/2023     Lab Results   Component Value Date    INR 3 04 (H) 05/15/2023 INR 3 13 (H) 05/13/2023    INR 2 46 (H) 05/12/2023    PROTIME 31 7 (H) 05/15/2023    PROTIME 32 5 (H) 05/13/2023    PROTIME 26 9 (H) 05/12/2023           Current Facility-Administered Medications:   •  acetaminophen (TYLENOL) tablet 975 mg, 975 mg, Oral, Q8H [address], Etelvina Peña MD, 975 mg at 05/15/23 1642  •  albuterol (PROVENTIL HFA,VENTOLIN HFA) inhaler 2 puff, 2 puff, Inhalation, Q4H PRN, Etelvina Peña MD  •  allopurinol (ZYLOPRIM) tablet 100 mg, 100 mg, Oral, Daily, Etelvina Peña MD, 100 mg at 05/15/23 0952  •  amiodarone tablet 200 mg, 200 mg, Oral, Daily With Breakfast, Etelvina Peña MD, 200 mg at 05/15/23 0746  •  atorvastatin (LIPITOR) tablet 40 mg, 40 mg, Oral, Daily With Julia Price MD, 40 mg at 05/15/23 1643  •  calcium carbonate (TUMS) chewable tablet 500 mg, 500 mg, Oral, TID PRN, Etelvina Peña MD  •  cholecalciferol (VITAMIN D3) tablet 1,000 Units, 1,000 Units, Oral, Daily, Etelvina Peña MD, 1,000 Units at 05/15/23 0747  •  citalopram (CeleXA) tablet 20 mg, 20 mg, Oral, Daily, Etelvina Peña MD, 20 mg at 05/15/23 0747  •  docusate sodium (COLACE) capsule 100 mg, 100 mg, Oral, BID, Etelvina Peña MD, 100 mg at 05/15/23 1643  •  furosemide (LASIX) tablet 40 mg, 40 mg, Oral, Daily, Etelvina Peña MD, 40 mg at 05/15/23 0747  •  gabapentin (NEURONTIN) capsule 100 mg, 100 mg, Oral, HS, Etelvina Peña MD, 100 mg at 05/14/23 2229  •  insulin lispro (HumaLOG) 100 units/mL subcutaneous injection 1-5 Units, 1-5 Units, Subcutaneous, TID AC **AND** Fingerstick Glucose (POCT), , , TID AC, Etelvina Peña MD  •  insulin lispro (HumaLOG) 100 units/mL subcutaneous injection 1-5 Units, 1-5 Units, Subcutaneous, HS, Etelvina Peña MD  •  lidocaine (LIDODERM) 5 % patch 1 patch, 1 patch, Topical, Daily, Etelvina Peña MD, 1 patch at 05/15/23 0830  •  methocarbamol (ROBAXIN) tablet 250 mg, 250 mg, Oral, Q6H [address], Etelvina Peña MD, 250 mg at 05/15/23 1648  •  metoprolol succinate (TOPROL-XL) 24 hr tablet 25 mg, 25 mg, Oral, Daily, Cheryl Shabazz MD, 25 mg at 05/15/23 0747  •  moisture barrier miconazole 2% cream (aka LICO MOISTURE BARRIER ANTIFUNGAL CREAM), , Topical, BID, JOSEPHINE Kaplan  •  montelukast (SINGULAIR) tablet 10 mg, 10 mg, Oral, HS, Cheryl Shabazz MD, 10 mg at 05/14/23 2229  •  naloxone (NARCAN) 0 04 mg/mL syringe 0 04 mg, 0 04 mg, Intravenous, Q1MIN PRN, Cheryl Shabazz MD  •  nystatin (MYCOSTATIN) powder, , Topical, BID, Amanda Pearl PA-C, Given at 05/15/23 1079  •  ondansetron (ZOFRAN-ODT) dispersible tablet 4 mg, 4 mg, Oral, Q6H PRN, Cheryl Shabazz MD, 4 mg at 05/15/23 1643  •  oxybutynin (DITROPAN-XL) 24 hr tablet 5 mg, 5 mg, Oral, Daily, Cheryl Shabazz MD, 5 mg at 05/15/23 8343  •  oxyCODONE (ROXICODONE) IR tablet 5 mg, 5 mg, Oral, Q4H PRN, Cheryl Shabazz MD, 5 mg at 05/14/23 1719  •  oxyCODONE (ROXICODONE) split tablet 7 5 mg, 7 5 mg, Oral, Q4H PRN, Cheryl Shabazz MD, 7 5 mg at 05/15/23 1310  •  senna (SENOKOT) tablet 17 2 mg, 2 tablet, Oral, Daily, Cheryl Shabazz MD, 17 2 mg at 05/14/23 7832  •  sodium chloride tablet 1 g, 1 g, Oral, BID With Meals, Cheryl Shabazz MD, 1 g at 05/15/23 0710  •  warfarin (COUMADIN) tablet 2 5 mg, 2 5 mg, Oral, Daily (warfarin), Amanda Pearl PA-C, 2 5 mg at 05/15/23 1643    Past Medical History:   Diagnosis Date   • Asthma    • Diabetes ([facility] [address] )    • Hypertension        Patient Active Problem List    Diagnosis Date Noted   • Humerus fracture 04/27/2023   • Ventricular arrhythmia 05/04/2023   • Difficult intravenous access 05/15/2023   • Acute COVID-19 05/14/2023   • Atrial fibrillation with RVR ([facility] [address] ) 05/13/2023   • Pressure injury of buttock, unstageable ([facility] [address] ) 05/13/2023   • Asthma 05/13/2023   • Osteoporosis with current pathological fracture with routine healing 05/13/2023   • Class 2 obesity in adult 05/13/2023   • CHAS (obstructive sleep apnea) 05/13/2023   • Abnormal CT scan 05/10/2023   • Hyponatremia 05/09/2023 • Bradycardia 05/08/2023   • Type 2 diabetes mellitus ([facility] [address] ) 04/28/2023   • Fall 04/27/2023   • Acute pain due to trauma 04/27/2023   • H/O mechanical aortic valve replacement 04/27/2023   • Laceration of right eyebrow 04/27/2023   • Fracture of multiple ribs of right side 04/27/2023          Kalyn Friend MD    I have spent a total time of 60 minutes on 05/15/23 in caring for this patient including Impressions, Documenting in the medical record, Reviewing / ordering tests, medicine, procedures  , Communicating with other healthcare professionals  and Speaking with her daughter  ** Please Note:  voice to text software may have been used in the creation of this document   Although proof errors in transcription or interpretation are a potential of such software**

## 2023-05-15 NOTE — PROGRESS NOTES
Internal Medicine Progress Note  Patient: Wilbert Chery  Age/sex: 68 y o  female  Medical Record #: [id number]      ASSESSMENT/PLAN: (Interval History)  Wilbert Chery is seen and examined and management for following issues:    Lt proximal humerus fracture  • NWB to the Lt UE  • Sling to the Lt UE   • Pain control and therapy per PMR  • Follow-up with Orthopedics in 4 weeks  Rt 8-9 rib fx  •  Pain control per PMR  • Encourage IS   • O2 as needed  Rt eyebrow laceration  • Monitor lacerations  • Local wound care  DM type 2  • HA1C 6 4  • Home: Janumet  2x daily  • Here: Levemir 5U at bedtime/SSI  • Fasting blood sugar on the lower end this AM  Will DC Levemir  • Will monitor and add back medications as needed  History of mechanical AVR  • Goal INR 2 5 - 3 5  • INR pending - pt stuck multiple times this AM without success  IR consult for midline placement  • Continue Coumadin 2 5mg daily  Pt takes 2 5mg Wednesday and Saturday and 5mg all other days of the week at home  • Continue to monitor  Hyponatremia  • Na 132  • Will continue to monitor  • Likely multifactorial      Ventricular arrhythmia  • One episode 5/2/23 and pt received a shock from ICD  • Continue metoprolol and amiodarone  • Continue Coumadin  Bradycardia  • Diltiazem discontinued and metoprolol dose lowered  • She will need outpt follow-up with Cardiology  [facility]  COVID-19  • Tested positive 5/13/23  • Pt has a cough which she reports is chronic  • Contact and airborne precautions for 10 days  Buttock wound/labia wound  · Followed by wound care  · Frequent position changes  · Will monitor  The above assessment and plan was reviewed and updated as determined by my evaluation of the patient on 5/15/2023      Labs:   Results from last 7 days   Lab Units 05/14/23  0502   WBC Thousand/uL 5 21   HEMOGLOBIN g/dL 10 8*   HEMATOCRIT % 34 7*   PLATELETS Thousands/uL 208     Results from last 7 days   Lab Units 05/14/23  0502 05/13/23  0510   SODIUM mmol/L 132* 132*   POTASSIUM mmol/L 4 9 5 1   CHLORIDE mmol/L 105 104   CO2 mmol/L 26 29   BUN mg/dL 24 22   CREATININE mg/dL 0 75 0 77   CALCIUM mg/dL 8 1* 8 3         Results from last 7 days   Lab Units 05/13/23  1501 05/12/23  0648   INR  3 13* 2 46*     Results from last 7 days   Lab Units 05/15/23  0558 05/15/23  0535 05/14/23  2058   POC GLUCOSE mg/dl 75 59* 134       Review of Scheduled Meds:  Current Facility-Administered Medications   Medication Dose Route Frequency Provider Last Rate   • acetaminophen  975 mg Oral [facility] Tess Tyson MD     • albuterol  2 puff Inhalation Q4H PRN Tess Tyson MD     • allopurinol  100 mg Oral Daily Tess Tyson MD     • amiodarone  200 mg Oral Daily With Breakfast Tess Tyson MD     • atorvastatin  40 mg Oral Daily With Gordo Gonzales MD     • calcium carbonate  500 mg Oral TID PRN Tess Tyson MD     • cholecalciferol  1,000 Units Oral Daily Tess Tyson MD     • citalopram  20 mg Oral Daily Tess Tyson MD     • docusate sodium  100 mg Oral BID Tess Tyson MD     • furosemide  40 mg Oral Daily Tess Tyson MD     • gabapentin  100 mg Oral HS Tess Tyson MD     • insulin detemir  5 Units Subcutaneous HS Tess Tyson MD     • insulin lispro  1-5 Units Subcutaneous TID [facility] Tess Tyson MD     • insulin lispro  1-5 Units Subcutaneous HS Tess Tyson MD     • lidocaine  1 patch Topical Daily Tess Tyson MD     • methocarbamol  250 mg Oral Q6H [address] Tess Tyson MD     • metoprolol succinate  25 mg Oral Daily Tess Tyson MD     • montelukast  10 mg Oral HS Tess Tyson MD     • naloxone  0 04 mg Intravenous Q1MIN PRN Tess Tyson MD     • nystatin   Topical BID Amanda Pearl PA-C     • ondansetron  4 mg Oral Q6H PRN Tess Tyson MD     • oxybutynin  5 mg Oral Daily Tess Tyson MD     • oxyCODONE  5 mg Oral Q4H PRN Tess Tyson MD • oxyCODONE  7 5 mg Oral Q4H PRN Pedro Coleman MD     • senna  2 tablet Oral Daily Pedro Coleman MD     • sodium chloride  1 g Oral BID With Meals Pedro Coleman MD     • warfarin  2 5 mg Oral Daily (warfarin) Amanda Pearl PA-C         Subjective/ HPI: Patient seen and examined  Patients overnight issues or events were reviewed with nursing or staff during rounds or morning huddle session  New or overnight issues include the following:     Pt seen in her room  She states that therapy is tough  She had some bleeding this AM - possibly from the Rt labia wound  No active bleeding at this time  She denies any other complaints  ROS:   A 10 point ROS was performed; negative except as noted above         Imaging:     XR humerus left    (Results Pending)   XR shoulder 2+ vw left    (Results Pending)       *Labs /Radiology studies Reviewed  *Medications  reviewed and reconciled as needed  *Please refer to order section for additional ordered labs studies  *Case discussed with primary attending during morning huddle case rounds    Physical Examination:  Vitals:   Vitals:    05/14/23 0900 05/14/23 1500 05/14/23 2106 05/15/23 0539   BP:  116/71 99/56 103/53   BP Location:  Right arm Right arm Right arm   Pulse:  60 60 59   Resp:  18 18 18   Temp:  98 2 °F (36 8 °C) 98 8 °F (37 1 °C) 98 4 °F (36 9 °C)   TempSrc:  Oral Oral Oral   SpO2: 96% 96% 97% 94%   Height:         General Appearance: no distress, conversive  HEENT: PERRLA, conjuctiva normal; oropharynx clear; mucous membranes moist;   Neck:  Supple, no lymphadenopathy or thyromegaly  Lungs: CTA, normal respiratory effort, no retractions, expiratory effort normal  CV: regular rate and rhythm , PMI normal   ABD: soft non tender, no masses , no hepatic or splenomegaly  EXT: DP pulses intact, no lymphadenopathy, Lt UE in splint and sling  Skin: normal turgor, normal texture, pictures of wounds in media section  Psych: affect normal, mood normal  Neuro: AAOx3 The above physical exam was reviewed and updated as determined by my evaluation of the patient on 5/15/2023  Invasive Devices     Peripheral Intravenous Line  Duration           Peripheral IV 05/12/23 Right Antecubital 3 days                   VTE Pharmacologic Prophylaxis: Warfarin (Coumadin)  Code Status: Level 1 - Full Code  Current Length of Stay: 2 day(s)      Total time spent:  30 minutes with more than 50% spent counseling/coordinating care  Counseling includes discussion with patient re: progress  and discussion with patient of his/her current medical state/information  Coordination of patient's care was performed in conjunction with primary service  Time invested included review of patient's labs, vitals, and management of their comorbidities with continued monitoring  In addition, this patient was discussed with medical team including physician and advanced extenders  The care of the patient was extensively discussed and appropriate treatment plan was formulated unique for this patient  Medical decision making for the day was made by supervising physician unless otherwise noted in their attestation statement  ** Please Note:  voice to text software may have been used in the creation of this document   Although proof errors in transcription or interpretation are a potential of such software**

## 2023-05-15 NOTE — PROGRESS NOTES
Progress Note - Orthopedics   Rangel Brewer 68 y o  female MRN: [id number]  Unit/Bed#: -01      Subjective:    68 y  o female 2 5 wks s/p fall with left proximal humerus fracture with extension into humeral shaft  Patient has COVID  Working with PT in [facility]  Reports left arm pain has started to improve recently  Coaptation splint has become uncomfortable for her neck   Denies N/T     Labs:  0   Lab Value Date/Time    HCT 34 7 (L) 05/14/2023 0502    HCT 31 4 (L) 05/06/2023 0450    HCT 32 0 (L) 05/05/2023 0813    HGB 10 8 (L) 05/14/2023 0502    HGB 10 1 (L) 05/06/2023 0450    HGB 10 8 (L) 05/05/2023 0813    INR 3 13 (H) 05/13/2023 1501    WBC 5 21 05/14/2023 0502    WBC 5 48 05/06/2023 0450    WBC 5 72 05/05/2023 0813       Meds:    Current Facility-Administered Medications:   •  acetaminophen (TYLENOL) tablet 975 mg, 975 mg, Oral, Q8H [address], Jeanie Carmichael MD, 975 mg at 05/14/23 2233  •  albuterol (PROVENTIL HFA,VENTOLIN HFA) inhaler 2 puff, 2 puff, Inhalation, Q4H PRN, Jeanie Carmichael MD  •  allopurinol (ZYLOPRIM) tablet 100 mg, 100 mg, Oral, Daily, Jeanie Carmichael MD, 100 mg at 05/14/23 4599  •  amiodarone tablet 200 mg, 200 mg, Oral, Daily With Breakfast, Jeanie Carmichael MD, 200 mg at 05/14/23 1587  •  atorvastatin (LIPITOR) tablet 40 mg, 40 mg, Oral, Daily With Kandi Swain MD, 40 mg at 05/14/23 1718  •  calcium carbonate (TUMS) chewable tablet 500 mg, 500 mg, Oral, TID PRN, Jeanie Carmichael MD  •  cholecalciferol (VITAMIN D3) tablet 1,000 Units, 1,000 Units, Oral, Daily, Jeanie Carmichael MD, 1,000 Units at 05/14/23 3903  •  citalopram (CeleXA) tablet 20 mg, 20 mg, Oral, Daily, Jeanie Carmichael MD, 20 mg at 05/14/23 1943  •  docusate sodium (COLACE) capsule 100 mg, 100 mg, Oral, BID, Jeanie Carmichael MD, 100 mg at 05/14/23 1718  •  furosemide (LASIX) tablet 40 mg, 40 mg, Oral, Daily, Jeanie Carmichael MD, 40 mg at 05/14/23 1278  •  gabapentin (NEURONTIN) capsule 100 mg, 100 mg, Oral, HS, Jeanie Carmichael, MD, 100 mg at 05/14/23 2229  •  insulin detemir (LEVEMIR) subcutaneous injection 5 Units, 5 Units, Subcutaneous, HS, Jh Terrazas MD, 5 Units at 05/14/23 2229  •  insulin lispro (HumaLOG) 100 units/mL subcutaneous injection 1-5 Units, 1-5 Units, Subcutaneous, TID AC **AND** Fingerstick Glucose (POCT), , , TID AC, Jh Terrazas MD  •  insulin lispro (HumaLOG) 100 units/mL subcutaneous injection 1-5 Units, 1-5 Units, Subcutaneous, HS, Jh Terrazas MD  •  lidocaine (LIDODERM) 5 % patch 1 patch, 1 patch, Topical, Daily, Jh Terrazas MD, 1 patch at 05/14/23 0839  •  methocarbamol (ROBAXIN) tablet 250 mg, 250 mg, Oral, Q6H [address], Jh Terrazas MD, 250 mg at 05/15/23 0537  •  metoprolol succinate (TOPROL-XL) 24 hr tablet 25 mg, 25 mg, Oral, Daily, Jh Terrazas MD, 25 mg at 05/14/23 3513  •  montelukast (SINGULAIR) tablet 10 mg, 10 mg, Oral, HS, Jh Terrazas MD, 10 mg at 05/14/23 2229  •  naloxone (NARCAN) 0 04 mg/mL syringe 0 04 mg, 0 04 mg, Intravenous, Q1MIN PRN, Jh Terrazas MD  •  nystatin (MYCOSTATIN) powder, , Topical, BID, Amanda Pearl PA-C, Given at 05/14/23 1719  •  ondansetron (ZOFRAN-ODT) dispersible tablet 4 mg, 4 mg, Oral, Q6H PRN, Jh Terrazas MD  •  oxybutynin (DITROPAN-XL) 24 hr tablet 5 mg, 5 mg, Oral, Daily, Jh Terrazas MD, 5 mg at 05/14/23 3163  •  oxyCODONE (ROXICODONE) IR tablet 5 mg, 5 mg, Oral, Q4H PRN, Jh Terrazsa MD, 5 mg at 05/14/23 1719  •  oxyCODONE (ROXICODONE) split tablet 7 5 mg, 7 5 mg, Oral, Q4H PRN, Jh Terrazas MD, 7 5 mg at 05/14/23 2233  •  senna (SENOKOT) tablet 17 2 mg, 2 tablet, Oral, Daily, Jh Terrazas MD, 17 2 mg at 05/14/23 2155  •  sodium chloride tablet 1 g, 1 g, Oral, BID With Meals, Jh Terrazas MD, 1 g at 05/14/23 1719  •  warfarin (COUMADIN) tablet 2 5 mg, 2 5 mg, Oral, Daily (warfarin), Amanda Pearl PA-C, 2 5 mg at 05/14/23 1718    Blood Culture:   Lab Results   Component Value Date    BLOODCX No Growth After 5 Days  05/03/2023       Wound Culture:   No results found for: WOUNDCULT    Ins and Outs:  I/O last 24 hours: In: 80 [P O :580]  Out: -           Physical:  Vitals:    05/15/23 0539   BP: 103/53   Pulse: 59   Resp: 18   Temp: 98 4 °F (36 9 °C)   SpO2: 94%     Musculoskeletal: left Upper Extremity  · Coaptation splint in place  · TTP about proximal and midshaft humerus  · Sensation intact to median/radial/ulnar nerve distribution   · Motor intact anterior interosseous nerve/posterior interosseous nerve/median/radial/ulnar nerve distributions  · 2+ radial pulse, symmetric bilaterally  · Digits warm and well perfused  · Capillary refill < 2 seconds    Assessment:    68 y  o female 2 5 wks s/p fall with left proximal humerus fracture with extension into humeral shaft being managed non-operatiely  Patient doing well  Continue with non-operative management      Plan:  · NWB LUE  · Will transition to nielsen brace today  · PT/OT - No restrictions to shoulder and elbow ROM  · Pain control  · DVT ppx - per primary  · F/u with Dr Cruz Call in 4 weeks  · Dispo: [id number] [address] for discharge from ortho perspective    Bernardo Lobato MD

## 2023-05-15 NOTE — PCC CARE MANAGEMENT
5/23- Pt reports she was independent with ADLs IADLs and driving prior to admission  Pt lives in an apartment by herself but reports she spends most of her time with her [address] in his home that is 3 stories, 2 LIZ  Pt reports she has been to outpatient therapies in the past and has used [address] but does not recall where or which agencies  PCP is Willow Dugan, preferred pharmacy is [facility] [address]  5/30- Pt to [place] Thursday 6/1, with [facility] and DME

## 2023-05-15 NOTE — PLAN OF CARE
Reviewed    Problem: PAIN - ADULT  Goal: Verbalizes/displays adequate comfort level or baseline comfort level  Description: Interventions:  - Encourage patient to monitor pain and request assistance  - Assess pain using appropriate pain scale  - Administer analgesics based on type and severity of pain and evaluate response  - Implement non-pharmacological measures as appropriate and evaluate response  - Consider cultural and social influences on pain and pain management  - Notify physician/advanced practitioner if interventions unsuccessful or patient reports new pain  Outcome: Progressing     Problem: INFECTION - ADULT  Goal: Absence or prevention of progression during hospitalization  Description: INTERVENTIONS:  - Assess and monitor for signs and symptoms of infection  - Monitor lab/diagnostic results  - Monitor all insertion sites, i e  indwelling lines, tubes, and drains  - Monitor endotracheal if appropriate and nasal secretions for changes in amount and color  - [place] appropriate cooling/warming therapies per order  - Administer medications as ordered  - Instruct and encourage patient and family to use good hand hygiene technique  - Identify and instruct in appropriate isolation precautions for identified infection/condition  Outcome: Progressing  Goal: Absence of fever/infection during neutropenic period  Description: INTERVENTIONS:  - Monitor WBC    Outcome: Progressing     Problem: SAFETY ADULT  Goal: Patient will remain free of falls  Description: INTERVENTIONS:  - Educate patient/family on patient safety including physical limitations  - Instruct patient to call for assistance with activity   - Consult OT/PT to assist with strengthening/mobility   - Keep Call bell within reach  - Keep bed low and locked with side rails adjusted as appropriate  - Keep care items and personal belongings within reach  - Initiate and maintain comfort rounds  - Make Fall Risk Sign visible to staff  - Offer Toileting every 4 Hours, in advance of need  - Initiate/Maintain bed and chair alarm  - Apply yellow socks and bracelet for high fall risk patients  - Consider moving patient to room near nurses station  Outcome: Progressing  Goal: Maintain or return to baseline ADL function  Description: INTERVENTIONS:  -  Assess patient's ability to carry out ADLs; assess patient's baseline for ADL function and identify physical deficits which impact ability to perform ADLs (bathing, care of mouth/teeth, toileting, grooming, dressing, etc )  - Assess/evaluate cause of self-care deficits   - Assess range of motion  - Assess patient's mobility; develop plan if impaired  - Assess patient's need for assistive devices and provide as appropriate  - Encourage maximum independence but intervene and supervise when necessary  - Involve family in performance of ADLs  - Assess for home care needs following discharge   - Consider OT consult to assist with ADL evaluation and planning for discharge  - Provide patient education as appropriate  Outcome: Progressing  Goal: Maintains/Returns to pre admission functional level  Description: INTERVENTIONS:   - Set and communicate daily mobility goal to care team and patient/family/caregiver     - Collaborate with rehabilitation services on mobility goals if consulted  - Out of bed for toileting  - Record patient progress and toleration of activity level   Outcome: Progressing     Problem: DISCHARGE PLANNING  Goal: Discharge to home or other facility with appropriate resources  Description: INTERVENTIONS:  - Identify barriers to discharge w/patient and caregiver  - Arrange for needed discharge resources and transportation as appropriate  - Identify discharge learning needs (meds, wound care, etc )  - Arrange for interpretive services to assist at discharge as needed  - Refer to Case Management Department for coordinating discharge planning if the patient needs post-hospital services based on physician/advanced practitioner order or complex needs related to functional status, cognitive ability, or social support system  Outcome: Progressing     Problem: MOBILITY - ADULT  Goal: Maintain or return to baseline ADL function  Description: INTERVENTIONS:  -  Assess patient's ability to carry out ADLs; assess patient's baseline for ADL function and identify physical deficits which impact ability to perform ADLs (bathing, care of mouth/teeth, toileting, grooming, dressing, etc )  - Assess/evaluate cause of self-care deficits   - Assess range of motion  - Assess patient's mobility; develop plan if impaired  - Assess patient's need for assistive devices and provide as appropriate  - Encourage maximum independence but intervene and supervise when necessary  - Involve family in performance of ADLs  - Assess for home care needs following discharge   - Consider OT consult to assist with ADL evaluation and planning for discharge  - Provide patient education as appropriate  Outcome: Progressing  Goal: Maintains/Returns to pre admission functional level  Description: INTERVENTIONS:  - Set and communicate daily mobility goal to care team and patient/family/caregiver  - Collaborate with rehabilitation services on mobility goals if consulted  - Out of bed for toileting  - Record patient progress and toleration of activity level   Outcome: Progressing     Problem: Nutrition/Hydration-ADULT  Goal: Nutrient/Hydration intake appropriate for improving, restoring or maintaining nutritional needs  Description: Monitor and assess patient's nutrition/hydration status for malnutrition  Collaborate with interdisciplinary team and initiate plan and interventions as ordered  Monitor patient's weight and dietary intake as ordered or per policy  Utilize nutrition screening tool and intervene as necessary  Determine patient's food preferences and provide high-protein, high-caloric foods as appropriate       INTERVENTIONS:  - Monitor oral intake, urinary output, labs, and treatment plans  - Assess nutrition and hydration status and recommend course of action  - Evaluate amount of meals eaten  - Assist patient with eating if necessary   - Allow adequate time for meals  - Recommend/ encourage appropriate diets, oral nutritional supplements, and vitamin/mineral supplements  - Order, calculate, and assess calorie counts as needed  - Recommend, monitor, and adjust tube feedings and TPN/PPN based on assessed needs  - Assess need for intravenous fluids  - Provide specific nutrition/hydration education as appropriate  - Include patient/family/caregiver in decisions related to nutrition  Outcome: Progressing     Problem: Prexisting or High Potential for Compromised Skin Integrity  Goal: Skin integrity is maintained or improved  Description: INTERVENTIONS:  - Identify patients at risk for skin breakdown  - Assess and monitor skin integrity  - Assess and monitor nutrition and hydration status  - Monitor labs   - Assess for incontinence   - Turn and reposition patient  - Assist with mobility/ambulation  - Relieve pressure over bony prominences  - Avoid friction and shearing  - Provide appropriate hygiene as needed including keeping skin clean and dry  - Evaluate need for skin moisturizer/barrier cream  - Collaborate with interdisciplinary team   - Patient/family teaching  - Consider wound care consult   Outcome: Progressing

## 2023-05-15 NOTE — PCC OCCUPATIONAL THERAPY
Pt continues to present with impairments in activity tolerance, endurance, standing balance/tolerance, UE strength, and UE ROM  Additional functional barriers include LUE pain, and LUE NWB status  Pt is functioning at overall Mod A for ADLs and CS-CGA for fxnl mobility w/ SPC  Pt will continue to benefit from skilled OT services to address above mentioned barriers and maximize functional independence in baseline areas of occupation  Pt's daughter has participated in several FT sessions and able to provide 24 hour A at d/c  From OT standpoint, pt on track to d/c home w/ family support on Thursday, recommending [address] [place] [address]

## 2023-05-15 NOTE — DISCHARGE INSTR - AVS FIRST PAGE
DISCHARGE INSTRUCTIONS:    WEIGHT BEARING STATUS: Non-weight bearing Left upper extremity  Per Ortho: Pendulum swings and elbow and wrist motion in the Salgado brace are all appropriate at this time  ACTIVITY:   Please follow mobility, self care instructions as your were taught by inpatient rehabilitation therapists  Please continue using your brace and adaptive equipment  You will continue your rehabilitation with home care RN, PT, OT  Pendulum swings and elbow and wrist motion in the Salgado brace are all appropriate at this time  RESTRICTIONS:  No Driving  No Work      MEDICATION CHANGES:  Please follow new AVS list   Pay attention to dose changes  For diabetes: You were discontinued from all your medications and maintained on a diabetic diet  Please continue to check your blood glucose and record for your PCP to review and consider restarting medications if needed  You can resume your Coumadin dosing as you did previously at home  You are new Amiodarone for your heart  Your Toprol XL (metoprolol succinate) has been reduced to 25 mg daily  For pain: You are to continue Tylenol as needed for mild pain, Robaxin for muscle spasms, Gabapentin at night for nerve pain, and Oxycodone IR for severe pain only  PLEASE SEE YOUR PCP FOR ALL MEDICATION REFILLS      HOME CARE RN INSTRUCTIONS/WOUND CARE INSTRUCTIONS:  1  Right and left abdominal fold pannus is resolved candidiasis rash   No open areas suggested to continue with the interdry sheets to keep the area dry and prevent skin breakdown   2  Bilateral groin and inner thigh candidiasis rash is resolved   3  Sacral wound - remains a unstageable due to the mixed wound bed of slough and pink tissue   Rudkaterina Begin is 60% and 40% pink   Noted the wound bed to be improved and small in size   Reviewed the plan and shown the daughter on how to care for the area   4  Bilateral heels dry and intact   5   Right upper thigh area - hospital acquired [address] linear line related to the mesh panties on the thigh area   Applied maxorb and a ABD to protect and then cut the elastic of the mesh panties to prevent from being tight   Discussed and reviewed the plan with the daughter      Skin care plans:  1-Hydraguard to bilateral  heels BID and PRN  2-Elevate heels to offload pressure  3-Ehob cushion in chair when out of bed  4-Moisturize skin daily with skin nourishing cream   5-Turn/reposition q2h or when medically stable for pressure re-distribution on skin  6  Low air loss mattress   7  Cleanse sacral buttocks with soap and water then apply triad paste then top with a small allevyn foam stephanie with a T for treatment change every other day and as needed for soilage or dislodgement    8  Cleanse bilateral abdominal pannus with soap and water then completely dry   Cut strips of the interdry and place flat in the skin folds of the abdominal area leaving 2 inches outside of the skin fold  Take interdry out and cleanse with with soap and water pat dry well and place back in the fold   Replace every 3 days or sooner if soiled   Do not use creams or powders on the skin with the interdry   9  Right upper thigh area - cleanse with soap and water then apply maxorb on open area then top with a ABD and secure with the mesh panties   Cut a slit in the panties to prevent from being tight on the thigh area change every other day or if soiled   10   Follow up at the wound center upon discharge - order is placed   OTHER:  See below for orthopedic instructions             Discharge Instructions - Orthopedics  Madeleine Yepez 68 y o  female MRN: [id number]  Unit/Bed#: -01    Weight Bearing Status:                                           Non weight bearing left upper extremity  Pendulum swings and elbow and wrist motion in the Salgado brace are all appropriate at this time  DVT prophylaxis  None as already on Coumadin    Pain:  Continue analgesics as directed      Dressing Instructions:   Please keep clean, dry and intact until follow up - should maintain Salgado brace    Appt Instructions: If you do not have your appointment, please call the clinic at [phone] to schedule a 4 week follow up with Dr Margoth Bermudez  Otherwise followup as scheduled  Contact the office sooner if you experience any increased numbness/tingling in the extremities        Miscellaneous:  None

## 2023-05-15 NOTE — PROGRESS NOTES
05/15/23 1500   Pain Assessment   Pain Assessment Tool 0-10   Pain Score 7   Pain Location/Orientation Orientation: Left; Location: Arm   Pain Onset/Description Onset: Ongoing;Frequency: Intermittent   Patient's Stated Pain Goal No pain   Hospital Pain Intervention(s) Rest;Repositioned   Restrictions/Precautions   Precautions Bed/chair alarms; Fall Risk; Airborne/isolation;Contact/isolation;Pain;Supervision on toilet/commode   LUE Weight Bearing Per Order NWB   Braces or Orthoses Splint  (LUE)   Cognition   Overall Cognitive Status WFL   Arousal/Participation Alert   Attention Within functional limits   Orientation Level Oriented X4   Memory Within functional limits   Following Commands Follows one step commands without difficulty   Sit to Lying   Comment declined as she just got midline placed   Transfer Bed/Chair/Wheelchair   Adaptive Equipment Michael Walker   Car Transfer   Reason if not Attempted Medical concerns   Car Transfer CARE Score 88   Walk 50 Feet with Two Turns   Reason if not Attempted Safety concerns   Walk 50 Feet with Two Turns CARE Score 88   Walk 150 Feet   Reason if not Attempted Safety concerns   Walk 150 Feet CARE Score 88   Walking 10 Feet on Uneven Surfaces   Reason if not Attempted Safety concerns   Walking 10 Feet on Uneven Surfaces CARE Score 88   Ambulation   Primary Mode of Locomotion Prior to Admission Walk   Does the patient walk? 2   Yes   Wheel 50 Feet with Two Turns   Reason if not Attempted Activity not applicable   Wheel 50 Feet with Two Turns CARE Score 9   Wheel 150 Feet   Reason if not Attempted Activity not applicable   Wheel 243 Feet CARE Score 9   Curb or Single Stair   Reason if not Attempted Safety concerns   1 Step (Curb) CARE Score 88   4 Steps   Reason if not Attempted Activity not applicable   4 Steps CARE Score 9   12 Steps   Reason if not Attempted Activity not applicable   12 Steps CARE Score 9   Therapeutic Interventions   Strengthening Supine heel slides, hip ABD/ADD, ankle DF/PF, glute sets, quad sets and AAROM LAQ BLE 3 x10 reps  Assessment   Treatment Assessment Pt scheduled for 2:30 session, upon getting PPE on, the nurse to give pt a midline came to door requesting to see her first as they leave at 3  Post Midline pt asking to not move much as they just completes line but agreeable to supine TE  Pt limited by pain throughout session  Pt is unable to roll but able to shift weight in bed enough to be able to place foam wedge under her R side with MAXA  Pt will cont POC as tolerated with cont focus on functional transfers, increased act tolerance, improved gait and bed mobility to decrease burden of care  Problem List Decreased strength;Decreased endurance; Impaired balance;Decreased mobility; Decreased coordination;Decreased skin integrity   Barriers to Discharge Inaccessible home environment;Decreased caregiver support   PT Barriers   Functional Limitation Car transfers;Stair negotiation;Standing;Transfers; Walking   Plan   Treatment/Interventions Functional transfer training;LE strengthening/ROM; Therapeutic exercise; Endurance training;Patient/family training; Compensatory technique education;Gait training;Bed mobility   Progress Slow progress, decreased activity tolerance   Recommendation   PT Discharge Recommendation   (TBD)   PT Therapy Minutes   PT Time In 1500   PT Time Out 1545   PT Total Time (minutes) 45   PT Mode of treatment - Individual (minutes) 45   PT Mode of treatment - Concurrent (minutes) 0   PT Mode of treatment - Group (minutes) 0   PT Mode of treatment - Co-treat (minutes) 0   PT Mode of Treatment - Total time(minutes) 45 minutes   PT Cumulative Minutes 165   Therapy Time missed   Time missed?  Yes   Amount of time missed 15   Reason for time missed Medical procedure  (midline)

## 2023-05-15 NOTE — PCC PHYSICAL THERAPY
5/29/23  Pt cont to demo slow but consistent progress in PT with ongoing barriers to indep include ongoing  L UE NWB precaution, pt's body habitus, LE/UE strength and ROM deficits, gait dysfunction and dec endurance  Pt also presents with multiple skin issues so will benefit from a hospital bed with low air mattress to prevent worsening of current pressure ulcer on buttocks and prevent additional skin integrity breakdown  Pt is currently functioning at -S for level household distance mobilities x 60' at best while still requires mod A for sit to supine and car transfer tasks and varies from min A to CS with sit to stand depending on surface height  Family training initiated and will be completed on 5/30 in preparation for home d/c on 6/1/23 with daughter Vahe Og to provide Assist/S at pt's apt  DME for a w/c and hospital bed submitted while pt already has a cane which is to be use for household distance mobilities as tolerated  Recommend home PT services at d/c with HEP previously given

## 2023-05-16 LAB
GLUCOSE SERPL-MCNC: 103 MG/DL (ref 65–140)
GLUCOSE SERPL-MCNC: 114 MG/DL (ref 65–140)
GLUCOSE SERPL-MCNC: 150 MG/DL (ref 65–140)
GLUCOSE SERPL-MCNC: 87 MG/DL (ref 65–140)
INR PPP: 3.13 (ref 0.84–1.19)
PROTHROMBIN TIME: 32.5 SECONDS (ref 11.6–14.5)

## 2023-05-16 PROCEDURE — 85610 PROTHROMBIN TIME: CPT | Performed by: PHYSICIAN ASSISTANT

## 2023-05-16 PROCEDURE — 97116 GAIT TRAINING THERAPY: CPT

## 2023-05-16 PROCEDURE — 97110 THERAPEUTIC EXERCISES: CPT

## 2023-05-16 PROCEDURE — 97530 THERAPEUTIC ACTIVITIES: CPT

## 2023-05-16 PROCEDURE — 82948 REAGENT STRIP/BLOOD GLUCOSE: CPT

## 2023-05-16 PROCEDURE — 99232 SBSQ HOSP IP/OBS MODERATE 35: CPT | Performed by: INTERNAL MEDICINE

## 2023-05-16 PROCEDURE — 99233 SBSQ HOSP IP/OBS HIGH 50: CPT | Performed by: PHYSICAL MEDICINE & REHABILITATION

## 2023-05-16 PROCEDURE — 97535 SELF CARE MNGMENT TRAINING: CPT

## 2023-05-16 RX ADMIN — Medication 7.5 MG: at 22:26

## 2023-05-16 RX ADMIN — DOCUSATE SODIUM 100 MG: 100 CAPSULE, LIQUID FILLED ORAL at 18:43

## 2023-05-16 RX ADMIN — MONTELUKAST 10 MG: 10 TABLET, FILM COATED ORAL at 22:27

## 2023-05-16 RX ADMIN — ONDANSETRON 4 MG: 4 TABLET, ORALLY DISINTEGRATING ORAL at 07:51

## 2023-05-16 RX ADMIN — ACETAMINOPHEN 975 MG: 325 TABLET ORAL at 16:06

## 2023-05-16 RX ADMIN — Medication 1000 UNITS: at 08:54

## 2023-05-16 RX ADMIN — ACETAMINOPHEN 975 MG: 325 TABLET ORAL at 08:53

## 2023-05-16 RX ADMIN — ATORVASTATIN CALCIUM 40 MG: 40 TABLET, FILM COATED ORAL at 16:06

## 2023-05-16 RX ADMIN — NYSTATIN: 100000 POWDER TOPICAL at 22:28

## 2023-05-16 RX ADMIN — DOCUSATE SODIUM 100 MG: 100 CAPSULE, LIQUID FILLED ORAL at 08:53

## 2023-05-16 RX ADMIN — SODIUM CHLORIDE 1 G: 1 TABLET ORAL at 16:07

## 2023-05-16 RX ADMIN — ALLOPURINOL 100 MG: 100 TABLET ORAL at 08:53

## 2023-05-16 RX ADMIN — INSULIN LISPRO 1 UNITS: 100 INJECTION, SOLUTION INTRAVENOUS; SUBCUTANEOUS at 22:28

## 2023-05-16 RX ADMIN — GABAPENTIN 100 MG: 100 CAPSULE ORAL at 22:27

## 2023-05-16 RX ADMIN — ONDANSETRON 4 MG: 4 TABLET, ORALLY DISINTEGRATING ORAL at 16:08

## 2023-05-16 RX ADMIN — METHOCARBAMOL TABLETS 250 MG: 500 TABLET, COATED ORAL at 05:34

## 2023-05-16 RX ADMIN — OXYBUTYNIN 5 MG: 5 TABLET, FILM COATED, EXTENDED RELEASE ORAL at 08:53

## 2023-05-16 RX ADMIN — METHOCARBAMOL TABLETS 250 MG: 500 TABLET, COATED ORAL at 18:43

## 2023-05-16 RX ADMIN — FUROSEMIDE 40 MG: 40 TABLET ORAL at 08:53

## 2023-05-16 RX ADMIN — METHOCARBAMOL TABLETS 250 MG: 500 TABLET, COATED ORAL at 11:07

## 2023-05-16 RX ADMIN — METHOCARBAMOL TABLETS 250 MG: 500 TABLET, COATED ORAL at 22:26

## 2023-05-16 RX ADMIN — SODIUM CHLORIDE 1 G: 1 TABLET ORAL at 08:56

## 2023-05-16 RX ADMIN — LIDOCAINE 5% 1 PATCH: 700 PATCH TOPICAL at 08:54

## 2023-05-16 RX ADMIN — WARFARIN SODIUM 2.5 MG: 2.5 TABLET ORAL at 18:43

## 2023-05-16 RX ADMIN — MICONAZOLE NITRATE: 20 CREAM TOPICAL at 22:28

## 2023-05-16 RX ADMIN — AMIODARONE HYDROCHLORIDE 200 MG: 200 TABLET ORAL at 08:53

## 2023-05-16 RX ADMIN — METOPROLOL SUCCINATE 25 MG: 25 TABLET, EXTENDED RELEASE ORAL at 08:53

## 2023-05-16 RX ADMIN — CITALOPRAM HYDROBROMIDE 20 MG: 20 TABLET ORAL at 08:54

## 2023-05-16 RX ADMIN — ACETAMINOPHEN 975 MG: 325 TABLET ORAL at 22:27

## 2023-05-16 NOTE — CASE MANAGEMENT
Case Management/Team Update:  Cm met with pt at bedside to introduce role and complete cm open:    Pt reports she was independent with ADLs IADLs and driving prior to admission  Pt lives in an apartment by herself but reports she spends most of her time with her [address] in his home that is 3 stories, 2 LIZ  Pt reports she has been to outpatient therapies in the past and has used [facility] but does not recall where or which agencies  PCP is Viviana Curtis, [facility] 5[facility]16, cm faxed clinicals requesting additional week of coverage  Awaiting determination  The patient was educated that other members of the patient's support are able to ask questions and observe as the patient wished  The patient was educated on the rehabilitation process including therapy program, the interdisciplinary team, and weekly team meetings  Estimated length of stay was reviewed with the patient as well as expectations of discussions of discharge planning  The role of the  was reviewed including providing care coordination, discharge planning and discharge facilitation  IMM was reviewed with the patient and a copy was provided for their reference  The patient verbalized understanding of the information provided and denied any further questions at this time  CM will continue to follow and assist the patient throughout their rehabilitation stay

## 2023-05-16 NOTE — PROGRESS NOTES
Internal Medicine Progress Note  Patient: Breanna Coronado  Age/sex: 68 y o  female  Medical Record #: [id number]      ASSESSMENT/PLAN: (Interval History)  Breanna Coronado is seen and examined and management for following issues:    Lt proximal humerus fracture  • NWB to the LUE  • Immobilizer placed to LUE by orthopedics  • Pain control and therapy per PMR  • Follow-up with Orthopedics in 4 weeks  Rt 8-9 rib fx  •  Pain control per PMR  • Encourage IS   • O2 as needed  Rt eyebrow laceration  • Monitor lacerations  • Local wound care  DM type 2  • HA1C 6 4  • Home: [place]  2x daily  • Here: SSI  • Will monitor and add back medications as needed     History of mechanical AVR  • Goal INR 2 5 - 3 5  • INR 3 1  • Continue Coumadin 2 5mg daily  • Pt takes 2 5mg Wednesday and Saturday and 5mg all other days of the week at home  • Continue to monitor  • Repeat INR Friday     Hyponatremia  • Na 133  • stable     Ventricular arrhythmia  • One episode 5/2/23 and pt received a shock from ICD  • Continue metoprolol and amiodarone  • Continue Coumadin  Bradycardia  • Diltiazem discontinued and metoprolol dose lowered  • She will need outpt follow-up with Cardiology  [facility]  • HR stable     COVID-19  • Tested positive 5/13/23  • Pt has a cough which she reports is chronic  • Contact and airborne precautions for 10 days  Buttock wound/labia wound  · Followed by wound care  · Frequent position changes/specialized mattress  · Will monitor  DC planning: TBD  The above assessment and plan was reviewed and updated as determined by my evaluation of the patient on 5/16/2023      Labs:   Results from last 7 days   Lab Units 05/15/23  1513 05/14/23  0502   WBC Thousand/uL 4 89 5 21   HEMOGLOBIN g/dL 10 3* 10 8*   HEMATOCRIT % 30 8* 34 7*   PLATELETS Thousands/uL 184 208     Results from last 7 days   Lab Units 05/15/23  1628 05/14/23  0502   SODIUM mmol/L 133* 132* POTASSIUM mmol/L 4 6 4 9   CHLORIDE mmol/L 106 105   CO2 mmol/L 27 26   BUN mg/dL 24 24   CREATININE mg/dL 0 85 0 75   CALCIUM mg/dL 7 9* 8 1*         Results from last 7 days   Lab Units 05/16/23  0559 05/15/23  1533   INR  3 13* 3 04*     Results from last 7 days   Lab Units 05/16/23  0545 05/15/23  2102 05/15/23  1645   POC GLUCOSE mg/dl 87 129 86       Review of Scheduled Meds:  Current Facility-Administered Medications   Medication Dose Route Frequency Provider Last Rate   • acetaminophen  975 mg Oral [facility] Britta Harley MD     • albuterol  2 puff Inhalation Q4H PRN Britta Harley MD     • allopurinol  100 mg Oral Daily Britta Harley MD     • amiodarone  200 mg Oral Daily With Breakfast Britta Harley MD     • atorvastatin  40 mg Oral Daily With Kristine Strickland MD     • calcium carbonate  500 mg Oral TID PRN Britta Harley MD     • cholecalciferol  1,000 Units Oral Daily Britta Harley MD     • citalopram  20 mg Oral Daily Britta Harley MD     • docusate sodium  100 mg Oral BID Britta Harley MD     • furosemide  40 mg Oral Daily Britta Harley MD     • gabapentin  100 mg Oral HS Britta Harley MD     • insulin lispro  1-5 Units Subcutaneous TID [facility] Britta Harley MD     • insulin lispro  1-5 Units Subcutaneous HS Britta Harley MD     • lidocaine  1 patch Topical Daily Britta Harley MD     • methocarbamol  250 mg Oral Q6H [address] Britta Harley MD     • metoprolol succinate  25 mg Oral Daily Britta Harley MD     • LICO ANTIFUNGAL   Topical BID JOSEPHINE Hughes     • montelukast  10 mg Oral HS Britta Harley MD     • naloxone  0 04 mg Intravenous Q1MIN PRN Britta Harley MD     • nystatin   Topical BID Amanda Pearl PA-C     • ondansetron  4 mg Oral Q6H PRN Britta Harley MD     • oxybutynin  5 mg Oral Daily Britta Harley MD     • oxyCODONE  5 mg Oral Q4H PRN Britta Harley MD     • oxyCODONE  7 5 mg Oral Q4H PRN Britta Harley MD     • senna  2 tablet Oral Daily Gloria Malik MD     • sodium chloride  1 g Oral BID With Meals Gloria Malik MD     • warfarin  2 5 mg Oral Daily (warfarin) Amanda Pearl PA-C         Subjective/ HPI: Patient seen and examined  Patients overnight issues or events were reviewed with nursing or staff during rounds or morning huddle session  New or overnight issues include the following:     Pt seen in her room  Therapy is ongoing, she is participating but states it's painful  She likes her new brace to her LUE, denies pain in the sacral area, no further bleeding noted  ROS:   A 10 point ROS was performed; negative except as noted above         Imaging:     XR humerus left   Final Result by Hari Vazquez MD (05/15 1613)   Oblique spiral fracture of the shaft of the humerus with apex medial angulation   No glenohumeral dislocation         Workstation performed: [id number]         XR shoulder 2+ vw left   Final Result by Hari Vazquez MD (05/15 1614)      Oblique spiral fracture of the proximal humeral shaft with stable alignment      Workstation performed: [id number]             *Labs /Radiology studies Reviewed  *Medications  reviewed and reconciled as needed  *Please refer to order section for additional ordered labs studies  *Case discussed with primary attending during morning huddle case rounds    Physical Examination:  Vitals:   Vitals:    05/15/23 2112 05/16/23 0601 05/16/23 0853 05/16/23 0914   BP: 104/55 151/77 136/58 136/58   BP Location: Right arm Right arm     Pulse: 60 64 69 69   Resp: 20 20     Temp: 98 4 °F (36 9 °C) 97 7 °F (36 5 °C)     TempSrc: Oral Oral     SpO2: 97% 95%     Height:         GEN: NAD; pleasant  NEURO: Alert and oriented x4; appropriate  HEENT: Pupils are equal/reactive; normocephalic, face is symmetrical, hearing intact  CV: S1 S2 regular, +audible click; no murmur/rub/gallops, 1/4 pedal pulses, trace b/l edema present  RESP: Lungs are clear bilaterally, no wheezes rales or rhonchi, on room air, no distress, respirations are easy and non labored  GI: Abdomen is obese, soft, non tender, +BS x4; non distended  : Voiding without issues  MUSC: Moves all extremities except LUE NWB  SKIN: pink, warm, poor turgor, eyebrow laceration stable; sacral wound refer to media       The above physical exam was reviewed and updated as determined by my evaluation of the patient on 5/16/2023  Invasive Devices     Peripheral Intravenous Line  Duration           Long-Dwell Peripheral IV (Midline) 45/07/61 Right Cephalic Vein <1 day                   VTE Pharmacologic Prophylaxis: Warfarin (Coumadin)  Code Status: Level 1 - Full Code  Current Length of Stay: 3 day(s)      Total time spent:  30 minutes with more than 50% spent counseling/coordinating care  Counseling includes discussion with patient re: progress  and discussion with patient of his/her current medical state/information  Coordination of patient's care was performed in conjunction with primary service  Time invested included review of patient's labs, vitals, and management of their comorbidities with continued monitoring  In addition, this patient was discussed with medical team including physician and advanced extenders  The care of the patient was extensively discussed and appropriate treatment plan was formulated unique for this patient  Medical decision making for the day was made by supervising physician unless otherwise noted in their attestation statement  ** Please Note:  voice to text software may have been used in the creation of this document   Although proof errors in transcription or interpretation are a potential of such software**

## 2023-05-16 NOTE — PROGRESS NOTES
PM&R PROGRESS NOTE:  Toma Tomas 68 y o  female MRN: [id number]  Unit/Bed#: -01 Encounter: [id number]        Rehab Diagnosis: Impairment of mobility, safety and Activities of Daily Living (ADLs) due to Major Multiple Trauma:  14 9  Other Multiple Trauma Multiple rib fractures with acute pain, improved acute hypoxic respiratory failure, and L humeral fracture NWB in coaptation splint       SUBJECTIVE: Patient seen face-to-face today in her room  She is sitting in a wheelchair and offloading by leaning forward  She reports she slept very well last night  She reports no shortness of breath but continues to have a mild cough which she states preceded her hospitalization  Denies fevers or chills  ASSESSMENT: Stable, progressing      PLAN:    Rehabilitation  • Functional deficits: Left upper extremity weakness, impaired mobility, impaired self-care, impaired endurance, impaired strength  • Continue current rehabilitation plan of care to maximize function  I personally attended, reviewed, and discussed medical and functional updates in team conference today  Please refer to advance care planning note for details  • Estimated Discharge: TBD, estimated length of stay 2-3 weeks    Current function:  Physical Therapy Occupational Therapy Speech Therapy   Weight Bearing Status: Non-weight bearing (LUE)  Transfers: Total Assistance, Assist of 2  Bed Mobility: Maximum Assistance, Total Assistance, Assist of 2  Amulation Distance (ft): 20 feet  Ambulation: Assist of 2, Moderate Assistance  Assistive Device for Ambulation: Michael [address] for Ramp: Wheelchair  Discharge Recommendations:  (TBD)   Eating: Supervision  Grooming: Total Assistance  Bathing: Total Assistance, Assist of 2  Bathing: Total Assistance, Assist of 2  Upper Body Dressing: Total Assistance  Lower Body Dressing: Total Assistance, Assist of 2  Toileting: Total Assistance  Toilet Transfer:  Moderate Assistance, Maximum Assistance  Cognition: Within Defined Limits  Orientation: Person, Place, Time, Situation                 DVT prophylaxis  • Managed on warfarin  • Goal INR 2-3  • INR = 3 1    Bladder plan  • Continent  • Limit pure wick usage if possible due to suspected labial small cut    Bowel plan  • Continent      * Humerus fracture  Assessment & Plan  2/2 mechanical fall  Proximal humerus shaft spiral fracture, displaced and angulated  Ortho attempted closed reduction  Ultimately placed in coaptation splint on 4/27 and managed non-operatively  NWB LUE  Pain Control as detailed below  No Vitamin D level in chart or unmerged chart recently   - At home was on daily Vitamin D supplement 1000 units - restarted   - 5/14 Vitamind D level is 50 7   Due for 2 week follow-up  Imaging: Personally reviewed  Redemonstration of oblique spiral fracture of the shaft of the humerus with apex medial angulation  No glenohumeral dislocation  Appreciate orthopedics evaluating patient 5/15/2023  Replaced her splint with a Salgado brace  Cleared by orthopedics to provide range of motion to elbow and wrist without restriction as tolerated  Follow-up with orthopedics in 4 weeks around 6/15/2023    Ventricular arrhythmia  Assessment & Plan  On 5/2 developed Afib with RR which degenerated to VT then VF  She received shock from her AICD ([place], Single Chamber ICD)  Seen by Cards who adjusted her device, and recommended restarting home Metoprolol and Amio load  Was also on digoxin  - Toprol dose adjusted and digoxin discontinued after developing junctional bradycardia later in stay  Here: Toprol XL 25mg daily, Amio 200mg daily starting tomorrow for 28 days  Monitor cardiac status during therapies   Monitor lytes   Keep K > 4, Mag > 2  Will plan for outpatient f/u with EP    Difficult intravenous access  Assessment & Plan  Midline placed by IR on 5/15/23  Need for blood draws frequently to follow electrolytes, INR  Need for IV access given her recent ventricular arrhythmia and AICD firing, importance of optimizing electrolytes, current COVID 19 virus  Acute COVID-19  Assessment & Plan  Diagnosed on screening test on 5/13  Minimally symptomatic (does have a cough)  Afebrile and on RA  10 days isolation per protocol through 5/22/2023 (last day)  Monitor respiratory status   - Has chronic issues with urinary urgency/frequency  - Added Q4hr timed voids so we can help her get to the commode in a timely manner    -Stable and has not required oxygen   -Compliant with utilizing incentive spirometer    CHAS (obstructive sleep apnea)  Assessment & Plan  Does not use CPAP at home  Consider Noc Ox overnight while here  Class 2 obesity in adult  Assessment & Plan  Nutrition consulted  Counseling    Osteoporosis with current pathological fracture with routine healing  Assessment & Plan  Managed by [employer] [facility] Endocrinology  History of L5 pathologic fracture  Has been on Alendronate once weekly since 2017  Would confirm with her what day she normally takes it prior to restarting  Will confirm that it is ok to restart with Orthopedics first, as some surgeons prefer to wait 6 weeks after acute fracture prior to restarting    - Per Dr Lili Allen ok to resume at anytime  - She normally takes it on Thursdays  Would complete through 6/2023  Per last Endocrine note in 6/2022, they were going to continue for 1 more year and then stop  She was due for DXA this May  Asthma  Assessment & Plan  No acute exacerbation  Home: Singulair 10mg QHS  Here: Same, PRN albuterol  No acute exacerbation  Monitor respiratory status    Pressure injury of buttock, unstageable (HCC)  Assessment & Plan  Present on admission  Wound care following while at [address] [place] [address] Place local wound care as follows:  • Evolving DTI to the b/l sacro-buttocks  Wound is POA to ARC unit  Wound continues to evolve  Noted full thickness skin loss with devitalized tissue  ?  Patient desires to defer bedside debridement at this time, she states she doesn't think she will be able to be on her side for that long due to pain  ? Continue with Triad paste and foam dressing  ? Pressure relief   ? P-500 mattress  • Candidiasis rash noted to the b/l medial thighs and perineum, no open wound or active bleeding at time of assessment  ? Will recommend pascual anti-fungal cream  • ITD with candidasis to the b/l abdominal pannus with partial thickness skin loss  ? Will recommend InterDry sheets     Frequent offloading in chair and in bed  P500 mattress  Optimize nutrition    Atrial fibrillation with RVR ([address] )  Assessment & Plan  Initially per AICD interrogation with Afib RVR that degenerated into VT/VF  Unclear burden  Here: Toprol XL 25mg daily, Fully anticoagulated on warfarin (See mechanical AV), amiodarone 200mg daily  Monitor and adjust as appropriate  Currently examines in NSR  IM consulted to assist with management  Outpatient f/u with EP    Abnormal CT scan  Assessment & Plan  - 4/27 CT CAP:           - Incidentally noted is a incarcerated/partially obstructed periumbilic ventral hernia, (image 146 series 9)          - Septated cyst anterior aspect of the right kidney, consider evaluation with ultrasound or MRI for further characterization (image 136 series 9)          - Dilated ascending aorta measuring 4 6 cm, evaluation with cardiothoracic surgery on nonemergent basis  - Follow up with PCP and CT surgery outpatient for findings  - Patient informed of abnormal results on 5/11: she is aware of ventral hernia but not aortic aneurysm or renal cyst         Hyponatremia  Assessment & Plan  [address] 125 on 5/9  5/15 = 133 and stable (previously 132)  Alexander by Trauma to be 2/2 poor PO intake and furosemide  Started on sodium tabs (weaning down)  Monitor and adjust as appropriate  IM consulted and assisting with management       Bradycardia  Assessment & Plan  Developed junctional bradycardia in hospital after restarting "home Toprol XL and digoxin  - Cards stopped digoxin  - Toprol XL decreased to 25mg daily  Monitor  Type 2 diabetes mellitus ([facility] )  Assessment & Plan    Home: Sitagliptin-Metformin 50-50mg  Here: Discontinue Levemir due to hypoglycemia, continue sliding scale insulin and diabetic diet  Diabetic Diet  Nutrition Consult  Attempt to resume oral meds here and get off insulin  IM consulted to assist with management  Outpatient f/u with PCP  Fracture of multiple ribs of right side  Assessment & Plan  R 8th and 9th rib possible fractures  Rib fracture protocol  Pain control with lidoderm patch  Incentive Spirometer  Monitor respiratory status  Laceration of right eyebrow  Assessment & Plan  Healing, monitor  Steri-Strips in place    H/O mechanical aortic valve replacement  Assessment & Plan  Managed on Coumadin  Goal INR 2 5-3 5  INR 3 1  IM consulted to assist with management of Coumadin dosing  Acute pain due to trauma  Assessment & Plan  R rib fractures, L humerus fracture  - Lidoderm patch for ribs  - Oxycodone 5-7 5mg PRN  - Robaxin 250mg Q6hr scheduled  - Gabapentin 100mg QHS (may be able to discontinue while here)  - Tylenol 975mg Q8hrs scheduled  APS assisted inpatient  Adjust as appropriate while here  Appreciate IM consultants medical co-management  Labs, medications, and imaging personally reviewed  ROS:  A ten point review of systems was completed on 05/16/23 and pertinent positives are listed in subjective section  All other systems reviewed were negative  OBJECTIVE:   /58   Pulse 69   Temp 97 7 °F (36 5 °C) (Oral)   Resp 20   Ht 4' 10\" (1 473 m)   SpO2 95%   BMI 39 29 kg/m²     Physical Exam  Vitals and nursing note reviewed  Constitutional:       General: She is not in acute distress  Appearance: She is obese  HENT:      Head: Normocephalic        Comments: Laceration with Steri-Strips     Nose: Nose normal       Mouth/Throat:      Mouth: Mucous " membranes are moist    Eyes:      Conjunctiva/sclera: Conjunctivae normal    Cardiovascular:      Rate and Rhythm: Normal rate and regular rhythm  Pulses: Normal pulses  Heart sounds: Normal heart sounds  Pulmonary:      Effort: Pulmonary effort is normal       Breath sounds: Normal breath sounds  No wheezing or rales  Comments: Occasional cough, semiproductive  Abdominal:      General: Bowel sounds are normal  There is no distension  Palpations: Abdomen is soft  Tenderness: There is no abdominal tenderness  Musculoskeletal:         General: Swelling (lue), tenderness and signs of injury present  Cervical back: Neck supple  Comments: Sarimiento Brace securely in place, patient more comfortable    Skin:     General: Skin is warm  Comments: See photos below -skin buttocks pannus, perineum   Neurological:      Mental Status: She is alert and oriented to person, place, and time  Comments: Proximal musculature weakness bilateral lower extremities graded as a 3 -/5  Left upper extremity not tested at the shoulder, 2/5 with elbow flexion elbow extension pain limited    Sensation to light touch intact  Neurovascular exam left upper extremity distally intact   Psychiatric:         Mood and Affect: Mood normal                                     Lab Results   Component Value Date    WBC 4 89 05/15/2023    HGB 10 3 (L) 05/15/2023    HCT 30 8 (L) 05/15/2023     (H) 05/15/2023     05/15/2023     Lab Results   Component Value Date    SODIUM 133 (L) 05/15/2023    K 4 6 05/15/2023     05/15/2023    CO2 27 05/15/2023    BUN 24 05/15/2023    CREATININE 0 85 05/15/2023    GLUC 100 05/15/2023    CALCIUM 7 9 (L) 05/15/2023     Lab Results   Component Value Date    INR 3 13 (H) 05/16/2023    INR 3 04 (H) 05/15/2023    INR 3 13 (H) 05/13/2023    PROTIME 32 5 (H) 05/16/2023    PROTIME 31 7 (H) 05/15/2023    PROTIME 32 5 (H) 05/13/2023           Current Facility-Administered Medications:   •  acetaminophen (TYLENOL) tablet 975 mg, 975 mg, Oral, Q8H [address], Bart Bhatia MD, 975 mg at 05/16/23 0853  •  albuterol (PROVENTIL HFA,VENTOLIN HFA) inhaler 2 puff, 2 puff, Inhalation, Q4H PRN, Bart Bhatia MD  •  allopurinol (ZYLOPRIM) tablet 100 mg, 100 mg, Oral, Daily, Bart Bhatia MD, 100 mg at 05/16/23 7904  •  amiodarone tablet 200 mg, 200 mg, Oral, Daily With Breakfast, Bart Bhatia MD, 200 mg at 05/16/23 8614  •  atorvastatin (LIPITOR) tablet 40 mg, 40 mg, Oral, Daily With Yusuf Aguero MD, 40 mg at 05/15/23 1643  •  calcium carbonate (TUMS) chewable tablet 500 mg, 500 mg, Oral, TID PRN, Bart Bhatia MD  •  cholecalciferol (VITAMIN D3) tablet 1,000 Units, 1,000 Units, Oral, Daily, Bart Bhatia MD, 1,000 Units at 05/16/23 0854  •  citalopram (CeleXA) tablet 20 mg, 20 mg, Oral, Daily, Bart Bhatia MD, 20 mg at 05/16/23 0854  •  docusate sodium (COLACE) capsule 100 mg, 100 mg, Oral, BID, Bart Bhatia MD, 100 mg at 05/16/23 1994  •  furosemide (LASIX) tablet 40 mg, 40 mg, Oral, Daily, Bart Bhatia MD, 40 mg at 05/16/23 2097  •  gabapentin (NEURONTIN) capsule 100 mg, 100 mg, Oral, HS, Bart Bhatia MD, 100 mg at 05/15/23 2215  •  insulin lispro (HumaLOG) 100 units/mL subcutaneous injection 1-5 Units, 1-5 Units, Subcutaneous, TID AC **AND** Fingerstick Glucose (POCT), , , TID AC, Bart Bhatia MD  •  insulin lispro (HumaLOG) 100 units/mL subcutaneous injection 1-5 Units, 1-5 Units, Subcutaneous, HS, Bart Bhatia MD  •  lidocaine (LIDODERM) 5 % patch 1 patch, 1 patch, Topical, Daily, Bart Bhatia MD, 1 patch at 05/16/23 0854  •  methocarbamol (ROBAXIN) tablet 250 mg, 250 mg, Oral, Q6H [address], Bart Bhatia MD, 250 mg at 05/16/23 1107  •  metoprolol succinate (TOPROL-XL) 24 hr tablet 25 mg, 25 mg, Oral, Daily, Bart Bhatia MD, 25 mg at 05/16/23 0853  •  moisture barrier miconazole 2% cream (aka LICO MOISTURE BARRIER ANTIFUNGAL CREAM), , Topical, BID, Malva JOSEPHINE Gama, Given at 05/15/23 1907  •  montelukast (SINGULAIR) tablet 10 mg, 10 mg, Oral, HS, Jonathan Hernandez MD, 10 mg at 05/15/23 2215  •  naloxone (NARCAN) 0 04 mg/mL syringe 0 04 mg, 0 04 mg, Intravenous, Q1MIN PRN, Jonathan Hernandez MD  •  nystatin (MYCOSTATIN) powder, , Topical, BID, Amanda Pearl PA-C, Given at 05/15/23 1907  •  ondansetron (ZOFRAN-ODT) dispersible tablet 4 mg, 4 mg, Oral, Q6H PRN, Jonathan Hernandez MD, 4 mg at 05/16/23 0751  •  oxybutynin (DITROPAN-XL) 24 hr tablet 5 mg, 5 mg, Oral, Daily, Jonathan Hernandez MD, 5 mg at 05/16/23 0097  •  oxyCODONE (ROXICODONE) IR tablet 5 mg, 5 mg, Oral, Q4H PRN, Jonathan Hernandez MD, 5 mg at 05/14/23 1719  •  oxyCODONE (ROXICODONE) split tablet 7 5 mg, 7 5 mg, Oral, Q4H PRN, Jonathan Hernandez MD, 7 5 mg at 05/15/23 2256  •  senna (SENOKOT) tablet 17 2 mg, 2 tablet, Oral, Daily, Jonathan Hernandez MD, 17 2 mg at 05/14/23 9939  •  sodium chloride tablet 1 g, 1 g, Oral, BID With Meals, Jonathan Hernandez MD, 1 g at 05/16/23 5328  •  warfarin (COUMADIN) tablet 2 5 mg, 2 5 mg, Oral, Daily (warfarin), Amanda Pearl PA-C, 2 5 mg at 05/15/23 1643    Past Medical History:   Diagnosis Date   • Asthma    • Diabetes ([facility] )    • Hypertension        Patient Active Problem List    Diagnosis Date Noted   • Humerus fracture 04/27/2023   • Ventricular arrhythmia 05/04/2023   • Difficult intravenous access 05/15/2023   • Acute COVID-19 05/14/2023   • Atrial fibrillation with RVR ([facility] ) 05/13/2023   • Pressure injury of buttock, unstageable ([facility] ) 05/13/2023   • Asthma 05/13/2023   • Osteoporosis with current pathological fracture with routine healing 05/13/2023   • Class 2 obesity in adult 05/13/2023   • CHAS (obstructive sleep apnea) 05/13/2023   • Abnormal CT scan 05/10/2023   • Hyponatremia 05/09/2023   • Bradycardia 05/08/2023   • Type 2 diabetes mellitus ([facility] ) 04/28/2023   • Fall 04/27/2023   • Acute pain due to trauma 04/27/2023   • H/O mechanical aortic valve replacement 04/27/2023   • Laceration of right eyebrow 04/27/2023   • Fracture of multiple ribs of right side 04/27/2023          Latanya Og MD    I have spent a total time of 55 minutes on 05/16/23 in caring for this patient including Impressions, Counseling / Coordination of care, Documenting in the medical record, Reviewing / ordering tests, medicine, procedures   and team conference meeting  ** Please Note:  voice to text software may have been used in the creation of this document   Although proof errors in transcription or interpretation are a potential of such software**

## 2023-05-16 NOTE — TEAM CONFERENCE
Acute RehabilitationTeam Conference Note  Date: 5/16/2023   Time: 10:39 AM       Patient Name:  Aide Robertson       Medical Record Number: [id number]   YOB: 1945  Sex: Female          Room/Bed:  /[facility] 451-01  Payor Info:  Payor: Wellington Chicas / Plan: Annel Villanueva PPO [facility] REP / Product Type: Medicare PPO /      Admitting Diagnosis: Proximal humerus fracture [S42 209A]  COVID-19 [U07 1]   Admit Date/Time:  5/13/2023  6:28 PM  Admission Comments: No comment available     Primary Diagnosis:  Humerus fracture  Principal Problem: Humerus fracture    Patient Active Problem List    Diagnosis Date Noted   • Difficult intravenous access 05/15/2023   • Acute COVID-19 05/14/2023   • Atrial fibrillation with RVR ([facility] [address] ) 05/13/2023   • Pressure injury of buttock, unstageable ([facility] [address] ) 05/13/2023   • Asthma 05/13/2023   • Osteoporosis with current pathological fracture with routine healing 05/13/2023   • Class 2 obesity in adult 05/13/2023   • CHAS (obstructive sleep apnea) 05/13/2023   • Abnormal CT scan 05/10/2023   • Hyponatremia 05/09/2023   • Bradycardia 05/08/2023   • Ventricular arrhythmia 05/04/2023   • Type 2 diabetes mellitus ([facility] [address] ) 04/28/2023   • Fall 04/27/2023   • Humerus fracture 04/27/2023   • Acute pain due to trauma 04/27/2023   • H/O mechanical aortic valve replacement 04/27/2023   • Laceration of right eyebrow 04/27/2023   • Fracture of multiple ribs of right side 04/27/2023       Physical Therapy:    Weight Bearing Status: Non-weight bearing (LUE)  Transfers: Total Assistance, Assist of 2  Bed Mobility: Maximum Assistance, Total Assistance, Assist of 2  Amulation Distance (ft): 20 feet  Ambulation: Assist of 2, Moderate Assistance  Assistive Device for Ambulation: Michael Walker  Assistive Device for Ramp: Wheelchair  Discharge Recommendations:  (TBD)    5/15/23   Pt is a 68year old female admitted to [facility] s/p fall sustaining humeral fracture and multiple R ribs fractrure, Right eyebrow laceration  Pt is seen for high complexity eval with medical co-morbidities affecting functional progress include current airborne/contact precaution from acute covid-19 positive diagnosis, CHAS, asthma, osteoporosis, L UE NWB precaution,  hyponatremia, bradycardia, ventricular arrhythmia, UTI, DM2, arthritis, obesity, pressure injury and ICD  Please see medical chart for more comprehensive list  Personal factors affecting pt at time of IE include environmental barrier of 2 LIZ if to go home with SO vs small threshold at her apt,  limited family availability to provide physical assistance at d/c for SO is also elderly using a SPC himself  Pt also demos inability to complete functional household and community distance mobilities safely  Pt lives at home with SO majority of the time and was fully indep at baseline with SPC on even and uneven surfaces including steps negotiation  Per PT evaluation  Pt remains A x2 for all transfers and bed mobility as she is greatly limited by pain at LUE  Pt was able to amb up to 20' with HW but is limited by fatigue and environment at this time  Pt is also limited by body habitus and ROM to B hips and knees  Pt will cont POC as tolerated with cont focus on functional transfers, HH dist amb and bed mobility  Occupational Therapy:  Eating: Supervision  Grooming: Total Assistance  Bathing: Total Assistance, Assist of 2  Bathing: Total Assistance, Assist of 2  Upper Body Dressing: Total Assistance  Lower Body Dressing: Total Assistance, Assist of 2  Toileting: Total Assistance  Toilet Transfer: Moderate Assistance, Maximum Assistance  Cognition: Within Defined Limits  Orientation: Person, Place, Time, Situation  Discharge Recommendations: Other       Pt is a 68 y o  female who was admitted to [address] [place] [address] on 5/13/2023  Pt's current problem list includes: LUE proximal humerus s/p reduction/splinting; multiple rib fractures s/p fall now + COVID   Comorbidities include - ICD, AVR, DM, HTN, asthma, AFIB  Pt's precautions include: NWB L UE in splint by ortho  Pt also has sacral pressure ulcer and decreased skin integrity at groin area and under pannus - nursing aware  At baseline pt was completing ADLS/IADLs independently and lives with her significant other Gordo Blank but she has her own apartment as well  Currently pt requires total assist for overall ADLS and mod/max assist for functional mobility/transfers  Pt currently presents with impairments in the following categories - activity tolerance, endurance and standing balance/tolerance  These impairments, as well as pt's fatigue, pain, orthopedic restricitions , WBS , decreased caregiver support, risk for falls, home environment and body habitus  limit pt's ability to safely engage in all baseline areas of occupation, including eating, grooming, bathing, dressing, toileting, functional mobility/transfers, laundry , driving, house maintenance, meal prep, social participation  and leisure activities    Pt presents with fair rehab potential  Pt is unsafe to D/C home at this time, recommending ELOS 3 weeks to achieve min assist level ADL goals and independence for mobility with least restrictive device  Speech Therapy:           No notes on file    Nursing Notes:  Appetite: Good  Diet Type: Diabetic                      Diet Patient/Family Education Complete: No    Type of Wound (LDA): Wound (sacrum DTI (Triad paste), pannus (Interdry), groin/labia Coral Deeds))                    Type of Wound Patient/Family Education: No  Bladder: Incontinent     Bladder Patient/Family Education: No  Bowel: Continent     Bowel Patient/Family Education: No  Pain Location/Orientation: Orientation: Left, Location: Arm  Pain Score: 7                       Hospital Pain Intervention(s): Medication (See MAR)  Pain Patient/Family Education: Yes       Lt proximal humerus fracture NWB to the Lt UE, Sling to the Lt UE, Follow-up with Orthopedics in 4 weeks   Rt 8-9 rib fx Encourage IS, O2 as needed  Rt eyebrow laceration, Monitor lacerations, Local wound care  DM type 2, HA1C 6 4, Home: Janumet  2x daily, Here: Levemir 5U at bedtime/SSI, Fasting blood sugar on the lower end this AM  Will DC Levemir, Will monitor and add back medications as needed  History of mechanical AVR, Goal INR 2 5 - 3 5  INR pending - pt stuck multiple times this AM without success  IR consult for midline placement  Continue Coumadin 2 5mg daily  Pt takes 2 5mg Wednesday and Saturday and 5mg all other days of the week at home  Hyponatremia Na 132, Likely multifactorial  Ventricular arrhythmia  One episode 5/2/23 and pt received a shock from ICD, Continue metoprolol and amiodarone, Continue Coumadin  Bradycardia, Diltiazem discontinued and metoprolol dose lowered  She will need outpt follow-up with Cardiology  [facility]  COVID-19 Tested positive 5/13/23, Pt has a cough which she reports is chronic  Contact and airborne precautions for 10 days  Buttock wound/labia wound, Followed by wound care  5/16- Pt is AAOx4  Pt is sometimes continent, sometimes incontinent of bladder, pt is continent of bowel  Pt's pain is controlled with current pain medication regimen  Pt requires alarms for safety, has been compliant with use of call bell  Pt seen by wound care today, new orders for sacrum, pannus, groin/labia wound care  Pt remains on contact and airborne precautions for Covid- 19  This week we will encourage independence with ADLs  We will monitor labs and vital signs  We will educate pt/family about repositioning to prevent skin breakdown  We will assist w repositioning and perform routine skin checks  We will monitor for adequate pain control  We will monitor for constipation and medicate pt as ordered  We will increase safety awareness and keep pt free from falls                             Case Management:     Discharge Planning  Living Arrangements: Lives w/ Family members  Support Systems: Daughter, Family members  Type of Current Residence: Private residence  Current [address] : No  5/15- Pt new admit, cm to assess  Is the patient actively participating in therapies? yes  List any modifications to the treatment plan: None    Barriers Interventions   Left humeral fx Splint   Morbid obesity Education, nutrition consulted   Body habitus Compensatory strategies   Skin integrity Skin checks   Diabetes Education   Hyponatremic Monitoring   Cardiac arhythmia, high risk Monitoring    Pain/Arthritis Medication   Incontinence Purewick   Anxiety/depression Monitoring, supportive counseling   UB/LB strength defecits Compensatory strategies   LIZ Stair training        COVID positive Precautions     Is the patient making expected progress toward goals? yes  List any update or changes to goals: None    Medical Goals: Patient will be medically stable for discharge to [facility] upon completion of rehab program and Patient will be able to manage medical conditions and comorbid conditions with medications and follow up upon completion of rehab program    Weekly Team Goals:   Rehab Team Goals  ADL Team Goal: Patient will require assist with ADLs with least restrictive device upon completion of rehab program  Transfer Team Goal: Patient will be independent with transfers with least restrictive device upon completion of rehab program  Locomotion Team Goal: Patient will be independent with locomotion with least restrictive device upon completion of rehab program (household distance)    Discussion: Pt participating in therapies, mod max total for ADLs and ambulation  Team recommending 3 week goals, team to coordinate safe dc plan  Anticipated Discharge Date:  reteam [facility] Team Members Present: The following team members are supervising care for this patient and were present during this Weekly Team Conference      Physician: Dr Edel Lamb MD  : Rogelio Estevez MSW  Registered Nurse: Oralia Guerrero, RN  Physical Therapist: Sandor Salcedo DPT  Occupational Therapist: Arnie Saenz MS, OTR/L  Speech Therapist:

## 2023-05-16 NOTE — PROGRESS NOTES
"OT Treatment Session     05/16/23 9123   Pain Assessment   Pain Assessment Tool 0-10   Pain Score 7   Pain Location/Orientation Orientation: Left; Location: Shoulder   Restrictions/Precautions   Precautions Bed/chair alarms;Cognitive; Fall Risk; Airborne/isolation;Pain;Pressure Ulcer;Supervision on toilet/commode   RUE Weight Bearing Per Order WBAT   LUE Weight Bearing Per Order NWB   RLE Weight Bearing Per Order WBAT   LLE Weight Bearing Per Order WBAT   ROM Restrictions No  (per ortho, no ROM restrictions)   Braces or Orthoses Splint  (LUE)   Lifestyle   Autonomy \"Thank you\"   Oral Hygiene   Type of Assistance Needed Physical assistance   Physical Assistance Level 25% or less   Comment Min A in stance at sink, requires A to apply toothpaste   Oral Hygiene CARE Score 3   Shower/Bathe Self   Type of Assistance Needed Physical assistance   Physical Assistance Level 76% or more   Comment Completed sponge bath seated EOB and in supported stance with nesha walker  Pt able to wash face, L arm, proximal LE and chest  Pt required A to thoroughly wash abdomen, sherley/buttock, distal LE/feet  Shower/Bathe Self CARE Score 2   Tub/Shower Transfer   Reason Not Assessed Sponge Bath;Balance; Endurance; Safety   Upper Body Dressing   Type of Assistance Needed Physical assistance   Physical Assistance Level 76% or more   Comment Educated pt on threading LUE first  A to thread LUE and head, able to thread RUE   TA to don nielsen brace   Upper Body Dressing CARE Score 2   Putting On/Taking Off Footwear   Type of Assistance Needed Physical assistance   Physical Assistance Level Total assistance   Putting On/Taking Off Footwear CARE Score 1   Lying to Sitting on Side of Bed   Type of Assistance Needed Physical assistance   Physical Assistance Level 76% or more   Comment HOB slightly elevated, Max A for trunk   Lying to Sitting on Side of Bed CARE Score 2   Sit to Stand   Type of Assistance Needed Physical assistance   Physical Assistance " "Level 26%-50%   Comment Mod-Min A with nesha walker   Sit to Stand CARE Score 3   Bed-Chair Transfer   Type of Assistance Needed Physical assistance   Physical Assistance Level 26%-50%   Comment Mod A EOB<>BSC   Chair/Bed-to-Chair Transfer CARE Score 3   Toileting Hygiene   Type of Assistance Needed Physical assistance   Physical Assistance Level 76% or more   Comment A to manage brief up/down and complete bowel hygiene in stance   Toileting Hygiene CARE Score 2   Toilet Transfer   Type of Assistance Needed Physical assistance   Physical Assistance Level 26%-50%   Comment SPT with nesha walker to [facility]   Toilet Transfer CARE Score 3   Functional Standing Tolerance   Time 1'30\"   Activity oral care   Therapeutic Exercise - ROM   UE-ROM Yes   ROM- Right Upper Extremities   R Shoulder PROM   R Position Seated;Gravity eliminated   RUE ROM Comment Per ortho, no LUE ROM restrictions  Educated pt on pendulum exercises completed in sitting to prevent frozen shoulder with therapist demonstration  Pt able to complete seated in a pain free range  Educated pt to only complete while staff present due to fall risk and decreased dynamic sitting balance  Cognition   Overall Cognitive Status WFL   Arousal/Participation Alert   Attention Within functional limits   Orientation Level Oriented X4   Memory Within functional limits   Following Commands Follows one step commands without difficulty   Additional Activities   Additional Activities Comments Educated pt on off loading techniques while seated in w/c, including lateral leans, forward lean over table with a 10 second hold to allow bloodflow  Educated pt to only complete with staff present due to fall risk  Pt verbalized understanding  Activity Tolerance   Activity Tolerance Patient tolerated treatment well   Assessment   Treatment Assessment Pt participated in 90 minute skilled OT session focusing on ADL re-training and fxnl xfers  Pt tolerated session well   Salgado brace " donned  Pt engaged in UB/LB bathing, dressing, oral care, functional transfers, and toileting  Please see above for details on functional performance and education  Pt demonstrated improved activity tolerance and strength, requiring less assist for functional transfers  Pt cont to be limited by decreased functional strength, balance, endurance/activity tolerance, ROM, pain, postural control, cognition, and mobility, resulting in decreased safety and independence with I/ADLs and functional mobility  Pt will benefit from continued skilled OT to maximize independence and safety with ADLs and functional transfers  Continue POC with focus on: functional transfers, bed mobility, standing tolerance/balance, R UE strengthening, ADL re-training, LHAE education/training, and splint management  Pt with all needs met, call bell in reach, seated in w/c  Prognosis Fair   Problem List Decreased strength;Decreased endurance; Impaired balance;Decreased mobility; Decreased coordination;Decreased skin integrity   Plan   Treatment/Interventions ADL retraining;Functional transfer training; Therapeutic exercise; Endurance training;Equipment eval/education; Compensatory technique education   Progress Progressing toward goals   OT Therapy Minutes   OT Time In 0930   OT Time Out 1100   OT Total Time (minutes) 90   OT Mode of treatment - Individual (minutes) 90   OT Mode of treatment - Concurrent (minutes) 0   OT Mode of treatment - Group (minutes) 0   OT Mode of treatment - Co-treat (minutes) 0   OT Mode of Treatment - Total time(minutes) 90 minutes   OT Cumulative Minutes 280

## 2023-05-16 NOTE — PLAN OF CARE
Problem: PAIN - ADULT  Goal: Verbalizes/displays adequate comfort level or baseline comfort level  Description: Interventions:  - Encourage patient to monitor pain and request assistance  - Assess pain using appropriate pain scale  - Administer analgesics based on type and severity of pain and evaluate response  - Implement non-pharmacological measures as appropriate and evaluate response  - Consider cultural and social influences on pain and pain management  - Notify physician/advanced practitioner if interventions unsuccessful or patient reports new pain  Outcome: Progressing     Problem: INFECTION - ADULT  Goal: Absence or prevention of progression during hospitalization  Description: INTERVENTIONS:  - Assess and monitor for signs and symptoms of infection  - Monitor lab/diagnostic results  - Monitor all insertion sites, i e  indwelling lines, tubes, and drains  - Monitor endotracheal if appropriate and nasal secretions for changes in amount and color  - Curtis appropriate cooling/warming therapies per order  - Administer medications as ordered  - Instruct and encourage patient and family to use good hand hygiene technique  - Identify and instruct in appropriate isolation precautions for identified infection/condition  Outcome: Progressing  Goal: Absence of fever/infection during neutropenic period  Description: INTERVENTIONS:  - Monitor WBC    Outcome: Progressing     Problem: SAFETY ADULT  Goal: Patient will remain free of falls  Description: INTERVENTIONS:  - Educate patient/family on patient safety including physical limitations  - Instruct patient to call for assistance with activity   - Consult OT/PT to assist with strengthening/mobility   - Keep Call bell within reach  - Keep bed low and locked with side rails adjusted as appropriate  - Keep care items and personal belongings within reach  - Initiate and maintain comfort rounds  - Make Fall Risk Sign visible to staff  - Offer Toileting every 2 Hours, in advance of need  - Initiate/Maintain bed  chair alarm  - Obtain necessary fall risk management equipment: nonskid socks  - Apply yellow socks and bracelet for high fall risk patients  - Consider moving patient to room near nurses station  Outcome: Progressing  Goal: Maintain or return to baseline ADL function  Description: INTERVENTIONS:  -  Assess patient's ability to carry out ADLs; assess patient's baseline for ADL function and identify physical deficits which impact ability to perform ADLs (bathing, care of mouth/teeth, toileting, grooming, dressing, etc )  - Assess/evaluate cause of self-care deficits   - Assess range of motion  - Assess patient's mobility; develop plan if impaired  - Assess patient's need for assistive devices and provide as appropriate  - Encourage maximum independence but intervene and supervise when necessary  - Involve family in performance of ADLs  - Assess for home care needs following discharge   - Consider OT consult to assist with ADL evaluation and planning for discharge  - Provide patient education as appropriate  Outcome: Progressing  Goal: Maintains/Returns to pre admission functional level  Description: INTERVENTIONS:  - Perform BMAT or MOVE assessment daily    - Set and communicate daily mobility goal to care team and patient/family/caregiver  - Collaborate with rehabilitation services on mobility goals if consulted  - Perform Range of Motion 3 times a day  - Reposition patient every 2 hours    - Dangle patient 3 times a day  - Stand patient 3 times a day  - Ambulate patient 3 times a day  - Out of bed to chair 3 times a day   - Out of bed for meals 3 times a day  - Out of bed for toileting  - Record patient progress and toleration of activity level   Outcome: Progressing     Problem: DISCHARGE PLANNING  Goal: Discharge to home or other facility with appropriate resources  Description: INTERVENTIONS:  - Identify barriers to discharge w/patient and caregiver  - Arrange for needed discharge resources and transportation as appropriate  - Identify discharge learning needs (meds, wound care, etc )  - Arrange for interpretive services to assist at discharge as needed  - Refer to Case Management Department for coordinating discharge planning if the patient needs post-hospital services based on physician/advanced practitioner order or complex needs related to functional status, cognitive ability, or social support system  Outcome: Progressing     Problem: MOBILITY - ADULT  Goal: Maintain or return to baseline ADL function  Description: INTERVENTIONS:  -  Assess patient's ability to carry out ADLs; assess patient's baseline for ADL function and identify physical deficits which impact ability to perform ADLs (bathing, care of mouth/teeth, toileting, grooming, dressing, etc )  - Assess/evaluate cause of self-care deficits   - Assess range of motion  - Assess patient's mobility; develop plan if impaired  - Assess patient's need for assistive devices and provide as appropriate  - Encourage maximum independence but intervene and supervise when necessary  - Involve family in performance of ADLs  - Assess for home care needs following discharge   - Consider OT consult to assist with ADL evaluation and planning for discharge  - Provide patient education as appropriate  Outcome: Progressing  Goal: Maintains/Returns to pre admission functional level  Description: INTERVENTIONS:  - Perform BMAT or MOVE assessment daily    - Set and communicate daily mobility goal to care team and patient/family/caregiver  - Collaborate with rehabilitation services on mobility goals if consulted  - Perform Range of Motion 3 times a day  - Reposition patient every 2 hours    - Dangle patient 3 times a day  - Stand patient 3 times a day  - Ambulate patient 3 times a day  - Out of bed to chair 3 times a day   - Out of bed for meals 3 times a day  - Out of bed for toileting  - Record patient progress and toleration of activity level   Outcome: Progressing     Problem: Nutrition/Hydration-ADULT  Goal: Nutrient/Hydration intake appropriate for improving, restoring or maintaining nutritional needs  Description: Monitor and assess patient's nutrition/hydration status for malnutrition  Collaborate with interdisciplinary team and initiate plan and interventions as ordered  Monitor patient's weight and dietary intake as ordered or per policy  Utilize nutrition screening tool and intervene as necessary  Determine patient's food preferences and provide high-protein, high-caloric foods as appropriate       INTERVENTIONS:  - Monitor oral intake, urinary output, labs, and treatment plans  - Assess nutrition and hydration status and recommend course of action  - Evaluate amount of meals eaten  - Assist patient with eating if necessary   - Allow adequate time for meals  - Recommend/ encourage appropriate diets, oral nutritional supplements, and vitamin/mineral supplements  - Order, calculate, and assess calorie counts as needed  - Recommend, monitor, and adjust tube feedings and TPN/PPN based on assessed needs  - Assess need for intravenous fluids  - Provide specific nutrition/hydration education as appropriate  - Include patient/family/caregiver in decisions related to nutrition  Outcome: Progressing     Problem: Prexisting or High Potential for Compromised Skin Integrity  Goal: Skin integrity is maintained or improved  Description: INTERVENTIONS:  - Identify patients at risk for skin breakdown  - Assess and monitor skin integrity  - Assess and monitor nutrition and hydration status  - Monitor labs   - Assess for incontinence   - Turn and reposition patient  - Assist with mobility/ambulation  - Relieve pressure over bony prominences  - Avoid friction and shearing  - Provide appropriate hygiene as needed including keeping skin clean and dry  - Evaluate need for skin moisturizer/barrier cream  - Collaborate with interdisciplinary team   - Patient/family teaching  - Consider wound care consult   Outcome: Progressing

## 2023-05-16 NOTE — [facility] NURSING
Lt proximal humerus fracture NWB to the Lt UE, Immobilizer placed to LUE by orthopedics, Follow-up with Orthopedics in 4 weeks  Rt 8-9 rib fx Encourage IS, O2 as needed  Rt eyebrow laceration, Monitor lacerations, Local wound care  DM type 2, HA1C 6 4, Home: [place]  2x daily  Stable currently w/o meds  History of mechanical AVR, Goal INR 2 5 - 3 5  INR pending - pt stuck multiple times this AM without success  IR consult for midline placement  Continue Coumadin 2 5mg daily  Pt takes 2 5mg Wednesday and Saturday and 5mg all other days of the week at home  Hyponatremia Na 132, Likely multifactorial  Ventricular arrhythmia One episode 5/2/23 and pt received a shock from ICD, Continue metoprolol and amiodarone, Continue Coumadin  Bradycardia, Diltiazem discontinued and metoprolol dose lowered  She will need outpt follow-up with Cardiology  [facility] follows with [facility]  COVID-19 Tested positive 5/13/23, Pt has a cough which she reports is chronic  Contact and airborne precautions for 10 days  Buttock wound/labia wound, Followed by wound care  LE edema L>R, Venous doppler 5/27, On Lasix 40mg qd, Will not inc Lasix dose for now  LE edema is a little better today  Her BPs are soft at times which may limit aggressive diuresis  Has severe TR/severe RVE by ECHO 5/3/23 likely the cause  Ascending aortic aneurysm By ECHO on 5/3/23, Measures 4 8 cm  Will need OP followup/surveillence     5/16- Pt is AAOx4  Pt is sometimes continent, sometimes incontinent of bladder, pt is continent of bowel  Pt's pain is controlled with current pain medication regimen  Pt requires alarms for safety, has been compliant with use of call bell  Pt seen by wound care today, new orders for sacrum, pannus, groin/labia wound care  Contact and airborne precautions for Covid- 19 complete 5/23 5/30- Pt is AAOx4  Pt is mostly continent of bowel and bladder  Pt's pain is controlled with current pain medication regimen   Pt does not require alarms, has been compliant with use of call bell  Pt needs to be seen by wound care today for final wound care recommendations for sacrum, pannus, and groins, and family will need to be educated on wound care for discharge  Pt is no longer on isolation for Covid  This week we will encourage independence with ADLs  We will monitor labs and vital signs  We will educate pt/family about repositioning to prevent skin breakdown  We will assist with repositioning and perform routine skin checks  We will monitor for adequate pain control  We will monitor for constipation and medicate as ordered  We will increase safety awareness and keep pt free from falls

## 2023-05-17 LAB
GLUCOSE SERPL-MCNC: 108 MG/DL (ref 65–140)
GLUCOSE SERPL-MCNC: 118 MG/DL (ref 65–140)
GLUCOSE SERPL-MCNC: 168 MG/DL (ref 65–140)
GLUCOSE SERPL-MCNC: 88 MG/DL (ref 65–140)
INR PPP: 3.58 (ref 0.84–1.19)
PROTHROMBIN TIME: 36 SECONDS (ref 11.6–14.5)

## 2023-05-17 PROCEDURE — 97535 SELF CARE MNGMENT TRAINING: CPT

## 2023-05-17 PROCEDURE — 82948 REAGENT STRIP/BLOOD GLUCOSE: CPT

## 2023-05-17 PROCEDURE — 99232 SBSQ HOSP IP/OBS MODERATE 35: CPT | Performed by: INTERNAL MEDICINE

## 2023-05-17 PROCEDURE — 97110 THERAPEUTIC EXERCISES: CPT

## 2023-05-17 PROCEDURE — 97116 GAIT TRAINING THERAPY: CPT

## 2023-05-17 PROCEDURE — 85610 PROTHROMBIN TIME: CPT | Performed by: NURSE PRACTITIONER

## 2023-05-17 PROCEDURE — 97530 THERAPEUTIC ACTIVITIES: CPT

## 2023-05-17 PROCEDURE — 99233 SBSQ HOSP IP/OBS HIGH 50: CPT | Performed by: PHYSICAL MEDICINE & REHABILITATION

## 2023-05-17 RX ADMIN — DOCUSATE SODIUM 100 MG: 100 CAPSULE, LIQUID FILLED ORAL at 07:41

## 2023-05-17 RX ADMIN — ALLOPURINOL 100 MG: 100 TABLET ORAL at 07:41

## 2023-05-17 RX ADMIN — MONTELUKAST 10 MG: 10 TABLET, FILM COATED ORAL at 22:04

## 2023-05-17 RX ADMIN — METHOCARBAMOL TABLETS 250 MG: 500 TABLET, COATED ORAL at 18:07

## 2023-05-17 RX ADMIN — NYSTATIN 1 APPLICATION.: 100000 POWDER TOPICAL at 11:00

## 2023-05-17 RX ADMIN — SODIUM CHLORIDE 1 G: 1 TABLET ORAL at 16:58

## 2023-05-17 RX ADMIN — OXYBUTYNIN 5 MG: 5 TABLET, FILM COATED, EXTENDED RELEASE ORAL at 07:41

## 2023-05-17 RX ADMIN — SODIUM CHLORIDE 1 G: 1 TABLET ORAL at 07:39

## 2023-05-17 RX ADMIN — FUROSEMIDE 40 MG: 40 TABLET ORAL at 07:43

## 2023-05-17 RX ADMIN — ATORVASTATIN CALCIUM 40 MG: 40 TABLET, FILM COATED ORAL at 16:48

## 2023-05-17 RX ADMIN — ONDANSETRON 4 MG: 4 TABLET, ORALLY DISINTEGRATING ORAL at 16:58

## 2023-05-17 RX ADMIN — MICONAZOLE NITRATE 1 APPLICATION.: 20 CREAM TOPICAL at 11:00

## 2023-05-17 RX ADMIN — METHOCARBAMOL TABLETS 250 MG: 500 TABLET, COATED ORAL at 06:18

## 2023-05-17 RX ADMIN — ONDANSETRON 4 MG: 4 TABLET, ORALLY DISINTEGRATING ORAL at 06:21

## 2023-05-17 RX ADMIN — Medication 1000 UNITS: at 07:41

## 2023-05-17 RX ADMIN — AMIODARONE HYDROCHLORIDE 200 MG: 200 TABLET ORAL at 06:19

## 2023-05-17 RX ADMIN — Medication 7.5 MG: at 12:04

## 2023-05-17 RX ADMIN — METHOCARBAMOL TABLETS 250 MG: 500 TABLET, COATED ORAL at 12:04

## 2023-05-17 RX ADMIN — LIDOCAINE 5% 1 PATCH: 700 PATCH TOPICAL at 08:03

## 2023-05-17 RX ADMIN — ACETAMINOPHEN 975 MG: 325 TABLET ORAL at 16:48

## 2023-05-17 RX ADMIN — SENNOSIDES 17.2 MG: 8.6 TABLET, FILM COATED ORAL at 07:40

## 2023-05-17 RX ADMIN — ACETAMINOPHEN 975 MG: 325 TABLET ORAL at 06:18

## 2023-05-17 RX ADMIN — DOCUSATE SODIUM 100 MG: 100 CAPSULE, LIQUID FILLED ORAL at 18:07

## 2023-05-17 RX ADMIN — CITALOPRAM HYDROBROMIDE 20 MG: 20 TABLET ORAL at 07:41

## 2023-05-17 RX ADMIN — GABAPENTIN 100 MG: 100 CAPSULE ORAL at 22:04

## 2023-05-17 RX ADMIN — MICONAZOLE NITRATE 1 APPLICATION.: 20 CREAM TOPICAL at 22:05

## 2023-05-17 RX ADMIN — INSULIN LISPRO 1 UNITS: 100 INJECTION, SOLUTION INTRAVENOUS; SUBCUTANEOUS at 22:06

## 2023-05-17 RX ADMIN — METOPROLOL SUCCINATE 25 MG: 25 TABLET, EXTENDED RELEASE ORAL at 07:43

## 2023-05-17 RX ADMIN — WARFARIN SODIUM 2.5 MG: 2.5 TABLET ORAL at 18:07

## 2023-05-17 RX ADMIN — NYSTATIN 1 APPLICATION.: 100000 POWDER TOPICAL at 22:05

## 2023-05-17 RX ADMIN — OXYCODONE HYDROCHLORIDE 5 MG: 5 TABLET ORAL at 16:51

## 2023-05-17 NOTE — PLAN OF CARE
Problem: PAIN - ADULT  Goal: Verbalizes/displays adequate comfort level or baseline comfort level  Description: Interventions:  - Encourage patient to monitor pain and request assistance  - Assess pain using appropriate pain scale  - Administer analgesics based on type and severity of pain and evaluate response  - Implement non-pharmacological measures as appropriate and evaluate response  - Consider cultural and social influences on pain and pain management  - Notify physician/advanced practitioner if interventions unsuccessful or patient reports new pain  Outcome: Progressing     Problem: INFECTION - ADULT  Goal: Absence or prevention of progression during hospitalization  Description: INTERVENTIONS:  - Assess and monitor for signs and symptoms of infection  - Monitor lab/diagnostic results  - Monitor all insertion sites, i e  indwelling lines, tubes, and drains  - Monitor endotracheal if appropriate and nasal secretions for changes in amount and color  - [place] appropriate cooling/warming therapies per order  - Administer medications as ordered  - Instruct and encourage patient and family to use good hand hygiene technique  - Identify and instruct in appropriate isolation precautions for identified infection/condition  Outcome: Progressing  Goal: Absence of fever/infection during neutropenic period  Description: INTERVENTIONS:  - Monitor WBC    Outcome: Progressing     Problem: SAFETY ADULT  Goal: Patient will remain free of falls  Description: INTERVENTIONS:  - Educate patient/family on patient safety including physical limitations  - Instruct patient to call for assistance with activity   - Consult OT/PT to assist with strengthening/mobility   - Keep Call bell within reach  - Keep bed low and locked with side rails adjusted as appropriate  - Keep care items and personal belongings within reach  - Initiate and maintain comfort rounds  - Make Fall Risk Sign visible to staff  - Offer Toileting every  Hours, in advance of need  - Initiate/Maintain alarm  - Obtain necessary fall risk management equipment:   - Apply yellow socks and bracelet for high fall risk patients  - Consider moving patient to room near nurses station  Outcome: Progressing  Goal: Maintain or return to baseline ADL function  Description: INTERVENTIONS:  -  Assess patient's ability to carry out ADLs; assess patient's baseline for ADL function and identify physical deficits which impact ability to perform ADLs (bathing, care of mouth/teeth, toileting, grooming, dressing, etc )  - Assess/evaluate cause of self-care deficits   - Assess range of motion  - Assess patient's mobility; develop plan if impaired  - Assess patient's need for assistive devices and provide as appropriate  - Encourage maximum independence but intervene and supervise when necessary  - Involve family in performance of ADLs  - Assess for home care needs following discharge   - Consider OT consult to assist with ADL evaluation and planning for discharge  - Provide patient education as appropriate  Outcome: Progressing  Goal: Maintains/Returns to pre admission functional level  Description: INTERVENTIONS:  - Perform BMAT or MOVE assessment daily    - Set and communicate daily mobility goal to care team and patient/family/caregiver  - Collaborate with rehabilitation services on mobility goals if consulted  - Perform Range of Motion  times a day  - Reposition patient every  hours    - Dangle patient times a day  - Stand patient  times a day  - Ambulate patient  times a day  - Out of bed to chair  times a day   - Out of bed for meals times a day  - Out of bed for toileting  - Record patient progress and toleration of activity level   Outcome: Progressing     Problem: DISCHARGE PLANNING  Goal: Discharge to home or other facility with appropriate resources  Description: INTERVENTIONS:  - Identify barriers to discharge w/patient and caregiver  - Arrange for needed discharge resources and transportation as appropriate  - Identify discharge learning needs (meds, wound care, etc )  - Arrange for interpretive services to assist at discharge as needed  - Refer to Case Management Department for coordinating discharge planning if the patient needs post-hospital services based on physician/advanced practitioner order or complex needs related to functional status, cognitive ability, or social support system  Outcome: Progressing     Problem: MOBILITY - ADULT  Goal: Maintain or return to baseline ADL function  Description: INTERVENTIONS:  -  Assess patient's ability to carry out ADLs; assess patient's baseline for ADL function and identify physical deficits which impact ability to perform ADLs (bathing, care of mouth/teeth, toileting, grooming, dressing, etc )  - Assess/evaluate cause of self-care deficits   - Assess range of motion  - Assess patient's mobility; develop plan if impaired  - Assess patient's need for assistive devices and provide as appropriate  - Encourage maximum independence but intervene and supervise when necessary  - Involve family in performance of ADLs  - Assess for home care needs following discharge   - Consider OT consult to assist with ADL evaluation and planning for discharge  - Provide patient education as appropriate  Outcome: Progressing  Goal: Maintains/Returns to pre admission functional level  Description: INTERVENTIONS:  - Perform BMAT or MOVE assessment daily    - Set and communicate daily mobility goal to care team and patient/family/caregiver  - Collaborate with rehabilitation services on mobility goals if consulted  - Perform Range of Motion  times a day  - Reposition patient every  hours    - Dangle patient times a day  - Stand patient  times a day  - Ambulate patient  times a day  - Out of bed to chair  times a day   - Out of bed for meals times a day  - Out of bed for toileting  - Record patient progress and toleration of activity level   Outcome: Progressing Problem: Nutrition/Hydration-ADULT  Goal: Nutrient/Hydration intake appropriate for improving, restoring or maintaining nutritional needs  Description: Monitor and assess patient's nutrition/hydration status for malnutrition  Collaborate with interdisciplinary team and initiate plan and interventions as ordered  Monitor patient's weight and dietary intake as ordered or per policy  Utilize nutrition screening tool and intervene as necessary  Determine patient's food preferences and provide high-protein, high-caloric foods as appropriate       INTERVENTIONS:  - Monitor oral intake, urinary output, labs, and treatment plans  - Assess nutrition and hydration status and recommend course of action  - Evaluate amount of meals eaten  - Assist patient with eating if necessary   - Allow adequate time for meals  - Recommend/ encourage appropriate diets, oral nutritional supplements, and vitamin/mineral supplements  - Order, calculate, and assess calorie counts as needed  - Recommend, monitor, and adjust tube feedings and TPN/PPN based on assessed needs  - Assess need for intravenous fluids  - Provide specific nutrition/hydration education as appropriate  - Include patient/family/caregiver in decisions related to nutrition  Outcome: Progressing     Problem: Prexisting or High Potential for Compromised Skin Integrity  Goal: Skin integrity is maintained or improved  Description: INTERVENTIONS:  - Identify patients at risk for skin breakdown  - Assess and monitor skin integrity  - Assess and monitor nutrition and hydration status  - Monitor labs   - Assess for incontinence   - Turn and reposition patient  - Assist with mobility/ambulation  - Relieve pressure over bony prominences  - Avoid friction and shearing  - Provide appropriate hygiene as needed including keeping skin clean and dry  - Evaluate need for skin moisturizer/barrier cream  - Collaborate with interdisciplinary team   - Patient/family teaching  - Consider wound care consult   Outcome: Progressing

## 2023-05-17 NOTE — PROGRESS NOTES
05/17/23 1400   Pain Assessment   Pain Assessment Tool 0-10   Pain Score 6   Pain Location/Orientation Orientation: Left; Location: Shoulder   Hospital Pain Intervention(s) Repositioned;Rest;Medication (See MAR)   Pain 2   Pain Score 2 5   Pain Location/Orientation 2 Location: [address]  [facility] Pain Intervention(s) 2 Repositioned   Restrictions/Precautions   Precautions Fall Risk;Contact/isolation; Airborne/isolation;Supervision on toilet/commode;Pressure Ulcer;Pain   LUE Weight Bearing Per Order NWB   RLE Weight Bearing Per Order WBAT   LLE Weight Bearing Per Order WBAT   Cognition   Overall Cognitive Status [facility]   Arousal/Participation Alert; Cooperative   Subjective   Subjective pt had no new c/o and ready for PT  pt rested in bed after lunch which helped a lot  Lying to Sitting on Side of Bed   Type of Assistance Needed Physical assistance;Verbal cues; Adaptive equipment   Physical Assistance Level 76% or more   Comment HOB slightly elevated but used R rail this time with max A of 1 mostly on trunk to complete sitting up   Lying to Sitting on Side of Bed CARE Score 2   Sit to Stand   Type of Assistance Needed Physical assistance;Verbal cues; Adaptive equipment   Physical Assistance Level 76% or more   Comment max of 1 from lowest height of bed even with bedrail to mod of 1 from w/c, BSC and if bed slightly elevated with rail   Sit to Stand CARE Score 2   Bed-Chair Transfer   Type of Assistance Needed Physical assistance;Verbal cues; Adaptive equipment   Physical Assistance Level 25% or less   Comment SPC and HW   Chair/Bed-to-Chair Transfer CARE Score 3   Walk 10 Feet   Type of Assistance Needed Physical assistance;Verbal cues; Adaptive equipment   Physical Assistance Level 25% or less   Comment 40 x 1+10' x 2 with HW, 40' x 2, 20' x 4, 10' x 1 with SPC,no CFA   Walk 10 Feet CARE Score 3   Toilet Transfer   Type of Assistance Needed Physical assistance;Verbal cues; Adaptive equipment   Physical Assistance Level 51%-75%   Comment SPC to sit, HW to stand up  (total assist for hygiene)   Toilet Transfer CARE Score 2   Therapeutic Interventions   Strengthening seated TE include bilat hip flexion x 10 reps x 3 sets, LAQ x 5 SH x 10 reps x 3 sets, toe raises x 10 reps x 3 sets, heel raises x 10 reps x 3 sets   Other Comments   Comments SpO2 at rest 99% to 91-93 after gait training without respiratory distress noted  IS training x 5 reps - 750ml at best   Assessment   Treatment Assessment Pt steadier with improving activity tolerance noted for able to walk 40' before seated rest break  Initially performed mobility training with HW but progressed to [facility] which pt tolerated well without LOB  although ching's baseline pattern of advancing SPC then taking 3-4 steps before advancing cane again which was still safe  For tomorrow PT will cont with strengthening, standing balance/tolerance and gait training with SPC with focus on inc gait distance to >60'  will also ask OT if appropriate to walk to/from bathroom/BSC with SPC  vs HW  Pt in agreement to stay in w/c with leg rest, SCD on and all needs within reach at end of tx  Family/Caregiver Present no   Barriers to Discharge Inaccessible home environment;Decreased caregiver support   Plan   Treatment/Interventions Functional transfer training;LE strengthening/ROM; Therapeutic exercise; Endurance training;Bed mobility;Gait training;Spoke to nursing;OT   Progress Slow progress, decreased activity tolerance   PT Therapy Minutes   PT Time In 1400   PT Time Out 1530   PT Total Time (minutes) 90   PT Mode of treatment - Individual (minutes) 90   PT Mode of treatment - Concurrent (minutes) 0   PT Mode of treatment - Group (minutes) 0   PT Mode of treatment - Co-treat (minutes) 0   PT Mode of Treatment - Total time(minutes) 90 minutes   PT Cumulative Minutes 345

## 2023-05-17 NOTE — PLAN OF CARE
Problem: PAIN - ADULT  Goal: Verbalizes/displays adequate comfort level or baseline comfort level  Description: Interventions:  - Encourage patient to monitor pain and request assistance  - Assess pain using appropriate pain scale  - Administer analgesics based on type and severity of pain and evaluate response  - Implement non-pharmacological measures as appropriate and evaluate response  - Consider cultural and social influences on pain and pain management  - Notify physician/advanced practitioner if interventions unsuccessful or patient reports new pain  Outcome: Progressing     Problem: INFECTION - ADULT  Goal: Absence or prevention of progression during hospitalization  Description: INTERVENTIONS:  - Assess and monitor for signs and symptoms of infection  - Monitor lab/diagnostic results  - Monitor all insertion sites, i e  indwelling lines, tubes, and drains  - Monitor endotracheal if appropriate and nasal secretions for changes in amount and color  - [place] appropriate cooling/warming therapies per order  - Administer medications as ordered  - Instruct and encourage patient and family to use good hand hygiene technique  - Identify and instruct in appropriate isolation precautions for identified infection/condition  Outcome: Progressing  Goal: Absence of fever/infection during neutropenic period  Description: INTERVENTIONS:  - Monitor WBC    Outcome: Progressing     Problem: SAFETY ADULT  Goal: Patient will remain free of falls  Description: INTERVENTIONS:  - Educate patient/family on patient safety including physical limitations  - Instruct patient to call for assistance with activity   - Consult OT/PT to assist with strengthening/mobility   - Keep Call bell within reach  - Keep bed low and locked with side rails adjusted as appropriate  - Keep care items and personal belongings within reach  - Initiate and maintain comfort rounds  - Make Fall Risk Sign visible to staff  - Apply yellow socks and bracelet for high fall risk patients  - Consider moving patient to room near nurses station  Outcome: Progressing  Goal: Maintain or return to baseline ADL function  Description: INTERVENTIONS:  -  Assess patient's ability to carry out ADLs; assess patient's baseline for ADL function and identify physical deficits which impact ability to perform ADLs (bathing, care of mouth/teeth, toileting, grooming, dressing, etc )  - Assess/evaluate cause of self-care deficits   - Assess range of motion  - Assess patient's mobility; develop plan if impaired  - Assess patient's need for assistive devices and provide as appropriate  - Encourage maximum independence but intervene and supervise when necessary  - Involve family in performance of ADLs  - Assess for home care needs following discharge   - Consider OT consult to assist with ADL evaluation and planning for discharge  - Provide patient education as appropriate  Outcome: Progressing  Goal: Maintains/Returns to pre admission functional level  Description: INTERVENTIONS:  - Perform BMAT or MOVE assessment daily    - Set and communicate daily mobility goal to care team and patient/family/caregiver     - Collaborate with rehabilitation services on mobility goals if consulted  - Out of bed for toileting  - Record patient progress and toleration of activity level   Outcome: Progressing     Problem: DISCHARGE PLANNING  Goal: Discharge to home or other facility with appropriate resources  Description: INTERVENTIONS:  - Identify barriers to discharge w/patient and caregiver  - Arrange for needed discharge resources and transportation as appropriate  - Identify discharge learning needs (meds, wound care, etc )  - Arrange for interpretive services to assist at discharge as needed  - Refer to Case Management Department for coordinating discharge planning if the patient needs post-hospital services based on physician/advanced practitioner order or complex needs related to functional status, cognitive ability, or social support system  Outcome: Progressing     Problem: MOBILITY - ADULT  Goal: Maintain or return to baseline ADL function  Description: INTERVENTIONS:  -  Assess patient's ability to carry out ADLs; assess patient's baseline for ADL function and identify physical deficits which impact ability to perform ADLs (bathing, care of mouth/teeth, toileting, grooming, dressing, etc )  - Assess/evaluate cause of self-care deficits   - Assess range of motion  - Assess patient's mobility; develop plan if impaired  - Assess patient's need for assistive devices and provide as appropriate  - Encourage maximum independence but intervene and supervise when necessary  - Involve family in performance of ADLs  - Assess for home care needs following discharge   - Consider OT consult to assist with ADL evaluation and planning for discharge  - Provide patient education as appropriate  Outcome: Progressing  Goal: Maintains/Returns to pre admission functional level  Description: INTERVENTIONS:  - Perform BMAT or MOVE assessment daily    - Set and communicate daily mobility goal to care team and patient/family/caregiver  - Collaborate with rehabilitation services on mobility goals if consulted  - Out of bed for toileting  - Record patient progress and toleration of activity level   Outcome: Progressing     Problem: Nutrition/Hydration-ADULT  Goal: Nutrient/Hydration intake appropriate for improving, restoring or maintaining nutritional needs  Description: Monitor and assess patient's nutrition/hydration status for malnutrition  Collaborate with interdisciplinary team and initiate plan and interventions as ordered  Monitor patient's weight and dietary intake as ordered or per policy  Utilize nutrition screening tool and intervene as necessary  Determine patient's food preferences and provide high-protein, high-caloric foods as appropriate       INTERVENTIONS:  - Monitor oral intake, urinary output, labs, and treatment plans  - Assess nutrition and hydration status and recommend course of action  - Evaluate amount of meals eaten  - Assist patient with eating if necessary   - Allow adequate time for meals  - Recommend/ encourage appropriate diets, oral nutritional supplements, and vitamin/mineral supplements  - Order, calculate, and assess calorie counts as needed  - Recommend, monitor, and adjust tube feedings and TPN/PPN based on assessed needs  - Assess need for intravenous fluids  - Provide specific nutrition/hydration education as appropriate  - Include patient/family/caregiver in decisions related to nutrition  Outcome: Progressing     Problem: Prexisting or High Potential for Compromised Skin Integrity  Goal: Skin integrity is maintained or improved  Description: INTERVENTIONS:  - Identify patients at risk for skin breakdown  - Assess and monitor skin integrity  - Assess and monitor nutrition and hydration status  - Monitor labs   - Assess for incontinence   - Turn and reposition patient  - Assist with mobility/ambulation  - Relieve pressure over bony prominences  - Avoid friction and shearing  - Provide appropriate hygiene as needed including keeping skin clean and dry  - Evaluate need for skin moisturizer/barrier cream  - Collaborate with interdisciplinary team   - Patient/family teaching  - Consider wound care consult   Outcome: Progressing

## 2023-05-17 NOTE — PROGRESS NOTES
Internal Medicine Progress Note  Patient: Christopher Buchanan  Age/sex: 68 y o  female  Medical Record #: [id number]      ASSESSMENT/PLAN: (Interval History)  Christopher Buchanan is seen and examined and management for following issues:    Lt proximal humerus fracture  • NWB to the LUE  • Immobilizer placed to LUE by orthopedics  • Pain control and therapy per PMR  • Follow-up with Orthopedics in 4 weeks  Rt 8-9 rib fx  •  Pain control per PMR  • Encourage IS   • O2 as needed  Rt eyebrow laceration  • Monitor lacerations  • Local wound care  DM type 2  • HA1C 6 4  • Home: Janumet  2x daily  • Here: SSI  • Will monitor and add back medications as needed     History of mechanical AVR  • Goal INR 2 5 - 3 5  • INR 3 5  • Continue Coumadin 2 5mg daily  • Pt takes 2 5mg Wednesday and Saturday and 5mg all other days of the week at home  • Continue to monitor  • Repeat Monday     Hyponatremia  • Na 133  • stable     Ventricular arrhythmia  • One episode 5/2/23 and pt received a shock from ICD  • Continue metoprolol and amiodarone  • Continue Coumadin  Bradycardia  • Diltiazem discontinued and metoprolol dose lowered  • She will need outpt follow-up with Cardiology  [facility]  • HR stable     COVID-19  • Tested positive 5/13/23  • Pt has a cough which she reports is chronic  • Contact and airborne precautions for 10 days  Buttock wound/labia wound  · Followed by wound care  · Frequent position changes/specialized mattress  · Will monitor  DC planning: TBD  The above assessment and plan was reviewed and updated as determined by my evaluation of the patient on 5/17/2023      Labs:   Results from last 7 days   Lab Units 05/15/23  1513 05/14/23  0502   WBC Thousand/uL 4 89 5 21   HEMOGLOBIN g/dL 10 3* 10 8*   HEMATOCRIT % 30 8* 34 7*   PLATELETS Thousands/uL 184 208     Results from last 7 days   Lab Units 05/15/23  1628 05/14/23  0502   SODIUM mmol/L 133* 132* POTASSIUM mmol/L 4 6 4 9   CHLORIDE mmol/L 106 105   CO2 mmol/L 27 26   BUN mg/dL 24 24   CREATININE mg/dL 0 85 0 75   CALCIUM mg/dL 7 9* 8 1*         Results from last 7 days   Lab Units 05/17/23  0804 05/16/23  0559   INR  3 58* 3 13*     Results from last 7 days   Lab Units 05/17/23  0623 05/16/23  2039 05/16/23  1611   POC GLUCOSE mg/dl 88 150* 103       Review of Scheduled Meds:  Current Facility-Administered Medications   Medication Dose Route Frequency Provider Last Rate   • acetaminophen  975 mg Oral [facility] Dameon Mcfadden MD     • albuterol  2 puff Inhalation Q4H PRN Dameon Mcfadden MD     • allopurinol  100 mg Oral Daily Dameon Mcfadden MD     • amiodarone  200 mg Oral Daily With Breakfast Dameon Mcfadden MD     • atorvastatin  40 mg Oral Daily With Claudia Thao MD     • calcium carbonate  500 mg Oral TID PRN Dameon Mcfadden MD     • cholecalciferol  1,000 Units Oral Daily Dameon Mcfadden MD     • citalopram  20 mg Oral Daily Dameon Mcfadden MD     • docusate sodium  100 mg Oral BID Dameon Mcfadden MD     • furosemide  40 mg Oral Daily Dameon Mcfadden MD     • gabapentin  100 mg Oral HS Dameon Mcfadden MD     • insulin lispro  1-5 Units Subcutaneous TID [facility] Dameon Mcfadden MD     • insulin lispro  1-5 Units Subcutaneous HS Dameon Mcfadden MD     • lidocaine  1 patch Topical Daily Dameon Mcfadden MD     • methocarbamol  250 mg Oral Q6H [address] Dameon Mcfadden MD     • metoprolol succinate  25 mg Oral Daily Dameon Mcfadden MD     • LICO ANTIFUNGAL   Topical BID Marylene Lust Fehnel-Young, CRNP     • montelukast  10 mg Oral HS Dameon Mcfadden MD     • naloxone  0 04 mg Intravenous Q1MIN PRN Dameon Mcfadden MD     • nystatin   Topical BID Amanda Pearl PA-C     • ondansetron  4 mg Oral Q6H PRN Dameon Mcfadden MD     • oxybutynin  5 mg Oral Daily Dameon Mcfadden MD     • oxyCODONE  5 mg Oral Q4H PRN Dameon Mcfadden MD     • oxyCODONE  7 5 mg Oral Q4H PRN Dameon Mcfadden MD     • senna  2 tablet Oral Daily Maria Briones MD     • sodium chloride  1 g Oral BID With Meals Maria Briones MD     • warfarin  2 5 mg Oral Daily (warfarin) Amanda Pearl PA-C         Subjective/ HPI: Patient seen and examined  Patients overnight issues or events were reviewed with nursing or staff during rounds or morning huddle session  New or overnight issues include the following:     Pt seen in her room  No overnight issues or concerns    ROS:   A 10 point ROS was performed; negative except as noted above         Imaging:     XR humerus left   Final Result by Sridhar King MD (05/15 1613)   Oblique spiral fracture of the shaft of the humerus with apex medial angulation   No glenohumeral dislocation         Workstation performed: [id number]         XR shoulder 2+ vw left   Final Result by Sridhar King MD (05/15 1614)      Oblique spiral fracture of the proximal humeral shaft with stable alignment      Workstation performed: [id number]             *Labs /Radiology studies Reviewed  *Medications  reviewed and reconciled as needed  *Please refer to order section for additional ordered labs studies  *Case discussed with primary attending during morning huddle case rounds    Physical Examination:  Vitals:   Vitals:    05/16/23 1530 05/16/23 2047 05/17/23 0610 05/17/23 0743   BP: (!) 109/49 106/52 134/60 125/60   BP Location: Right arm Right arm Left arm    Pulse: 60 60 62 61   Resp: 18 20 18    Temp: 97 9 °F (36 6 °C) 98 3 °F (36 8 °C) 97 8 °F (36 6 °C)    TempSrc: Oral Oral Oral    SpO2: 93% 96% 97%    Weight:   95 3 kg (210 lb 1 6 oz)    Height:         GEN: NAD; pleasant  NEURO: Alert and oriented x4; appropriate  HEENT: Pupils are equal/reactive; normocephalic, face is symmetrical, hearing intact  CV: S1 S2 regular, +audible click; no murmur/rub/gallops, 1/4 pedal pulses, trace b/l edema present  RESP: Lungs are clear bilaterally, no wheezes rales or rhonchi, on room air, no distress, respirations are easy and non labored  GI: Abdomen is obese, soft, non tender, +BS x4; non distended  : Voiding without issues; perineal excoriation  MUSC: Moves all extremities except LUE NWB  SKIN: pink, warm, poor turgor, eyebrow laceration stable; sacral wound refer to media       The above physical exam was reviewed and updated as determined by my evaluation of the patient on 5/17/2023  Invasive Devices     Peripheral Intravenous Line  Duration           Long-Dwell Peripheral IV (Midline) 29/77/92 Right Cephalic Vein 1 day                   VTE Pharmacologic Prophylaxis: Warfarin (Coumadin)  Code Status: Level 1 - Full Code  Current Length of Stay: 4 day(s)      Total time spent:  20 minutes with more than 50% spent counseling/coordinating care  Counseling includes discussion with patient re: progress  and discussion with patient of his/her current medical state/information  Coordination of patient's care was performed in conjunction with primary service  Time invested included review of patient's labs, vitals, and management of their comorbidities with continued monitoring  In addition, this patient was discussed with medical team including physician and advanced extenders  The care of the patient was extensively discussed and appropriate treatment plan was formulated unique for this patient  Medical decision making for the day was made by supervising physician unless otherwise noted in their attestation statement  ** Please Note:  voice to text software may have been used in the creation of this document   Although proof errors in transcription or interpretation are a potential of such software**

## 2023-05-17 NOTE — PROGRESS NOTES
"   05/17/23 1000   Pain Assessment   Pain Assessment Tool 0-10   Pain Score 6   Pain Location/Orientation Orientation: Left; Location: Arm   Restrictions/Precautions   Precautions Bed/chair alarms; Fall Risk;Supervision on toilet/commode; Airborne/isolation;Pain;Pressure Ulcer  (dec skin integrity)   RUE Weight Bearing Per Order WBAT   LUE Weight Bearing Per Order NWB   RLE Weight Bearing Per Order WBAT   LLE Weight Bearing Per Order WBAT   ROM Restrictions   (per ortho note, \"No restrictions to shoulder and elbow ROM\")   Braces or Orthoses   (L shoulder Salgado brace)   Lifestyle   Autonomy \"It feels amazing to get lotion on my arm\"   Reciprocal Relationships \   Eating   Type of Assistance Needed Set-up / clean-up   Physical Assistance Level No physical assistance   Comment Pt required assist to cut turkey, potatoes and vegetables, assist to open soda can  Eating CARE Score 5   Oral Hygiene   Type of Assistance Needed Physical assistance   Physical Assistance Level 25% or less   Comment Hamzah in stance at sink, assist to apply toothpaste  Oral Hygiene CARE Score 3   Shower/Bathe Self   Type of Assistance Needed Physical assistance   Physical Assistance Level 76% or more   Comment SB primarily seated on BSC and in supported stance with nesha walker for groin/buttocks  Pt able to wash face, L arm, proximal LE and chest  Pt required A to thoroughly wash abdomen, sherley/buttock, distal LE/feet  Shower/Bathe Self CARE Score 2   Bathing   Assessed Bath Style Sponge Bath   Upper Body Dressing   Type of Assistance Needed Physical assistance   Physical Assistance Level 76% or more   Comment MaxA don/doff FELIPE lan w/ nesha technique   TA for management of Salgado brace L shoulder   Upper Body Dressing CARE Score 2   Lower Body Dressing   Type of Assistance Needed Physical assistance   Physical Assistance Level 76% or more   Comment MaxA overall, assist to don underwear and pad to thigh height, Pt able to partially " assist w/ CM over R hip in supported stance w/ nesha walker   Lower Body Dressing CARE Score 2   Putting On/Taking Off Footwear   Type of Assistance Needed Physical assistance   Physical Assistance Level Total assistance   Comment TA don/doff slipper socks   Putting On/Taking Off Footwear CARE Score 1   Lying to Sitting on Side of Bed   Type of Assistance Needed Physical assistance   Physical Assistance Level 76% or more   Lying to Sitting on Side of Bed CARE Score 2   Sit to Stand   Type of Assistance Needed Physical assistance   Physical Assistance Level 51%-75%   Comment min-modA depending on height of surface   Sit to Stand CARE Score 2   Toileting Hygiene   Type of Assistance Needed Physical assistance   Physical Assistance Level 76% or more   Comment MaxA from [facility] and use of nesha walker on R in stance   Peterson Reyna 83 Score 2   Toilet Transfer   Type of Assistance Needed Physical assistance   Physical Assistance Level 26%-50%   Comment SPT bed>BSC transfer to pt's R as well as stance w/ several steps forward ambulation Deepika to [facility]   Toilet Transfer CARE Score 3   Cognition   Overall Cognitive Status WFL   Arousal/Participation Cooperative   Attention Within functional limits   Orientation Level Oriented X4   Memory Within functional limits   Following Commands Follows multistep commands with increased time or repetition   Activity Tolerance   Activity Tolerance Patient tolerated treatment well   Assessment   Treatment Assessment OT session focusing on ADL w/ modified routine  Despite efforts to modify routine, pt requries MaxA to thoroughly wash herself d/t dec activity tolerance, body habitus w/ skin integrity concerns/wounds, no functional use of LUE curerntly as is NWB w/ extensive bracing  OT to continue to treat and follow established POC  Problem List Decreased strength;Decreased range of motion;Decreased endurance; Impaired balance;Decreased mobility;Obesity; Decreased skin integrity;Orthopedic restrictions;Pain   Plan   Treatment/Interventions ADL retraining;Functional transfer training; Therapeutic exercise; Endurance training;Patient/family training;Equipment eval/education; Compensatory technique education;Continued evaluation;Spoke to nursing   Progress Slow progress, decreased activity tolerance   OT Therapy Minutes   OT Time In 1000   OT Time Out 1130   OT Total Time (minutes) 90   OT Mode of treatment - Individual (minutes) 90   OT Mode of treatment - Concurrent (minutes) 0   OT Mode of treatment - Group (minutes) 0   OT Mode of treatment - Co-treat (minutes) 0   OT Mode of Treatment - Total time(minutes) 90 minutes   OT Cumulative Minutes 370   Therapy Time missed   Time missed?  No

## 2023-05-17 NOTE — PROGRESS NOTES
PM&R PROGRESS NOTE:  Deborah Rodriguez 68 y o  female MRN: [id number]  Unit/Bed#: -01 Encounter: [id number]        Rehab Diagnosis: Impairment of mobility, safety and Activities of Daily Living (ADLs) due to Major Multiple Trauma:  14 9  Other Multiple Trauma Multiple rib fractures with acute pain, improved acute hypoxic respiratory failure, and L humeral fracture NWB in coaptation splint       SUBJECTIVE: Patient doing well today  Denies fevers/chills  Tired, but motivated  No other acute issues today  ASSESSMENT: Stable, progressing      PLAN:    Rehabilitation  • Functional deficits: Left upper extremity weakness, impaired mobility, impaired self-care, impaired endurance, impaired strength  • Continue current rehabilitation plan of care to maximize function  • Estimated Discharge: TBD, estimated length of stay 2-3 weeks    Current function:  Physical Therapy Occupational Therapy Speech Therapy   Weight Bearing Status: Non-weight bearing (LUE)  Transfers: Total Assistance, Assist of 2  Bed Mobility: Maximum Assistance, Total Assistance, Assist of 2  Amulation Distance (ft): 20 feet  Ambulation: Assist of 2, Moderate Assistance  Assistive Device for Ambulation: [address] for Ramp: Wheelchair  Discharge Recommendations:  (TBD)   Eating: Supervision  Grooming: Total Assistance  Bathing: Total Assistance, Assist of 2  Bathing: Total Assistance, Assist of 2  Upper Body Dressing: Total Assistance  Lower Body Dressing: Total Assistance, Assist of 2  Toileting: Total Assistance  Toilet Transfer:  Moderate Assistance, Maximum Assistance  Cognition: Within Defined Limits  Orientation: Person, Place, Time, Situation                 DVT prophylaxis  • Managed on warfarin  • Goal INR 2-3  • Last INR = 3 1    Bladder plan  • Continent  • Limit pure wick usage if possible due to suspected labial small cut    Bowel plan  • Continent      * Humerus fracture  Assessment & Plan  2/2 mechanical fall  Proximal humerus shaft spiral fracture, displaced and angulated  Ortho attempted closed reduction  Ultimately placed in coaptation splint on 4/27 and managed non-operatively  NWB LUMAJOR  Pain Control as detailed below  No Vitamin D level in chart or unmerged chart recently   - At home was on daily Vitamin D supplement 1000 units - restarted   - 5/14 Vitamind D level is 50 7   Due for 2 week follow-up  Imaging: Personally reviewed  Redemonstration of oblique spiral fracture of the shaft of the humerus with apex medial angulation  No glenohumeral dislocation  Appreciate orthopedics evaluating patient 5/15/2023  Replaced her splint with a Salgado brace  Cleared by orthopedics to provide range of motion to elbow and wrist without restriction as tolerated  Follow-up with orthopedics in 4 weeks around 6/15/2023    Ventricular arrhythmia  Assessment & Plan  On 5/2 developed Afib with RR which degenerated to VT then VF  She received shock from her AICD ([place], Single Chamber ICD)  Seen by Cards who adjusted her device, and recommended restarting home Metoprolol and Amio load  Was also on digoxin  - Toprol dose adjusted and digoxin discontinued after developing junctional bradycardia later in stay  Here: Toprol XL 25mg daily, Amio 200mg daily starting tomorrow for 28 days  Monitor cardiac status during therapies   Monitor lytes  Keep K > 4, Mag > 2  Will plan for outpatient f/u with EP    Difficult intravenous access  Assessment & Plan  Midline placed by IR on 5/15/23  Need for blood draws frequently to follow electrolytes, INR  Need for IV access given her recent ventricular arrhythmia and AICD firing, importance of optimizing electrolytes, current COVID 19 virus  Acute COVID-19  Assessment & Plan  Diagnosed on screening test on 5/13  Minimally symptomatic (does have a cough)  Afebrile and on RA  10 days isolation per protocol through 5/22/2023 (last day)    Monitor respiratory status   - Has chronic issues with urinary urgency/frequency  - Added Q4hr timed voids so we can help her get to the commode in a timely manner    -Stable and has not required oxygen   -Compliant with utilizing incentive spirometer    CHAS (obstructive sleep apnea)  Assessment & Plan  Does not use CPAP at home  Consider Noc Ox overnight while here  Class 2 obesity in adult  Assessment & Plan  Nutrition consulted  Counseling    Osteoporosis with current pathological fracture with routine healing  Assessment & Plan  Managed by [address] [facility]  History of L5 pathologic fracture  Has been on Alendronate once weekly since 2017  Would confirm with her what day she normally takes it prior to restarting  Will confirm that it is ok to restart with Orthopedics first, as some surgeons prefer to wait 6 weeks after acute fracture prior to restarting    - Per Dr Thornton Milling ok to resume at anytime  - She normally takes it on Thursdays  Would complete through 6/2023  Per last Endocrine note in 6/2022, they were going to continue for 1 more year and then stop  She was due for DXA this May  Asthma  Assessment & Plan  No acute exacerbation  Home: Singulair 10mg QHS  Here: Same, PRN albuterol  No acute exacerbation  Monitor respiratory status    Pressure injury of buttock, unstageable (HCC)  Assessment & Plan  Present on admission  Wound care following while at [address] [place] [address] Place local wound care as follows:  • Evolving DTI to the b/l sacro-buttocks  Wound is POA to ARC unit  Wound continues to evolve  Noted full thickness skin loss with devitalized tissue  ? Patient desires to defer bedside debridement at this time, she states she doesn't think she will be able to be on her side for that long due to pain  ? Continue with Triad paste and foam dressing  ? Pressure relief   ? P-500 mattress  • Candidiasis rash noted to the b/l medial thighs and perineum, no open wound or active bleeding at time of assessment     ? Will recommend pascual anti-fungal cream  • ITD with candidasis to the b/l abdominal pannus with partial thickness skin loss  ? Will recommend InterDry sheets     Frequent offloading in chair and in bed  P500 mattress  Optimize nutrition    Atrial fibrillation with RVR ([address] )  Assessment & Plan  Initially per AICD interrogation with Afib RVR that degenerated into VT/VF  Unclear burden  Here: Toprol XL 25mg daily, Fully anticoagulated on warfarin (See mechanical AV), amiodarone 200mg daily  Monitor and adjust as appropriate  Currently examines in NSR  IM consulted to assist with management  Outpatient f/u with EP    Abnormal CT scan  Assessment & Plan  - 4/27 CT CAP:           - Incidentally noted is a incarcerated/partially obstructed periumbilic ventral hernia, (image 146 series 9)          - Septated cyst anterior aspect of the right kidney, consider evaluation with ultrasound or MRI for further characterization (image 136 series 9)          - Dilated ascending aorta measuring 4 6 cm, evaluation with cardiothoracic surgery on nonemergent basis  - Follow up with PCP and CT surgery outpatient for findings  - Patient informed of abnormal results on 5/11: she is aware of ventral hernia but not aortic aneurysm or renal cyst         Hyponatremia  Assessment & Plan  [address] 125 on 5/9  5/15 = 133 and stable (previously 132)  [place] by Trauma to be 2/2 poor PO intake and furosemide  Started on sodium tabs (weaning down)  Monitor and adjust as appropriate  IM consulted and assisting with management  Bradycardia  Assessment & Plan  Developed junctional bradycardia in hospital after restarting home Toprol XL and digoxin  - Cards stopped digoxin  - Toprol XL decreased to 25mg daily  Monitor  Type 2 diabetes mellitus ([facility] [address] )  Assessment & Plan    Home: Sitagliptin-Metformin 50-50mg  Here: Discontinue Levemir due to hypoglycemia, continue sliding scale insulin and diabetic diet    Diabetic Diet  Nutrition Consult  Attempt to resume oral meds here and "get off insulin  IM consulted to assist with management  Outpatient f/u with PCP  Fracture of multiple ribs of right side  Assessment & Plan  R 8th and 9th rib possible fractures  Rib fracture protocol  Pain control with lidoderm patch  Incentive Spirometer  Monitor respiratory status  Laceration of right eyebrow  Assessment & Plan  Healing, monitor  Steri-Strips in place    H/O mechanical aortic valve replacement  Assessment & Plan  Managed on Coumadin  Goal INR 2 5-3 5  INR 3 1  IM consulted to assist with management of Coumadin dosing  Acute pain due to trauma  Assessment & Plan  R rib fractures, L humerus fracture  - Lidoderm patch for ribs  - Oxycodone 5-7 5mg PRN  - Robaxin 250mg Q6hr scheduled  - Gabapentin 100mg QHS (may be able to discontinue while here)  - Tylenol 975mg Q8hrs scheduled  APS assisted inpatient  Adjust as appropriate while here  Appreciate IM consultants medical co-management  Labs, medications, and imaging personally reviewed  ROS:  A ten point review of systems was completed on 05/17/23 and pertinent positives are listed in subjective section  All other systems reviewed were negative  OBJECTIVE:   /60 (BP Location: Left arm)   Pulse 62   Temp 97 8 °F (36 6 °C) (Oral)   Resp 18   Ht 4' 10\" (1 473 m)   Wt 95 3 kg (210 lb 1 6 oz)   SpO2 97%   BMI 43 91 kg/m²     Physical Exam  Vitals and nursing note reviewed  Constitutional:       General: She is not in acute distress  Appearance: She is obese  HENT:      Head: Normocephalic and atraumatic  Nose: Nose normal       Mouth/Throat:      Mouth: Mucous membranes are moist    Eyes:      Conjunctiva/sclera: Conjunctivae normal    Cardiovascular:      Rate and Rhythm: Normal rate and regular rhythm  Pulses: Normal pulses  Pulmonary:      Effort: Pulmonary effort is normal       Breath sounds: Normal breath sounds  No wheezing or rales     Abdominal:      General: Bowel sounds are " normal  There is no distension  Palpations: Abdomen is soft  Tenderness: There is no abdominal tenderness  Musculoskeletal:         General: No swelling  Cervical back: Neck supple  Comments: LUE splint in place   Skin:     General: Skin is warm  Neurological:      Mental Status: She is alert and oriented to person, place, and time     Psychiatric:         Mood and Affect: Mood normal         Lab Results   Component Value Date    WBC 4 89 05/15/2023    HGB 10 3 (L) 05/15/2023    HCT 30 8 (L) 05/15/2023     (H) 05/15/2023     05/15/2023     Lab Results   Component Value Date    SODIUM 133 (L) 05/15/2023    K 4 6 05/15/2023     05/15/2023    CO2 27 05/15/2023    BUN 24 05/15/2023    CREATININE 0 85 05/15/2023    GLUC 100 05/15/2023    CALCIUM 7 9 (L) 05/15/2023     Lab Results   Component Value Date    INR 3 13 (H) 05/16/2023    INR 3 04 (H) 05/15/2023    INR 3 13 (H) 05/13/2023    PROTIME 32 5 (H) 05/16/2023    PROTIME 31 7 (H) 05/15/2023    PROTIME 32 5 (H) 05/13/2023           Current Facility-Administered Medications:   •  acetaminophen (TYLENOL) tablet 975 mg, 975 mg, Oral, Q8H [address], Glennon Felty, MD, 975 mg at 05/17/23 3411  •  albuterol (PROVENTIL HFA,VENTOLIN HFA) inhaler 2 puff, 2 puff, Inhalation, Q4H PRN, Glennon Felty, MD  •  allopurinol (ZYLOPRIM) tablet 100 mg, 100 mg, Oral, Daily, Glennon Felty, MD, 100 mg at 05/17/23 7301  •  amiodarone tablet 200 mg, 200 mg, Oral, Daily With Breakfast, Glennon Felty, MD, 200 mg at 05/17/23 8152  •  atorvastatin (LIPITOR) tablet 40 mg, 40 mg, Oral, Daily With Maria T Cervantes MD, 40 mg at 05/16/23 1606  •  calcium carbonate (TUMS) chewable tablet 500 mg, 500 mg, Oral, TID PRN, Glennon Felty, MD  •  cholecalciferol (VITAMIN D3) tablet 1,000 Units, 1,000 Units, Oral, Daily, Glennon Felty, MD, 1,000 Units at 05/17/23 0741  •  citalopram (CeleXA) tablet 20 mg, 20 mg, Oral, Daily, Glennon Felty, MD, 20 mg at 05/17/23 5616  •  docusate sodium (COLACE) capsule 100 mg, 100 mg, Oral, BID, Cynthia Self MD, 100 mg at 05/17/23 0741  •  furosemide (LASIX) tablet 40 mg, 40 mg, Oral, Daily, Cynthia Self MD, 40 mg at 05/16/23 3591  •  gabapentin (NEURONTIN) capsule 100 mg, 100 mg, Oral, HS, Cynthia Self MD, 100 mg at 05/16/23 2227  •  insulin lispro (HumaLOG) 100 units/mL subcutaneous injection 1-5 Units, 1-5 Units, Subcutaneous, TID AC **AND** Fingerstick Glucose (POCT), , , TID AC, Cynthia Self MD  •  insulin lispro (HumaLOG) 100 units/mL subcutaneous injection 1-5 Units, 1-5 Units, Subcutaneous, HS, Cynthia Self MD, 1 Units at 05/16/23 2228  •  lidocaine (LIDODERM) 5 % patch 1 patch, 1 patch, Topical, Daily, Cynthia Self MD, 1 patch at 05/16/23 0854  •  methocarbamol (ROBAXIN) tablet 250 mg, 250 mg, Oral, Q6H [facility], Cynthia Self MD, 250 mg at 05/17/23 8508  •  metoprolol succinate (TOPROL-XL) 24 hr tablet 25 mg, 25 mg, Oral, Daily, Cynthia Self MD, 25 mg at 05/16/23 0853  •  moisture barrier miconazole 2% cream (aka LICO MOISTURE BARRIER ANTIFUNGAL CREAM), , Topical, BID, JOSEPHNIE Huffman, Given at 05/16/23 2228  •  montelukast (SINGULAIR) tablet 10 mg, 10 mg, Oral, HS, Cynthia Self MD, 10 mg at 05/16/23 2227  •  naloxone (NARCAN) 0 04 mg/mL syringe 0 04 mg, 0 04 mg, Intravenous, Q1MIN PRN, Cynthia Self MD  •  nystatin (MYCOSTATIN) powder, , Topical, BID, Amanda Pearl PA-C, Given at 05/16/23 2228  •  ondansetron (ZOFRAN-ODT) dispersible tablet 4 mg, 4 mg, Oral, Q6H PRN, Cynthia Self MD, 4 mg at 05/17/23 9475  •  oxybutynin (DITROPAN-XL) 24 hr tablet 5 mg, 5 mg, Oral, Daily, Cynthia Self MD, 5 mg at 05/17/23 0741  •  oxyCODONE (ROXICODONE) IR tablet 5 mg, 5 mg, Oral, Q4H PRN, Cynthia Self MD, 5 mg at 05/14/23 1719  •  oxyCODONE (ROXICODONE) split tablet 7 5 mg, 7 5 mg, Oral, Q4H PRN, Cynthia Self MD, 7 5 mg at 05/16/23 2226  •  senna (SENOKOT) tablet 17 2 mg, 2 tablet, Oral, Daily, Girish Alberto MD, 17 2 mg at 05/17/23 0740  •  sodium chloride tablet 1 g, 1 g, Oral, BID With Meals, Girish Alberto MD, 1 g at 05/17/23 4950  •  warfarin (COUMADIN) tablet 2 5 mg, 2 5 mg, Oral, Daily (warfarin), Matti Ramirez PA-C, 2 5 mg at 05/16/23 1843    Past Medical History:   Diagnosis Date   • Asthma    • Diabetes [facility])    • Hypertension        Patient Active Problem List    Diagnosis Date Noted   • Humerus fracture 04/27/2023   • Ventricular arrhythmia 05/04/2023   • Difficult intravenous access 05/15/2023   • Acute COVID-19 05/14/2023   • Atrial fibrillation with RVR ([facility] [address] ) 05/13/2023   • Pressure injury of buttock, unstageable ([facility] [address] ) 05/13/2023   • Asthma 05/13/2023   • Osteoporosis with current pathological fracture with routine healing 05/13/2023   • Class 2 obesity in adult 05/13/2023   • CHAS (obstructive sleep apnea) 05/13/2023   • Abnormal CT scan 05/10/2023   • Hyponatremia 05/09/2023   • Bradycardia 05/08/2023   • Type 2 diabetes mellitus ([facility] [address] ) 04/28/2023   • Fall 04/27/2023   • Acute pain due to trauma 04/27/2023   • H/O mechanical aortic valve replacement 04/27/2023   • Laceration of right eyebrow 04/27/2023   • Fracture of multiple ribs of right side 04/27/2023          Paty Price MD    I have spent a total time of 35 minutes on 05/17/23 in caring for this patient including Impressions, Counseling / Coordination of care and Documenting in the medical record  ** Please Note:  voice to text software may have been used in the creation of this document   Although proof errors in transcription or interpretation are a potential of such software**

## 2023-05-17 NOTE — PLAN OF CARE
Problem: PAIN - ADULT  Goal: Verbalizes/displays adequate comfort level or baseline comfort level  Description: Interventions:  - Encourage patient to monitor pain and request assistance  - Assess pain using appropriate pain scale  - Administer analgesics based on type and severity of pain and evaluate response  - Implement non-pharmacological measures as appropriate and evaluate response  - Consider cultural and social influences on pain and pain management  - Notify physician/advanced practitioner if interventions unsuccessful or patient reports new pain  Outcome: Progressing     Problem: INFECTION - ADULT  Goal: Absence or prevention of progression during hospitalization  Description: INTERVENTIONS:  - Assess and monitor for signs and symptoms of infection  - Monitor lab/diagnostic results  - Monitor all insertion sites, i e  indwelling lines, tubes, and drains  - Monitor endotracheal if appropriate and nasal secretions for changes in amount and color  - [place] appropriate cooling/warming therapies per order  - Administer medications as ordered  - Instruct and encourage patient and family to use good hand hygiene technique  - Identify and instruct in appropriate isolation precautions for identified infection/condition  Outcome: Progressing  Goal: Absence of fever/infection during neutropenic period  Description: INTERVENTIONS:  - Monitor WBC    Outcome: Progressing     Problem: SAFETY ADULT  Goal: Patient will remain free of falls  Description: INTERVENTIONS:  - Educate patient/family on patient safety including physical limitations  - Instruct patient to call for assistance with activity   - Consult OT/PT to assist with strengthening/mobility   - Keep Call bell within reach  - Keep bed low and locked with side rails adjusted as appropriate  - Keep care items and personal belongings within reach  - Initiate and maintain comfort rounds  - Make Fall Risk Sign visible to staff  - Offer Toileting every 2 Hours, in advance of need  - Initiate/Maintain bed/chair alarm  - Obtain necessary fall risk management equipment: nonskid socks  - Apply yellow socks and bracelet for high fall risk patients  - Consider moving patient to room near nurses station  Outcome: Progressing  Goal: Maintain or return to baseline ADL function  Description: INTERVENTIONS:  -  Assess patient's ability to carry out ADLs; assess patient's baseline for ADL function and identify physical deficits which impact ability to perform ADLs (bathing, care of mouth/teeth, toileting, grooming, dressing, etc )  - Assess/evaluate cause of self-care deficits   - Assess range of motion  - Assess patient's mobility; develop plan if impaired  - Assess patient's need for assistive devices and provide as appropriate  - Encourage maximum independence but intervene and supervise when necessary  - Involve family in performance of ADLs  - Assess for home care needs following discharge   - Consider OT consult to assist with ADL evaluation and planning for discharge  - Provide patient education as appropriate  Outcome: Progressing  Goal: Maintains/Returns to pre admission functional level  Description: INTERVENTIONS:  - Perform BMAT or MOVE assessment daily    - Set and communicate daily mobility goal to care team and patient/family/caregiver  - Collaborate with rehabilitation services on mobility goals if consulted  - Perform Range of Motion 3 times a day  - Reposition patient every 2 hours    - Dangle patient 3 times a day  - Stand patient 3 times a day  - Ambulate patient 3 times a day  - Out of bed to chair 3 times a day   - Out of bed for meals 3 times a day  - Out of bed for toileting  - Record patient progress and toleration of activity level   Outcome: Progressing     Problem: DISCHARGE PLANNING  Goal: Discharge to home or other facility with appropriate resources  Description: INTERVENTIONS:  - Identify barriers to discharge w/patient and caregiver  - Arrange for needed discharge resources and transportation as appropriate  - Identify discharge learning needs (meds, wound care, etc )  - Arrange for interpretive services to assist at discharge as needed  - Refer to Case Management Department for coordinating discharge planning if the patient needs post-hospital services based on physician/advanced practitioner order or complex needs related to functional status, cognitive ability, or social support system  Outcome: Progressing     Problem: MOBILITY - ADULT  Goal: Maintain or return to baseline ADL function  Description: INTERVENTIONS:  -  Assess patient's ability to carry out ADLs; assess patient's baseline for ADL function and identify physical deficits which impact ability to perform ADLs (bathing, care of mouth/teeth, toileting, grooming, dressing, etc )  - Assess/evaluate cause of self-care deficits   - Assess range of motion  - Assess patient's mobility; develop plan if impaired  - Assess patient's need for assistive devices and provide as appropriate  - Encourage maximum independence but intervene and supervise when necessary  - Involve family in performance of ADLs  - Assess for home care needs following discharge   - Consider OT consult to assist with ADL evaluation and planning for discharge  - Provide patient education as appropriate  Outcome: Progressing  Goal: Maintains/Returns to pre admission functional level  Description: INTERVENTIONS:  - Perform BMAT or MOVE assessment daily    - Set and communicate daily mobility goal to care team and patient/family/caregiver  - Collaborate with rehabilitation services on mobility goals if consulted  - Perform Range of Motion 3 times a day  - Reposition patient every 2 hours    - Dangle patient 3 times a day  - Stand patient 3 times a day  - Ambulate patient 3 times a day  - Out of bed to chair 3 times a day   - Out of bed for meals 3 times a day  - Out of bed for toileting  - Record patient progress and toleration of activity level   Outcome: Progressing     Problem: Nutrition/Hydration-ADULT  Goal: Nutrient/Hydration intake appropriate for improving, restoring or maintaining nutritional needs  Description: Monitor and assess patient's nutrition/hydration status for malnutrition  Collaborate with interdisciplinary team and initiate plan and interventions as ordered  Monitor patient's weight and dietary intake as ordered or per policy  Utilize nutrition screening tool and intervene as necessary  Determine patient's food preferences and provide high-protein, high-caloric foods as appropriate       INTERVENTIONS:  - Monitor oral intake, urinary output, labs, and treatment plans  - Assess nutrition and hydration status and recommend course of action  - Evaluate amount of meals eaten  - Assist patient with eating if necessary   - Allow adequate time for meals  - Recommend/ encourage appropriate diets, oral nutritional supplements, and vitamin/mineral supplements  - Order, calculate, and assess calorie counts as needed  - Recommend, monitor, and adjust tube feedings and TPN/PPN based on assessed needs  - Assess need for intravenous fluids  - Provide specific nutrition/hydration education as appropriate  - Include patient/family/caregiver in decisions related to nutrition  Outcome: Progressing     Problem: Prexisting or High Potential for Compromised Skin Integrity  Goal: Skin integrity is maintained or improved  Description: INTERVENTIONS:  - Identify patients at risk for skin breakdown  - Assess and monitor skin integrity  - Assess and monitor nutrition and hydration status  - Monitor labs   - Assess for incontinence   - Turn and reposition patient  - Assist with mobility/ambulation  - Relieve pressure over bony prominences  - Avoid friction and shearing  - Provide appropriate hygiene as needed including keeping skin clean and dry  - Evaluate need for skin moisturizer/barrier cream  - Collaborate with interdisciplinary team   - Patient/family teaching  - Consider wound care consult   Outcome: Progressing

## 2023-05-18 LAB
GLUCOSE SERPL-MCNC: 122 MG/DL (ref 65–140)
GLUCOSE SERPL-MCNC: 137 MG/DL (ref 65–140)
GLUCOSE SERPL-MCNC: 80 MG/DL (ref 65–140)

## 2023-05-18 PROCEDURE — 97530 THERAPEUTIC ACTIVITIES: CPT

## 2023-05-18 PROCEDURE — 82948 REAGENT STRIP/BLOOD GLUCOSE: CPT

## 2023-05-18 PROCEDURE — 99232 SBSQ HOSP IP/OBS MODERATE 35: CPT | Performed by: INTERNAL MEDICINE

## 2023-05-18 PROCEDURE — 97110 THERAPEUTIC EXERCISES: CPT

## 2023-05-18 PROCEDURE — 97116 GAIT TRAINING THERAPY: CPT

## 2023-05-18 PROCEDURE — 97535 SELF CARE MNGMENT TRAINING: CPT

## 2023-05-18 PROCEDURE — 99233 SBSQ HOSP IP/OBS HIGH 50: CPT | Performed by: PHYSICAL MEDICINE & REHABILITATION

## 2023-05-18 RX ADMIN — NYSTATIN: 100000 POWDER TOPICAL at 18:31

## 2023-05-18 RX ADMIN — ACETAMINOPHEN 975 MG: 325 TABLET ORAL at 07:50

## 2023-05-18 RX ADMIN — Medication 7.5 MG: at 08:00

## 2023-05-18 RX ADMIN — GABAPENTIN 100 MG: 100 CAPSULE ORAL at 22:38

## 2023-05-18 RX ADMIN — SENNOSIDES 17.2 MG: 8.6 TABLET, FILM COATED ORAL at 07:53

## 2023-05-18 RX ADMIN — MICONAZOLE NITRATE: 20 CREAM TOPICAL at 18:31

## 2023-05-18 RX ADMIN — Medication 1000 UNITS: at 07:53

## 2023-05-18 RX ADMIN — ACETAMINOPHEN 975 MG: 325 TABLET ORAL at 16:28

## 2023-05-18 RX ADMIN — METHOCARBAMOL TABLETS 250 MG: 500 TABLET, COATED ORAL at 18:30

## 2023-05-18 RX ADMIN — MICONAZOLE NITRATE 1 APPLICATION.: 20 CREAM TOPICAL at 08:16

## 2023-05-18 RX ADMIN — ONDANSETRON 4 MG: 4 TABLET, ORALLY DISINTEGRATING ORAL at 08:00

## 2023-05-18 RX ADMIN — CITALOPRAM HYDROBROMIDE 20 MG: 20 TABLET ORAL at 07:53

## 2023-05-18 RX ADMIN — MONTELUKAST 10 MG: 10 TABLET, FILM COATED ORAL at 22:38

## 2023-05-18 RX ADMIN — METOPROLOL SUCCINATE 25 MG: 25 TABLET, EXTENDED RELEASE ORAL at 08:01

## 2023-05-18 RX ADMIN — ACETAMINOPHEN 975 MG: 325 TABLET ORAL at 00:02

## 2023-05-18 RX ADMIN — ALLOPURINOL 100 MG: 100 TABLET ORAL at 07:53

## 2023-05-18 RX ADMIN — METHOCARBAMOL TABLETS 250 MG: 500 TABLET, COATED ORAL at 11:50

## 2023-05-18 RX ADMIN — FUROSEMIDE 40 MG: 40 TABLET ORAL at 08:01

## 2023-05-18 RX ADMIN — WARFARIN SODIUM 2.5 MG: 2.5 TABLET ORAL at 18:30

## 2023-05-18 RX ADMIN — METHOCARBAMOL TABLETS 250 MG: 500 TABLET, COATED ORAL at 06:04

## 2023-05-18 RX ADMIN — SODIUM CHLORIDE 1 G: 1 TABLET ORAL at 07:52

## 2023-05-18 RX ADMIN — OXYCODONE HYDROCHLORIDE 5 MG: 5 TABLET ORAL at 00:10

## 2023-05-18 RX ADMIN — ATORVASTATIN CALCIUM 40 MG: 40 TABLET, FILM COATED ORAL at 16:28

## 2023-05-18 RX ADMIN — ACETAMINOPHEN 975 MG: 325 TABLET ORAL at 22:37

## 2023-05-18 RX ADMIN — AMIODARONE HYDROCHLORIDE 200 MG: 200 TABLET ORAL at 07:51

## 2023-05-18 RX ADMIN — DOCUSATE SODIUM 100 MG: 100 CAPSULE, LIQUID FILLED ORAL at 18:30

## 2023-05-18 RX ADMIN — OXYBUTYNIN 5 MG: 5 TABLET, FILM COATED, EXTENDED RELEASE ORAL at 07:52

## 2023-05-18 RX ADMIN — ONDANSETRON 4 MG: 4 TABLET, ORALLY DISINTEGRATING ORAL at 16:42

## 2023-05-18 RX ADMIN — METHOCARBAMOL TABLETS 250 MG: 500 TABLET, COATED ORAL at 00:02

## 2023-05-18 RX ADMIN — DOCUSATE SODIUM 100 MG: 100 CAPSULE, LIQUID FILLED ORAL at 07:53

## 2023-05-18 RX ADMIN — NYSTATIN 1 APPLICATION.: 100000 POWDER TOPICAL at 08:16

## 2023-05-18 RX ADMIN — LIDOCAINE 5% 1 PATCH: 700 PATCH TOPICAL at 08:17

## 2023-05-18 RX ADMIN — SODIUM CHLORIDE 1 G: 1 TABLET ORAL at 16:29

## 2023-05-18 NOTE — PROGRESS NOTES
Internal Medicine Progress Note  Patient: Mynor Swanson  Age/sex: 68 y o  female  Medical Record #: [id number]      ASSESSMENT/PLAN: (Interval History)  Mynor Swanson is seen and examined and management for following issues:    Lt proximal humerus fracture  • NWB to the LUE  • Immobilizer placed to LUE by orthopedics  • Pain control and therapy per PMR  • Follow-up with Orthopedics in 4 weeks  Rt 8-9 rib fx  •  Pain control per PMR  • Encourage IS   • O2 as needed  Rt eyebrow laceration  • Monitor lacerations  • Local wound care  DM type 2  • HA1C 6 4  • Home: [place]  2x daily  • Here: SSI  • Will monitor and add back medications as needed     History of mechanical AVR  • Goal INR 2 5 - 3 5  • INR 3 1  • Continue Coumadin 2 5mg daily  • Pt takes 2 5mg Wednesday and Saturday and 5mg all other days of the week at home  • Continue to monitor  • Repeat INR Friday     Hyponatremia  • Na 133  • stable     Ventricular arrhythmia  • One episode 5/2/23 and pt received a shock from ICD  • Continue metoprolol and amiodarone  • Continue Coumadin  Bradycardia  • Diltiazem discontinued and metoprolol dose lowered  • She will need outpt follow-up with Cardiology  [facility]  • HR stable     COVID-19  • Tested positive 5/13/23  • Pt has a cough which she reports is chronic  • Contact and airborne precautions for 10 days  Buttock wound/labia wound  · Followed by wound care  · Frequent position changes/specialized mattress  · Will monitor  DC planning: TBD  The above assessment and plan was reviewed and updated as determined by my evaluation of the patient on 5/18/2023      Labs:   Results from last 7 days   Lab Units 05/15/23  1513 05/14/23  0502   WBC Thousand/uL 4 89 5 21   HEMOGLOBIN g/dL 10 3* 10 8*   HEMATOCRIT % 30 8* 34 7*   PLATELETS Thousands/uL 184 208     Results from last 7 days   Lab Units 05/15/23  1628 05/14/23  0502   SODIUM mmol/L 133* 132* POTASSIUM mmol/L 4 6 4 9   CHLORIDE mmol/L 106 105   CO2 mmol/L 27 26   BUN mg/dL 24 24   CREATININE mg/dL 0 85 0 75   CALCIUM mg/dL 7 9* 8 1*         Results from last 7 days   Lab Units 05/17/23  0804 05/16/23  0559   INR  3 58* 3 13*     Results from last 7 days   Lab Units 05/18/23  0539 05/17/23  2050 05/17/23  1649   POC GLUCOSE mg/dl 80 168* 108       Review of Scheduled Meds:  Current Facility-Administered Medications   Medication Dose Route Frequency Provider Last Rate   • acetaminophen  975 mg Oral [facility] Kervin Luna MD     • albuterol  2 puff Inhalation Q4H PRN Kervin Luna MD     • allopurinol  100 mg Oral Daily Kervin Luna MD     • amiodarone  200 mg Oral Daily With Breakfast Kervin Luna MD     • atorvastatin  40 mg Oral Daily With Angela Monae MD     • calcium carbonate  500 mg Oral TID PRN Kervin Luna MD     • cholecalciferol  1,000 Units Oral Daily Kervin Luna MD     • citalopram  20 mg Oral Daily Kervin uLna MD     • docusate sodium  100 mg Oral BID Kervin Luna MD     • furosemide  40 mg Oral Daily Kervin Luna MD     • gabapentin  100 mg Oral HS Kervin Luna MD     • insulin lispro  1-5 Units Subcutaneous TID [facility] Kervin Luna MD     • insulin lispro  1-5 Units Subcutaneous HS Kervin Luna MD     • lidocaine  1 patch Topical Daily Kervin Luna MD     • methocarbamol  250 mg Oral Q6H [facility] Kervin Luna MD     • metoprolol succinate  25 mg Oral Daily Kervin Luna MD     • LICO ANTIFUNGAL   Topical BID JOSEPHINE Llanes     • montelukast  10 mg Oral HS Kervin Luna MD     • naloxone  0 04 mg Intravenous Q1MIN PRN Kervin Luna MD     • nystatin   Topical BID Amanda Pearl PA-C     • ondansetron  4 mg Oral Q6H PRN Kervin Luna MD     • oxybutynin  5 mg Oral Daily Kervin Luna MD     • oxyCODONE  5 mg Oral Q4H PRN Kervin Luna MD     • oxyCODONE  7 5 mg Oral Q4H PRN Kervin Luna MD     • senna  2 tablet Oral Daily Magdaleno Cordova MD     • sodium chloride  1 g Oral BID With Meals Magdaleno Cordova MD     • warfarin  2 5 mg Oral Daily (warfarin) Amanda Pearl PA-C         Subjective/ HPI: Patient seen and examined  Patients overnight issues or events were reviewed with nursing or staff during rounds or morning huddle session  New or overnight issues include the following:     Pt seen in her room  She states that she is doing well  She denies any other complaints  ROS:   A 10 point ROS was performed; negative except as noted above         Imaging:     XR humerus left   Final Result by Arjun Monahan MD (05/15 1613)   Oblique spiral fracture of the shaft of the humerus with apex medial angulation   No glenohumeral dislocation         Workstation performed: [id number]         XR shoulder 2+ vw left   Final Result by Arjun Monahan MD (05/15 1614)      Oblique spiral fracture of the proximal humeral shaft with stable alignment      Workstation performed: [id number]             *Labs /Radiology studies Reviewed  *Medications  reviewed and reconciled as needed  *Please refer to order section for additional ordered labs studies  *Case discussed with primary attending during morning huddle case rounds    Physical Examination:  Vitals:   Vitals:    05/17/23 1829 05/17/23 2058 05/18/23 0546 05/18/23 0801   BP:  107/53 131/69 137/62   BP Location:  Right arm Right arm    Pulse:  60 63 61   Resp:  16 20    Temp:  98 1 °F (36 7 °C) 97 8 °F (36 6 °C)    TempSrc:  Oral Oral    SpO2:  95% 96%    Weight: 92 9 kg (204 lb 12 9 oz)      Height:         GEN: NAD; pleasant, conversive  NEURO: Alert and oriented x4; appropriate  HEENT: Pupils are equal/reactive; normocephalic, face is symmetrical, hearing intact  CV: S1 S2 regular, +audible click; no murmur/rub/gallops, 1/4 pedal pulses, trace b/l edema present  RESP: Lungs are clear bilaterally, no wheezes rales or rhonchi, on room air, no distress, respirations are easy and non labored  GI: Abdomen is obese, soft, non tender, +BS x4; non distended  : Voiding without issues  MUSC: Moves all extremities except LUE NWB, Lt UE in sling  SKIN: pink, warm, poor turgor, eyebrow laceration stable; sacral wound refer to media       The above physical exam was reviewed and updated as determined by my evaluation of the patient on 5/18/2023  Invasive Devices     Peripheral Intravenous Line  Duration           Long-Dwell Peripheral IV (Midline) 79/46/17 Right Cephalic Vein 2 days                   VTE Pharmacologic Prophylaxis: Warfarin (Coumadin)  Code Status: Level 1 - Full Code  Current Length of Stay: 5 day(s)      Total time spent:  30 minutes with more than 50% spent counseling/coordinating care  Counseling includes discussion with patient re: progress  and discussion with patient of his/her current medical state/information  Coordination of patient's care was performed in conjunction with primary service  Time invested included review of patient's labs, vitals, and management of their comorbidities with continued monitoring  In addition, this patient was discussed with medical team including physician and advanced extenders  The care of the patient was extensively discussed and appropriate treatment plan was formulated unique for this patient  Medical decision making for the day was made by supervising physician unless otherwise noted in their attestation statement  ** Please Note:  voice to text software may have been used in the creation of this document   Although proof errors in transcription or interpretation are a potential of such software**

## 2023-05-18 NOTE — PROGRESS NOTES
05/18/23 1400   Pain Assessment   Pain Assessment Tool 0-10   Pain Score 6   Pain Location/Orientation Orientation: Left; Location: Shoulder   Pain Onset/Description Onset: Ongoing;Frequency: Intermittent   Hospital Pain Intervention(s) Repositioned   Pain 2   Pain Score 2 5   Pain Location/Orientation 2 Location: [address]  [facility] Pain Intervention(s) 2 Repositioned   Restrictions/Precautions   Precautions Fall Risk;Pain;Pressure Ulcer;Supervision on toilet/commode;Pacemaker;Contact/isolation; Airborne/isolation   LUE Weight Bearing Per Order NWB   Braces or Orthoses Sling;Splint  (LUE)   Cognition   Overall Cognitive Status WFL   Arousal/Participation Alert; Cooperative   Attention Within functional limits   Orientation Level Oriented X4   Memory Within functional limits   Following Commands Follows multistep commands with increased time or repetition   Lying to Sitting on Side of Bed   Type of Assistance Needed Physical assistance;Verbal cues; Adaptive equipment   Physical Assistance Level 51%-75%   Comment with HOB slightly elevated and R bed rail, pt able to A with BLE but required more A at trunk  Lying to Sitting on Side of Bed CARE Score 2   Sit to Stand   Type of Assistance Needed Physical assistance;Verbal cues; Adaptive equipment   Physical Assistance Level 51%-75%   Comment MODA on higher surfaces, MAXA on low surfaces  Sit to Stand CARE Score 2   Bed-Chair Transfer   Type of Assistance Needed Physical assistance;Verbal cues; Adaptive equipment   Physical Assistance Level 25% or less   Comment SPC with SPC, mildly unsteady and has ant lean     Chair/Bed-to-Chair Transfer CARE Score 3   Transfer Bed/Chair/Wheelchair   Adaptive Equipment Cane   Walk 50 Feet with Two Turns   Comment dyspnea with SPT's this session   Reason if not Attempted Safety concerns   Walk 50 Feet with Two Turns CARE Score 88   Walk 150 Feet   Reason if not Attempted Safety concerns   Walk 150 Feet CARE Score 88   Walking 10 Feet on Uneven Surfaces   Reason if not Attempted Safety concerns   Walking 10 Feet on Uneven Surfaces CARE Score 88   Ambulation   Primary Mode of Locomotion Prior to Admission Walk   Does the patient walk? 2  Yes   Wheel 50 Feet with Two Turns   Reason if not Attempted Activity not applicable   Wheel 50 Feet with Two Turns CARE Score 9   Wheel 150 Feet   Reason if not Attempted Activity not applicable   Wheel 264 Feet CARE Score 9   Curb or Single Stair   Reason if not Attempted Safety concerns   1 Step (Curb) CARE Score 88   4 Steps   Reason if not Attempted Activity not applicable   4 Steps CARE Score 9   12 Steps   Reason if not Attempted Activity not applicable   12 Steps CARE Score 9   Therapeutic Interventions   Strengthening Seated LAQ and ankle DF/PF 10x3 reps BLE   Other several SPT with SPC  Other Comments   Comments increased difficulty maintaining sitting in WC as she c/o increased pressure to sore on bottom  Assessment   Treatment Assessment Pt participated in 30 min session with increased focus on bed mobility, functional transfers and LE strengthening  Pt noted increased pain in seated position and preferred sitting EOB rather than WC  Pt noted mild ant lean making her slightly unsteady when performing SPT with cane but able to correct it with NAINA  Pt's endurance is poor and she presented with dyspnea with transfers  Noted productive cough with movement this session  Pt will cont POC as tolerated with cont focus on increased act tolerance, increased gait and LE strengthening  Problem List Decreased strength;Decreased range of motion;Decreased endurance; Impaired balance;Decreased mobility;Obesity; Decreased skin integrity;Orthopedic restrictions;Pain   Barriers to Discharge Inaccessible home environment;Decreased caregiver support   PT Barriers   Functional Limitation Car transfers; Ramp negotiation;Stair negotiation;Standing;Transfers; Walking   Plan   Treatment/Interventions Functional transfer training;LE strengthening/ROM; Therapeutic exercise; Endurance training;Patient/family training;Gait training;Bed mobility   Progress Slow progress, decreased activity tolerance   Recommendation   PT Discharge Recommendation   (TBD)   PT Therapy Minutes   PT Time In 1400   PT Time Out 1430   PT Total Time (minutes) 30   PT Mode of treatment - Individual (minutes) 30   PT Mode of treatment - Concurrent (minutes) 0   PT Mode of treatment - Group (minutes) 0   PT Mode of treatment - Co-treat (minutes) 0   PT Mode of Treatment - Total time(minutes) 30 minutes   PT Cumulative Minutes 375   Therapy Time missed   Time missed?  No

## 2023-05-18 NOTE — PROGRESS NOTES
05/18/23 1430   Pain Assessment   Pain Assessment Tool 0-10   Pain Score 6   Pain Location/Orientation Orientation: Left; Location: Shoulder   Pain Onset/Description Onset: Ongoing;Frequency: Constant/Continuous   Hospital Pain Intervention(s) Rest   Pain 2   Pain Score 2 6   Pain Location/Orientation 2 Location: [address]  [facility] Pain Intervention(s) 2 Repositioned   Restrictions/Precautions   Precautions Fall Risk;Pain;Supervision on toilet/commode;Contact/isolation; Airborne/isolation;Pacemaker;Pressure Ulcer   LUE Weight Bearing Per Order (S)  NWB   Braces or Orthoses Splint;Sling   Cognition   Overall Cognitive Status WFL   Arousal/Participation Alert; Cooperative   Attention Within functional limits   Subjective   Subjective pt seated EOB and was ready to continue PT  reported sudden onset of inc pain on buttocks while attempted to sit on the w/c with roho with PTA but agreeable to trial at end of tx   Sit to Stand   Type of Assistance Needed Physical assistance;Verbal cues; Adaptive equipment   Physical Assistance Level 25% or less   Comment CGA to min A this session on slightly elevated bed, BSC, regular height w/c   Sit to Stand CARE Score 3   Bed-Chair Transfer   Type of Assistance Needed Incidental touching;Verbal cues; Adaptive equipment   Comment CG-CS with SPC extra time to complete   Chair/Bed-to-Chair Transfer CARE Score 4   Walk 10 Feet   Type of Assistance Needed Incidental touching;Verbal cues; Adaptive equipment   Comment CG-CS with SPC using gait belt but no LOB nor required assist this session x 20' x 2, 10' x 1, 40' x 2   Walk 10 Feet CARE Score 4   Toilet Transfer   Type of Assistance Needed Verbal cues; Physical assistance   Physical Assistance Level 25% or less   Comment + bladder movement but cont to require max assist to complete hygiene and CM   Toilet Transfer CARE Score 3   Therapeutic Interventions   Balance standing balance/tolerance x 6 mins while doing word finding task Assessment   Treatment Assessment pt tolerated skilled PT without any LOB or knee buckling although cont to demo dec gait tolerance to 20-40' at a time due to fatigue with SpO2 95%  also Due to overall dec UE/LE strength deficit and inability to use L UE secondary to NWB precaution she still requires physical assist to complete sit to stand task but otherwise CG-CS for SPT and short distance amb using SPC  pt left in w/c with all needs within reach and SCD on at end of tx  PT will cont to work on strengthening, standing balance/tolerance and functional mobility training with SPC with emphasis on inc gait tolerance  Barriers to Discharge Inaccessible home environment;Decreased caregiver support   Plan   Treatment/Interventions Functional transfer training; Therapeutic exercise; Endurance training;Gait training;Spoke to nursing;OT;Spoke to MD   Progress Slow progress, decreased activity tolerance   PT Therapy Minutes   PT Time In 1430   PT Time Out 1530   PT Total Time (minutes) 60   PT Mode of treatment - Individual (minutes) 60   PT Mode of treatment - Concurrent (minutes) 0   PT Mode of treatment - Group (minutes) 0   PT Mode of treatment - Co-treat (minutes) 0   PT Mode of Treatment - Total time(minutes) 60 minutes   PT Cumulative Minutes 435   Therapy Time missed   Time missed?  No

## 2023-05-18 NOTE — PLAN OF CARE
Problem: PAIN - ADULT  Goal: Verbalizes/displays adequate comfort level or baseline comfort level  Description: Interventions:  - Encourage patient to monitor pain and request assistance  - Assess pain using appropriate pain scale  - Administer analgesics based on type and severity of pain and evaluate response  - Implement non-pharmacological measures as appropriate and evaluate response  - Consider cultural and social influences on pain and pain management  - Notify physician/advanced practitioner if interventions unsuccessful or patient reports new pain  Outcome: Progressing     Problem: INFECTION - ADULT  Goal: Absence or prevention of progression during hospitalization  Description: INTERVENTIONS:  - Assess and monitor for signs and symptoms of infection  - Monitor lab/diagnostic results  - Monitor all insertion sites, i e  indwelling lines, tubes, and drains  - Monitor endotracheal if appropriate and nasal secretions for changes in amount and color  - Donald appropriate cooling/warming therapies per order  - Administer medications as ordered  - Instruct and encourage patient and family to use good hand hygiene technique  - Identify and instruct in appropriate isolation precautions for identified infection/condition  Outcome: Progressing  Goal: Absence of fever/infection during neutropenic period  Description: INTERVENTIONS:  - Monitor WBC    Outcome: Progressing     Problem: SAFETY ADULT  Goal: Patient will remain free of falls  Description: INTERVENTIONS:  - Educate patient/family on patient safety including physical limitations  - Instruct patient to call for assistance with activity   - Consult OT/PT to assist with strengthening/mobility   - Keep Call bell within reach  - Keep bed low and locked with side rails adjusted as appropriate  - Keep care items and personal belongings within reach  - Initiate and maintain comfort rounds  - Make Fall Risk Sign visible to staff  - Offer Toileting every 2 Hours, in advance of need  - Initiate/Maintain bed/chair alarm  - Obtain necessary fall risk management equipment: nonskid socks  - Apply yellow socks and bracelet for high fall risk patients  - Consider moving patient to room near nurses station  Outcome: Progressing  Goal: Maintain or return to baseline ADL function  Description: INTERVENTIONS:  -  Assess patient's ability to carry out ADLs; assess patient's baseline for ADL function and identify physical deficits which impact ability to perform ADLs (bathing, care of mouth/teeth, toileting, grooming, dressing, etc )  - Assess/evaluate cause of self-care deficits   - Assess range of motion  - Assess patient's mobility; develop plan if impaired  - Assess patient's need for assistive devices and provide as appropriate  - Encourage maximum independence but intervene and supervise when necessary  - Involve family in performance of ADLs  - Assess for home care needs following discharge   - Consider OT consult to assist with ADL evaluation and planning for discharge  - Provide patient education as appropriate  Outcome: Progressing  Goal: Maintains/Returns to pre admission functional level  Description: INTERVENTIONS:  - Perform BMAT or MOVE assessment daily    - Set and communicate daily mobility goal to care team and patient/family/caregiver  - Collaborate with rehabilitation services on mobility goals if consulted  - Perform Range of Motion 3 times a day  - Reposition patient every 2 hours    - Dangle patient 3 times a day  - Stand patient 3 times a day  - Ambulate patient 3 times a day  - Out of bed to chair 3 times a day   - Out of bed for meals 3 times a day  - Out of bed for toileting  - Record patient progress and toleration of activity level   Outcome: Progressing     Problem: DISCHARGE PLANNING  Goal: Discharge to home or other facility with appropriate resources  Description: INTERVENTIONS:  - Identify barriers to discharge w/patient and caregiver  - Arrange for needed discharge resources and transportation as appropriate  - Identify discharge learning needs (meds, wound care, etc )  - Arrange for interpretive services to assist at discharge as needed  - Refer to Case Management Department for coordinating discharge planning if the patient needs post-hospital services based on physician/advanced practitioner order or complex needs related to functional status, cognitive ability, or social support system  Outcome: Progressing     Problem: MOBILITY - ADULT  Goal: Maintain or return to baseline ADL function  Description: INTERVENTIONS:  -  Assess patient's ability to carry out ADLs; assess patient's baseline for ADL function and identify physical deficits which impact ability to perform ADLs (bathing, care of mouth/teeth, toileting, grooming, dressing, etc )  - Assess/evaluate cause of self-care deficits   - Assess range of motion  - Assess patient's mobility; develop plan if impaired  - Assess patient's need for assistive devices and provide as appropriate  - Encourage maximum independence but intervene and supervise when necessary  - Involve family in performance of ADLs  - Assess for home care needs following discharge   - Consider OT consult to assist with ADL evaluation and planning for discharge  - Provide patient education as appropriate  Outcome: Progressing  Goal: Maintains/Returns to pre admission functional level  Description: INTERVENTIONS:  - Perform BMAT or MOVE assessment daily    - Set and communicate daily mobility goal to care team and patient/family/caregiver  - Collaborate with rehabilitation services on mobility goals if consulted  - Perform Range of Motion 3 times a day  - Reposition patient every 2 hours    - Dangle patient 3 times a day  - Stand patient 3 times a day  - Ambulate patient 3 times a day  - Out of bed to chair 3 times a day   - Out of bed for meals 3 times a day  - Out of bed for toileting  - Record patient progress and toleration of activity level   Outcome: Progressing     Problem: Nutrition/Hydration-ADULT  Goal: Nutrient/Hydration intake appropriate for improving, restoring or maintaining nutritional needs  Description: Monitor and assess patient's nutrition/hydration status for malnutrition  Collaborate with interdisciplinary team and initiate plan and interventions as ordered  Monitor patient's weight and dietary intake as ordered or per policy  Utilize nutrition screening tool and intervene as necessary  Determine patient's food preferences and provide high-protein, high-caloric foods as appropriate       INTERVENTIONS:  - Monitor oral intake, urinary output, labs, and treatment plans  - Assess nutrition and hydration status and recommend course of action  - Evaluate amount of meals eaten  - Assist patient with eating if necessary   - Allow adequate time for meals  - Recommend/ encourage appropriate diets, oral nutritional supplements, and vitamin/mineral supplements  - Order, calculate, and assess calorie counts as needed  - Recommend, monitor, and adjust tube feedings and TPN/PPN based on assessed needs  - Assess need for intravenous fluids  - Provide specific nutrition/hydration education as appropriate  - Include patient/family/caregiver in decisions related to nutrition  Outcome: Progressing     Problem: Prexisting or High Potential for Compromised Skin Integrity  Goal: Skin integrity is maintained or improved  Description: INTERVENTIONS:  - Identify patients at risk for skin breakdown  - Assess and monitor skin integrity  - Assess and monitor nutrition and hydration status  - Monitor labs   - Assess for incontinence   - Turn and reposition patient  - Assist with mobility/ambulation  - Relieve pressure over bony prominences  - Avoid friction and shearing  - Provide appropriate hygiene as needed including keeping skin clean and dry  - Evaluate need for skin moisturizer/barrier cream  - Collaborate with interdisciplinary team   - Patient/family teaching  - Consider wound care consult   Outcome: Progressing

## 2023-05-18 NOTE — PROGRESS NOTES
05/18/23 2437   Pain Assessment   Pain Assessment Tool 0-10   Pain Score 5   Pain Location/Orientation Orientation: Left; Location: Shoulder   Hospital Pain Intervention(s) Repositioned   Restrictions/Precautions   Precautions Fall Risk;Pain;Pressure Ulcer;Contact/isolation; Airborne/isolation;Pacemaker   LUE Weight Bearing Per Order NWB   Braces or Orthoses Sling;Splint  (LUE)   Sit to Stand   Type of Assistance Needed Physical assistance   Physical Assistance Level 51%-75%   Comment from raised surfaces  Sit to Stand CARE Score 2   Bed-Chair Transfer   Type of Assistance Needed Physical assistance   Physical Assistance Level 26%-50%   Comment Use of HW  Chair/Bed-to-Chair Transfer CARE Score 3   Toileting Hygiene   Type of Assistance Needed Physical assistance   Physical Assistance Level 76% or more   Comment MaxA for CM up/down over hips  Toileting Hygiene CARE Score 2   Toilet Transfer   Type of Assistance Needed Physical assistance   Physical Assistance Level 51%-75%   Comment HW   Toilet Transfer CARE Score 2   Exercise Tools   Other Exercise Tool 1 Pt engaged in RUE strengthening within tolerance with use of 2# weight for bicep/tricep, AROM for all other plans  LUE retrograde massage to hand digits only and opposition/gross grasp only  Educated on proper positioning to reduce eedma  Cognition   Overall Cognitive Status WFL   Arousal/Participation Alert; Cooperative   Attention Within functional limits   Orientation Level Oriented X4   Memory Within functional limits   Following Commands Follows multistep commands with increased time or repetition   Activity Tolerance   Activity Tolerance Patient tolerated treatment well   Assessment   Treatment Assessment Pt participated in skilled OT services with focus on toileting, functional txfers, UE ROM and strengthening within all precautions  Pt continues to be limited by LUE orthopedic restrictions, dec act doug, dec strength, body habitus   Pt will continue to benefit from skilled OT services with focus on ADL retraining, standing tolerance/balance, functional txfers and act doug  Prognosis Fair   Problem List Decreased strength;Decreased range of motion;Decreased endurance; Impaired balance;Decreased mobility;Obesity; Decreased skin integrity;Orthopedic restrictions;Pain   Plan   Treatment/Interventions ADL retraining;Functional transfer training; Therapeutic exercise; Endurance training;Patient/family training;Equipment eval/education; Bed mobility; Compensatory technique education   Progress Slow progress, decreased activity tolerance   OT Therapy Minutes   OT Time In 0930   OT Time Out 1100   OT Total Time (minutes) 90   OT Mode of treatment - Individual (minutes) 90   OT Mode of treatment - Concurrent (minutes) 0   OT Mode of treatment - Group (minutes) 0   OT Mode of treatment - Co-treat (minutes) 0   OT Mode of Treatment - Total time(minutes) 90 minutes   OT Cumulative Minutes 460   Therapy Time missed   Time missed?  No

## 2023-05-19 LAB
GLUCOSE SERPL-MCNC: 111 MG/DL (ref 65–140)
GLUCOSE SERPL-MCNC: 119 MG/DL (ref 65–140)
GLUCOSE SERPL-MCNC: 128 MG/DL (ref 65–140)
GLUCOSE SERPL-MCNC: 128 MG/DL (ref 65–140)
GLUCOSE SERPL-MCNC: 188 MG/DL (ref 65–140)
INR PPP: 4.18 (ref 0.84–1.19)
PROTHROMBIN TIME: 40.6 SECONDS (ref 11.6–14.5)

## 2023-05-19 PROCEDURE — 97110 THERAPEUTIC EXERCISES: CPT

## 2023-05-19 PROCEDURE — 99232 SBSQ HOSP IP/OBS MODERATE 35: CPT | Performed by: STUDENT IN AN ORGANIZED HEALTH CARE EDUCATION/TRAINING PROGRAM

## 2023-05-19 PROCEDURE — 85610 PROTHROMBIN TIME: CPT | Performed by: PHYSICAL MEDICINE & REHABILITATION

## 2023-05-19 PROCEDURE — 97535 SELF CARE MNGMENT TRAINING: CPT

## 2023-05-19 PROCEDURE — 97530 THERAPEUTIC ACTIVITIES: CPT

## 2023-05-19 PROCEDURE — 82948 REAGENT STRIP/BLOOD GLUCOSE: CPT

## 2023-05-19 PROCEDURE — 99232 SBSQ HOSP IP/OBS MODERATE 35: CPT | Performed by: NURSE PRACTITIONER

## 2023-05-19 RX ADMIN — ONDANSETRON 4 MG: 4 TABLET, ORALLY DISINTEGRATING ORAL at 09:14

## 2023-05-19 RX ADMIN — ALLOPURINOL 100 MG: 100 TABLET ORAL at 09:09

## 2023-05-19 RX ADMIN — MONTELUKAST 10 MG: 10 TABLET, FILM COATED ORAL at 22:08

## 2023-05-19 RX ADMIN — SODIUM CHLORIDE 1 G: 1 TABLET ORAL at 17:05

## 2023-05-19 RX ADMIN — METHOCARBAMOL TABLETS 250 MG: 500 TABLET, COATED ORAL at 11:53

## 2023-05-19 RX ADMIN — METOPROLOL SUCCINATE 25 MG: 25 TABLET, EXTENDED RELEASE ORAL at 09:11

## 2023-05-19 RX ADMIN — METHOCARBAMOL TABLETS 250 MG: 500 TABLET, COATED ORAL at 18:39

## 2023-05-19 RX ADMIN — AMIODARONE HYDROCHLORIDE 200 MG: 200 TABLET ORAL at 09:09

## 2023-05-19 RX ADMIN — Medication 7.5 MG: at 22:16

## 2023-05-19 RX ADMIN — NYSTATIN 1 APPLICATION.: 100000 POWDER TOPICAL at 18:40

## 2023-05-19 RX ADMIN — DOCUSATE SODIUM 100 MG: 100 CAPSULE, LIQUID FILLED ORAL at 18:39

## 2023-05-19 RX ADMIN — FUROSEMIDE 40 MG: 40 TABLET ORAL at 09:10

## 2023-05-19 RX ADMIN — METHOCARBAMOL TABLETS 250 MG: 500 TABLET, COATED ORAL at 00:50

## 2023-05-19 RX ADMIN — OXYBUTYNIN 5 MG: 5 TABLET, FILM COATED, EXTENDED RELEASE ORAL at 09:09

## 2023-05-19 RX ADMIN — ONDANSETRON 4 MG: 4 TABLET, ORALLY DISINTEGRATING ORAL at 17:01

## 2023-05-19 RX ADMIN — Medication 1000 UNITS: at 09:11

## 2023-05-19 RX ADMIN — GABAPENTIN 100 MG: 100 CAPSULE ORAL at 22:08

## 2023-05-19 RX ADMIN — NYSTATIN 1 APPLICATION.: 100000 POWDER TOPICAL at 09:18

## 2023-05-19 RX ADMIN — SENNOSIDES 17.2 MG: 8.6 TABLET, FILM COATED ORAL at 09:10

## 2023-05-19 RX ADMIN — ACETAMINOPHEN 975 MG: 325 TABLET ORAL at 17:02

## 2023-05-19 RX ADMIN — MICONAZOLE NITRATE 1 APPLICATION.: 20 CREAM TOPICAL at 18:41

## 2023-05-19 RX ADMIN — ACETAMINOPHEN 975 MG: 325 TABLET ORAL at 09:08

## 2023-05-19 RX ADMIN — METHOCARBAMOL TABLETS 250 MG: 500 TABLET, COATED ORAL at 06:14

## 2023-05-19 RX ADMIN — LIDOCAINE 5% 1 PATCH: 700 PATCH TOPICAL at 09:13

## 2023-05-19 RX ADMIN — MICONAZOLE NITRATE 1 APPLICATION.: 20 CREAM TOPICAL at 09:18

## 2023-05-19 RX ADMIN — OXYCODONE HYDROCHLORIDE 5 MG: 5 TABLET ORAL at 00:51

## 2023-05-19 RX ADMIN — ATORVASTATIN CALCIUM 40 MG: 40 TABLET, FILM COATED ORAL at 17:01

## 2023-05-19 RX ADMIN — DOCUSATE SODIUM 100 MG: 100 CAPSULE, LIQUID FILLED ORAL at 09:10

## 2023-05-19 RX ADMIN — INSULIN LISPRO 1 UNITS: 100 INJECTION, SOLUTION INTRAVENOUS; SUBCUTANEOUS at 22:39

## 2023-05-19 RX ADMIN — CITALOPRAM HYDROBROMIDE 20 MG: 20 TABLET ORAL at 09:11

## 2023-05-19 RX ADMIN — SODIUM CHLORIDE 1 G: 1 TABLET ORAL at 09:13

## 2023-05-19 NOTE — PROGRESS NOTES
PM&R PROGRESS NOTE:  Rah Sena 68 y o  female MRN: [id number]  Unit/Bed#: -01 Encounter: [id number]      Rehab Diagnosis: Impairment of mobility, safety and Activities of Daily Living (ADLs) due to Major Multiple Trauma:  14 9  Other Multiple Trauma Multiple rib fractures with acute pain, improved acute hypoxic respiratory failure, and L humeral fracture NWB in coaptation splint       SUBJECTIVE: Patient seen and evaluated at bedside  Pain in her wound area as well as in the left arm although she feels like there is some improvement in the arm pain overall and she has good movement of her hand despite some swelling in it  Spoke with wound care today and they plan to come back and see her on Monday to continue the current plan of care at this time from a wound care standpoint  Maintains on COVID precautions to 5/23  She denies any fever, chills, nausea, vomiting, cough, shortness of breath, diarrhea, constipation  She has some pain in the wound area when the Bosi is applied which she has not felt before  Otherwise sleeping well and eating    ASSESSMENT: Stable, wounds      PLAN:    Rehabilitation  • Functional deficits: Left upper extremity weakness, impaired mobility, impaired self-care, impaired endurance, impaired strength  • Continue current rehabilitation plan of care to maximize function  • Estimated Discharge: TBD, estimated length of stay 2-3 weeks    Current function:  Physical Therapy Occupational Therapy Speech Therapy   Weight Bearing Status: Non-weight bearing (LUE)  Transfers: Total Assistance, Assist of 2  Bed Mobility: Maximum Assistance, Total Assistance, Assist of 2  Amulation Distance (ft): 20 feet  Ambulation: Assist of 2, Moderate Assistance  Assistive Device for Ambulation: Michael [address] for Ramp: Wheelchair  Discharge Recommendations:  (TBD)   Eating: Supervision  Grooming: Total Assistance  Bathing: Total Assistance, Assist of 2  Bathing:  Total Assistance, Assist of 2  Upper Body Dressing: Total Assistance  Lower Body Dressing: Total Assistance, Assist of 2  Toileting: Total Assistance  Toilet Transfer: Moderate Assistance, Maximum Assistance  Cognition: Within Defined Limits  Orientation: Person, Place, Time, Situation                 DVT prophylaxis  • Managed on warfarin  • Goal INR 2 5-3 5  • Last INR = 4 18    Bladder plan  • Continent  • Limit pure wick usage if possible    Bowel plan  • Continent      Difficult intravenous access  Assessment & Plan  Midline placed by IR on 5/15/23  Need for blood draws frequently to follow electrolytes, INR  Need for IV access given her recent ventricular arrhythmia and AICD firing, importance of optimizing electrolytes, current COVID 19 virus  Acute COVID-19  Assessment & Plan  Diagnosed on screening test on 5/13  Minimally symptomatic (does have a cough)  Afebrile and on RA  10 days isolation per protocol through 5/22/2023 (last day)  Monitor respiratory status   - Has chronic issues with urinary urgency/frequency  - Added Q4hr timed voids so we can help her get to the commode in a timely manner    -Stable and has not required oxygen   -Compliant with utilizing incentive spirometer    CHAS (obstructive sleep apnea)  Assessment & Plan  Does not use CPAP at home  Consider Noc Ox overnight while here  Class 2 obesity in adult  Assessment & Plan  Nutrition consulted  Counseling    Osteoporosis with current pathological fracture with routine healing  Assessment & Plan  Managed by [facility] Endocrinology  History of L5 pathologic fracture  Has been on Alendronate once weekly since 2017  Would confirm with her what day she normally takes it prior to restarting  Will confirm that it is ok to restart with Orthopedics first, as some surgeons prefer to wait 6 weeks after acute fracture prior to restarting    - Per Dr Sugar Goff ok to resume at anytime  - She normally takes it on Thursdays  Would complete through 6/2023    Per last Endocrine note in 6/2022, they were going to continue for 1 more year and then stop  She was due for DXA this May  Asthma  Assessment & Plan  No acute exacerbation  Home: Singulair 10mg QHS  Here: Same, PRN albuterol  No acute exacerbation  Monitor respiratory status    Pressure injury of buttock, unstageable (HCC)  Assessment & Plan  Present on admission  Wound care following while at [address] [place] [address] Place local wound care as follows:  • Evolving DTI to the b/l sacro-buttocks  Wound is POA to ARC unit  Wound continues to evolve  Noted full thickness skin loss with devitalized tissue  ? Patient desires to defer bedside debridement at this time, she states she doesn't think she will be able to be on her side for that long due to pain  ? Continue with Triad paste and foam dressing  ? Pressure relief   ? P-500 mattress  • Candidiasis rash noted to the b/l medial thighs and perineum, no open wound or active bleeding at time of assessment  ? Will recommend pascual anti-fungal cream  • ITD with candidasis to the b/l abdominal pannus with partial thickness skin loss  ? Will recommend InterDry sheets   • No s/s of infection present  • Will recommend to continue with preventative nursing skin care measures  • Nutrition is following along   • Patient verbalized understanding of plan of care  • [place] text wound care team with questions or concerns    • Routine wound care follow-up while admitted   • Follow-up with the [facility] wound care center as an outpatient, ambulatory referral placed      Frequent offloading in chair and in bed  P500 mattress  Optimize nutrition    Atrial fibrillation with RVR ([address] )  Assessment & Plan  Initially per AICD interrogation with Afib RVR that degenerated into VT/VF  Unclear burden  Here: Toprol XL 25mg daily, Fully anticoagulated on warfarin (See mechanical AV), amiodarone 200mg daily  Monitor and adjust as appropriate  Currently examines in NSR     IM consulted to assist with management  Outpatient f/u with EP    Abnormal CT scan  Assessment & Plan  - 4/27 CT CAP:           - Incidentally noted is a incarcerated/partially obstructed periumbilic ventral hernia, (image 146 series 9)          - Septated cyst anterior aspect of the right kidney, consider evaluation with ultrasound or MRI for further characterization (image 136 series 9)          - Dilated ascending aorta measuring 4 6 cm, evaluation with cardiothoracic surgery on nonemergent basis  - Follow up with PCP and CT surgery outpatient for findings  - Patient informed of abnormal results on 5/11: she is aware of ventral hernia but not aortic aneurysm or renal cyst         Hyponatremia  Assessment & Plan  Jorge [address] 125 on 5/9  5/15 = 133 and stable (previously 132)  [place] by Trauma to be 2/2 poor PO intake and furosemide  Started on sodium tabs (weaning down)  Monitor and adjust as appropriate  IM consulted and assisting with management  Bradycardia  Assessment & Plan  Developed junctional bradycardia in hospital after restarting home Toprol XL and digoxin  - Cards stopped digoxin  - Toprol XL decreased to 25mg daily  Monitor  Ventricular arrhythmia  Assessment & Plan  On 5/2 developed Afib with RR which degenerated to VT then VF  She received shock from her AICD ([place], Single Chamber ICD)  Seen by Cards who adjusted her device, and recommended restarting home Metoprolol and Amio load  Was also on digoxin  - Toprol dose adjusted and digoxin discontinued after developing junctional bradycardia later in stay  Here: Toprol XL 25mg daily, Amio 200mg daily starting tomorrow for 28 days  Monitor cardiac status during therapies   Monitor lytes  Keep K > 4, Mag > 2  Will plan for outpatient f/u with EP    Type 2 diabetes mellitus ([facility] [address] )  Assessment & Plan    Home: Sitagliptin-Metformin 50-50mg  Here: Discontinue Levemir due to hypoglycemia, continue sliding scale insulin and diabetic diet    Diabetic Diet  Nutrition Consult  Attempt to resume oral meds here and get off insulin  IM consulted to assist with management  Outpatient f/u with PCP  Fracture of multiple ribs of right side  Assessment & Plan  R 8th and 9th rib possible fractures  Rib fracture protocol  Pain control with lidoderm patch  Incentive Spirometer  Monitor respiratory status  Laceration of right eyebrow  Assessment & Plan  Healing, monitor  Steri-Strips in place    H/O mechanical aortic valve replacement  Assessment & Plan  Managed on Coumadin  Goal INR 2 5-3 5  INR 3 5  IM consulted to assist with management of Coumadin dosing  Acute pain due to trauma  Assessment & Plan  R rib fractures, L humerus fracture  - Lidoderm patch for ribs  - Oxycodone 5-7 5mg PRN  - Robaxin 250mg Q6hr scheduled  - Gabapentin 100mg QHS (may be able to discontinue while here)  - Tylenol 975mg Q8hrs scheduled  APS assisted inpatient  Adjust as appropriate while here  * Humerus fracture  Assessment & Plan  2/2 mechanical fall  Proximal humerus shaft spiral fracture, displaced and angulated  Ortho attempted closed reduction  Ultimately placed in coaptation splint on 4/27 and managed non-operatively  NWB LUE  Pain Control as detailed below  No Vitamin D level in chart or unmerged chart recently   - At home was on daily Vitamin D supplement 1000 units - restarted   - 5/14 Vitamind D level is 50 7   Due for 2 week follow-up  Imaging: Personally reviewed  Redemonstration of oblique spiral fracture of the shaft of the humerus with apex medial angulation  No glenohumeral dislocation  Appreciate orthopedics evaluating patient 5/15/2023  Replaced her splint with a Salgado brace  Cleared by orthopedics to provide range of motion to elbow and wrist without restriction as tolerated  Follow-up with orthopedics in 4 weeks around 6/15/2023        Appreciate IM consultants medical co-management  Labs, medications, and imaging personally reviewed        ROS:  A ten point "review of systems was completed on 05/19/23 and pertinent positives are listed in subjective section  All other systems reviewed were negative  OBJECTIVE:   /62   Pulse 67   Temp 98 5 °F (36 9 °C) (Oral)   Resp 18   Ht 4' 10\" (1 473 m)   Wt 92 9 kg (204 lb 12 9 oz) Comment: pt also has a splint on LUE  SpO2 96%   BMI 42 80 kg/m²     Physical Exam  Vitals and nursing note reviewed  Constitutional:       General: She is not in acute distress  Appearance: She is obese  HENT:      Head: Normocephalic and atraumatic  Right Ear: External ear normal       Left Ear: External ear normal       Nose: Nose normal  No rhinorrhea  Mouth/Throat:      Mouth: Mucous membranes are moist    Eyes:      General: No scleral icterus  Conjunctiva/sclera: Conjunctivae normal    Cardiovascular:      Rate and Rhythm: Normal rate and regular rhythm  Pulses: Normal pulses  Pulmonary:      Effort: Pulmonary effort is normal       Breath sounds: Normal breath sounds  No wheezing or rales  Abdominal:      General: Bowel sounds are normal  There is no distension  Palpations: Abdomen is soft  Tenderness: There is no abdominal tenderness  Musculoskeletal:      Cervical back: Neck supple  Right lower leg: No edema  Left lower leg: No edema  Comments: Left upper extremity splint   Skin:     General: Skin is warm and dry  Neurological:      Mental Status: She is alert and oriented to person, place, and time     Psychiatric:         Mood and Affect: Mood normal          Behavior: Behavior normal         Lab Results   Component Value Date    WBC 4 89 05/15/2023    HGB 10 3 (L) 05/15/2023    HCT 30 8 (L) 05/15/2023     (H) 05/15/2023     05/15/2023     Lab Results   Component Value Date    SODIUM 133 (L) 05/15/2023    K 4 6 05/15/2023     05/15/2023    CO2 27 05/15/2023    BUN 24 05/15/2023    CREATININE 0 85 05/15/2023    GLUC 100 05/15/2023    CALCIUM 7 9 " (L) 05/15/2023     Lab Results   Component Value Date    INR 4 18 (H) 05/19/2023    INR 3 58 (H) 05/17/2023    INR 3 13 (H) 05/16/2023    PROTIME 40 6 (H) 05/19/2023    PROTIME 36 0 (H) 05/17/2023    PROTIME 32 5 (H) 05/16/2023           Current Facility-Administered Medications:   •  acetaminophen (TYLENOL) tablet 975 mg, 975 mg, Oral, Q8H [address], Jh Terrazas MD, 975 mg at 05/19/23 0908  •  albuterol (PROVENTIL HFA,VENTOLIN HFA) inhaler 2 puff, 2 puff, Inhalation, Q4H PRN, Jh Terrazas MD  •  allopurinol (ZYLOPRIM) tablet 100 mg, 100 mg, Oral, Daily, Jh Terrazas MD, 100 mg at 05/19/23 0909  •  amiodarone tablet 200 mg, 200 mg, Oral, Daily With Breakfast, Jh Terrazas MD, 200 mg at 05/19/23 0909  •  atorvastatin (LIPITOR) tablet 40 mg, 40 mg, Oral, Daily With Ria Dunham MD, 40 mg at 05/18/23 1628  •  calcium carbonate (TUMS) chewable tablet 500 mg, 500 mg, Oral, TID PRN, Jh Terrazas MD  •  cholecalciferol (VITAMIN D3) tablet 1,000 Units, 1,000 Units, Oral, Daily, Jh Terrazas MD, 1,000 Units at 05/19/23 0911  •  citalopram (CeleXA) tablet 20 mg, 20 mg, Oral, Daily, Jh Terrazas MD, 20 mg at 05/19/23 0911  •  docusate sodium (COLACE) capsule 100 mg, 100 mg, Oral, BID, Jh Terrazas MD, 100 mg at 05/19/23 0910  •  furosemide (LASIX) tablet 40 mg, 40 mg, Oral, Daily, Jh Terrazas MD, 40 mg at 05/19/23 0910  •  gabapentin (NEURONTIN) capsule 100 mg, 100 mg, Oral, HS, Jh Terrazas MD, 100 mg at 05/18/23 2238  •  insulin lispro (HumaLOG) 100 units/mL subcutaneous injection 1-5 Units, 1-5 Units, Subcutaneous, TID AC **AND** Fingerstick Glucose (POCT), , , TID AC, Jh Terrazas MD  •  insulin lispro (HumaLOG) 100 units/mL subcutaneous injection 1-5 Units, 1-5 Units, Subcutaneous, HS, Jh Terrazas MD, 1 Units at 05/17/23 2206  •  lidocaine (LIDODERM) 5 % patch 1 patch, 1 patch, Topical, Daily, Jh Terrazas MD, 1 patch at 05/19/23 0913  •  methocarbamol (ROBAXIN) tablet 250 mg, 250 mg, Oral, Q6H [address], Kervin Luna MD, 250 mg at 05/19/23 4619  •  metoprolol succinate (TOPROL-XL) 24 hr tablet 25 mg, 25 mg, Oral, Daily, Kervin Luna MD, 25 mg at 05/19/23 0911  •  moisture barrier miconazole 2% cream (aka LICO MOISTURE BARRIER ANTIFUNGAL CREAM), , Topical, BID, JOSEPHINE Llanes, 1 application  at 05/19/23 0918  •  montelukast (SINGULAIR) tablet 10 mg, 10 mg, Oral, HS, Kervin Luna MD, 10 mg at 05/18/23 2238  •  naloxone (NARCAN) 0 04 mg/mL syringe 0 04 mg, 0 04 mg, Intravenous, Q1MIN PRN, Kervin Luna MD  •  nystatin (MYCOSTATIN) powder, , Topical, BID, Amanda Pearl PA-C, 1 application   at 05/19/23 0918  •  ondansetron (ZOFRAN-ODT) dispersible tablet 4 mg, 4 mg, Oral, Q6H PRN, Kervin Luna MD, 4 mg at 05/19/23 3587  •  oxybutynin (DITROPAN-XL) 24 hr tablet 5 mg, 5 mg, Oral, Daily, Kervin Luna MD, 5 mg at 05/19/23 8179  •  oxyCODONE (ROXICODONE) IR tablet 5 mg, 5 mg, Oral, Q4H PRN, Kervin Luna MD, 5 mg at 05/19/23 0051  •  oxyCODONE (ROXICODONE) split tablet 7 5 mg, 7 5 mg, Oral, Q4H PRN, Kervin Luna MD, 7 5 mg at 05/18/23 0800  •  senna (SENOKOT) tablet 17 2 mg, 2 tablet, Oral, Daily, Kervin Luna MD, 17 2 mg at 05/19/23 7637  •  sodium chloride tablet 1 g, 1 g, Oral, BID With Meals, Kervin Luna MD, 1 g at 05/19/23 0913    Past Medical History:   Diagnosis Date   • Asthma    • Diabetes [facility])    • Hypertension        Patient Active Problem List    Diagnosis Date Noted   • Difficult intravenous access 05/15/2023   • Acute COVID-19 05/14/2023   • Atrial fibrillation with RVR ([facility] [address] ) 05/13/2023   • Pressure injury of buttock, unstageable ([facility] [address] ) 05/13/2023   • Asthma 05/13/2023   • Osteoporosis with current pathological fracture with routine healing 05/13/2023   • Class 2 obesity in adult 05/13/2023   • CHAS (obstructive sleep apnea) 05/13/2023   • Abnormal CT scan 05/10/2023   • Hyponatremia 05/09/2023   • Bradycardia 05/08/2023   • Ventricular arrhythmia 05/04/2023   • Type 2 diabetes mellitus ([facility] [address] ) 04/28/2023   • Fall 04/27/2023   • Humerus fracture 04/27/2023   • Acute pain due to trauma 04/27/2023   • H/O mechanical aortic valve replacement 04/27/2023   • Laceration of right eyebrow 04/27/2023   • Fracture of multiple ribs of right side 04/27/2023        Neda Hammonds,   Physical Medicine and Jonny Joshua    I have spent a total time of 35 minutes on 05/19/23 in caring for this patient including Impressions, Counseling / Coordination of care, Documenting in the medical record, Reviewing / ordering tests, medicine, procedures  , Obtaining or reviewing history  , Communicating with other healthcare professionals  and Donning and doffing PPE required for airborne and droplet precautions

## 2023-05-19 NOTE — PROGRESS NOTES
PM&R PROGRESS NOTE:  Dinh Osorio 68 y o  female MRN: [id number]  Unit/Bed#: -01 Encounter: [id number]        Rehab Diagnosis: Impairment of mobility, safety and Activities of Daily Living (ADLs) due to Major Multiple Trauma:  14 9  Other Multiple Trauma Multiple rib fractures with acute pain, improved acute hypoxic respiratory failure, and L humeral fracture NWB in coaptation splint       SUBJECTIVE: No acute issues reported by nursing  Patient without fevers  Has a very mild cough  Compliant with offloading  Splint in place on LUE  ASSESSMENT: Stable, progressing      PLAN:    Rehabilitation  • Functional deficits: Left upper extremity weakness, impaired mobility, impaired self-care, impaired endurance, impaired strength  • Continue current rehabilitation plan of care to maximize function  • Estimated Discharge: TBD, estimated length of stay 2-3 weeks    Current function:  Physical Therapy Occupational Therapy Speech Therapy   Weight Bearing Status: Non-weight bearing (LUE)  Transfers: Total Assistance, Assist of 2  Bed Mobility: Maximum Assistance, Total Assistance, Assist of 2  Amulation Distance (ft): 20 feet  Ambulation: Assist of 2, Moderate Assistance  Assistive Device for Ambulation: [address] for Ramp: Wheelchair  Discharge Recommendations:  (TBD)   Eating: Supervision  Grooming: Total Assistance  Bathing: Total Assistance, Assist of 2  Bathing: Total Assistance, Assist of 2  Upper Body Dressing: Total Assistance  Lower Body Dressing: Total Assistance, Assist of 2  Toileting: Total Assistance  Toilet Transfer:  Moderate Assistance, Maximum Assistance  Cognition: Within Defined Limits  Orientation: Person, Place, Time, Situation                 DVT prophylaxis  • Managed on warfarin  • Goal INR 2 5-3 5  • Last INR = 3 58    Bladder plan  • Continent  • Limit pure wick usage if possible    Bowel plan  • Continent      * Humerus fracture  Assessment & Plan  2/2 mechanical fall  Proximal humerus shaft spiral fracture, displaced and angulated  Ortho attempted closed reduction  Ultimately placed in coaptation splint on 4/27 and managed non-operatively  NWB LUE  Pain Control as detailed below  No Vitamin D level in chart or unmerged chart recently   - At home was on daily Vitamin D supplement 1000 units - restarted   - 5/14 Vitamind D level is 50 7   Due for 2 week follow-up  Imaging: Personally reviewed  Redemonstration of oblique spiral fracture of the shaft of the humerus with apex medial angulation  No glenohumeral dislocation  Appreciate orthopedics evaluating patient 5/15/2023  Replaced her splint with a Salgado brace  Cleared by orthopedics to provide range of motion to elbow and wrist without restriction as tolerated  Follow-up with orthopedics in 4 weeks around 6/15/2023    Ventricular arrhythmia  Assessment & Plan  On 5/2 developed Afib with RR which degenerated to VT then VF  She received shock from her AICD ([employer], Single Chamber ICD)  Seen by Cards who adjusted her device, and recommended restarting home Metoprolol and Amio load  Was also on digoxin  - Toprol dose adjusted and digoxin discontinued after developing junctional bradycardia later in stay  Here: Toprol XL 25mg daily, Amio 200mg daily starting tomorrow for 28 days  Monitor cardiac status during therapies   Monitor lytes  Keep K > 4, Mag > 2  Will plan for outpatient f/u with EP    Difficult intravenous access  Assessment & Plan  Midline placed by IR on 5/15/23  Need for blood draws frequently to follow electrolytes, INR  Need for IV access given her recent ventricular arrhythmia and AICD firing, importance of optimizing electrolytes, current COVID 19 virus  Acute COVID-19  Assessment & Plan  Diagnosed on screening test on 5/13  Minimally symptomatic (does have a cough)  Afebrile and on RA  10 days isolation per protocol through 5/22/2023 (last day)    Monitor respiratory status   - Has chronic issues with urinary urgency/frequency  - Added Q4hr timed voids so we can help her get to the commode in a timely manner    -Stable and has not required oxygen   -Compliant with utilizing incentive spirometer    CHAS (obstructive sleep apnea)  Assessment & Plan  Does not use CPAP at home  Consider Noc Ox overnight while here  Class 2 obesity in adult  Assessment & Plan  Nutrition consulted  Counseling    Osteoporosis with current pathological fracture with routine healing  Assessment & Plan  Managed by [facility]  History of L5 pathologic fracture  Has been on Alendronate once weekly since 2017  Would confirm with her what day she normally takes it prior to restarting  Will confirm that it is ok to restart with Orthopedics first, as some surgeons prefer to wait 6 weeks after acute fracture prior to restarting    - Per Dr Jun Trevino ok to resume at anytime  - She normally takes it on Thursdays  Would complete through 6/2023  Per last Endocrine note in 6/2022, they were going to continue for 1 more year and then stop  She was due for DXA this May  Asthma  Assessment & Plan  No acute exacerbation  Home: Singulair 10mg QHS  Here: Same, PRN albuterol  No acute exacerbation  Monitor respiratory status    Pressure injury of buttock, unstageable (HCC)  Assessment & Plan  Present on admission  Wound care following while at [address] [place] [address] Place local wound care as follows:  • Evolving DTI to the b/l sacro-buttocks  Wound is POA to ARC unit  Wound continues to evolve  Noted full thickness skin loss with devitalized tissue  ? Patient desires to defer bedside debridement at this time, she states she doesn't think she will be able to be on her side for that long due to pain  ? Continue with Triad paste and foam dressing  ? Pressure relief   ? P-500 mattress  • Candidiasis rash noted to the b/l medial thighs and perineum, no open wound or active bleeding at time of assessment     ? Will recommend pascual anti-fungal cream  • ITD with candidasis to the b/l abdominal pannus with partial thickness skin loss  ? Will recommend InterDry sheets   • No s/s of infection present  • Will recommend to continue with preventative nursing skin care measures  • Nutrition is following along   • Patient verbalized understanding of plan of care  • [place] text wound care team with questions or concerns    • Routine wound care follow-up while admitted   • Follow-up with the [facility] wound care center as an outpatient, ambulatory referral placed      Frequent offloading in chair and in bed  P500 mattress  Optimize nutrition    Atrial fibrillation with RVR ([address] )  Assessment & Plan  Initially per AICD interrogation with Afib RVR that degenerated into VT/VF  Unclear burden  Here: Toprol XL 25mg daily, Fully anticoagulated on warfarin (See mechanical AV), amiodarone 200mg daily  Monitor and adjust as appropriate  Currently examines in NSR  IM consulted to assist with management  Outpatient f/u with EP    Abnormal CT scan  Assessment & Plan  - 4/27 CT CAP:           - Incidentally noted is a incarcerated/partially obstructed periumbilic ventral hernia, (image 146 series 9)          - Septated cyst anterior aspect of the right kidney, consider evaluation with ultrasound or MRI for further characterization (image 136 series 9)          - Dilated ascending aorta measuring 4 6 cm, evaluation with cardiothoracic surgery on nonemergent basis  - Follow up with PCP and CT surgery outpatient for findings  - Patient informed of abnormal results on 5/11: she is aware of ventral hernia but not aortic aneurysm or renal cyst         Hyponatremia  Assessment & Plan  [address] 125 on 5/9  5/15 = 133 and stable (previously 132)  [place] by Trauma to be 2/2 poor PO intake and furosemide  Started on sodium tabs (weaning down)  Monitor and adjust as appropriate  IM consulted and assisting with management       Bradycardia  Assessment & Plan  Developed "junctional bradycardia in hospital after restarting home Toprol XL and digoxin  - Cards stopped digoxin  - Toprol XL decreased to 25mg daily  Monitor  Type 2 diabetes mellitus ([address] )  Assessment & Plan    Home: Sitagliptin-Metformin 50-50mg  Here: Discontinue Levemir due to hypoglycemia, continue sliding scale insulin and diabetic diet  Diabetic Diet  Nutrition Consult  Attempt to resume oral meds here and get off insulin  IM consulted to assist with management  Outpatient f/u with PCP  Fracture of multiple ribs of right side  Assessment & Plan  R 8th and 9th rib possible fractures  Rib fracture protocol  Pain control with lidoderm patch  Incentive Spirometer  Monitor respiratory status  Laceration of right eyebrow  Assessment & Plan  Healing, monitor  Steri-Strips in place    H/O mechanical aortic valve replacement  Assessment & Plan  Managed on Coumadin  Goal INR 2 5-3 5  INR 3 5  IM consulted to assist with management of Coumadin dosing  Acute pain due to trauma  Assessment & Plan  R rib fractures, L humerus fracture  - Lidoderm patch for ribs  - Oxycodone 5-7 5mg PRN  - Robaxin 250mg Q6hr scheduled  - Gabapentin 100mg QHS (may be able to discontinue while here)  - Tylenol 975mg Q8hrs scheduled  APS assisted inpatient  Adjust as appropriate while here  Appreciate IM consultants medical co-management  Labs, medications, and imaging personally reviewed  ROS:  A ten point review of systems was completed on 05/18/23 and pertinent positives are listed in subjective section  All other systems reviewed were negative  OBJECTIVE:   /65   Pulse 60   Temp 97 8 °F (36 6 °C) (Oral)   Resp 17   Ht 4' 10\" (1 473 m)   Wt 92 9 kg (204 lb 12 9 oz) Comment: pt also has a splint on LUE  SpO2 95%   BMI 42 80 kg/m²     Physical Exam  Vitals and nursing note reviewed  Constitutional:       General: She is not in acute distress  Appearance: She is obese     HENT:      Head: " Normocephalic and atraumatic  Nose: Nose normal       Mouth/Throat:      Mouth: Mucous membranes are moist    Eyes:      Conjunctiva/sclera: Conjunctivae normal    Cardiovascular:      Rate and Rhythm: Normal rate and regular rhythm  Pulses: Normal pulses  Pulmonary:      Effort: Pulmonary effort is normal       Breath sounds: Normal breath sounds  No wheezing or rales  Abdominal:      General: Bowel sounds are normal  There is no distension  Palpations: Abdomen is soft  Tenderness: There is no abdominal tenderness  Musculoskeletal:         General: No swelling  Cervical back: Neck supple  Comments: LUE splint in place   Skin:     General: Skin is warm  Neurological:      Mental Status: She is alert and oriented to person, place, and time     Psychiatric:         Mood and Affect: Mood normal         Lab Results   Component Value Date    WBC 4 89 05/15/2023    HGB 10 3 (L) 05/15/2023    HCT 30 8 (L) 05/15/2023     (H) 05/15/2023     05/15/2023     Lab Results   Component Value Date    SODIUM 133 (L) 05/15/2023    K 4 6 05/15/2023     05/15/2023    CO2 27 05/15/2023    BUN 24 05/15/2023    CREATININE 0 85 05/15/2023    GLUC 100 05/15/2023    CALCIUM 7 9 (L) 05/15/2023     Lab Results   Component Value Date    INR 3 58 (H) 05/17/2023    INR 3 13 (H) 05/16/2023    INR 3 04 (H) 05/15/2023    PROTIME 36 0 (H) 05/17/2023    PROTIME 32 5 (H) 05/16/2023    PROTIME 31 7 (H) 05/15/2023           Current Facility-Administered Medications:   •  acetaminophen (TYLENOL) tablet 975 mg, 975 mg, Oral, Q8H [address], Liliam Gutierrez MD, 975 mg at 05/18/23 1628  •  albuterol (PROVENTIL HFA,VENTOLIN HFA) inhaler 2 puff, 2 puff, Inhalation, Q4H PRN, Liliam Gutierrez MD  •  allopurinol (ZYLOPRIM) tablet 100 mg, 100 mg, Oral, Daily, Liliam Gutierrez MD, 100 mg at 05/18/23 2558  •  amiodarone tablet 200 mg, 200 mg, Oral, Daily With Breakfast, Liliam Gutierrez MD, 200 mg at 05/18/23 0751  • atorvastatin (LIPITOR) tablet 40 mg, 40 mg, Oral, Daily With Robert Fairchild MD, 40 mg at 05/18/23 1628  •  calcium carbonate (TUMS) chewable tablet 500 mg, 500 mg, Oral, TID PRN, Ronak Bullock MD  •  cholecalciferol (VITAMIN D3) tablet 1,000 Units, 1,000 Units, Oral, Daily, Ronak Bullock MD, 1,000 Units at 05/18/23 0753  •  citalopram (CeleXA) tablet 20 mg, 20 mg, Oral, Daily, Ronak Bullock MD, 20 mg at 05/18/23 0753  •  docusate sodium (COLACE) capsule 100 mg, 100 mg, Oral, BID, Ronak Bullock MD, 100 mg at 05/18/23 1830  •  furosemide (LASIX) tablet 40 mg, 40 mg, Oral, Daily, Ronak Bullock MD, 40 mg at 05/18/23 0801  •  gabapentin (NEURONTIN) capsule 100 mg, 100 mg, Oral, HS, Ronak Bullock MD, 100 mg at 05/17/23 2204  •  insulin lispro (HumaLOG) 100 units/mL subcutaneous injection 1-5 Units, 1-5 Units, Subcutaneous, TID AC **AND** Fingerstick Glucose (POCT), , , TID AC, Ronak Bullock MD  •  insulin lispro (HumaLOG) 100 units/mL subcutaneous injection 1-5 Units, 1-5 Units, Subcutaneous, HS, Ronak Bullock MD, 1 Units at 05/17/23 2206  •  lidocaine (LIDODERM) 5 % patch 1 patch, 1 patch, Topical, Daily, Ronak Bullock MD, 1 patch at 05/18/23 0817  •  methocarbamol (ROBAXIN) tablet 250 mg, 250 mg, Oral, Q6H [address], Ronak Bullock MD, 250 mg at 05/18/23 1830  •  metoprolol succinate (TOPROL-XL) 24 hr tablet 25 mg, 25 mg, Oral, Daily, Ronak Bullock MD, 25 mg at 05/18/23 0801  •  moisture barrier miconazole 2% cream (aka LICO MOISTURE BARRIER ANTIFUNGAL CREAM), , Topical, BID, JOSEPHINE Freedman, Given at 05/18/23 1831  •  montelukast (SINGULAIR) tablet 10 mg, 10 mg, Oral, HS, Ronak Bullock MD, 10 mg at 05/17/23 2204  •  naloxone (NARCAN) 0 04 mg/mL syringe 0 04 mg, 0 04 mg, Intravenous, Q1MIN PRN, Ronak Bullock MD  •  nystatin (MYCOSTATIN) powder, , Topical, BID, Amanda Pearl PA-C, Given at 05/18/23 1831  •  ondansetron (ZOFRAN-ODT) dispersible tablet 4 mg, 4 mg, Oral, Q6H PRN, Liliam Gutierrez MD, 4 mg at 05/18/23 1642  •  oxybutynin (DITROPAN-XL) 24 hr tablet 5 mg, 5 mg, Oral, Daily, Liliam Gutierrez MD, 5 mg at 05/18/23 6489  •  oxyCODONE (ROXICODONE) IR tablet 5 mg, 5 mg, Oral, Q4H PRN, Liliam Gutierrez MD, 5 mg at 05/18/23 0010  •  oxyCODONE (ROXICODONE) split tablet 7 5 mg, 7 5 mg, Oral, Q4H PRN, Liliam Gutierrez MD, 7 5 mg at 05/18/23 0800  •  senna (SENOKOT) tablet 17 2 mg, 2 tablet, Oral, Daily, Liliam Gutierrez MD, 17 2 mg at 05/18/23 0753  •  sodium chloride tablet 1 g, 1 g, Oral, BID With Meals, Liliam Gutierrez MD, 1 g at 05/18/23 1629  •  warfarin (COUMADIN) tablet 2 5 mg, 2 5 mg, Oral, Daily (warfarin), Amanda Pearl PA-C, 2 5 mg at 05/18/23 1830    Past Medical History:   Diagnosis Date   • Asthma    • Diabetes [facility])    • Hypertension        Patient Active Problem List    Diagnosis Date Noted   • Humerus fracture 04/27/2023   • Ventricular arrhythmia 05/04/2023   • Difficult intravenous access 05/15/2023   • Acute COVID-19 05/14/2023   • Atrial fibrillation with RVR ([facility] ) 05/13/2023   • Pressure injury of buttock, unstageable ([facility] ) 05/13/2023   • Asthma 05/13/2023   • Osteoporosis with current pathological fracture with routine healing 05/13/2023   • Class 2 obesity in adult 05/13/2023   • CHAS (obstructive sleep apnea) 05/13/2023   • Abnormal CT scan 05/10/2023   • Hyponatremia 05/09/2023   • Bradycardia 05/08/2023   • Type 2 diabetes mellitus ([facility] ) 04/28/2023   • Fall 04/27/2023   • Acute pain due to trauma 04/27/2023   • H/O mechanical aortic valve replacement 04/27/2023   • Laceration of right eyebrow 04/27/2023   • Fracture of multiple ribs of right side 04/27/2023          Varinder Chawla MD    I have spent a total time of 30 minutes on 05/18/23 in caring for this patient including Impressions, Counseling / Coordination of care and Documenting in the medical record        ** Please Note:  voice to text software may have been used in the creation of this document   Although proof errors in transcription or interpretation are a potential of such software**

## 2023-05-19 NOTE — PROGRESS NOTES
05/19/23 0830   Pain Assessment   Pain Assessment Tool 0-10   Pain Score 7   Pain Location/Orientation Orientation: Left; Location: Shoulder   Pain 2   Pain Location/Orientation 2 Location: Buttocks   Restrictions/Precautions   Precautions Fall Risk;Pain;Bed/chair alarms;Contact/isolation; Airborne/isolation;Supervision on toilet/commode;Pressure Ulcer;Pacemaker   LUE Weight Bearing Per Order NWB   Braces or Orthoses Sling;Splint   Subjective   Subjective pt agreeable randy perform skilled PT   Lying to Sitting on Side of Bed   Type of Assistance Needed Physical assistance   Physical Assistance Level 51%-75%   Comment with HOB slightly elevated and R bed rail, pt able to A with BLE but required more A at trunk  Lying to Sitting on Side of Bed CARE Score 2   Sit to Stand   Type of Assistance Needed Physical assistance   Physical Assistance Level 25% or less   Comment recliner to Q cane x5   Sit to Stand CARE Score 3   Bed-Chair Transfer   Type of Assistance Needed Incidental touching; Adaptive equipment   Physical Assistance Level 25% or less   Comment Cga to  q-cane and [facility]   Chair/Bed-to-Chair Transfer CARE Score 3   Transfer Bed/Chair/Wheelchair   Adaptive Equipment Cane;Quad Cane   Walk 10 Feet   Type of Assistance Needed Incidental touching; Adaptive equipment   Comment CgA to  SPC and q-cane   Walk 10 Feet CARE Score 4   Walk 50 Feet with Two Turns   Reason if not Attempted Safety concerns   Walk 50 Feet with Two Turns CARE Score 88   Walk 150 Feet   Reason if not Attempted Safety concerns   Walk 150 Feet CARE Score 88   Walking 10 Feet on Uneven Surfaces   Reason if not Attempted Safety concerns   Walking 10 Feet on Uneven Surfaces CARE Score 88   Ambulation   Primary Mode of Locomotion Prior to Admission Walk   Distance Walked (feet) 10 ft  (x3)   Assist Device Michael Walker   Does the patient walk? 2   Yes   Wheel 50 Feet with Two Turns   Reason if not Attempted Activity not applicable   Wheel 50 Feet with Two Turns CARE Score 9   Wheel 150 Feet   Reason if not Attempted Activity not applicable   Wheel 517 Feet CARE Score 9   Curb or Single Stair   Reason if not Attempted Safety concerns   1 Step (Curb) CARE Score 88   4 Steps   Reason if not Attempted Activity not applicable   4 Steps CARE Score 9   12 Steps   Reason if not Attempted Activity not applicable   12 Steps CARE Score 9   Therapeutic Interventions   Strengthening seated LAQ AP marching x20 reps   Balance Standing dynamic balance 4 min and then 4 min with reaching activites   Assessment   Treatment Assessment pt cont to focus on bed xfers and mobility STS /SPT with using her SPC form home and PT q-cane at times , Pt gets fatigue needs to rest at tmes , pt overall improving toward DC goals   Cont POC , Pt in recliner with all need reach chair alarm on   Barriers to Discharge Decreased caregiver support; Inaccessible home environment   Plan   Progress Slow progress, decreased activity tolerance   PT Therapy Minutes   PT Time In 0830   PT Time Out 1000   PT Total Time (minutes) 90   PT Mode of treatment - Individual (minutes) 90   PT Mode of treatment - Concurrent (minutes) 0   PT Mode of treatment - Group (minutes) 0   PT Mode of treatment - Co-treat (minutes) 0   PT Mode of Treatment - Total time(minutes) 90 minutes   PT Cumulative Minutes 525   Therapy Time missed   Time missed?  No

## 2023-05-19 NOTE — CASE MANAGEMENT
Case Management Update:  Cm received authorization from [facility] for continued coverage until 5/23/23,  due 5/23

## 2023-05-19 NOTE — PLAN OF CARE
Problem: PAIN - ADULT  Goal: Verbalizes/displays adequate comfort level or baseline comfort level  Description: Interventions:  - Encourage patient to monitor pain and request assistance  - Assess pain using appropriate pain scale  - Administer analgesics based on type and severity of pain and evaluate response  - Implement non-pharmacological measures as appropriate and evaluate response  - Consider cultural and social influences on pain and pain management  - Notify physician/advanced practitioner if interventions unsuccessful or patient reports new pain  Outcome: Progressing     Problem: INFECTION - ADULT  Goal: Absence or prevention of progression during hospitalization  Description: INTERVENTIONS:  - Assess and monitor for signs and symptoms of infection  - Monitor lab/diagnostic results  - Monitor all insertion sites, i e  indwelling lines, tubes, and drains  - Monitor endotracheal if appropriate and nasal secretions for changes in amount and color  - [place] appropriate cooling/warming therapies per order  - Administer medications as ordered  - Instruct and encourage patient and family to use good hand hygiene technique  - Identify and instruct in appropriate isolation precautions for identified infection/condition  Outcome: Progressing  Goal: Absence of fever/infection during neutropenic period  Description: INTERVENTIONS:  - Monitor WBC    Outcome: Progressing     Problem: SAFETY ADULT  Goal: Patient will remain free of falls  Description: INTERVENTIONS:  - Educate patient/family on patient safety including physical limitations  - Instruct patient to call for assistance with activity   - Consult OT/PT to assist with strengthening/mobility   - Keep Call bell within reach  - Keep bed low and locked with side rails adjusted as appropriate  - Keep care items and personal belongings within reach  - Initiate and maintain comfort rounds  - Make Fall Risk Sign visible to staff  - Offer Toileting every 4 Hours, in advance of need  - Initiate/Maintain bed alarm  - Obtain necessary fall risk management equipment: wheelchair   - Apply yellow socks and bracelet for high fall risk patients  - Consider moving patient to room near nurses station  Outcome: Progressing  Goal: Maintain or return to baseline ADL function  Description: INTERVENTIONS:  -  Assess patient's ability to carry out ADLs; assess patient's baseline for ADL function and identify physical deficits which impact ability to perform ADLs (bathing, care of mouth/teeth, toileting, grooming, dressing, etc )  - Assess/evaluate cause of self-care deficits   - Assess range of motion  - Assess patient's mobility; develop plan if impaired  - Assess patient's need for assistive devices and provide as appropriate  - Encourage maximum independence but intervene and supervise when necessary  - Involve family in performance of ADLs  - Assess for home care needs following discharge   - Consider OT consult to assist with ADL evaluation and planning for discharge  - Provide patient education as appropriate  Outcome: Progressing  Goal: Maintains/Returns to pre admission functional level  Description: INTERVENTIONS:  - Perform BMAT or MOVE assessment daily    - Set and communicate daily mobility goal to care team and patient/family/caregiver  - Collaborate with rehabilitation services on mobility goals if consulted  - Perform Range of Motion 3 times a day  - Reposition patient every 2 hours    - Dangle patient 1 times a day  - Stand patient 1 times a day  - Ambulate patient 1 times a day  - Out of bed to chair 1 times a day   - Out of bed for meals 3 times a day  - Out of bed for toileting  - Record patient progress and toleration of activity level   Outcome: Progressing     Problem: DISCHARGE PLANNING  Goal: Discharge to home or other facility with appropriate resources  Description: INTERVENTIONS:  - Identify barriers to discharge w/patient and caregiver  - Arrange for needed discharge resources and transportation as appropriate  - Identify discharge learning needs (meds, wound care, etc )  - Arrange for interpretive services to assist at discharge as needed  - Refer to Case Management Department for coordinating discharge planning if the patient needs post-hospital services based on physician/advanced practitioner order or complex needs related to functional status, cognitive ability, or social support system  Outcome: Progressing     Problem: MOBILITY - ADULT  Goal: Maintain or return to baseline ADL function  Description: INTERVENTIONS:  -  Assess patient's ability to carry out ADLs; assess patient's baseline for ADL function and identify physical deficits which impact ability to perform ADLs (bathing, care of mouth/teeth, toileting, grooming, dressing, etc )  - Assess/evaluate cause of self-care deficits   - Assess range of motion  - Assess patient's mobility; develop plan if impaired  - Assess patient's need for assistive devices and provide as appropriate  - Encourage maximum independence but intervene and supervise when necessary  - Involve family in performance of ADLs  - Assess for home care needs following discharge   - Consider OT consult to assist with ADL evaluation and planning for discharge  - Provide patient education as appropriate  Outcome: Progressing  Goal: Maintains/Returns to pre admission functional level  Description: INTERVENTIONS:  - Perform BMAT or MOVE assessment daily    - Set and communicate daily mobility goal to care team and patient/family/caregiver  - Collaborate with rehabilitation services on mobility goals if consulted  - Perform Range of Motion - times a day  - Reposition patient every - hours    - Dangle patient - times a day  - Stand patient - times a day  - Ambulate patient - times a day  - Out of bed to chair - times a day   - Out of bed for meals -- times a day  - Out of bed for toileting  - Record patient progress and toleration of activity level   Outcome: Progressing     Problem: Nutrition/Hydration-ADULT  Goal: Nutrient/Hydration intake appropriate for improving, restoring or maintaining nutritional needs  Description: Monitor and assess patient's nutrition/hydration status for malnutrition  Collaborate with interdisciplinary team and initiate plan and interventions as ordered  Monitor patient's weight and dietary intake as ordered or per policy  Utilize nutrition screening tool and intervene as necessary  Determine patient's food preferences and provide high-protein, high-caloric foods as appropriate       INTERVENTIONS:  - Monitor oral intake, urinary output, labs, and treatment plans  - Assess nutrition and hydration status and recommend course of action  - Evaluate amount of meals eaten  - Assist patient with eating if necessary   - Allow adequate time for meals  - Recommend/ encourage appropriate diets, oral nutritional supplements, and vitamin/mineral supplements  - Order, calculate, and assess calorie counts as needed  - Recommend, monitor, and adjust tube feedings and TPN/PPN based on assessed needs  - Assess need for intravenous fluids  - Provide specific nutrition/hydration education as appropriate  - Include patient/family/caregiver in decisions related to nutrition  Outcome: Progressing     Problem: Prexisting or High Potential for Compromised Skin Integrity  Goal: Skin integrity is maintained or improved  Description: INTERVENTIONS:  - Identify patients at risk for skin breakdown  - Assess and monitor skin integrity  - Assess and monitor nutrition and hydration status  - Monitor labs   - Assess for incontinence   - Turn and reposition patient  - Assist with mobility/ambulation  - Relieve pressure over bony prominences  - Avoid friction and shearing  - Provide appropriate hygiene as needed including keeping skin clean and dry  - Evaluate need for skin moisturizer/barrier cream  - Collaborate with interdisciplinary team   - Patient/family teaching  - Consider wound care consult   Outcome: Progressing

## 2023-05-19 NOTE — PROGRESS NOTES
"   05/19/23 1230   Pain Assessment   Pain Assessment Tool 0-10   Pain Score 5   Pain Location/Orientation Orientation: Left; Location: Arm;Location: Shoulder   Restrictions/Precautions   Precautions Bed/chair alarms; Fall Risk; Airborne/isolation;Pressure Ulcer;Supervision on toilet/commode   LUE Weight Bearing Per Order NWB   Braces or Orthoses Sling;Splint   Lifestyle   Autonomy \"I just got back into bed  \"   Sit to Lying   Type of Assistance Needed Physical assistance   Physical Assistance Level 51%-75%   Comment BLE management, inc time   Sit to Lying CARE Score 2   Lying to Sitting on Side of Bed   Type of Assistance Needed Physical assistance   Physical Assistance Level 26%-50%   Comment HOB elevated, req inc time   Lying to Sitting on Side of Bed CARE Score 3   Sit to Stand   Type of Assistance Needed Physical assistance; Adaptive equipment;Verbal cues   Physical Assistance Level 25% or less   Sit to Stand CARE Score 3   Bed-Chair Transfer   Type of Assistance Needed Physical assistance; Adaptive equipment   Physical Assistance Level 25% or less   Comment CGA w/ hurry cane w/ inc time, ambulated<>door  With fatigue Min A  Chair/Bed-to-Chair Transfer CARE Score 3   Toileting Hygiene   Comment Pt plans to wear nightgowns at d/c, will benefit from ongoing underwear management  Toilet Transfer   Type of Assistance Needed Physical assistance; Adaptive equipment   Physical Assistance Level 25% or less   Comment CGA-Min A w/ hurry cane to platform BSC, inc time  Toilet Transfer CARE Score 3   ROM- Right Upper Extremities   R Shoulder PROM; Flexion; Extension;Horizontal ABduction   R Elbow Elbow flexion;Elbow extension;AROM   R Wrist AROM; Wrist flexion;Wrist extension;Radial deviation;Ulnar deviation   R Hand Index finger; Thumb;AROM;Long finger;Ring finger;Little finger   R Position Seated;Standing   RUE ROM Comment Completed 3x10 pendulums while in stance w/ RUE support of bed rail, req Min vc's to maximize technique   " Pt able to tolerate w/ minimal pain, cues for pacing at times  Add'lly completed AROM 2x10 digit flexion/extension, digit ABD/ADD, wrist flexion/extension, ulnar/radial deviation, and elbow flexion/extension to A w/ edema management  Pt reports improving edema, reiterated importance of proper positioning while at rest w/ pt reporting carryover of same  Reviewed splint and sling management, however anticipate pt will req A w/ donning/doffing at d/c  Exercise Tools   Other Exercise Tool 1 BUE ther ex to maximize strength and endurance for ADLs and fxnl mobility  Completed w/ 2# DB, 3x10 bicep curls and tricep curls  No TE overhead 2* previous shoulder injury w/ limited ROM  Pt tolerated well w/ rest breaks between sets to manage fatigue  Cognition   Overall Cognitive Status WFL   Assessment   Treatment Assessment Pt seen for skilled OT session focusing on bed mobility, short distance fxnl mobility w/ hurry cane, toilet xfer, RUE strengthening, and LUE ROM  Time spent rapport building, pt motivated to improve and understands need for inc A at time of d/c  Pt cont w/ mild LUE pain w/ fxnl act, req inc time for all fxnl tasks w/ frequent rest breaks to manage pain and generalized fatigue  Overall improvement noted w/ LUE edema, reiterated importance of ongoing AROM to distal UE and proper positioning at rest  Cont OT POC: endurance work, fxnl standing tolerance, clothing management, ADL retraining, LUE sling vs splint management, and LUE AROM  Pt requesting to rest in bed at end of session despite edu and encouragement provided, A w/ positioning to include sacral offloading w/ use of pillows and wedges  Prognosis Fair   Problem List Decreased strength;Decreased range of motion;Decreased endurance; Impaired balance;Decreased mobility;Obesity; Decreased skin integrity;Orthopedic restrictions;Pain   Plan   Treatment/Interventions ADL retraining;Functional transfer training; Therapeutic exercise; Endurance training;Patient/family training   Progress Slow progress, decreased activity tolerance   OT Therapy Minutes   OT Time In 1230   OT Time Out 1400   OT Total Time (minutes) 90   OT Mode of treatment - Individual (minutes) 90   OT Mode of treatment - Concurrent (minutes) 0   OT Mode of treatment - Group (minutes) 0   OT Mode of treatment - Co-treat (minutes) 0   OT Mode of Treatment - Total time(minutes) 90 minutes   OT Cumulative Minutes 550   Therapy Time missed   Time missed?  No

## 2023-05-19 NOTE — PROGRESS NOTES
Internal Medicine Progress Note  Patient: Deborah Rodriguez  Age/sex: 68 y o  female  Medical Record #: [id number]      ASSESSMENT/PLAN: (Interval History)  Deborah Rodriguez is seen and examined and management for following issues:    Lt proximal humerus fracture  • NWB to the LUE  • Immobilizer placed to LUE by orthopedics  • Pain control and therapy per PMR  • Follow-up with Orthopedics in 4 weeks  Rt 8-9 rib fx  •  Pain control per PMR  • Encourage IS   • O2 as needed  Rt eyebrow laceration  • Monitor lacerations  • Local wound care  DM type 2  • HA1C 6 4  • Home: [employer]  2x daily  • Here: SSI  • Will monitor and add back medications as needed     History of mechanical AVR  • Goal INR 2 5 - 3 5  • INR 4 1  • Hold Coumadin  • Pt takes 2 5mg Wednesday and Saturday and 5mg all other days of the week at home=CURRENTLY ON HOLD  • Continue to monitor  • Repeat INR am     Hyponatremia  • Na 133  • stable     Ventricular arrhythmia  • One episode 5/2/23 and pt received a shock from ICD  • Continue metoprolol and amiodarone  • Continue Coumadin  Bradycardia  • Diltiazem discontinued and metoprolol dose lowered  • She will need outpt follow-up with Cardiology  [facility]  • HR stable     COVID-19  • Tested positive 5/13/23  • Pt has a cough which she reports is chronic  • Contact and airborne precautions for 10 days  Buttock wound/labia wound  · Followed by wound care  · Frequent position changes/specialized mattress  · Will monitor  DC planning: TBD  The above assessment and plan was reviewed and updated as determined by my evaluation of the patient on 5/19/2023      Labs:   Results from last 7 days   Lab Units 05/15/23  1513 05/14/23  0502   WBC Thousand/uL 4 89 5 21   HEMOGLOBIN g/dL 10 3* 10 8*   HEMATOCRIT % 30 8* 34 7*   PLATELETS Thousands/uL 184 208     Results from last 7 days   Lab Units 05/15/23  1628 05/14/23  0502   SODIUM mmol/L 133* 132*   POTASSIUM mmol/L 4 6 4 9   CHLORIDE mmol/L 106 105   CO2 mmol/L 27 26   BUN mg/dL 24 24   CREATININE mg/dL 0 85 0 75   CALCIUM mg/dL 7 9* 8 1*         Results from last 7 days   Lab Units 05/19/23  0614 05/17/23  0804   INR  4 18* 3 58*     Results from last 7 days   Lab Units 05/19/23  0544 05/18/23  2221 05/18/23  1633   POC GLUCOSE mg/dl 111 119 137       Review of Scheduled Meds:  Current Facility-Administered Medications   Medication Dose Route Frequency Provider Last Rate   • acetaminophen  975 mg Oral [facility] Santy Glasgow MD     • albuterol  2 puff Inhalation Q4H PRN Santy Glasgow MD     • allopurinol  100 mg Oral Daily Santy Glasgow MD     • amiodarone  200 mg Oral Daily With Breakfast Santy Glasgow MD     • atorvastatin  40 mg Oral Daily With Lurdes Ochoa MD     • calcium carbonate  500 mg Oral TID PRN Santy Glasgow MD     • cholecalciferol  1,000 Units Oral Daily Santy Glasgow MD     • citalopram  20 mg Oral Daily Santy Glasgow MD     • docusate sodium  100 mg Oral BID Santy Glasgow MD     • furosemide  40 mg Oral Daily Santy Glasgow MD     • gabapentin  100 mg Oral HS Santy Glasgow MD     • insulin lispro  1-5 Units Subcutaneous TID [facility] Santy Glasgow MD     • insulin lispro  1-5 Units Subcutaneous HS Santy Glasgow MD     • lidocaine  1 patch Topical Daily Santy Glasgow MD     • methocarbamol  250 mg Oral Q6H [address] Santy Glasgow MD     • metoprolol succinate  25 mg Oral Daily Santy Glasgow MD     • LICO ANTIFUNGAL   Topical BID JOSEPHINE Espinosa     • montelukast  10 mg Oral HS Santy Glasgow MD     • naloxone  0 04 mg Intravenous Q1MIN PRN Santy Glasgow MD     • nystatin   Topical BID Amanda Pearl PA-C     • ondansetron  4 mg Oral Q6H PRN Santy Glasgow MD     • oxybutynin  5 mg Oral Daily Santy Glasgow MD     • oxyCODONE  5 mg Oral Q4H PRN Santy Glasgow MD     • oxyCODONE  7 5 mg Oral Q4H PRN Santy Glasgow MD     • senna  2 tablet Oral Daily Grady Guzman MD     • sodium chloride  1 g Oral BID With Meals Grady Guzman MD         Subjective/ HPI: Patient seen and examined  Patients overnight issues or events were reviewed with nursing or staff during rounds or morning huddle session  New or overnight issues include the following:     Pt seen in her room  She is anxious to have her restrictions removed regarding her covid status so that family can visit  She feels that her pain is improving, she has much more movement in her left hand as the swelling has significantly improved      ROS:   A 10 point ROS was performed; negative except as noted above         Imaging:     XR humerus left   Final Result by Debbie Gregory MD (05/15 1613)   Oblique spiral fracture of the shaft of the humerus with apex medial angulation   No glenohumeral dislocation         Workstation performed: [id number]         XR shoulder 2+ vw left   Final Result by Debbie Gregory MD (05/15 1614)      Oblique spiral fracture of the proximal humeral shaft with stable alignment      Workstation performed: [id number]             *Labs /Radiology studies Reviewed  *Medications  reviewed and reconciled as needed  *Please refer to order section for additional ordered labs studies  *Case discussed with primary attending during morning huddle case rounds    Physical Examination:  Vitals:   Vitals:    05/18/23 1502 05/18/23 2246 05/19/23 0605 05/19/23 0911   BP: 127/65 125/60 133/68 134/62   BP Location:  Right arm Right arm    Pulse: 60 60 63 67   Resp: 17 19 18    Temp:  98 5 °F (36 9 °C) 98 5 °F (36 9 °C)    TempSrc:  Oral Oral    SpO2: 95% 96% 96%    Weight:       Height:         GEN: NAD; pleasant, conversive  NEURO: Alert and oriented x4; appropriate  HEENT: Pupils are equal/reactive; normocephalic, face is symmetrical, hearing intact  CV: S1 S2 regular, +audible click; no murmur/rub/gallops, 1/4 pedal pulses, trace b/l edema present; LUE hand edema improved  RESP: Lungs are clear bilaterally, no wheezes rales or rhonchi, on room air, no distress, respirations are easy and non labored  GI: Abdomen is obese, soft, non tender, +BS x4; non distended  : Voiding without issues  MUSC: Moves all extremities except LUE NWB, Lt UE in sling  SKIN: pink, warm, poor turgor, eyebrow laceration stable; sacral wound refer to media       The above physical exam was reviewed and updated as determined by my evaluation of the patient on 5/19/2023  Invasive Devices     Peripheral Intravenous Line  Duration           Long-Dwell Peripheral IV (Midline) 80/13/89 Right Cephalic Vein 3 days                   VTE Pharmacologic Prophylaxis: Warfarin (Coumadin)  Code Status: Level 1 - Full Code  Current Length of Stay: 6 day(s)      Total time spent:  30 minutes with more than 50% spent counseling/coordinating care  Counseling includes discussion with patient re: progress  and discussion with patient of his/her current medical state/information  Coordination of patient's care was performed in conjunction with primary service  Time invested included review of patient's labs, vitals, and management of their comorbidities with continued monitoring  In addition, this patient was discussed with medical team including physician and advanced extenders  The care of the patient was extensively discussed and appropriate treatment plan was formulated unique for this patient  Medical decision making for the day was made by supervising physician unless otherwise noted in their attestation statement  ** Please Note:  voice to text software may have been used in the creation of this document   Although proof errors in transcription or interpretation are a potential of such software**

## 2023-05-20 LAB
GLUCOSE SERPL-MCNC: 120 MG/DL (ref 65–140)
GLUCOSE SERPL-MCNC: 154 MG/DL (ref 65–140)
GLUCOSE SERPL-MCNC: 78 MG/DL (ref 65–140)
GLUCOSE SERPL-MCNC: 98 MG/DL (ref 65–140)
INR PPP: 4.2 (ref 0.84–1.19)
PROTHROMBIN TIME: 40.8 SECONDS (ref 11.6–14.5)

## 2023-05-20 PROCEDURE — 99232 SBSQ HOSP IP/OBS MODERATE 35: CPT | Performed by: INTERNAL MEDICINE

## 2023-05-20 PROCEDURE — 85610 PROTHROMBIN TIME: CPT | Performed by: NURSE PRACTITIONER

## 2023-05-20 PROCEDURE — 82948 REAGENT STRIP/BLOOD GLUCOSE: CPT

## 2023-05-20 RX ADMIN — LIDOCAINE 5% 1 PATCH: 700 PATCH TOPICAL at 08:28

## 2023-05-20 RX ADMIN — INSULIN LISPRO 1 UNITS: 100 INJECTION, SOLUTION INTRAVENOUS; SUBCUTANEOUS at 22:09

## 2023-05-20 RX ADMIN — ONDANSETRON 4 MG: 4 TABLET, ORALLY DISINTEGRATING ORAL at 15:30

## 2023-05-20 RX ADMIN — METHOCARBAMOL TABLETS 250 MG: 500 TABLET, COATED ORAL at 23:19

## 2023-05-20 RX ADMIN — NYSTATIN 1 APPLICATION.: 100000 POWDER TOPICAL at 17:25

## 2023-05-20 RX ADMIN — DOCUSATE SODIUM 100 MG: 100 CAPSULE, LIQUID FILLED ORAL at 08:29

## 2023-05-20 RX ADMIN — CITALOPRAM HYDROBROMIDE 20 MG: 20 TABLET ORAL at 08:29

## 2023-05-20 RX ADMIN — MONTELUKAST 10 MG: 10 TABLET, FILM COATED ORAL at 22:11

## 2023-05-20 RX ADMIN — METHOCARBAMOL TABLETS 250 MG: 500 TABLET, COATED ORAL at 11:55

## 2023-05-20 RX ADMIN — DOCUSATE SODIUM 100 MG: 100 CAPSULE, LIQUID FILLED ORAL at 17:20

## 2023-05-20 RX ADMIN — METHOCARBAMOL TABLETS 250 MG: 500 TABLET, COATED ORAL at 00:27

## 2023-05-20 RX ADMIN — ATORVASTATIN CALCIUM 40 MG: 40 TABLET, FILM COATED ORAL at 17:12

## 2023-05-20 RX ADMIN — MICONAZOLE NITRATE: 20 CREAM TOPICAL at 09:37

## 2023-05-20 RX ADMIN — SODIUM CHLORIDE 1 G: 1 TABLET ORAL at 17:20

## 2023-05-20 RX ADMIN — METHOCARBAMOL TABLETS 250 MG: 500 TABLET, COATED ORAL at 05:38

## 2023-05-20 RX ADMIN — ONDANSETRON 4 MG: 4 TABLET, ORALLY DISINTEGRATING ORAL at 08:29

## 2023-05-20 RX ADMIN — GABAPENTIN 100 MG: 100 CAPSULE ORAL at 22:10

## 2023-05-20 RX ADMIN — METOPROLOL SUCCINATE 25 MG: 25 TABLET, EXTENDED RELEASE ORAL at 08:29

## 2023-05-20 RX ADMIN — ACETAMINOPHEN 975 MG: 325 TABLET ORAL at 23:19

## 2023-05-20 RX ADMIN — Medication 1000 UNITS: at 08:28

## 2023-05-20 RX ADMIN — SODIUM CHLORIDE 1 G: 1 TABLET ORAL at 08:24

## 2023-05-20 RX ADMIN — SENNOSIDES 17.2 MG: 8.6 TABLET, FILM COATED ORAL at 08:28

## 2023-05-20 RX ADMIN — NYSTATIN: 100000 POWDER TOPICAL at 09:37

## 2023-05-20 RX ADMIN — MICONAZOLE NITRATE 1 APPLICATION.: 20 CREAM TOPICAL at 17:25

## 2023-05-20 RX ADMIN — ALLOPURINOL 100 MG: 100 TABLET ORAL at 08:29

## 2023-05-20 RX ADMIN — ACETAMINOPHEN 975 MG: 325 TABLET ORAL at 08:28

## 2023-05-20 RX ADMIN — AMIODARONE HYDROCHLORIDE 200 MG: 200 TABLET ORAL at 08:29

## 2023-05-20 RX ADMIN — ACETAMINOPHEN 975 MG: 325 TABLET ORAL at 15:29

## 2023-05-20 RX ADMIN — ACETAMINOPHEN 975 MG: 325 TABLET ORAL at 00:27

## 2023-05-20 RX ADMIN — OXYBUTYNIN 5 MG: 5 TABLET, FILM COATED, EXTENDED RELEASE ORAL at 08:28

## 2023-05-20 RX ADMIN — METHOCARBAMOL TABLETS 250 MG: 500 TABLET, COATED ORAL at 17:22

## 2023-05-20 NOTE — PLAN OF CARE
Problem: INFECTION - ADULT  Goal: Absence or prevention of progression during hospitalization  Description: INTERVENTIONS:  - Assess and monitor for signs and symptoms of infection  - Monitor lab/diagnostic results  - Monitor all insertion sites, i e  indwelling lines, tubes, and drains  - Monitor endotracheal if appropriate and nasal secretions for changes in amount and color  - [place] appropriate cooling/warming therapies per order  - Administer medications as ordered  - Instruct and encourage patient and family to use good hand hygiene technique  - Identify and instruct in appropriate isolation precautions for identified infection/condition  Outcome: Progressing

## 2023-05-20 NOTE — PROGRESS NOTES
Internal Medicine Progress Note  Patient: Phil Small  Age/sex: 68 y o  female  Medical Record #: [id number]      ASSESSMENT/PLAN: (Interval History)  Phil Small is seen and examined and management for following issues:    Lt proximal humerus fracture  • NWB to the LUE  • Immobilizer placed to LUE by orthopedics  • Pain control and therapy per PMR  • Follow-up with Orthopedics in 4 weeks  Rt 8-9 rib fx  •  Pain control per PMR  • Encourage IS   • O2 as needed  Rt eyebrow laceration  • Monitor lacerations  • Local wound care  • stable     DM type 2  • HA1C 6 4  • Home: [employer]um[employer]  2x daily  • Here: SSI  • Will monitor and add back medications as needed     History of mechanical AVR  • Goal INR 2 5 - 3 5  • INR 4 2  • Hold Coumadin repeat INR in am  • Pt takes 2 5mg Wednesday and Saturday and 5mg all other days of the week at home=CURRENTLY ON HOLD  • Continue to monitor  Hyponatremia  • Na 133  • stable     Ventricular arrhythmia  • One episode 5/2/23 and pt received a shock from ICD  • Continue metoprolol and amiodarone  • Continue Coumadin  • Monitor bmp     Bradycardia  • Diltiazem discontinued and metoprolol dose lowered  • She will need outpt follow-up with Cardiology  [facility]  • HR stable     COVID-19  • Tested positive 5/13/23  • Pt has a cough which she reports is chronic  • Contact and airborne precautions for 10 days  Buttock wound/labia wound  · Followed by wound care  · Frequent position changes/specialized mattress  · Will monitor  DC planning: TBD  The above assessment and plan was reviewed and updated as determined by my evaluation of the patient on 5/20/2023      Labs:   Results from last 7 days   Lab Units 05/15/23  1513 05/14/23  0502   WBC Thousand/uL 4 89 5 21   HEMOGLOBIN g/dL 10 3* 10 8*   HEMATOCRIT % 30 8* 34 7*   PLATELETS Thousands/uL 184 208     Results from last 7 days   Lab Units 05/15/23  1628 05/14/23  0502   SODIUM mmol/L 133* 132*   POTASSIUM mmol/L 4 6 4 9   CHLORIDE mmol/L 106 105   CO2 mmol/L 27 26   BUN mg/dL 24 24   CREATININE mg/dL 0 85 0 75   CALCIUM mg/dL 7 9* 8 1*         Results from last 7 days   Lab Units 05/20/23  0552 05/19/23  0614   INR  4 20* 4 18*     Results from last 7 days   Lab Units 05/20/23  0624 05/19/23  1941 05/19/23  1558   POC GLUCOSE mg/dl 78 188* 128       Review of Scheduled Meds:  Current Facility-Administered Medications   Medication Dose Route Frequency Provider Last Rate   • acetaminophen  975 mg Oral [facility] Glennon Felty, MD     • albuterol  2 puff Inhalation Q4H PRN Glennon Felty, MD     • allopurinol  100 mg Oral Daily Glennon Felty, MD     • amiodarone  200 mg Oral Daily With Breakfast Glennon Felty, MD     • atorvastatin  40 mg Oral Daily With Zari Hawk MD     • calcium carbonate  500 mg Oral TID PRN Glennon Felty, MD     • cholecalciferol  1,000 Units Oral Daily Glennon Felty, MD     • citalopram  20 mg Oral Daily Glennon Felty, MD     • docusate sodium  100 mg Oral BID Glennon Felty, MD     • furosemide  40 mg Oral Daily Glennon Felty, MD     • gabapentin  100 mg Oral HS Glennon Felty, MD     • insulin lispro  1-5 Units Subcutaneous TID [facility] Glennon Felty, MD     • insulin lispro  1-5 Units Subcutaneous HS Glennon Felty, MD     • lidocaine  1 patch Topical Daily Glennon Felty, MD     • methocarbamol  250 mg Oral Q6H [facility] Glennon Felty, MD     • metoprolol succinate  25 mg Oral Daily Glennon Felty, MD     • LICO ANTIFUNGAL   Topical BID JOSEPHINE Cisneros     • montelukast  10 mg Oral HS Glennon Felty, MD     • naloxone  0 04 mg Intravenous Q1MIN PRN Glennon Felty, MD     • nystatin   Topical BID Amanda Pearl PA-C     • ondansetron  4 mg Oral Q6H PRN Glennon Felty, MD     • oxybutynin  5 mg Oral Daily Glennon Felty, MD     • oxyCODONE  5 mg Oral Q4H PRN Glennon Felty, MD     • oxyCODONE  7 5 mg Oral Q4H PRN Glennon Felty, MD     • senna  2 tablet Oral Daily Sim Curtis MD     • sodium chloride  1 g Oral BID With Meals Sim Curtis MD         Subjective/ HPI: Patient seen and examined  Patients overnight issues or events were reviewed with nursing or staff during rounds or morning huddle session  New or overnight issues include the following:     Pt seen in her room  No complaints overnight    ROS:   A 10 point ROS was performed; negative except as noted above         Imaging:     XR humerus left   Final Result by Jaspal Santana MD (05/15 1613)   Oblique spiral fracture of the shaft of the humerus with apex medial angulation   No glenohumeral dislocation         Workstation performed: [id number]         XR shoulder 2+ vw left   Final Result by Jaspal Santana MD (05/15 1614)      Oblique spiral fracture of the proximal humeral shaft with stable alignment      Workstation performed: [id number]             *Labs /Radiology studies Reviewed  *Medications  reviewed and reconciled as needed  *Please refer to order section for additional ordered labs studies  *Case discussed with primary attending during morning huddle case rounds    Physical Examination:  Vitals:   Vitals:    05/19/23 1702 05/19/23 1949 05/20/23 0530 05/20/23 0828   BP: 108/55 102/57 128/62 108/53   BP Location: Right arm Right arm Right arm    Pulse: 60 60 60 68   Resp: 18 20 18    Temp: 97 7 °F (36 5 °C) 98 4 °F (36 9 °C) 98 9 °F (37 2 °C)    TempSrc: Oral Oral Oral    SpO2: 97% 96% 98%    Weight:       Height:         GEN: NAD; pleasant  NEURO: Alert and oriented x4; appropriate  HEENT: Pupils are equal/reactive; normocephalic, face is symmetrical, hearing intact  CV: S1 S2 regular, +audible click; no murmur/rub/gallops, 1/4 pedal pulses, trace b/l edema present; LUE hand edema trace  RESP: Lungs are clear bilaterally, no wheezes rales or rhonchi, on room air, no distress, respirations are easy and non labored; mild [place]  GI: Abdomen is obese, soft, non tender, +BS x4; non distended  : Voiding without issues  MUSC: Moves all extremities except LUE NWB, Lt UE in sling  SKIN: pink, warm, poor turgor, eyebrow laceration stable; sacral wound refer to media       The above physical exam was reviewed and updated as determined by my evaluation of the patient on 5/20/2023  Invasive Devices     Peripheral Intravenous Line  Duration           Long-Dwell Peripheral IV (Midline) 30/59/38 Right Cephalic Vein 4 days                   VTE Pharmacologic Prophylaxis: Warfarin (Coumadin)  Code Status: Level 1 - Full Code  Current Length of Stay: 7 day(s)      Total time spent:  30 minutes with more than 50% spent counseling/coordinating care  Counseling includes discussion with patient re: progress  and discussion with patient of his/her current medical state/information  Coordination of patient's care was performed in conjunction with primary service  Time invested included review of patient's labs, vitals, and management of their comorbidities with continued monitoring  In addition, this patient was discussed with medical team including physician and advanced extenders  The care of the patient was extensively discussed and appropriate treatment plan was formulated unique for this patient  Medical decision making for the day was made by supervising physician unless otherwise noted in their attestation statement  ** Please Note:  voice to text software may have been used in the creation of this document   Although proof errors in transcription or interpretation are a potential of such software**

## 2023-05-20 NOTE — PLAN OF CARE
Problem: INFECTION - ADULT  Goal: Absence or prevention of progression during hospitalization  Description: INTERVENTIONS:  - Assess and monitor for signs and symptoms of infection  - Monitor lab/diagnostic results  - Monitor all insertion sites, i e  indwelling lines, tubes, and drains  - Monitor endotracheal if appropriate and nasal secretions for changes in amount and color  - [place] appropriate cooling/warming therapies per order  - Administer medications as ordered  - Instruct and encourage patient and family to use good hand hygiene technique  - Identify and instruct in appropriate isolation precautions for identified infection/condition  Outcome: Progressing

## 2023-05-20 NOTE — PLAN OF CARE
Problem: PAIN - ADULT  Goal: Verbalizes/displays adequate comfort level or baseline comfort level  Description: Interventions:  - Encourage patient to monitor pain and request assistance  - Assess pain using appropriate pain scale  - Administer analgesics based on type and severity of pain and evaluate response  - Implement non-pharmacological measures as appropriate and evaluate response  - Consider cultural and social influences on pain and pain management  - Notify physician/advanced practitioner if interventions unsuccessful or patient reports new pain  Outcome: Progressing     Problem: INFECTION - ADULT  Goal: Absence or prevention of progression during hospitalization  Description: INTERVENTIONS:  - Assess and monitor for signs and symptoms of infection  - Monitor lab/diagnostic results  - Monitor all insertion sites, i e  indwelling lines, tubes, and drains  - Monitor endotracheal if appropriate and nasal secretions for changes in amount and color  - [place] appropriate cooling/warming therapies per order  - Administer medications as ordered  - Instruct and encourage patient and family to use good hand hygiene technique  - Identify and instruct in appropriate isolation precautions for identified infection/condition  Outcome: Progressing  Goal: Absence of fever/infection during neutropenic period  Description: INTERVENTIONS:  - Monitor WBC    Outcome: Progressing     Problem: SAFETY ADULT  Goal: Patient will remain free of falls  Description: INTERVENTIONS:  - Educate patient/family on patient safety including physical limitations  - Instruct patient to call for assistance with activity   - Consult OT/PT to assist with strengthening/mobility   - Keep Call bell within reach  - Keep bed low and locked with side rails adjusted as appropriate  - Keep care items and personal belongings within reach  - Initiate and maintain comfort rounds  - Make Fall Risk Sign visible to staff  - Offer Toileting every 2 Hours, in advance of need  - Initiate/Maintain bed alarm  - Obtain necessary fall risk management equipment: bed alarm  - Apply yellow socks and bracelet for high fall risk patients  - Consider moving patient to room near nurses station  Outcome: Progressing  Goal: Maintain or return to baseline ADL function  Description: INTERVENTIONS:  -  Assess patient's ability to carry out ADLs; assess patient's baseline for ADL function and identify physical deficits which impact ability to perform ADLs (bathing, care of mouth/teeth, toileting, grooming, dressing, etc )  - Assess/evaluate cause of self-care deficits   - Assess range of motion  - Assess patient's mobility; develop plan if impaired  - Assess patient's need for assistive devices and provide as appropriate  - Encourage maximum independence but intervene and supervise when necessary  - Involve family in performance of ADLs  - Assess for home care needs following discharge   - Consider OT consult to assist with ADL evaluation and planning for discharge  - Provide patient education as appropriate  Outcome: Progressing  Goal: Maintains/Returns to pre admission functional level  Description: INTERVENTIONS:  - Perform BMAT or MOVE assessment daily    - Set and communicate daily mobility goal to care team and patient/family/caregiver  - Collaborate with rehabilitation services on mobility goals if consulted  - Perform Range of Motion 3 times a day  - Reposition patient every 2 hours    - Dangle patient 3 times a day  - Stand patient 3 times a day  - Ambulate patient 3 times a day  - Out of bed to chair 3 times a day   - Out of bed for meals 3 times a day  - Out of bed for toileting  - Record patient progress and toleration of activity level   Outcome: Progressing     Problem: DISCHARGE PLANNING  Goal: Discharge to home or other facility with appropriate resources  Description: INTERVENTIONS:  - Identify barriers to discharge w/patient and caregiver  - Arrange for needed discharge resources and transportation as appropriate  - Identify discharge learning needs (meds, wound care, etc )  - Arrange for interpretive services to assist at discharge as needed  - Refer to Case Management Department for coordinating discharge planning if the patient needs post-hospital services based on physician/advanced practitioner order or complex needs related to functional status, cognitive ability, or social support system  Outcome: Progressing     Problem: MOBILITY - ADULT  Goal: Maintain or return to baseline ADL function  Description: INTERVENTIONS:  -  Assess patient's ability to carry out ADLs; assess patient's baseline for ADL function and identify physical deficits which impact ability to perform ADLs (bathing, care of mouth/teeth, toileting, grooming, dressing, etc )  - Assess/evaluate cause of self-care deficits   - Assess range of motion  - Assess patient's mobility; develop plan if impaired  - Assess patient's need for assistive devices and provide as appropriate  - Encourage maximum independence but intervene and supervise when necessary  - Involve family in performance of ADLs  - Assess for home care needs following discharge   - Consider OT consult to assist with ADL evaluation and planning for discharge  - Provide patient education as appropriate  Outcome: Progressing  Goal: Maintains/Returns to pre admission functional level  Description: INTERVENTIONS:  - Perform BMAT or MOVE assessment daily    - Set and communicate daily mobility goal to care team and patient/family/caregiver  - Collaborate with rehabilitation services on mobility goals if consulted  - Perform Range of Motion 3 times a day  - Reposition patient every 2 hours    - Dangle patient 3 times a day  - Stand patient 3 times a day  - Ambulate patient 3 times a day  - Out of bed to chair 3 times a day   - Out of bed for meals 3 times a day  - Out of bed for toileting  - Record patient progress and toleration of activity level   Outcome: Progressing     Problem: Nutrition/Hydration-ADULT  Goal: Nutrient/Hydration intake appropriate for improving, restoring or maintaining nutritional needs  Description: Monitor and assess patient's nutrition/hydration status for malnutrition  Collaborate with interdisciplinary team and initiate plan and interventions as ordered  Monitor patient's weight and dietary intake as ordered or per policy  Utilize nutrition screening tool and intervene as necessary  Determine patient's food preferences and provide high-protein, high-caloric foods as appropriate       INTERVENTIONS:  - Monitor oral intake, urinary output, labs, and treatment plans  - Assess nutrition and hydration status and recommend course of action  - Evaluate amount of meals eaten  - Assist patient with eating if necessary   - Allow adequate time for meals  - Recommend/ encourage appropriate diets, oral nutritional supplements, and vitamin/mineral supplements  - Order, calculate, and assess calorie counts as needed  - Recommend, monitor, and adjust tube feedings and TPN/PPN based on assessed needs  - Assess need for intravenous fluids  - Provide specific nutrition/hydration education as appropriate  - Include patient/family/caregiver in decisions related to nutrition  Outcome: Progressing     Problem: Prexisting or High Potential for Compromised Skin Integrity  Goal: Skin integrity is maintained or improved  Description: INTERVENTIONS:  - Identify patients at risk for skin breakdown  - Assess and monitor skin integrity  - Assess and monitor nutrition and hydration status  - Monitor labs   - Assess for incontinence   - Turn and reposition patient  - Assist with mobility/ambulation  - Relieve pressure over bony prominences  - Avoid friction and shearing  - Provide appropriate hygiene as needed including keeping skin clean and dry  - Evaluate need for skin moisturizer/barrier cream  - Collaborate with interdisciplinary team   - Patient/family teaching  - Consider wound care consult   Outcome: Progressing

## 2023-05-21 LAB
GLUCOSE SERPL-MCNC: 112 MG/DL (ref 65–140)
GLUCOSE SERPL-MCNC: 113 MG/DL (ref 65–140)
GLUCOSE SERPL-MCNC: 150 MG/DL (ref 65–140)
GLUCOSE SERPL-MCNC: 69 MG/DL (ref 65–140)
INR PPP: 3.16 (ref 0.84–1.19)
PROTHROMBIN TIME: 32.7 SECONDS (ref 11.6–14.5)

## 2023-05-21 PROCEDURE — 82948 REAGENT STRIP/BLOOD GLUCOSE: CPT

## 2023-05-21 PROCEDURE — 97116 GAIT TRAINING THERAPY: CPT

## 2023-05-21 PROCEDURE — 99232 SBSQ HOSP IP/OBS MODERATE 35: CPT | Performed by: INTERNAL MEDICINE

## 2023-05-21 PROCEDURE — 97530 THERAPEUTIC ACTIVITIES: CPT

## 2023-05-21 PROCEDURE — 85610 PROTHROMBIN TIME: CPT | Performed by: NURSE PRACTITIONER

## 2023-05-21 RX ORDER — WARFARIN SODIUM 1 MG/1
2 TABLET ORAL
Status: DISCONTINUED | OUTPATIENT
Start: 2023-05-21 | End: 2023-05-25

## 2023-05-21 RX ADMIN — FUROSEMIDE 40 MG: 40 TABLET ORAL at 08:31

## 2023-05-21 RX ADMIN — Medication 1000 UNITS: at 08:25

## 2023-05-21 RX ADMIN — ACETAMINOPHEN 975 MG: 325 TABLET ORAL at 17:15

## 2023-05-21 RX ADMIN — METHOCARBAMOL TABLETS 250 MG: 500 TABLET, COATED ORAL at 11:58

## 2023-05-21 RX ADMIN — ATORVASTATIN CALCIUM 40 MG: 40 TABLET, FILM COATED ORAL at 17:16

## 2023-05-21 RX ADMIN — MONTELUKAST 10 MG: 10 TABLET, FILM COATED ORAL at 22:05

## 2023-05-21 RX ADMIN — AMIODARONE HYDROCHLORIDE 200 MG: 200 TABLET ORAL at 08:25

## 2023-05-21 RX ADMIN — ALLOPURINOL 100 MG: 100 TABLET ORAL at 08:24

## 2023-05-21 RX ADMIN — ACETAMINOPHEN 975 MG: 325 TABLET ORAL at 23:33

## 2023-05-21 RX ADMIN — ONDANSETRON 4 MG: 4 TABLET, ORALLY DISINTEGRATING ORAL at 17:17

## 2023-05-21 RX ADMIN — OXYBUTYNIN 5 MG: 5 TABLET, FILM COATED, EXTENDED RELEASE ORAL at 08:25

## 2023-05-21 RX ADMIN — LIDOCAINE 5% 1 PATCH: 700 PATCH TOPICAL at 08:24

## 2023-05-21 RX ADMIN — ACETAMINOPHEN 975 MG: 325 TABLET ORAL at 08:24

## 2023-05-21 RX ADMIN — METHOCARBAMOL TABLETS 250 MG: 500 TABLET, COATED ORAL at 17:15

## 2023-05-21 RX ADMIN — ONDANSETRON 4 MG: 4 TABLET, ORALLY DISINTEGRATING ORAL at 08:29

## 2023-05-21 RX ADMIN — METOPROLOL SUCCINATE 25 MG: 25 TABLET, EXTENDED RELEASE ORAL at 08:31

## 2023-05-21 RX ADMIN — OXYCODONE HYDROCHLORIDE 5 MG: 5 TABLET ORAL at 22:17

## 2023-05-21 RX ADMIN — NYSTATIN: 100000 POWDER TOPICAL at 22:06

## 2023-05-21 RX ADMIN — SENNOSIDES 17.2 MG: 8.6 TABLET, FILM COATED ORAL at 08:24

## 2023-05-21 RX ADMIN — SODIUM CHLORIDE 1 G: 1 TABLET ORAL at 08:24

## 2023-05-21 RX ADMIN — MICONAZOLE NITRATE: 20 CREAM TOPICAL at 08:32

## 2023-05-21 RX ADMIN — GABAPENTIN 100 MG: 100 CAPSULE ORAL at 22:04

## 2023-05-21 RX ADMIN — DOCUSATE SODIUM 100 MG: 100 CAPSULE, LIQUID FILLED ORAL at 08:24

## 2023-05-21 RX ADMIN — WARFARIN SODIUM 2 MG: 1 TABLET ORAL at 17:16

## 2023-05-21 RX ADMIN — MICONAZOLE NITRATE: 20 CREAM TOPICAL at 22:06

## 2023-05-21 RX ADMIN — INSULIN LISPRO 1 UNITS: 100 INJECTION, SOLUTION INTRAVENOUS; SUBCUTANEOUS at 22:09

## 2023-05-21 RX ADMIN — SODIUM CHLORIDE 1 G: 1 TABLET ORAL at 17:26

## 2023-05-21 RX ADMIN — METHOCARBAMOL TABLETS 250 MG: 500 TABLET, COATED ORAL at 06:07

## 2023-05-21 RX ADMIN — CITALOPRAM HYDROBROMIDE 20 MG: 20 TABLET ORAL at 08:24

## 2023-05-21 RX ADMIN — METHOCARBAMOL TABLETS 250 MG: 500 TABLET, COATED ORAL at 23:34

## 2023-05-21 RX ADMIN — NYSTATIN: 100000 POWDER TOPICAL at 08:32

## 2023-05-21 NOTE — PROGRESS NOTES
Internal Medicine Progress Note  Patient: Kay Short  Age/sex: 68 y o  female  Medical Record #: [id number]      ASSESSMENT/PLAN: (Interval History)  Kay Short is seen and examined and management for following issues:    Lt proximal humerus fracture  • NWB to the LUE  • Immobilizer placed to LUE by orthopedics  • Pain control and therapy per PMR  • Follow-up with Orthopedics in 4 weeks  Rt 8-9 rib fx  •  Pain control per PMR  • Encourage IS   • O2 as needed  Rt eyebrow laceration  • Monitor lacerations  • Local wound care  • stable     DM type 2  • HA1C 6 4  • Home: [employer]  2x daily  • Here: SSI  • Will monitor and add back medications as needed  • BS overall stable     History of mechanical AVR  • Goal INR 2 5 - 3 5  • INR 3 1  • Resume coumadin at 2mg daily 5/21  • Pt takes 2 5mg Wednesday and Saturday and 5mg all other days of the week at home=CURRENTLY ON HOLD  • Repeat INR next wk     Hyponatremia  • Na 133  • stable     Ventricular arrhythmia  • One episode 5/2/23 and pt received a shock from ICD  • Continue metoprolol and amiodarone  • Continue Coumadin  • Monitor bmp     Bradycardia  • Diltiazem discontinued and metoprolol dose lowered  • She will need outpt follow-up with Cardiology  She follows with [facility]  • HR stable     COVID-19  • Tested positive 5/13/23  • Off precautions 5/23/23  • Pt has a cough which she reports is chronic  • Contact and airborne precautions for 10 days  Buttock wound/labia wound  · Followed by wound care  · Frequent position changes/specialized mattress  · Will monitor  DC planning: TBD  The above assessment and plan was reviewed and updated as determined by my evaluation of the patient on 5/21/2023      Labs:   Results from last 7 days   Lab Units 05/15/23  1513   WBC Thousand/uL 4 89   HEMOGLOBIN g/dL 10 3*   HEMATOCRIT % 30 8*   PLATELETS Thousands/uL 184     Results from last 7 days   Lab Units 05/15/23  1628   SODIUM mmol/L 133*   POTASSIUM mmol/L 4 6   CHLORIDE mmol/L 106   CO2 mmol/L 27   BUN mg/dL 24   CREATININE mg/dL 0 85   CALCIUM mg/dL 7 9*         Results from last 7 days   Lab Units 05/21/23  0617 05/20/23  0552   INR  3 16* 4 20*     Results from last 7 days   Lab Units 05/21/23  0559 05/20/23 1959 05/20/23  1657   POC GLUCOSE mg/dl 69 154* 120       Review of Scheduled Meds:  Current Facility-Administered Medications   Medication Dose Route Frequency Provider Last Rate   • acetaminophen  975 mg Oral [facility] Kervin Luna MD     • albuterol  2 puff Inhalation Q4H PRN Kervin Luna MD     • allopurinol  100 mg Oral Daily Kervin Luna MD     • amiodarone  200 mg Oral Daily With Breakfast Kervin Luna MD     • atorvastatin  40 mg Oral Daily With Angela Monae MD     • calcium carbonate  500 mg Oral TID PRN Kervin Luna MD     • cholecalciferol  1,000 Units Oral Daily Kervin Luna MD     • citalopram  20 mg Oral Daily Kervin Luna MD     • docusate sodium  100 mg Oral BID Kervin Luna MD     • furosemide  40 mg Oral Daily Kervin Luna MD     • gabapentin  100 mg Oral HS Kervin Luna MD     • insulin lispro  1-5 Units Subcutaneous TID [facility] Kervin Luna MD     • insulin lispro  1-5 Units Subcutaneous HS Kervin Luna MD     • lidocaine  1 patch Topical Daily Kervin Luna MD     • methocarbamol  250 mg Oral Q6H [facility] Kervin Luna MD     • metoprolol succinate  25 mg Oral Daily Kervin Luna MD     • LICO ANTIFUNGAL   Topical BID JOSEPHINE Llanes     • montelukast  10 mg Oral HS Kervin Luna MD     • naloxone  0 04 mg Intravenous Q1MIN PRN Kervin Luna MD     • nystatin   Topical BID Amanda Pearl PA-C     • ondansetron  4 mg Oral Q6H PRN Kervin Luna MD     • oxybutynin  5 mg Oral Daily Kervin Luna MD     • oxyCODONE  5 mg Oral Q4H PRN Kervin Luna MD     • oxyCODONE  7 5 mg Oral Q4H PRN Kervin Luna MD     • senna  2 tablet Oral Daily [facility] Saumya Dixon MD     • sodium chloride  1 g Oral BID With Meals Santy Glasgow MD         Subjective/ HPI: Patient seen and examined  Patients overnight issues or events were reviewed with nursing or staff during rounds or morning huddle session  New or overnight issues include the following:     Pt seen in her room with therapy  No complaints overnight, pain is controlled  ROS:   A 10 point ROS was performed; negative except as noted above         Imaging:     XR humerus left   Final Result by Katie Pacheco MD (05/15 1613)   Oblique spiral fracture of the shaft of the humerus with apex medial angulation   No glenohumeral dislocation         Workstation performed: [id number]         XR shoulder 2+ vw left   Final Result by Katie Pacheco MD (05/15 1614)      Oblique spiral fracture of the proximal humeral shaft with stable alignment      Workstation performed: [id number]             *Labs /Radiology studies Reviewed  *Medications  reviewed and reconciled as needed  *Please refer to order section for additional ordered labs studies  *Case discussed with primary attending during morning huddle case rounds    Physical Examination:  Vitals:   Vitals:    05/20/23 0828 05/20/23 1549 05/20/23 2036 05/21/23 0608   BP: 108/53 121/59 114/58 143/73   BP Location:   Right arm Right arm   Pulse: 68 62 61 63   Resp:  16 20 20   Temp:  98 1 °F (36 7 °C) 97 5 °F (36 4 °C) 97 7 °F (36 5 °C)   TempSrc:  Oral Oral Oral   SpO2:  97% 98% 96%   Weight:       Height:         GEN: NAD; pleasant  NEURO: Alert and oriented x4; appropriate  HEENT: Pupils are equal/reactive; normocephalic, face is symmetrical, hearing intact  CV: S1 S2 regular, +audible click; no murmur/rub/gallops, 1/4 pedal pulses, trace b/l edema present; LUE hand edema trace  RESP: Lungs are clear bilaterally, no wheezes rales or rhonchi, on room air, no distress, respirations are easy and non labored; mild [place]  GI: Abdomen is obese, soft, non tender, +BS x4; non distended  : Voiding without issues  MUSC: Moves all extremities except LUE NWB, Lt UE in sling  SKIN: pink, warm, poor turgor, eyebrow laceration stable; sacral wound seems to be improving       The above physical exam was reviewed and updated as determined by my evaluation of the patient on 5/21/2023  Invasive Devices     Peripheral Intravenous Line  Duration           Long-Dwell Peripheral IV (Midline) 48/89/74 Right Cephalic Vein 5 days                   VTE Pharmacologic Prophylaxis: Warfarin (Coumadin)  Code Status: Level 1 - Full Code  Current Length of Stay: 8 day(s)      Total time spent:  20 minutes with more than 50% spent counseling/coordinating care  Counseling includes discussion with patient re: progress  and discussion with patient of his/her current medical state/information  Coordination of patient's care was performed in conjunction with primary service  Time invested included review of patient's labs, vitals, and management of their comorbidities with continued monitoring  In addition, this patient was discussed with medical team including physician and advanced extenders  The care of the patient was extensively discussed and appropriate treatment plan was formulated unique for this patient  Medical decision making for the day was made by supervising physician unless otherwise noted in their attestation statement  ** Please Note:  voice to text software may have been used in the creation of this document   Although proof errors in transcription or interpretation are a potential of such software**

## 2023-05-21 NOTE — PLAN OF CARE
Problem: PAIN - ADULT  Goal: Verbalizes/displays adequate comfort level or baseline comfort level  Description: Interventions:  - Encourage patient to monitor pain and request assistance  - Assess pain using appropriate pain scale  - Administer analgesics based on type and severity of pain and evaluate response  - Implement non-pharmacological measures as appropriate and evaluate response  - Consider cultural and social influences on pain and pain management  - Notify physician/advanced practitioner if interventions unsuccessful or patient reports new pain  Outcome: Progressing     Problem: INFECTION - ADULT  Goal: Absence or prevention of progression during hospitalization  Description: INTERVENTIONS:  - Assess and monitor for signs and symptoms of infection  - Monitor lab/diagnostic results  - Monitor all insertion sites, i e  indwelling lines, tubes, and drains  - Monitor endotracheal if appropriate and nasal secretions for changes in amount and color  - [place] appropriate cooling/warming therapies per order  - Administer medications as ordered  - Instruct and encourage patient and family to use good hand hygiene technique  - Identify and instruct in appropriate isolation precautions for identified infection/condition  Outcome: Progressing  Goal: Absence of fever/infection during neutropenic period  Description: INTERVENTIONS:  - Monitor WBC    Outcome: Progressing     Problem: SAFETY ADULT  Goal: Patient will remain free of falls  Description: INTERVENTIONS:  - Educate patient/family on patient safety including physical limitations  - Instruct patient to call for assistance with activity   - Consult OT/PT to assist with strengthening/mobility   - Keep Call bell within reach  - Keep bed low and locked with side rails adjusted as appropriate  - Keep care items and personal belongings within reach  - Initiate and maintain comfort rounds  - Make Fall Risk Sign visible to staff  - Offer Toileting every 2 Hours, in advance of need  - Initiate/Maintain bed/chair alarm  - Obtain necessary fall risk management equipment: non skid footwear  - Apply yellow socks and bracelet for high fall risk patients  - Consider moving patient to room near nurses station  Outcome: Progressing  Goal: Maintain or return to baseline ADL function  Description: INTERVENTIONS:  -  Assess patient's ability to carry out ADLs; assess patient's baseline for ADL function and identify physical deficits which impact ability to perform ADLs (bathing, care of mouth/teeth, toileting, grooming, dressing, etc )  - Assess/evaluate cause of self-care deficits   - Assess range of motion  - Assess patient's mobility; develop plan if impaired  - Assess patient's need for assistive devices and provide as appropriate  - Encourage maximum independence but intervene and supervise when necessary  - Involve family in performance of ADLs  - Assess for home care needs following discharge   - Consider OT consult to assist with ADL evaluation and planning for discharge  - Provide patient education as appropriate  Outcome: Progressing  Goal: Maintains/Returns to pre admission functional level  Description: INTERVENTIONS:  - Perform BMAT or MOVE assessment daily    - Set and communicate daily mobility goal to care team and patient/family/caregiver  - Collaborate with rehabilitation services on mobility goals if consulted  - Perform Range of Motion 3 times a day  - Reposition patient every 2 hours    - Dangle patient 3 times a day  - Stand patient 3 times a day  - Ambulate patient 3 times a day  - Out of bed to chair 3 times a day   - Out of bed for meals 3 times a day  - Out of bed for toileting  - Record patient progress and toleration of activity level   Outcome: Progressing     Problem: DISCHARGE PLANNING  Goal: Discharge to home or other facility with appropriate resources  Description: INTERVENTIONS:  - Identify barriers to discharge w/patient and caregiver  - Arrange for needed discharge resources and transportation as appropriate  - Identify discharge learning needs (meds, wound care, etc )  - Arrange for interpretive services to assist at discharge as needed  - Refer to Case Management Department for coordinating discharge planning if the patient needs post-hospital services based on physician/advanced practitioner order or complex needs related to functional status, cognitive ability, or social support system  Outcome: Progressing     Problem: MOBILITY - ADULT  Goal: Maintain or return to baseline ADL function  Description: INTERVENTIONS:  -  Assess patient's ability to carry out ADLs; assess patient's baseline for ADL function and identify physical deficits which impact ability to perform ADLs (bathing, care of mouth/teeth, toileting, grooming, dressing, etc )  - Assess/evaluate cause of self-care deficits   - Assess range of motion  - Assess patient's mobility; develop plan if impaired  - Assess patient's need for assistive devices and provide as appropriate  - Encourage maximum independence but intervene and supervise when necessary  - Involve family in performance of ADLs  - Assess for home care needs following discharge   - Consider OT consult to assist with ADL evaluation and planning for discharge  - Provide patient education as appropriate  Outcome: Progressing  Goal: Maintains/Returns to pre admission functional level  Description: INTERVENTIONS:  - Perform BMAT or MOVE assessment daily    - Set and communicate daily mobility goal to care team and patient/family/caregiver  - Collaborate with rehabilitation services on mobility goals if consulted  - Perform Range of Motion 3 times a day  - Reposition patient every 2 hours    - Dangle patient 3 times a day  - Stand patient 3 times a day  - Ambulate patient 3 times a day  - Out of bed to chair 3 times a day   - Out of bed for meals 3 times a day  - Out of bed for toileting  - Record patient progress and toleration of activity level   Outcome: Progressing     Problem: Nutrition/Hydration-ADULT  Goal: Nutrient/Hydration intake appropriate for improving, restoring or maintaining nutritional needs  Description: Monitor and assess patient's nutrition/hydration status for malnutrition  Collaborate with interdisciplinary team and initiate plan and interventions as ordered  Monitor patient's weight and dietary intake as ordered or per policy  Utilize nutrition screening tool and intervene as necessary  Determine patient's food preferences and provide high-protein, high-caloric foods as appropriate       INTERVENTIONS:  - Monitor oral intake, urinary output, labs, and treatment plans  - Assess nutrition and hydration status and recommend course of action  - Evaluate amount of meals eaten  - Assist patient with eating if necessary   - Allow adequate time for meals  - Recommend/ encourage appropriate diets, oral nutritional supplements, and vitamin/mineral supplements  - Order, calculate, and assess calorie counts as needed  - Recommend, monitor, and adjust tube feedings and TPN/PPN based on assessed needs  - Assess need for intravenous fluids  - Provide specific nutrition/hydration education as appropriate  - Include patient/family/caregiver in decisions related to nutrition  Outcome: Progressing     Problem: Prexisting or High Potential for Compromised Skin Integrity  Goal: Skin integrity is maintained or improved  Description: INTERVENTIONS:  - Identify patients at risk for skin breakdown  - Assess and monitor skin integrity  - Assess and monitor nutrition and hydration status  - Monitor labs   - Assess for incontinence   - Turn and reposition patient  - Assist with mobility/ambulation  - Relieve pressure over bony prominences  - Avoid friction and shearing  - Provide appropriate hygiene as needed including keeping skin clean and dry  - Evaluate need for skin moisturizer/barrier cream  - Collaborate with interdisciplinary team   - Patient/family teaching  - Consider wound care consult   Outcome: Progressing

## 2023-05-21 NOTE — PROGRESS NOTES
05/21/23 0830   Pain Assessment   Pain Assessment Tool 0-10   Pain Score 6   Pain Location/Orientation Orientation: Left; Location: Shoulder; Location: Arm   Pain Onset/Description Onset: Ongoing   Patient's Stated Pain Goal No pain   Hospital Pain Intervention(s) Repositioned; Rest   Restrictions/Precautions   Precautions Bed/chair alarms; Fall Risk; Airborne/isolation;Contact/isolation;Pain;Supervision on toilet/commode   LUE Weight Bearing Per Order NWB   Braces or Orthoses Sling   Cognition   Overall Cognitive Status WFL   Arousal/Participation Alert; Cooperative   Attention Within functional limits   Orientation Level Oriented X4   Memory Within functional limits   Following Commands Follows multistep commands with increased time or repetition   Lying to Sitting on Side of Bed   Type of Assistance Needed Incidental touching;Verbal cues   Comment CGA with HOB slightly elevated  Lying to Sitting on Side of Bed CARE Score 4   Sit to Stand   Type of Assistance Needed Physical assistance; Adaptive equipment   Physical Assistance Level 25% or less   Comment pt varied NAINA to CS dependent on surface height  Sit to Stand CARE Score 3   Bed-Chair Transfer   Type of Assistance Needed Incidental touching; Adaptive equipment   Comment CGA with pt's [facility]   Chair/Bed-to-Chair Transfer CARE Score 4   Transfer Bed/Chair/Wheelchair   Adaptive Equipment Cane   Walk 10 Feet   Type of Assistance Needed Incidental touching; Adaptive equipment   Comment with SPC   Walk 10 Feet CARE Score 4   Walk 50 Feet with Two Turns   Reason if not Attempted Safety concerns   Walk 50 Feet with Two Turns CARE Score 88   Walk 150 Feet   Reason if not Attempted Safety concerns   Walk 150 Feet CARE Score 88   Walking 10 Feet on Uneven Surfaces   Reason if not Attempted Safety concerns   Walking 10 Feet on Uneven Surfaces CARE Score 88   Ambulation   Primary Mode of Locomotion Prior to Admission Walk   Distance Walked (feet) 46 ft  (x2)   Assist Device Cane   Gait Pattern Antalgic; Inconsistant Barbra; Slow Barbra;Decreased foot clearance; Forward Flexion; Lateral deviation; Step to; Improper weight shift   Limitations Noted In Endurance;Speed;Balance   Provided Assistance with: Balance   Does the patient walk? 2  Yes   Wheel 50 Feet with Two Turns   Reason if not Attempted Activity not applicable   Wheel 50 Feet with Two Turns CARE Score 9   Wheel 150 Feet   Reason if not Attempted Activity not applicable   Wheel 656 Feet CARE Score 9   Curb or Single Stair   Reason if not Attempted Safety concerns   1 Step (Curb) CARE Score 88   4 Steps   Reason if not Attempted Activity not applicable   4 Steps CARE Score 9   12 Steps   Reason if not Attempted Activity not applicable   12 Steps CARE Score 9   Toilet Transfer   Type of Assistance Needed Incidental touching; Adaptive equipment   Comment CGA with [facility]   Toilet Transfer CARE Score 4   Toilet Transfer   Surface Assessed Bedside Commode   Limitations Noted In Endurance;Balance   Findings full A with hygiene and clothing management  , CS while standing  Therapeutic Interventions   Other Pt in bed and mostly washed but requesting BSC upon entering room  Once on [facility] pt's sacral sore and open areas under pannus taken care of from nurse that was in room  Increased A to change gown as splint on LUE had to be removed  Pt requires increased rest breaks in between movements of UE as she has increased pain  Pt able to wash under L axillary and L breast but full A for R  Once gown was on pt stood at [facility] and performed some pendulum's as she states it helps decrease pain  Splint adjusted in stance and sling placed prior to sitting  Pt was left in [facility] with B lef rest on and alarm in place  LUE elevated with green foam wedge for added support  Assessment   Treatment Assessment 90 min skilled PT session with increased focus on gait, functional transfers, ROM and balance  Pt noted cont poor endurance and increased L shoulder pain   Pt cont to vary with STS ability as she has increased difficulty from lower surfaces  Pt cont to require CGA to [facility] for gait and SPT's with SPC  2* to ongoing pain and lack of ability to use LUE she requires A with all hygiene and clothing management  Pt will cont to benefit from cont focus on LE strengthening, gait, stair management when able, increased act tolerance and functional transfers to decrease burden of care  Problem List Decreased strength;Decreased range of motion;Decreased endurance; Impaired balance;Decreased mobility;Obesity; Decreased skin integrity;Orthopedic restrictions;Pain   Barriers to Discharge Decreased caregiver support; Inaccessible home environment   PT Barriers   Functional Limitation Car transfers; Ramp negotiation;Stair negotiation;Standing;Transfers; Walking   Plan   Treatment/Interventions Functional transfer training;LE strengthening/ROM; Therapeutic exercise; Endurance training;Cognitive reorientation;Patient/family training;Gait training;Bed mobility   Progress Slow progress, decreased activity tolerance   Recommendation   PT Discharge Recommendation   (TBD)   PT Therapy Minutes   PT Time In 0830   PT Time Out 1000   PT Total Time (minutes) 90   PT Mode of treatment - Individual (minutes) 90   PT Mode of treatment - Concurrent (minutes) 0   PT Mode of treatment - Group (minutes) 0   PT Mode of treatment - Co-treat (minutes) 0   PT Mode of Treatment - Total time(minutes) 90 minutes   PT Cumulative Minutes 615   Therapy Time missed   Time missed?  No

## 2023-05-22 LAB
ANION GAP SERPL CALCULATED.3IONS-SCNC: 0 MMOL/L (ref 4–13)
BASOPHILS # BLD AUTO: 0.03 THOUSANDS/ÂΜL (ref 0–0.1)
BASOPHILS NFR BLD AUTO: 1 % (ref 0–1)
BUN SERPL-MCNC: 23 MG/DL (ref 5–25)
CALCIUM SERPL-MCNC: 8.6 MG/DL (ref 8.3–10.1)
CHLORIDE SERPL-SCNC: 110 MMOL/L (ref 96–108)
CO2 SERPL-SCNC: 28 MMOL/L (ref 21–32)
CREAT SERPL-MCNC: 0.59 MG/DL (ref 0.6–1.3)
EOSINOPHIL # BLD AUTO: 0.14 THOUSAND/ÂΜL (ref 0–0.61)
EOSINOPHIL NFR BLD AUTO: 5 % (ref 0–6)
ERYTHROCYTE [DISTWIDTH] IN BLOOD BY AUTOMATED COUNT: 18.4 % (ref 11.6–15.1)
GFR SERPL CREATININE-BSD FRML MDRD: 88 ML/MIN/1.73SQ M
GLUCOSE P FAST SERPL-MCNC: 73 MG/DL (ref 65–99)
GLUCOSE SERPL-MCNC: 114 MG/DL (ref 65–140)
GLUCOSE SERPL-MCNC: 130 MG/DL (ref 65–140)
GLUCOSE SERPL-MCNC: 73 MG/DL (ref 65–140)
GLUCOSE SERPL-MCNC: 73 MG/DL (ref 65–140)
GLUCOSE SERPL-MCNC: 91 MG/DL (ref 65–140)
HCT VFR BLD AUTO: 33.4 % (ref 34.8–46.1)
HGB BLD-MCNC: 10.9 G/DL (ref 11.5–15.4)
IMM GRANULOCYTES # BLD AUTO: 0.01 THOUSAND/UL (ref 0–0.2)
IMM GRANULOCYTES NFR BLD AUTO: 0 % (ref 0–2)
INR PPP: 2.68 (ref 0.84–1.19)
LYMPHOCYTES # BLD AUTO: 0.83 THOUSANDS/ÂΜL (ref 0.6–4.47)
LYMPHOCYTES NFR BLD AUTO: 26 % (ref 14–44)
MCH RBC QN AUTO: 34.6 PG (ref 26.8–34.3)
MCHC RBC AUTO-ENTMCNC: 32.6 G/DL (ref 31.4–37.4)
MCV RBC AUTO: 106 FL (ref 82–98)
MONOCYTES # BLD AUTO: 0.28 THOUSAND/ÂΜL (ref 0.17–1.22)
MONOCYTES NFR BLD AUTO: 9 % (ref 4–12)
NEUTROPHILS # BLD AUTO: 1.85 THOUSANDS/ÂΜL (ref 1.85–7.62)
NEUTS SEG NFR BLD AUTO: 59 % (ref 43–75)
NRBC BLD AUTO-RTO: 1 /100 WBCS
PLATELET # BLD AUTO: 142 THOUSANDS/UL (ref 149–390)
PMV BLD AUTO: 10.5 FL (ref 8.9–12.7)
POTASSIUM SERPL-SCNC: 4.3 MMOL/L (ref 3.5–5.3)
PROTHROMBIN TIME: 28.8 SECONDS (ref 11.6–14.5)
RBC # BLD AUTO: 3.15 MILLION/UL (ref 3.81–5.12)
SODIUM SERPL-SCNC: 138 MMOL/L (ref 135–147)
WBC # BLD AUTO: 3.14 THOUSAND/UL (ref 4.31–10.16)

## 2023-05-22 PROCEDURE — 85610 PROTHROMBIN TIME: CPT | Performed by: NURSE PRACTITIONER

## 2023-05-22 PROCEDURE — 97530 THERAPEUTIC ACTIVITIES: CPT

## 2023-05-22 PROCEDURE — 85025 COMPLETE CBC W/AUTO DIFF WBC: CPT | Performed by: PHYSICAL MEDICINE & REHABILITATION

## 2023-05-22 PROCEDURE — 80048 BASIC METABOLIC PNL TOTAL CA: CPT | Performed by: PHYSICAL MEDICINE & REHABILITATION

## 2023-05-22 PROCEDURE — 99221 1ST HOSP IP/OBS SF/LOW 40: CPT

## 2023-05-22 PROCEDURE — 99233 SBSQ HOSP IP/OBS HIGH 50: CPT | Performed by: PHYSICAL MEDICINE & REHABILITATION

## 2023-05-22 PROCEDURE — 97110 THERAPEUTIC EXERCISES: CPT

## 2023-05-22 PROCEDURE — 82948 REAGENT STRIP/BLOOD GLUCOSE: CPT

## 2023-05-22 PROCEDURE — 97535 SELF CARE MNGMENT TRAINING: CPT

## 2023-05-22 PROCEDURE — 97116 GAIT TRAINING THERAPY: CPT

## 2023-05-22 PROCEDURE — 99232 SBSQ HOSP IP/OBS MODERATE 35: CPT | Performed by: INTERNAL MEDICINE

## 2023-05-22 RX ADMIN — NYSTATIN: 100000 POWDER TOPICAL at 19:42

## 2023-05-22 RX ADMIN — SODIUM CHLORIDE 1 G: 1 TABLET ORAL at 16:39

## 2023-05-22 RX ADMIN — METHOCARBAMOL TABLETS 250 MG: 500 TABLET, COATED ORAL at 13:35

## 2023-05-22 RX ADMIN — ALLOPURINOL 100 MG: 100 TABLET ORAL at 09:04

## 2023-05-22 RX ADMIN — METHOCARBAMOL TABLETS 250 MG: 500 TABLET, COATED ORAL at 23:00

## 2023-05-22 RX ADMIN — DOCUSATE SODIUM 100 MG: 100 CAPSULE, LIQUID FILLED ORAL at 19:40

## 2023-05-22 RX ADMIN — GABAPENTIN 100 MG: 100 CAPSULE ORAL at 22:44

## 2023-05-22 RX ADMIN — ATORVASTATIN CALCIUM 40 MG: 40 TABLET, FILM COATED ORAL at 16:33

## 2023-05-22 RX ADMIN — DOCUSATE SODIUM 100 MG: 100 CAPSULE, LIQUID FILLED ORAL at 09:04

## 2023-05-22 RX ADMIN — ACETAMINOPHEN 975 MG: 325 TABLET ORAL at 09:18

## 2023-05-22 RX ADMIN — LIDOCAINE 5% 1 PATCH: 700 PATCH TOPICAL at 09:04

## 2023-05-22 RX ADMIN — FUROSEMIDE 40 MG: 40 TABLET ORAL at 09:04

## 2023-05-22 RX ADMIN — ACETAMINOPHEN 975 MG: 325 TABLET ORAL at 16:29

## 2023-05-22 RX ADMIN — CITALOPRAM HYDROBROMIDE 20 MG: 20 TABLET ORAL at 09:04

## 2023-05-22 RX ADMIN — METHOCARBAMOL TABLETS 250 MG: 500 TABLET, COATED ORAL at 19:39

## 2023-05-22 RX ADMIN — OXYBUTYNIN 5 MG: 5 TABLET, FILM COATED, EXTENDED RELEASE ORAL at 09:04

## 2023-05-22 RX ADMIN — ONDANSETRON 4 MG: 4 TABLET, ORALLY DISINTEGRATING ORAL at 08:23

## 2023-05-22 RX ADMIN — AMIODARONE HYDROCHLORIDE 200 MG: 200 TABLET ORAL at 09:04

## 2023-05-22 RX ADMIN — METOPROLOL SUCCINATE 25 MG: 25 TABLET, EXTENDED RELEASE ORAL at 09:04

## 2023-05-22 RX ADMIN — MICONAZOLE NITRATE: 20 CREAM TOPICAL at 09:09

## 2023-05-22 RX ADMIN — SODIUM CHLORIDE 1 G: 1 TABLET ORAL at 09:08

## 2023-05-22 RX ADMIN — ONDANSETRON 4 MG: 4 TABLET, ORALLY DISINTEGRATING ORAL at 16:33

## 2023-05-22 RX ADMIN — MONTELUKAST 10 MG: 10 TABLET, FILM COATED ORAL at 22:44

## 2023-05-22 RX ADMIN — METHOCARBAMOL TABLETS 250 MG: 500 TABLET, COATED ORAL at 05:53

## 2023-05-22 RX ADMIN — WARFARIN SODIUM 2 MG: 1 TABLET ORAL at 19:40

## 2023-05-22 RX ADMIN — Medication 1000 UNITS: at 09:04

## 2023-05-22 RX ADMIN — Medication 7.5 MG: at 22:46

## 2023-05-22 RX ADMIN — NYSTATIN 1 APPLICATION.: 100000 POWDER TOPICAL at 09:09

## 2023-05-22 RX ADMIN — ACETAMINOPHEN 975 MG: 325 TABLET ORAL at 22:43

## 2023-05-22 RX ADMIN — MICONAZOLE NITRATE: 20 CREAM TOPICAL at 19:42

## 2023-05-22 RX ADMIN — SENNOSIDES 17.2 MG: 8.6 TABLET, FILM COATED ORAL at 09:04

## 2023-05-22 NOTE — PLAN OF CARE
Problem: PAIN - ADULT  Goal: Verbalizes/displays adequate comfort level or baseline comfort level  Description: Interventions:  - Encourage patient to monitor pain and request assistance  - Assess pain using appropriate pain scale  - Administer analgesics based on type and severity of pain and evaluate response  - Implement non-pharmacological measures as appropriate and evaluate response  - Consider cultural and social influences on pain and pain management  - Notify physician/advanced practitioner if interventions unsuccessful or patient reports new pain  Outcome: Progressing     Problem: INFECTION - ADULT  Goal: Absence or prevention of progression during hospitalization  Description: INTERVENTIONS:  - Assess and monitor for signs and symptoms of infection  - Monitor lab/diagnostic results  - Monitor all insertion sites, i e  indwelling lines, tubes, and drains  - Monitor endotracheal if appropriate and nasal secretions for changes in amount and color  - [place] appropriate cooling/warming therapies per order  - Administer medications as ordered  - Instruct and encourage patient and family to use good hand hygiene technique  - Identify and instruct in appropriate isolation precautions for identified infection/condition  Outcome: Progressing  Goal: Absence of fever/infection during neutropenic period  Description: INTERVENTIONS:  - Monitor WBC    Outcome: Progressing     Problem: SAFETY ADULT  Goal: Patient will remain free of falls  Description: INTERVENTIONS:  - Educate patient/family on patient safety including physical limitations  - Instruct patient to call for assistance with activity   - Consult OT/PT to assist with strengthening/mobility   - Keep Call bell within reach  - Keep bed low and locked with side rails adjusted as appropriate  - Keep care items and personal belongings within reach  - Initiate and maintain comfort rounds  - Make Fall Risk Sign visible to staff  - Offer Toileting every 2 Hours, in advance of need  - Initiate/Maintain bed alarm  - Obtain necessary fall risk management equipment: bed alarm  - Apply yellow socks and bracelet for high fall risk patients  - Consider moving patient to room near nurses station  Outcome: Progressing  Goal: Maintain or return to baseline ADL function  Description: INTERVENTIONS:  -  Assess patient's ability to carry out ADLs; assess patient's baseline for ADL function and identify physical deficits which impact ability to perform ADLs (bathing, care of mouth/teeth, toileting, grooming, dressing, etc )  - Assess/evaluate cause of self-care deficits   - Assess range of motion  - Assess patient's mobility; develop plan if impaired  - Assess patient's need for assistive devices and provide as appropriate  - Encourage maximum independence but intervene and supervise when necessary  - Involve family in performance of ADLs  - Assess for home care needs following discharge   - Consider OT consult to assist with ADL evaluation and planning for discharge  - Provide patient education as appropriate  Outcome: Progressing  Goal: Maintains/Returns to pre admission functional level  Description: INTERVENTIONS:  - Perform BMAT or MOVE assessment daily    - Set and communicate daily mobility goal to care team and patient/family/caregiver  - Collaborate with rehabilitation services on mobility goals if consulted  - Perform Range of Motion 3 times a day  - Reposition patient every 2 hours    - Dangle patient 3 times a day  - Stand patient 3 times a day  - Ambulate patient 3 times a day  - Out of bed to chair 3 times a day   - Out of bed for meals 3 times a day  - Out of bed for toileting  - Record patient progress and toleration of activity level   Outcome: Progressing     Problem: DISCHARGE PLANNING  Goal: Discharge to home or other facility with appropriate resources  Description: INTERVENTIONS:  - Identify barriers to discharge w/patient and caregiver  - Arrange for needed discharge resources and transportation as appropriate  - Identify discharge learning needs (meds, wound care, etc )  - Arrange for interpretive services to assist at discharge as needed  - Refer to Case Management Department for coordinating discharge planning if the patient needs post-hospital services based on physician/advanced practitioner order or complex needs related to functional status, cognitive ability, or social support system  Outcome: Progressing     Problem: MOBILITY - ADULT  Goal: Maintain or return to baseline ADL function  Description: INTERVENTIONS:  -  Assess patient's ability to carry out ADLs; assess patient's baseline for ADL function and identify physical deficits which impact ability to perform ADLs (bathing, care of mouth/teeth, toileting, grooming, dressing, etc )  - Assess/evaluate cause of self-care deficits   - Assess range of motion  - Assess patient's mobility; develop plan if impaired  - Assess patient's need for assistive devices and provide as appropriate  - Encourage maximum independence but intervene and supervise when necessary  - Involve family in performance of ADLs  - Assess for home care needs following discharge   - Consider OT consult to assist with ADL evaluation and planning for discharge  - Provide patient education as appropriate  Outcome: Progressing  Goal: Maintains/Returns to pre admission functional level  Description: INTERVENTIONS:  - Perform BMAT or MOVE assessment daily    - Set and communicate daily mobility goal to care team and patient/family/caregiver  - Collaborate with rehabilitation services on mobility goals if consulted  - Perform Range of Motion 3 times a day  - Reposition patient every 2 hours    - Dangle patient 3 times a day  - Stand patient 3 times a day  - Ambulate patient 3 times a day  - Out of bed to chair 3 times a day   - Out of bed for meals 3 times a day  - Out of bed for toileting  - Record patient progress and toleration of activity level   Outcome: Progressing     Problem: Nutrition/Hydration-ADULT  Goal: Nutrient/Hydration intake appropriate for improving, restoring or maintaining nutritional needs  Description: Monitor and assess patient's nutrition/hydration status for malnutrition  Collaborate with interdisciplinary team and initiate plan and interventions as ordered  Monitor patient's weight and dietary intake as ordered or per policy  Utilize nutrition screening tool and intervene as necessary  Determine patient's food preferences and provide high-protein, high-caloric foods as appropriate       INTERVENTIONS:  - Monitor oral intake, urinary output, labs, and treatment plans  - Assess nutrition and hydration status and recommend course of action  - Evaluate amount of meals eaten  - Assist patient with eating if necessary   - Allow adequate time for meals  - Recommend/ encourage appropriate diets, oral nutritional supplements, and vitamin/mineral supplements  - Order, calculate, and assess calorie counts as needed  - Recommend, monitor, and adjust tube feedings and TPN/PPN based on assessed needs  - Assess need for intravenous fluids  - Provide specific nutrition/hydration education as appropriate  - Include patient/family/caregiver in decisions related to nutrition  Outcome: Progressing     Problem: Prexisting or High Potential for Compromised Skin Integrity  Goal: Skin integrity is maintained or improved  Description: INTERVENTIONS:  - Identify patients at risk for skin breakdown  - Assess and monitor skin integrity  - Assess and monitor nutrition and hydration status  - Monitor labs   - Assess for incontinence   - Turn and reposition patient  - Assist with mobility/ambulation  - Relieve pressure over bony prominences  - Avoid friction and shearing  - Provide appropriate hygiene as needed including keeping skin clean and dry  - Evaluate need for skin moisturizer/barrier cream  - Collaborate with interdisciplinary team   - Patient/family teaching  - Consider wound care consult   Outcome: Progressing

## 2023-05-22 NOTE — PROGRESS NOTES
"   05/22/23 0900   Pain Assessment   Pain Assessment Tool 0-10   Pain Score 4   Pain Location/Orientation Orientation: Left; Location: Shoulder; Location: Arm   Restrictions/Precautions   Precautions Bed/chair alarms; Fall Risk; Airborne/isolation;Pain;Supervision on toilet/commode   LUE Weight Bearing Per Order NWB   Braces or Orthoses Sling   Lifestyle   Autonomy \"I guess I can sit up for a little  \"   Oral Hygiene   Type of Assistance Needed Supervision;Set-up / clean-up   Physical Assistance Level No physical assistance   Comment pt requested to complete seated 2* fatigue and LUE pain  Oral Hygiene CARE Score 4   Shower/Bathe Self   Type of Assistance Needed Physical assistance;Verbal cues; Set-up / clean-up   Physical Assistance Level 51%-75%   Comment chlorhexidine used to bathe RUE 2* midline, able to bathe L distal UE w/ A to bathe proximal UE  Pt able to bathe upper legs, A to bathe lower legs  CGA in stance to bathe sherley req A to thoroughly bathe buttocks  RN present for sacral wound care w/ pt in stance  wound care to abdomen and groin completed w/ pt supine and OTR A w/ positioning  Shower/Bathe Self CARE Score 2   Tub/Shower Transfer   Reason Not Assessed Sponge Bath;Medical  (COVID +)   Upper Body Dressing   Type of Assistance Needed Physical assistance   Physical Assistance Level 76% or more   Comment while seated, A to thread LUE, A to fully pull OH, and pt able to thread RUE req A to fully pull down back  TA to manage L shoulder brace  Upper Body Dressing CARE Score 2   Lower Body Dressing   Type of Assistance Needed Physical assistance; Adaptive equipment;Verbal cues   Physical Assistance Level 26%-50%   Comment able to thread BLE into underwear while seated w/ use of LHAE w/ inc time  CGA in stance for clothing management, req encouragement to manage over L hip and in back  A to fully manage over L hip 2* body habitus     Lower Body Dressing CARE Score 3   Putting On/Taking Off Footwear   Type of " Assistance Needed Physical assistance   Physical Assistance Level Total assistance   Comment TA to don/doff socks  Putting On/Taking Off Footwear CARE Score 1   Sit to Lying   Type of Assistance Needed Physical assistance   Physical Assistance Level 51%-75%   Comment BLE management   Sit to Lying CARE Score 2   Lying to Sitting on Side of Bed   Type of Assistance Needed Incidental touching   Physical Assistance Level No physical assistance   Comment w/ bed rail   Lying to Sitting on Side of Bed CARE Score 4   Sit to Stand   Type of Assistance Needed Incidental touching   Physical Assistance Level No physical assistance   Comment inc time   Sit to Stand CARE Score 4   Bed-Chair Transfer   Type of Assistance Needed Incidental touching; Adaptive equipment   Physical Assistance Level No physical assistance   Comment CGA w/ hurry cane  Chair/Bed-to-Chair Transfer CARE Score 4   Toileting Hygiene   Type of Assistance Needed Physical assistance;Verbal cues   Physical Assistance Level 26%-50%   Comment inc time for clothing management down w/ CGA  managed sherley hygiene, A for thorough bowel hygiene  able to partially management clothing management w/ A to fully manage over L hip and in back  Toileting Hygiene CARE Score 3   Toilet Transfer   Type of Assistance Needed Incidental touching; Adaptive equipment   Physical Assistance Level No physical assistance   Comment CGA w/ hurry cane to platform BSC  Toilet Transfer CARE Score 4   Cognition   Overall Cognitive Status WFL   Activity Tolerance   Medical Staff Wilson Funes and RN present for wound care  Assessment   Treatment Assessment Pt seen for skilled OT session focusing on self-care management  Details on sponge bath ADL noted above, cont to be limited by body habitus and LUE pain w/ limited AROM  Req encouragement and edu to maximize participation, especially w/ inc task demand which inc pt's frustration at times   Pt reports her daughter will be able to A w/ ADLs at d/c, pt will be off precautions tomorrow and plan to invite family to observe therapy sessions  Cont OT POC: fxnl standing tolerance, endurance work, clothing management, [address] training, toileting, and LUE positioning/ROM  Pt agreeable to rest in w/c, all needs within reach and alarm activated  Prognosis Fair   Problem List Decreased strength;Decreased range of motion;Decreased endurance; Impaired balance;Decreased mobility;Obesity; Decreased skin integrity;Orthopedic restrictions;Pain   Plan   Treatment/Interventions ADL retraining;Functional transfer training; Therapeutic exercise; Endurance training;Patient/family training   Progress Slow progress, decreased activity tolerance   Recommendation   OT Discharge Recommendation   (pending progress)   OT Therapy Minutes   OT Time In 0900   OT Time Out 1045   OT Total Time (minutes) 105   OT Mode of treatment - Individual (minutes) 105   OT Mode of treatment - Concurrent (minutes) 0   OT Mode of treatment - Group (minutes) 0   OT Mode of treatment - Co-treat (minutes) 0   OT Mode of Treatment - Total time(minutes) 105 minutes   OT Cumulative Minutes 655   Therapy Time missed   Time missed?  No

## 2023-05-22 NOTE — PROGRESS NOTES
05/22/23 1430   Pain Assessment   Pain Assessment Tool 0-10   Pain Score No Pain   Restrictions/Precautions   Precautions Fall Risk;Bed/chair alarms;Supervision on toilet/commode;Contact/isolation; Airborne/isolation   LUE Weight Bearing Per Order NWB   Braces or Orthoses Sling   Cognition   Overall Cognitive Status WFL   Arousal/Participation Alert; Cooperative   Subjective   Subjective pt had no c/o and ready for PT   Sit to Lying   Type of Assistance Needed Supervision   Sit to Lying CARE Score 4   Lying to Sitting on Side of Bed   Type of Assistance Needed Supervision;Verbal cues; Adaptive equipment   Comment HOB slightly elevated with R bedrail - will need a bedrail at home   Lying to Sitting on Side of Bed CARE Score 4   Sit to Stand   Type of Assistance Needed Physical assistance;Verbal cues; Adaptive equipment   Physical Assistance Level 26%-50%   Comment CS to mod A depending on height of surface and fatigue   Sit to Stand CARE Score 3   Bed-Chair Transfer   Type of Assistance Needed Incidental touching;Verbal cues; Adaptive equipment   Comment CG-CS with hurry cane   Chair/Bed-to-Chair Transfer CARE Score 4   Walk 10 Feet   Type of Assistance Needed Incidental touching;Verbal cues; Adaptive equipment   Comment 40 x 5 reps with hurry cane   Walk 10 Feet CARE Score 4   Toilet Transfer   Type of Assistance Needed Incidental touching;Verbal cues; Adaptive equipment   Comment CG-CS walk to/from [facility] with hurry cane   Toilet Transfer CARE Score 4   Therapeutic Interventions   Strengthening seated marches, LAQ, heel raises and toe raises x 15 reps x 2 sets   Assessment   Treatment Assessment skilled PT worked on functional transfer and LE strengthening exercises  able to inc frequency of gait to 5x but distance still limited to 40' at a time but no LOB  Still demos difficulty getting up from lower surfaces of about <21 inch high requiring min-mod of 1 while CG-CS from higher surfaces of about >23 inches high   Therapy will reach out to daughter Erik Estrada tomorrow to schedule family training due to ongoing barriers anticipate pt will require assist for ADLs and mobilities  Pt left in w/c with all needs within reach and chair alarm on  Barriers to Discharge Inaccessible home environment;Decreased caregiver support   PT Barriers   Functional Limitation Car transfers; Ramp negotiation;Stair negotiation;Standing;Transfers; Walking   Plan   Treatment/Interventions Functional transfer training;LE strengthening/ROM; Therapeutic exercise; Bed mobility;Gait training;Spoke to nursing;OT;Spoke to MD;Spoke to advanced practitioner   Progress Slow progress, decreased activity tolerance   Recommendation   Equipment Recommended Cane   PT Therapy Minutes   PT Time In 1430   PT Time Out 1545   PT Total Time (minutes) 75   PT Mode of treatment - Individual (minutes) 75   PT Mode of treatment - Concurrent (minutes) 0   PT Mode of treatment - Group (minutes) 0   PT Mode of treatment - Co-treat (minutes) 0   PT Mode of Treatment - Total time(minutes) 75 minutes   PT Cumulative Minutes 720

## 2023-05-22 NOTE — PLAN OF CARE
Problem: PAIN - ADULT  Goal: Verbalizes/displays adequate comfort level or baseline comfort level  Description: Interventions:  - Encourage patient to monitor pain and request assistance  - Assess pain using appropriate pain scale  - Administer analgesics based on type and severity of pain and evaluate response  - Implement non-pharmacological measures as appropriate and evaluate response  - Consider cultural and social influences on pain and pain management  - Notify physician/advanced practitioner if interventions unsuccessful or patient reports new pain  Outcome: Progressing     Problem: INFECTION - ADULT  Goal: Absence or prevention of progression during hospitalization  Description: INTERVENTIONS:  - Assess and monitor for signs and symptoms of infection  - Monitor lab/diagnostic results  - Monitor all insertion sites, i e  indwelling lines, tubes, and drains  - Monitor endotracheal if appropriate and nasal secretions for changes in amount and color  - [place] appropriate cooling/warming therapies per order  - Administer medications as ordered  - Instruct and encourage patient and family to use good hand hygiene technique  - Identify and instruct in appropriate isolation precautions for identified infection/condition  Outcome: Progressing  Goal: Absence of fever/infection during neutropenic period  Description: INTERVENTIONS:  - Monitor WBC    Outcome: Progressing     Problem: SAFETY ADULT  Goal: Patient will remain free of falls  Description: INTERVENTIONS:  - Educate patient/family on patient safety including physical limitations  - Instruct patient to call for assistance with activity   - Consult OT/PT to assist with strengthening/mobility   - Keep Call bell within reach  - Keep bed low and locked with side rails adjusted as appropriate  - Keep care items and personal belongings within reach  - Initiate and maintain comfort rounds  - Make Fall Risk Sign visible to staff  - Offer Toileting every 2 Hours, in advance of need  - Initiate/Maintain bed/chair alarm  - Obtain necessary fall risk management equipment: non skid footwear  - Apply yellow socks and bracelet for high fall risk patients  - Consider moving patient to room near nurses station  Outcome: Progressing  Goal: Maintain or return to baseline ADL function  Description: INTERVENTIONS:  -  Assess patient's ability to carry out ADLs; assess patient's baseline for ADL function and identify physical deficits which impact ability to perform ADLs (bathing, care of mouth/teeth, toileting, grooming, dressing, etc )  - Assess/evaluate cause of self-care deficits   - Assess range of motion  - Assess patient's mobility; develop plan if impaired  - Assess patient's need for assistive devices and provide as appropriate  - Encourage maximum independence but intervene and supervise when necessary  - Involve family in performance of ADLs  - Assess for home care needs following discharge   - Consider OT consult to assist with ADL evaluation and planning for discharge  - Provide patient education as appropriate  Outcome: Progressing  Goal: Maintains/Returns to pre admission functional level  Description: INTERVENTIONS:  - Perform BMAT or MOVE assessment daily    - Set and communicate daily mobility goal to care team and patient/family/caregiver  - Collaborate with rehabilitation services on mobility goals if consulted  - Perform Range of Motion 3 times a day  - Reposition patient every 2 hours    - Dangle patient 3 times a day  - Stand patient 3 times a day  - Ambulate patient 3 times a day  - Out of bed to chair 3 times a day   - Out of bed for meals 3 times a day  - Out of bed for toileting  - Record patient progress and toleration of activity level   Outcome: Progressing     Problem: DISCHARGE PLANNING  Goal: Discharge to home or other facility with appropriate resources  Description: INTERVENTIONS:  - Identify barriers to discharge w/patient and caregiver  - Arrange for needed discharge resources and transportation as appropriate  - Identify discharge learning needs (meds, wound care, etc )  - Arrange for interpretive services to assist at discharge as needed  - Refer to Case Management Department for coordinating discharge planning if the patient needs post-hospital services based on physician/advanced practitioner order or complex needs related to functional status, cognitive ability, or social support system  Outcome: Progressing     Problem: MOBILITY - ADULT  Goal: Maintain or return to baseline ADL function  Description: INTERVENTIONS:  -  Assess patient's ability to carry out ADLs; assess patient's baseline for ADL function and identify physical deficits which impact ability to perform ADLs (bathing, care of mouth/teeth, toileting, grooming, dressing, etc )  - Assess/evaluate cause of self-care deficits   - Assess range of motion  - Assess patient's mobility; develop plan if impaired  - Assess patient's need for assistive devices and provide as appropriate  - Encourage maximum independence but intervene and supervise when necessary  - Involve family in performance of ADLs  - Assess for home care needs following discharge   - Consider OT consult to assist with ADL evaluation and planning for discharge  - Provide patient education as appropriate  Outcome: Progressing  Goal: Maintains/Returns to pre admission functional level  Description: INTERVENTIONS:  - Perform BMAT or MOVE assessment daily    - Set and communicate daily mobility goal to care team and patient/family/caregiver  - Collaborate with rehabilitation services on mobility goals if consulted  - Perform Range of Motion 3 times a day  - Reposition patient every 2 hours    - Dangle patient 3 times a day  - Stand patient 3 times a day  - Ambulate patient 3 times a day  - Out of bed to chair 3 times a day   - Out of bed for meals 3 times a day  - Out of bed for toileting  - Record patient progress and toleration of activity level   Outcome: Progressing     Problem: Nutrition/Hydration-ADULT  Goal: Nutrient/Hydration intake appropriate for improving, restoring or maintaining nutritional needs  Description: Monitor and assess patient's nutrition/hydration status for malnutrition  Collaborate with interdisciplinary team and initiate plan and interventions as ordered  Monitor patient's weight and dietary intake as ordered or per policy  Utilize nutrition screening tool and intervene as necessary  Determine patient's food preferences and provide high-protein, high-caloric foods as appropriate       INTERVENTIONS:  - Monitor oral intake, urinary output, labs, and treatment plans  - Assess nutrition and hydration status and recommend course of action  - Evaluate amount of meals eaten  - Assist patient with eating if necessary   - Allow adequate time for meals  - Recommend/ encourage appropriate diets, oral nutritional supplements, and vitamin/mineral supplements  - Order, calculate, and assess calorie counts as needed  - Recommend, monitor, and adjust tube feedings and TPN/PPN based on assessed needs  - Assess need for intravenous fluids  - Provide specific nutrition/hydration education as appropriate  - Include patient/family/caregiver in decisions related to nutrition  Outcome: Progressing     Problem: Prexisting or High Potential for Compromised Skin Integrity  Goal: Skin integrity is maintained or improved  Description: INTERVENTIONS:  - Identify patients at risk for skin breakdown  - Assess and monitor skin integrity  - Assess and monitor nutrition and hydration status  - Monitor labs   - Assess for incontinence   - Turn and reposition patient  - Assist with mobility/ambulation  - Relieve pressure over bony prominences  - Avoid friction and shearing  - Provide appropriate hygiene as needed including keeping skin clean and dry  - Evaluate need for skin moisturizer/barrier cream  - Collaborate with interdisciplinary team   - Patient/family teaching  - Consider wound care consult   Outcome: Progressing

## 2023-05-22 NOTE — PROGRESS NOTES
Progress Note - Wound   Breanna Bullion 68 y o  female MRN: [id number]  Unit/Bed#: [facility] 451-01 Encounter: [id number]      Assessment:  Pressure ulcer of the sacral region, unstageable   Intertrigo candidiasis   Ambulatory dysfunction   Type 2 DM without long term use of insulin   Urinary incontinence   Obesity       Plan:  • DTI of the b/l sacro-buttocks has fully evolved to an unstageable pressure injury  Wound is POA to ARC unit  Wound is improving, less devitalized tissue, decreased measurements  ? Patient desires to defer bedside debridement at this time, due to her concern of not being able to tolerate being on her side for that long  ? Continue with Triad paste and foam dressing  ? Pressure relief   ? P-500 mattress  • Candidiasis rash noted to the b/l medial thighs and perineum - greatly improved, nearly resolved  ? Continue with pascual anti-fungal cream  • ITD with candidasis to the b/l abdominal pannus partial thickness wounds have resolved, mild candidiasis rash remains  ? Continue with interdry sheets  • No s/s of infection present  • Will recommend to continue with preventative nursing skin care measures  • Nutrition is following along - nutritional supplements of Meet  • Patient verbalized understanding of plan of care  • [place] text wound care team with questions or concerns    • Routine wound care follow-up while admitted   • Follow-up with the [facility] wound care center as an outpatient, ambulatory referral placed        Subjective:  Wound care to see patient for follow-up visit of sacral wound and candidiasis rash  Patient seen in bed on low air loss P-500 mattress, alert and oriented x 4, cooperative and agreeable for the assessment  Patient her skin is feeling much better, and she is beginning to move better in therapy  Patient also states she has been taking her nutritional supplements and eating better  Patient is dependent for care, moderate assist of one with turning for the assessment  "Mobility limited due to pain from her various injuries s/p fall  LUE in sling  Denies incontinence   Denies fever, chills, or increased pain related to the wounds  Objective:      Vitals: Blood pressure 124/58, pulse 60, temperature 97 8 °F (36 6 °C), temperature source Oral, resp  rate 16, height 4' 10\" (1 473 m), weight 92 9 kg (204 lb 12 9 oz), SpO2 97 %  ,Body mass index is 42 8 kg/m²  Focused Physical Exam:  1  unstageable pressure injury to the mid sacrum - round/oval shaped full thickness wound  Wound bed is approx: 60% moist yellow slough and 40% moist red granular tissue  Small amount of serous sangineous drainage  Edges fragile and attached with maceration  Marta-wound is intact with blanchable pink/hyperpigmented skin  Wound is non-tender  2  B/l heels are dry and intact without redness or wounds  3  B/l groin, medial thighs, and perineum is intact with resolving candidiasis rash  Skin is dry pink and peeling  No open aspect or drainage  4  B/l abdominal pannus with ITD with resolving candidiasis rash- the partial thickness wounds have resolved  Skin is dry pink/hyperpigmented and peeling  No open aspect or drainage  No induration, fluctuance, odor, warmth/temperature differences, redness, or purulence noted to the above mentioned wounds and skin areas assessed  New dressings applied as noted above  Patient tolerated assessment well- denies pain and no s/s of non-verbal pain or discomfort observed during the encounter  Right pannus       Left pannus       Lab, Imaging and other studies: I have personally reviewed pertinent reports          Wound 05/07/23 Pressure Injury Buttocks Right;Upper (Active)   Wound Image       05/22/23 1306   Wound Length (cm) 1 5 cm 05/22/23 1306   Wound Width (cm) 1 cm 05/22/23 1306   Wound Depth (cm) 0 3 cm 05/22/23 1306   Wound Surface Area (cm^2) 1 5 cm^2 05/22/23 1306   Wound Volume (cm^3) 0 45 cm^3 05/22/23 1306   Calculated Wound Volume (cm^3) 0 45 " "cm^3 05/22/23 1306   Change in Wound Size % 96 25 05/22/23 1306           Total time spent today:    Total time (face-to-face and non-face-to-face) spent on today's visit was 20 minutes  This includes preparation for the visits (previous wound care notes and internal medicine/PMR notes from 5/22/23) performance of a medically appropriate history and examination, and orders for medications/treatments or testing  Discussed assessment findings, and plan of care/recommendations with patients RN and PMR attending JOSEPHINE Jones, CWON      Portions of the record may have been created with voice recognition software  Occasional wrong word or \"sound a like\" substitutions may have occurred due to the inherent limitations of voice recognition software    Read the chart carefully and recognize, using context, where substitutions have occurred      "

## 2023-05-22 NOTE — PROGRESS NOTES
PM&R PROGRESS NOTE:  Marjan Yoder 68 y o  female MRN: [id number]  Unit/Bed#: -01 Encounter: [id number]        Rehab Diagnosis: Impairment of mobility, safety and Activities of Daily Living (ADLs) due to Major Multiple Trauma:  14 9  Other Multiple Trauma Multiple rib fractures with acute pain, improved acute hypoxic respiratory failure, and L humeral fracture NWB in coaptation splint       SUBJECTIVE: Patient seen face to face  No acute issues overnight  Had a good weekend  Eating well  Denies chest pain or shortness of breath  Pain is well controlled  Progressing in rehabilitation, patient is happy with her progress  Personally updated patient's daughter Bertha Helms -provided a medical and functional update with her understanding and appreciation  ASSESSMENT: Stable, progressing      PLAN:    Rehabilitation  • Functional deficits: Left upper extremity weakness, impaired mobility, impaired self-care, impaired endurance, impaired strength  • Continue current rehabilitation plan of care to maximize function  • Functionally, progressing towards a min assist level  • Estimated Discharge: TBD, estimated length of stay 1-2 weeks    DVT prophylaxis  • Managed on warfarin  • Goal INR 2 5-3 5  • Last INR = 2 68    Bladder plan  • Continent  • Limit pure wick usage if possible    Bowel plan  • Continent      * Humerus fracture  Assessment & Plan  2/2 mechanical fall  Proximal humerus shaft spiral fracture, displaced and angulated  Ortho attempted closed reduction  Ultimately placed in coaptation splint on 4/27 and managed non-operatively  NWB LUE  Pain Control as detailed below  No Vitamin D level in chart or unmerged chart recently   - At home was on daily Vitamin D supplement 1000 units - restarted   - 5/14 Vitamind D level is 50 7   Due for 2 week follow-up  Imaging: Personally reviewed  Redemonstration of oblique spiral fracture of the shaft of the humerus with apex medial angulation    No glenohumeral dislocation  Appreciate orthopedics evaluating patient 5/15/2023  Replaced her splint with a Salgado brace  Cleared by orthopedics to provide range of motion to elbow and wrist without restriction as tolerated  Follow-up with orthopedics in 4 weeks around 6/15/2023    Ventricular arrhythmia  Assessment & Plan  On 5/2 developed Afib with RR which degenerated to VT then VF  She received shock from her AICD ([place], Single Chamber ICD)  Seen by Cards who adjusted her device, and recommended restarting home Metoprolol and Amio load  Was also on digoxin  - Toprol dose adjusted and digoxin discontinued after developing junctional bradycardia later in stay  Here: Toprol XL 25mg daily, Amio 200mg daily starting tomorrow for 28 days  Monitor cardiac status during therapies   Monitor lytes  Keep K > 4, Mag > 2  Will plan for outpatient f/u with EP    Difficult intravenous access  Assessment & Plan  Midline placed by IR on 5/15/23  Need for blood draws frequently to follow electrolytes, INR  Need for IV access given her recent ventricular arrhythmia and AICD firing, importance of optimizing electrolytes, current COVID 19 virus  Acute COVID-19  Assessment & Plan  Diagnosed on screening test on 5/13  Minimally symptomatic (does have a cough)  Afebrile and on RA  10 days isolation per protocol through 5/22/2023 (last day)  Off precautions 5/23/2023  Monitor respiratory status   - Has chronic issues with urinary urgency/frequency  - Added Q4hr timed voids so we can help her get to the commode in a timely manner    -Stable and has not required oxygen   -Compliant with utilizing incentive spirometer    CHAS (obstructive sleep apnea)  Assessment & Plan  Does not use CPAP at home  Consider Noc Ox overnight while here      Class 2 obesity in adult  Assessment & Plan  Nutrition consulted  Counseling    Osteoporosis with current pathological fracture with routine healing  Assessment & Plan  Managed by [facility]  History of L5 pathologic fracture  Has been on Alendronate once weekly since 2017  Would confirm with her what day she normally takes it prior to restarting  Will confirm that it is ok to restart with Orthopedics first, as some surgeons prefer to wait 6 weeks after acute fracture prior to restarting    - Per Dr Boni Kirkpatrick ok to resume at anytime  - She normally takes it on Thursdays  Would complete through 6/2023  Per last Endocrine note in 6/2022, they were going to continue for 1 more year and then stop  She was due for DXA this May  Asthma  Assessment & Plan  No acute exacerbation  Home: Singulair 10mg QHS  Here: Same, PRN albuterol  No acute exacerbation  Monitor respiratory status    Pressure injury of buttock, unstageable ([address] )  Assessment & Plan  Present on admission  Much improved -personally assessed  Wound care to assess later today additionally    Wound care following while at [address] [place] [address] Place local wound care as follows:  • Evolving DTI to the b/l sacro-buttocks  Wound is POA to ARC unit  Wound continues to evolve  Noted full thickness skin loss with devitalized tissue  ? Patient desires to defer bedside debridement at this time, she states she doesn't think she will be able to be on her side for that long due to pain  ? Continue with Triad paste and foam dressing  ? Pressure relief   ? P-500 mattress  • Candidiasis rash noted to the b/l medial thighs and perineum, no open wound or active bleeding at time of assessment  ? Will recommend pascual anti-fungal cream  • ITD with candidasis to the b/l abdominal pannus with partial thickness skin loss  ? Will recommend InterDry sheets   • No s/s of infection present  • Will recommend to continue with preventative nursing skin care measures  • Nutrition is following along   • Patient verbalized understanding of plan of care    • [place] text wound care team with questions or concerns    • Routine wound care follow-up while admitted • Follow[facility]up with the [facility] as an outpatient, ambulatory referral placed      Frequent offloading in chair and in bed  P500 mattress  Optimize nutrition    Atrial fibrillation with RVR ([address] )  Assessment & Plan  Initially per AICD interrogation with Afib RVR that degenerated into VT/VF  Unclear burden  Here: Toprol XL 25mg daily, Fully anticoagulated on warfarin (See mechanical AV), amiodarone 200mg daily  Monitor and adjust as appropriate  Currently examines in NSR  IM consulted to assist with management  Outpatient f/u with EP    Abnormal CT scan  Assessment & Plan  - 4/27 CT CAP:           - Incidentally noted is a incarcerated/partially obstructed periumbilic ventral hernia, (image 146 series 9)          - Septated cyst anterior aspect of the right kidney, consider evaluation with ultrasound or MRI for further characterization (image 136 series 9)          - Dilated ascending aorta measuring 4 6 cm, evaluation with cardiothoracic surgery on nonemergent basis  - Follow up with PCP and CT surgery outpatient for findings  - Patient informed of abnormal results on 5/11: she is aware of ventral hernia but not aortic aneurysm or renal cyst         Hyponatremia  Assessment & Plan  Resolved  Na = 138    In acute care, [address] Alberto 125 on 5/9  [place] by Trauma to be 2/2 poor PO intake and furosemide  Started on sodium tabs (weaning down)  Monitor and adjust as appropriate  IM consulted and assisting with management  Bradycardia  Assessment & Plan  Developed junctional bradycardia in hospital after restarting home Toprol XL and digoxin  - Cards stopped digoxin  - Toprol XL decreased to 25mg daily  Monitor  Type 2 diabetes mellitus ([address] )  Assessment & Plan    Home: Sitagliptin-Metformin 50-50mg  Here: Discontinue Levemir due to hypoglycemia, continue sliding scale insulin and diabetic diet  Diabetic Diet  Nutrition Consult  Attempt to resume oral meds here and get off insulin    IM consulted "to assist with management  Outpatient f/u with PCP  Fracture of multiple ribs of right side  Assessment & Plan  R 8th and 9th rib possible fractures  Rib fracture protocol  Pain control with lidoderm patch  Incentive Spirometer  Monitor respiratory status  Laceration of right eyebrow  Assessment & Plan  Healing, monitor  Steri-Strips in place    H/O mechanical aortic valve replacement  Assessment & Plan  Managed on Coumadin  Goal INR 2 5-3 5  INR 2 68  IM consulted to assist with management of Coumadin dosing  Acute pain due to trauma  Assessment & Plan  R rib fractures, L humerus fracture  - Lidoderm patch for ribs  - Oxycodone 5-7 5mg PRN  - Robaxin 250mg Q6hr scheduled  - Gabapentin 100mg QHS (may be able to discontinue while here)  - Tylenol 975mg Q8hrs scheduled  APS assisted inpatient  Adjust as appropriate while here  Appreciate IM consultants medical co-management  Labs, medications, and imaging personally reviewed  ROS:  A ten point review of systems was completed on 05/22/23 and pertinent positives are listed in subjective section  All other systems reviewed were negative  OBJECTIVE:   /58   Pulse 60   Temp 97 8 °F (36 6 °C) (Oral)   Resp 16   Ht 4' 10\" (1 473 m)   Wt 92 9 kg (204 lb 12 9 oz) Comment: pt also has a splint on LUE  SpO2 97%   BMI 42 80 kg/m²     Physical Exam  Vitals and nursing note reviewed  Constitutional:       General: She is not in acute distress  Appearance: She is obese  HENT:      Head: Normocephalic and atraumatic  Nose: Nose normal       Mouth/Throat:      Mouth: Mucous membranes are moist    Eyes:      Conjunctiva/sclera: Conjunctivae normal    Cardiovascular:      Rate and Rhythm: Normal rate and regular rhythm  Pulses: Normal pulses  Pulmonary:      Effort: Pulmonary effort is normal       Breath sounds: Normal breath sounds  No wheezing or rales     Abdominal:      General: Bowel sounds are normal  There is no " distension  Palpations: Abdomen is soft  Tenderness: There is no abdominal tenderness  Musculoskeletal:         General: No swelling  Cervical back: Neck supple  Comments: LUE splint in place   Skin:     General: Skin is warm  Comments: Improving buttocks/sacral ulcer - good pink granulation tissue visualized  Pannus excoriations improving as well    Neurological:      Mental Status: She is alert and oriented to person, place, and time  Motor: Weakness present        Comments: Seen doing a sit to stand with CGA-Min A and was able to maintain a standing position for several minutes   Psychiatric:         Mood and Affect: Mood normal         Lab Results   Component Value Date    WBC 3 14 (L) 05/22/2023    HGB 10 9 (L) 05/22/2023    HCT 33 4 (L) 05/22/2023     (H) 05/22/2023     (L) 05/22/2023     Lab Results   Component Value Date    SODIUM 138 05/22/2023    K 4 3 05/22/2023     (H) 05/22/2023    CO2 28 05/22/2023    BUN 23 05/22/2023    CREATININE 0 59 (L) 05/22/2023    GLUC 73 05/22/2023    CALCIUM 8 6 05/22/2023     Lab Results   Component Value Date    INR 2 68 (H) 05/22/2023    INR 3 16 (H) 05/21/2023    INR 4 20 (H) 05/20/2023    PROTIME 28 8 (H) 05/22/2023    PROTIME 32 7 (H) 05/21/2023    PROTIME 40 8 (H) 05/20/2023           Current Facility-Administered Medications:   •  acetaminophen (TYLENOL) tablet 975 mg, 975 mg, Oral, Q8H [address], Girish Alberto MD, 975 mg at 05/22/23 5689  •  albuterol (PROVENTIL HFA,VENTOLIN HFA) inhaler 2 puff, 2 puff, Inhalation, Q4H PRN, Girish Alberto MD  •  allopurinol (ZYLOPRIM) tablet 100 mg, 100 mg, Oral, Daily, Girish Alberto MD, 100 mg at 05/22/23 3596  •  amiodarone tablet 200 mg, 200 mg, Oral, Daily With Breakfast, Girish Alberto MD, 200 mg at 05/22/23 8170  •  atorvastatin (LIPITOR) tablet 40 mg, 40 mg, Oral, Daily With Samy Castañeda MD, 40 mg at 05/21/23 1716  •  calcium carbonate (TUMS) chewable tablet 500 mg, 500 mg, Oral, TID PRN, Garret Cole MD  •  cholecalciferol (VITAMIN D3) tablet 1,000 Units, 1,000 Units, Oral, Daily, Garret Cole MD, 1,000 Units at 05/22/23 0904  •  citalopram (CeleXA) tablet 20 mg, 20 mg, Oral, Daily, Garret Cole MD, 20 mg at 05/22/23 2255  •  docusate sodium (COLACE) capsule 100 mg, 100 mg, Oral, BID, Garret Cole MD, 100 mg at 05/22/23 3002  •  furosemide (LASIX) tablet 40 mg, 40 mg, Oral, Daily, Garret Cole MD, 40 mg at 05/22/23 7748  •  gabapentin (NEURONTIN) capsule 100 mg, 100 mg, Oral, HS, Garret Cole MD, 100 mg at 05/21/23 2204  •  insulin lispro (HumaLOG) 100 units/mL subcutaneous injection 1-5 Units, 1-5 Units, Subcutaneous, TID AC **AND** Fingerstick Glucose (POCT), , , TID AC, Garret Cole MD  •  insulin lispro (HumaLOG) 100 units/mL subcutaneous injection 1-5 Units, 1-5 Units, Subcutaneous, HS, Garret Cole MD, 1 Units at 05/21/23 2209  •  lidocaine (LIDODERM) 5 % patch 1 patch, 1 patch, Topical, Daily, Garret Cole MD, 1 patch at 05/22/23 0378  •  methocarbamol (ROBAXIN) tablet 250 mg, 250 mg, Oral, Q6H [facility], Garret Cole MD, 250 mg at 05/22/23 0704  •  metoprolol succinate (TOPROL-XL) 24 hr tablet 25 mg, 25 mg, Oral, Daily, Garret Cole MD, 25 mg at 05/22/23 2618  •  moisture barrier miconazole 2% cream (aka LICO MOISTURE BARRIER ANTIFUNGAL CREAM), , Topical, BID, JOSEPHINE Robison, Given at 05/22/23 3493  •  montelukast (SINGULAIR) tablet 10 mg, 10 mg, Oral, HS, Garret Cole MD, 10 mg at 05/21/23 2205  •  naloxone (NARCAN) 0 04 mg/mL syringe 0 04 mg, 0 04 mg, Intravenous, Q1MIN PRN, Garret Cole MD  •  nystatin (MYCOSTATIN) powder, , Topical, BID, Amanda Pearl PA-C, 1 application   at 05/22/23 0909  •  ondansetron (ZOFRAN-ODT) dispersible tablet 4 mg, 4 mg, Oral, Q6H PRN, Garret Cole MD, 4 mg at 05/22/23 7501  •  oxybutynin (DITROPAN-XL) 24 hr tablet 5 mg, 5 mg, Oral, Daily, Garret Cole MD, 5 mg at 05/22/23 0957  •  oxyCODONE (ROXICODONE) IR tablet 5 mg, 5 mg, Oral, Q4H PRN, Maria Briones MD, 5 mg at 05/21/23 2217  •  oxyCODONE (ROXICODONE) split tablet 7 5 mg, 7 5 mg, Oral, Q4H PRN, Maria Briones MD, 7 5 mg at 05/19/23 2216  •  senna (SENOKOT) tablet 17 2 mg, 2 tablet, Oral, Daily, Maria Briones MD, 17 2 mg at 05/22/23 3881  •  sodium chloride tablet 1 g, 1 g, Oral, BID With Meals, Maria Briones MD, 1 g at 05/22/23 0908  •  warfarin (COUMADIN) tablet 2 mg, 2 mg, Oral, Daily (warfarin), JOSEPHINE Martins, 2 mg at 05/21/23 1716    Past Medical History:   Diagnosis Date   • Asthma    • Diabetes [facility])    • Hypertension        Patient Active Problem List    Diagnosis Date Noted   • Humerus fracture 04/27/2023   • Ventricular arrhythmia 05/04/2023   • Difficult intravenous access 05/15/2023   • Acute COVID-19 05/14/2023   • Atrial fibrillation with RVR ([facility] [address] ) 05/13/2023   • Pressure injury of buttock, unstageable ([facility] [address] ) 05/13/2023   • Asthma 05/13/2023   • Osteoporosis with current pathological fracture with routine healing 05/13/2023   • Class 2 obesity in adult 05/13/2023   • CHAS (obstructive sleep apnea) 05/13/2023   • Abnormal CT scan 05/10/2023   • Hyponatremia 05/09/2023   • Bradycardia 05/08/2023   • Type 2 diabetes mellitus ([facility] [address] ) 04/28/2023   • Fall 04/27/2023   • Acute pain due to trauma 04/27/2023   • H/O mechanical aortic valve replacement 04/27/2023   • Laceration of right eyebrow 04/27/2023   • Fracture of multiple ribs of right side 04/27/2023          Dorothy Fernandes MD    I have spent a total time of 55 minutes on 05/22/23 in caring for this patient including Impressions, Documenting in the medical record, Obtaining or reviewing history   and updating patient's daughter  ** Please Note:  voice to text software may have been used in the creation of this document   Although proof errors in transcription or interpretation are a potential of such software**

## 2023-05-22 NOTE — PROGRESS NOTES
Internal Medicine Progress Note  Patient: Agata Robertson  Age/sex: 68 y o  female  Medical Record #: [id number]      ASSESSMENT/PLAN: (Interval History)  Agata Robertson is seen and examined and management for following issues:    Lt proximal humerus fracture  • NWB to the LUE  • Immobilizer placed to LUE by orthopedics  • Pain control and therapy per PMR  • Follow-up with Orthopedics in 4 weeks  Rt 8-9 rib fx  • Pain control per PMR  • Encourage IS   • O2 as needed  Rt eyebrow laceration  • Monitor lacerations  • Local wound care  • stable     DM type 2  • HA1C 6 4  • Home: Janumet  2x daily  • Here: SSI  • Will monitor and add back medications as needed  • BS overall stable     History of mechanical AVR  • Goal INR 2 5 - 3 5  • INR 2 68  • Repeat INR Wednesday  • Continue coumadin at 2mg daily  • Pt takes 2 5mg Wednesday and Saturday and 5mg all other days of the week at home     Hyponatremia  • Na 138  • Resolved  Ventricular arrhythmia  • One episode 5/2/23 and pt received a shock from ICD  • Continue metoprolol and amiodarone  • Continue Coumadin  • Monitor bmp     Bradycardia  • Diltiazem discontinued and metoprolol dose lowered  • She will need outpt follow-up with Cardiology  [facility]  • HR stable     COVID-19  • Tested positive 5/13/23  • Pt has a cough which she reports is chronic  • Contact and airborne precautions for 10 days  Buttock wound/labia wound  · Followed by wound care  · Frequent position changes/specialized mattress  · Will monitor  DC planning: TBD  The above assessment and plan was reviewed and updated as determined by my evaluation of the patient on 5/22/2023      Labs:   Results from last 7 days   Lab Units 05/22/23  0613 05/15/23  1513   WBC Thousand/uL 3 14* 4 89   HEMOGLOBIN g/dL 10 9* 10 3*   HEMATOCRIT % 33 4* 30 8*   PLATELETS Thousands/uL 142* 184     Results from last 7 days   Lab Units 05/22/23  0613 05/15/23  1628 SODIUM mmol/L 138 133*   POTASSIUM mmol/L 4 3 4 6   CHLORIDE mmol/L 110* 106   CO2 mmol/L 28 27   BUN mg/dL 23 24   CREATININE mg/dL 0 59* 0 85   CALCIUM mg/dL 8 6 7 9*         Results from last 7 days   Lab Units 05/22/23  0613 05/21/23  0617   INR  2 68* 3 16*     Results from last 7 days   Lab Units 05/22/23  0611 05/21/23 2006 05/21/23  1626   POC GLUCOSE mg/dl 73 150* 113       Review of Scheduled Meds:  Current Facility-Administered Medications   Medication Dose Route Frequency Provider Last Rate   • acetaminophen  975 mg Oral [facility] Yesenia Mares MD     • albuterol  2 puff Inhalation Q4H PRN Yesenia Mares MD     • allopurinol  100 mg Oral Daily Yesenia Mares MD     • amiodarone  200 mg Oral Daily With Breakfast Yesenia Mares MD     • atorvastatin  40 mg Oral Daily With Kitr Munoz MD     • calcium carbonate  500 mg Oral TID PRN Yesenia Mares MD     • cholecalciferol  1,000 Units Oral Daily Yesenia Mares MD     • citalopram  20 mg Oral Daily Yesenia Mares MD     • docusate sodium  100 mg Oral BID Yesenia Mares MD     • furosemide  40 mg Oral Daily Yesenia Mares MD     • gabapentin  100 mg Oral HS Yesenia Mares MD     • insulin lispro  1-5 Units Subcutaneous TID [facility] Yesenia Mares MD     • insulin lispro  1-5 Units Subcutaneous HS Yesenia Mares MD     • lidocaine  1 patch Topical Daily Yesenia Mares MD     • methocarbamol  250 mg Oral Q6H [address] Yesenia Mares MD     • metoprolol succinate  25 mg Oral Daily Yesenia Mares MD     • LICO ANTIFUNGAL   Topical BID JOSEPHINE Simmons     • montelukast  10 mg Oral HS Yesenia Mares MD     • naloxone  0 04 mg Intravenous Q1MIN PRN Yesenia Mares MD     • nystatin   Topical BID Amanda Pearl PA-C     • ondansetron  4 mg Oral Q6H PRN Yesenia Mares MD     • oxybutynin  5 mg Oral Daily Yesenia Mares MD     • oxyCODONE  5 mg Oral Q4H PRN Yesenia Mares MD     • oxyCODONE  7 5 mg Oral Q4H PRN Yesenia Mares MD • senna  2 tablet Oral Daily Garret Cole MD     • sodium chloride  1 g Oral BID With Meals Garret Cole MD     • warfarin  2 mg Oral Daily (warfarin) JOSEPHINE Gonzalez         Subjective/ HPI: Patient seen and examined  Patients overnight issues or events were reviewed with nursing or staff during rounds or morning huddle session  New or overnight issues include the following:     Pt seen in her room  She states that she is doing well overall  She denies any current complaints  ROS:   A 10 point ROS was performed; negative except as noted above         Imaging:     XR humerus left   Final Result by David Reynoso MD (05/15 1613)   Oblique spiral fracture of the shaft of the humerus with apex medial angulation   No glenohumeral dislocation         Workstation performed: [id number]         XR shoulder 2+ vw left   Final Result by David Reynoso MD (05/15 1614)      Oblique spiral fracture of the proximal humeral shaft with stable alignment      Workstation performed: [id number]             *Labs /Radiology studies Reviewed  *Medications  reviewed and reconciled as needed  *Please refer to order section for additional ordered labs studies  *Case discussed with primary attending during morning huddle case rounds    Physical Examination:  Vitals:   Vitals:    05/21/23 0608 05/21/23 1356 05/21/23 2008 05/22/23 0500   BP: 143/73 125/59 133/70 137/71   BP Location: Right arm Right arm Right arm Right arm   Pulse: 63 66 69 61   Resp: 20 17 16 16   Temp: 97 7 °F (36 5 °C) 97 7 °F (36 5 °C) 98 1 °F (36 7 °C) 97 8 °F (36 6 °C)   TempSrc: Oral Oral Oral Oral   SpO2: 96% 99% 97% 97%   Weight:       Height:         GEN: NAD; pleasant  NEURO: Alert and oriented x4; appropriate  HEENT: Pupils are equal/reactive; normocephalic, face is symmetrical, hearing intact  CV: S1 S2 regular, +audible click; no murmur/rub/gallops, 1/4 pedal pulses, trace b/l edema present; LUE hand edema trace  RESP: Lungs are clear bilaterally, no wheezes rales or rhonchi, on room air, no distress, respirations are easy and non labored; mild [place]  GI: Abdomen is obese, soft, non tender, +BS x4; non distended  : Voiding without issues  MUSC: Moves all extremities except LUE NWB, Lt UE in splint  SKIN: pink, warm, poor turgor, eyebrow laceration stable; sacral wound seems to be improving       The above physical exam was reviewed and updated as determined by my evaluation of the patient on 5/22/2023  Invasive Devices     Peripheral Intravenous Line  Duration           Long-Dwell Peripheral IV (Midline) 29/19/63 Right Cephalic Vein 6 days                   VTE Pharmacologic Prophylaxis: Warfarin (Coumadin)  Code Status: Level 1 - Full Code  Current Length of Stay: 9 day(s)      Total time spent:  30 minutes with more than 50% spent counseling/coordinating care  Counseling includes discussion with patient re: progress  and discussion with patient of his/her current medical state/information  Coordination of patient's care was performed in conjunction with primary service  Time invested included review of patient's labs, vitals, and management of their comorbidities with continued monitoring  In addition, this patient was discussed with medical team including physician and advanced extenders  The care of the patient was extensively discussed and appropriate treatment plan was formulated unique for this patient  Medical decision making for the day was made by supervising physician unless otherwise noted in their attestation statement  ** Please Note:  voice to text software may have been used in the creation of this document   Although proof errors in transcription or interpretation are a potential of such software**

## 2023-05-22 NOTE — PROGRESS NOTES
"   05/22/23 1230   Pain Assessment   Pain Assessment Tool 0-10   Pain Score No Pain   Restrictions/Precautions   Precautions Bed/chair alarms; Fall Risk; Airborne/isolation;Pain;Supervision on toilet/commode   LUE Weight Bearing Per Order NWB   ROM Restrictions   (per ortho note, \"No restrictions to shoulder and elbow ROM\")   Braces or Orthoses Sling   Subjective   Subjective pt agreeable to perform skilled PT   Sit to Lying   Type of Assistance Needed Physical assistance   Physical Assistance Level 51%-75%   Comment BLE management   Sit to Lying CARE Score 2   Sit to Stand   Type of Assistance Needed Incidental touching; Adaptive equipment   Sit to Stand CARE Score 4   Bed-Chair Transfer   Type of Assistance Needed Incidental touching; Adaptive equipment   Comment [facility]   Chair/Bed-to-Chair Transfer CARE Score 4   Transfer Bed/Chair/Wheelchair   Adaptive Equipment Cane   Walk 10 Feet   Type of Assistance Needed Incidental touching   Comment with SPC   Walk 10 Feet CARE Score 4   Walk 50 Feet with Two Turns   Reason if not Attempted Safety concerns   Walk 50 Feet with Two Turns CARE Score 88   Walk 150 Feet   Reason if not Attempted Safety concerns   Walk 150 Feet CARE Score 88   Walking 10 Feet on Uneven Surfaces   Reason if not Attempted Safety concerns   Walking 10 Feet on Uneven Surfaces CARE Score 88   Ambulation   Primary Mode of Locomotion Prior to Admission Walk   Distance Walked (feet) 10 ft   Assist Device Cane   Does the patient walk? 2   Yes   Wheel 50 Feet with Two Turns   Reason if not Attempted Activity not applicable   Wheel 50 Feet with Two Turns CARE Score 9   Wheel 150 Feet   Reason if not Attempted Activity not applicable   Wheel 654 Feet CARE Score 9   Curb or Single Stair   Reason if not Attempted Safety concerns   1 Step (Curb) CARE Score 88   4 Steps   Reason if not Attempted Activity not applicable   4 Steps CARE Score 9   12 Steps   Reason if not Attempted Activity not applicable   12 Steps " CARE Score 9   Therapeutic Interventions   Strengthening LAQ AP marching gluts 20 reps   Flexibility HS and calf stretch ,   Balance standing balance   Assessment   Treatment Assessment pt cont seated in recliner with all needs in reach and pt perform LE LAQ AP gluts 20 reps STS x5  and 10feet walking w SPC form home amd then LE strengthening   Pt using arm sling for support during SPC xfers /SPT back to bed with all needs in reach and alarm on   Pt will cont towrad DC goals   Barriers to Discharge Decreased caregiver support; Inaccessible home environment   Plan   Progress Slow progress, decreased activity tolerance   PT Therapy Minutes   PT Time In 1230   PT Time Out 1300   PT Total Time (minutes) 30   PT Mode of treatment - Individual (minutes) 30   PT Mode of treatment - Concurrent (minutes) 0   PT Mode of treatment - Group (minutes) 0   PT Mode of treatment - Co-treat (minutes) 0   PT Mode of Treatment - Total time(minutes) 30 minutes   PT Cumulative Minutes 432

## 2023-05-23 ENCOUNTER — HOME HEALTH ADMISSION (OUTPATIENT)
Dept: HOME HEALTH SERVICES | Facility: HOME HEALTHCARE | Age: 78
End: 2023-05-23
Payer: COMMERCIAL

## 2023-05-23 LAB
GLUCOSE SERPL-MCNC: 128 MG/DL (ref 65–140)
GLUCOSE SERPL-MCNC: 131 MG/DL (ref 65–140)
GLUCOSE SERPL-MCNC: 82 MG/DL (ref 65–140)
GLUCOSE SERPL-MCNC: 96 MG/DL (ref 65–140)

## 2023-05-23 PROCEDURE — 99233 SBSQ HOSP IP/OBS HIGH 50: CPT | Performed by: PHYSICAL MEDICINE & REHABILITATION

## 2023-05-23 PROCEDURE — 97116 GAIT TRAINING THERAPY: CPT

## 2023-05-23 PROCEDURE — 97110 THERAPEUTIC EXERCISES: CPT

## 2023-05-23 PROCEDURE — 97530 THERAPEUTIC ACTIVITIES: CPT

## 2023-05-23 PROCEDURE — 97535 SELF CARE MNGMENT TRAINING: CPT

## 2023-05-23 PROCEDURE — 99232 SBSQ HOSP IP/OBS MODERATE 35: CPT | Performed by: INTERNAL MEDICINE

## 2023-05-23 PROCEDURE — 82948 REAGENT STRIP/BLOOD GLUCOSE: CPT

## 2023-05-23 RX ADMIN — OXYCODONE HYDROCHLORIDE 5 MG: 5 TABLET ORAL at 16:01

## 2023-05-23 RX ADMIN — LIDOCAINE 5% 1 PATCH: 700 PATCH TOPICAL at 08:01

## 2023-05-23 RX ADMIN — METOPROLOL SUCCINATE 25 MG: 25 TABLET, EXTENDED RELEASE ORAL at 08:00

## 2023-05-23 RX ADMIN — SODIUM CHLORIDE 1 G: 1 TABLET ORAL at 16:05

## 2023-05-23 RX ADMIN — NYSTATIN 1 APPLICATION.: 100000 POWDER TOPICAL at 08:05

## 2023-05-23 RX ADMIN — Medication 1000 UNITS: at 08:00

## 2023-05-23 RX ADMIN — ONDANSETRON 4 MG: 4 TABLET, ORALLY DISINTEGRATING ORAL at 06:24

## 2023-05-23 RX ADMIN — WARFARIN SODIUM 2 MG: 1 TABLET ORAL at 17:33

## 2023-05-23 RX ADMIN — NYSTATIN: 100000 POWDER TOPICAL at 17:37

## 2023-05-23 RX ADMIN — FUROSEMIDE 40 MG: 40 TABLET ORAL at 08:00

## 2023-05-23 RX ADMIN — MONTELUKAST 10 MG: 10 TABLET, FILM COATED ORAL at 21:06

## 2023-05-23 RX ADMIN — DOCUSATE SODIUM 100 MG: 100 CAPSULE, LIQUID FILLED ORAL at 17:33

## 2023-05-23 RX ADMIN — OXYBUTYNIN 5 MG: 5 TABLET, FILM COATED, EXTENDED RELEASE ORAL at 08:01

## 2023-05-23 RX ADMIN — DOCUSATE SODIUM 100 MG: 100 CAPSULE, LIQUID FILLED ORAL at 08:01

## 2023-05-23 RX ADMIN — ACETAMINOPHEN 975 MG: 325 TABLET ORAL at 16:01

## 2023-05-23 RX ADMIN — ATORVASTATIN CALCIUM 40 MG: 40 TABLET, FILM COATED ORAL at 16:01

## 2023-05-23 RX ADMIN — ONDANSETRON 4 MG: 4 TABLET, ORALLY DISINTEGRATING ORAL at 16:01

## 2023-05-23 RX ADMIN — METHOCARBAMOL TABLETS 250 MG: 500 TABLET, COATED ORAL at 17:33

## 2023-05-23 RX ADMIN — Medication 7.5 MG: at 21:06

## 2023-05-23 RX ADMIN — SENNOSIDES 17.2 MG: 8.6 TABLET, FILM COATED ORAL at 08:00

## 2023-05-23 RX ADMIN — SODIUM CHLORIDE 1 G: 1 TABLET ORAL at 06:24

## 2023-05-23 RX ADMIN — ALLOPURINOL 100 MG: 100 TABLET ORAL at 08:00

## 2023-05-23 RX ADMIN — CITALOPRAM HYDROBROMIDE 20 MG: 20 TABLET ORAL at 08:00

## 2023-05-23 RX ADMIN — METHOCARBAMOL TABLETS 250 MG: 500 TABLET, COATED ORAL at 06:21

## 2023-05-23 RX ADMIN — AMIODARONE HYDROCHLORIDE 200 MG: 200 TABLET ORAL at 06:22

## 2023-05-23 RX ADMIN — METHOCARBAMOL TABLETS 250 MG: 500 TABLET, COATED ORAL at 12:04

## 2023-05-23 RX ADMIN — ACETAMINOPHEN 975 MG: 325 TABLET ORAL at 06:21

## 2023-05-23 RX ADMIN — GABAPENTIN 100 MG: 100 CAPSULE ORAL at 21:06

## 2023-05-23 RX ADMIN — ACETAMINOPHEN 975 MG: 325 TABLET ORAL at 23:21

## 2023-05-23 RX ADMIN — MICONAZOLE NITRATE 1 APPLICATION.: 20 CREAM TOPICAL at 08:05

## 2023-05-23 RX ADMIN — METHOCARBAMOL TABLETS 250 MG: 500 TABLET, COATED ORAL at 23:21

## 2023-05-23 RX ADMIN — MICONAZOLE NITRATE: 20 CREAM TOPICAL at 17:37

## 2023-05-23 NOTE — PROGRESS NOTES
05/23/23 1200   Pain Assessment   Pain Assessment Tool 0-10   Pain Score 4   Pain Location/Orientation Orientation: Left; Location: Arm   Hospital Pain Intervention(s) Elevated;Repositioned; Rest   Restrictions/Precautions   LUE Weight Bearing Per Order (S)  NWB   Braces or Orthoses   (pt in nielsen brace L UE; sling is for comfort but encouraged pt to spend time without sling as pt is allowed ROM at elbow and wrist  used sling for gait activities)   General   Change In Medical/Functional Status (S)  pt now off iso/airborne precautions   Subjective   Subjective pt agreeable to participate in PT tx  pt frustrated t/o session due to 2 episodes of incontinence (first urine than small bowel mvmt)   Sit to Stand   Type of Assistance Needed Physical assistance;Verbal cues; Adaptive equipment   Physical Assistance Level 25% or less   Comment use of hurry cane RUE  performed several trials from , [facility], and standard chair in hallway  cues for utilizing momentum to assist with task, cues for reaching back wehn returning to sit   Sit to Stand CARE Score 3   Bed-Chair Transfer   Type of Assistance Needed Physical assistance; Adaptive equipment;Verbal cues   Physical Assistance Level 25% or less   Comment  < > [facility] transfers with cane RUE   Chair/Bed-to-Chair Transfer CARE Score 3   Walk 10 Feet   Type of Assistance Needed Verbal cues; Incidental touching; Adaptive equipment   Comment hurrycane   Walk 10 Feet CARE Score 4   Walk 50 Feet with Two Turns   Type of Assistance Needed Incidental touching;Verbal cues; Adaptive equipment   Comment hurrycane   Walk 50 Feet with Two Turns CARE Score 4   Ambulation   Distance Walked (feet) 50 ft  (x 1, 35 ft x 2)   Assist Device Cane  (personal HurryCane)   Gait Pattern Antalgic; Inconsistant Barbra;Decreased foot clearance; Forward Flexion; Wide NAZIA   Limitations Noted In Balance; Endurance; Heel Strike;Posture;Strength   Provided Assistance with: Balance   Walk Assist Level Contact Guard Findings practiced ambulating from [facility] to standard chair placed 35 ft away to incorporate 180 degree turning  donned LUE sling for comfrt for gait activities   Does the patient walk? 2  Yes   Toilet Transfer   Type of Assistance Needed Physical assistance;Verbal cues; Adaptive equipment   Physical Assistance Level 25% or less   Comment from [facility] x several trials during session with use of RUE   Toilet Transfer CARE Score 3   Therapeutic Interventions   Strengthening seated: hip add ball squeezes, knee ext and hip flexion with 1 5lb weights, ankle pf/df, all BLE ther ex 3 sets of 10 with rest breaks provided PRN   Other Comments   Comments pt stating the bottom of her Enzo Arreola is wearing down  provided her with info on how to get new one on [employer]   Assessment   Treatment Assessment Pt participated in 90 minute PT tx session focusing on BLE strengthening exercises and functional mobility training  Pt needed to be toileted twice in session with one epidose of slight urine incontinence and once episode of small bowel movement when ambulating back to room  RN assisted in changing sacral wound dressing post incontinent episode  Pt requests L forearm be held up during gait, so donned sling for comfort and then removed at end of session  pt will continue to benefit from skilled PT interventions to address deficits, reduce fall risk, and maximize functional independence  Problem List Decreased strength;Decreased range of motion;Decreased endurance; Impaired balance;Decreased mobility;Obesity; Decreased skin integrity;Orthopedic restrictions;Pain   Barriers to Discharge Inaccessible home environment   Plan   Treatment/Interventions Functional transfer training;LE strengthening/ROM; Elevations; Therapeutic exercise; Endurance training;Patient/family training;Bed mobility;Gait training;Equipment eval/education   Progress Progressing toward goals   PT Therapy Minutes   PT Time In 1200   PT Time Out 1330   PT Total Time (minutes) 90   PT Mode of treatment - Individual (minutes) 90   PT Mode of treatment - Concurrent (minutes) 0   PT Mode of treatment - Group (minutes) 0   PT Mode of treatment - Co-treat (minutes) 0   PT Mode of Treatment - Total time(minutes) 90 minutes   PT Cumulative Minutes 810   Therapy Time missed   Time missed?  No

## 2023-05-23 NOTE — PLAN OF CARE
Problem: PAIN - ADULT  Goal: Verbalizes/displays adequate comfort level or baseline comfort level  Description: Interventions:  - Encourage patient to monitor pain and request assistance  - Assess pain using appropriate pain scale  - Administer analgesics based on type and severity of pain and evaluate response  - Implement non-pharmacological measures as appropriate and evaluate response  - Consider cultural and social influences on pain and pain management  - Notify physician/advanced practitioner if interventions unsuccessful or patient reports new pain  Outcome: Progressing     Problem: INFECTION - ADULT  Goal: Absence or prevention of progression during hospitalization  Description: INTERVENTIONS:  - Assess and monitor for signs and symptoms of infection  - Monitor lab/diagnostic results  - Monitor all insertion sites, i e  indwelling lines, tubes, and drains  - Monitor endotracheal if appropriate and nasal secretions for changes in amount and color  - [place] appropriate cooling/warming therapies per order  - Administer medications as ordered  - Instruct and encourage patient and family to use good hand hygiene technique  - Identify and instruct in appropriate isolation precautions for identified infection/condition  Outcome: Progressing  Goal: Absence of fever/infection during neutropenic period  Description: INTERVENTIONS:  - Monitor WBC    Outcome: Progressing     Problem: SAFETY ADULT  Goal: Patient will remain free of falls  Description: INTERVENTIONS:  - Educate patient/family on patient safety including physical limitations  - Instruct patient to call for assistance with activity   - Consult OT/PT to assist with strengthening/mobility   - Keep Call bell within reach  - Keep bed low and locked with side rails adjusted as appropriate  - Keep care items and personal belongings within reach  - Initiate and maintain comfort rounds  - Make Fall Risk Sign visible to staff  - Offer Toileting every 2 Hours, in advance of need  - Initiate/Maintain bed/chair alarm  - Obtain necessary fall risk management equipment: nonskid socks  - Apply yellow socks and bracelet for high fall risk patients  - Consider moving patient to room near nurses station  Outcome: Progressing  Goal: Maintain or return to baseline ADL function  Description: INTERVENTIONS:  -  Assess patient's ability to carry out ADLs; assess patient's baseline for ADL function and identify physical deficits which impact ability to perform ADLs (bathing, care of mouth/teeth, toileting, grooming, dressing, etc )  - Assess/evaluate cause of self-care deficits   - Assess range of motion  - Assess patient's mobility; develop plan if impaired  - Assess patient's need for assistive devices and provide as appropriate  - Encourage maximum independence but intervene and supervise when necessary  - Involve family in performance of ADLs  - Assess for home care needs following discharge   - Consider OT consult to assist with ADL evaluation and planning for discharge  - Provide patient education as appropriate  Outcome: Progressing  Goal: Maintains/Returns to pre admission functional level  Description: INTERVENTIONS:  - Perform BMAT or MOVE assessment daily    - Set and communicate daily mobility goal to care team and patient/family/caregiver  - Collaborate with rehabilitation services on mobility goals if consulted  - Perform Range of Motion 3 times a day  - Reposition patient every 2 hours    - Dangle patient 3 times a day  - Stand patient 3 times a day  - Ambulate patient 3 times a day  - Out of bed to chair 3 times a day   - Out of bed for meals 3 times a day  - Out of bed for toileting  - Record patient progress and toleration of activity level   Outcome: Progressing     Problem: DISCHARGE PLANNING  Goal: Discharge to home or other facility with appropriate resources  Description: INTERVENTIONS:  - Identify barriers to discharge w/patient and caregiver  - Arrange for needed discharge resources and transportation as appropriate  - Identify discharge learning needs (meds, wound care, etc )  - Arrange for interpretive services to assist at discharge as needed  - Refer to Case Management Department for coordinating discharge planning if the patient needs post-hospital services based on physician/advanced practitioner order or complex needs related to functional status, cognitive ability, or social support system  Outcome: Progressing     Problem: MOBILITY - ADULT  Goal: Maintain or return to baseline ADL function  Description: INTERVENTIONS:  -  Assess patient's ability to carry out ADLs; assess patient's baseline for ADL function and identify physical deficits which impact ability to perform ADLs (bathing, care of mouth/teeth, toileting, grooming, dressing, etc )  - Assess/evaluate cause of self-care deficits   - Assess range of motion  - Assess patient's mobility; develop plan if impaired  - Assess patient's need for assistive devices and provide as appropriate  - Encourage maximum independence but intervene and supervise when necessary  - Involve family in performance of ADLs  - Assess for home care needs following discharge   - Consider OT consult to assist with ADL evaluation and planning for discharge  - Provide patient education as appropriate  Outcome: Progressing  Goal: Maintains/Returns to pre admission functional level  Description: INTERVENTIONS:  - Perform BMAT or MOVE assessment daily    - Set and communicate daily mobility goal to care team and patient/family/caregiver  - Collaborate with rehabilitation services on mobility goals if consulted  - Perform Range of Motion 3 times a day  - Reposition patient every 2 hours    - Dangle patient 3 times a day  - Stand patient 3 times a day  - Ambulate patient 3 times a day  - Out of bed to chair 3 times a day   - Out of bed for meals 3 times a day  - Out of bed for toileting  - Record patient progress and toleration of activity level   Outcome: Progressing     Problem: Nutrition/Hydration-ADULT  Goal: Nutrient/Hydration intake appropriate for improving, restoring or maintaining nutritional needs  Description: Monitor and assess patient's nutrition/hydration status for malnutrition  Collaborate with interdisciplinary team and initiate plan and interventions as ordered  Monitor patient's weight and dietary intake as ordered or per policy  Utilize nutrition screening tool and intervene as necessary  Determine patient's food preferences and provide high-protein, high-caloric foods as appropriate       INTERVENTIONS:  - Monitor oral intake, urinary output, labs, and treatment plans  - Assess nutrition and hydration status and recommend course of action  - Evaluate amount of meals eaten  - Assist patient with eating if necessary   - Allow adequate time for meals  - Recommend/ encourage appropriate diets, oral nutritional supplements, and vitamin/mineral supplements  - Order, calculate, and assess calorie counts as needed  - Recommend, monitor, and adjust tube feedings and TPN/PPN based on assessed needs  - Assess need for intravenous fluids  - Provide specific nutrition/hydration education as appropriate  - Include patient/family/caregiver in decisions related to nutrition  Outcome: Progressing     Problem: Prexisting or High Potential for Compromised Skin Integrity  Goal: Skin integrity is maintained or improved  Description: INTERVENTIONS:  - Identify patients at risk for skin breakdown  - Assess and monitor skin integrity  - Assess and monitor nutrition and hydration status  - Monitor labs   - Assess for incontinence   - Turn and reposition patient  - Assist with mobility/ambulation  - Relieve pressure over bony prominences  - Avoid friction and shearing  - Provide appropriate hygiene as needed including keeping skin clean and dry  - Evaluate need for skin moisturizer/barrier cream  - Collaborate with interdisciplinary team   - Patient/family teaching  - Consider wound care consult   Outcome: Progressing

## 2023-05-23 NOTE — PROGRESS NOTES
PM&R PROGRESS NOTE:  Milagro Walter 68 y o  female MRN: [id number]  Unit/Bed#: -01 Encounter: [id number]        Rehab Diagnosis: Impairment of mobility, safety and Activities of Daily Living (ADLs) due to Major Multiple Trauma:  14 9  Other Multiple Trauma Multiple rib fractures with acute pain, improved acute hypoxic respiratory failure, and L humeral fracture NWB in coaptation splint       SUBJECTIVE: Patient seen face-to-face  No acute issues  Feeling well today  Progressing in rehabilitation and very happy she is off COVID precautions  Brace is slightly uncomfortable therefore [employer] being called to adjust     ASSESSMENT: Stable, progressing      PLAN:    Rehabilitation  • Functional deficits: Left upper extremity weakness, impaired mobility, impaired self-care, impaired endurance, impaired strength  • Continue current rehabilitation plan of care to maximize function  I personally attended, reviewed, and discussed medical and functional updates in team conference today  Please refer to advance care planning note for details    • Expected Discharge: Discharge Thursday, 6/1/2023 Home health care RN, PT, OT  o Family training with her daughter Gayla Oviedo she will be living with post discharge      Current function:  Physical Therapy Occupational Therapy Speech Therapy   Weight Bearing Status: Non-weight bearing (L UE)  Transfers: Minimal Assistance, Moderate Assistance (CS-CG from higher surfaces, min-mod from lower surfaces)  Bed Mobility: Maximum Assistance, Supervision (max sit to supine, S supine to sit with bedrail)  Amulation Distance (ft): 40 feet  Ambulation: Incidental Touching, Supervision  Assistive Device for Ambulation: [address] [facility]: Wheelchair  Discharge Recommendations: [facility]:  [address] [facility] with[de-identified] First Floor Setup, Family Support, 24 Hour Assisteance, 24 Hour Supervision, Home Physical Therapy   Eating: Independent  Grooming: Minimal Assistance  Bathing: Moderate Assistance  Bathing: Moderate Assistance  Upper Body Dressing: Maximum Assistance  Lower Body Dressing: Minimal Assistance  Toileting: Minimal Assistance  Toilet Transfer: Incidental Touching  Cognition: Within Defined Limits  Orientation: Person, Place, Time, Situation                 DVT prophylaxis  • Managed on warfarin  • Goal INR 2 5-3 5  • Last INR = 2 68    Bladder plan  • Continent  • Limit pure wick usage if possible    Bowel plan  • Continent      * Humerus fracture  Assessment & Plan  2/2 mechanical fall  Proximal humerus shaft spiral fracture, displaced and angulated  Ortho attempted closed reduction  Ultimately placed in coaptation splint on 4/27 and managed non-operatively  NWB LUE  Pain Control as detailed below  No Vitamin D level in chart or unmerged chart recently   - At home was on daily Vitamin D supplement 1000 units - restarted   - 5/14 Vitamind D level is 50 7   Due for 2 week follow-up  Imaging: Personally reviewed  Redemonstration of oblique spiral fracture of the shaft of the humerus with apex medial angulation  No glenohumeral dislocation  Appreciate orthopedics evaluating patient 5/15/2023  Replaced her splint with a Salgado brace  Cleared by orthopedics to provide range of motion to elbow and wrist without restriction as tolerated  Follow-up with orthopedics in 4 weeks around 6/15/2023    Ventricular arrhythmia  Assessment & Plan  On 5/2 developed Afib with RR which degenerated to VT then VF  She received shock from her AICD ([employer], Single Chamber ICD)  Seen by Cards who adjusted her device, and recommended restarting home Metoprolol and Amio load  Was also on digoxin  - Toprol dose adjusted and digoxin discontinued after developing junctional bradycardia later in stay  Here: Toprol XL 25mg daily, Amio 200mg daily starting tomorrow for 28 days  Monitor cardiac status during therapies   Monitor lytes   Keep K > 4, Mag > 2  Will plan for outpatient f/u with EP    Difficult intravenous access  Assessment & Plan  Midline placed by IR on 5/15/23  Need for blood draws frequently to follow electrolytes, INR  Need for IV access given her recent ventricular arrhythmia and AICD firing, importance of optimizing electrolytes, current COVID 19 virus  Acute COVID-19  Assessment & Plan  Diagnosed on screening test on 5/13  Minimally symptomatic (does have a cough)  Afebrile and on RA  10 days isolation per protocol through 5/22/2023 (last day)  Off precautions 5/23/2023  Monitor respiratory status   - Has chronic issues with urinary urgency/frequency  - Added Q4hr timed voids so we can help her get to the commode in a timely manner    -Stable and has not required oxygen   -Compliant with utilizing incentive spirometer    CHAS (obstructive sleep apnea)  Assessment & Plan  Does not use CPAP at home  Consider Noc Ox overnight while here  Class 2 obesity in adult  Assessment & Plan  Nutrition consulted  Counseling    Osteoporosis with current pathological fracture with routine healing  Assessment & Plan  Managed by [address] [facility]  History of L5 pathologic fracture  Has been on Alendronate once weekly since 2017  Would confirm with her what day she normally takes it prior to restarting  Will confirm that it is ok to restart with Orthopedics first, as some surgeons prefer to wait 6 weeks after acute fracture prior to restarting    - Per Dr Treesa Ruiz ok to resume at anytime  - She normally takes it on Thursdays  Would complete through 6/2023  Per last Endocrine note in 6/2022, they were going to continue for 1 more year and then stop  She was due for DXA this May  Asthma  Assessment & Plan  No acute exacerbation  Home: Singulair 10mg QHS  Here: Same, PRN albuterol  No acute exacerbation  Monitor respiratory status    Pressure injury of buttock, unstageable ([facility] [address] )  Assessment & Plan  Present on admission  Much improved -personally assessed    Wound care to assess later today additionally    Wound care following while at [address] [place] [address] Place local wound care as follows:  • Evolving DTI to the b/l sacro-buttocks  Wound is POA to ARC unit  Wound continues to evolve  Noted full thickness skin loss with devitalized tissue  ? Patient desires to defer bedside debridement at this time, she states she doesn't think she will be able to be on her side for that long due to pain  ? Continue with Triad paste and foam dressing  ? Pressure relief   ? P-500 mattress  • Candidiasis rash noted to the b/l medial thighs and perineum, no open wound or active bleeding at time of assessment  ? Will recommend pascual anti-fungal cream  • ITD with candidasis to the b/l abdominal pannus with partial thickness skin loss  ? Will recommend InterDry sheets   • No s/s of infection present  • Will recommend to continue with preventative nursing skin care measures  • Nutrition is following along   • Patient verbalized understanding of plan of care  • [place] text wound care team with questions or concerns    • Routine wound care follow-up while admitted   • Follow-up with the [facility] wound care center as an outpatient, ambulatory referral placed      Frequent offloading in chair and in bed  P500 mattress  Optimize nutrition    Atrial fibrillation with RVR ([address] )  Assessment & Plan  Initially per AICD interrogation with Afib RVR that degenerated into VT/VF  Unclear burden  Here: Toprol XL 25mg daily, Fully anticoagulated on warfarin (See mechanical AV), amiodarone 200mg daily  Monitor and adjust as appropriate  Currently examines in NSR  IM consulted to assist with management      Outpatient f/u with EP    Abnormal CT scan  Assessment & Plan  - 4/27 CT CAP:           - Incidentally noted is a incarcerated/partially obstructed periumbilic ventral hernia, (image 146 series 9)          - Septated cyst anterior aspect of the right kidney, consider evaluation with ultrasound or MRI for further characterization (image 136 series 9)          - Dilated ascending aorta measuring 4 6 cm, evaluation with cardiothoracic surgery on nonemergent basis  - Follow up with PCP and CT surgery outpatient for findings  - Patient informed of abnormal results on 5/11: she is aware of ventral hernia but not aortic aneurysm or renal cyst         Hyponatremia  Assessment & Plan  Resolved  Na = 138    In acute care, [address] on 5/9  [place] by Trauma to be 2/2 poor PO intake and furosemide  Started on sodium tabs (weaning down)  Monitor and adjust as appropriate  IM consulted and assisting with management  Bradycardia  Assessment & Plan  Developed junctional bradycardia in hospital after restarting home Toprol XL and digoxin  - Cards stopped digoxin  - Toprol XL decreased to 25mg daily  Monitor  Type 2 diabetes mellitus ([facility] [address] )  Assessment & Plan    Home: Sitagliptin-Metformin 50-50mg  Here: Discontinue Levemir due to hypoglycemia, continue sliding scale insulin and diabetic diet  Diabetic Diet  Nutrition Consult  Attempt to resume oral meds here and get off insulin  IM consulted to assist with management  Outpatient f/u with PCP  Fracture of multiple ribs of right side  Assessment & Plan  R 8th and 9th rib possible fractures  Rib fracture protocol  Pain control with lidoderm patch  Incentive Spirometer  Monitor respiratory status  Laceration of right eyebrow  Assessment & Plan  Healing, monitor  Steri-Strips in place    H/O mechanical aortic valve replacement  Assessment & Plan  Managed on Coumadin  Goal INR 2 5-3 5  INR 2 68  IM consulted to assist with management of Coumadin dosing  Acute pain due to trauma  Assessment & Plan  R rib fractures, L humerus fracture  - Lidoderm patch for ribs  - Oxycodone 5-7 5mg PRN  - Robaxin 250mg Q6hr scheduled  - Gabapentin 100mg QHS (may be able to discontinue while here)  - Tylenol 975mg Q8hrs scheduled  APS assisted inpatient  Adjust as appropriate while here           Appreciate IM consultants "medical co-management  Labs, medications, and imaging personally reviewed  ROS:  A ten point review of systems was completed on 05/23/23 and pertinent positives are listed in subjective section  All other systems reviewed were negative  OBJECTIVE:   /56 (BP Location: Right arm)   Pulse 64   Temp (!) 97 4 °F (36 3 °C) (Oral)   Resp 18   Ht 4' 10\" (1 473 m)   Wt 92 9 kg (204 lb 12 9 oz) Comment: pt also has a splint on LUE  SpO2 95%   BMI 42 80 kg/m²       Physical Exam  Vitals and nursing note reviewed  Constitutional:       General: She is not in acute distress  HENT:      Head: Normocephalic and atraumatic  Nose: Nose normal       Mouth/Throat:      Mouth: Mucous membranes are moist    Eyes:      Conjunctiva/sclera: Conjunctivae normal    Cardiovascular:      Rate and Rhythm: Normal rate and regular rhythm  Pulses: Normal pulses  Pulmonary:      Effort: Pulmonary effort is normal       Breath sounds: Normal breath sounds  No wheezing or rales  Abdominal:      General: Bowel sounds are normal  There is no distension  Palpations: Abdomen is soft  Tenderness: There is no abdominal tenderness  Musculoskeletal:         General: Swelling present  Cervical back: Neck supple  Comments: Splint in place   Skin:     General: Skin is warm  Neurological:      Mental Status: She is alert and oriented to person, place, and time  Motor: Weakness present        Gait: Gait abnormal    Psychiatric:         Mood and Affect: Mood normal         Lab Results   Component Value Date    WBC 3 14 (L) 05/22/2023    HGB 10 9 (L) 05/22/2023    HCT 33 4 (L) 05/22/2023     (H) 05/22/2023     (L) 05/22/2023     Lab Results   Component Value Date    SODIUM 138 05/22/2023    K 4 3 05/22/2023     (H) 05/22/2023    CO2 28 05/22/2023    BUN 23 05/22/2023    CREATININE 0 59 (L) 05/22/2023    GLUC 73 05/22/2023    CALCIUM 8 6 05/22/2023     Lab Results   Component " Value Date    INR 2 68 (H) 05/22/2023    INR 3 16 (H) 05/21/2023    INR 4 20 (H) 05/20/2023    PROTIME 28 8 (H) 05/22/2023    PROTIME 32 7 (H) 05/21/2023    PROTIME 40 8 (H) 05/20/2023           Current Facility-Administered Medications:   •  acetaminophen (TYLENOL) tablet 975 mg, 975 mg, Oral, Q8H [address], Daisha Hopper MD, 975 mg at 05/23/23 6908  •  albuterol (PROVENTIL HFA,VENTOLIN HFA) inhaler 2 puff, 2 puff, Inhalation, Q4H PRN, Daisha Hopper MD  •  allopurinol (ZYLOPRIM) tablet 100 mg, 100 mg, Oral, Daily, Daisha Hopper MD, 100 mg at 05/23/23 0800  •  amiodarone tablet 200 mg, 200 mg, Oral, Daily With Breakfast, Daisha Hopper MD, 200 mg at 05/23/23 3625  •  atorvastatin (LIPITOR) tablet 40 mg, 40 mg, Oral, Daily With Carly Block MD, 40 mg at 05/22/23 1633  •  calcium carbonate (TUMS) chewable tablet 500 mg, 500 mg, Oral, TID PRN, Daisha Hopper MD  •  cholecalciferol (VITAMIN D3) tablet 1,000 Units, 1,000 Units, Oral, Daily, Daisha Hopper MD, 1,000 Units at 05/23/23 0800  •  citalopram (CeleXA) tablet 20 mg, 20 mg, Oral, Daily, Daisha Hopper MD, 20 mg at 05/23/23 0800  •  docusate sodium (COLACE) capsule 100 mg, 100 mg, Oral, BID, Daisha Hopper MD, 100 mg at 05/23/23 0801  •  furosemide (LASIX) tablet 40 mg, 40 mg, Oral, Daily, Daisha Hopper MD, 40 mg at 05/23/23 0800  •  gabapentin (NEURONTIN) capsule 100 mg, 100 mg, Oral, HS, Daisha Hopper MD, 100 mg at 05/22/23 2244  •  insulin lispro (HumaLOG) 100 units/mL subcutaneous injection 1-5 Units, 1-5 Units, Subcutaneous, TID AC **AND** Fingerstick Glucose (POCT), , , TID AC, Daisha Hopper MD  •  insulin lispro (HumaLOG) 100 units/mL subcutaneous injection 1-5 Units, 1-5 Units, Subcutaneous, HS, Daisha Hopper MD, 1 Units at 05/21/23 2209  •  lidocaine (LIDODERM) 5 % patch 1 patch, 1 patch, Topical, Daily, Daisha Hopper MD, 1 patch at 05/23/23 0801  •  methocarbamol (ROBAXIN) tablet 250 mg, 250 mg, Oral, Q6H [address], Daisha Hopper, MD, 250 mg at 05/23/23 0556  •  metoprolol succinate (TOPROL-XL) 24 hr tablet 25 mg, 25 mg, Oral, Daily, Jh Terrazas MD, 25 mg at 05/23/23 0800  •  moisture barrier miconazole 2% cream (aka LICO MOISTURE BARRIER ANTIFUNGAL CREAM), , Topical, BID, Luis Cai, JOSEPIHNE, 1 application  at 05/23/23 0805  •  montelukast (SINGULAIR) tablet 10 mg, 10 mg, Oral, HS, Jh Terrazas MD, 10 mg at 05/22/23 2244  •  naloxone (NARCAN) 0 04 mg/mL syringe 0 04 mg, 0 04 mg, Intravenous, Q1MIN PRN, Jh Terrazas MD  •  nystatin (MYCOSTATIN) powder, , Topical, BID, Amanda Pearl PA-C, 1 application   at 05/23/23 0805  •  ondansetron (ZOFRAN-ODT) dispersible tablet 4 mg, 4 mg, Oral, Q6H PRN, Jh Terrazas MD, 4 mg at 05/23/23 7421  •  oxybutynin (DITROPAN-XL) 24 hr tablet 5 mg, 5 mg, Oral, Daily, Jh Terrazas MD, 5 mg at 05/23/23 3927  •  oxyCODONE (ROXICODONE) IR tablet 5 mg, 5 mg, Oral, Q4H PRN, Jh Terrazas MD, 5 mg at 05/21/23 2217  •  oxyCODONE (ROXICODONE) split tablet 7 5 mg, 7 5 mg, Oral, Q4H PRN, Jh Terrazas MD, 7 5 mg at 05/22/23 2246  •  senna (SENOKOT) tablet 17 2 mg, 2 tablet, Oral, Daily, Jh Terrazas MD, 17 2 mg at 05/23/23 0800  •  sodium chloride tablet 1 g, 1 g, Oral, BID With Meals, Jh Terrazas MD, 1 g at 05/23/23 2782  •  warfarin (COUMADIN) tablet 2 mg, 2 mg, Oral, Daily (warfarin), Shahid Arcola, MAHADNP, 2 mg at 05/22/23 1940    Past Medical History:   Diagnosis Date   • Asthma    • Diabetes [facility])    • Hypertension        Patient Active Problem List    Diagnosis Date Noted   • Humerus fracture 04/27/2023   • Ventricular arrhythmia 05/04/2023   • Difficult intravenous access 05/15/2023   • Acute COVID-19 05/14/2023   • Atrial fibrillation with RVR ([facility] [address] ) 05/13/2023   • Pressure injury of buttock, unstageable ([facility] [address] ) 05/13/2023   • Asthma 05/13/2023   • Osteoporosis with current pathological fracture with routine healing 05/13/2023   • Class 2 obesity in adult 05/13/2023   • CHAS (obstructive sleep apnea) 05/13/2023   • Abnormal CT scan 05/10/2023   • Hyponatremia 05/09/2023   • Bradycardia 05/08/2023   • Type 2 diabetes mellitus ([facility] [address] ) 04/28/2023   • Fall 04/27/2023   • Acute pain due to trauma 04/27/2023   • H/O mechanical aortic valve replacement 04/27/2023   • Laceration of right eyebrow 04/27/2023   • Fracture of multiple ribs of right side 04/27/2023          Avelina Henley MD    I have spent a total time of 55 minutes on 05/23/23 in caring for this patient including Impressions, Counseling / Coordination of care, Documenting in the medical record and weekly team conference  ** Please Note:  voice to text software may have been used in the creation of this document   Although proof errors in transcription or interpretation are a potential of such software**

## 2023-05-23 NOTE — TEAM CONFERENCE
Acute RehabilitationTeam Conference Note  Date: 5/23/2023   Time: 10:38 AM       Patient Name:  Jie Back       Medical Record Number: [id number]   YOB: 1945  Sex: Female          Room/Bed:  [place] [address]/[facility] 451-01  Payor Info:  Payor: Kevin Angel / Plan: Leandra Stubbs PPO [address] / Product Type: Medicare PPO /      Admitting Diagnosis: Proximal humerus fracture [S42 209A]  COVID-19 [U07 1]   Admit Date/Time:  5/13/2023  6:28 PM  Admission Comments: No comment available     Primary Diagnosis:  Humerus fracture  Principal Problem: Humerus fracture    Patient Active Problem List    Diagnosis Date Noted   • Difficult intravenous access 05/15/2023   • Acute COVID-19 05/14/2023   • Atrial fibrillation with RVR ([facility] ) 05/13/2023   • Pressure injury of buttock, unstageable ([facility] ) 05/13/2023   • Asthma 05/13/2023   • Osteoporosis with current pathological fracture with routine healing 05/13/2023   • Class 2 obesity in adult 05/13/2023   • CHAS (obstructive sleep apnea) 05/13/2023   • Abnormal CT scan 05/10/2023   • Hyponatremia 05/09/2023   • Bradycardia 05/08/2023   • Ventricular arrhythmia 05/04/2023   • Type 2 diabetes mellitus ([facility] [address] ) 04/28/2023   • Fall 04/27/2023   • Humerus fracture 04/27/2023   • Acute pain due to trauma 04/27/2023   • H/O mechanical aortic valve replacement 04/27/2023   • Laceration of right eyebrow 04/27/2023   • Fracture of multiple ribs of right side 04/27/2023       Physical Therapy:    Weight Bearing Status: Non-weight bearing (L UE)  Transfers: Minimal Assistance, Moderate Assistance (CS-CG from higher surfaces, min-mod from lower surfaces)  Bed Mobility: Maximum Assistance, Supervision (max sit to supine, S supine to sit with bedrail)  Amulation Distance (ft): 40 feet  Ambulation: Incidental Touching, Supervision  Assistive Device for Ambulation: [facility]: Wheelchair  Discharge Recommendations: Home with:  [address] [facility] [place] with[de-identified] First Floor Setup, Family Support, 24 Hour Assisteance, 24 Hour Supervision, Home Physical Therapy      5/22/23  Pt is demonstrating slow but consistent progress in PT mainly due to ongoing L UE NWB precaution, pt's body habitus, LE/UE strength and ROM deficits, gait dysfunction and dec endurance  Pt will benefit from additional skilled PT intervention to improve overall functions, dec caregiver burden and dec risk for falls  Therapy to contact daughter and schedule family training for pt will now be off isolation/contact precaution on 5/23/23  Occupational Therapy:  Eating: Independent  Grooming: Minimal Assistance  Bathing: Moderate Assistance  Bathing: Moderate Assistance  Upper Body Dressing: Maximum Assistance  Lower Body Dressing: Minimal Assistance  Toileting: Minimal Assistance  Toilet Transfer: Incidental Touching  Cognition: Within Defined Limits  Orientation: Person, Place, Time, Situation  Discharge Recommendations: Home with:  [address] with[de-identified] Family Support, First Floor Setup, Home Occupational Therapy       Pt continues to present with impairments in activity tolerance, endurance, standing balance/tolerance, UE strength, and UE ROM  Additional functional barriers include fatigue, pain, WBS , decreased caregiver support, risk for falls, and home environment  Pt is functioning at overall Mod A for ADLs and CGA-Min A for fxnl mobility w/ hurry cane vs HW  Pt will continue to benefit from skilled OT services to address above mentioned barriers and maximize functional independence in baseline areas of occupation  OT D/C recommendation is for reteam, this week plan to scheduled family observation/training  w/ pt's daughter(s) and solidify DME needs  Speech Therapy:           No notes on file    Nursing Notes:  Appetite: Good  Diet Type: Diabetic                      Diet Patient/Family Education Complete: No    Type of Wound (LDA):  Wound (sacrum DTI (Triad paste), pannus (Interdry), groin/labia Jacqueline Poplin)) Type of Wound Patient/Family Education: No  Bladder: Incontinent     Bladder Patient/Family Education: No  Bowel: Continent     Bowel Patient/Family Education: No  Pain Location/Orientation: Orientation: Left, Location: Arm  Pain Score: 5  Pain 2  Pain Score 2: 6  Pain Location/Orientation 2: Location: Buttocks  Hospital Pain Intervention(s) 2: Repositioned                    Hospital Pain Intervention(s): Repositioned, Relaxation technique  Pain Patient/Family Education: Yes       Lt proximal humerus fracture NWB to the Lt UE, Sling to the Lt UE, Follow-up with Orthopedics in 4 weeks  Rt 8-9 rib fx Encourage IS, O2 as needed  Rt eyebrow laceration, Monitor lacerations, Local wound care  DM type 2, HA1C 6 4, Home: Janumet  2x daily, Here: Levemir 5U at bedtime/SSI, Fasting blood sugar on the lower end this AM  Will DC Levemir, Will monitor and add back medications as needed  History of mechanical AVR, Goal INR 2 5 - 3 5  INR pending - pt stuck multiple times this AM without success  IR consult for midline placement  Continue Coumadin 2 5mg daily  Pt takes 2 5mg Wednesday and Saturday and 5mg all other days of the week at home  Hyponatremia Na 132, Likely multifactorial  Ventricular arrhythmia  One episode 5/2/23 and pt received a shock from ICD, Continue metoprolol and amiodarone, Continue Coumadin  Bradycardia, Diltiazem discontinued and metoprolol dose lowered  She will need outpt follow-up with Cardiology  [facility]  COVID-19 Tested positive 5/13/23, Pt has a cough which she reports is chronic  Contact and airborne precautions for 10 days  Buttock wound/labia wound, Followed by wound care  5/16- Pt is AAOx4  Pt is sometimes continent, sometimes incontinent of bladder, pt is continent of bowel  Pt's pain is controlled with current pain medication regimen  Pt requires alarms for safety, has been compliant with use of call bell   Pt seen by wound care today, new orders for sacrum, pannus, groin/labia wound care  Contact and airborne precautions for Covid- 19 complete 5/23  This week we will encourage independence with ADLs  We will monitor labs and vital signs  We will educate pt/family about repositioning to prevent skin breakdown  We will assist with repositioning and perform routine skin checks  We will monitor for adequate pain control  We will monitor for constipation and medicate as ordered  We will increase safety awareness and keep pt free from falls  Case Management:     Discharge Planning  Living Arrangements: Lives w/ Family members  Support Systems: Daughter, Family members  Type of Current Residence: Private residence  Current [address] : No  5/23- Pt reports she was independent with ADLs IADLs and driving prior to admission  Pt lives in an apartment by herself but reports she spends most of her time with her [address] in his home that is 3 stories, 2 LIZ  Pt reports she has been to outpatient therapies in the past and has used [address] but does not recall where or which agencies  PCP is Stephanie Diggs, preferred pharmacy is [facility]  Is the patient actively participating in therapies? yes  List any modifications to the treatment plan: None    Barriers Interventions   Morbid obesity Education nutrition   Body habitus Compensatory strategies   Skin integrity Skin checks   Diabetes Education   Pain Medication   Anxiety/depression Monitoring, supportive counseling   LIZ Stair training   Brace management Ortho tech to educate     Is the patient making expected progress toward goals?  yes  List any update or changes to goals: None    Medical Goals: Patient will be medically stable for discharge to [facility] upon completion of rehab program and Patient will be able to manage medical conditions and comorbid conditions with medications and follow up upon completion of rehab program    Weekly Team Goals:   Rehab Team Goals  ADL Team Goal: Patient will require assist with ADLs with least restrictive device upon completion of rehab program  Transfer Team Goal: Patient will be independent with transfers with least restrictive device upon completion of rehab program  Locomotion Team Goal: Patient will be independent with locomotion with least restrictive device upon completion of rehab program (household distance)    Discussion: Pt participating in therapies and making progress rehab program  Pt functioning mod/min for lower surfaces, using cane  Mod max assist for ADLs  Family training to occur with daughter Thursday 5/25  Team recommending home therapy RN PT OT  Anticipated Discharge Date:  D/c Thursday 6/1 with home therapies   [facility] Team Members Present: The following team members are supervising care for this patient and were present during this Weekly Team Conference      Physician: Dr Masha Ozuna MD  : Roldan Saxena MSW  Registered Nurse: Sarah Tran, MIRANDA  Physical Therapist: Phani Hidalgo DPT  Occupational Therapist: Velia Bess MS, OTR/L, CBIS  Speech Therapist:

## 2023-05-23 NOTE — PROGRESS NOTES
Internal Medicine Progress Note  Patient: Ni Quiroz  Age/sex: 68 y o  female  Medical Record #: [id number]      ASSESSMENT/PLAN: (Interval History)  Ni Quiroz is seen and examined and management for following issues:    Lt proximal humerus fracture  • NWB to the LUE  • Immobilizer placed to LUE by orthopedics  • Pain control and therapy per PMR  • Follow-up with Orthopedics in 4 weeks  Rt 8-9 rib fx  • Pain control per PMR  • Encourage IS   • O2 as needed  Rt eyebrow laceration  • Monitor lacerations  • Local wound care  • stable     DM type 2  • HA1C 6 4  • Home: Janumet  2x daily  • Here: SSI  • Will monitor and add back medications as needed  • BS overall stable     History of mechanical AVR  • Goal INR 2 5 - 3 5  • INR 2 68  • Repeat INR thursday  • Continue coumadin at 2mg daily (was elevated on 2 5mg daily)  • Pt takes 2 5mg Wednesday and Saturday and 5mg all other days of the week at home     Hyponatremia  • Na 138  • Resolved  Ventricular arrhythmia  • One episode 5/2/23 and pt received a shock from ICD  • Continue metoprolol and amiodarone  • Continue Coumadin  • Monitor bmp     Bradycardia  • Diltiazem discontinued and metoprolol dose lowered  • She will need outpt follow-up with Cardiology  She follows with [facility]  • HR stable     COVID-19  • Tested positive 5/13/23  • Pt has a cough which she reports is chronic  • Contact and airborne precautions for 10 days  • Off isolation    Buttock wound/labia wound  · Followed by wound care  · Frequent position changes/specialized mattress  · Will monitor  · Overall improvement    DC planning: TBD  The above assessment and plan was reviewed and updated as determined by my evaluation of the patient on 5/23/2023      Labs:   Results from last 7 days   Lab Units 05/22/23  0613   WBC Thousand/uL 3 14*   HEMOGLOBIN g/dL 10 9*   HEMATOCRIT % 33 4*   PLATELETS Thousands/uL 142*     Results from last 7 days   Lab Units 05/22/23  0613   SODIUM mmol/L 138   POTASSIUM mmol/L 4 3   CHLORIDE mmol/L 110*   CO2 mmol/L 28   BUN mg/dL 23   CREATININE mg/dL 0 59*   CALCIUM mg/dL 8 6         Results from last 7 days   Lab Units 05/22/23  0613 05/21/23  0617   INR  2 68* 3 16*     Results from last 7 days   Lab Units 05/23/23  0620 05/22/23 2006 05/22/23  1632   POC GLUCOSE mg/dl 82 130 114       Review of Scheduled Meds:  Current Facility-Administered Medications   Medication Dose Route Frequency Provider Last Rate   • acetaminophen  975 mg Oral [facility] Silvina Centeno MD     • albuterol  2 puff Inhalation Q4H PRN Silvina Centeno MD     • allopurinol  100 mg Oral Daily Silvina Centeno MD     • amiodarone  200 mg Oral Daily With Breakfast Silvina Centeno MD     • atorvastatin  40 mg Oral Daily With Leyda Allen MD     • calcium carbonate  500 mg Oral TID PRN Silvina Centeno MD     • cholecalciferol  1,000 Units Oral Daily Silvina Centeno MD     • citalopram  20 mg Oral Daily Silvina Centeno MD     • docusate sodium  100 mg Oral BID Silvina Centeno MD     • furosemide  40 mg Oral Daily Silvina Centeno MD     • gabapentin  100 mg Oral HS Silvina Centeno MD     • insulin lispro  1-5 Units Subcutaneous TID [facility] Silvina Centeno MD     • insulin lispro  1-5 Units Subcutaneous HS Silivna Centeno MD     • lidocaine  1 patch Topical Daily Silvina Centeno MD     • methocarbamol  250 mg Oral Q6H [address] Silvina Centeno MD     • metoprolol succinate  25 mg Oral Daily Silvina Centeno MD     • LICO ANTIFUNGAL   Topical BID JOSEPHINE Gallardo     • montelukast  10 mg Oral HS Silvina Centeno MD     • naloxone  0 04 mg Intravenous Q1MIN PRN Silvina Centeno MD     • nystatin   Topical BID Amanda Pearl PA-C     • ondansetron  4 mg Oral Q6H PRN Silvina Centeno MD     • oxybutynin  5 mg Oral Daily Silvina Centeno MD     • oxyCODONE  5 mg Oral Q4H PRN Silvina Centeno MD     • oxyCODONE  7 5 mg Oral Q4H PRN Silvina Centeno MD     • raven Nieves tablet Oral Daily Magdaleno Cordova MD     • sodium chloride  1 g Oral BID With Meals Magdaleno Cordova MD     • warfarin  2 mg Oral Daily (warfarin) JOSEPHINE Alvarado         Subjective/ HPI: Patient seen and examined  Patients overnight issues or events were reviewed with nursing or staff during rounds or morning huddle session  New or overnight issues include the following:     Pt seen in her room  Overall improvement noted, she is happy about being off of precautions  ROS:   A 10 point ROS was performed; negative except as noted above         Imaging:     XR humerus left   Final Result by Arjun Monahan MD (05/15 1613)   Oblique spiral fracture of the shaft of the humerus with apex medial angulation   No glenohumeral dislocation         Workstation performed: [id number]         XR shoulder 2+ vw left   Final Result by Arjun Monahan MD (05/15 1614)      Oblique spiral fracture of the proximal humeral shaft with stable alignment      Workstation performed: [id number]             *Labs /Radiology studies Reviewed  *Medications  reviewed and reconciled as needed  *Please refer to order section for additional ordered labs studies  *Case discussed with primary attending during morning huddle case rounds    Physical Examination:  Vitals:   Vitals:    05/22/23 1430 05/22/23 2037 05/23/23 0618 05/23/23 0754   BP: 147/70 117/56 131/60 121/56   BP Location: Right arm Right arm Right arm Right arm   Pulse: 73 64 62 64   Resp: 16 16 18 18   Temp: 98 1 °F (36 7 °C) 98 4 °F (36 9 °C) (!) 97 4 °F (36 3 °C)    TempSrc: Oral Oral Oral    SpO2: 95% 97% 95%    Weight:       Height:         GEN: NAD; pleasant  NEURO: Alert and oriented x4; appropriate  HEENT: Pupils are equal/reactive; normocephalic, face is symmetrical, hearing intact  CV: S1 S2 regular, +audible click; no murmur/rub/gallops, 1/4 pedal pulses, trace b/l edema present; LUE hand edema trace  RESP: Lungs are clear bilaterally, no wheezes rales or rhonchi, on room air, no distress, respirations are easy and non labored; mild [place]  GI: Abdomen is obese, soft, non tender, +BS x4; non distended  : Voiding without issues  MUSC: Moves all extremities except LUE NWB, Lt UE in splint  SKIN: pink, warm, poor turgor, eyebrow laceration stable; sacral wound improved, fungal rash improved       The above physical exam was reviewed and updated as determined by my evaluation of the patient on 5/23/2023  Invasive Devices     Peripheral Intravenous Line  Duration           Long-Dwell Peripheral IV (Midline) 23/44/09 Right Cephalic Vein 7 days                   VTE Pharmacologic Prophylaxis: Warfarin (Coumadin)  Code Status: Level 1 - Full Code  Current Length of Stay: 10 day(s)      Total time spent:  30 minutes with more than 50% spent counseling/coordinating care  Counseling includes discussion with patient re: progress  and discussion with patient of his/her current medical state/information  Coordination of patient's care was performed in conjunction with primary service  Time invested included review of patient's labs, vitals, and management of their comorbidities with continued monitoring  In addition, this patient was discussed with medical team including physician and advanced extenders  The care of the patient was extensively discussed and appropriate treatment plan was formulated unique for this patient  Medical decision making for the day was made by supervising physician unless otherwise noted in their attestation statement  ** Please Note:  voice to text software may have been used in the creation of this document   Although proof errors in transcription or interpretation are a potential of such software**

## 2023-05-23 NOTE — RESTORATIVE TECHNICIAN NOTE
"       Restorative Technician Note      Patient Name: Wilbert Cehry     Restorative Tech Visit Date: 05/23/23  Note Type: Bracing, Follow-up fitting      Pt in the wheelchair therefore able to get a better look at her Salgado brace for the left arm  Did trim both pieces of the brace (inner/outer) with patient stating the brace did feel better  Informed the patient to let nursing know if she has any further issues with the brace  Please call Mobility Coordinator at ext  [phone] or on Tiger text \" SLB-PT-Restorative Tech\" role in regards to bracing instruction and/or adjustment      [employer],       "

## 2023-05-23 NOTE — PLAN OF CARE
Problem: PAIN - ADULT  Goal: Verbalizes/displays adequate comfort level or baseline comfort level  Description: Interventions:  - Encourage patient to monitor pain and request assistance  - Assess pain using appropriate pain scale  - Administer analgesics based on type and severity of pain and evaluate response  - Implement non-pharmacological measures as appropriate and evaluate response  - Consider cultural and social influences on pain and pain management  - Notify physician/advanced practitioner if interventions unsuccessful or patient reports new pain  Outcome: Progressing     Problem: INFECTION - ADULT  Goal: Absence or prevention of progression during hospitalization  Description: INTERVENTIONS:  - Assess and monitor for signs and symptoms of infection  - Monitor lab/diagnostic results  - Monitor all insertion sites, i e  indwelling lines, tubes, and drains  - Monitor endotracheal if appropriate and nasal secretions for changes in amount and color  - [place] appropriate cooling/warming therapies per order  - Administer medications as ordered  - Instruct and encourage patient and family to use good hand hygiene technique  - Identify and instruct in appropriate isolation precautions for identified infection/condition  Outcome: Progressing  Goal: Absence of fever/infection during neutropenic period  Description: INTERVENTIONS:  - Monitor WBC    Outcome: Progressing     Problem: SAFETY ADULT  Goal: Patient will remain free of falls  Description: INTERVENTIONS:  - Educate patient/family on patient safety including physical limitations  - Instruct patient to call for assistance with activity   - Consult OT/PT to assist with strengthening/mobility   - Keep Call bell within reach  - Keep bed low and locked with side rails adjusted as appropriate  - Keep care items and personal belongings within reach  - Initiate and maintain comfort rounds  - Make Fall Risk Sign visible to staff  - Offer Toileting every 2 Hours, in advance of need  - Initiate/Maintain bed/ chair alarm  - Obtain necessary fall risk management equipment: nonskid footwear  - Apply yellow socks and bracelet for high fall risk patients  - Consider moving patient to room near nurses station  Outcome: Progressing  Goal: Maintain or return to baseline ADL function  Description: INTERVENTIONS:  -  Assess patient's ability to carry out ADLs; assess patient's baseline for ADL function and identify physical deficits which impact ability to perform ADLs (bathing, care of mouth/teeth, toileting, grooming, dressing, etc )  - Assess/evaluate cause of self-care deficits   - Assess range of motion  - Assess patient's mobility; develop plan if impaired  - Assess patient's need for assistive devices and provide as appropriate  - Encourage maximum independence but intervene and supervise when necessary  - Involve family in performance of ADLs  - Assess for home care needs following discharge   - Consider OT consult to assist with ADL evaluation and planning for discharge  - Provide patient education as appropriate  Outcome: Progressing  Goal: Maintains/Returns to pre admission functional level  Description: INTERVENTIONS:  - Perform BMAT or MOVE assessment daily    - Set and communicate daily mobility goal to care team and patient/family/caregiver  - Collaborate with rehabilitation services on mobility goals if consulted  - Perform Range of Motion 3 times a day  - Reposition patient every 2 hours    - Dangle patient 3 times a day  - Stand patient 3 times a day  - Ambulate patient 3 times a day  - Out of bed to chair 3 times a day   - Out of bed for meals 3 times a day  - Out of bed for toileting  - Record patient progress and toleration of activity level   Outcome: Progressing     Problem: DISCHARGE PLANNING  Goal: Discharge to home or other facility with appropriate resources  Description: INTERVENTIONS:  - Identify barriers to discharge w/patient and caregiver  - Arrange for needed discharge resources and transportation as appropriate  - Identify discharge learning needs (meds, wound care, etc )  - Arrange for interpretive services to assist at discharge as needed  - Refer to Case Management Department for coordinating discharge planning if the patient needs post-hospital services based on physician/advanced practitioner order or complex needs related to functional status, cognitive ability, or social support system  Outcome: Progressing     Problem: MOBILITY - ADULT  Goal: Maintain or return to baseline ADL function  Description: INTERVENTIONS:  -  Assess patient's ability to carry out ADLs; assess patient's baseline for ADL function and identify physical deficits which impact ability to perform ADLs (bathing, care of mouth/teeth, toileting, grooming, dressing, etc )  - Assess/evaluate cause of self-care deficits   - Assess range of motion  - Assess patient's mobility; develop plan if impaired  - Assess patient's need for assistive devices and provide as appropriate  - Encourage maximum independence but intervene and supervise when necessary  - Involve family in performance of ADLs  - Assess for home care needs following discharge   - Consider OT consult to assist with ADL evaluation and planning for discharge  - Provide patient education as appropriate  Outcome: Progressing  Goal: Maintains/Returns to pre admission functional level  Description: INTERVENTIONS:  - Perform BMAT or MOVE assessment daily    - Set and communicate daily mobility goal to care team and patient/family/caregiver  - Collaborate with rehabilitation services on mobility goals if consulted  - Perform Range of Motion 3 times a day  - Reposition patient every 2 hours    - Dangle patient 3 times a day  - Stand patient 3 times a day  - Ambulate patient 3 times a day  - Out of bed to chair 3 times a day   - Out of bed for meals 3 times a day  - Out of bed for toileting  - Record patient progress and toleration of activity level   Outcome: Progressing     Problem: Nutrition/Hydration-ADULT  Goal: Nutrient/Hydration intake appropriate for improving, restoring or maintaining nutritional needs  Description: Monitor and assess patient's nutrition/hydration status for malnutrition  Collaborate with interdisciplinary team and initiate plan and interventions as ordered  Monitor patient's weight and dietary intake as ordered or per policy  Utilize nutrition screening tool and intervene as necessary  Determine patient's food preferences and provide high-protein, high-caloric foods as appropriate       INTERVENTIONS:  - Monitor oral intake, urinary output, labs, and treatment plans  - Assess nutrition and hydration status and recommend course of action  - Evaluate amount of meals eaten  - Assist patient with eating if necessary   - Allow adequate time for meals  - Recommend/ encourage appropriate diets, oral nutritional supplements, and vitamin/mineral supplements  - Order, calculate, and assess calorie counts as needed  - Recommend, monitor, and adjust tube feedings and TPN/PPN based on assessed needs  - Assess need for intravenous fluids  - Provide specific nutrition/hydration education as appropriate  - Include patient/family/caregiver in decisions related to nutrition  Outcome: Progressing     Problem: Prexisting or High Potential for Compromised Skin Integrity  Goal: Skin integrity is maintained or improved  Description: INTERVENTIONS:  - Identify patients at risk for skin breakdown  - Assess and monitor skin integrity  - Assess and monitor nutrition and hydration status  - Monitor labs   - Assess for incontinence   - Turn and reposition patient  - Assist with mobility/ambulation  - Relieve pressure over bony prominences  - Avoid friction and shearing  - Provide appropriate hygiene as needed including keeping skin clean and dry  - Evaluate need for skin moisturizer/barrier cream  - Collaborate with interdisciplinary team   - Patient/family teaching  - Consider wound care consult   Outcome: Progressing

## 2023-05-23 NOTE — PROGRESS NOTES
"Occupational Therapy Treatment Note         05/23/23 0801   Pain Assessment   Pain Assessment Tool 0-10   Pain Score 5   Pain Location/Orientation Orientation: Left; Location: Arm   Hospital Pain Intervention(s) Repositioned;Relaxation technique   Restrictions/Precautions   Precautions Bed/chair alarms; Fall Risk;Supervision on toilet/commode;Pain  (now off airborne precautions)   LUE Weight Bearing Per Order (S)  NWB   Braces or Orthoses   (pt in nielsen brace L UE; sling is for comfort but encouraged pt to spend time without sling as pt is allowed ROM at elbow and wrist)   Lifestyle   Autonomy \"Leti might be able to come live with me at my house\"   Eating   Type of Assistance Needed Set-up / clean-up   Physical Assistance Level No physical assistance   Eating CARE Score 5   Oral Hygiene   Type of Assistance Needed Set-up / clean-up   Physical Assistance Level No physical assistance   Comment seated   Oral Hygiene CARE Score 5   Shower/Bathe Self   Type of Assistance Needed Physical assistance   Physical Assistance Level 76% or more   Comment Seated at EOB pt able to complete bathing of L UE, abdominal and chest area and thighs  Pt requires assist for lower legs/feet and R UE  Pt stands and requires assist for rear hygiene, then supine in bed requires assist for under pannus area and groin  Nursing present for wound care  Shower/Bathe Self CARE Score 2   Bathing   Assessed Bath Style Sponge Bath   Upper Body Dressing   Type of Assistance Needed Physical assistance   Physical Assistance Level 76% or more   Comment pt requires assist for L UE brace and dress overhead   Upper Body Dressing CARE Score 2   Lower Body Dressing   Type of Assistance Needed Physical assistance   Physical Assistance Level 51%-75%   Comment pt requires assist to manage L side on/off hips for undwear   pt able to use dressing stick to doff underwear, requires assist to use reacher to fully don   Lower Body Dressing CARE Score 2   Putting " On/Taking Off Footwear   Type of Assistance Needed Physical assistance   Physical Assistance Level Total assistance   Putting On/Taking Off Footwear CARE Score 1   Sit to Stand   Type of Assistance Needed Physical assistance; Adaptive equipment   Physical Assistance Level 25% or less   Comment hurry cane R UE   Sit to Stand CARE Score 3   Bed-Chair Transfer   Type of Assistance Needed Physical assistance; Adaptive equipment   Physical Assistance Level 25% or less   Comment min assist stand pivot using hurry cane   Chair/Bed-to-Chair Transfer CARE Score 3   Toileting Hygiene   Type of Assistance Needed Physical assistance   Physical Assistance Level 51%-75%   Comment min assist in stance for front hygiene  assist for clothing management on L side   Toileting Hygiene CARE Score 2   Toilet Transfer   Type of Assistance Needed Physical assistance; Adaptive equipment   Physical Assistance Level 25% or less   Comment stand pivot with hurrycane to [facility]   Toilet Transfer CARE Score 3   Cognition   Overall Cognitive Status WFL   Arousal/Participation Alert   Attention Within functional limits   Orientation Level Oriented X4   Memory Within functional limits   Following Commands Follows one step commands without difficulty   Activity Tolerance   Activity Tolerance Patient tolerated treatment well   Medical Staff Made Aware reached out to [employer] regarding L UE brace as pt reporting discomfort  MIRANDA Chung present for skin/wound care  Assessment   Treatment Assessment Pt participated in skilled OT tx session  See above for further details on functional performance  Pt continues to require significant assistance for ADL routine 2* L UE NWB/in brace as well as body habitus  Per pt, her dtr [employer] is planning to move in with her to assist her upon d/c  OT called pt's dtr [employer] who stated this is true and she would be willing to move in and assist her mother  Family training scheduled for OT/PT/ nursing this Thursday from 10-2  [employer] present to assess L shoulder brace as pt reporting discomfort  OT applied tubigrip size G to L humeral area for under brace, as the left over splinting material was falling off  Tubigrip should be removed for ADL/hygiene and skin checks  Pt will continue to benefit from skilled OT intervention to address standing balance/tolerance, functional transfers using hurrycane, family training on ADL assistance, Israel Mustafa for LB dressing using R UE, R elbow strengthening in order to maximize functional independence in ADLS, functional mobility/transfers, while decreasing burden of care  Pt left positioned in bed with call bell in reach and bed alarm on  OT educated pt on removing L UE sling and using L elbow and wrist as per Dr Jasmeet Kee note cleared by ortho to provide ROM to elbow and wrist as tolerated  Prognosis Fair   Problem List Decreased strength;Decreased range of motion;Decreased endurance; Impaired balance;Decreased mobility;Orthopedic restrictions;Pain;Obesity   Barriers to Discharge Inaccessible home environment   Plan   Treatment/Interventions Functional transfer training;ADL retraining; Therapeutic exercise; Endurance training;Bed mobility; Equipment eval/education;Patient/family training; Compensatory technique education   Progress Slow progress, decreased activity tolerance   OT Therapy Minutes   OT Time In 0800   OT Time Out 0930   OT Total Time (minutes) 90   OT Mode of treatment - Individual (minutes) 90   OT Mode of treatment - Concurrent (minutes) 0   OT Mode of treatment - Group (minutes) 0   OT Mode of treatment - Co-treat (minutes) 0   OT Mode of Treatment - Total time(minutes) 90 minutes   OT Cumulative Minutes 745   Therapy Time missed   Time missed?  No

## 2023-05-23 NOTE — RESTORATIVE TECHNICIAN NOTE
Restorative Technician Note      Patient Name: Rah Sena     Restorative Tech Visit Date: 05/23/23  Note Type: Bracing, Follow-up fitting        Received a message from OT that the patient is complaining of the fit of the [place] brace ( initially fit by [employer] 5/15/23)  Went to the patient's room with Jesenia Moncada  Pt was in the bed and has her personal clothing on so difficult to adjust the brace  Pt states it is rubbing in her armpit area  Pt is scheduled to have PT in the afternoon and should be up in the wheelchair after that therapy session  We will return mid afternoon to make adjustments to the brace for an optimal fit       General [place],

## 2023-05-23 NOTE — CASE MANAGEMENT
Team Update:  CM spoke with pt at bedside to review dc date of Thursday 6/1  Pt agreeable and reports her daughter will be in the complete some family training, pt agreeable to home therapy  Pt agreeable to [facility] referral, referral sent for PT OT RN to [facility] via [employer]

## 2023-05-24 LAB
GLUCOSE SERPL-MCNC: 109 MG/DL (ref 65–140)
GLUCOSE SERPL-MCNC: 111 MG/DL (ref 65–140)
GLUCOSE SERPL-MCNC: 88 MG/DL (ref 65–140)
GLUCOSE SERPL-MCNC: 93 MG/DL (ref 65–140)

## 2023-05-24 PROCEDURE — 97535 SELF CARE MNGMENT TRAINING: CPT

## 2023-05-24 PROCEDURE — 97110 THERAPEUTIC EXERCISES: CPT

## 2023-05-24 PROCEDURE — 97530 THERAPEUTIC ACTIVITIES: CPT

## 2023-05-24 PROCEDURE — 99232 SBSQ HOSP IP/OBS MODERATE 35: CPT | Performed by: INTERNAL MEDICINE

## 2023-05-24 PROCEDURE — 97116 GAIT TRAINING THERAPY: CPT

## 2023-05-24 PROCEDURE — 82948 REAGENT STRIP/BLOOD GLUCOSE: CPT

## 2023-05-24 PROCEDURE — 99232 SBSQ HOSP IP/OBS MODERATE 35: CPT | Performed by: PHYSICAL MEDICINE & REHABILITATION

## 2023-05-24 RX ADMIN — ALLOPURINOL 100 MG: 100 TABLET ORAL at 09:57

## 2023-05-24 RX ADMIN — FUROSEMIDE 40 MG: 40 TABLET ORAL at 09:58

## 2023-05-24 RX ADMIN — SENNOSIDES 17.2 MG: 8.6 TABLET, FILM COATED ORAL at 09:57

## 2023-05-24 RX ADMIN — SODIUM CHLORIDE 1 G: 1 TABLET ORAL at 06:38

## 2023-05-24 RX ADMIN — DOCUSATE SODIUM 100 MG: 100 CAPSULE, LIQUID FILLED ORAL at 09:57

## 2023-05-24 RX ADMIN — NYSTATIN 1 APPLICATION.: 100000 POWDER TOPICAL at 21:11

## 2023-05-24 RX ADMIN — OXYCODONE HYDROCHLORIDE 5 MG: 5 TABLET ORAL at 21:34

## 2023-05-24 RX ADMIN — ACETAMINOPHEN 975 MG: 325 TABLET ORAL at 06:34

## 2023-05-24 RX ADMIN — ATORVASTATIN CALCIUM 40 MG: 40 TABLET, FILM COATED ORAL at 16:44

## 2023-05-24 RX ADMIN — ACETAMINOPHEN 975 MG: 325 TABLET ORAL at 16:44

## 2023-05-24 RX ADMIN — AMIODARONE HYDROCHLORIDE 200 MG: 200 TABLET ORAL at 06:35

## 2023-05-24 RX ADMIN — NYSTATIN 1 APPLICATION.: 100000 POWDER TOPICAL at 09:59

## 2023-05-24 RX ADMIN — MONTELUKAST 10 MG: 10 TABLET, FILM COATED ORAL at 21:33

## 2023-05-24 RX ADMIN — OXYBUTYNIN 5 MG: 5 TABLET, FILM COATED, EXTENDED RELEASE ORAL at 09:57

## 2023-05-24 RX ADMIN — CITALOPRAM HYDROBROMIDE 20 MG: 20 TABLET ORAL at 09:57

## 2023-05-24 RX ADMIN — METOPROLOL SUCCINATE 25 MG: 25 TABLET, EXTENDED RELEASE ORAL at 09:57

## 2023-05-24 RX ADMIN — GABAPENTIN 100 MG: 100 CAPSULE ORAL at 21:33

## 2023-05-24 RX ADMIN — MICONAZOLE NITRATE 1 APPLICATION.: 20 CREAM TOPICAL at 09:59

## 2023-05-24 RX ADMIN — MICONAZOLE NITRATE 1 APPLICATION.: 20 CREAM TOPICAL at 21:11

## 2023-05-24 RX ADMIN — METHOCARBAMOL TABLETS 250 MG: 500 TABLET, COATED ORAL at 06:35

## 2023-05-24 RX ADMIN — LIDOCAINE 5% 1 PATCH: 700 PATCH TOPICAL at 09:56

## 2023-05-24 RX ADMIN — WARFARIN SODIUM 2 MG: 1 TABLET ORAL at 18:13

## 2023-05-24 RX ADMIN — ONDANSETRON 4 MG: 4 TABLET, ORALLY DISINTEGRATING ORAL at 06:39

## 2023-05-24 RX ADMIN — METHOCARBAMOL TABLETS 250 MG: 500 TABLET, COATED ORAL at 11:31

## 2023-05-24 RX ADMIN — Medication 1000 UNITS: at 09:56

## 2023-05-24 RX ADMIN — METHOCARBAMOL TABLETS 250 MG: 500 TABLET, COATED ORAL at 18:13

## 2023-05-24 RX ADMIN — DOCUSATE SODIUM 100 MG: 100 CAPSULE, LIQUID FILLED ORAL at 18:13

## 2023-05-24 NOTE — PROGRESS NOTES
PM&R PROGRESS NOTE:  Dinh Osorio 68 y o  female MRN: [id number]  Unit/Bed#: -01 Encounter: [id number]        Rehab Diagnosis: Impairment of mobility, safety and Activities of Daily Living (ADLs) due to Major Multiple Trauma:  14 9  Other Multiple Trauma Multiple rib fractures with acute pain, improved acute hypoxic respiratory failure, and L humeral fracture NWB in coaptation splint       SUBJECTIVE: Patient seen face-to-face today in OT  Doing very well  States her brace is fitting much better after [employer] made adjustments  Pain has improved  No acute issues per nursing  ASSESSMENT: Stable, progressing      PLAN:    Rehabilitation  • Functional deficits: Left upper extremity weakness, impaired mobility, impaired self-care, impaired endurance, impaired strength  • Continue current rehabilitation plan of care to maximize function  • Expected Discharge: Discharge Thursday, 6/1/2023 Home health care RN, PT, OT  o Family training with her daughter Tana Monroy today      Current function:  Physical Therapy Occupational Therapy Speech Therapy   Weight Bearing Status: Non-weight bearing (L UE)  Transfers: Minimal Assistance, Moderate Assistance (CS-CG from higher surfaces, min-mod from lower surfaces)  Bed Mobility: Maximum Assistance, Supervision (max sit to supine, S supine to sit with bedrail)  Amulation Distance (ft): 40 feet  Ambulation: Incidental Touching, Supervision  Assistive Device for Ambulation: [facility]: Wheelchair  Discharge Recommendations: Home with:  [address] with[de-identified] First Floor Setup, Family Support, 24 Hour Assisteance, 24 Hour Supervision, Home Physical Therapy   Eating: Independent  Grooming: Minimal Assistance  Bathing: Moderate Assistance  Bathing:  Moderate Assistance  Upper Body Dressing: Maximum Assistance  Lower Body Dressing: Minimal Assistance  Toileting: Minimal Assistance  Toilet Transfer: Incidental Touching  Cognition: Within Defined Limits  Orientation: Person, Place, Time, Situation                 DVT prophylaxis  • Managed on warfarin  • Goal INR 2 5-3 5  • Last INR = 2 68    Bladder plan  • Continent  • Limit pure wick usage if possible    Bowel plan  • Continent      * Humerus fracture  Assessment & Plan  2/2 mechanical fall  Proximal humerus shaft spiral fracture, displaced and angulated  Ortho attempted closed reduction  Ultimately placed in coaptation splint on 4/27 and managed non-operatively  NWB LUE  Pain Control as detailed below  No Vitamin D level in chart or unmerged chart recently   - At home was on daily Vitamin D supplement 1000 units - restarted   - 5/14 Vitamind D level is 50 7   Due for 2 week follow-up  Imaging: Personally reviewed  Redemonstration of oblique spiral fracture of the shaft of the humerus with apex medial angulation  No glenohumeral dislocation  Appreciate orthopedics evaluating patient 5/15/2023  Replaced her splint with a Salgado brace  Cleared by orthopedics to provide range of motion to elbow and wrist without restriction as tolerated  Follow-up with orthopedics in 4 weeks around 6/15/2023    Ventricular arrhythmia  Assessment & Plan  On 5/2 developed Afib with RR which degenerated to VT then VF  She received shock from her AICD ([place], Single Chamber ICD)  Seen by Cards who adjusted her device, and recommended restarting home Metoprolol and Amio load  Was also on digoxin  - Toprol dose adjusted and digoxin discontinued after developing junctional bradycardia later in stay  Here: Toprol XL 25mg daily, Amio 200mg daily starting tomorrow for 28 days  Monitor cardiac status during therapies   Monitor lytes   Keep K > 4, Mag > 2  Will plan for outpatient f/u with EP    Difficult intravenous access  Assessment & Plan  Midline placed by IR on 5/15/23  Need for blood draws frequently to follow electrolytes, INR  Need for IV access given her recent ventricular arrhythmia and AICD firing, importance of optimizing electrolytes, current COVID 19 virus  Acute COVID-19  Assessment & Plan  Diagnosed on screening test on 5/13  Minimally symptomatic (does have a cough)  Afebrile and on RA  10 days isolation per protocol through 5/22/2023 (last day)  Off precautions 5/23/2023  Monitor respiratory status   - Has chronic issues with urinary urgency/frequency  - Added Q4hr timed voids so we can help her get to the commode in a timely manner    -Stable and has not required oxygen   -Compliant with utilizing incentive spirometer    CHAS (obstructive sleep apnea)  Assessment & Plan  Does not use CPAP at home  Consider Noc Ox overnight while here  Class 2 obesity in adult  Assessment & Plan  Nutrition consulted  Counseling    Osteoporosis with current pathological fracture with routine healing  Assessment & Plan  Managed by [facility] [employer] [facility]  History of L5 pathologic fracture  Has been on Alendronate once weekly since 2017  Would confirm with her what day she normally takes it prior to restarting  Will confirm that it is ok to restart with Orthopedics first, as some surgeons prefer to wait 6 weeks after acute fracture prior to restarting    - Per Dr Thornton Milling ok to resume at anytime  - She normally takes it on Thursdays  Would complete through 6/2023  Per last Endocrine note in 6/2022, they were going to continue for 1 more year and then stop  She was due for DXA this May  Asthma  Assessment & Plan  No acute exacerbation  Home: Singulair 10mg QHS  Here: Same, PRN albuterol  No acute exacerbation  Monitor respiratory status    Pressure injury of buttock, unstageable (HCC)  Assessment & Plan  Present on admission  Much improved -personally assessed and assessed by wound care 5/22/2023    Wound care following while at [address] [place] [address] Place local wound care as follows:  • DTI of the b/l sacro-buttocks has fully evolved to an unstageable pressure injury   Wound is POA to ARC unit  Wound is improving, less devitalized tissue, decreased measurements  ? Patient desires to defer bedside debridement at this time, due to her concern of not being able to tolerate being on her side for that long  ? Continue with Triad paste and foam dressing  ? Pressure relief   ? P-500 mattress  • Candidiasis rash noted to the b/l medial thighs and perineum - greatly improved, nearly resolved  ? Continue with pascual anti-fungal cream  • ITD with candidasis to the b/l abdominal pannus partial thickness wounds have resolved, mild candidiasis rash remains  ? Continue with interdry sheets  • No s/s of infection present  • Will recommend to continue with preventative nursing skin care measures  • Nutrition is following along - nutritional supplements of Meet  • Patient verbalized understanding of plan of care  • [place] text wound care team with questions or concerns    • Routine wound care follow-up while admitted   • Follow-up with the [facility] wound care center as an outpatient, ambulatory referral placed  Frequent offloading in chair and in bed  P500 mattress  Optimize nutrition    Atrial fibrillation with RVR ([address] )  Assessment & Plan  Initially per AICD interrogation with Afib RVR that degenerated into VT/VF  Unclear burden  Here: Toprol XL 25mg daily, Fully anticoagulated on warfarin (See mechanical AV), amiodarone 200mg daily  Monitor and adjust as appropriate  Currently examines in NSR  IM consulted to assist with management      Outpatient f/u with EP    Abnormal CT scan  Assessment & Plan  - 4/27 CT CAP:           - Incidentally noted is a incarcerated/partially obstructed periumbilic ventral hernia, (image 146 series 9)          - Septated cyst anterior aspect of the right kidney, consider evaluation with ultrasound or MRI for further characterization (image 136 series 9)          - Dilated ascending aorta measuring 4 6 cm, evaluation with cardiothoracic surgery on nonemergent basis  - Follow up with PCP and CT surgery outpatient for findings  - Patient informed of abnormal results on 5/11: she is aware of ventral hernia but not aortic aneurysm or renal cyst         Hyponatremia  Assessment & Plan  Resolved  Na = 138    In acute care, [address] on 5/9  [place] by Trauma to be 2/2 poor PO intake and furosemide  Started on sodium tabs (weaning down)  Monitor and adjust as appropriate  IM consulted and assisting with management  Bradycardia  Assessment & Plan  Developed junctional bradycardia in hospital after restarting home Toprol XL and digoxin  - Cards stopped digoxin  - Toprol XL decreased to 25mg daily  Monitor  Type 2 diabetes mellitus ([facility] [address] )  Assessment & Plan    Home: Sitagliptin-Metformin 50-50mg  Here: Discontinue Levemir due to hypoglycemia, continue sliding scale insulin and diabetic diet  Diabetic Diet  Nutrition Consult  Attempt to resume oral meds here and get off insulin  IM consulted to assist with management  Outpatient f/u with PCP  Fracture of multiple ribs of right side  Assessment & Plan  R 8th and 9th rib possible fractures  Rib fracture protocol  Pain control with lidoderm patch  Incentive Spirometer  Monitor respiratory status  Laceration of right eyebrow  Assessment & Plan  Healing, monitor  Steri-Strips in place    H/O mechanical aortic valve replacement  Assessment & Plan  Managed on Coumadin  Goal INR 2 5-3 5  INR 2 68  IM consulted to assist with management of Coumadin dosing  Acute pain due to trauma  Assessment & Plan  R rib fractures, L humerus fracture  - Lidoderm patch for ribs  - Oxycodone 5-7 5mg PRN  - Robaxin 250mg Q6hr scheduled  - Gabapentin 100mg QHS (may be able to discontinue while here)  - Tylenol 975mg Q8hrs scheduled  APS assisted inpatient  Adjust as appropriate while here  Appreciate IM consultants medical co-management  Labs, medications, and imaging personally reviewed        ROS:  A ten point review of systems was completed on 05/24/23 and pertinent positives are "listed in subjective section  All other systems reviewed were negative  OBJECTIVE:   /55   Pulse 65   Temp 97 8 °F (36 6 °C) (Oral)   Resp 18   Ht 4' 10\" (1 473 m)   Wt 92 9 kg (204 lb 12 9 oz) Comment: pt also has a splint on LUE  SpO2 94%   BMI 42 80 kg/m²       Physical Exam  Vitals and nursing note reviewed  Constitutional:       General: She is not in acute distress  HENT:      Head: Normocephalic and atraumatic  Nose: Nose normal       Mouth/Throat:      Mouth: Mucous membranes are moist    Eyes:      Conjunctiva/sclera: Conjunctivae normal    Cardiovascular:      Rate and Rhythm: Normal rate and regular rhythm  Pulses: Normal pulses  Pulmonary:      Effort: Pulmonary effort is normal       Breath sounds: Normal breath sounds  No wheezing or rales  Abdominal:      General: Bowel sounds are normal  There is no distension  Palpations: Abdomen is soft  Tenderness: There is no abdominal tenderness  Musculoskeletal:         General: Swelling (LUE mild but improved) present  Cervical back: Neck supple  Comments: Much better fitting brace in place   Skin:     General: Skin is warm  Neurological:      Mental Status: She is alert and oriented to person, place, and time     Psychiatric:         Mood and Affect: Mood normal         Lab Results   Component Value Date    HCT 33 4 (L) 05/22/2023    HGB 10 9 (L) 05/22/2023     (H) 05/22/2023     (L) 05/22/2023    WBC 3 14 (L) 05/22/2023     Lab Results   Component Value Date    BUN 23 05/22/2023    CALCIUM 8 6 05/22/2023     (H) 05/22/2023    CO2 28 05/22/2023    CREATININE 0 59 (L) 05/22/2023    GLUC 73 05/22/2023    K 4 3 05/22/2023    SODIUM 138 05/22/2023     Lab Results   Component Value Date    INR 2 68 (H) 05/22/2023    INR 3 16 (H) 05/21/2023    INR 4 20 (H) 05/20/2023    PROTIME 28 8 (H) 05/22/2023    PROTIME 32 7 (H) 05/21/2023    PROTIME 40 8 (H) 05/20/2023           Current " Facility-Administered Medications:   •  acetaminophen (TYLENOL) tablet 975 mg, 975 mg, Oral, Q8H [address], Moises Alexander MD, 975 mg at 05/24/23 3867  •  albuterol (PROVENTIL HFA,VENTOLIN HFA) inhaler 2 puff, 2 puff, Inhalation, Q4H PRN, Moises Alexander MD  •  allopurinol (ZYLOPRIM) tablet 100 mg, 100 mg, Oral, Daily, Moises Alexander MD, 100 mg at 05/24/23 0382  •  amiodarone tablet 200 mg, 200 mg, Oral, Daily With Breakfast, Moises Alexander MD, 200 mg at 05/24/23 7391  •  atorvastatin (LIPITOR) tablet 40 mg, 40 mg, Oral, Daily With Jolie Morse MD, 40 mg at 05/23/23 1601  •  calcium carbonate (TUMS) chewable tablet 500 mg, 500 mg, Oral, TID PRN, Moises Alexander MD  •  cholecalciferol (VITAMIN D3) tablet 1,000 Units, 1,000 Units, Oral, Daily, Moises Alexander MD, 1,000 Units at 05/24/23 0956  •  citalopram (CeleXA) tablet 20 mg, 20 mg, Oral, Daily, Moises Alexander MD, 20 mg at 05/24/23 0957  •  docusate sodium (COLACE) capsule 100 mg, 100 mg, Oral, BID, Moises Alexander MD, 100 mg at 05/24/23 0957  •  furosemide (LASIX) tablet 40 mg, 40 mg, Oral, Daily, Moises Alexander MD, 40 mg at 05/24/23 6387  •  gabapentin (NEURONTIN) capsule 100 mg, 100 mg, Oral, HS, Moises Alexander MD, 100 mg at 05/23/23 2106  •  insulin lispro (HumaLOG) 100 units/mL subcutaneous injection 1-5 Units, 1-5 Units, Subcutaneous, TID AC **AND** Fingerstick Glucose (POCT), , , TID AC, Moises Alexander MD  •  insulin lispro (HumaLOG) 100 units/mL subcutaneous injection 1-5 Units, 1-5 Units, Subcutaneous, HS, Moises Alexander MD, 1 Units at 05/21/23 2209  •  lidocaine (LIDODERM) 5 % patch 1 patch, 1 patch, Topical, Daily, Moises Alexander MD, 1 patch at 05/24/23 0956  •  methocarbamol (ROBAXIN) tablet 250 mg, 250 mg, Oral, Q6H [address], Moises Alexander MD, 250 mg at 05/24/23 1131  •  metoprolol succinate (TOPROL-XL) 24 hr tablet 25 mg, 25 mg, Oral, Daily, Moises Alexander MD, 25 mg at 05/24/23 0957  •  moisture barrier miconazole 2% cream (aka LICO MOISTURE BARRIER ANTIFUNGAL CREAM), , Topical, BID, JOSEPHINE Cisneros, 1 application  at 05/24/23 0959  •  montelukast (SINGULAIR) tablet 10 mg, 10 mg, Oral, HS, Glennon Felty, MD, 10 mg at 05/23/23 2106  •  naloxone (NARCAN) 0 04 mg/mL syringe 0 04 mg, 0 04 mg, Intravenous, Q1MIN PRN, Glennon Felty, MD  •  nystatin (MYCOSTATIN) powder, , Topical, BID, Amanda Pearl PA-C, 1 application   at 05/24/23 0959  •  ondansetron (ZOFRAN-ODT) dispersible tablet 4 mg, 4 mg, Oral, Q6H PRN, Glennon Felty, MD, 4 mg at 05/24/23 1538  •  oxybutynin (DITROPAN-XL) 24 hr tablet 5 mg, 5 mg, Oral, Daily, Glennon Felty, MD, 5 mg at 05/24/23 0790  •  oxyCODONE (ROXICODONE) IR tablet 5 mg, 5 mg, Oral, Q4H PRN, Glennon Felty, MD, 5 mg at 05/23/23 1601  •  oxyCODONE (ROXICODONE) split tablet 7 5 mg, 7 5 mg, Oral, Q4H PRN, Glennon Felty, MD, 7 5 mg at 05/23/23 2106  •  senna (SENOKOT) tablet 17 2 mg, 2 tablet, Oral, Daily, Glennon Felty, MD, 17 2 mg at 05/24/23 1798  •  warfarin (COUMADIN) tablet 2 mg, 2 mg, Oral, Daily (warfarin), JOSEPHINE Snaon, 2 mg at 05/23/23 1733    Past Medical History:   Diagnosis Date   • Asthma    • Diabetes [facility])    • Hypertension        Patient Active Problem List    Diagnosis Date Noted   • Humerus fracture 04/27/2023   • Ventricular arrhythmia 05/04/2023   • Difficult intravenous access 05/15/2023   • Acute COVID-19 05/14/2023   • Atrial fibrillation with RVR ([facility] [address] ) 05/13/2023   • Pressure injury of buttock, unstageable ([address] ) 05/13/2023   • Asthma 05/13/2023   • Osteoporosis with current pathological fracture with routine healing 05/13/2023   • Class 2 obesity in adult 05/13/2023   • CHAS (obstructive sleep apnea) 05/13/2023   • Abnormal CT scan 05/10/2023   • Hyponatremia 05/09/2023   • Bradycardia 05/08/2023   • Type 2 diabetes mellitus ([facility] ) 04/28/2023   • Fall 04/27/2023   • Acute pain due to trauma 04/27/2023   • H/O mechanical aortic valve replacement 04/27/2023   • Laceration of right eyebrow 04/27/2023   • Fracture of multiple ribs of right side 04/27/2023          Nani Drew, MD    I have spent a total time of 35 minutes on 05/24/23 in caring for this patient including Impressions, Counseling / Coordination of care and Documenting in the medical record  ** Please Note:  voice to text software may have been used in the creation of this document   Although proof errors in transcription or interpretation are a potential of such software**

## 2023-05-24 NOTE — PROGRESS NOTES
Pastoral Care Progress Note    2023  Patient: Milagro Walter : 1945  Admission Date & Time: 2023 1828  MRN: [id number] CSN: [id number]        Enjoyable introduction with Pt, who told me all the details of her grocery store fall that resulted in her current hospital stay  Listened, expressed care and compassion   remains available               Chaplaincy Interventions Utilized:     Exploration: Explored emotional needs & resources and Explored spiritual needs & resources    Collaboration: Encouraged adherence to treatment plan    Relationship Building: Cultivated a relationship of care and support    Ritual: Provided prayer    Chaplaincy Outcomes Achieved:  Debriefed/defused experience and Emotional resources utilized     23 1200   Clinical Encounter Type   Visited With Patient   Routine Visit Introduction

## 2023-05-24 NOTE — PROGRESS NOTES
Internal Medicine Progress Note  Patient: Milagro Walter  Age/sex: 68 y o  female  Medical Record #: [id number]      ASSESSMENT/PLAN: (Interval History)  Milagro Walter is seen and examined and management for following issues:    Lt proximal humerus fracture  • NWB to the LUE  • Immobilizer placed to LUE by orthopedics  • Pain control and therapy per PMR  • Follow-up with Orthopedics in 4 weeks  Rt 8-9 rib fx  • Pain control per PMR  • Encourage IS   • O2 as needed  Rt eyebrow laceration  • Monitor lacerations  • Local wound care  • stable     DM type 2  • HA1C 6 4  • Home: [place]  2x daily  • Here: SSI  • Will monitor and add back medications as needed  • BS overall stable     History of mechanical AVR  • Goal INR 2 5 - 3 5  • INR 2 68  • Repeat INR thursday  • Continue coumadin at 2mg daily (was elevated on 2 5mg daily)  • Pt takes 2 5mg Wednesday and Saturday and 5mg all other days of the week at home     Hyponatremia  • Na 138  • Dc nacl tabs and repeat bmp in am     Ventricular arrhythmia  • One episode 23 and pt received a shock from ICD  • Continue metoprolol and amiodarone  • Continue Coumadin  • Monitor bmp     Bradycardia  • Diltiazem discontinued and metoprolol dose lowered  • She will need outpt follow-up with Cardiology  She [facility]  • HR stable     COVID-19  • Tested positive 23  • Pt has a cough which she reports is chronic  • Contact and airborne precautions for 10 days  • Off isolation    Buttock wound/labia wound  · Followed by wound care  · Frequent position changes/specialized mattress  · Will monitor  · Overall improvement      DC plannin23  The above assessment and plan was reviewed and updated as determined by my evaluation of the patient on 2023      Labs:   Results from last 7 days   Lab Units 23  0613   HEMATOCRIT % 33 4*   HEMOGLOBIN g/dL 10 9*   PLATELETS Thousands/uL 142*   WBC Thousand/uL 3 14*     Results from last 7 days   Lab Units 05/22/23  0613   BUN mg/dL 23   CALCIUM mg/dL 8 6   CHLORIDE mmol/L 110*   CO2 mmol/L 28   CREATININE mg/dL 0 59*   POTASSIUM mmol/L 4 3   SODIUM mmol/L 138         Results from last 7 days   Lab Units 05/22/23  0613 05/21/23  0617   INR  2 68* 3 16*     Results from last 7 days   Lab Units 05/24/23  0633 05/23/23  2103 05/23/23  1628   POC GLUCOSE mg/dl 88 131 128       Review of Scheduled Meds:  Current Facility-Administered Medications   Medication Dose Route Frequency Provider Last Rate   • acetaminophen  975 mg Oral [facility] Glennon Felty, MD     • albuterol  2 puff Inhalation Q4H PRN Glennon Felty, MD     • allopurinol  100 mg Oral Daily Glennon Felty, MD     • amiodarone  200 mg Oral Daily With Breakfast Glennon Felty, MD     • atorvastatin  40 mg Oral Daily With Zari Hawk MD     • calcium carbonate  500 mg Oral TID PRN Glennon Felty, MD     • cholecalciferol  1,000 Units Oral Daily Glennon Felty, MD     • citalopram  20 mg Oral Daily Glennon Felty, MD     • docusate sodium  100 mg Oral BID Glennon Felty, MD     • furosemide  40 mg Oral Daily Glennon Felty, MD     • gabapentin  100 mg Oral HS Glennon Felty, MD     • insulin lispro  1-5 Units Subcutaneous TID [facility] Glennon Felty, MD     • insulin lispro  1-5 Units Subcutaneous HS Glennon Felty, MD     • lidocaine  1 patch Topical Daily Glennon Felty, MD     • methocarbamol  250 mg Oral Q6H [facility] Glennon Felty, MD     • metoprolol succinate  25 mg Oral Daily Glennon Felty, MD     • LICO ANTIFUNGAL   Topical BID JOSEPHINE Cisneros     • montelukast  10 mg Oral HS Glennon Felty, MD     • naloxone  0 04 mg Intravenous Q1MIN PRN Glennon Felty, MD     • nystatin   Topical BID Amanda Pearl PA-C     • ondansetron  4 mg Oral Q6H PRN Glennon Felty, MD     • oxybutynin  5 mg Oral Daily Glennon Felty, MD     • oxyCODONE  5 mg Oral Q4H PRN Glennon Felty, MD     • oxyCODONE  7 5 mg Oral Q4H PRN Alex Aceves MD     • senna  2 tablet Oral Daily Alex Aceves MD     • sodium chloride  1 g Oral BID With Meals Alex Aceves MD     • warfarin  2 mg Oral Daily (warfarin) JOSEPHINE Su         Subjective/ HPI: Patient seen and examined  Patients overnight issues or events were reviewed with nursing or staff during rounds or morning huddle session  New or overnight issues include the following:     Pt seen in her room  No issues overnight      ROS:   A 10 point ROS was performed; negative except as noted above         Imaging:     XR humerus left   Final Result by Celsa Mixon MD (05/15 1613)   Oblique spiral fracture of the shaft of the humerus with apex medial angulation   No glenohumeral dislocation         Workstation performed: [id number]         XR shoulder 2+ vw left   Final Result by Celsa Mixno MD (05/15 1614)      Oblique spiral fracture of the proximal humeral shaft with stable alignment      Workstation performed: [id number]             *Labs /Radiology studies Reviewed  *Medications  reviewed and reconciled as needed  *Please refer to order section for additional ordered labs studies  *Case discussed with primary attending during morning huddle case rounds    Physical Examination:  Vitals:   Vitals:    05/23/23 0754 05/23/23 1451 05/23/23 2030 05/24/23 0627   BP: 121/56 113/52 139/66 131/58   BP Location: Right arm Right arm Right arm Right arm   Pulse: 64 72 63 64   Resp: 18 18 18 18   Temp:  98 7 °F (37 1 °C) 98 6 °F (37 °C) 97 8 °F (36 6 °C)   TempSrc:  Oral Oral Oral   SpO2:  93% 98% 94%   Weight:       Height:         GEN: NAD; pleasant  NEURO: Alert and oriented x4; appropriate  HEENT: Pupils are equal/reactive; normocephalic, face is symmetrical, hearing intact  CV: S1 S2 regular, +audible click; no murmur/rub/gallops, 1/4 pedal pulses, trace b/l edema present; LUE hand edema trace  RESP: Lungs are clear bilaterally, no wheezes rales or rhonchi, on room air, no distress, respirations are easy and non labored; mild [place]  GI: Abdomen is obese, soft, non tender, +BS x4; non distended  : Voiding without issues  MUSC: Moves all extremities except LUE NWB, Lt UE in splint  SKIN: pink, warm, poor turgor, eyebrow laceration stable; sacral wound improved, fungal rash improved; resolving ecchymosis       The above physical exam was reviewed and updated as determined by my evaluation of the patient on 5/24/2023  Invasive Devices     Peripheral Intravenous Line  Duration           Long-Dwell Peripheral IV (Midline) 51/79/17 Right Cephalic Vein 8 days                   VTE Pharmacologic Prophylaxis: Warfarin (Coumadin)  Code Status: Level 1 - Full Code  Current Length of Stay: 11 day(s)      Total time spent:  30 minutes with more than 50% spent counseling/coordinating care  Counseling includes discussion with patient re: progress  and discussion with patient of his/her current medical state/information  Coordination of patient's care was performed in conjunction with primary service  Time invested included review of patient's labs, vitals, and management of their comorbidities with continued monitoring  In addition, this patient was discussed with medical team including physician and advanced extenders  The care of the patient was extensively discussed and appropriate treatment plan was formulated unique for this patient  Medical decision making for the day was made by supervising physician unless otherwise noted in their attestation statement  ** Please Note:  voice to text software may have been used in the creation of this document   Although proof errors in transcription or interpretation are a potential of such software**

## 2023-05-24 NOTE — PLAN OF CARE
Problem: PAIN - ADULT  Goal: Verbalizes/displays adequate comfort level or baseline comfort level  Description: Interventions:  - Encourage patient to monitor pain and request assistance  - Assess pain using appropriate pain scale  - Administer analgesics based on type and severity of pain and evaluate response  - Implement non-pharmacological measures as appropriate and evaluate response  - Consider cultural and social influences on pain and pain management  - Notify physician/advanced practitioner if interventions unsuccessful or patient reports new pain  Outcome: Progressing     Problem: INFECTION - ADULT  Goal: Absence or prevention of progression during hospitalization  Description: INTERVENTIONS:  - Assess and monitor for signs and symptoms of infection  - Monitor lab/diagnostic results  - Monitor all insertion sites, i e  indwelling lines, tubes, and drains  - Monitor endotracheal if appropriate and nasal secretions for changes in amount and color  - Strum appropriate cooling/warming therapies per order  - Administer medications as ordered  - Instruct and encourage patient and family to use good hand hygiene technique  - Identify and instruct in appropriate isolation precautions for identified infection/condition  Outcome: Progressing  Goal: Absence of fever/infection during neutropenic period  Description: INTERVENTIONS:  - Monitor WBC    Outcome: Progressing     Problem: SAFETY ADULT  Goal: Patient will remain free of falls  Description: INTERVENTIONS:  - Educate patient/family on patient safety including physical limitations  - Instruct patient to call for assistance with activity   - Consult OT/PT to assist with strengthening/mobility   - Keep Call bell within reach  - Keep bed low and locked with side rails adjusted as appropriate  - Keep care items and personal belongings within reach  - Initiate and maintain comfort rounds  - Make Fall Risk Sign visible to staff  - Offer Toileting every 2 Hours, in advance of need  - Initiate/Maintain bed/chair alarm  - Obtain necessary fall risk management equipment: nonskid socks  - Apply yellow socks and bracelet for high fall risk patients  - Consider moving patient to room near nurses station  Outcome: Progressing  Goal: Maintain or return to baseline ADL function  Description: INTERVENTIONS:  -  Assess patient's ability to carry out ADLs; assess patient's baseline for ADL function and identify physical deficits which impact ability to perform ADLs (bathing, care of mouth/teeth, toileting, grooming, dressing, etc )  - Assess/evaluate cause of self-care deficits   - Assess range of motion  - Assess patient's mobility; develop plan if impaired  - Assess patient's need for assistive devices and provide as appropriate  - Encourage maximum independence but intervene and supervise when necessary  - Involve family in performance of ADLs  - Assess for home care needs following discharge   - Consider OT consult to assist with ADL evaluation and planning for discharge  - Provide patient education as appropriate  Outcome: Progressing  Goal: Maintains/Returns to pre admission functional level  Description: INTERVENTIONS:  - Perform BMAT or MOVE assessment daily    - Set and communicate daily mobility goal to care team and patient/family/caregiver  - Collaborate with rehabilitation services on mobility goals if consulted  - Perform Range of Motion 3 times a day  - Reposition patient every 2 hours    - Dangle patient 3 times a day  - Stand patient 3 times a day  - Ambulate patient 3 times a day  - Out of bed to chair 3 times a day   - Out of bed for meals 3 times a day  - Out of bed for toileting  - Record patient progress and toleration of activity level   Outcome: Progressing     Problem: DISCHARGE PLANNING  Goal: Discharge to home or other facility with appropriate resources  Description: INTERVENTIONS:  - Identify barriers to discharge w/patient and caregiver  - Arrange for needed discharge resources and transportation as appropriate  - Identify discharge learning needs (meds, wound care, etc )  - Arrange for interpretive services to assist at discharge as needed  - Refer to Case Management Department for coordinating discharge planning if the patient needs post-hospital services based on physician/advanced practitioner order or complex needs related to functional status, cognitive ability, or social support system  Outcome: Progressing     Problem: MOBILITY - ADULT  Goal: Maintain or return to baseline ADL function  Description: INTERVENTIONS:  -  Assess patient's ability to carry out ADLs; assess patient's baseline for ADL function and identify physical deficits which impact ability to perform ADLs (bathing, care of mouth/teeth, toileting, grooming, dressing, etc )  - Assess/evaluate cause of self-care deficits   - Assess range of motion  - Assess patient's mobility; develop plan if impaired  - Assess patient's need for assistive devices and provide as appropriate  - Encourage maximum independence but intervene and supervise when necessary  - Involve family in performance of ADLs  - Assess for home care needs following discharge   - Consider OT consult to assist with ADL evaluation and planning for discharge  - Provide patient education as appropriate  Outcome: Progressing  Goal: Maintains/Returns to pre admission functional level  Description: INTERVENTIONS:  - Perform BMAT or MOVE assessment daily    - Set and communicate daily mobility goal to care team and patient/family/caregiver  - Collaborate with rehabilitation services on mobility goals if consulted  - Perform Range of Motion 3 times a day  - Reposition patient every 2 hours    - Dangle patient 3 times a day  - Stand patient 3 times a day  - Ambulate patient 3 times a day  - Out of bed to chair 3 times a day   - Out of bed for meals 3 times a day  - Out of bed for toileting  - Record patient progress and toleration of activity level   Outcome: Progressing     Problem: Nutrition/Hydration-ADULT  Goal: Nutrient/Hydration intake appropriate for improving, restoring or maintaining nutritional needs  Description: Monitor and assess patient's nutrition/hydration status for malnutrition  Collaborate with interdisciplinary team and initiate plan and interventions as ordered  Monitor patient's weight and dietary intake as ordered or per policy  Utilize nutrition screening tool and intervene as necessary  Determine patient's food preferences and provide high-protein, high-caloric foods as appropriate       INTERVENTIONS:  - Monitor oral intake, urinary output, labs, and treatment plans  - Assess nutrition and hydration status and recommend course of action  - Evaluate amount of meals eaten  - Assist patient with eating if necessary   - Allow adequate time for meals  - Recommend/ encourage appropriate diets, oral nutritional supplements, and vitamin/mineral supplements  - Order, calculate, and assess calorie counts as needed  - Recommend, monitor, and adjust tube feedings and TPN/PPN based on assessed needs  - Assess need for intravenous fluids  - Provide specific nutrition/hydration education as appropriate  - Include patient/family/caregiver in decisions related to nutrition  Outcome: Progressing     Problem: Prexisting or High Potential for Compromised Skin Integrity  Goal: Skin integrity is maintained or improved  Description: INTERVENTIONS:  - Identify patients at risk for skin breakdown  - Assess and monitor skin integrity  - Assess and monitor nutrition and hydration status  - Monitor labs   - Assess for incontinence   - Turn and reposition patient  - Assist with mobility/ambulation  - Relieve pressure over bony prominences  - Avoid friction and shearing  - Provide appropriate hygiene as needed including keeping skin clean and dry  - Evaluate need for skin moisturizer/barrier cream  - Collaborate with interdisciplinary team   - Patient/family teaching  - Consider wound care consult   Outcome: Progressing

## 2023-05-24 NOTE — CASE MANAGEMENT
Case Management Update:  Faxed  to [facility] for additional coverage to [place] date of Thursday 6/1  Awaiting determination

## 2023-05-24 NOTE — PROGRESS NOTES
"   05/24/23 1030   Pain Assessment   Pain Assessment Tool 0-10   Pain Score No Pain   Restrictions/Precautions   Precautions Fall Risk;Bed/chair alarms;Pain;Pacemaker;Supervision on toilet/commode   LUE Weight Bearing Per Order NWB   Braces or Orthoses   (L [place] Brace)   Cognition   Overall Cognitive Status WFL   Subjective   Subjective Reports new brace feels suhail since adjustments made yesterday   Sit to Stand   Type of Assistance Needed Incidental touching   Comment Cane   Sit to Stand CARE Score 4   Bed-Chair Transfer   Type of Assistance Needed Incidental touching; Adaptive equipment   Comment using her personal cane   Chair/Bed-to-Chair Transfer CARE Score 4   Car Transfer   Type of Assistance Needed Physical assistance;Verbal cues; Adaptive equipment   Physical Assistance Level 51%-75%   Comment A legs in/out and boost to stand  Pt reporting car is lower than her car a home  recommend using cushion for future practice   Car Transfer CARE Score 2   Walk 10 Feet   Type of Assistance Needed Incidental touching; Adaptive equipment   Comment personal cane   Walk 10 Feet CARE Score 4   Walk 50 Feet with Two Turns   Type of Assistance Needed Incidental touching; Adaptive equipment   Comment personal cane   Walk 50 Feet with Two Turns CARE Score 4   Walk 150 Feet   Reason if not Attempted Safety concerns   Walk 150 Feet CARE Score 88   Walking 10 Feet on Uneven Surfaces   Reason if not Attempted Safety concerns   Walking 10 Feet on Uneven Surfaces CARE Score 88   Ambulation   Primary Mode of Locomotion Prior to Admission Walk   Distance Walked (feet) 50 ft  (x 4)   Assist Device Cane   Does the patient walk? 2   Yes   Curb or Single Stair   Comment pt states threshold to home 2\" or less   4 Steps   Reason if not Attempted Activity not applicable   4 Steps CARE Score 9   12 Steps   Reason if not Attempted Activity not applicable   12 Steps CARE Score 9   Therapeutic Interventions   Strengthening LAQ, Seated hip " "flexion 4 x 8 reps w/ 2#   Flexibility passive stretch B HS & calves   Balance worked on sidestepping along kitchen counter left/right   Assessment   Treatment Assessment Pt seen for continued work on functinal mobility with use of her personal Hurry cane  Pt reporting her dtr has new \"feet for cane\"  Moving at her own pace with activity, no LOB noted  Pt rocking in chair to use momentum to stand, but performs consistently with CGA  Overall activity tolerance improving  Will continue with strengthening, gait training to maximize safety  PT Therapy Minutes   PT Time In 1030   PT Time Out 1130   PT Total Time (minutes) 60   PT Mode of treatment - Individual (minutes) 60   PT Mode of treatment - Concurrent (minutes) 0   PT Mode of treatment - Group (minutes) 0   PT Mode of treatment - Co-treat (minutes) 0   PT Mode of Treatment - Total time(minutes) 60 minutes   PT Cumulative Minutes 870   Therapy Time missed   Time missed?  No       "

## 2023-05-24 NOTE — PROGRESS NOTES
"Occupational Therapy Treatment Note         05/24/23 0900   Pain Assessment   Pain Assessment Tool 0-10   Pain Score 5   Pain Location/Orientation Orientation: Left; Location: Arm   Hospital Pain Intervention(s) Rest;Elevated   Restrictions/Precautions   Precautions Bed/chair alarms; Fall Risk;Supervision on toilet/commode;Pacemaker;Pain   LUE Weight Bearing Per Order (S)  NWB   Braces or Orthoses   (L nielsen brace; did not wear L sling which is for comfort)   Lifestyle   Autonomy \"I am concerned my refrigerator won't be working when I go home\"   Lower Body Dressing   Type of Assistance Needed Physical assistance; Adaptive equipment   Physical Assistance Level 25% or less   Comment completed at [facility]  In stance with contact guard assist pt able to manage underwear down, requires assist to fully manage up on L  with increased time while seated pt able to use dressing stick and reacher in R UE to doff underwear and thread LEs into underwear  Lower Body Dressing CARE Score 3   Sit to Stand   Type of Assistance Needed Incidental touching; Adaptive equipment   Physical Assistance Level No physical assistance   Comment hurry cane   Sit to Stand CARE Score 4   Bed-Chair Transfer   Type of Assistance Needed Physical assistance; Adaptive equipment   Physical Assistance Level 25% or less   Comment min assist/contact guard using hurry cane   Chair/Bed-to-Chair Transfer CARE Score 3   Toileting Hygiene   Type of Assistance Needed Physical assistance; Adaptive equipment   Physical Assistance Level 26%-50%   Comment in stance contact guard to manage bladder, requires assist for posterior  requires assist to fully adjust underwear up on L side   Toileting Hygiene CARE Score 3   Toilet Transfer   Type of Assistance Needed Physical assistance; Adaptive equipment   Physical Assistance Level 25% or less   Comment hurry cane to/from platform [facility]   Toilet Transfer CARE Score 3   Cognition   Overall Cognitive Status WFL " Arousal/Participation Alert   Attention Within functional limits   Orientation Level Oriented X4   Memory Within functional limits   Following Commands Follows one step commands without difficulty   Activity Tolerance   Activity Tolerance Patient tolerated treatment well   Assessment   Treatment Assessment Pt participated in skilled OT tx session  See above for further details on functional performance  Pt progressing in LB ADL using LHAE and now that pt is agreeable to not wearing L UE sling (just wearing Salgado brace) which allows her to use L hand to assist with clothing management  Pt reporting her refrigerator was having problems, OT educated that d/c date is set for next Thursday and that the refrigerator not being fixed would not hold up d/c so recommend she contacts apartment complex today to begin arranging  OT and pt spoke with pt's dtr Racquel and informed her of same, Lorenza Johnson will contact complex about the refrigerator  Pt's other dtr Karla Nunez will be present tomorrow at 10:00 for OT family training focused on ADL routine of sponge bathing and dressing, as well as toileting  Will plan to order pt platform BSC but will educate on up charge  Pt positioned in w/c with L UE supported by green wedge and roho cushion  Chair alarm on and call bell in reach  Plan for d/c next Thursday with continued home therapy and nursing  Prognosis Fair   Problem List Decreased strength;Decreased range of motion;Decreased endurance; Impaired balance;Decreased mobility;Orthopedic restrictions;Pain   Barriers to Discharge Decreased caregiver support; Inaccessible home environment   Plan   Treatment/Interventions ADL retraining;Functional transfer training; Therapeutic exercise; Endurance training;Patient/family training;Equipment eval/education; Bed mobility; Compensatory technique education   Progress Progressing toward goals   OT Therapy Minutes   OT Time In 0900   OT Time Out 1000   OT Total Time (minutes) 60   OT Mode of treatment - Individual (minutes) 60   OT Mode of treatment - Concurrent (minutes) 0   OT Mode of treatment - Group (minutes) 0   OT Mode of treatment - Co-treat (minutes) 0   OT Mode of Treatment - Total time(minutes) 60 minutes   OT Cumulative Minutes 805   Therapy Time missed   Time missed?  No

## 2023-05-24 NOTE — PROGRESS NOTES
"   05/24/23 1230   Pain Assessment   Pain Assessment Tool 0-10   Pain Score 3   Pain Location/Orientation Orientation: Left; Location: Shoulder   Restrictions/Precautions   Precautions Bed/chair alarms; Fall Risk;Pacemaker;Supervision on toilet/commode;Pain   RUE Weight Bearing Per Order WBAT   LUE Weight Bearing Per Order (S)  NWB  (Salgado brace)   RLE Weight Bearing Per Order WBAT   LLE Weight Bearing Per Order WBAT   ROM Restrictions Yes   LUE ROM Restriction (S)    (5/24 clarification from Dr Claudette Hicks that pt is to have NO ROM at L shoulder  OK for ROM at elbow and wrist  (There is an ortho note that says OK at shoulder, but per Dr Claudette Hicks this is a typo ))   Lifestyle   Autonomy \"Oh it is tomorrow that my dtr is coming in for training? We might need more than one day because I have so much going on\"   Sit to Stand   Type of Assistance Needed Incidental touching   Physical Assistance Level No physical assistance   Comment Cane   Sit to Stand CARE Score 4   Functional Standing Tolerance   Comments Pt in stance for 4min while completing parquetry puzzle at table top to challenge pt's standing and activity tolerance  pt reports some fatigue at end of task but was able to complete w/ CS no LOB  ROM- Right Upper Extremities   RUE ROM Comment 3x10 w/ 2# DB for elbow flx/extn, forearm pronation/supination, wrist flx/extn  Tolerated well  Completed to increase BUE strength for increased IND w/ ADLs  Cognition   Overall Cognitive Status WFL   Arousal/Participation Alert; Cooperative   Attention Within functional limits   Orientation Level Oriented X4   Memory Within functional limits   Following Commands Follows one step commands without difficulty   Additional Activities   Additional Activities Comments D/t ongoing edema in L hand, completed gentle retrograde massage  pt denies pain t/o but noted intermittent grimacing, pt continued to deny pain  Following completion pt notes that her L hand feels \"easier to bend  \" " Activity Tolerance   Activity Tolerance Patient tolerated treatment well   Assessment   Treatment Assessment OT session focusing on fxnl standing tolerance, RUE TE, L hand edema management, fxnl transfers  5/24 clarification from Dr Trevon Macias that pt is to have NO ROM at L shoulder  OK for ROM at elbow and wrist  (There is an ortho note that says OK at shoulder, but per Dr Trevon Macias this is a typo  ) Pt's dtr, Robe Rios, to be present tomorrow for family training and ADL completion w/ OT  OT to continue to treat and follow POC  Prognosis Fair   Problem List Decreased strength;Decreased range of motion;Decreased endurance;Decreased coordination; Impaired balance;Decreased mobility; Impaired sensation;Obesity; Impaired tone;Decreased skin integrity;Orthopedic restrictions;Pain   Plan   Treatment/Interventions ADL retraining;Functional transfer training; Therapeutic exercise; Endurance training;Patient/family training;Equipment eval/education; Compensatory technique education;Continued evaluation;Spoke to MD   Progress Progressing toward goals   OT Therapy Minutes   OT Time In 1230   OT Time Out 1330   OT Total Time (minutes) 60   OT Mode of treatment - Individual (minutes) 60   OT Mode of treatment - Concurrent (minutes) 0   OT Mode of treatment - Group (minutes) 0   OT Mode of treatment - Co-treat (minutes) 0   OT Mode of Treatment - Total time(minutes) 60 minutes   OT Cumulative Minutes 865   Therapy Time missed   Time missed?  No

## 2023-05-25 PROBLEM — L89.153 STAGE III PRESSURE ULCER OF SACRAL REGION (HCC): Status: ACTIVE | Noted: 2023-05-13

## 2023-05-25 LAB
DME PARACHUTE DELIVERY DATE REQUESTED: NORMAL
DME PARACHUTE DELIVERY DATE REQUESTED: NORMAL
DME PARACHUTE ITEM DESCRIPTION: NORMAL
DME PARACHUTE ORDER STATUS: NORMAL
DME PARACHUTE ORDER STATUS: NORMAL
DME PARACHUTE SUPPLIER NAME: NORMAL
DME PARACHUTE SUPPLIER NAME: NORMAL
DME PARACHUTE SUPPLIER PHONE: NORMAL
DME PARACHUTE SUPPLIER PHONE: NORMAL
GLUCOSE SERPL-MCNC: 136 MG/DL (ref 65–140)
GLUCOSE SERPL-MCNC: 151 MG/DL (ref 65–140)
GLUCOSE SERPL-MCNC: 92 MG/DL (ref 65–140)
GLUCOSE SERPL-MCNC: 93 MG/DL (ref 65–140)
INR PPP: 2.05 (ref 0.84–1.19)
PROTHROMBIN TIME: 23.4 SECONDS (ref 11.6–14.5)

## 2023-05-25 PROCEDURE — 97535 SELF CARE MNGMENT TRAINING: CPT

## 2023-05-25 PROCEDURE — 97530 THERAPEUTIC ACTIVITIES: CPT

## 2023-05-25 PROCEDURE — 97110 THERAPEUTIC EXERCISES: CPT

## 2023-05-25 PROCEDURE — 99232 SBSQ HOSP IP/OBS MODERATE 35: CPT | Performed by: PHYSICAL MEDICINE & REHABILITATION

## 2023-05-25 PROCEDURE — 85610 PROTHROMBIN TIME: CPT | Performed by: NURSE PRACTITIONER

## 2023-05-25 PROCEDURE — 82948 REAGENT STRIP/BLOOD GLUCOSE: CPT

## 2023-05-25 PROCEDURE — 99232 SBSQ HOSP IP/OBS MODERATE 35: CPT | Performed by: INTERNAL MEDICINE

## 2023-05-25 RX ORDER — WARFARIN SODIUM 2.5 MG/1
2.5 TABLET ORAL
Status: DISCONTINUED | OUTPATIENT
Start: 2023-05-25 | End: 2023-05-29

## 2023-05-25 RX ADMIN — DOCUSATE SODIUM 100 MG: 100 CAPSULE, LIQUID FILLED ORAL at 09:09

## 2023-05-25 RX ADMIN — MICONAZOLE NITRATE 1 APPLICATION.: 20 CREAM TOPICAL at 17:31

## 2023-05-25 RX ADMIN — OXYBUTYNIN 5 MG: 5 TABLET, FILM COATED, EXTENDED RELEASE ORAL at 09:10

## 2023-05-25 RX ADMIN — AMIODARONE HYDROCHLORIDE 200 MG: 200 TABLET ORAL at 06:39

## 2023-05-25 RX ADMIN — NYSTATIN 1 APPLICATION.: 100000 POWDER TOPICAL at 09:12

## 2023-05-25 RX ADMIN — CITALOPRAM HYDROBROMIDE 20 MG: 20 TABLET ORAL at 09:09

## 2023-05-25 RX ADMIN — METOPROLOL SUCCINATE 25 MG: 25 TABLET, EXTENDED RELEASE ORAL at 09:10

## 2023-05-25 RX ADMIN — ACETAMINOPHEN 975 MG: 325 TABLET ORAL at 06:39

## 2023-05-25 RX ADMIN — WARFARIN SODIUM 2.5 MG: 2.5 TABLET ORAL at 17:30

## 2023-05-25 RX ADMIN — INSULIN LISPRO 1 UNITS: 100 INJECTION, SOLUTION INTRAVENOUS; SUBCUTANEOUS at 21:00

## 2023-05-25 RX ADMIN — DOCUSATE SODIUM 100 MG: 100 CAPSULE, LIQUID FILLED ORAL at 17:30

## 2023-05-25 RX ADMIN — Medication 1000 UNITS: at 09:09

## 2023-05-25 RX ADMIN — OXYCODONE HYDROCHLORIDE 5 MG: 5 TABLET ORAL at 09:36

## 2023-05-25 RX ADMIN — GABAPENTIN 100 MG: 100 CAPSULE ORAL at 21:00

## 2023-05-25 RX ADMIN — METHOCARBAMOL TABLETS 250 MG: 500 TABLET, COATED ORAL at 11:17

## 2023-05-25 RX ADMIN — ALLOPURINOL 100 MG: 100 TABLET ORAL at 09:09

## 2023-05-25 RX ADMIN — LIDOCAINE 5% 1 PATCH: 700 PATCH TOPICAL at 09:10

## 2023-05-25 RX ADMIN — ACETAMINOPHEN 975 MG: 325 TABLET ORAL at 15:35

## 2023-05-25 RX ADMIN — METHOCARBAMOL TABLETS 250 MG: 500 TABLET, COATED ORAL at 17:30

## 2023-05-25 RX ADMIN — ATORVASTATIN CALCIUM 40 MG: 40 TABLET, FILM COATED ORAL at 17:30

## 2023-05-25 RX ADMIN — OXYCODONE HYDROCHLORIDE 5 MG: 5 TABLET ORAL at 15:38

## 2023-05-25 RX ADMIN — MONTELUKAST 10 MG: 10 TABLET, FILM COATED ORAL at 21:00

## 2023-05-25 RX ADMIN — MICONAZOLE NITRATE 1 APPLICATION.: 20 CREAM TOPICAL at 09:12

## 2023-05-25 RX ADMIN — NYSTATIN 1 APPLICATION.: 100000 POWDER TOPICAL at 17:31

## 2023-05-25 RX ADMIN — METHOCARBAMOL TABLETS 250 MG: 500 TABLET, COATED ORAL at 06:39

## 2023-05-25 RX ADMIN — SENNOSIDES 17.2 MG: 8.6 TABLET, FILM COATED ORAL at 09:09

## 2023-05-25 NOTE — PROGRESS NOTES
PM&R PROGRESS NOTE:  Aide Robertson 68 y o  female MRN: [id number]  Unit/Bed#: -01 Encounter: [id number]        Rehab Diagnosis: Impairment of mobility, safety and Activities of Daily Living (ADLs) due to Major Multiple Trauma:  14 9  Other Multiple Trauma Multiple rib fractures with acute pain, improved acute hypoxic respiratory failure, and L humeral fracture NWB in coaptation splint       SUBJECTIVE: Patient seen face-to-face today in occupational therapy  No acute issues overnight  Denies acute pain  Patient's daughter Kahlil Damon is here for family training  ASSESSMENT: Stable, progressing      PLAN:    Rehabilitation  • Functional deficits: Left upper extremity weakness, impaired mobility, impaired self-care, impaired endurance, impaired strength  • Continue current rehabilitation plan of care to maximize function  • Expected Discharge: Discharge Thursday, 6/1/2023 Home health care RN, PT, OT  o Family training with her daughter Kahlil Damon       Current function:  Physical Therapy Occupational Therapy Speech Therapy   Weight Bearing Status: Non-weight bearing (L UE)  Transfers: Minimal Assistance, Moderate Assistance (CS-CG from higher surfaces, min-mod from lower surfaces)  Bed Mobility: Maximum Assistance, Supervision (max sit to supine, S supine to sit with bedrail)  Amulation Distance (ft): 40 feet  Ambulation: Incidental Touching, Supervision  Assistive Device for Ambulation: [facility]: Wheelchair  Discharge Recommendations: Home with:  [address] with[de-identified] First Floor Setup, Family Support, 24 Hour Assisteance, 24 Hour Supervision, Home Physical Therapy   Eating: Independent  Grooming: Minimal Assistance  Bathing: Moderate Assistance  Bathing:  Moderate Assistance  Upper Body Dressing: Maximum Assistance  Lower Body Dressing: Minimal Assistance  Toileting: Minimal Assistance  Toilet Transfer: Incidental Touching  Cognition: Within Defined Limits  Orientation: Person, Place, Time, Situation                 DVT prophylaxis  • Managed on warfarin  • Goal INR 2 5-3 5  • Last INR = 2 05    Bladder plan  • Continent  • Limit pure wick usage if possible    Bowel plan  • Continent      * Humerus fracture  Assessment & Plan  2/2 mechanical fall  Proximal humerus shaft spiral fracture, displaced and angulated  Ortho attempted closed reduction  Ultimately placed in coaptation splint on 4/27 and managed non-operatively  NWB LUE  Pain Control as detailed below  No Vitamin D level in chart or unmerged chart recently   - At home was on daily Vitamin D supplement 1000 units - restarted   - 5/14 Vitamind D level is 50 7   Due for 2 week follow-up  Imaging: Personally reviewed  Redemonstration of oblique spiral fracture of the shaft of the humerus with apex medial angulation  No glenohumeral dislocation  Appreciate orthopedics evaluating patient 5/15/2023  Replaced her splint with a Salgado brace  Cleared by orthopedics to provide range of motion to elbow and wrist without restriction as tolerated  Follow-up with orthopedics in 4 weeks around 6/15/2023    Ventricular arrhythmia  Assessment & Plan  On 5/2 developed Afib with RR which degenerated to VT then VF  She received shock from her AICD ([place], Single Chamber ICD)  Seen by Cards who adjusted her device, and recommended restarting home Metoprolol and Amio load  Was also on digoxin  - Toprol dose adjusted and digoxin discontinued after developing junctional bradycardia later in stay  Here: Toprol XL 25mg daily, Amio 200mg daily starting tomorrow for 28 days  Monitor cardiac status during therapies   Monitor lytes   Keep K > 4, Mag > 2  Will plan for outpatient f/u with EP    Difficult intravenous access  Assessment & Plan  Midline placed by IR on 5/15/23  Need for blood draws frequently to follow electrolytes, INR  Need for IV access given her recent ventricular arrhythmia and AICD firing, importance of optimizing electrolytes, current COVID 19 virus  Acute COVID-19  Assessment & Plan  Diagnosed on screening test on 5/13  Minimally symptomatic (does have a cough)  Afebrile and on RA  10 days isolation per protocol through 5/22/2023 (last day)  Off precautions 5/23/2023  Monitor respiratory status   - Has chronic issues with urinary urgency/frequency  - Added Q4hr timed voids so we can help her get to the commode in a timely manner    -Stable and has not required oxygen   -Compliant with utilizing incentive spirometer    CHAS (obstructive sleep apnea)  Assessment & Plan  Does not use CPAP at home  Consider Noc Ox overnight while here  Class 2 obesity in adult  Assessment & Plan  Nutrition consulted  Counseling    Osteoporosis with current pathological fracture with routine healing  Assessment & Plan  Managed by [facility]  History of L5 pathologic fracture  Has been on Alendronate once weekly since 2017  Would confirm with her what day she normally takes it prior to restarting  Will confirm that it is ok to restart with Orthopedics first, as some surgeons prefer to wait 6 weeks after acute fracture prior to restarting    - Per Dr Christa Griffin ok to resume at anytime  - She normally takes it on Thursdays  Would complete through 6/2023  Per last Endocrine note in 6/2022, they were going to continue for 1 more year and then stop  She was due for DXA this May  Asthma  Assessment & Plan  No acute exacerbation  Home: Singulair 10mg QHS  Here: Same, PRN albuterol  No acute exacerbation  Monitor respiratory status    Stage III pressure ulcer of sacral region [facility])  Assessment & Plan  Present on admission  Much improved - personally assessed and assessed by wound care 5/22/2023    Wound care following while at [address] [place] [address] Place local wound care as follows:  • DTI of the b/l sacro-buttocks has fully evolved to a Stage 3/4 injury  Wound is POA to ARC unit  Wound is improving, less devitalized tissue, decreased measurements     ? Patient desires to defer bedside debridement at this time, due to her concern of not being able to tolerate being on her side for that long  ? Continue with Triad paste and foam dressing  ? Pressure relief   ? P-500 mattress  • Candidiasis rash noted to the b/l medial thighs and perineum - greatly improved, nearly resolved  ? Continue with pascual anti-fungal cream  • ITD with candidasis to the b/l abdominal pannus partial thickness wounds have resolved, mild candidiasis rash remains  ? Continue with interdry sheets  • No s/s of infection present  • Will recommend to continue with preventative nursing skin care measures  • Nutrition is following along - nutritional supplements of Meet  • Patient verbalized understanding of plan of care  • [place] text wound care team with questions or concerns    • Routine wound care follow-up while admitted   • [facility], ambulatory referral placed  Frequent offloading in chair and in bed  P500 mattress  Optimize nutrition    Atrial fibrillation with RVR ([address] )  Assessment & Plan  Initially per AICD interrogation with Afib RVR that degenerated into VT/VF  Unclear burden  Here: Toprol XL 25mg daily, Fully anticoagulated on warfarin (See mechanical AV), amiodarone 200mg daily  Monitor and adjust as appropriate  Currently examines in NSR  IM consulted to assist with management      Outpatient f/u with EP    Abnormal CT scan  Assessment & Plan  - 4/27 CT CAP:           - Incidentally noted is a incarcerated/partially obstructed periumbilic ventral hernia, (image 146 series 9)          - Septated cyst anterior aspect of the right kidney, consider evaluation with ultrasound or MRI for further characterization (image 136 series 9)          - Dilated ascending aorta measuring 4 6 cm, evaluation with cardiothoracic surgery on nonemergent basis  - Follow up with PCP and CT surgery outpatient for findings  - Patient informed of abnormal results on 5/11: she is aware of ventral hernia but not aortic aneurysm or renal cyst         Hyponatremia  Assessment & Plan  Resolved  Na = 138    In acute care, [address] on 5/9  [place] by Trauma to be 2/2 poor PO intake and furosemide  Started on sodium tabs (weaning down)  Monitor and adjust as appropriate  IM consulted and assisting with management  Bradycardia  Assessment & Plan  Developed junctional bradycardia in hospital after restarting home Toprol XL and digoxin  - Cards stopped digoxin  - Toprol XL decreased to 25mg daily  Monitor  Type 2 diabetes mellitus ([facility] )  Assessment & Plan    Home: Sitagliptin-Metformin 50-50mg  Here: Discontinue Levemir due to hypoglycemia, continue sliding scale insulin and diabetic diet  Diabetic Diet  Nutrition Consult  Attempt to resume oral meds here and get off insulin  IM consulted to assist with management  Outpatient f/u with PCP  Fracture of multiple ribs of right side  Assessment & Plan  R 8th and 9th rib possible fractures  Rib fracture protocol  Pain control with lidoderm patch  Incentive Spirometer  Monitor respiratory status  Laceration of right eyebrow  Assessment & Plan  Healing, monitor  Steri-Strips in place    H/O mechanical aortic valve replacement  Assessment & Plan  Managed on Coumadin  Goal INR 2 5-3 5  INR 2 05  IM consulted to assist with management of Coumadin dosing  Acute pain due to trauma  Assessment & Plan  R rib fractures, L humerus fracture  - Lidoderm patch for ribs  - Oxycodone 5-7 5mg PRN  - Robaxin 250mg Q6hr scheduled  - Gabapentin 100mg QHS (may be able to discontinue while here)  - Tylenol 975mg Q8hrs scheduled  APS assisted inpatient  Adjust as appropriate while here  Appreciate IM consultants medical co-management  Labs, medications, and imaging personally reviewed  ROS:  A ten point review of systems was completed on 05/25/23 and pertinent positives are listed in subjective section   All other "systems reviewed were negative  OBJECTIVE:   /56 (BP Location: Right arm)   Pulse 58   Temp 98 6 °F (37 °C) (Oral)   Resp 18   Ht 4' 10\" (1 473 m)   Wt 92 9 kg (204 lb 12 9 oz) Comment: pt also has a splint on LUE  SpO2 93%   BMI 42 80 kg/m²       Physical Exam  Vitals and nursing note reviewed  Constitutional:       General: She is not in acute distress  HENT:      Head: Normocephalic and atraumatic  Nose: Nose normal       Mouth/Throat:      Mouth: Mucous membranes are moist    Eyes:      Conjunctiva/sclera: Conjunctivae normal    Cardiovascular:      Rate and Rhythm: Normal rate and regular rhythm  Pulses: Normal pulses  Pulmonary:      Effort: Pulmonary effort is normal       Breath sounds: Normal breath sounds  No wheezing or rales  Abdominal:      General: Bowel sounds are normal  There is no distension  Palpations: Abdomen is soft  Tenderness: There is no abdominal tenderness  Musculoskeletal:         General: Swelling (LUE mild but improved) present  Cervical back: Neck supple  Comments: Much better fitting brace in place   Skin:     General: Skin is warm  Comments: Improving sacral ulcer - appears more stage 3 now   Neurological:      Mental Status: She is alert and oriented to person, place, and time  Motor: Weakness present     Psychiatric:         Mood and Affect: Mood normal         Lab Results   Component Value Date    HCT 33 4 (L) 05/22/2023    HGB 10 9 (L) 05/22/2023     (H) 05/22/2023     (L) 05/22/2023    WBC 3 14 (L) 05/22/2023     Lab Results   Component Value Date    BUN 23 05/22/2023    CALCIUM 8 6 05/22/2023     (H) 05/22/2023    CO2 28 05/22/2023    CREATININE 0 59 (L) 05/22/2023    GLUC 73 05/22/2023    K 4 3 05/22/2023    SODIUM 138 05/22/2023     Lab Results   Component Value Date    INR 2 05 (H) 05/25/2023    INR 2 68 (H) 05/22/2023    INR 3 16 (H) 05/21/2023    PROTIME 23 4 (H) 05/25/2023    PROTIME " 28  8 (H) 05/22/2023    PROTIME 32 7 (H) 05/21/2023           Current Facility-Administered Medications:   •  acetaminophen (TYLENOL) tablet 975 mg, 975 mg, Oral, Q8H [facility], Glennon Felty, MD, 975 mg at 05/25/23 0785  •  albuterol (PROVENTIL HFA,VENTOLIN HFA) inhaler 2 puff, 2 puff, Inhalation, Q4H PRN, Glennon Felty, MD  •  allopurinol (ZYLOPRIM) tablet 100 mg, 100 mg, Oral, Daily, Glennon Felty, MD, 100 mg at 05/25/23 8167  •  amiodarone tablet 200 mg, 200 mg, Oral, Daily With Breakfast, Glennon Felty, MD, 200 mg at 05/25/23 7129  •  atorvastatin (LIPITOR) tablet 40 mg, 40 mg, Oral, Daily With Zari Hawk MD, 40 mg at 05/24/23 1644  •  calcium carbonate (TUMS) chewable tablet 500 mg, 500 mg, Oral, TID PRN, Glennon Felty, MD  •  cholecalciferol (VITAMIN D3) tablet 1,000 Units, 1,000 Units, Oral, Daily, Glennon Felty, MD, 1,000 Units at 05/25/23 0909  •  citalopram (CeleXA) tablet 20 mg, 20 mg, Oral, Daily, Glennon Felty, MD, 20 mg at 05/25/23 0909  •  docusate sodium (COLACE) capsule 100 mg, 100 mg, Oral, BID, Glennon Felty, MD, 100 mg at 05/25/23 0909  •  furosemide (LASIX) tablet 40 mg, 40 mg, Oral, Daily, Glennon Felty, MD, 40 mg at 05/24/23 0784  •  gabapentin (NEURONTIN) capsule 100 mg, 100 mg, Oral, HS, Glennon Felty, MD, 100 mg at 05/24/23 2133  •  insulin lispro (HumaLOG) 100 units/mL subcutaneous injection 1-5 Units, 1-5 Units, Subcutaneous, TID AC **AND** Fingerstick Glucose (POCT), , , TID AC, Glennon Felty, MD  •  insulin lispro (HumaLOG) 100 units/mL subcutaneous injection 1-5 Units, 1-5 Units, Subcutaneous, HS, Glennon Felty, MD, 1 Units at 05/21/23 2209  •  lidocaine (LIDODERM) 5 % patch 1 patch, 1 patch, Topical, Daily, Glennon Felty, MD, 1 patch at 05/25/23 0910  •  methocarbamol (ROBAXIN) tablet 250 mg, 250 mg, Oral, Q6H [facility], Glennon Felty, MD, 250 mg at 05/25/23 1117  •  metoprolol succinate (TOPROL-XL) 24 hr tablet 25 mg, 25 mg, Oral, Daily, Glennon Felty, MD, 25 mg at 05/25/23 0910  •  moisture barrier miconazole 2% cream (aka LICO MOISTURE BARRIER ANTIFUNGAL CREAM), , Topical, BID, JOSEPHINE Hughes, 1 application  at 05/25/23 0912  •  montelukast (SINGULAIR) tablet 10 mg, 10 mg, Oral, HS, Britta Harley MD, 10 mg at 05/24/23 2133  •  naloxone (NARCAN) 0 04 mg/mL syringe 0 04 mg, 0 04 mg, Intravenous, Q1MIN PRN, Britta Harley MD  •  nystatin (MYCOSTATIN) powder, , Topical, BID, Amanda Pearl PA-C, 1 application   at 05/25/23 0912  •  ondansetron (ZOFRAN-ODT) dispersible tablet 4 mg, 4 mg, Oral, Q6H PRN, Britta Harley MD, 4 mg at 05/24/23 4809  •  oxybutynin (DITROPAN-XL) 24 hr tablet 5 mg, 5 mg, Oral, Daily, Britta Harley MD, 5 mg at 05/25/23 3551  •  oxyCODONE (ROXICODONE) IR tablet 5 mg, 5 mg, Oral, Q4H PRN, Britta Harley MD, 5 mg at 05/25/23 1520  •  oxyCODONE (ROXICODONE) split tablet 7 5 mg, 7 5 mg, Oral, Q4H PRN, Britta Harley MD, 7 5 mg at 05/23/23 2106  •  senna (SENOKOT) tablet 17 2 mg, 2 tablet, Oral, Daily, Britta Harley MD, 17 2 mg at 05/25/23 3870  •  warfarin (COUMADIN) tablet 2 5 mg, 2 5 mg, Oral, Daily (warfarin), Kostas Henry PA-C    Past Medical History:   Diagnosis Date   • Asthma    • Diabetes ([facility] )    • Hypertension        Patient Active Problem List    Diagnosis Date Noted   • Humerus fracture 04/27/2023   • Ventricular arrhythmia 05/04/2023   • Difficult intravenous access 05/15/2023   • Acute COVID-19 05/14/2023   • Atrial fibrillation with RVR ([facility] ) 05/13/2023   • Stage III pressure ulcer of sacral region ([facility] ) 05/13/2023   • Asthma 05/13/2023   • Osteoporosis with current pathological fracture with routine healing 05/13/2023   • Class 2 obesity in adult 05/13/2023   • CHAS (obstructive sleep apnea) 05/13/2023   • Abnormal CT scan 05/10/2023   • Hyponatremia 05/09/2023   • Bradycardia 05/08/2023   • Type 2 diabetes mellitus ([facility] ) 04/28/2023   • Fall 04/27/2023   • Acute pain due to trauma 04/27/2023   • H/O mechanical aortic valve replacement 04/27/2023   • Laceration of right eyebrow 04/27/2023   • Fracture of multiple ribs of right side 04/27/2023          Darian Anderson MD    I have spent a total time of 35 minutes on 05/25/23 in caring for this patient including Impressions, Counseling / Coordination of care and Documenting in the medical record  ** Please Note:  voice to text software may have been used in the creation of this document   Although proof errors in transcription or interpretation are a potential of such software**

## 2023-05-25 NOTE — PLAN OF CARE
Problem: PAIN - ADULT  Goal: Verbalizes/displays adequate comfort level or baseline comfort level  Description: Interventions:  - Encourage patient to monitor pain and request assistance  - Assess pain using appropriate pain scale  - Administer analgesics based on type and severity of pain and evaluate response  - Implement non-pharmacological measures as appropriate and evaluate response  - Consider cultural and social influences on pain and pain management  - Notify physician/advanced practitioner if interventions unsuccessful or patient reports new pain  Outcome: Progressing     Problem: INFECTION - ADULT  Goal: Absence or prevention of progression during hospitalization  Description: INTERVENTIONS:  - Assess and monitor for signs and symptoms of infection  - Monitor lab/diagnostic results  - Monitor all insertion sites, i e  indwelling lines, tubes, and drains  - Monitor endotracheal if appropriate and nasal secretions for changes in amount and color  - Independence appropriate cooling/warming therapies per order  - Administer medications as ordered  - Instruct and encourage patient and family to use good hand hygiene technique  - Identify and instruct in appropriate isolation precautions for identified infection/condition  Outcome: Progressing  Goal: Absence of fever/infection during neutropenic period  Description: INTERVENTIONS:  - Monitor WBC    Outcome: Progressing     Problem: SAFETY ADULT  Goal: Patient will remain free of falls  Description: INTERVENTIONS:  - Educate patient/family on patient safety including physical limitations  - Instruct patient to call for assistance with activity   - Consult OT/PT to assist with strengthening/mobility   - Keep Call bell within reach  - Keep bed low and locked with side rails adjusted as appropriate  - Keep care items and personal belongings within reach  - Initiate and maintain comfort rounds  - Make Fall Risk Sign visible to staff  - Offer Toileting every 2 Hours, in advance of need  - Initiate/Maintain bed/chair alarm  - Obtain necessary fall risk management equipment: nonskid socks  - Apply yellow socks and bracelet for high fall risk patients  - Consider moving patient to room near nurses station  Outcome: Progressing  Goal: Maintain or return to baseline ADL function  Description: INTERVENTIONS:  -  Assess patient's ability to carry out ADLs; assess patient's baseline for ADL function and identify physical deficits which impact ability to perform ADLs (bathing, care of mouth/teeth, toileting, grooming, dressing, etc )  - Assess/evaluate cause of self-care deficits   - Assess range of motion  - Assess patient's mobility; develop plan if impaired  - Assess patient's need for assistive devices and provide as appropriate  - Encourage maximum independence but intervene and supervise when necessary  - Involve family in performance of ADLs  - Assess for home care needs following discharge   - Consider OT consult to assist with ADL evaluation and planning for discharge  - Provide patient education as appropriate  Outcome: Progressing  Goal: Maintains/Returns to pre admission functional level  Description: INTERVENTIONS:  - Perform BMAT or MOVE assessment daily    - Set and communicate daily mobility goal to care team and patient/family/caregiver  - Collaborate with rehabilitation services on mobility goals if consulted  - Perform Range of Motion 3 times a day  - Reposition patient every 2 hours    - Dangle patient 3 times a day  - Stand patient 3 times a day  - Ambulate patient 3 times a day  - Out of bed to chair 3 times a day   - Out of bed for meals 3 times a day  - Out of bed for toileting  - Record patient progress and toleration of activity level   Outcome: Progressing     Problem: DISCHARGE PLANNING  Goal: Discharge to home or other facility with appropriate resources  Description: INTERVENTIONS:  - Identify barriers to discharge w/patient and caregiver  - Arrange for needed discharge resources and transportation as appropriate  - Identify discharge learning needs (meds, wound care, etc )  - Arrange for interpretive services to assist at discharge as needed  - Refer to Case Management Department for coordinating discharge planning if the patient needs post-hospital services based on physician/advanced practitioner order or complex needs related to functional status, cognitive ability, or social support system  Outcome: Progressing     Problem: MOBILITY - ADULT  Goal: Maintain or return to baseline ADL function  Description: INTERVENTIONS:  -  Assess patient's ability to carry out ADLs; assess patient's baseline for ADL function and identify physical deficits which impact ability to perform ADLs (bathing, care of mouth/teeth, toileting, grooming, dressing, etc )  - Assess/evaluate cause of self-care deficits   - Assess range of motion  - Assess patient's mobility; develop plan if impaired  - Assess patient's need for assistive devices and provide as appropriate  - Encourage maximum independence but intervene and supervise when necessary  - Involve family in performance of ADLs  - Assess for home care needs following discharge   - Consider OT consult to assist with ADL evaluation and planning for discharge  - Provide patient education as appropriate  Outcome: Progressing  Goal: Maintains/Returns to pre admission functional level  Description: INTERVENTIONS:  - Perform BMAT or MOVE assessment daily    - Set and communicate daily mobility goal to care team and patient/family/caregiver  - Collaborate with rehabilitation services on mobility goals if consulted  - Perform Range of Motion 3 times a day  - Reposition patient every 2 hours    - Dangle patient 3 times a day  - Stand patient 3 times a day  - Ambulate patient 3 times a day  - Out of bed to chair 3 times a day   - Out of bed for meals 3 times a day  - Out of bed for toileting  - Record patient progress and toleration of activity level   Outcome: Progressing     Problem: Nutrition/Hydration-ADULT  Goal: Nutrient/Hydration intake appropriate for improving, restoring or maintaining nutritional needs  Description: Monitor and assess patient's nutrition/hydration status for malnutrition  Collaborate with interdisciplinary team and initiate plan and interventions as ordered  Monitor patient's weight and dietary intake as ordered or per policy  Utilize nutrition screening tool and intervene as necessary  Determine patient's food preferences and provide high-protein, high-caloric foods as appropriate       INTERVENTIONS:  - Monitor oral intake, urinary output, labs, and treatment plans  - Assess nutrition and hydration status and recommend course of action  - Evaluate amount of meals eaten  - Assist patient with eating if necessary   - Allow adequate time for meals  - Recommend/ encourage appropriate diets, oral nutritional supplements, and vitamin/mineral supplements  - Order, calculate, and assess calorie counts as needed  - Recommend, monitor, and adjust tube feedings and TPN/PPN based on assessed needs  - Assess need for intravenous fluids  - Provide specific nutrition/hydration education as appropriate  - Include patient/family/caregiver in decisions related to nutrition  Outcome: Progressing     Problem: Prexisting or High Potential for Compromised Skin Integrity  Goal: Skin integrity is maintained or improved  Description: INTERVENTIONS:  - Identify patients at risk for skin breakdown  - Assess and monitor skin integrity  - Assess and monitor nutrition and hydration status  - Monitor labs   - Assess for incontinence   - Turn and reposition patient  - Assist with mobility/ambulation  - Relieve pressure over bony prominences  - Avoid friction and shearing  - Provide appropriate hygiene as needed including keeping skin clean and dry  - Evaluate need for skin moisturizer/barrier cream  - Collaborate with interdisciplinary team   - Patient/family teaching  - Consider wound care consult   Outcome: Progressing

## 2023-05-25 NOTE — PROGRESS NOTES
05/25/23 1230   Pain Assessment   Pain Assessment Tool 0-10   Pain Score 6   Pain Location/Orientation Orientation: Left; Location: Shoulder   Pain Radiating Towards no   Pain Onset/Description Onset: Ongoing;Frequency: Constant/Continuous   Hospital Pain Intervention(s) Rest;Relaxation technique   Restrictions/Precautions   Precautions Fall Risk;Bed/chair alarms;Supervision on toilet/commode   LUE Weight Bearing Per Order NWB   Braces or Orthoses Sling  (L UE nielsen brace)   Cognition   Overall Cognitive Status WFL   Arousal/Participation Alert; Cooperative   Subjective   Subjective pt had no new c/o and ready for PT with daughter Dasia Quach in attendance for FT  Roll Left and Right   Reason if not Attempted Medical concerns   Roll Left and Right CARE Score 88   Sit to Lying   Type of Assistance Needed Physical assistance;Verbal cues; Adaptive equipment   Physical Assistance Level 51%-75%   Comment R rail with assist with bilat LE which per daughter is baseline   Sit to Lying CARE Score 2   Lying to Sitting on Side of Bed   Type of Assistance Needed Supervision;Verbal cues; Adaptive equipment   Comment HOB slightly elevated (uses wedge at home ) and R rail   Lying to Sitting on Side of Bed CARE Score 4   Sit to Stand   Type of Assistance Needed Physical assistance;Verbal cues; Adaptive equipment   Physical Assistance Level 25% or less   Comment CS for the most part with 3x min A as pt fatigue towards end of tx  assist provided by daughter boosting pt on the buttocks  Sit to Stand CARE Score 3   Bed-Chair Transfer   Type of Assistance Needed Supervision;Verbal cues; Adaptive equipment   Comment personal cane with new cane base   Chair/Bed-to-Chair Transfer CARE Score 4   Car Transfer   Type of Assistance Needed Physical assistance;Verbal cues; Adaptive equipment   Physical Assistance Level 51%-75%   Comment assist with bilat LE but able to get up from mock car with extra cushion - per daughter at baseline family been "assisting pt cari VARELA into the car   Car Transfer CARE Score 2   Walk 10 Feet   Type of Assistance Needed Supervision;Verbal cues; Adaptive equipment   Comment 10' x 3, 20' x 1, 30' x 1 with personal cane   Walk 10 Feet CARE Score 4   Wheel 50 Feet with Two Turns   Comment due to body habitus, strength and ROM deficits will not be able to self propel w/c but will benefit from w/c for community distance mobilities including medical appts  family will push the w/c  will also benefit from at least 2\" ROHO cushion due to ongoing pressure ulcer on bottom  in addition pt and dtr educated on importance of repositioning for pressure relief  every 15 mins if able or at least every hour for at least 90 secs at a time, both in agreement   Picking Up Object   Comment (S)  will reasses next session with reacher   Toilet Transfer   Type of Assistance Needed Incidental touching;Verbal cues; Adaptive equipment   Comment CS-CGA walk to/from [facility] with [facility]   Toilet Transfer CARE Score 4   Therapeutic Interventions   Strengthening HEP prescription with handout utilizing [employer] program include supine/seated gluteal sets, supine quad sets x5 SH x 10 reps x 3 sets, seated marches& LAQ x 3 SH x 10 reps x 3 sets, seate hip adduction pillow squeezes, hip abduction, heel toe raises x 5 SH x 10 reps x 3 sets daily   Other Comments   Comments pt and daughter provided handout for car door handle for pt unable to use overhead bar in car due to baseline limited shoulder ROM   Assessment   Treatment Assessment skilled PT for 90 mins focused on HEP prescription and hands on FT, see info above for details, daughter to return on Tuesday 5/20 for additional hands on FT in preparation for home d/c on 6/1/23  DME for hospital bed and w/c also submitted to CM today   at this time pt cont to demo dec act tolerance jodee with gait task so will cont to work on it along with strengthening exercises and standing tolerance/balance training to improve overall " functions and dec caregiver burden at d/c  Problem List Decreased endurance;Decreased strength; Impaired balance;Decreased mobility;Obesity;Orthopedic restrictions;Pain;Decreased range of motion   Barriers to Discharge None   PT Barriers   Functional Limitation Car transfers;Stair negotiation; Walking;Transfers   Plan   Treatment/Interventions LE strengthening/ROM; Functional transfer training; Therapeutic exercise; Endurance training;Bed mobility;Gait training;Spoke to nursing;OT   Progress Progressing toward goals   Recommendation   PT Discharge Recommendation Home with home health rehabilitation   Equipment Recommended Cane; Wheelchair  (hospital bed)   Wheelchair Package Recommended   (22 x16 nesha height w/c with full desk length, swing away leg rest, anti-tipper with 2inch Roho cushion, full automatic bed with low air mattress)   Date ordered 05/25/23   PT Therapy Minutes   PT Time In 1230   PT Time Out 1400   PT Total Time (minutes) 90   PT Mode of treatment - Individual (minutes) 90   PT Mode of treatment - Concurrent (minutes) 0   PT Mode of treatment - Group (minutes) 0   PT Mode of treatment - Co-treat (minutes) 0   PT Mode of Treatment - Total time(minutes) 90 minutes   PT Cumulative Minutes 960   Therapy Time missed   Time missed?  No

## 2023-05-25 NOTE — PROGRESS NOTES
Internal Medicine Progress Note  Patient: Aysha Almazan  Age/sex: 68 y o  female  Medical Record #: [id number]      ASSESSMENT/PLAN: (Interval History)  Aysha Almazan is seen and examined and management for following issues:    Lt proximal humerus fracture  • NWB to the LUE  • Immobilizer placed to LUE by orthopedics  • Pain control and therapy per PMR  • Follow-up with Orthopedics in 4 weeks  Rt 8-9 rib fx  • Pain control per PMR  • Encourage IS   • O2 as needed  Rt eyebrow laceration  • Monitor lacerations  • Local wound care  • stable     DM type 2  • HA1C 6 4  • Home: [place]  2x daily  • Here: SSI  • Will monitor and add back medications as needed  • BS overall stable     History of mechanical AVR  • Goal INR 2 5 - 3 5  • INR 2 05  • Increase Coumadin to 2 5mg daily  • Repeat INR Monday  • Pt takes 2 5mg Wednesday and Saturday and 5mg all other days of the week at home     Hyponatremia  • Na 138  • Dc nacl tabs and repeat bmp in am     Ventricular arrhythmia  • One episode 23 and pt received a shock from ICD  • Continue metoprolol and amiodarone  • Continue Coumadin  • Monitor bmp     Bradycardia  • Diltiazem discontinued and metoprolol dose lowered  • She will need outpt follow-up with Cardiology  [facility]  • HR stable     COVID-19  • Tested positive 23  • Pt has a cough which she reports is chronic  • Contact and airborne precautions for 10 days  • Off isolation    Buttock wound/labia wound  · Followed by wound care  · Frequent position changes/specialized mattress  · Will monitor  · Overall improvement      DC plannin23  The above assessment and plan was reviewed and updated as determined by my evaluation of the patient on 2023      Labs:   Results from last 7 days   Lab Units 23  0613   HEMATOCRIT % 33 4*   HEMOGLOBIN g/dL 10 9*   PLATELETS Thousands/uL 142*   WBC Thousand/uL 3 14*     Results from last 7 days   Lab Units 05/22/23  0613   BUN mg/dL 23   CALCIUM mg/dL 8 6   CHLORIDE mmol/L 110*   CO2 mmol/L 28   CREATININE mg/dL 0 59*   POTASSIUM mmol/L 4 3   SODIUM mmol/L 138         Results from last 7 days   Lab Units 05/25/23  0717 05/22/23  0613   INR  2 05* 2 68*     Results from last 7 days   Lab Units 05/25/23  0636 05/24/23  2113 05/24/23  1610   POC GLUCOSE mg/dl 92 109 111       Review of Scheduled Meds:  Current Facility-Administered Medications   Medication Dose Route Frequency Provider Last Rate   • acetaminophen  975 mg Oral [facility] Cheryl Shabazz MD     • albuterol  2 puff Inhalation Q4H PRN Cheryl Shabazz MD     • allopurinol  100 mg Oral Daily Cheryl Shabazz MD     • amiodarone  200 mg Oral Daily With Breakfast Cheryl Shabazz MD     • atorvastatin  40 mg Oral Daily With Dominga Novak MD     • calcium carbonate  500 mg Oral TID PRN Cheryl Shabazz MD     • cholecalciferol  1,000 Units Oral Daily Cheryl Shabazz MD     • citalopram  20 mg Oral Daily Cheryl Shabazz MD     • docusate sodium  100 mg Oral BID Cheryl Shabazz MD     • furosemide  40 mg Oral Daily Cheryl Shabazz MD     • gabapentin  100 mg Oral HS Cheryl Shabazz MD     • insulin lispro  1-5 Units Subcutaneous TID [facility] Cheryl Shabazz MD     • insulin lispro  1-5 Units Subcutaneous HS Cheryl Shabazz MD     • lidocaine  1 patch Topical Daily Cheryl Shabazz MD     • methocarbamol  250 mg Oral Q6H [address] Cheryl Shabazz MD     • metoprolol succinate  25 mg Oral Daily Cheryl Shabazz MD     • LICO ANTIFUNGAL   Topical BID JOSEPHINE Kaplan     • montelukast  10 mg Oral HS Cheryl Shabazz MD     • naloxone  0 04 mg Intravenous Q1MIN PRN Cheryl Shabazz MD     • nystatin   Topical BID Amanda Pearl PA-C     • ondansetron  4 mg Oral Q6H PRN Cheryl Shabazz MD     • oxybutynin  5 mg Oral Daily Cheryl Shabazz MD     • oxyCODONE  5 mg Oral Q4H PRN Cheryl Shabazz MD     • oxyCODONE  7 5 mg Oral Q4H PRN Cheryl Shabazz MD     • raven  2 tablet Oral Daily Corey Gonsales MD     • warfarin  2 mg Oral Daily (warfarin) JOSEPHINE Cox         Subjective/ HPI: Patient seen and examined  Patients overnight issues or events were reviewed with nursing or staff during rounds or morning huddle session  New or overnight issues include the following:     Pt seen in her room  She states that she is doing well  She denies any current complaints  ROS:   A 10 point ROS was performed; negative except as noted above         Imaging:     XR humerus left   Final Result by Nikunj Brownlee MD (05/15 1613)   Oblique spiral fracture of the shaft of the humerus with apex medial angulation   No glenohumeral dislocation         Workstation performed: [id number]         XR shoulder 2+ vw left   Final Result by Nikunj Brownlee MD (05/15 1614)      Oblique spiral fracture of the proximal humeral shaft with stable alignment      Workstation performed: [id number]             *Labs /Radiology studies Reviewed  *Medications  reviewed and reconciled as needed  *Please refer to order section for additional ordered labs studies  *Case discussed with primary attending during morning huddle case rounds    Physical Examination:  Vitals:   Vitals:    05/24/23 1418 05/24/23 2113 05/25/23 0648 05/25/23 0905   BP: 102/51 119/51 146/62 109/56   BP Location: Right arm Right arm Right arm Right arm   Pulse: 70 61 66 58   Resp: 19 20 18 18   Temp: 98 6 °F (37 °C) 98 3 °F (36 8 °C) 98 6 °F (37 °C)    TempSrc: Oral Oral Oral    SpO2: 96% 97% 93%    Weight:       Height:         GEN: NAD; pleasant  NEURO: Alert and oriented x4; appropriate  HEENT: Pupils are equal/reactive; normocephalic, face is symmetrical, hearing intact  CV: S1 S2 regular, +audible click; no murmur/rub/gallops, 1/4 pedal pulses, trace b/l edema present; LUE hand edema trace  RESP: Lungs are clear bilaterally, no wheezes rales or rhonchi, on room air, no distress, respirations are easy and non labored; mild [place]  GI: Abdomen is obese, soft, non tender, +BS x4; non distended  : Voiding without issues  MUSC: Moves all extremities except LUE NWB, Lt UE in splint  SKIN: pink, warm, poor turgor, eyebrow laceration stable; sacral wound improved, fungal rash improved; resolving ecchymosis       The above physical exam was reviewed and updated as determined by my evaluation of the patient on 5/25/2023  Invasive Devices     Peripheral Intravenous Line  Duration           Long-Dwell Peripheral IV (Midline) 36/83/68 Right Cephalic Vein 9 days                   VTE Pharmacologic Prophylaxis: Warfarin (Coumadin)  Code Status: Level 1 - Full Code  Current Length of Stay: 12 day(s)      Total time spent:  30 minutes with more than 50% spent counseling/coordinating care  Counseling includes discussion with patient re: progress  and discussion with patient of his/her current medical state/information  Coordination of patient's care was performed in conjunction with primary service  Time invested included review of patient's labs, vitals, and management of their comorbidities with continued monitoring  In addition, this patient was discussed with medical team including physician and advanced extenders  The care of the patient was extensively discussed and appropriate treatment plan was formulated unique for this patient  Medical decision making for the day was made by supervising physician unless otherwise noted in their attestation statement  ** Please Note:  voice to text software may have been used in the creation of this document   Although proof errors in transcription or interpretation are a potential of such software**

## 2023-05-25 NOTE — PROGRESS NOTES
"Occupational Therapy Treatment Note         05/25/23 1000   Pain Assessment   Pain Assessment Tool 0-10   Pain Score 6   Pain Location/Orientation Orientation: Left; Location: Arm   Hospital Pain Intervention(s) Repositioned   Restrictions/Precautions   Precautions Bed/chair alarms; Fall Risk;Supervision on toilet/commode   LUE Weight Bearing Per Order (S)  NWB   Braces or Orthoses   (L UE nielsen brace)   Lifestyle   Autonomy \"this went well\" - family training   Eating   Type of Assistance Needed Set-up / clean-up   Physical Assistance Level No physical assistance   Eating CARE Score 5   Oral Hygiene   Type of Assistance Needed Set-up / clean-up   Physical Assistance Level No physical assistance   Comment seated  will complete after family training   Oral Hygiene CARE Score 5   Shower/Bathe Self   Type of Assistance Needed Physical assistance   Physical Assistance Level 76% or more   Comment Seated on wheelchair pt uses R UE to complete bathing of L UE, abdomen/chest, R thigh  Pt requires assist for LEs, R UE and thoroughness of under L UE  Wound care and bathing of groin  buttock to be completed during nursing family training after session  OT recommended they complete this care of full washing of groin/pannus/buttock while supine in bed  Shower/Bathe Self CARE Score 2   Bathing   Assessed Bath Style Sponge Bath   Tub/Shower Transfer   Reason Not Assessed Sponge Bath   Findings recommend sponge bathing upon d/c   Upper Body Dressing   Type of Assistance Needed Physical assistance   Physical Assistance Level 76% or more   Comment OT educated pt's dtr on assisting with L nielsen brace and dresses  Pt's dtr able to assist, completed don/doffing of brace 3x  Upper Body Dressing CARE Score 2   Lower Body Dressing   Comment pt will observe donning of LB clothing after wound care     Putting On/Taking Off Footwear   Type of Assistance Needed Physical assistance   Physical Assistance Level Total assistance   Putting " On/Taking Off Footwear CARE Score 1   Sit to Stand   Type of Assistance Needed Incidental touching; Adaptive equipment   Physical Assistance Level No physical assistance   Comment hurry cane   Sit to Stand CARE Score 4   Bed-Chair Transfer   Type of Assistance Needed Incidental touching; Adaptive equipment   Physical Assistance Level No physical assistance   Comment hurry cane short distance mobility to/from [facility]   Chair/Bed-to-Chair Transfer CARE Score 4   Toileting Hygiene   Type of Assistance Needed Physical assistance   Physical Assistance Level 51%-75%   Comment educated pt's dtr on how to provide contact guard assist in stance while pt completes clothing management and front hygiene  pt requires assist for L side of clothing and rear hygiene   Toileting Hygiene CARE Score 2   Toilet Transfer   Type of Assistance Needed Incidental touching; Adaptive equipment   Physical Assistance Level No physical assistance   Comment platform BSC and hurrycane, walking less than 10 feet within room  pt has goal to use her raised toilets at home  pt/dtr called pt's significant other who confirmed he had a wide BSC that pt could borrow   Toilet Transfer CARE Score 4   Cognition   Overall Cognitive Status WFL   Arousal/Participation Alert   Attention Within functional limits   Memory Within functional limits   Following Commands Follows all commands and directions without difficulty   Activity Tolerance   Medical Staff Pernell Macias and RN Colette Murry aware pt noted with small area of redness at inner elbow  Per Dr Trevon Macias, applied small gauze and tegaderm over area as extra protection under shirt from nielsen brace  OT also educated pt to keep elbow in more neutral position while seated and just elevate hand, to prevent elbow from flexing into brace while resting   Assessment   Treatment Assessment Pt participated in skilled OT tx session  See above for further details on functional performance   Session focused on family training with pt's dtr Joycelyn Newton, who will be staying with pt upon d/c and assisting  Joycelyn Newton demonstrates ability to be able to assist pt with dressing, sponge bathing, and functional transfers using hurry cane  Bathing/wound care of under pannus, groin and buttock will be demonstrated by nursing after ADL  OT verbally explained these areas should be washed thoroughly while supine in bed, and then UB and LEs and be completed while seated  Plan for continued family training Tuesday beginning at 8 with Joycelyn Newton with OT focus on: Salgado brace management and UB dressing, as well as educate on LHAE and demonstrate LB dressing and to again review wound care with nursing, prior to d/c Thursday with home healthcare services  Joycelyn Newton will be able to assist pt with ADLs/IADLs upon d/c and transfers if needed, but goal for pt to be supervision for functional transfers and short distance mobility next week  Pt will continue to benefit from skilled OT intervention to address R UE strengthening at below, sit <> stand transfers, standing balance, LHAE for LB dressing in order to maximize functional independence in ADLS, functional mobility/transfers, while decreasing burden of care  Pt left positioned in recliner with roho cushion with call bell in reach and chair alarm on  No DME ordered by OT as pt is borrowing wide BSC from her significant other  Prognosis Fair   Problem List Decreased strength;Decreased endurance; Impaired balance;Decreased mobility; Decreased skin integrity;Orthopedic restrictions;Pain   Barriers to Discharge None  (next week)   Plan   Treatment/Interventions ADL retraining;Functional transfer training; Therapeutic exercise; Endurance training;Patient/family training;Equipment eval/education; Bed mobility; Compensatory technique education   Progress Progressing toward goals   Recommendation   OT Discharge Recommendation Home with home health rehabilitation   OT Therapy Minutes   OT Time In 1000   OT Time Out 1130   OT Total Time (minutes) 90   OT Mode of treatment - Individual (minutes) 90   OT Mode of treatment - Concurrent (minutes) 0   OT Mode of treatment - Group (minutes) 0   OT Mode of treatment - Co-treat (minutes) 0   OT Mode of Treatment - Total time(minutes) 90 minutes   OT Cumulative Minutes 955   Therapy Time missed   Time missed?  No

## 2023-05-26 LAB
ANION GAP SERPL CALCULATED.3IONS-SCNC: 0 MMOL/L (ref 4–13)
BASOPHILS # BLD AUTO: 0.02 THOUSANDS/ÂΜL (ref 0–0.1)
BASOPHILS NFR BLD AUTO: 1 % (ref 0–1)
BUN SERPL-MCNC: 21 MG/DL (ref 5–25)
CALCIUM SERPL-MCNC: 8.6 MG/DL (ref 8.3–10.1)
CHLORIDE SERPL-SCNC: 110 MMOL/L (ref 96–108)
CO2 SERPL-SCNC: 30 MMOL/L (ref 21–32)
CREAT SERPL-MCNC: 0.53 MG/DL (ref 0.6–1.3)
DME PARACHUTE DELIVERY DATE REQUESTED: NORMAL
DME PARACHUTE DELIVERY NOTE: NORMAL
DME PARACHUTE ITEM DESCRIPTION: NORMAL
DME PARACHUTE ORDER STATUS: NORMAL
DME PARACHUTE SUPPLIER NAME: NORMAL
DME PARACHUTE SUPPLIER PHONE: NORMAL
EOSINOPHIL # BLD AUTO: 0.21 THOUSAND/ÂΜL (ref 0–0.61)
EOSINOPHIL NFR BLD AUTO: 6 % (ref 0–6)
ERYTHROCYTE [DISTWIDTH] IN BLOOD BY AUTOMATED COUNT: 19.1 % (ref 11.6–15.1)
GFR SERPL CREATININE-BSD FRML MDRD: 91 ML/MIN/1.73SQ M
GLUCOSE SERPL-MCNC: 112 MG/DL (ref 65–140)
GLUCOSE SERPL-MCNC: 125 MG/DL (ref 65–140)
GLUCOSE SERPL-MCNC: 134 MG/DL (ref 65–140)
GLUCOSE SERPL-MCNC: 72 MG/DL (ref 65–140)
GLUCOSE SERPL-MCNC: 87 MG/DL (ref 65–140)
HCT VFR BLD AUTO: 31.9 % (ref 34.8–46.1)
HGB BLD-MCNC: 10.4 G/DL (ref 11.5–15.4)
IMM GRANULOCYTES # BLD AUTO: 0.01 THOUSAND/UL (ref 0–0.2)
IMM GRANULOCYTES NFR BLD AUTO: 0 % (ref 0–2)
INR PPP: 1.99 (ref 0.84–1.19)
LYMPHOCYTES # BLD AUTO: 0.67 THOUSANDS/ÂΜL (ref 0.6–4.47)
LYMPHOCYTES NFR BLD AUTO: 18 % (ref 14–44)
MCH RBC QN AUTO: 34.9 PG (ref 26.8–34.3)
MCHC RBC AUTO-ENTMCNC: 32.6 G/DL (ref 31.4–37.4)
MCV RBC AUTO: 107 FL (ref 82–98)
MONOCYTES # BLD AUTO: 0.32 THOUSAND/ÂΜL (ref 0.17–1.22)
MONOCYTES NFR BLD AUTO: 9 % (ref 4–12)
NEUTROPHILS # BLD AUTO: 2.45 THOUSANDS/ÂΜL (ref 1.85–7.62)
NEUTS SEG NFR BLD AUTO: 66 % (ref 43–75)
NRBC BLD AUTO-RTO: 0 /100 WBCS
PLATELET # BLD AUTO: 120 THOUSANDS/UL (ref 149–390)
PMV BLD AUTO: 10.3 FL (ref 8.9–12.7)
POTASSIUM SERPL-SCNC: 3.9 MMOL/L (ref 3.5–5.3)
PROTHROMBIN TIME: 22.8 SECONDS (ref 11.6–14.5)
RBC # BLD AUTO: 2.98 MILLION/UL (ref 3.81–5.12)
SODIUM SERPL-SCNC: 140 MMOL/L (ref 135–147)
WBC # BLD AUTO: 3.68 THOUSAND/UL (ref 4.31–10.16)

## 2023-05-26 PROCEDURE — 99232 SBSQ HOSP IP/OBS MODERATE 35: CPT | Performed by: PHYSICAL MEDICINE & REHABILITATION

## 2023-05-26 PROCEDURE — 97116 GAIT TRAINING THERAPY: CPT

## 2023-05-26 PROCEDURE — 97530 THERAPEUTIC ACTIVITIES: CPT

## 2023-05-26 PROCEDURE — 80048 BASIC METABOLIC PNL TOTAL CA: CPT | Performed by: PHYSICIAN ASSISTANT

## 2023-05-26 PROCEDURE — 97535 SELF CARE MNGMENT TRAINING: CPT

## 2023-05-26 PROCEDURE — 82948 REAGENT STRIP/BLOOD GLUCOSE: CPT

## 2023-05-26 PROCEDURE — 99232 SBSQ HOSP IP/OBS MODERATE 35: CPT | Performed by: INTERNAL MEDICINE

## 2023-05-26 PROCEDURE — 85025 COMPLETE CBC W/AUTO DIFF WBC: CPT | Performed by: PHYSICIAN ASSISTANT

## 2023-05-26 PROCEDURE — 85610 PROTHROMBIN TIME: CPT | Performed by: PHYSICIAN ASSISTANT

## 2023-05-26 RX ADMIN — METOPROLOL SUCCINATE 25 MG: 25 TABLET, EXTENDED RELEASE ORAL at 08:19

## 2023-05-26 RX ADMIN — NYSTATIN: 100000 POWDER TOPICAL at 22:05

## 2023-05-26 RX ADMIN — SENNOSIDES 17.2 MG: 8.6 TABLET, FILM COATED ORAL at 08:17

## 2023-05-26 RX ADMIN — ALLOPURINOL 100 MG: 100 TABLET ORAL at 08:20

## 2023-05-26 RX ADMIN — ACETAMINOPHEN 975 MG: 325 TABLET ORAL at 22:04

## 2023-05-26 RX ADMIN — DOCUSATE SODIUM 100 MG: 100 CAPSULE, LIQUID FILLED ORAL at 08:17

## 2023-05-26 RX ADMIN — GABAPENTIN 100 MG: 100 CAPSULE ORAL at 22:04

## 2023-05-26 RX ADMIN — METHOCARBAMOL TABLETS 250 MG: 500 TABLET, COATED ORAL at 06:20

## 2023-05-26 RX ADMIN — ATORVASTATIN CALCIUM 40 MG: 40 TABLET, FILM COATED ORAL at 16:39

## 2023-05-26 RX ADMIN — MICONAZOLE NITRATE: 20 CREAM TOPICAL at 22:05

## 2023-05-26 RX ADMIN — Medication 1000 UNITS: at 08:18

## 2023-05-26 RX ADMIN — FUROSEMIDE 40 MG: 40 TABLET ORAL at 08:20

## 2023-05-26 RX ADMIN — METHOCARBAMOL TABLETS 250 MG: 500 TABLET, COATED ORAL at 17:42

## 2023-05-26 RX ADMIN — MONTELUKAST 10 MG: 10 TABLET, FILM COATED ORAL at 22:04

## 2023-05-26 RX ADMIN — NYSTATIN 1 APPLICATION.: 100000 POWDER TOPICAL at 08:22

## 2023-05-26 RX ADMIN — LIDOCAINE 5% 1 PATCH: 700 PATCH TOPICAL at 08:20

## 2023-05-26 RX ADMIN — AMIODARONE HYDROCHLORIDE 200 MG: 200 TABLET ORAL at 08:18

## 2023-05-26 RX ADMIN — METHOCARBAMOL TABLETS 250 MG: 500 TABLET, COATED ORAL at 12:24

## 2023-05-26 RX ADMIN — Medication 7.5 MG: at 09:07

## 2023-05-26 RX ADMIN — CITALOPRAM HYDROBROMIDE 20 MG: 20 TABLET ORAL at 08:19

## 2023-05-26 RX ADMIN — ACETAMINOPHEN 975 MG: 325 TABLET ORAL at 16:39

## 2023-05-26 RX ADMIN — MICONAZOLE NITRATE 1 APPLICATION.: 20 CREAM TOPICAL at 08:22

## 2023-05-26 RX ADMIN — OXYBUTYNIN 5 MG: 5 TABLET, FILM COATED, EXTENDED RELEASE ORAL at 08:17

## 2023-05-26 RX ADMIN — ACETAMINOPHEN 975 MG: 325 TABLET ORAL at 08:15

## 2023-05-26 RX ADMIN — WARFARIN SODIUM 2.5 MG: 2.5 TABLET ORAL at 17:42

## 2023-05-26 RX ADMIN — DOCUSATE SODIUM 100 MG: 100 CAPSULE, LIQUID FILLED ORAL at 17:41

## 2023-05-26 NOTE — PROGRESS NOTES
"Occupational Therapy Treatment Note         05/26/23 1030   Pain Assessment   Pain Assessment Tool 0-10   Pain Score 6   Pain Location/Orientation Orientation: Left; Location: Arm   Hospital Pain Intervention(s) Repositioned;Relaxation technique   Restrictions/Precautions   Precautions Bed/chair alarms; Fall Risk;Supervision on toilet/commode;Pain   LUE Weight Bearing Per Order (S)  NWB - ok for ROM at wrist/elbow  Per Dr Roderick Gonsalves, no ROM at shoulder at this time )   Braces or Orthoses   (L UE nielsen brace)   Lifestyle   Autonomy \"I was pleased with how training went with my dtr\"   Eating   Type of Assistance Needed Set-up / clean-up   Physical Assistance Level No physical assistance   Eating CARE Score 5   Oral Hygiene   Type of Assistance Needed Incidental touching   Physical Assistance Level No physical assistance   Comment in stance at sink   Oral Hygiene CARE Score 4   Sit to Stand   Type of Assistance Needed Physical assistance; Adaptive equipment   Physical Assistance Level 25% or less   Comment min assist overall  pt noted with fatigue after PT session earlier this morning  lowered SPC height 1 notch   Sit to Stand CARE Score 3   Bed-Chair Transfer   Type of Assistance Needed Physical assistance; Adaptive equipment   Physical Assistance Level 25% or less   Comment min assist for sit to stand  Once on feet, contact guard within room and to/from pt's bathroom in room   Chair/Bed-to-Chair Transfer CARE Score 3   Toilet Transfer   Comment mod assist from standard toilet  she has toilet raiser with rails she has used before that she will apply to her toilet at home  BSC that she was going to borrow is not wide enough for her (she saw a picture last night)  therefore OT ordered platform BSC - pt aware of $75 up charge     Cognition   Overall Cognitive Status WFL   Arousal/Participation Alert   Attention Within functional limits   Orientation Level Oriented X4   Memory Within functional limits   Following Commands " Follows all commands and directions without difficulty   Activity Tolerance   Activity Tolerance Patient tolerated treatment well   Assessment   Treatment Assessment Pt participated in skilled OT tx session  See above for further details on functional performance  Submitted order for platform BSC to  today  Pt with improved activity tolerance for functional mobility, but with fatigue requires min assist for sit to stand transfer and benefits from rocking forward 3x prior to standing  Provided handout to pt on mesh post partum underwear from [facility], as pt likes hospital underwear  Pt reports refrigerator in her apartment was fixed and she is happy with family training yesterday, her dtr will be in again Tuesday at 8  She has no concerns regarding d/c home next Thursday, aside from getting stronger and building her endurance  Prognosis Fair   Problem List Decreased strength;Decreased endurance; Impaired balance;Decreased mobility; Decreased coordination;Orthopedic restrictions;Pain;Decreased skin integrity;Obesity   Barriers to Discharge None   Plan   Treatment/Interventions ADL retraining;Functional transfer training; Therapeutic exercise; Endurance training;Patient/family training;Equipment eval/education; Bed mobility; Compensatory technique education   Progress Progressing toward goals   Recommendation   OT Discharge Recommendation Home with home health rehabilitation   Equipment Recommended Bedside commode   Commode Type Bariatric (300 lb+)   OT Equipment [place] platform [facility]   Date ordered 05/26/23   OT Therapy Minutes   OT Time In 1030   OT Time Out 1130   OT Total Time (minutes) 60   OT Mode of treatment - Individual (minutes) 60   OT Mode of treatment - Concurrent (minutes) 0   OT Mode of treatment - Group (minutes) 0   OT Mode of treatment - Co-treat (minutes) 0   OT Mode of Treatment - Total time(minutes) 60 minutes   OT Cumulative Minutes 1015   Therapy Time missed   Time missed?  No

## 2023-05-26 NOTE — PROGRESS NOTES
"Occupational Therapy Treatment Note         05/26/23 1320   Pain Assessment   Pain Assessment Tool 0-10   Pain Score No Pain   Restrictions/Precautions   Precautions Bed/chair alarms; Fall Risk;Supervision on toilet/commode;Pain   LUE Weight Bearing Per Order (S)  NWB  (ok for ROM at wrist/elbow  Per Dr Emiliano Castaneda, no ROM at shoulder at this time )   Braces or Orthoses   (sarmineto brace L UE)   Lifestyle   Autonomy \"I am doing well\"   Lower Body Dressing   Type of Assistance Needed Physical assistance   Physical Assistance Level 25% or less   Comment in stance pt able to manage clothing down with min assist  she sits and uses dressing stick to doff underwear/scrub hospital pants   Lower Body Dressing CARE Score 3   Putting On/Taking Off Footwear   Type of Assistance Needed Physical assistance   Physical Assistance Level Total assistance   Putting On/Taking Off Footwear CARE Score 1   Sit to Lying   Type of Assistance Needed Physical assistance   Physical Assistance Level 51%-75%   Comment +R bedrail; assist B/L LE management   Sit to Lying CARE Score 2   Sit to Stand   Type of Assistance Needed Physical assistance; Adaptive equipment   Physical Assistance Level 25% or less   Comment SPC   Sit to Stand CARE Score 3   Bed-Chair Transfer   Type of Assistance Needed Physical assistance; Adaptive equipment   Physical Assistance Level 25% or less   Comment min assist for sit to stand, contact guard within room using [facility] R UE   Chair/Bed-to-Chair Transfer CARE Score 3   [address]   Type of Assistance Needed Physical assistance; Adaptive equipment   Physical Assistance Level 51%-75%   Comment in stance at [facility] pt completes clothing management with min assist, she stands with contact guard assist for front hygiene, requires assist for rear hygiene   Toileting Hygiene CARE Score 2   Toilet Transfer   Type of Assistance Needed Incidental touching; Adaptive equipment   Physical Assistance Level No physical assistance " Comment on/off platform BSC using SPC   Toilet Transfer CARE Score 4   Cognition   Overall Cognitive Status WFL   Arousal/Participation Alert   Attention Within functional limits   Orientation Level Oriented X4   Memory Within functional limits   Activity Tolerance   Activity Tolerance Patient tolerated treatment well   Assessment   Treatment Assessment Pt participated in skilled OT tx session  See above for further details on functional performance  Pt returned to bed at end of session for MIRANDA Lambert to complete wound care and hygiene  Pt will continue to benefit from skilled OT intervention to address [address] for LB ADLs, sit to stand transfers, R elbow strengthening, standing tolerance/balance, short distance functional mobility using SPC in order to maximize functional independence in ADLS, functional mobility/transfers, while decreasing burden of care  Pt left positioned in bed with call bell in reach and bed alarm on  Prognosis Fair   Problem List Decreased strength;Decreased endurance; Impaired balance;Decreased mobility; Decreased range of motion;Orthopedic restrictions; Obesity; Decreased skin integrity   Barriers to Discharge None   Plan   Treatment/Interventions ADL retraining;Functional transfer training; Therapeutic exercise; Endurance training;Patient/family training;Equipment eval/education; Bed mobility; Compensatory technique education   Progress Progressing toward goals   Recommendation   OT Discharge Recommendation Home with home health rehabilitation   OT Therapy Minutes   OT Time In 1230   OT Time Out 1300   OT Total Time (minutes) 30   OT Mode of treatment - Individual (minutes) 30   OT Mode of treatment - Concurrent (minutes) 0   OT Mode of treatment - Group (minutes) 0   OT Mode of treatment - Co-treat (minutes) 0   OT Mode of Treatment - Total time(minutes) 30 minutes   OT Cumulative Minutes 0007   Therapy Time missed   Time missed?  No

## 2023-05-26 NOTE — PLAN OF CARE
Problem: INFECTION - ADULT  Goal: Absence or prevention of progression during hospitalization  Description: INTERVENTIONS:  - Assess and monitor for signs and symptoms of infection  - Monitor lab/diagnostic results  - Monitor all insertion sites, i e  indwelling lines, tubes, and drains  - Monitor endotracheal if appropriate and nasal secretions for changes in amount and color  - [place] appropriate cooling/warming therapies per order  - Administer medications as ordered  - Instruct and encourage patient and family to use good hand hygiene technique  - Identify and instruct in appropriate isolation precautions for identified infection/condition  Outcome: Progressing

## 2023-05-26 NOTE — PROGRESS NOTES
05/26/23 0830   Pain Assessment   Pain Assessment Tool 0-10   Pain Score 7   Pain Location/Orientation Orientation: Left; Location: Shoulder   Pain 2   Hospital Pain Intervention(s) 2 Repositioned   Restrictions/Precautions   LUE Weight Bearing Per Order NWB   Braces or Orthoses Sling  (L UE nielsen brace)   Subjective   Subjective Pt concern about working with male PT , pt reports having a brief on and would feel more comfortable with female PT , pt agreeable with Chrystal PTA for skilled PT ,   Sit to Stand   Type of Assistance Needed Physical assistance   Physical Assistance Level 25% or less   Comment STS to reposition pants and sling   Sit to Stand CARE Score 3   Transfer Bed/Chair/Wheelchair   Adaptive Equipment Cane   Assessment   Treatment Assessment Pt concern about working with male PT , pt reports having a brief on and would feel more comfortable with female PT , pt agreeable with [facility] [phone] PTA for skilled PT , I reconfirm pt concern with Leatha Preet DONALD and he will F/U with pt concern   However, pt perform chait mobiloity with STS with SPC to reposition pants and sling  , Chrystal PTA finish out skilled PT with pt please see her note  Cont POC , Please schedule pt with DPT Cy    Barriers to Discharge None   Plan   Progress Progressing toward goals   Recommendation   PT Discharge Recommendation Home with home health rehabilitation   PT Therapy Minutes   PT Time In 0830   PT Time Out 0845   PT Total Time (minutes) 15   PT Mode of treatment - Individual (minutes) 15   PT Mode of treatment - Concurrent (minutes) 0   PT Mode of treatment - Group (minutes) 0   PT Mode of treatment - Co-treat (minutes) 0   PT Mode of Treatment - Total time(minutes) 15 minutes   PT Cumulative Minutes 975   Therapy Time missed   Time missed?  No

## 2023-05-26 NOTE — PLAN OF CARE
Problem: PAIN - ADULT  Goal: Verbalizes/displays adequate comfort level or baseline comfort level  Description: Interventions:  - Encourage patient to monitor pain and request assistance  - Assess pain using appropriate pain scale  - Administer analgesics based on type and severity of pain and evaluate response  - Implement non-pharmacological measures as appropriate and evaluate response  - Consider cultural and social influences on pain and pain management  - Notify physician/advanced practitioner if interventions unsuccessful or patient reports new pain  Outcome: Progressing     Problem: INFECTION - ADULT  Goal: Absence or prevention of progression during hospitalization  Description: INTERVENTIONS:  - Assess and monitor for signs and symptoms of infection  - Monitor lab/diagnostic results  - Monitor all insertion sites, i e  indwelling lines, tubes, and drains  - Monitor endotracheal if appropriate and nasal secretions for changes in amount and color  - [place] appropriate cooling/warming therapies per order  - Administer medications as ordered  - Instruct and encourage patient and family to use good hand hygiene technique  - Identify and instruct in appropriate isolation precautions for identified infection/condition  Outcome: Progressing  Goal: Absence of fever/infection during neutropenic period  Description: INTERVENTIONS:  - Monitor WBC    Outcome: Progressing     Problem: SAFETY ADULT  Goal: Patient will remain free of falls  Description: INTERVENTIONS:  - Educate patient/family on patient safety including physical limitations  - Instruct patient to call for assistance with activity   - Consult OT/PT to assist with strengthening/mobility   - Keep Call bell within reach  - Keep bed low and locked with side rails adjusted as appropriate  - Keep care items and personal belongings within reach  - Initiate and maintain comfort rounds  - Make Fall Risk Sign visible to staff  - Offer Toileting every 2 Hours, in advance of need  - Initiate/Maintain bed alarm  - Obtain necessary fall risk management equipment:   - Apply yellow socks and bracelet for high fall risk patients  - Consider moving patient to room near nurses station  Outcome: Progressing  Goal: Maintain or return to baseline ADL function  Description: INTERVENTIONS:  -  Assess patient's ability to carry out ADLs; assess patient's baseline for ADL function and identify physical deficits which impact ability to perform ADLs (bathing, care of mouth/teeth, toileting, grooming, dressing, etc )  - Assess/evaluate cause of self-care deficits   - Assess range of motion  - Assess patient's mobility; develop plan if impaired  - Assess patient's need for assistive devices and provide as appropriate  - Encourage maximum independence but intervene and supervise when necessary  - Involve family in performance of ADLs  - Assess for home care needs following discharge   - Consider OT consult to assist with ADL evaluation and planning for discharge  - Provide patient education as appropriate  Outcome: Progressing  Goal: Maintains/Returns to pre admission functional level  Description: INTERVENTIONS:  - Perform BMAT or MOVE assessment daily    - Set and communicate daily mobility goal to care team and patient/family/caregiver  - Collaborate with rehabilitation services on mobility goals if consulted  - Perform Range of Motion 3 times a day  - Reposition patient every 2 hours    - Dangle patient 3 times a day  - Stand patient 3 times a day  - Ambulate patient 3 times a day  - Out of bed to chair 3 times a day   - Out of bed for meals 3 times a day  - Out of bed for toileting  - Record patient progress and toleration of activity level   Outcome: Progressing     Problem: DISCHARGE PLANNING  Goal: Discharge to home or other facility with appropriate resources  Description: INTERVENTIONS:  - Identify barriers to discharge w/patient and caregiver  - Arrange for needed discharge resources and transportation as appropriate  - Identify discharge learning needs (meds, wound care, etc )  - Arrange for interpretive services to assist at discharge as needed  - Refer to Case Management Department for coordinating discharge planning if the patient needs post-hospital services based on physician/advanced practitioner order or complex needs related to functional status, cognitive ability, or social support system  Outcome: Progressing     Problem: MOBILITY - ADULT  Goal: Maintain or return to baseline ADL function  Description: INTERVENTIONS:  -  Assess patient's ability to carry out ADLs; assess patient's baseline for ADL function and identify physical deficits which impact ability to perform ADLs (bathing, care of mouth/teeth, toileting, grooming, dressing, etc )  - Assess/evaluate cause of self-care deficits   - Assess range of motion  - Assess patient's mobility; develop plan if impaired  - Assess patient's need for assistive devices and provide as appropriate  - Encourage maximum independence but intervene and supervise when necessary  - Involve family in performance of ADLs  - Assess for home care needs following discharge   - Consider OT consult to assist with ADL evaluation and planning for discharge  - Provide patient education as appropriate  Outcome: Progressing  Goal: Maintains/Returns to pre admission functional level  Description: INTERVENTIONS:  - Perform BMAT or MOVE assessment daily    - Set and communicate daily mobility goal to care team and patient/family/caregiver  - Collaborate with rehabilitation services on mobility goals if consulted  - Perform Range of Motion 3 times a day  - Reposition patient every 2 hours    - Dangle patient 3 times a day  - Stand patient 3 times a day  - Ambulate patient 3 times a day  - Out of bed to chair 3 times a day   - Out of bed for meals 3 times a day  - Out of bed for toileting  - Record patient progress and toleration of activity level Outcome: Progressing     Problem: Nutrition/Hydration-ADULT  Goal: Nutrient/Hydration intake appropriate for improving, restoring or maintaining nutritional needs  Description: Monitor and assess patient's nutrition/hydration status for malnutrition  Collaborate with interdisciplinary team and initiate plan and interventions as ordered  Monitor patient's weight and dietary intake as ordered or per policy  Utilize nutrition screening tool and intervene as necessary  Determine patient's food preferences and provide high-protein, high-caloric foods as appropriate       INTERVENTIONS:  - Monitor oral intake, urinary output, labs, and treatment plans  - Assess nutrition and hydration status and recommend course of action  - Evaluate amount of meals eaten  - Assist patient with eating if necessary   - Allow adequate time for meals  - Recommend/ encourage appropriate diets, oral nutritional supplements, and vitamin/mineral supplements  - Order, calculate, and assess calorie counts as needed  - Recommend, monitor, and adjust tube feedings and TPN/PPN based on assessed needs  - Assess need for intravenous fluids  - Provide specific nutrition/hydration education as appropriate  - Include patient/family/caregiver in decisions related to nutrition  Outcome: Progressing

## 2023-05-26 NOTE — PROGRESS NOTES
05/26/23 0845   Pain Assessment   Pain Assessment Tool 0-10   Pain Score 7   Pain Location/Orientation Orientation: Left; Location: Shoulder; Location: Arm   Pain Onset/Description Onset: Ongoing;Frequency: Intermittent   Hospital Pain Intervention(s) Repositioned;Elevated   Restrictions/Precautions   Precautions Fall Risk;Cognitive;Bed/chair alarms;Pain;Supervision on toilet/commode   LUE Weight Bearing Per Order NWB   LUE ROM Restriction Range Limitation  (5/24 clarification from Dr Terrence Cueto that pt is to have NO ROM at L shoulder  OK for ROM at elbow and wrist  (There is an ortho note that says OK at shoulder, but per Dr Terrence Cueto this is a typo ))   Braces or Orthoses Sling   Cognition   Overall Cognitive Status [facility]   Arousal/Participation Alert; Cooperative   Attention Within functional limits   Orientation Level Oriented X4   Memory Within functional limits   Following Commands Follows all commands and directions without difficulty   Subjective   Subjective Pt requesting female therapist   Sit to Stand   Type of Assistance Needed Physical assistance;Verbal cues; Adaptive equipment   Physical Assistance Level 26%-50%   Comment MIN overall but MODA towards end of session   Sit to Stand CARE Score 3   Bed-Chair Transfer   Type of Assistance Needed Supervision; Incidental touching; Adaptive equipment   Comment CS/CGA with [facility]   Chair/Bed-to-Chair Transfer CARE Score 4   Transfer Bed/Chair/Wheelchair   Adaptive Equipment Cane   Walk 10 Feet   Type of Assistance Needed Supervision; Adaptive equipment   Comment CS with SPC   Walk 10 Feet CARE Score 4   Walk 50 Feet with Two Turns   Type of Assistance Needed Incidental touching;Supervision; Adaptive equipment   Comment CS/CGA with SPC   Walk 50 Feet with Two Turns CARE Score 4   Walk 150 Feet   Reason if not Attempted Safety concerns   Walk 150 Feet CARE Score 88   Walking 10 Feet on Uneven Surfaces   Reason if not Attempted Safety concerns   Walking 10 Feet on Uneven Surfaces "CARE Score 88   Ambulation   Primary Mode of Locomotion Prior to Admission Walk   Distance Walked (feet) 50 ft  (x3)   Assist Device Cane   Gait Pattern Antalgic; Inconsistant Barbra; Slow Barbra;Decreased foot clearance; Forward Flexion; Wide NAZIA; Improper weight shift   Limitations Noted In Balance; Coordination;Device Management; Endurance; Heel Strike;Midline Orientation;Posture; Safety;Speed;Swing;Strength   Provided Assistance with: Balance;Direction   Walk Assist Level Contact Guard;Close Supervision   Findings increased VC's for sequencing of cane and to not place cane too far away  Does the patient walk? 2  Yes   Wheel 50 Feet with Two Turns   Reason if not Attempted Activity not applicable   Wheel 50 Feet with Two Turns CARE Score 9   Wheel 150 Feet   Reason if not Attempted Activity not applicable   Wheel 829 Feet CARE Score 9   4 Steps   Reason if not Attempted Activity not applicable   4 Steps CARE Score 9   12 Steps   Reason if not Attempted Activity not applicable   12 Steps CARE Score 9   Picking Up Object   Type of Assistance Needed Incidental touching; Adaptive equipment   Comment CGA reacher in stance   Picking Up Object CARE Score 4   Therapeutic Interventions   Strengthening Seated LAQ, hip flex and ankle DF/PF 3 x10 reps BLE with 1 5# while waiting for medication to \"kick in\"  Assessment   Treatment Assessment Pt participated in skilled PT session with increased focus on HH dist amb, functional transfers and LE strengthening  Pt initially to be treated by Kevin MART but requested female PTA  Pt main focus of this session was HH dist amb and STS from multiple surfaces  Pt remained overall NAINA from EPIFANIO Gold 23 but in standard chair MODA required  Pt nicely declined car this session but will need to practice over weekend in preporation for discharge 6/1/23  Cont POC as tolerated  Problem List Decreased endurance;Decreased strength; Impaired balance;Decreased mobility;Obesity;Orthopedic " restrictions;Pain;Decreased range of motion   PT Barriers   Functional Limitation Car transfers;Stair negotiation; Walking;Transfers   Plan   Treatment/Interventions Functional transfer training;LE strengthening/ROM; Endurance training; Therapeutic exercise;Cognitive reorientation;Patient/family training;Gait training   Progress Progressing toward goals   Recommendation   PT Discharge Recommendation Home with home health rehabilitation   [facility]; Wheelchair  (hospial bed)   PT Therapy Minutes   PT Time In 0845   PT Time Out 1000   PT Total Time (minutes) 75   PT Mode of treatment - Individual (minutes) 75   PT Mode of treatment - Concurrent (minutes) 0   PT Mode of treatment - Group (minutes) 0   PT Mode of treatment - Co-treat (minutes) 0   PT Mode of Treatment - Total time(minutes) 75 minutes   PT Cumulative Minutes 1050   Therapy Time missed   Time missed?  No

## 2023-05-26 NOTE — PROGRESS NOTES
Internal Medicine Progress Note  Patient: Bertha Reyes  Age/sex: 68 y o  female  Medical Record #: [id number]      ASSESSMENT/PLAN: (Interval History)  Bertha Reyes is seen and examined and management for following issues:    Lt proximal humerus fracture  • NWB to the LUE  • Immobilizer placed to LUE by orthopedics  • Pain control and therapy per PMR  • Follow-up with Orthopedics in 4 weeks  Rt 8-9 rib fx  • Pain control per PMR  • Encourage IS   • O2 as needed  Rt eyebrow laceration  • Monitor lacerations  • Local wound care  • stable     DM type 2  • HA1C 6 4  • Home: [place]  2x daily  • Here: SSI  • Will monitor and add back medications as needed  • BS overall stable     History of mechanical AVR  • Goal INR 2 5 - 3 5  • INR 1 99   • Increase Coumadin to 2 5mg daily  • Repeat INR Monday  • Pt takes 2 5mg Wednesday and Saturday and 5mg all other days of the week at home     Hyponatremia  • Resolved  • Na 140     Ventricular arrhythmia  • One episode 23 and pt received a shock from ICD  • Continue metoprolol and amiodarone  • Continue Coumadin  • Monitor bmp     Bradycardia  • Diltiazem discontinued and metoprolol dose lowered  • She will need outpt follow-up with Cardiology  [facility]  • HR stable     COVID-19  • Tested positive 23  • Pt has a cough which she reports is chronic  • Contact and airborne precautions for 10 days  • Off isolation    Buttock wound/labia wound  · Followed by wound care  · Frequent position changes/specialized mattress  · Will monitor  · Overall improvement      DC plannin23  The above assessment and plan was reviewed and updated as determined by my evaluation of the patient on 2023      Labs:   Results from last 7 days   Lab Units 23  0619 23  0613   HEMATOCRIT % 31 9* 33 4*   HEMOGLOBIN g/dL 10 4* 10 9*   PLATELETS Thousands/uL 120* 142*   WBC Thousand/uL 3 68* 3 14*     Results from last 7 days Lab Units 05/26/23  0619 05/22/23  0613   BUN mg/dL 21 23   CALCIUM mg/dL 8 6 8 6   CHLORIDE mmol/L 110* 110*   CO2 mmol/L 30 28   CREATININE mg/dL 0 53* 0 59*   POTASSIUM mmol/L 3 9 4 3   SODIUM mmol/L 140 138         Results from last 7 days   Lab Units 05/26/23  0619 05/25/23  0717   INR  1 99* 2 05*     Results from last 7 days   Lab Units 05/26/23  0620 05/25/23 2052 05/25/23  1604   POC GLUCOSE mg/dl 72 151* 136       Review of Scheduled Meds:  Current Facility-Administered Medications   Medication Dose Route Frequency Provider Last Rate   • acetaminophen  975 mg Oral [facility] Kervin Luna MD     • albuterol  2 puff Inhalation Q4H PRN Kervin Luna MD     • allopurinol  100 mg Oral Daily Kervin Luna MD     • amiodarone  200 mg Oral Daily With Breakfast Kervin Luna MD     • atorvastatin  40 mg Oral Daily With Angela Monae MD     • calcium carbonate  500 mg Oral TID PRN Kervin Luna MD     • cholecalciferol  1,000 Units Oral Daily Kervin Luna MD     • citalopram  20 mg Oral Daily Kervin Luna MD     • docusate sodium  100 mg Oral BID Kervin Luna MD     • furosemide  40 mg Oral Daily Kervin Luna MD     • gabapentin  100 mg Oral HS Kervin Luna MD     • insulin lispro  1-5 Units Subcutaneous TID [facility] Kervin Luna MD     • insulin lispro  1-5 Units Subcutaneous HS Kervin Luna MD     • lidocaine  1 patch Topical Daily Kervin Luna MD     • methocarbamol  250 mg Oral Q6H [facility] Kervin Luna MD     • metoprolol succinate  25 mg Oral Daily Kervin Luna MD     • LICO ANTIFUNGAL   Topical BID JOSEPHINE Llanes     • montelukast  10 mg Oral HS Kervin Luna MD     • naloxone  0 04 mg Intravenous Q1MIN PRN Kervin Luna MD     • nystatin   Topical BID Amanda Pearl PA-C     • ondansetron  4 mg Oral Q6H PRN Kervin Luna MD     • oxybutynin  5 mg Oral Daily Kervin Luna MD     • oxyCODONE  5 mg Oral Q4H PRN Kervin Luna MD     • oxyCODONE  7 5 mg Oral Q4H PRN Yesenia Mares MD     • senna  2 tablet Oral Daily Yesenia Mares MD     • warfarin  2 5 mg Oral Daily (warfarin) Amanda Pearl PA-C         Subjective/ HPI: Patient seen and examined  Patients overnight issues or events were reviewed with nursing or staff during rounds or morning huddle session  New or overnight issues include the following:     Pt seen in her room  She states she is doing well  She denies any current complaints  ROS:   A 10 point ROS was performed; negative except as noted above         Imaging:     XR humerus left   Final Result by Gifty Good MD (05/15 1613)   Oblique spiral fracture of the shaft of the humerus with apex medial angulation   No glenohumeral dislocation         Workstation performed: [id number]         XR shoulder 2+ vw left   Final Result by Gifty Good MD (05/15 1614)      Oblique spiral fracture of the proximal humeral shaft with stable alignment      Workstation performed: [id number]             *Labs /Radiology studies Reviewed  *Medications  reviewed and reconciled as needed  *Please refer to order section for additional ordered labs studies  *Case discussed with primary attending during morning huddle case rounds    Physical Examination:  Vitals:   Vitals:    05/25/23 1429 05/25/23 2130 05/26/23 0615 05/26/23 0819   BP: 135/65 145/63 148/75 113/53   BP Location: Right arm Right arm Right arm    Pulse: 64 61 71 70   Resp: 18 18 18    Temp: (!) 97 4 °F (36 3 °C) 98 6 °F (37 °C) 98 1 °F (36 7 °C)    TempSrc: Oral Oral Oral    SpO2: 96% 100% 94%    Weight:       Height:         GEN: NAD; pleasant, conversive  NEURO: Alert and oriented x4; appropriate  HEENT: Pupils are equal/reactive; normocephalic, face is symmetrical, hearing intact  CV: S1 S2 regular, +audible click; no murmur/rub/gallops, 1/4 pedal pulses, trace b/l edema present; LUE hand edema trace  RESP: Lungs are clear bilaterally, no wheezes rales or rhonchi, on room air, no distress, respirations are easy and non labored; mild [place]  GI: Abdomen is obese, soft, non tender, +BS x4; non distended  : Voiding without issues  MUSC: Moves all extremities  NWB Lt UE in splint  SKIN: pink, warm, poor turgor, eyebrow laceration stable; sacral wound improved, fungal rash improved; resolving ecchymosis       The above physical exam was reviewed and updated as determined by my evaluation of the patient on 5/26/2023  Invasive Devices     Peripheral Intravenous Line  Duration           Long-Dwell Peripheral IV (Midline) 27/05/42 Right Cephalic Vein 10 days                   VTE Pharmacologic Prophylaxis: Warfarin (Coumadin)  Code Status: Level 1 - Full Code  Current Length of Stay: 13 day(s)      Total time spent:  30 minutes with more than 50% spent counseling/coordinating care  Counseling includes discussion with patient re: progress  and discussion with patient of his/her current medical state/information  Coordination of patient's care was performed in conjunction with primary service  Time invested included review of patient's labs, vitals, and management of their comorbidities with continued monitoring  In addition, this patient was discussed with medical team including physician and advanced extenders  The care of the patient was extensively discussed and appropriate treatment plan was formulated unique for this patient  Medical decision making for the day was made by supervising physician unless otherwise noted in their attestation statement  ** Please Note:  voice to text software may have been used in the creation of this document   Although proof errors in transcription or interpretation are a potential of such software**

## 2023-05-26 NOTE — PROGRESS NOTES
PM&R PROGRESS NOTE:  Christopher Buchanan 68 y o  female MRN: [id number]  Unit/Bed#: -01 Encounter: [id number]        Rehab Diagnosis: Impairment of mobility, safety and Activities of Daily Living (ADLs) due to Major Multiple Trauma:  14 9  Other Multiple Trauma Multiple rib fractures with acute pain, improved acute hypoxic respiratory failure, and L humeral fracture NWB in coaptation splint       SUBJECTIVE: Patient seen face to face today in OT  No complaints  Brace fitting better   Progressing in rehab program      ASSESSMENT: Stable, progressing      PLAN:    Rehabilitation  • Functional deficits: Left upper extremity weakness, impaired mobility, impaired self-care, impaired endurance, impaired strength  • Continue current rehabilitation plan of care to maximize function  • Expected Discharge: Discharge Thursday, 6/1/2023 Home health care RN, PT, OT  o Family training with her daughter Joycelyn Newton       Current function:  Physical Therapy Occupational Therapy Speech Therapy   Weight Bearing Status: Non-weight bearing (L UE)  Transfers: Minimal Assistance, Moderate Assistance (CS-CG from higher surfaces, min-mod from lower surfaces)  Bed Mobility: Maximum Assistance, Supervision (max sit to supine, S supine to sit with bedrail)  Amulation Distance (ft): 40 feet  Ambulation: Incidental Touching, Supervision  Assistive Device for Ambulation: [facility]: Wheelchair  Discharge Recommendations: Home with:  [address] with[de-identified] First Floor Setup, Family Support, 24 Hour Assisteance, 24 Hour Supervision, Home Physical Therapy   Eating: Independent  Grooming: Minimal Assistance  Bathing: Moderate Assistance  Bathing:  Moderate Assistance  Upper Body Dressing: Maximum Assistance  Lower Body Dressing: Minimal Assistance  Toileting: Minimal Assistance  Toilet Transfer: Incidental Touching  Cognition: Within Defined Limits  Orientation: Person, Place, Time, Situation                 DVT prophylaxis  • Managed on warfarin  • Goal INR 2 5-3 5  • Last INR = 1 99    Bladder plan  • Continent  • Limit pure wick usage if possible    Bowel plan  • Continent      * Humerus fracture  Assessment & Plan  2/2 mechanical fall  Proximal humerus shaft spiral fracture, displaced and angulated  Ortho attempted closed reduction  Ultimately placed in coaptation splint on 4/27 and managed non-operatively  NWB LUE  Pain Control as detailed below  No Vitamin D level in chart or unmerged chart recently   - At home was on daily Vitamin D supplement 1000 units - restarted   - 5/14 Vitamind D level is 50 7   Due for 2 week follow-up  Imaging: Personally reviewed  Redemonstration of oblique spiral fracture of the shaft of the humerus with apex medial angulation  No glenohumeral dislocation  Appreciate orthopedics evaluating patient 5/15/2023  Replaced her splint with a Salgado brace  Cleared by orthopedics to provide range of motion to elbow and wrist without restriction as tolerated  Follow-up with orthopedics in 4 weeks around 6/15/2023    Ventricular arrhythmia  Assessment & Plan  On 5/2 developed Afib with RR which degenerated to VT then VF  She received shock from her AICD (Aubrey, Single Chamber ICD)  Seen by Cards who adjusted her device, and recommended restarting home Metoprolol and Amio load  Was also on digoxin  - Toprol dose adjusted and digoxin discontinued after developing junctional bradycardia later in stay  Here: Toprol XL 25mg daily, Amio 200mg daily starting tomorrow for 28 days  Monitor cardiac status during therapies   Monitor lytes  Keep K > 4, Mag > 2  Will plan for outpatient f/u with EP    Difficult intravenous access  Assessment & Plan  Midline placed by IR on 5/15/23  Need for blood draws frequently to follow electrolytes, INR  Need for IV access given her recent ventricular arrhythmia and AICD firing, importance of optimizing electrolytes, current COVID 19 virus      Acute COVID-19  Assessment & Plan  Diagnosed on screening test on 5/13  Minimally symptomatic (does have a cough)  Afebrile and on RA  10 days isolation per protocol through 5/22/2023 (last day)  Off precautions 5/23/2023  Monitor respiratory status   - Has chronic issues with urinary urgency/frequency  - Added Q4hr timed voids so we can help her get to the commode in a timely manner    -Stable and has not required oxygen   -Compliant with utilizing incentive spirometer    CHAS (obstructive sleep apnea)  Assessment & Plan  Does not use CPAP at home  Consider Noc Ox overnight while here  Class 2 obesity in adult  Assessment & Plan  Nutrition consulted  Counseling    Osteoporosis with current pathological fracture with routine healing  Assessment & Plan  Managed by [address] [facility]  History of L5 pathologic fracture  Has been on Alendronate once weekly since 2017  Would confirm with her what day she normally takes it prior to restarting  Will confirm that it is ok to restart with Orthopedics first, as some surgeons prefer to wait 6 weeks after acute fracture prior to restarting    - Per Dr Teresa Ruiz ok to resume at anytime  - She normally takes it on Thursdays  Would complete through 6/2023  Per last Endocrine note in 6/2022, they were going to continue for 1 more year and then stop  She was due for DXA this May  Asthma  Assessment & Plan  No acute exacerbation  Home: Singulair 10mg QHS  Here: Same, PRN albuterol  No acute exacerbation  Monitor respiratory status    Stage III pressure ulcer of sacral region [facility])  Assessment & Plan  Present on admission  Much improved - personally assessed and assessed by wound care 5/22/2023    Wound care following while at [address] [place] [address] Place local wound care as follows:  • DTI of the b/l sacro-buttocks has fully evolved to a Stage 3/4 injury  Wound is POA to ARC unit  Wound is improving, less devitalized tissue, decreased measurements     ? Patient desires to defer bedside debridement at this time, due to her concern of not being able to tolerate being on her side for that long  ? Continue with Triad paste and foam dressing  ? Pressure relief   ? P-500 mattress  • Candidiasis rash noted to the b/l medial thighs and perineum - greatly improved, nearly resolved  ? Continue with pascual anti-fungal cream  • ITD with candidasis to the b/l abdominal pannus partial thickness wounds have resolved, mild candidiasis rash remains  ? Continue with interdry sheets  • No s/s of infection present  • Will recommend to continue with preventative nursing skin care measures  • Nutrition is following along - nutritional supplements of Meet  • Patient verbalized understanding of plan of care  • [place] text wound care team with questions or concerns    • Routine wound care follow-up while admitted   • Follow-[facility] with [facility] outpatient, ambulatory referral placed  Frequent offloading in chair and in bed  P500 mattress  Optimize nutrition    Atrial fibrillation with RVR ([address] )  Assessment & Plan  Initially per AICD interrogation with Afib RVR that degenerated into VT/VF  Unclear burden  Here: Toprol XL 25mg daily, Fully anticoagulated on warfarin (See mechanical AV), amiodarone 200mg daily  Monitor and adjust as appropriate  Currently examines in NSR  IM consulted to assist with management      Outpatient f/u with EP    Abnormal CT scan  Assessment & Plan  - 4/27 CT CAP:           - Incidentally noted is a incarcerated/partially obstructed periumbilic ventral hernia, (image 146 series 9)          - Septated cyst anterior aspect of the right kidney, consider evaluation with ultrasound or MRI for further characterization (image 136 series 9)          - Dilated ascending aorta measuring 4 6 cm, evaluation with cardiothoracic surgery on nonemergent basis  - Follow up with PCP and CT surgery outpatient for findings  - Patient informed of abnormal results on 5/11: she is aware of ventral hernia but not aortic aneurysm or renal cyst         Hyponatremia  Assessment & Plan  Resolved  Na = 138    In acute care, [address] on 5/9  [place] by Trauma to be 2/2 poor PO intake and furosemide  Started on sodium tabs (weaning down)  Monitor and adjust as appropriate  IM consulted and assisting with management  Bradycardia  Assessment & Plan  Developed junctional bradycardia in hospital after restarting home Toprol XL and digoxin  - Cards stopped digoxin  - Toprol XL decreased to 25mg daily  Monitor  Type 2 diabetes mellitus ([address] )  Assessment & Plan    Home: Sitagliptin-Metformin 50-50mg  Here: Discontinue Levemir due to hypoglycemia, continue sliding scale insulin and diabetic diet  Diabetic Diet  Nutrition Consult  Attempt to resume oral meds here and get off insulin  IM consulted to assist with management  Outpatient f/u with PCP  Fracture of multiple ribs of right side  Assessment & Plan  R 8th and 9th rib possible fractures  Rib fracture protocol  Pain control with lidoderm patch  Incentive Spirometer  Monitor respiratory status  Laceration of right eyebrow  Assessment & Plan  Healing, monitor  Steri-Strips in place    H/O mechanical aortic valve replacement  Assessment & Plan  Managed on Coumadin  Goal INR 2 5-3 5  INR 1 99  IM consulted to assist with management of Coumadin dosing  Acute pain due to trauma  Assessment & Plan  R rib fractures, L humerus fracture  - Lidoderm patch for ribs  - Oxycodone 5-7 5mg PRN  - Robaxin 250mg Q6hr scheduled  - Gabapentin 100mg QHS (may be able to discontinue while here)  - Tylenol 975mg Q8hrs scheduled  APS assisted inpatient  Adjust as appropriate while here  Appreciate IM consultants medical co-management  Labs, medications, and imaging personally reviewed  ROS:  A ten point review of systems was completed on 05/26/23 and pertinent positives are listed in subjective section   All other systems reviewed were "negative  OBJECTIVE:   BP (!) 109/47 (BP Location: Right arm)   Pulse 64   Temp 98 2 °F (36 8 °C) (Oral)   Resp 18   Ht 4' 10\" (1 473 m)   Wt 92 9 kg (204 lb 12 9 oz) Comment: pt also has a splint on LUE  SpO2 95%   BMI 42 80 kg/m²       Physical Exam  Vitals and nursing note reviewed  Constitutional:       General: She is not in acute distress  HENT:      Head: Normocephalic and atraumatic  Nose: Nose normal       Mouth/Throat:      Mouth: Mucous membranes are moist    Eyes:      Conjunctiva/sclera: Conjunctivae normal    Cardiovascular:      Rate and Rhythm: Normal rate and regular rhythm  Pulses: Normal pulses  Pulmonary:      Effort: Pulmonary effort is normal       Breath sounds: Normal breath sounds  No wheezing or rales  Abdominal:      General: Bowel sounds are normal  There is no distension  Palpations: Abdomen is soft  Tenderness: There is no abdominal tenderness  Musculoskeletal:         General: Swelling (LUE mild but improved) present  Cervical back: Neck supple  Comments: Much better fitting brace in place   Skin:     General: Skin is warm  Comments: Improving sacral ulcer - appears more stage 3 now   Neurological:      Mental Status: She is alert and oriented to person, place, and time  Motor: Weakness present     Psychiatric:         Mood and Affect: Mood normal         Lab Results   Component Value Date    HCT 31 9 (L) 05/26/2023    HGB 10 4 (L) 05/26/2023     (H) 05/26/2023     (L) 05/26/2023    WBC 3 68 (L) 05/26/2023     Lab Results   Component Value Date    BUN 21 05/26/2023    CALCIUM 8 6 05/26/2023     (H) 05/26/2023    CO2 30 05/26/2023    CREATININE 0 53 (L) 05/26/2023    GLUC 87 05/26/2023    K 3 9 05/26/2023    SODIUM 140 05/26/2023     Lab Results   Component Value Date    INR 1 99 (H) 05/26/2023    INR 2 05 (H) 05/25/2023    INR 2 68 (H) 05/22/2023    PROTIME 22 8 (H) 05/26/2023    PROTIME 23 4 (H) " 05/25/2023    PROTIME 28 8 (H) 05/22/2023           Current Facility-Administered Medications:   •  acetaminophen (TYLENOL) tablet 975 mg, 975 mg, Oral, Q8H [address], Jeffrey Segal MD, 975 mg at 05/26/23 0815  •  albuterol (PROVENTIL HFA,VENTOLIN HFA) inhaler 2 puff, 2 puff, Inhalation, Q4H PRN, Jeffrey Segal MD  •  allopurinol (ZYLOPRIM) tablet 100 mg, 100 mg, Oral, Daily, Jeffrey Segal MD, 100 mg at 05/26/23 0820  •  amiodarone tablet 200 mg, 200 mg, Oral, Daily With Breakfast, Jeffrey Segal MD, 200 mg at 05/26/23 0818  •  atorvastatin (LIPITOR) tablet 40 mg, 40 mg, Oral, Daily With Artemio Haque MD, 40 mg at 05/25/23 1730  •  calcium carbonate (TUMS) chewable tablet 500 mg, 500 mg, Oral, TID PRN, Jeffrey Segal MD  •  cholecalciferol (VITAMIN D3) tablet 1,000 Units, 1,000 Units, Oral, Daily, Jeffrey Segal MD, 1,000 Units at 05/26/23 0818  •  citalopram (CeleXA) tablet 20 mg, 20 mg, Oral, Daily, Jeffrey Segal MD, 20 mg at 05/26/23 9904  •  docusate sodium (COLACE) capsule 100 mg, 100 mg, Oral, BID, Jeffrey Segal MD, 100 mg at 05/26/23 5877  •  furosemide (LASIX) tablet 40 mg, 40 mg, Oral, Daily, Jeffrey Segal MD, 40 mg at 05/26/23 0820  •  gabapentin (NEURONTIN) capsule 100 mg, 100 mg, Oral, HS, Jeffrey Segal MD, 100 mg at 05/25/23 2100  •  insulin lispro (HumaLOG) 100 units/mL subcutaneous injection 1-5 Units, 1-5 Units, Subcutaneous, TID AC **AND** Fingerstick Glucose (POCT), , , TID AC, Jeffrey Segal MD  •  insulin lispro (HumaLOG) 100 units/mL subcutaneous injection 1-5 Units, 1-5 Units, Subcutaneous, HS, Jeffrey Segal MD, 1 Units at 05/25/23 2100  •  lidocaine (LIDODERM) 5 % patch 1 patch, 1 patch, Topical, Daily, Jeffrey Segal MD, 1 patch at 05/26/23 0820  •  methocarbamol (ROBAXIN) tablet 250 mg, 250 mg, Oral, Q6H [address], Jeffrey Segal MD, 250 mg at 05/26/23 1224  •  metoprolol succinate (TOPROL-XL) 24 hr tablet 25 mg, 25 mg, Oral, Daily, Jeffrey Segal MD, 25 mg at 05/26/23 9703  •  moisture barrier miconazole 2% cream (aka LICO MOISTURE BARRIER ANTIFUNGAL CREAM), , Topical, BID, Wilma Burkitt Fehnel-Young, CRNP, 1 application  at 05/26/23 6127  •  montelukast (SINGULAIR) tablet 10 mg, 10 mg, Oral, HS, Jeffrey Segal MD, 10 mg at 05/25/23 2100  •  naloxone (NARCAN) 0 04 mg/mL syringe 0 04 mg, 0 04 mg, Intravenous, Q1MIN PRN, Jeffrey Segal MD  •  nystatin (MYCOSTATIN) powder, , Topical, BID, Amanda Pearl PA-C, 1 application   at 05/26/23 0407  •  ondansetron (ZOFRAN-ODT) dispersible tablet 4 mg, 4 mg, Oral, Q6H PRN, Jeffrey Segal MD, 4 mg at 05/24/23 5101  •  oxybutynin (DITROPAN-XL) 24 hr tablet 5 mg, 5 mg, Oral, Daily, Jeffrey Segal MD, 5 mg at 05/26/23 8799  •  oxyCODONE (ROXICODONE) IR tablet 5 mg, 5 mg, Oral, Q4H PRN, Jeffrey Segal MD, 5 mg at 05/25/23 1538  •  oxyCODONE (ROXICODONE) split tablet 7 5 mg, 7 5 mg, Oral, Q4H PRN, Jeffrey Segal MD, 7 5 mg at 05/26/23 0907  •  senna (SENOKOT) tablet 17 2 mg, 2 tablet, Oral, Daily, Jeffrey Segal MD, 17 2 mg at 05/26/23 6023  •  warfarin (COUMADIN) tablet 2 5 mg, 2 5 mg, Oral, Daily (warfarin), Amanda Pearl PA-C, 2 5 mg at 05/25/23 1730    Past Medical History:   Diagnosis Date   • Asthma    • Diabetes [facility])    • Hypertension        Patient Active Problem List    Diagnosis Date Noted   • Humerus fracture 04/27/2023   • Ventricular arrhythmia 05/04/2023   • Difficult intravenous access 05/15/2023   • Acute COVID-19 05/14/2023   • Atrial fibrillation with RVR ([facility] ) 05/13/2023   • Stage III pressure ulcer of sacral region ([facility] ) 05/13/2023   • Asthma 05/13/2023   • Osteoporosis with current pathological fracture with routine healing 05/13/2023   • Class 2 obesity in adult 05/13/2023   • CHAS (obstructive sleep apnea) 05/13/2023   • Abnormal CT scan 05/10/2023   • Hyponatremia 05/09/2023   • Bradycardia 05/08/2023   • Type 2 diabetes mellitus ([facility] ) 04/28/2023   • Fall 04/27/2023   • Acute pain due to trauma 04/27/2023 • H/O mechanical aortic valve replacement 04/27/2023   • Laceration of right eyebrow 04/27/2023   • Fracture of multiple ribs of right side 04/27/2023          Ni Camacho MD    I have spent a total time of 25 minutes on 05/26/23 in caring for this patient including Impressions, Counseling / Coordination of care, Documenting in the medical record and Reviewing / ordering tests, medicine, procedures    ** Please Note:  voice to text software may have been used in the creation of this document   Although proof errors in transcription or interpretation are a potential of such software** standing/actual

## 2023-05-27 ENCOUNTER — APPOINTMENT (INPATIENT)
Dept: NON INVASIVE DIAGNOSTICS | Facility: HOSPITAL | Age: 78
DRG: 559 | End: 2023-05-27
Payer: COMMERCIAL

## 2023-05-27 LAB
GLUCOSE SERPL-MCNC: 108 MG/DL (ref 65–140)
GLUCOSE SERPL-MCNC: 108 MG/DL (ref 65–140)
GLUCOSE SERPL-MCNC: 141 MG/DL (ref 65–140)
GLUCOSE SERPL-MCNC: 97 MG/DL (ref 65–140)

## 2023-05-27 PROCEDURE — 97116 GAIT TRAINING THERAPY: CPT

## 2023-05-27 PROCEDURE — 97110 THERAPEUTIC EXERCISES: CPT

## 2023-05-27 PROCEDURE — 99232 SBSQ HOSP IP/OBS MODERATE 35: CPT | Performed by: PHYSICAL MEDICINE & REHABILITATION

## 2023-05-27 PROCEDURE — 82948 REAGENT STRIP/BLOOD GLUCOSE: CPT

## 2023-05-27 PROCEDURE — 97535 SELF CARE MNGMENT TRAINING: CPT

## 2023-05-27 PROCEDURE — 93971 EXTREMITY STUDY: CPT

## 2023-05-27 PROCEDURE — 97530 THERAPEUTIC ACTIVITIES: CPT

## 2023-05-27 PROCEDURE — 99232 SBSQ HOSP IP/OBS MODERATE 35: CPT | Performed by: INTERNAL MEDICINE

## 2023-05-27 RX ADMIN — SENNOSIDES 17.2 MG: 8.6 TABLET, FILM COATED ORAL at 08:02

## 2023-05-27 RX ADMIN — AMIODARONE HYDROCHLORIDE 200 MG: 200 TABLET ORAL at 07:52

## 2023-05-27 RX ADMIN — OXYCODONE HYDROCHLORIDE 5 MG: 5 TABLET ORAL at 06:31

## 2023-05-27 RX ADMIN — ACETAMINOPHEN 975 MG: 325 TABLET ORAL at 07:52

## 2023-05-27 RX ADMIN — MONTELUKAST 10 MG: 10 TABLET, FILM COATED ORAL at 23:10

## 2023-05-27 RX ADMIN — MICONAZOLE NITRATE: 20 CREAM TOPICAL at 17:21

## 2023-05-27 RX ADMIN — MICONAZOLE NITRATE 1 APPLICATION.: 20 CREAM TOPICAL at 08:04

## 2023-05-27 RX ADMIN — METHOCARBAMOL TABLETS 250 MG: 500 TABLET, COATED ORAL at 17:16

## 2023-05-27 RX ADMIN — CITALOPRAM HYDROBROMIDE 20 MG: 20 TABLET ORAL at 08:02

## 2023-05-27 RX ADMIN — METHOCARBAMOL TABLETS 250 MG: 500 TABLET, COATED ORAL at 11:40

## 2023-05-27 RX ADMIN — WARFARIN SODIUM 2.5 MG: 2.5 TABLET ORAL at 17:16

## 2023-05-27 RX ADMIN — METHOCARBAMOL TABLETS 250 MG: 500 TABLET, COATED ORAL at 23:10

## 2023-05-27 RX ADMIN — OXYBUTYNIN 5 MG: 5 TABLET, FILM COATED, EXTENDED RELEASE ORAL at 08:02

## 2023-05-27 RX ADMIN — Medication 1000 UNITS: at 08:03

## 2023-05-27 RX ADMIN — ATORVASTATIN CALCIUM 40 MG: 40 TABLET, FILM COATED ORAL at 17:16

## 2023-05-27 RX ADMIN — DOCUSATE SODIUM 100 MG: 100 CAPSULE, LIQUID FILLED ORAL at 08:02

## 2023-05-27 RX ADMIN — METHOCARBAMOL TABLETS 250 MG: 500 TABLET, COATED ORAL at 06:32

## 2023-05-27 RX ADMIN — LIDOCAINE 5% 1 PATCH: 700 PATCH TOPICAL at 08:02

## 2023-05-27 RX ADMIN — METOPROLOL SUCCINATE 25 MG: 25 TABLET, EXTENDED RELEASE ORAL at 08:03

## 2023-05-27 RX ADMIN — ACETAMINOPHEN 975 MG: 325 TABLET ORAL at 17:16

## 2023-05-27 RX ADMIN — NYSTATIN: 100000 POWDER TOPICAL at 17:21

## 2023-05-27 RX ADMIN — ALLOPURINOL 100 MG: 100 TABLET ORAL at 08:02

## 2023-05-27 RX ADMIN — NYSTATIN 1 APPLICATION.: 100000 POWDER TOPICAL at 08:04

## 2023-05-27 RX ADMIN — FUROSEMIDE 40 MG: 40 TABLET ORAL at 08:03

## 2023-05-27 RX ADMIN — DOCUSATE SODIUM 100 MG: 100 CAPSULE, LIQUID FILLED ORAL at 17:16

## 2023-05-27 RX ADMIN — ACETAMINOPHEN 975 MG: 325 TABLET ORAL at 23:11

## 2023-05-27 RX ADMIN — GABAPENTIN 100 MG: 100 CAPSULE ORAL at 23:11

## 2023-05-27 NOTE — PROGRESS NOTES
05/27/23 1400   Pain Assessment   Pain Assessment Tool 0-10   Pain Score 6   Pain Location/Orientation Orientation: Left; Location: Shoulder   Pain Onset/Description Onset: Ongoing;Frequency: Constant/Continuous   Hospital Pain Intervention(s) Medication (See MAR); Rest   Multiple Pain Sites No   Restrictions/Precautions   Precautions Bed/chair alarms; Fall Risk;Supervision on toilet/commode;Pressure Ulcer   LUE Weight Bearing Per Order NWB   ROM Restrictions Yes   LUE ROM Restriction Range Limitation  (5/24 clarification from Dr Sofía Roldan that pt is to have NO ROM at L shoulder  OK for ROM at elbow and wrist  (There is an ortho note that says OK at shoulder, but per Dr Sofía Roldan this is a typo ))   Cognition   Overall Cognitive Status WFL   Arousal/Participation Alert; Cooperative   Subjective   Subjective pt reported increase swelling on L LE, CRNP and MD aware  Sit to Lying   Type of Assistance Needed Physical assistance;Verbal cues; Adaptive equipment   Physical Assistance Level 51%-75%   Comment +R rail with assist on bilat LE   Sit to Lying CARE Score 2   Sit to Stand   Type of Assistance Needed Physical assistance;Verbal cues; Adaptive equipment   Physical Assistance Level 26%-50%   Comment min-mod A of 1 every sit to stand transition while CS stand to sit transfer   Sit to Stand CARE Score 3   Bed-Chair Transfer   Type of Assistance Needed Supervision;Verbal cues; Adaptive equipment   Comment CS with [facility]   Chair/Bed-to-Chair Transfer CARE Score 4   Walk 10 Feet   Type of Assistance Needed Supervision;Verbal cues; Adaptive equipment   Comment 35', 40' with cane   Walk 10 Feet CARE Score 4   Walk 50 Feet with Two Turns   Type of Assistance Needed Supervision;Verbal cues; Adaptive equipment   Comment 48' with 2 turns using cane   Walk 50 Feet with Two Turns CARE Score 4   Toilet Transfer   Comment did not need to use pre/post tx   Therapeutic Interventions   Strengthening seated TE include hip flexion x 10 reps x 5 sets  bilat LAQ x 3 SH x10 reps x 3 sets   Assessment   Treatment Assessment skilled PT focused on functional transfers and therapeutic exercises which pt tolerated well  Pt able to inc amb distance up to 50' this session with SpO2 range at 93-98%  Although pt consistently required min-mod A to stand up from the w/c this session despite rocking technique  PT will cont to work on strengthening exercises and functional mobility training to inc overall indep and dec caregiver burden at d/c    Barriers to Discharge Decreased caregiver support; Inaccessible home environment   Plan   Treatment/Interventions Functional transfer training;LE strengthening/ROM; Therapeutic exercise; Endurance training;Bed mobility;Gait training;Spoke to nursing;Spoke to MD;Spoke to advanced practitioner;OT   Progress Progressing toward goals   Recommendation   PT Discharge Recommendation Home with home health rehabilitation   PT Therapy Minutes   PT Time In 1400   PT Time Out 1530   PT Total Time (minutes) 90   PT Mode of treatment - Individual (minutes) 90   PT Mode of treatment - Concurrent (minutes) 0   PT Mode of treatment - Group (minutes) 0   PT Mode of treatment - Co-treat (minutes) 0   PT Mode of Treatment - Total time(minutes) 90 minutes   PT Cumulative Minutes 1140   Therapy Time missed   Time missed?  No

## 2023-05-27 NOTE — PLAN OF CARE
Problem: PAIN - ADULT  Goal: Verbalizes/displays adequate comfort level or baseline comfort level  Description: Interventions:  - Encourage patient to monitor pain and request assistance  - Assess pain using appropriate pain scale  - Administer analgesics based on type and severity of pain and evaluate response  - Implement non-pharmacological measures as appropriate and evaluate response  - Consider cultural and social influences on pain and pain management  - Notify physician/advanced practitioner if interventions unsuccessful or patient reports new pain  Outcome: Progressing     Problem: INFECTION - ADULT  Goal: Absence or prevention of progression during hospitalization  Description: INTERVENTIONS:  - Assess and monitor for signs and symptoms of infection  - Monitor lab/diagnostic results  - Monitor all insertion sites, i e  indwelling lines, tubes, and drains  - Monitor endotracheal if appropriate and nasal secretions for changes in amount and color  - [place] appropriate cooling/warming therapies per order  - Administer medications as ordered  - Instruct and encourage patient and family to use good hand hygiene technique  - Identify and instruct in appropriate isolation precautions for identified infection/condition  Outcome: Progressing  Goal: Absence of fever/infection during neutropenic period  Description: INTERVENTIONS:  - Monitor WBC    Outcome: Progressing     Problem: SAFETY ADULT  Goal: Patient will remain free of falls  Description: INTERVENTIONS:  - Educate patient/family on patient safety including physical limitations  - Instruct patient to call for assistance with activity   - Consult OT/PT to assist with strengthening/mobility   - Keep Call bell within reach  - Keep bed low and locked with side rails adjusted as appropriate  - Keep care items and personal belongings within reach  - Initiate and maintain comfort rounds  - Make Fall Risk Sign visible to staff  - Offer Toileting every 2 Hours, in advance of need  - Initiate/Maintain bed alarm  - Obtain necessary fall risk management equipment: bed alarm  - Apply yellow socks and bracelet for high fall risk patients  - Consider moving patient to room near nurses station  Outcome: Progressing  Goal: Maintain or return to baseline ADL function  Description: INTERVENTIONS:  -  Assess patient's ability to carry out ADLs; assess patient's baseline for ADL function and identify physical deficits which impact ability to perform ADLs (bathing, care of mouth/teeth, toileting, grooming, dressing, etc )  - Assess/evaluate cause of self-care deficits   - Assess range of motion  - Assess patient's mobility; develop plan if impaired  - Assess patient's need for assistive devices and provide as appropriate  - Encourage maximum independence but intervene and supervise when necessary  - Involve family in performance of ADLs  - Assess for home care needs following discharge   - Consider OT consult to assist with ADL evaluation and planning for discharge  - Provide patient education as appropriate  Outcome: Progressing  Goal: Maintains/Returns to pre admission functional level  Description: INTERVENTIONS:  - Perform BMAT or MOVE assessment daily    - Set and communicate daily mobility goal to care team and patient/family/caregiver  - Collaborate with rehabilitation services on mobility goals if consulted  - Perform Range of Motion 3 times a day  - Reposition patient every 2 hours    - Dangle patient 3 times a day  - Stand patient 3 times a day  - Ambulate patient 3 times a day  - Out of bed to chair 3 times a day   - Out of bed for meals 3 times a day  - Out of bed for toileting  - Record patient progress and toleration of activity level   Outcome: Progressing     Problem: DISCHARGE PLANNING  Goal: Discharge to home or other facility with appropriate resources  Description: INTERVENTIONS:  - Identify barriers to discharge w/patient and caregiver  - Arrange for needed discharge resources and transportation as appropriate  - Identify discharge learning needs (meds, wound care, etc )  - Arrange for interpretive services to assist at discharge as needed  - Refer to Case Management Department for coordinating discharge planning if the patient needs post-hospital services based on physician/advanced practitioner order or complex needs related to functional status, cognitive ability, or social support system  Outcome: Progressing     Problem: MOBILITY - ADULT  Goal: Maintain or return to baseline ADL function  Description: INTERVENTIONS:  -  Assess patient's ability to carry out ADLs; assess patient's baseline for ADL function and identify physical deficits which impact ability to perform ADLs (bathing, care of mouth/teeth, toileting, grooming, dressing, etc )  - Assess/evaluate cause of self-care deficits   - Assess range of motion  - Assess patient's mobility; develop plan if impaired  - Assess patient's need for assistive devices and provide as appropriate  - Encourage maximum independence but intervene and supervise when necessary  - Involve family in performance of ADLs  - Assess for home care needs following discharge   - Consider OT consult to assist with ADL evaluation and planning for discharge  - Provide patient education as appropriate  Outcome: Progressing  Goal: Maintains/Returns to pre admission functional level  Description: INTERVENTIONS:  - Perform BMAT or MOVE assessment daily    - Set and communicate daily mobility goal to care team and patient/family/caregiver  - Collaborate with rehabilitation services on mobility goals if consulted  - Perform Range of Motion 3 times a day  - Reposition patient every 2 hours    - Dangle patient 3 times a day  - Stand patient 3 times a day  - Ambulate patient 3 times a day  - Out of bed to chair 3 times a day   - Out of bed for meals 3 times a day  - Out of bed for toileting  - Record patient progress and toleration of activity level   Outcome: Progressing     Problem: Nutrition/Hydration-ADULT  Goal: Nutrient/Hydration intake appropriate for improving, restoring or maintaining nutritional needs  Description: Monitor and assess patient's nutrition/hydration status for malnutrition  Collaborate with interdisciplinary team and initiate plan and interventions as ordered  Monitor patient's weight and dietary intake as ordered or per policy  Utilize nutrition screening tool and intervene as necessary  Determine patient's food preferences and provide high-protein, high-caloric foods as appropriate       INTERVENTIONS:  - Monitor oral intake, urinary output, labs, and treatment plans  - Assess nutrition and hydration status and recommend course of action  - Evaluate amount of meals eaten  - Assist patient with eating if necessary   - Allow adequate time for meals  - Recommend/ encourage appropriate diets, oral nutritional supplements, and vitamin/mineral supplements  - Order, calculate, and assess calorie counts as needed  - Recommend, monitor, and adjust tube feedings and TPN/PPN based on assessed needs  - Assess need for intravenous fluids  - Provide specific nutrition/hydration education as appropriate  - Include patient/family/caregiver in decisions related to nutrition  Outcome: Progressing     Problem: Prexisting or High Potential for Compromised Skin Integrity  Goal: Skin integrity is maintained or improved  Description: INTERVENTIONS:  - Identify patients at risk for skin breakdown  - Assess and monitor skin integrity  - Assess and monitor nutrition and hydration status  - Monitor labs   - Assess for incontinence   - Turn and reposition patient  - Assist with mobility/ambulation  - Relieve pressure over bony prominences  - Avoid friction and shearing  - Provide appropriate hygiene as needed including keeping skin clean and dry  - Evaluate need for skin moisturizer/barrier cream  - Collaborate with interdisciplinary team   - Patient/family teaching  - Consider wound care consult   Outcome: Progressing

## 2023-05-27 NOTE — PROGRESS NOTES
"   05/27/23 0860   Pain Assessment   Pain Assessment Tool 0-10   Pain Score 5   Pain Location/Orientation Orientation: Left; Location: Arm   Restrictions/Precautions   Precautions Bed/chair alarms; Fall Risk;Pain;Supervision on toilet/commode;Pressure Ulcer   LUE Weight Bearing Per Order NWB  (okay for ROM at wrist/elbow only, no ROM at shoulder )   Braces or Orthoses   (LUE sarmineto brace)   Lifestyle   Autonomy \"It feels good to get washed up  \"   Oral Hygiene   Type of Assistance Needed Incidental touching   Physical Assistance Level No physical assistance   Comment CS-CGA in stance at sink  req set-up and inc time  Oral Hygiene CARE Score 4   Shower/Bathe Self   Type of Assistance Needed Physical assistance;Set-up / clean-up; Verbal cues   Physical Assistance Level 76% or more   Comment while seated on w/c, pt able to bathe LUE however req A for thoroughness  Pt able to bathe abdomen and upper legs  A req to bathe proximal RUE and b/l lower legs  CGA in stance w/ hurry cane PRN to bathe sherley and buttocks, A to thoroughly bathe buttocks  confirmed w/ RN wound care completed prior to session  Shower/Bathe Self CARE Score 2   Tub/Shower Transfer   Reason Not Assessed Sponge Bath   Upper Body Dressing   Type of Assistance Needed Physical assistance   Physical Assistance Level 76% or more   Comment pt able to recall nesha dressing technique  Able to doff shirt w/ Min A, A to fully thread LUE, pull OH and pt able to thread RUE w/ A req to fully manage in back  TA to don/doff LUE brace  req inc time 2* LUE pain and dec ROM  Upper Body Dressing CARE Score 2   Lower Body Dressing   Type of Assistance Needed Physical assistance   Physical Assistance Level 25% or less   Comment able to thread BLE w/ LHAE, CGA in stance for clothing management, A to fully manage in back     Lower Body Dressing CARE Score 3   Putting On/Taking Off Footwear   Type of Assistance Needed Physical assistance   Physical Assistance Level Total " assistance   Comment TA to don/doff socks  Putting On/Taking Off Footwear CARE Score 1   Sit to Stand   Type of Assistance Needed Physical assistance   Physical Assistance Level 25% or less   Comment CGA-Min A, inc time   Sit to Stand CARE Score 3   Bed-Chair Transfer   Type of Assistance Needed Physical assistance; Adaptive equipment   Physical Assistance Level 25% or less   Comment Min A for sit<>stand, CGA w/ hurry cane, ambulatedM<bathroom  Chair/Bed-to-Chair Transfer CARE Score 3   ROM - Left Upper Extremities    L Elbow Elbow flexion;Elbow extension;AROM   L Wrist AROM; Wrist flexion;Wrist extension;Radial deviation;Ulnar deviation   L Hand AROM; Thumb; Index finger; Long finger;Ring finger;Little finger  (w/ use of red hand sponge)   L Position Seated  (in w/c)   LUE ROM Comment Gentle ROM to L digits, wrist, and elbow  Pt tolerated well w/ rest breaks between sets to manage LUE pain, espeically w/ elbow extension  Req encouragement at time to maximize participation, pt remains guarded w/ AROM  Cognition   Overall Cognitive Status WFL   Assessment   Treatment Assessment Pt seen for skilled OT session focusing on self-care management and gentle ROM to distal LUE  Details on ADL noted above, cont to req overall Mod A 2* LUE pain and orthopedic restrictions  Req inc time for all fxnl tasks w/ frequent rest breaks to manage LUE pain and generalized fatigue  Cont to req encouragement and ongoing edu to maximize participation to include fxnl mobility and fxnl standing tolerance  Cont OT POC: LHAE training, sit<>stand xfers, R elbow strengthening, fxnl standing tolerance/balance, short distance fxnl mobility w/ SPC  Pt was left resting in w/c w/ all needs within reach and alarm activated  From OT standpoint, pt on track for d/c home w/ family support on Thursday, recommending [address]  Prognosis Fair   Problem List Decreased strength;Decreased endurance; Impaired balance;Decreased mobility; Decreased range of motion;Orthopedic restrictions; Obesity; Decreased skin integrity   Plan   Treatment/Interventions ADL retraining;Functional transfer training; Therapeutic exercise; Endurance training;Patient/family training   Progress Progressing toward goals   Recommendation   OT Discharge Recommendation Home with home health rehabilitation   OT Therapy Minutes   OT Time In 0830   OT Time Out 1000   OT Total Time (minutes) 90   OT Mode of treatment - Individual (minutes) 90   OT Mode of treatment - Concurrent (minutes) 0   OT Mode of treatment - Group (minutes) 0   OT Mode of treatment - Co-treat (minutes) 0   OT Mode of Treatment - Total time(minutes) 90 minutes   OT Cumulative Minutes 1135   Therapy Time missed   Time missed?  No

## 2023-05-27 NOTE — PROGRESS NOTES
PM&R PROGRESS NOTE:  Katia Garner 68 y o  female MRN: [id number]  Unit/Bed#: -01 Encounter: [id number]        Rehab Diagnosis: Impairment of mobility, safety and Activities of Daily Living (ADLs) due to Major Multiple Trauma:  14 9  Other Multiple Trauma Multiple rib fractures with acute pain, improved acute hypoxic respiratory failure, and L humeral fracture NWB in coaptation splint       SUBJECTIVE: Patient seen face-to-face  No acute issues overnight  Progressing in rehabilitation program   Preparing for discharge on Thursday  ASSESSMENT: Stable, progressing      PLAN:    Rehabilitation  • Functional deficits: Left upper extremity weakness, impaired mobility, impaired self-care, impaired endurance, impaired strength  • Continue current rehabilitation plan of care to maximize function  • Expected Discharge: Discharge Thursday, 6/1/2023 Home health care RN, PT, OT  o Family training with her daughter Tiffany Brandon       Current function:  Physical Therapy Occupational Therapy Speech Therapy   Weight Bearing Status: Non-weight bearing (L UE)  Transfers: Minimal Assistance, Moderate Assistance (CS-CG from higher surfaces, min-mod from lower surfaces)  Bed Mobility: Maximum Assistance, Supervision (max sit to supine, S supine to sit with bedrail)  Amulation Distance (ft): 40 feet  Ambulation: Incidental Touching, Supervision  Assistive Device for Ambulation: [facility]: Wheelchair  Discharge Recommendations: Home with:  [address] with[de-identified] First Floor Setup, Family Support, 24 Hour Assisteance, 24 Hour Supervision, Home Physical Therapy   Eating: Independent  Grooming: Minimal Assistance  Bathing: Moderate Assistance  Bathing:  Moderate Assistance  Upper Body Dressing: Maximum Assistance  Lower Body Dressing: Minimal Assistance  Toileting: Minimal Assistance  Toilet Transfer: Incidental Touching  Cognition: Within Defined Limits  Orientation: Person, Place, Time, Situation DVT prophylaxis  • Managed on warfarin  • Goal INR 2 5-3 5  • Last INR = 1 99    Bladder plan  • Continent  • Limit pure wick usage if possible    Bowel plan  • Continent      * Humerus fracture  Assessment & Plan  2/2 mechanical fall  Proximal humerus shaft spiral fracture, displaced and angulated  Ortho attempted closed reduction  Ultimately placed in coaptation splint on 4/27 and managed non-operatively  NWB LUE  Pain Control as detailed below  No Vitamin D level in chart or unmerged chart recently   - At home was on daily Vitamin D supplement 1000 units - restarted   - 5/14 Vitamind D level is 50 7   Due for 2 week follow-up  Imaging: Personally reviewed  Redemonstration of oblique spiral fracture of the shaft of the humerus with apex medial angulation  No glenohumeral dislocation  Appreciate orthopedics evaluating patient 5/15/2023  Replaced her splint with a Salgado brace  Cleared by orthopedics to provide range of motion to elbow and wrist without restriction as tolerated  Follow-up with orthopedics in 4 weeks around 6/15/2023    Ventricular arrhythmia  Assessment & Plan  On 5/2 developed Afib with RR which degenerated to VT then VF  She received shock from her AICD ([place], Single Chamber ICD)  Seen by Cards who adjusted her device, and recommended restarting home Metoprolol and Amio load  Was also on digoxin  - Toprol dose adjusted and digoxin discontinued after developing junctional bradycardia later in stay  Here: Toprol XL 25mg daily, Amio 200mg daily starting tomorrow for 28 days  Monitor cardiac status during therapies   Monitor lytes  Keep K > 4, Mag > 2  Will plan for outpatient f/u with EP    Difficult intravenous access  Assessment & Plan  Midline placed by IR on 5/15/23  Need for blood draws frequently to follow electrolytes, INR  Need for IV access given her recent ventricular arrhythmia and AICD firing, importance of optimizing electrolytes, current COVID 19 virus      Acute COVID-19  Assessment & Plan  Diagnosed on screening test on 5/13  Minimally symptomatic (does have a cough)  Afebrile and on RA  10 days isolation per protocol through 5/22/2023 (last day)  Off precautions 5/23/2023  Monitor respiratory status   - Has chronic issues with urinary urgency/frequency  - Added Q4hr timed voids so we can help her get to the commode in a timely manner    -Stable and has not required oxygen   -Compliant with utilizing incentive spirometer    CHAS (obstructive sleep apnea)  Assessment & Plan  Does not use CPAP at home  Consider Noc Ox overnight while here  Class 2 obesity in adult  Assessment & Plan  Nutrition consulted  Counseling    Osteoporosis with current pathological fracture with routine healing  Assessment & Plan  Managed by [facility]  History of L5 pathologic fracture  Has been on Alendronate once weekly since 2017  Would confirm with her what day she normally takes it prior to restarting  Will confirm that it is ok to restart with Orthopedics first, as some surgeons prefer to wait 6 weeks after acute fracture prior to restarting    - Per Dr Walterine Boxer ok to resume at anytime  - She normally takes it on Thursdays  Would complete through 6/2023  Per last Endocrine note in 6/2022, they were going to continue for 1 more year and then stop  She was due for DXA this May  Asthma  Assessment & Plan  No acute exacerbation  Home: Singulair 10mg QHS  Here: Same, PRN albuterol  No acute exacerbation  Monitor respiratory status    Stage III pressure ulcer of sacral region [facility])  Assessment & Plan  Present on admission  Much improved - personally assessed and assessed by wound care 5/22/2023    Wound care following while at [address] [place] [address] Place local wound care as follows:  • DTI of the b/l sacro-buttocks has fully evolved to a Stage 3/4 injury  Wound is POA to ARC unit  Wound is improving, less devitalized tissue, decreased measurements     ? Patient desires to defer bedside debridement at this time, due to her concern of not being able to tolerate being on her side for that long  ? Continue with Triad paste and foam dressing  ? Pressure relief   ? P-500 mattress  • Candidiasis rash noted to the b/l medial thighs and perineum - greatly improved, nearly resolved  ? Continue with pascual anti-fungal cream  • ITD with candidasis to the b/l abdominal pannus partial thickness wounds have resolved, mild candidiasis rash remains  ? Continue with interdry sheets  • No s/s of infection present  • Will recommend to continue with preventative nursing skin care measures  • Nutrition is following along - nutritional supplements of Meet  • Patient verbalized understanding of plan of care  • [place] text wound care team with questions or concerns    • Routine wound care follow-up while admitted   • [facility] outpatient, ambulatory referral placed  Frequent offloading in chair and in bed  P500 mattress  Optimize nutrition    Atrial fibrillation with RVR ([address] )  Assessment & Plan  Initially per AICD interrogation with Afib RVR that degenerated into VT/VF  Unclear burden  Here: Toprol XL 25mg daily, Fully anticoagulated on warfarin (See mechanical AV), amiodarone 200mg daily  Monitor and adjust as appropriate  Currently examines in NSR  IM consulted to assist with management      Outpatient f/u with EP    Abnormal CT scan  Assessment & Plan  - 4/27 CT CAP:           - Incidentally noted is a incarcerated/partially obstructed periumbilic ventral hernia, (image 146 series 9)          - Septated cyst anterior aspect of the right kidney, consider evaluation with ultrasound or MRI for further characterization (image 136 series 9)          - Dilated ascending aorta measuring 4 6 cm, evaluation with cardiothoracic surgery on nonemergent basis  - Follow up with PCP and CT surgery outpatient for findings  - Patient informed of abnormal results on 5/11: she is aware of ventral hernia but not aortic aneurysm or renal cyst         Hyponatremia  Assessment & Plan  Resolved  Na = 138    In acute care, [address] on 5/9  [place] by Trauma to be 2/2 poor PO intake and furosemide  Started on sodium tabs (weaning down)  Monitor and adjust as appropriate  IM consulted and assisting with management  Bradycardia  Assessment & Plan  Developed junctional bradycardia in hospital after restarting home Toprol XL and digoxin  - Cards stopped digoxin  - Toprol XL decreased to 25mg daily  Monitor  Type 2 diabetes mellitus ([address] )  Assessment & Plan    Home: Sitagliptin-Metformin 50-50mg  Here: Discontinue Levemir due to hypoglycemia, continue sliding scale insulin and diabetic diet  Diabetic Diet  Nutrition Consult  Attempt to resume oral meds here and get off insulin  IM consulted to assist with management  Outpatient f/u with PCP  Fracture of multiple ribs of right side  Assessment & Plan  R 8th and 9th rib possible fractures  Rib fracture protocol  Pain control with lidoderm patch  Incentive Spirometer  Monitor respiratory status  Laceration of right eyebrow  Assessment & Plan  Healing, monitor  Steri-Strips in place    H/O mechanical aortic valve replacement  Assessment & Plan  Managed on Coumadin  Goal INR 2 5-3 5  INR 1 99  IM consulted to assist with management of Coumadin dosing  Acute pain due to trauma  Assessment & Plan  R rib fractures, L humerus fracture  - Lidoderm patch for ribs  - Oxycodone 5-7 5mg PRN  - Robaxin 250mg Q6hr scheduled  - Gabapentin 100mg QHS (may be able to discontinue while here)  - Tylenol 975mg Q8hrs scheduled  APS assisted inpatient  Adjust as appropriate while here  Appreciate IM consultants medical co-management  Labs, medications, and imaging personally reviewed  ROS:  A ten point review of systems was completed on 05/27/23 and pertinent positives are listed in subjective section   All other systems reviewed were "negative  OBJECTIVE:   /61   Pulse 64   Temp 98 5 °F (36 9 °C) (Oral)   Resp 20   Ht 4' 10\" (1 473 m)   Wt 92 9 kg (204 lb 12 9 oz) Comment: pt also has a splint on LUE  SpO2 94%   BMI 42 80 kg/m²       Physical Exam  Vitals and nursing note reviewed  Constitutional:       General: She is not in acute distress  Appearance: She is obese  HENT:      Head: Normocephalic and atraumatic  Nose: Nose normal       Mouth/Throat:      Mouth: Mucous membranes are moist    Eyes:      Conjunctiva/sclera: Conjunctivae normal    Cardiovascular:      Rate and Rhythm: Normal rate and regular rhythm  Pulses: Normal pulses  Pulmonary:      Effort: Pulmonary effort is normal       Breath sounds: Normal breath sounds  No wheezing or rales  Abdominal:      General: Bowel sounds are normal  There is no distension  Palpations: Abdomen is soft  Tenderness: There is no abdominal tenderness  Musculoskeletal:         General: Swelling present  Cervical back: Neck supple  Comments: Salgado brace in place   Skin:     General: Skin is warm  Neurological:      Mental Status: She is alert and oriented to person, place, and time  Sensory: No sensory deficit     Psychiatric:         Mood and Affect: Mood normal         Lab Results   Component Value Date    HCT 31 9 (L) 05/26/2023    HGB 10 4 (L) 05/26/2023     (H) 05/26/2023     (L) 05/26/2023    WBC 3 68 (L) 05/26/2023     Lab Results   Component Value Date    BUN 21 05/26/2023    CALCIUM 8 6 05/26/2023     (H) 05/26/2023    CO2 30 05/26/2023    CREATININE 0 53 (L) 05/26/2023    GLUC 87 05/26/2023    K 3 9 05/26/2023    SODIUM 140 05/26/2023     Lab Results   Component Value Date    INR 1 99 (H) 05/26/2023    INR 2 05 (H) 05/25/2023    INR 2 68 (H) 05/22/2023    PROTIME 22 8 (H) 05/26/2023    PROTIME 23 4 (H) 05/25/2023    PROTIME 28 8 (H) 05/22/2023           Current Facility-Administered Medications:   •  " acetaminophen (TYLENOL) tablet 975 mg, 975 mg, Oral, Q8H [address], Maisha Plascencia MD, 975 mg at 05/27/23 3617  •  albuterol (PROVENTIL HFA,VENTOLIN HFA) inhaler 2 puff, 2 puff, Inhalation, Q4H PRN, Maisha Plascencia MD  •  allopurinol (ZYLOPRIM) tablet 100 mg, 100 mg, Oral, Daily, Maisha Plascencia MD, 100 mg at 05/27/23 4775  •  amiodarone tablet 200 mg, 200 mg, Oral, Daily With Breakfast, Maisha Plascencia MD, 200 mg at 05/27/23 6507  •  atorvastatin (LIPITOR) tablet 40 mg, 40 mg, Oral, Daily With Petrona Chang MD, 40 mg at 05/26/23 1639  •  calcium carbonate (TUMS) chewable tablet 500 mg, 500 mg, Oral, TID PRN, Maisha Plascencia MD  •  cholecalciferol (VITAMIN D3) tablet 1,000 Units, 1,000 Units, Oral, Daily, Maisha Plascencia MD, 1,000 Units at 05/27/23 0803  •  citalopram (CeleXA) tablet 20 mg, 20 mg, Oral, Daily, Maisha Plascencia MD, 20 mg at 05/27/23 0802  •  docusate sodium (COLACE) capsule 100 mg, 100 mg, Oral, BID, Maisha Plascencia MD, 100 mg at 05/27/23 0802  •  furosemide (LASIX) tablet 40 mg, 40 mg, Oral, Daily, Maisha Plascencia MD, 40 mg at 05/27/23 0803  •  gabapentin (NEURONTIN) capsule 100 mg, 100 mg, Oral, HS, Maisha Plascencia MD, 100 mg at 05/26/23 2204  •  insulin lispro (HumaLOG) 100 units/mL subcutaneous injection 1-5 Units, 1-5 Units, Subcutaneous, TID AC **AND** Fingerstick Glucose (POCT), , , TID AC, Maisha Plascencia MD  •  insulin lispro (HumaLOG) 100 units/mL subcutaneous injection 1-5 Units, 1-5 Units, Subcutaneous, HS, Maisha Plascencia MD, 1 Units at 05/25/23 2100  •  lidocaine (LIDODERM) 5 % patch 1 patch, 1 patch, Topical, Daily, Maisha Plascencia MD, 1 patch at 05/27/23 0802  •  methocarbamol (ROBAXIN) tablet 250 mg, 250 mg, Oral, Q6H [address], Maisha Plascencia MD, 250 mg at 05/27/23 1140  •  metoprolol succinate (TOPROL-XL) 24 hr tablet 25 mg, 25 mg, Oral, Daily, Maisha Plascencia MD, 25 mg at 05/27/23 0803  •  moisture barrier miconazole 2% cream (aka LICO MOISTURE BARRIER ANTIFUNGAL CREAM), , Topical, BID, JOSEPHINE Hannon, 1 application  at 05/27/23 0804  •  montelukast (SINGULAIR) tablet 10 mg, 10 mg, Oral, HS, Jh Terrazas MD, 10 mg at 05/26/23 2204  •  naloxone (NARCAN) 0 04 mg/mL syringe 0 04 mg, 0 04 mg, Intravenous, Q1MIN PRN, Jh Terrazas MD  •  nystatin (MYCOSTATIN) powder, , Topical, BID, Amanda Pearl PA-C, 1 application   at 05/27/23 0804  •  ondansetron (ZOFRAN-ODT) dispersible tablet 4 mg, 4 mg, Oral, Q6H PRN, Jh Terrazas MD, 4 mg at 05/24/23 5167  •  oxybutynin (DITROPAN-XL) 24 hr tablet 5 mg, 5 mg, Oral, Daily, Jh Terrazas MD, 5 mg at 05/27/23 9060  •  oxyCODONE (ROXICODONE) IR tablet 5 mg, 5 mg, Oral, Q4H PRN, Jh Terrazas MD, 5 mg at 05/27/23 5883  •  oxyCODONE (ROXICODONE) split tablet 7 5 mg, 7 5 mg, Oral, Q4H PRN, Jh Terrazas MD, 7 5 mg at 05/26/23 0907  •  senna (SENOKOT) tablet 17 2 mg, 2 tablet, Oral, Daily, Jh Terrazas MD, 17 2 mg at 05/27/23 1186  •  warfarin (COUMADIN) tablet 2 5 mg, 2 5 mg, Oral, Daily (warfarin), Amanda Pearl PA-C, 2 5 mg at 05/26/23 1742    Past Medical History:   Diagnosis Date   • Asthma    • Diabetes [facility])    • Hypertension        Patient Active Problem List    Diagnosis Date Noted   • Humerus fracture 04/27/2023   • Ventricular arrhythmia 05/04/2023   • Difficult intravenous access 05/15/2023   • Acute COVID-19 05/14/2023   • Atrial fibrillation with RVR ([facility] ) 05/13/2023   • Stage III pressure ulcer of sacral region ([facility] ) 05/13/2023   • Asthma 05/13/2023   • Osteoporosis with current pathological fracture with routine healing 05/13/2023   • Class 2 obesity in adult 05/13/2023   • CHAS (obstructive sleep apnea) 05/13/2023   • Abnormal CT scan 05/10/2023   • Hyponatremia 05/09/2023   • Bradycardia 05/08/2023   • Type 2 diabetes mellitus ([facility] ) 04/28/2023   • Fall 04/27/2023   • Acute pain due to trauma 04/27/2023   • H/O mechanical aortic valve replacement 04/27/2023   • Laceration of right eyebrow 04/27/2023   • Fracture of multiple ribs of right side 04/27/2023          Darian Anderson MD    I have spent a total time of 35 minutes on 05/27/23 in caring for this patient including Impressions, Counseling / Coordination of care and Documenting in the medical record  ** Please Note:  voice to text software may have been used in the creation of this document   Although proof errors in transcription or interpretation are a potential of such software**

## 2023-05-27 NOTE — PLAN OF CARE
Problem: INFECTION - ADULT  Goal: Absence or prevention of progression during hospitalization  Description: INTERVENTIONS:  - Assess and monitor for signs and symptoms of infection  - Monitor lab/diagnostic results  - Monitor all insertion sites, i e  indwelling lines, tubes, and drains  - Monitor endotracheal if appropriate and nasal secretions for changes in amount and color  - [place] appropriate cooling/warming therapies per order  - Administer medications as ordered  - Instruct and encourage patient and family to use good hand hygiene technique  - Identify and instruct in appropriate isolation precautions for identified infection/condition  Outcome: Progressing

## 2023-05-27 NOTE — PLAN OF CARE
Problem: PAIN - ADULT  Goal: Verbalizes/displays adequate comfort level or baseline comfort level  Description: Interventions:  - Encourage patient to monitor pain and request assistance  - Assess pain using appropriate pain scale  - Administer analgesics based on type and severity of pain and evaluate response  - Implement non-pharmacological measures as appropriate and evaluate response  - Consider cultural and social influences on pain and pain management  - Notify physician/advanced practitioner if interventions unsuccessful or patient reports new pain  Outcome: Progressing     Problem: INFECTION - ADULT  Goal: Absence or prevention of progression during hospitalization  Description: INTERVENTIONS:  - Assess and monitor for signs and symptoms of infection  - Monitor lab/diagnostic results  - Monitor all insertion sites, i e  indwelling lines, tubes, and drains  - Monitor endotracheal if appropriate and nasal secretions for changes in amount and color  - [place] appropriate cooling/warming therapies per order  - Administer medications as ordered  - Instruct and encourage patient and family to use good hand hygiene technique  - Identify and instruct in appropriate isolation precautions for identified infection/condition  Outcome: Progressing  Goal: Absence of fever/infection during neutropenic period  Description: INTERVENTIONS:  - Monitor WBC    Outcome: Progressing     Problem: SAFETY ADULT  Goal: Patient will remain free of falls  Description: INTERVENTIONS:  - Educate patient/family on patient safety including physical limitations  - Instruct patient to call for assistance with activity   - Consult OT/PT to assist with strengthening/mobility   - Keep Call bell within reach  - Keep bed low and locked with side rails adjusted as appropriate  - Keep care items and personal belongings within reach  - Initiate and maintain comfort rounds  - Make Fall Risk Sign visible to staff  - Offer Toileting every 2 Hours, in advance of need  - Initiate/Maintain bed alarm  - Obtain necessary fall risk management equipment:   - Apply yellow socks and bracelet for high fall risk patients  - Consider moving patient to room near nurses station  Outcome: Progressing  Goal: Maintain or return to baseline ADL function  Description: INTERVENTIONS:  -  Assess patient's ability to carry out ADLs; assess patient's baseline for ADL function and identify physical deficits which impact ability to perform ADLs (bathing, care of mouth/teeth, toileting, grooming, dressing, etc )  - Assess/evaluate cause of self-care deficits   - Assess range of motion  - Assess patient's mobility; develop plan if impaired  - Assess patient's need for assistive devices and provide as appropriate  - Encourage maximum independence but intervene and supervise when necessary  - Involve family in performance of ADLs  - Assess for home care needs following discharge   - Consider OT consult to assist with ADL evaluation and planning for discharge  - Provide patient education as appropriate  Outcome: Progressing  Goal: Maintains/Returns to pre admission functional level  Description: INTERVENTIONS:  - Perform BMAT or MOVE assessment daily    - Set and communicate daily mobility goal to care team and patient/family/caregiver  - Collaborate with rehabilitation services on mobility goals if consulted  - Perform Range of Motion 3 times a day  - Reposition patient every 2 hours    - Dangle patient 3 times a day  - Stand patient 3 times a day  - Ambulate patient 3 times a day  - Out of bed to chair 3 times a day   - Out of bed for meals 3 times a day  - Out of bed for toileting  - Record patient progress and toleration of activity level   Outcome: Progressing     Problem: DISCHARGE PLANNING  Goal: Discharge to home or other facility with appropriate resources  Description: INTERVENTIONS:  - Identify barriers to discharge w/patient and caregiver  - Arrange for needed discharge resources and transportation as appropriate  - Identify discharge learning needs (meds, wound care, etc )  - Arrange for interpretive services to assist at discharge as needed  - Refer to Case Management Department for coordinating discharge planning if the patient needs post-hospital services based on physician/advanced practitioner order or complex needs related to functional status, cognitive ability, or social support system  Outcome: Progressing     Problem: MOBILITY - ADULT  Goal: Maintain or return to baseline ADL function  Description: INTERVENTIONS:  -  Assess patient's ability to carry out ADLs; assess patient's baseline for ADL function and identify physical deficits which impact ability to perform ADLs (bathing, care of mouth/teeth, toileting, grooming, dressing, etc )  - Assess/evaluate cause of self-care deficits   - Assess range of motion  - Assess patient's mobility; develop plan if impaired  - Assess patient's need for assistive devices and provide as appropriate  - Encourage maximum independence but intervene and supervise when necessary  - Involve family in performance of ADLs  - Assess for home care needs following discharge   - Consider OT consult to assist with ADL evaluation and planning for discharge  - Provide patient education as appropriate  Outcome: Progressing  Goal: Maintains/Returns to pre admission functional level  Description: INTERVENTIONS:  - Perform BMAT or MOVE assessment daily    - Set and communicate daily mobility goal to care team and patient/family/caregiver  - Collaborate with rehabilitation services on mobility goals if consulted  - Perform Range of Motion 3 times a day  - Reposition patient every 2 hours    - Dangle patient 3 times a day  - Stand patient 3 times a day  - Ambulate patient 3 times a day  - Out of bed to chair 3 times a day   - Out of bed for meals 3 times a day  - Out of bed for toileting  - Record patient progress and toleration of activity level Outcome: Progressing     Problem: Nutrition/Hydration-ADULT  Goal: Nutrient/Hydration intake appropriate for improving, restoring or maintaining nutritional needs  Description: Monitor and assess patient's nutrition/hydration status for malnutrition  Collaborate with interdisciplinary team and initiate plan and interventions as ordered  Monitor patient's weight and dietary intake as ordered or per policy  Utilize nutrition screening tool and intervene as necessary  Determine patient's food preferences and provide high-protein, high-caloric foods as appropriate       INTERVENTIONS:  - Monitor oral intake, urinary output, labs, and treatment plans  - Assess nutrition and hydration status and recommend course of action  - Evaluate amount of meals eaten  - Assist patient with eating if necessary   - Allow adequate time for meals  - Recommend/ encourage appropriate diets, oral nutritional supplements, and vitamin/mineral supplements  - Order, calculate, and assess calorie counts as needed  - Recommend, monitor, and adjust tube feedings and TPN/PPN based on assessed needs  - Assess need for intravenous fluids  - Provide specific nutrition/hydration education as appropriate  - Include patient/family/caregiver in decisions related to nutrition  Outcome: Progressing     Problem: Prexisting or High Potential for Compromised Skin Integrity  Goal: Skin integrity is maintained or improved  Description: INTERVENTIONS:  - Identify patients at risk for skin breakdown  - Assess and monitor skin integrity  - Assess and monitor nutrition and hydration status  - Monitor labs   - Assess for incontinence   - Turn and reposition patient  - Assist with mobility/ambulation  - Relieve pressure over bony prominences  - Avoid friction and shearing  - Provide appropriate hygiene as needed including keeping skin clean and dry  - Evaluate need for skin moisturizer/barrier cream  - Collaborate with interdisciplinary team   - Patient/family teaching  - Consider wound care consult   Outcome: Progressing

## 2023-05-27 NOTE — PROGRESS NOTES
Internal Medicine Progress Note  Patient: Jie Back  Age/sex: 68 y o  female  Medical Record #: [id number]      ASSESSMENT/PLAN: (Interval History)  Jie Back is seen and examined and management for following issues:    Lt proximal humerus fracture  • Managed non-operatively  • NWB to the LUE  • Immobilizer placed to LUE by orthopedics  • Pain control and therapy per PMR  • Follow-up with Orthopedics in 4 weeks      Rt 8-9 rib fx  • Pain control per PMR  • Encourage IS      Rt eyebrow laceration  • healed  • steristrips off     DM type 2  • HA1C 6 4  • Home: [employer]  2x daily  • Here: SSI  • Will monitor and add back medications as needed  • BS overall stable     History of mechanical AVR  • Goal INR 2 5 - 3 5  • At home, takes 2 5mg Wed/Sat, 5mg all other days of the week   • Increased Coumadin to 2 5mg daily from 2mg qd on 23 for INR 1 99  • Repeat INR 23     Hyponatremia  • Resolved  • Na 140     Ventricular arrhythmia/ICD device  • ICD was originally placed for VT in past  • On 23, + rapid afib that deteriorated into VT/VF in the setting of no beta blocker -->+ ICD shock and reprogrammed  • Continue Toprol 25 mg qd and Amiodarone 200 mg qd      Junctional escape rhythm   • Diltiazem discontinued and Toprol dose lowered in the hospital by EP with resolution  • She will need outpt follow-up with Cardiology  [facility]  • HR stable     COVID-19  • Tested positive 23  • Pt has a cough which she reports is chronic  • Off isolation     Buttock wound/labia wound  • Followed by wound care    • Plan and surveillance per PMR     LE edema  · L>R  · For venous doppler  · On Lasix 40mg qd  · May need inc Lasix dose  · Has severe TR/severe RVE by ECHO 5/3/23 = ?new    Hx hypomagnesemia  · Takes Mag 64 mg tabs - 5 tabs qd at home  · Will check Mg level       DC plannin23    The above assessment and plan was reviewed and updated as determined by my evaluation of the patient on 5/27/2023      Labs:   Results from last 7 days   Lab Units 05/26/23  0619 05/22/23  0613   HEMATOCRIT % 31 9* 33 4*   HEMOGLOBIN g/dL 10 4* 10 9*   PLATELETS Thousands/uL 120* 142*   WBC Thousand/uL 3 68* 3 14*     Results from last 7 days   Lab Units 05/26/23  0619 05/22/23  0613   BUN mg/dL 21 23   CALCIUM mg/dL 8 6 8 6   CHLORIDE mmol/L 110* 110*   CO2 mmol/L 30 28   CREATININE mg/dL 0 53* 0 59*   POTASSIUM mmol/L 3 9 4 3   SODIUM mmol/L 140 138         Results from last 7 days   Lab Units 05/26/23  0619 05/25/23  0717   INR  1 99* 2 05*     Results from last 7 days   Lab Units 05/27/23  0632 05/26/23  2059 05/26/23  1533   POC GLUCOSE mg/dl 108 134 125       Review of Scheduled Meds:  Current Facility-Administered Medications   Medication Dose Route Frequency Provider Last Rate   • acetaminophen  975 mg Oral Count includes the [facility] Corey Gonsales MD     • albuterol  2 puff Inhalation Q4H PRN Corey Gonsales MD     • allopurinol  100 mg Oral Daily Corey Gonsales MD     • amiodarone  200 mg Oral Daily With Breakfast Corey Gonsales MD     • atorvastatin  40 mg Oral Daily With Shaina Lucas MD     • calcium carbonate  500 mg Oral TID PRN Corey Gonsales MD     • cholecalciferol  1,000 Units Oral Daily Corey Gonsales MD     • citalopram  20 mg Oral Daily Corey Gonsales MD     • docusate sodium  100 mg Oral BID Corey Gonsales MD     • furosemide  40 mg Oral Daily Corey Gonsales MD     • gabapentin  100 mg Oral HS Corey Gonsales MD     • insulin lispro  1-5 Units Subcutaneous TID [facility] Corey Gonsales MD     • insulin lispro  1-5 Units Subcutaneous HS Corey Gonsales MD     • lidocaine  1 patch Topical Daily Corey Gonsales MD     • methocarbamol  250 mg Oral Q6H [facility] Corey Gonsales MD     • metoprolol succinate  25 mg Oral Daily Corey Gonsales MD     • LICO ANTIFUNGAL   Topical BID Shelvy Ka Fehnel-Young, CRNP     • montelukast  10 mg Oral HS Corey Gonsales MD     • naloxone  0 04 mg Intravenous Q1MIN PRN Grady Guzman MD     • nystatin   Topical BID Amanda Pearl PA-C     • ondansetron  4 mg Oral Q6H PRN Grady Guzman MD     • oxybutynin  5 mg Oral Daily Grady Guzman MD     • oxyCODONE  5 mg Oral Q4H PRN Grady Guzman MD     • oxyCODONE  7 5 mg Oral Q4H PRN Grady Guzman MD     • senna  2 tablet Oral Daily Grady Guzman MD     • warfarin  2 5 mg Oral Daily (warfarin) Amanda Pearl PA-C         Subjective/ HPI: Patient seen and examined  Patients overnight issues or events were reviewed with nursing or staff during rounds or morning huddle session  New or overnight issues include the following:     No new or overnight issues  Offers no complaints    ROS:   A 10 point ROS was performed; negative except as noted above  *Labs /Radiology studies reviewed  *Medications reviewed and reconciled as needed  *Please refer to order section for additional ordered labs studies  *Case discussed with primary attending during morning huddle case rounds    Physical Examination:  Vitals:   Vitals:    05/26/23 2019 05/26/23 2053 05/27/23 0633 05/27/23 0803   BP: 139/54 (!) 102/45 144/67 123/61   BP Location: Right arm Right arm Right arm    Pulse: 63 60 71 64   Resp: 20 20 20    Temp: 98 3 °F (36 8 °C) 98 4 °F (36 9 °C) 98 5 °F (36 9 °C)    TempSrc: Oral Oral Oral    SpO2: 96% 95% 94%    Weight:       Height:           General Appearance: no distress, conversive  HEENT: PERRLA, conjuctiva normal; oropharynx clear; mucous membranes moist   Neck:  Supple, normal ROM  Lungs: CTA, normal respiratory effort, no retractions, expiratory effort normal  CV: regular rate and rhythm; no rubs/murmurs/gallops, PMI normal   ABD: soft; ND/NT; +BS  EXT: LE edema L>R jodee left thigh  Skin: normal turgor, normal texture, no rashes  Psych: affect normal, mood normal  Neuro: AAO      The above physical exam was reviewed and updated as determined by my evaluation of the patient on 5/27/2023      Invasive Devices     Peripheral Intravenous Line  Duration           Long-Dwell Peripheral IV (Midline) 29/09/40 Right Cephalic Vein 11 days                   VTE Pharmacologic Prophylaxis: Warfarin (Coumadin)  Code Status: Level 1 - Full Code  Current Length of Stay: 14 day(s)      Total time spent:  30 minutes with more than 50% spent counseling/coordinating care  Counseling includes discussion with patient re: progress  and discussion with patient of his/her current medical state/information  Coordination of patient's care was performed in conjunction with primary service  Time invested included review of patient's labs, vitals, and management of their comorbidities with continued monitoring  In addition, this patient was discussed with medical team including physician and advanced extenders  The care of the patient was extensively discussed and appropriate treatment plan was formulated unique for this patient  Medical decision making for the day was made by supervising physician unless otherwise noted in their attestation statement  ** Please Note:  voice to text software may have been used in the creation of this document   Although proof errors in transcription or interpretation are a potential of such software**

## 2023-05-28 LAB
GLUCOSE SERPL-MCNC: 107 MG/DL (ref 65–140)
GLUCOSE SERPL-MCNC: 110 MG/DL (ref 65–140)
GLUCOSE SERPL-MCNC: 113 MG/DL (ref 65–140)
GLUCOSE SERPL-MCNC: 118 MG/DL (ref 65–140)

## 2023-05-28 PROCEDURE — 97530 THERAPEUTIC ACTIVITIES: CPT

## 2023-05-28 PROCEDURE — 97110 THERAPEUTIC EXERCISES: CPT

## 2023-05-28 PROCEDURE — 99232 SBSQ HOSP IP/OBS MODERATE 35: CPT | Performed by: INTERNAL MEDICINE

## 2023-05-28 PROCEDURE — 93971 EXTREMITY STUDY: CPT | Performed by: SURGERY

## 2023-05-28 PROCEDURE — 97535 SELF CARE MNGMENT TRAINING: CPT

## 2023-05-28 PROCEDURE — 82948 REAGENT STRIP/BLOOD GLUCOSE: CPT

## 2023-05-28 PROCEDURE — 97116 GAIT TRAINING THERAPY: CPT

## 2023-05-28 RX ADMIN — SENNOSIDES 17.2 MG: 8.6 TABLET, FILM COATED ORAL at 08:13

## 2023-05-28 RX ADMIN — OXYCODONE HYDROCHLORIDE 5 MG: 5 TABLET ORAL at 16:15

## 2023-05-28 RX ADMIN — ATORVASTATIN CALCIUM 40 MG: 40 TABLET, FILM COATED ORAL at 16:15

## 2023-05-28 RX ADMIN — MICONAZOLE NITRATE: 20 CREAM TOPICAL at 08:16

## 2023-05-28 RX ADMIN — AMIODARONE HYDROCHLORIDE 200 MG: 200 TABLET ORAL at 08:13

## 2023-05-28 RX ADMIN — DOCUSATE SODIUM 100 MG: 100 CAPSULE, LIQUID FILLED ORAL at 17:01

## 2023-05-28 RX ADMIN — ACETAMINOPHEN 975 MG: 325 TABLET ORAL at 16:15

## 2023-05-28 RX ADMIN — ALLOPURINOL 100 MG: 100 TABLET ORAL at 08:13

## 2023-05-28 RX ADMIN — ACETAMINOPHEN 975 MG: 325 TABLET ORAL at 23:08

## 2023-05-28 RX ADMIN — Medication 1000 UNITS: at 08:13

## 2023-05-28 RX ADMIN — CITALOPRAM HYDROBROMIDE 20 MG: 20 TABLET ORAL at 08:13

## 2023-05-28 RX ADMIN — METOPROLOL SUCCINATE 25 MG: 25 TABLET, EXTENDED RELEASE ORAL at 08:13

## 2023-05-28 RX ADMIN — OXYCODONE HYDROCHLORIDE 5 MG: 5 TABLET ORAL at 08:13

## 2023-05-28 RX ADMIN — LIDOCAINE 5% 1 PATCH: 700 PATCH TOPICAL at 08:14

## 2023-05-28 RX ADMIN — METHOCARBAMOL TABLETS 250 MG: 500 TABLET, COATED ORAL at 11:21

## 2023-05-28 RX ADMIN — METHOCARBAMOL TABLETS 250 MG: 500 TABLET, COATED ORAL at 17:01

## 2023-05-28 RX ADMIN — METHOCARBAMOL TABLETS 250 MG: 500 TABLET, COATED ORAL at 05:23

## 2023-05-28 RX ADMIN — METHOCARBAMOL TABLETS 250 MG: 500 TABLET, COATED ORAL at 23:09

## 2023-05-28 RX ADMIN — DOCUSATE SODIUM 100 MG: 100 CAPSULE, LIQUID FILLED ORAL at 08:13

## 2023-05-28 RX ADMIN — ACETAMINOPHEN 975 MG: 325 TABLET ORAL at 08:13

## 2023-05-28 RX ADMIN — OXYBUTYNIN 5 MG: 5 TABLET, FILM COATED, EXTENDED RELEASE ORAL at 08:13

## 2023-05-28 RX ADMIN — GABAPENTIN 100 MG: 100 CAPSULE ORAL at 22:13

## 2023-05-28 RX ADMIN — WARFARIN SODIUM 2.5 MG: 2.5 TABLET ORAL at 17:01

## 2023-05-28 RX ADMIN — MONTELUKAST 10 MG: 10 TABLET, FILM COATED ORAL at 22:13

## 2023-05-28 RX ADMIN — FUROSEMIDE 40 MG: 40 TABLET ORAL at 08:13

## 2023-05-28 NOTE — PROGRESS NOTES
05/28/23 1330   Pain Assessment   Pain Assessment Tool 0-10   Pain Score 6   Pain Location/Orientation Orientation: Left; Location: Shoulder   Hospital Pain Intervention(s) Medication (See MAR); Rest   Restrictions/Precautions   Precautions Fall Risk;Pressure Ulcer;Supervision on toilet/commode;Pain   LUE Weight Bearing Per Order NWB   Braces or Orthoses   (L UE nielsen brace)   Cognition   Overall Cognitive Status WFL   Arousal/Participation Alert; Cooperative   Subjective   Subjective pt had no new c/o and ready for PT  pt was in w/c pre mobility and requested to stay in w/c at end of tx  Sit to Stand   Type of Assistance Needed Physical assistance;Verbal cues; Adaptive equipment   Physical Assistance Level 25% or less   Comment min-CS from 21 inch w/c with cushion while CS from 22 5 inches regular chair + 2 inch cushion with R arm rest   Sit to Stand CARE Score 3   Bed-Chair Transfer   Type of Assistance Needed Adaptive equipment;Supervision   Comment S-DS with cane   Chair/Bed-to-Chair Transfer CARE Score 4   Walk 10 Feet   Type of Assistance Needed Supervision;Verbal cues; Adaptive equipment   Comment S-DS with cane x 23' x 4   Walk 10 Feet CARE Score 4   Walk 50 Feet with Two Turns   Type of Assistance Needed Supervision;Verbal cues; Adaptive equipment   Comment S-DS with cane 55', 60' with 1-2 short standing rest breaks   Walk 50 Feet with Two Turns CARE Score 4   Ambulation   Gait Pattern Inconsistant Barbra; Slow Barbra; Forward Flexion; Improper weight shift; Wide NAZIA; Decreased foot clearance   Picking Up Object   Type of Assistance Needed Supervision;Verbal cues; Adaptive equipment   Comment reacher - pen   Picking Up Object CARE Score 4   Therapeutic Interventions   Strengthening seated TE bilat hip flexion x 10 reps x 3 sets with 2# ankle wts   LAQ with 2# ankle wts x 5 SH x 10 reps x 3 sets , bilat DF x 5 SH x 10 reps x 3 sets, bilat heel lifts x 5 SH x 10 reps x 3 sets   Assessment   Treatment Assessment skilled PT focused on household distance mobility training with cane and strengthening exercises  at this time pt continues to demo inconsistent indep with sit to stand transition requiring min-CS but once up she is able to complete SPT and short distance amb at S-DS level using a cane without any LOB and increasing gait distance to 60' today  She is able to tolerate seated TE with 2# ankle wts  PT to cont on strengthening and functional mobility training with cane including completing family training which is scheduled on 5/31/23  Next PT session PT will reassess amb before upgrading transfer status of pt in room using a cane  Family/Caregiver Present no   Barriers to Discharge Inaccessible home environment;Decreased caregiver support   PT Barriers   Functional Limitation Car transfers; Ramp negotiation;Stair negotiation;Transfers; Walking;Standing   Plan   Treatment/Interventions Functional transfer training;LE strengthening/ROM; Therapeutic exercise; Endurance training;Gait training;Spoke to nursing;OT   Progress Progressing toward goals   Recommendation   PT Discharge Recommendation Home with home health rehabilitation   PT Therapy Minutes   PT Time In 1330   PT Time Out 1500   PT Total Time (minutes) 90   PT Mode of treatment - Individual (minutes) 90   PT Mode of treatment - Concurrent (minutes) 0   PT Mode of treatment - Group (minutes) 0   PT Mode of treatment - Co-treat (minutes) 0   PT Mode of Treatment - Total time(minutes) 90 minutes   PT Cumulative Minutes 1230   Therapy Time missed   Time missed?  No

## 2023-05-28 NOTE — PROGRESS NOTES
05/28/23 1330   Pain Assessment   Pain Assessment Tool 0-10   Pain Score 6   Pain Location/Orientation Orientation: Left; Location: Shoulder   Hospital Pain Intervention(s) Medication (See MAR); Rest   Restrictions/Precautions   Precautions Fall Risk;Pressure Ulcer;Supervision on toilet/commode;Pain   LUE Weight Bearing Per Order NWB   Braces or Orthoses   (L UE nielsen brace)   Cognition   Overall Cognitive Status WFL   Arousal/Participation Alert; Cooperative   Subjective   Subjective pt had no new c/o and ready for PT  pt was in w/c pre mobility and requested to stay in w/c at end of tx  Sit to Stand   Type of Assistance Needed Physical assistance;Verbal cues; Adaptive equipment   Physical Assistance Level 25% or less   Comment min-CS from 21 inch w/c with cushion while CS from 22 5 inches regular chair + 2 inch cushion with R arm rest   Sit to Stand CARE Score 3   Bed-Chair Transfer   Type of Assistance Needed Adaptive equipment;Supervision   Comment S-DS with cane   Chair/Bed-to-Chair Transfer CARE Score 4   Walk 10 Feet   Type of Assistance Needed Supervision;Verbal cues; Adaptive equipment   Comment S-DS with cane x 23' x 4   Walk 10 Feet CARE Score 4   Walk 50 Feet with Two Turns   Type of Assistance Needed Supervision;Verbal cues; Adaptive equipment   Comment S-DS with cane 55', 60' with 1-2 short standing rest breaks   Walk 50 Feet with Two Turns CARE Score 4   Ambulation   Gait Pattern Inconsistant Barbra; Slow Barbra; Forward Flexion; Improper weight shift; Wide NAZIA; Decreased foot clearance   Picking Up Object   Type of Assistance Needed Supervision;Verbal cues; Adaptive equipment   Comment reacher - pen   Picking Up Object CARE Score 4   Therapeutic Interventions   Strengthening seated TE bilat hip flexion x 10 reps x 3 sets with 2# ankle wts   LAQ with 2# ankle wts x 5 SH x 10 reps x 3 sets , bilat DF x 5 SH x 10 reps x 3 sets, bilat heel lifts x 5 SH x 10 reps x 3 sets   Assessment   Treatment Assessment skilled PT focused on household distance mobility training with cane and strengthening exercises  at this time pt continues to demo inconsistent indep with sit to stand transition requiring min-CS but once up she is able to complete SPT and short distance amb at S-DS level using a cane without any LOB and increasing gait distance to 60'  She is able to tolerate seated TE with 2# ankle wts  PT to cont on strengthening and functional mobility training with cane including completing family training which is scheduled on 5/31/23  Next PT session PT will reassess amb before upgrading transfer status of pt in room using a cane  Family/Caregiver Present no   Barriers to Discharge Inaccessible home environment;Decreased caregiver support   PT Barriers   Functional Limitation Car transfers; Ramp negotiation;Stair negotiation;Transfers; Walking;Standing   Plan   Treatment/Interventions Functional transfer training;LE strengthening/ROM; Therapeutic exercise; Endurance training;Gait training;Spoke to nursing;OT   Progress Progressing toward goals   Recommendation   PT Discharge Recommendation Home with home health rehabilitation   PT Therapy Minutes   PT Time In 1330   PT Time Out 1500   PT Total Time (minutes) 90   PT Mode of treatment - Individual (minutes) 90   PT Mode of treatment - Concurrent (minutes) 0   PT Mode of treatment - Group (minutes) 0   PT Mode of treatment - Co-treat (minutes) 0   PT Mode of Treatment - Total time(minutes) 90 minutes   PT Cumulative Minutes 1230   Therapy Time missed   Time missed?  No

## 2023-05-28 NOTE — PROGRESS NOTES
"   05/28/23 0830   Pain Assessment   Pain Assessment Tool 0-10   Pain Score 5   Pain Location/Orientation Orientation: Left; Location: Arm   Restrictions/Precautions   Precautions Fall Risk;Pressure Ulcer   LUE Weight Bearing Per Order NWB   LUE ROM Restriction Range Limitation  (ROM to wrist and elbow only, no ROM at shoulder )   Braces or Orthoses   (LUE nielsen brace)   Lifestyle   Autonomy \"I am really trying to get better  \"   Sit to Stand   Type of Assistance Needed Incidental touching   Physical Assistance Level No physical assistance   Comment pt rocks few times prior to standing, w/ inc time completes at [facility]  Sit to Stand CARE Score 4   Bed-Chair Transfer   Type of Assistance Needed Adaptive equipment;Supervision   Physical Assistance Level No physical assistance   Comment  w/ SPC, ambulated to therapy gym  Chair/Bed-to-Chair Transfer CARE Score 4   Toileting Hygiene   Type of Assistance Needed Physical assistance;Verbal cues   Physical Assistance Level 51%-75%   Comment pt able to manage sherley hygiene, req A to manage rear hygiene  able to partially manage clothing over R hip, req A to fully manage over L hip despite inc time provided  Toileting Hygiene CARE Score 2   Toilet Transfer   Type of Assistance Needed Incidental touching; Adaptive equipment   Physical Assistance Level No physical assistance   Comment [facility] w/ SPC to platform [facility]  Toilet Transfer CARE Score 4   Functional Standing Tolerance   Time 5'24\", 3'40\"   Activity Pt engaged in puzzle ther act while in stance at table w/ focus on RUE fxnl reach crossing midline, standing endurance, and static standing balance  Tolerated well w/ CS and extended seated rest break provided between stand trials to manage fatigue and LUE pain  Overall much improvement noted w/ standing tolerance and balance, pt reports inc confidence regarding same     ROM- Right Upper Extremities   R Position Seated   RUE ROM Comment To maximize strength and " endurance for ADLs/fxnl mobility pt completed 3x10 bicep curls w/ 2# DB  Tolerated well w/ rest breaks between sets  ROM - Left Upper Extremities    L Elbow Elbow flexion;Elbow extension;AROM  (3x10)   L Wrist AROM; Wrist flexion;Wrist extension;Radial deviation;Ulnar deviation  (3x10)   L Hand AROM; Thumb; Index finger; Long finger;Ring finger;Little finger  (w/ red hand sponge x50 reps)   L Position Seated  (in w/c)   LUE ROM Comment Improved tolerance compared to yesterday  Cognition   Overall Cognitive Status WFL   Assessment   Treatment Assessment Pt seen for skilled OT session focusing on toileting, short distance fxnl mobility w/ SPC, LHAE training, RUE strengthening, distal LUE AROM, and fxnl standing tolerance  Pt cont to be limited by LUE pain and dec endurance at times req rest breaks to manage  Overall improvements noted w/ standing balance and activity tolerance  Cont OT POC: fxnl mobility, sit<>stand xfers, RUE strengthening, ongoing LHAE training, and fxnl standing tolerance  Pt was left resting in recliner w/ all needs within reach, bed/chair alarms discontinued and team made aware of same  Prognosis Fair   Problem List Decreased strength;Decreased endurance; Impaired balance;Decreased mobility; Decreased range of motion;Orthopedic restrictions; Obesity; Decreased skin integrity   Plan   Treatment/Interventions ADL retraining;Functional transfer training; Therapeutic exercise; Endurance training;Patient/family training   Progress Progressing toward goals   Recommendation   OT Discharge Recommendation Home with home health rehabilitation   OT Therapy Minutes   OT Time In 0830   OT Time Out 1000   OT Total Time (minutes) 90   OT Mode of treatment - Individual (minutes) 90   OT Mode of treatment - Concurrent (minutes) 0   OT Mode of treatment - Group (minutes) 0   OT Mode of treatment - Co-treat (minutes) 0   OT Mode of Treatment - Total time(minutes) 90 minutes   OT Cumulative Minutes 1225   Therapy Time missed   Time missed?  No

## 2023-05-28 NOTE — PROGRESS NOTES
"Internal Medicine Progress Note  Patient: Jed Del Angel  Age/sex: 68 y o  female  Medical Record #: [id number]      ASSESSMENT/PLAN: (Interval History)  Jed Del Angel is seen and examined and management for following issues:    Lt proximal humerus fracture  • Managed non-operatively  • NWB to the LUE  • Immobilizer placed to LUE by orthopedics  • Pain control and therapy per PMR  • Follow-up with Orthopedics in 4 weeks      Rt 8-9 rib fx  • Pain control per PMR  • Encourage IS      Rt eyebrow laceration  • healed     DM type 2  • HA1C 6 4  • Home: Janumet  2x daily  • Here: SSI  • Stable currently w/o meds     History of mechanical AVR  • Goal INR 2 5 - 3 5  • At home, takes 2 5mg Wed/Sat, 5mg all other days of the week   • Increased Coumadin to 2 5mg daily from 2mg qd on 5/25/23 for INR 1 99  • INR 5/29/23     Ventricular arrhythmia/ICD device  • ICD was originally placed for VT in past  • On 5/2/23, + rapid afib that deteriorated into VT/VF in setting of no beta blocker -->+ ICD shock and reprogrammed  • Continue Toprol 25 mg qd/Amiodarone 200 mg qd      Junctional escape rhythm   • Diltiazem discontinued and Toprol dose lowered in the hospital by EP with resolution  • She will need outpt follow-up with Cardiology  She follows with the [facility]  • HR stable     COVID-19  • Tested positive 5/13/23  • Pt has chronic cough   • Off isolation     Buttock wound/labia wound  • Followed by wound care  • Plan and surveillance per PMR     LE edema  • L>R  • Venous doppler 5/27: as below  • On Lasix 40mg qd  • May need inc Lasix dose but will see how labs are 5/29    Her BPs are soft at times which may limit aggressive diuresis  • Has severe TR/severe RVE by ECHO 5/3/23 likely the cause     Hx hypomagnesemia  • Takes Mag 64 mg tabs - 5 tabs qd at home  • Will check Mg level 5/29     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  · \"No gross evidence of acute or chronic deep " "vein thrombosis in the visualized segments of the femoral-popliteal veins  There is reflux noted at the proximal femoral vein with an echogenic area noted, suggestive of chronic thrombus vs  frozen valve  The gastrocnemius, posterior tibial and peroneal veins were not Visualized  There is evidence of chronic non-occlusive, superficial thrombophlebitis in the great saphenous vein at the proximal thigh  There are multiple areas of hyperechoic foci with posterior enhancement noted at the area of the great saphenous vein in the calf, suggestive of chronic superficial thrombophlebitis  Doppler evaluation shows a normal response to augmentation maneuvers  Popliteal, posterior tibial and anterior tibial arterial Doppler waveform's are Triphasic\"  · On Coumadin    Severe TR/severe RVE  · This is by ECHO 5/3/23  · Not sure if this is new, cannot tell from HCG's OP notes but it isn't mentioned there  · This is likely the LE edema issue  · Will have cards followup with her    Ascending aortic aneurysm  · By ECHO on 5/3/23  · Measures 4 8 cm  · Will need OP followup/surveillence        DC plannin23    The above assessment and plan was reviewed and updated as determined by my evaluation of the patient on 2023      Labs:   Results from last 7 days   Lab Units 23  0619 23  0613   HEMATOCRIT % 31 9* 33 4*   HEMOGLOBIN g/dL 10 4* 10 9*   PLATELETS Thousands/uL 120* 142*   WBC Thousand/uL 3 68* 3 14*     Results from last 7 days   Lab Units 23  0619 23  0613   BUN mg/dL 21 23   CALCIUM mg/dL 8 6 8 6   CHLORIDE mmol/L 110* 110*   CO2 mmol/L 30 28   CREATININE mg/dL 0 53* 0 59*   POTASSIUM mmol/L 3 9 4 3   SODIUM mmol/L 140 138         Results from last 7 days   Lab Units 23  0619 23  0717   INR  1 99* 2 05*     Results from last 7 days   Lab Units 23  0740 23  2048 23  1622   POC GLUCOSE mg/dl 107 141* 108       Review of Scheduled Meds:  Current Facility-Administered " Medications   Medication Dose Route Frequency Provider Last Rate   • acetaminophen  975 mg Oral [facility] Kervin Luna MD     • albuterol  2 puff Inhalation Q4H PRN Kervin Luna MD     • allopurinol  100 mg Oral Daily Kervin Luna MD     • amiodarone  200 mg Oral Daily With Breakfast Kervin Luna MD     • atorvastatin  40 mg Oral Daily With Angela Monae MD     • calcium carbonate  500 mg Oral TID PRN Kervin Luna MD     • cholecalciferol  1,000 Units Oral Daily Kervin Luna MD     • citalopram  20 mg Oral Daily Kervin Luna MD     • docusate sodium  100 mg Oral BID Kervin Luna MD     • furosemide  40 mg Oral Daily Kervin Luna MD     • gabapentin  100 mg Oral HS Kervin Luna MD     • insulin lispro  1-5 Units Subcutaneous TID [facility] Kervin Luna MD     • insulin lispro  1-5 Units Subcutaneous HS Kervin Luna MD     • lidocaine  1 patch Topical Daily Kervin Luna MD     • methocarbamol  250 mg Oral Q6H [facility] Kervin Luna MD     • metoprolol succinate  25 mg Oral Daily Kervin Luna MD     • LICO ANTIFUNGAL   Topical BID [facility], CRNP     • montelukast  10 mg Oral HS Kervin Luna MD     • naloxone  0 04 mg Intravenous Q1MIN PRN Kervin Luna MD     • nystatin   Topical BID Amanda Pearl PA-C     • ondansetron  4 mg Oral Q6H PRN Kervin Luna MD     • oxybutynin  5 mg Oral Daily Kervin Luna MD     • oxyCODONE  5 mg Oral Q4H PRN Kervin Luna MD     • oxyCODONE  7 5 mg Oral Q4H PRN Kervin Luna MD     • senna  2 tablet Oral Daily Kervin Luna MD     • warfarin  2 5 mg Oral Daily (warfarin) Amanda Pearl PA-C         Subjective/ HPI: Patient seen and examined  Patients overnight issues or events were reviewed with nursing or staff during rounds or morning huddle session  New or overnight issues include the following:     No new or overnight issues    Venous doppler didn't identify any acute DVT although possible chronic thrombus and chronic superficial thrombophlebitis were noted  Offers no complaints except that the left thigh edema bothers her  ROS:   A 10 point ROS was performed; negative except as noted above  *Labs /Radiology studies reviewed  *Medications reviewed and reconciled as needed  *Please refer to order section for additional ordered labs studies  *Case discussed with primary attending during morning huddle case rounds    Physical Examination:  Vitals:   Vitals:    05/27/23 0803 05/27/23 1348 05/27/23 2045 05/28/23 0730   BP: 123/61 133/68 108/52 140/53   BP Location:  Right arm Right arm Right arm   Pulse: 64 63 63 64   Resp:  20 18 18   Temp:  98 4 °F (36 9 °C) 97 9 °F (36 6 °C) 98 3 °F (36 8 °C)   TempSrc:  Oral Oral Oral   SpO2:  99% 96% 95%   Weight:       Height:           General Appearance: no distress, conversive  HEENT:  External ear normal   Nose normal w/o drainage  Mucous membranes are moist  Oropharynx is clear  Conjunctiva clear w/o icterus or redness  Neck:  Supple, normal ROM  Lungs: BBS without crackles/wheeze/rhonchi; respirations unlabored with normal inspiratory/expiratory effort  No retractions noted  On RA  CV: regular rate and rhythm; no rubs/murmurs/gallops, PMI normal   ABD: Abdomen is soft  Bowel sounds all quadrants  Nontender with no distention  EXT: + edema of both legs L>R jodee left thigh  Skin: normal turgor, normal texture, no rashes  Psych: affect normal, mood normal  Neuro: AAO     The above physical exam was reviewed and updated as determined by my evaluation of the patient on 5/28/2023  Invasive Devices     Peripheral Intravenous Line  Duration           Long-Dwell Peripheral IV (Midline) 50/15/35 Right Cephalic Vein 12 days                   VTE Pharmacologic Prophylaxis: Warfarin (Coumadin)  Code Status: Level 1 - Full Code  Current Length of Stay: 15 day(s)      Total time spent:  30 minutes with more than 50% spent counseling/coordinating care   Counseling includes discussion with patient re: progress  and discussion with patient of his/her current medical state/information  Coordination of patient's care was performed in conjunction with primary service  Time invested included review of patient's labs, vitals, and management of their comorbidities with continued monitoring  In addition, this patient was discussed with medical team including physician and advanced extenders  The care of the patient was extensively discussed and appropriate treatment plan was formulated unique for this patient  Medical decision making for the day was made by supervising physician unless otherwise noted in their attestation statement  ** Please Note:  voice to text software may have been used in the creation of this document   Although proof errors in transcription or interpretation are a potential of such software**

## 2023-05-29 LAB
ANION GAP SERPL CALCULATED.3IONS-SCNC: -1 MMOL/L (ref 4–13)
BASOPHILS # BLD AUTO: 0.03 THOUSANDS/ÂΜL (ref 0–0.1)
BASOPHILS NFR BLD AUTO: 1 % (ref 0–1)
BUN SERPL-MCNC: 23 MG/DL (ref 5–25)
CALCIUM SERPL-MCNC: 8.7 MG/DL (ref 8.3–10.1)
CHLORIDE SERPL-SCNC: 108 MMOL/L (ref 96–108)
CO2 SERPL-SCNC: 31 MMOL/L (ref 21–32)
CREAT SERPL-MCNC: 0.55 MG/DL (ref 0.6–1.3)
EOSINOPHIL # BLD AUTO: 0.22 THOUSAND/ÂΜL (ref 0–0.61)
EOSINOPHIL NFR BLD AUTO: 7 % (ref 0–6)
ERYTHROCYTE [DISTWIDTH] IN BLOOD BY AUTOMATED COUNT: 19.5 % (ref 11.6–15.1)
GFR SERPL CREATININE-BSD FRML MDRD: 90 ML/MIN/1.73SQ M
GLUCOSE P FAST SERPL-MCNC: 97 MG/DL (ref 65–99)
GLUCOSE SERPL-MCNC: 116 MG/DL (ref 65–140)
GLUCOSE SERPL-MCNC: 147 MG/DL (ref 65–140)
GLUCOSE SERPL-MCNC: 94 MG/DL (ref 65–140)
GLUCOSE SERPL-MCNC: 97 MG/DL (ref 65–140)
GLUCOSE SERPL-MCNC: 99 MG/DL (ref 65–140)
HCT VFR BLD AUTO: 32.4 % (ref 34.8–46.1)
HGB BLD-MCNC: 10 G/DL (ref 11.5–15.4)
IMM GRANULOCYTES # BLD AUTO: 0.02 THOUSAND/UL (ref 0–0.2)
IMM GRANULOCYTES NFR BLD AUTO: 1 % (ref 0–2)
INR PPP: 2.19 (ref 0.84–1.19)
LYMPHOCYTES # BLD AUTO: 0.72 THOUSANDS/ÂΜL (ref 0.6–4.47)
LYMPHOCYTES NFR BLD AUTO: 23 % (ref 14–44)
MAGNESIUM SERPL-MCNC: 2 MG/DL (ref 1.6–2.6)
MCH RBC QN AUTO: 34.4 PG (ref 26.8–34.3)
MCHC RBC AUTO-ENTMCNC: 30.9 G/DL (ref 31.4–37.4)
MCV RBC AUTO: 111 FL (ref 82–98)
MONOCYTES # BLD AUTO: 0.36 THOUSAND/ÂΜL (ref 0.17–1.22)
MONOCYTES NFR BLD AUTO: 11 % (ref 4–12)
NEUTROPHILS # BLD AUTO: 1.8 THOUSANDS/ÂΜL (ref 1.85–7.62)
NEUTS SEG NFR BLD AUTO: 57 % (ref 43–75)
NRBC BLD AUTO-RTO: 0 /100 WBCS
PLATELET # BLD AUTO: 110 THOUSANDS/UL (ref 149–390)
PMV BLD AUTO: 10.8 FL (ref 8.9–12.7)
POTASSIUM SERPL-SCNC: 3.7 MMOL/L (ref 3.5–5.3)
PROTHROMBIN TIME: 24.6 SECONDS (ref 11.6–14.5)
RBC # BLD AUTO: 2.91 MILLION/UL (ref 3.81–5.12)
SODIUM SERPL-SCNC: 138 MMOL/L (ref 135–147)
WBC # BLD AUTO: 3.15 THOUSAND/UL (ref 4.31–10.16)

## 2023-05-29 PROCEDURE — 80048 BASIC METABOLIC PNL TOTAL CA: CPT | Performed by: PHYSICAL MEDICINE & REHABILITATION

## 2023-05-29 PROCEDURE — 83735 ASSAY OF MAGNESIUM: CPT | Performed by: NURSE PRACTITIONER

## 2023-05-29 PROCEDURE — 85025 COMPLETE CBC W/AUTO DIFF WBC: CPT | Performed by: PHYSICAL MEDICINE & REHABILITATION

## 2023-05-29 PROCEDURE — 82948 REAGENT STRIP/BLOOD GLUCOSE: CPT

## 2023-05-29 PROCEDURE — 85610 PROTHROMBIN TIME: CPT | Performed by: NURSE PRACTITIONER

## 2023-05-29 PROCEDURE — 99232 SBSQ HOSP IP/OBS MODERATE 35: CPT | Performed by: INTERNAL MEDICINE

## 2023-05-29 PROCEDURE — NC001 PR NO CHARGE: Performed by: PHYSICAL MEDICINE & REHABILITATION

## 2023-05-29 RX ORDER — WARFARIN SODIUM 2.5 MG/1
2.5 TABLET ORAL
Status: DISCONTINUED | OUTPATIENT
Start: 2023-05-30 | End: 2023-05-30

## 2023-05-29 RX ORDER — WARFARIN SODIUM 5 MG/1
5 TABLET ORAL
Status: COMPLETED | OUTPATIENT
Start: 2023-05-29 | End: 2023-05-29

## 2023-05-29 RX ADMIN — MONTELUKAST 10 MG: 10 TABLET, FILM COATED ORAL at 22:36

## 2023-05-29 RX ADMIN — METHOCARBAMOL TABLETS 250 MG: 500 TABLET, COATED ORAL at 17:05

## 2023-05-29 RX ADMIN — METOPROLOL SUCCINATE 25 MG: 25 TABLET, EXTENDED RELEASE ORAL at 08:32

## 2023-05-29 RX ADMIN — METHOCARBAMOL TABLETS 250 MG: 500 TABLET, COATED ORAL at 05:50

## 2023-05-29 RX ADMIN — FUROSEMIDE 40 MG: 40 TABLET ORAL at 08:30

## 2023-05-29 RX ADMIN — AMIODARONE HYDROCHLORIDE 200 MG: 200 TABLET ORAL at 08:31

## 2023-05-29 RX ADMIN — ACETAMINOPHEN 975 MG: 325 TABLET ORAL at 16:28

## 2023-05-29 RX ADMIN — DOCUSATE SODIUM 100 MG: 100 CAPSULE, LIQUID FILLED ORAL at 08:31

## 2023-05-29 RX ADMIN — CITALOPRAM HYDROBROMIDE 20 MG: 20 TABLET ORAL at 08:31

## 2023-05-29 RX ADMIN — WARFARIN SODIUM 5 MG: 5 TABLET ORAL at 17:05

## 2023-05-29 RX ADMIN — SENNOSIDES 17.2 MG: 8.6 TABLET, FILM COATED ORAL at 08:30

## 2023-05-29 RX ADMIN — ATORVASTATIN CALCIUM 40 MG: 40 TABLET, FILM COATED ORAL at 16:28

## 2023-05-29 RX ADMIN — DOCUSATE SODIUM 100 MG: 100 CAPSULE, LIQUID FILLED ORAL at 17:05

## 2023-05-29 RX ADMIN — OXYBUTYNIN 5 MG: 5 TABLET, FILM COATED, EXTENDED RELEASE ORAL at 08:31

## 2023-05-29 RX ADMIN — OXYCODONE HYDROCHLORIDE 5 MG: 5 TABLET ORAL at 08:33

## 2023-05-29 RX ADMIN — MICONAZOLE NITRATE 1 APPLICATION.: 20 CREAM TOPICAL at 08:34

## 2023-05-29 RX ADMIN — ACETAMINOPHEN 975 MG: 325 TABLET ORAL at 08:30

## 2023-05-29 RX ADMIN — MICONAZOLE NITRATE 1 APPLICATION.: 20 CREAM TOPICAL at 17:07

## 2023-05-29 RX ADMIN — Medication 1000 UNITS: at 08:33

## 2023-05-29 RX ADMIN — ACETAMINOPHEN 975 MG: 325 TABLET ORAL at 22:36

## 2023-05-29 RX ADMIN — NYSTATIN 1 APPLICATION.: 100000 POWDER TOPICAL at 08:34

## 2023-05-29 RX ADMIN — ALLOPURINOL 100 MG: 100 TABLET ORAL at 08:31

## 2023-05-29 RX ADMIN — LIDOCAINE 5% 1 PATCH: 700 PATCH TOPICAL at 08:33

## 2023-05-29 RX ADMIN — METHOCARBAMOL TABLETS 250 MG: 500 TABLET, COATED ORAL at 11:43

## 2023-05-29 RX ADMIN — METHOCARBAMOL TABLETS 250 MG: 500 TABLET, COATED ORAL at 23:15

## 2023-05-29 RX ADMIN — GABAPENTIN 100 MG: 100 CAPSULE ORAL at 22:36

## 2023-05-29 RX ADMIN — NYSTATIN 1 APPLICATION.: 100000 POWDER TOPICAL at 17:07

## 2023-05-29 NOTE — PROGRESS NOTES
Internal Medicine Progress Note  Patient: Shanda Felix  Age/sex: 68 y o  female  Medical Record #: [id number]      ASSESSMENT/PLAN: (Interval History)  Shanda Felix is seen and examined and management for following issues:    Lt proximal humerus fracture  • Managed non-operatively  • NWB to the LUE  • Immobilizer placed to LUE by orthopedics  • Pain control and therapy per PMR  • Follow-up with Orthopedics in 4 weeks      Rt 8-9 rib fx  • Pain control per PMR  • Encourage IS      Rt eyebrow laceration  • healed     DM type 2  • HA1C 6 4  • Home: Janumet  2x daily  • Here: SSI  • Stable currently w/o meds     History of mechanical AVR  • Goal INR 2 5 - 3 5  • At home, takes 2 5mg Wed/Sat, 5mg all other days of the week   • On 5/25/23, increased Coumadin to 2 5mg daily from 2mg qd for INR 1 99  • Today INR is 2 19  • Will give 5mg tonight then back to 2 5  • INR 5/31      PAF  · As below  · Continue Coumadin    Ventricular arrhythmia/ICD device  • ICD was originally placed for VT in past  • On 5/2/23, + rapid afib that deteriorated into VT/VF in setting of no beta blocker -->+ ICD shock and reprogrammed  • Continue Toprol 25 mg qd/Amiodarone 200 mg qd      Junctional escape rhythm   • Diltiazem discontinued and Toprol dose lowered in the hospital by EP with resolution  • She will need outpt follow-up with Cardiology  She follows with the [facility]  • HR stable     COVID-19  • Tested positive 5/13/23  • Pt has chronic cough   • Off isolation     Buttock wound/labia wound  • Followed by wound care  • Plan and surveillance per PMR     LE edema  • L>R  • Venous doppler 5/27: as below  • On Lasix 40mg qd  • Will not inc Lasix dose for now  LE edema is a little better today    • Her BPs are soft at times which may limit aggressive diuresis  • Has severe TR/severe RVE by ECHO 5/3/23 likely the cause     Hx hypomagnesemia  • Takes Mag 64 mg tabs - 5 tabs qd at home but on none here  • Mg level "today on  is 2 0     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  • \"No gross evidence of acute or chronic deep vein thrombosis in the visualized segments of the femoral-popliteal veins  There is reflux noted at the proximal femoral vein with an echogenic area noted, suggestive of chronic thrombus vs  frozen valve  The gastrocnemius, posterior tibial and peroneal veins were not Visualized  There is evidence of chronic non-occlusive, superficial thrombophlebitis in the great saphenous vein at the proximal thigh  There are multiple areas of hyperechoic foci with posterior enhancement noted at the area of the great saphenous vein in the calf, suggestive of chronic superficial thrombophlebitis  Doppler evaluation shows a normal response to augmentation maneuvers  Popliteal, posterior tibial and anterior tibial arterial Doppler waveform's are Triphasic\"  • On Coumadin     Severe TR/severe RVE  • This is by ECHO 5/3/23  • Not sure if this is new, cannot tell from [facility]CG's OP notes but it isn't mentioned there  • This is likely the LE edema issue  • Will have cards followup with her     Ascending aortic aneurysm  • By ECHO on 5/3/23  • Measures 4 8 cm  • Will need OP followup/surveillence         DC plannin23    The above assessment and plan was reviewed and updated as determined by my evaluation of the patient on 2023      Labs:   Results from last 7 days   Lab Units 23  0603 23  0619   HEMATOCRIT % 32 4* 31 9*   HEMOGLOBIN g/dL 10 0* 10 4*   PLATELETS Thousands/uL 110* 120*   WBC Thousand/uL 3 15* 3 68*     Results from last 7 days   Lab Units 23  0603 23  0619   BUN mg/dL 23 21   CALCIUM mg/dL 8 7 8 6   CHLORIDE mmol/L 108 110*   CO2 mmol/L 31 30   CREATININE mg/dL 0 55* 0 53*   POTASSIUM mmol/L 3 7 3 9   SODIUM mmol/L 138 140         Results from last 7 days   Lab Units 23  0603 23  0619   INR  2 19* 1 99*     Results from last 7 days   Lab Units " 05/29/23  0633 05/28/23 2052 05/28/23  1602   POC GLUCOSE mg/dl 94 113 110       Review of Scheduled Meds:  Current Facility-Administered Medications   Medication Dose Route Frequency Provider Last Rate   • acetaminophen  975 mg Oral [facility] Bart Bhatia MD     • albuterol  2 puff Inhalation Q4H PRN Bart Bhatia MD     • allopurinol  100 mg Oral Daily Bart Bhatia MD     • amiodarone  200 mg Oral Daily With Breakfast Bart Bhatia MD     • atorvastatin  40 mg Oral Daily With Valorie Buckner MD     • calcium carbonate  500 mg Oral TID PRN Bart Bhatia MD     • cholecalciferol  1,000 Units Oral Daily Bart Bhatia MD     • citalopram  20 mg Oral Daily Bart Bhatia MD     • docusate sodium  100 mg Oral BID Bart Bhatia MD     • furosemide  40 mg Oral Daily Bart Bhatia MD     • gabapentin  100 mg Oral HS Bart Bhatia MD     • insulin lispro  1-5 Units Subcutaneous TID [facility] Bart Bhatia MD     • insulin lispro  1-5 Units Subcutaneous HS Bart Bhatia MD     • lidocaine  1 patch Topical Daily Bart Bhatia MD     • methocarbamol  250 mg Oral Q6H [facility] Bart Bhatia MD     • metoprolol succinate  25 mg Oral Daily Bart Bhatia MD     • LICO ANTIFUNGAL   Topical BID JOSEPHINE Marcos     • montelukast  10 mg Oral HS Bart Bhatia MD     • naloxone  0 04 mg Intravenous Q1MIN PRN Bart Bhatia MD     • nystatin   Topical BID Amanda Pearl PA-C     • ondansetron  4 mg Oral Q6H PRN Bart Bhatia MD     • oxybutynin  5 mg Oral Daily Bart Bhatia MD     • oxyCODONE  5 mg Oral Q4H PRN Bart Bhatia MD     • oxyCODONE  7 5 mg Oral Q4H PRN Bart Bhatia MD     • senna  2 tablet Oral Daily Bart Bhatia MD     • warfarin  2 5 mg Oral Daily (warfarin) Amanda Pearl PA-C         Subjective/ HPI: Patient seen and examined  Patients overnight issues or events were reviewed with nursing or staff during rounds or morning huddle session   New or overnight issues include the following:     No new or overnight issues  Offers no complaints    ROS:   A 10 point ROS was performed; negative except as noted above  *Labs /Radiology studies reviewed  *Medications reviewed and reconciled as needed  *Please refer to order section for additional ordered labs studies  *Case discussed with primary attending during morning huddle case rounds    Physical Examination:  Vitals:   Vitals:    05/28/23 1738 05/28/23 2038 05/29/23 0553 05/29/23 0832   BP: 95/50 118/59 136/60 (!) 113/48   BP Location: Left leg Right arm Right arm    Pulse: 60 63 64 67   Resp: 16 20 18    Temp: 97 9 °F (36 6 °C) 98 2 °F (36 8 °C) 97 7 °F (36 5 °C)    TempSrc: Oral Oral Oral    SpO2: 98% 97% 96%    Weight:       Height:           General Appearance: no distress, conversive  HEENT: PERRLA, conjuctiva normal; oropharynx clear; mucous membranes moist   Neck:  Supple, normal ROM  Lungs: CTA, normal respiratory effort, no retractions, expiratory effort normal  CV: regular rate and rhythm; no rubs/murmurs/gallops, PMI normal   ABD: soft; ND/NT; +BS  EXT: +LE edema L>R with left thigh>right thigh = improved today  Skin: normal turgor, normal texture, no rashes  Psych: affect normal, mood normal  Neuro: AAO      The above physical exam was reviewed and updated as determined by my evaluation of the patient on 5/29/2023  Invasive Devices     Peripheral Intravenous Line  Duration           Long-Dwell Peripheral IV (Midline) 71/60/17 Right Cephalic Vein 13 days                   VTE Pharmacologic Prophylaxis: Warfarin (Coumadin)  Code Status: Level 1 - Full Code  Current Length of Stay: 16 day(s)      Total time spent:  30 minutes with more than 50% spent counseling/coordinating care  Counseling includes discussion with patient re: progress  and discussion with patient of his/her current medical state/information  Coordination of patient's care was performed in conjunction with primary service   Time invested included review of patient's labs, vitals, and management of their comorbidities with continued monitoring  In addition, this patient was discussed with medical team including physician and advanced extenders  The care of the patient was extensively discussed and appropriate treatment plan was formulated unique for this patient  Medical decision making for the day was made by supervising physician unless otherwise noted in their attestation statement  ** Please Note:  voice to text software may have been used in the creation of this document   Although proof errors in transcription or interpretation are a potential of such software**

## 2023-05-29 NOTE — PLAN OF CARE
Problem: PAIN - ADULT  Goal: Verbalizes/displays adequate comfort level or baseline comfort level  Description: Interventions:  - Encourage patient to monitor pain and request assistance  - Assess pain using appropriate pain scale  - Administer analgesics based on type and severity of pain and evaluate response  - Implement non-pharmacological measures as appropriate and evaluate response  - Consider cultural and social influences on pain and pain management  - Notify physician/advanced practitioner if interventions unsuccessful or patient reports new pain  Outcome: Progressing     Problem: INFECTION - ADULT  Goal: Absence or prevention of progression during hospitalization  Description: INTERVENTIONS:  - Assess and monitor for signs and symptoms of infection  - Monitor lab/diagnostic results  - Monitor all insertion sites, i e  indwelling lines, tubes, and drains  - Monitor endotracheal if appropriate and nasal secretions for changes in amount and color  - [place] appropriate cooling/warming therapies per order  - Administer medications as ordered  - Instruct and encourage patient and family to use good hand hygiene technique  - Identify and instruct in appropriate isolation precautions for identified infection/condition  Outcome: Progressing  Goal: Absence of fever/infection during neutropenic period  Description: INTERVENTIONS:  - Monitor WBC    Outcome: Progressing     Problem: SAFETY ADULT  Goal: Patient will remain free of falls  Description: INTERVENTIONS:  - Educate patient/family on patient safety including physical limitations  - Instruct patient to call for assistance with activity   - Consult OT/PT to assist with strengthening/mobility   - Keep Call bell within reach  - Keep bed low and locked with side rails adjusted as appropriate  - Keep care items and personal belongings within reach  - Initiate and maintain comfort rounds  - Make Fall Risk Sign visible to staff  - Offer Toileting every 2 Hours, in advance of need  - Initiate/Maintain bed  chair alarm  - Obtain necessary fall risk management equipment: nonskid socks  - Apply yellow socks and bracelet for high fall risk patients  - Consider moving patient to room near nurses station  Outcome: Progressing  Goal: Maintain or return to baseline ADL function  Description: INTERVENTIONS:  -  Assess patient's ability to carry out ADLs; assess patient's baseline for ADL function and identify physical deficits which impact ability to perform ADLs (bathing, care of mouth/teeth, toileting, grooming, dressing, etc )  - Assess/evaluate cause of self-care deficits   - Assess range of motion  - Assess patient's mobility; develop plan if impaired  - Assess patient's need for assistive devices and provide as appropriate  - Encourage maximum independence but intervene and supervise when necessary  - Involve family in performance of ADLs  - Assess for home care needs following discharge   - Consider OT consult to assist with ADL evaluation and planning for discharge  - Provide patient education as appropriate  Outcome: Progressing  Goal: Maintains/Returns to pre admission functional level  Description: INTERVENTIONS:  - Perform BMAT or MOVE assessment daily    - Set and communicate daily mobility goal to care team and patient/family/caregiver  - Collaborate with rehabilitation services on mobility goals if consulted  - Perform Range of Motion 3 times a day  - Reposition patient every 2 hours    - Dangle patient 3 times a day  - Stand patient 3 times a day  - Ambulate patient 3 times a day  - Out of bed to chair 3 times a day   - Out of bed for meals 3 times a day  - Out of bed for toileting  - Record patient progress and toleration of activity level   Outcome: Progressing     Problem: DISCHARGE PLANNING  Goal: Discharge to home or other facility with appropriate resources  Description: INTERVENTIONS:  - Identify barriers to discharge w/patient and caregiver  - Arrange for needed discharge resources and transportation as appropriate  - Identify discharge learning needs (meds, wound care, etc )  - Arrange for interpretive services to assist at discharge as needed  - Refer to Case Management Department for coordinating discharge planning if the patient needs post-hospital services based on physician/advanced practitioner order or complex needs related to functional status, cognitive ability, or social support system  Outcome: Progressing     Problem: MOBILITY - ADULT  Goal: Maintain or return to baseline ADL function  Description: INTERVENTIONS:  -  Assess patient's ability to carry out ADLs; assess patient's baseline for ADL function and identify physical deficits which impact ability to perform ADLs (bathing, care of mouth/teeth, toileting, grooming, dressing, etc )  - Assess/evaluate cause of self-care deficits   - Assess range of motion  - Assess patient's mobility; develop plan if impaired  - Assess patient's need for assistive devices and provide as appropriate  - Encourage maximum independence but intervene and supervise when necessary  - Involve family in performance of ADLs  - Assess for home care needs following discharge   - Consider OT consult to assist with ADL evaluation and planning for discharge  - Provide patient education as appropriate  Outcome: Progressing  Goal: Maintains/Returns to pre admission functional level  Description: INTERVENTIONS:  - Perform BMAT or MOVE assessment daily    - Set and communicate daily mobility goal to care team and patient/family/caregiver  - Collaborate with rehabilitation services on mobility goals if consulted  - Perform Range of Motion 3 times a day  - Reposition patient every 2 hours    - Dangle patient 3 times a day  - Stand patient 3 times a day  - Ambulate patient 3 times a day  - Out of bed to chair 3 times a day   - Out of bed for meals 3 times a day  - Out of bed for toileting  - Record patient progress and toleration of activity level   Outcome: Progressing     Problem: Nutrition/Hydration-ADULT  Goal: Nutrient/Hydration intake appropriate for improving, restoring or maintaining nutritional needs  Description: Monitor and assess patient's nutrition/hydration status for malnutrition  Collaborate with interdisciplinary team and initiate plan and interventions as ordered  Monitor patient's weight and dietary intake as ordered or per policy  Utilize nutrition screening tool and intervene as necessary  Determine patient's food preferences and provide high-protein, high-caloric foods as appropriate       INTERVENTIONS:  - Monitor oral intake, urinary output, labs, and treatment plans  - Assess nutrition and hydration status and recommend course of action  - Evaluate amount of meals eaten  - Assist patient with eating if necessary   - Allow adequate time for meals  - Recommend/ encourage appropriate diets, oral nutritional supplements, and vitamin/mineral supplements  - Order, calculate, and assess calorie counts as needed  - Recommend, monitor, and adjust tube feedings and TPN/PPN based on assessed needs  - Assess need for intravenous fluids  - Provide specific nutrition/hydration education as appropriate  - Include patient/family/caregiver in decisions related to nutrition  Outcome: Progressing     Problem: Prexisting or High Potential for Compromised Skin Integrity  Goal: Skin integrity is maintained or improved  Description: INTERVENTIONS:  - Identify patients at risk for skin breakdown  - Assess and monitor skin integrity  - Assess and monitor nutrition and hydration status  - Monitor labs   - Assess for incontinence   - Turn and reposition patient  - Assist with mobility/ambulation  - Relieve pressure over bony prominences  - Avoid friction and shearing  - Provide appropriate hygiene as needed including keeping skin clean and dry  - Evaluate need for skin moisturizer/barrier cream  - Collaborate with interdisciplinary team   - Patient/family teaching  - Consider wound care consult   Outcome: Progressing

## 2023-05-29 NOTE — PLAN OF CARE
Problem: PAIN - ADULT  Goal: Verbalizes/displays adequate comfort level or baseline comfort level  Description: Interventions:  - Encourage patient to monitor pain and request assistance  - Assess pain using appropriate pain scale  - Administer analgesics based on type and severity of pain and evaluate response  - Implement non-pharmacological measures as appropriate and evaluate response  - Consider cultural and social influences on pain and pain management  - Notify physician/advanced practitioner if interventions unsuccessful or patient reports new pain  Outcome: Progressing     Problem: INFECTION - ADULT  Goal: Absence or prevention of progression during hospitalization  Description: INTERVENTIONS:  - Assess and monitor for signs and symptoms of infection  - Monitor lab/diagnostic results  - Monitor all insertion sites, i e  indwelling lines, tubes, and drains  - Monitor endotracheal if appropriate and nasal secretions for changes in amount and color  - [place] appropriate cooling/warming therapies per order  - Administer medications as ordered  - Instruct and encourage patient and family to use good hand hygiene technique  - Identify and instruct in appropriate isolation precautions for identified infection/condition  Outcome: Progressing  Goal: Absence of fever/infection during neutropenic period  Description: INTERVENTIONS:  - Monitor WBC    Outcome: Progressing     Problem: SAFETY ADULT  Goal: Patient will remain free of falls  Description: INTERVENTIONS:  - Educate patient/family on patient safety including physical limitations  - Instruct patient to call for assistance with activity   - Consult OT/PT to assist with strengthening/mobility   - Keep Call bell within reach  - Keep bed low and locked with side rails adjusted as appropriate  - Keep care items and personal belongings within reach  - Initiate and maintain comfort rounds  - Make Fall Risk Sign visible to staff  - Offer Toileting every 2 Hours, in advance of need  - Initiate/Maintain bed alarm  - Obtain necessary fall risk management equipment: bed alarm  - Apply yellow socks and bracelet for high fall risk patients  - Consider moving patient to room near nurses station  Outcome: Progressing  Goal: Maintain or return to baseline ADL function  Description: INTERVENTIONS:  -  Assess patient's ability to carry out ADLs; assess patient's baseline for ADL function and identify physical deficits which impact ability to perform ADLs (bathing, care of mouth/teeth, toileting, grooming, dressing, etc )  - Assess/evaluate cause of self-care deficits   - Assess range of motion  - Assess patient's mobility; develop plan if impaired  - Assess patient's need for assistive devices and provide as appropriate  - Encourage maximum independence but intervene and supervise when necessary  - Involve family in performance of ADLs  - Assess for home care needs following discharge   - Consider OT consult to assist with ADL evaluation and planning for discharge  - Provide patient education as appropriate  Outcome: Progressing  Goal: Maintains/Returns to pre admission functional level  Description: INTERVENTIONS:  - Perform BMAT or MOVE assessment daily    - Set and communicate daily mobility goal to care team and patient/family/caregiver  - Collaborate with rehabilitation services on mobility goals if consulted  - Perform Range of Motion 3 times a day  - Reposition patient every 2 hours    - Dangle patient 3 times a day  - Stand patient 3 times a day  - Ambulate patient 3 times a day  - Out of bed to chair 3 times a day   - Out of bed for meals 3 times a day  - Out of bed for toileting  - Record patient progress and toleration of activity level   Outcome: Progressing     Problem: DISCHARGE PLANNING  Goal: Discharge to home or other facility with appropriate resources  Description: INTERVENTIONS:  - Identify barriers to discharge w/patient and caregiver  - Arrange for needed discharge resources and transportation as appropriate  - Identify discharge learning needs (meds, wound care, etc )  - Arrange for interpretive services to assist at discharge as needed  - Refer to Case Management Department for coordinating discharge planning if the patient needs post-hospital services based on physician/advanced practitioner order or complex needs related to functional status, cognitive ability, or social support system  Outcome: Progressing     Problem: MOBILITY - ADULT  Goal: Maintain or return to baseline ADL function  Description: INTERVENTIONS:  -  Assess patient's ability to carry out ADLs; assess patient's baseline for ADL function and identify physical deficits which impact ability to perform ADLs (bathing, care of mouth/teeth, toileting, grooming, dressing, etc )  - Assess/evaluate cause of self-care deficits   - Assess range of motion  - Assess patient's mobility; develop plan if impaired  - Assess patient's need for assistive devices and provide as appropriate  - Encourage maximum independence but intervene and supervise when necessary  - Involve family in performance of ADLs  - Assess for home care needs following discharge   - Consider OT consult to assist with ADL evaluation and planning for discharge  - Provide patient education as appropriate  Outcome: Progressing  Goal: Maintains/Returns to pre admission functional level  Description: INTERVENTIONS:  - Perform BMAT or MOVE assessment daily    - Set and communicate daily mobility goal to care team and patient/family/caregiver  - Collaborate with rehabilitation services on mobility goals if consulted  - Perform Range of Motion 3 times a day  - Reposition patient every 2 hours    - Dangle patient 3 times a day  - Stand patient 3 times a day  - Ambulate patient 3 times a day  - Out of bed to chair 3 times a day   - Out of bed for meals 3 times a day  - Out of bed for toileting  - Record patient progress and toleration of activity level   Outcome: Progressing     Problem: Nutrition/Hydration-ADULT  Goal: Nutrient/Hydration intake appropriate for improving, restoring or maintaining nutritional needs  Description: Monitor and assess patient's nutrition/hydration status for malnutrition  Collaborate with interdisciplinary team and initiate plan and interventions as ordered  Monitor patient's weight and dietary intake as ordered or per policy  Utilize nutrition screening tool and intervene as necessary  Determine patient's food preferences and provide high-protein, high-caloric foods as appropriate       INTERVENTIONS:  - Monitor oral intake, urinary output, labs, and treatment plans  - Assess nutrition and hydration status and recommend course of action  - Evaluate amount of meals eaten  - Assist patient with eating if necessary   - Allow adequate time for meals  - Recommend/ encourage appropriate diets, oral nutritional supplements, and vitamin/mineral supplements  - Order, calculate, and assess calorie counts as needed  - Recommend, monitor, and adjust tube feedings and TPN/PPN based on assessed needs  - Assess need for intravenous fluids  - Provide specific nutrition/hydration education as appropriate  - Include patient/family/caregiver in decisions related to nutrition  Outcome: Progressing     Problem: Prexisting or High Potential for Compromised Skin Integrity  Goal: Skin integrity is maintained or improved  Description: INTERVENTIONS:  - Identify patients at risk for skin breakdown  - Assess and monitor skin integrity  - Assess and monitor nutrition and hydration status  - Monitor labs   - Assess for incontinence   - Turn and reposition patient  - Assist with mobility/ambulation  - Relieve pressure over bony prominences  - Avoid friction and shearing  - Provide appropriate hygiene as needed including keeping skin clean and dry  - Evaluate need for skin moisturizer/barrier cream  - Collaborate with interdisciplinary team   - Patient/family teaching  - Consider wound care consult   Outcome: Progressing

## 2023-05-29 NOTE — PROGRESS NOTES
Venous Dopplers reviewed:  NEGATIVE FOR DVT, but showing chronic superficial thrombophelbitis    LEFT LOWER LIMB:  No gross evidence of acute or chronic deep vein thrombosis in the visualized  segments of the femoral-popliteal veins  There is reflux noted at the proximal  femoral vein with an echogenic area noted, suggestive of chronic thrombus vs   frozen valve  The gastrocnemius, posterior tibial and peroneal veins were not  visualized  There is evidence of chronic non-occlusive, superficial thrombophlebitis in the  great saphenous vein at the proximal thigh  There are multiple areas of  hyperechoic foci with posterior enhancement noted at the area of the great  saphenous vein in the calf, suggestive of chronic superficial thrombophlebitis  Doppler evaluation shows a normal response to augmentation maneuvers  Popliteal, posterior tibial and anterior tibial arterial Doppler waveform's are  Triphasic      Continues on Coumadin    Brynn Little MD  PM&R

## 2023-05-30 LAB
DME PARACHUTE DELIVERY DATE REQUESTED: NORMAL
DME PARACHUTE DELIVERY NOTE: NORMAL
DME PARACHUTE ITEM DESCRIPTION: NORMAL
DME PARACHUTE ORDER STATUS: NORMAL
DME PARACHUTE SUPPLIER NAME: NORMAL
DME PARACHUTE SUPPLIER PHONE: NORMAL
GLUCOSE SERPL-MCNC: 105 MG/DL (ref 65–140)
GLUCOSE SERPL-MCNC: 106 MG/DL (ref 65–140)
GLUCOSE SERPL-MCNC: 139 MG/DL (ref 65–140)
GLUCOSE SERPL-MCNC: 82 MG/DL (ref 65–140)

## 2023-05-30 PROCEDURE — 82948 REAGENT STRIP/BLOOD GLUCOSE: CPT

## 2023-05-30 PROCEDURE — 97535 SELF CARE MNGMENT TRAINING: CPT

## 2023-05-30 PROCEDURE — 99232 SBSQ HOSP IP/OBS MODERATE 35: CPT | Performed by: PHYSICAL MEDICINE & REHABILITATION

## 2023-05-30 PROCEDURE — 99232 SBSQ HOSP IP/OBS MODERATE 35: CPT | Performed by: INTERNAL MEDICINE

## 2023-05-30 PROCEDURE — 97530 THERAPEUTIC ACTIVITIES: CPT

## 2023-05-30 PROCEDURE — 97116 GAIT TRAINING THERAPY: CPT

## 2023-05-30 RX ORDER — WARFARIN SODIUM 5 MG/1
5 TABLET ORAL
Status: DISCONTINUED | OUTPATIENT
Start: 2023-05-30 | End: 2023-06-01 | Stop reason: HOSPADM

## 2023-05-30 RX ADMIN — Medication 1000 UNITS: at 08:10

## 2023-05-30 RX ADMIN — WARFARIN SODIUM 5 MG: 5 TABLET ORAL at 17:15

## 2023-05-30 RX ADMIN — ACETAMINOPHEN 975 MG: 325 TABLET ORAL at 16:47

## 2023-05-30 RX ADMIN — METHOCARBAMOL TABLETS 250 MG: 500 TABLET, COATED ORAL at 17:15

## 2023-05-30 RX ADMIN — METHOCARBAMOL TABLETS 250 MG: 500 TABLET, COATED ORAL at 12:35

## 2023-05-30 RX ADMIN — ATORVASTATIN CALCIUM 40 MG: 40 TABLET, FILM COATED ORAL at 17:16

## 2023-05-30 RX ADMIN — ACETAMINOPHEN 975 MG: 325 TABLET ORAL at 08:10

## 2023-05-30 RX ADMIN — MICONAZOLE NITRATE: 20 CREAM TOPICAL at 08:11

## 2023-05-30 RX ADMIN — DOCUSATE SODIUM 100 MG: 100 CAPSULE, LIQUID FILLED ORAL at 17:15

## 2023-05-30 RX ADMIN — FUROSEMIDE 40 MG: 40 TABLET ORAL at 08:10

## 2023-05-30 RX ADMIN — OXYBUTYNIN 5 MG: 5 TABLET, FILM COATED, EXTENDED RELEASE ORAL at 08:10

## 2023-05-30 RX ADMIN — CITALOPRAM HYDROBROMIDE 20 MG: 20 TABLET ORAL at 08:10

## 2023-05-30 RX ADMIN — GABAPENTIN 100 MG: 100 CAPSULE ORAL at 22:28

## 2023-05-30 RX ADMIN — METOPROLOL SUCCINATE 25 MG: 25 TABLET, EXTENDED RELEASE ORAL at 08:10

## 2023-05-30 RX ADMIN — Medication 7.5 MG: at 09:13

## 2023-05-30 RX ADMIN — ACETAMINOPHEN 975 MG: 325 TABLET ORAL at 22:30

## 2023-05-30 RX ADMIN — METHOCARBAMOL TABLETS 250 MG: 500 TABLET, COATED ORAL at 06:21

## 2023-05-30 RX ADMIN — NYSTATIN: 100000 POWDER TOPICAL at 17:16

## 2023-05-30 RX ADMIN — METHOCARBAMOL TABLETS 250 MG: 500 TABLET, COATED ORAL at 22:35

## 2023-05-30 RX ADMIN — DOCUSATE SODIUM 100 MG: 100 CAPSULE, LIQUID FILLED ORAL at 08:10

## 2023-05-30 RX ADMIN — ALLOPURINOL 100 MG: 100 TABLET ORAL at 08:10

## 2023-05-30 RX ADMIN — LIDOCAINE 5% 1 PATCH: 700 PATCH TOPICAL at 08:10

## 2023-05-30 RX ADMIN — NYSTATIN: 100000 POWDER TOPICAL at 08:11

## 2023-05-30 RX ADMIN — MONTELUKAST 10 MG: 10 TABLET, FILM COATED ORAL at 22:28

## 2023-05-30 RX ADMIN — AMIODARONE HYDROCHLORIDE 200 MG: 200 TABLET ORAL at 08:10

## 2023-05-30 NOTE — PLAN OF CARE
Problem: PAIN - ADULT  Goal: Verbalizes/displays adequate comfort level or baseline comfort level  Description: Interventions:  - Encourage patient to monitor pain and request assistance  - Assess pain using appropriate pain scale  - Administer analgesics based on type and severity of pain and evaluate response  - Implement non-pharmacological measures as appropriate and evaluate response  - Consider cultural and social influences on pain and pain management  - Notify physician/advanced practitioner if interventions unsuccessful or patient reports new pain  Outcome: Progressing     Problem: INFECTION - ADULT  Goal: Absence or prevention of progression during hospitalization  Description: INTERVENTIONS:  - Assess and monitor for signs and symptoms of infection  - Monitor lab/diagnostic results  - Monitor all insertion sites, i e  indwelling lines, tubes, and drains  - Monitor endotracheal if appropriate and nasal secretions for changes in amount and color  - [place] appropriate cooling/warming therapies per order  - Administer medications as ordered  - Instruct and encourage patient and family to use good hand hygiene technique  - Identify and instruct in appropriate isolation precautions for identified infection/condition  Outcome: Progressing  Goal: Absence of fever/infection during neutropenic period  Description: INTERVENTIONS:  - Monitor WBC    Outcome: Progressing     Problem: SAFETY ADULT  Goal: Patient will remain free of falls  Description: INTERVENTIONS:  - Educate patient/family on patient safety including physical limitations  - Instruct patient to call for assistance with activity   - Consult OT/PT to assist with strengthening/mobility   - Keep Call bell within reach  - Keep bed low and locked with side rails adjusted as appropriate  - Keep care items and personal belongings within reach  - Initiate and maintain comfort rounds  - Make Fall Risk Sign visible to staff  - Offer Toileting every  Hours, in advance of need  - Initiate/Maintain alarm  - Obtain necessary fall risk management equipment:   - Apply yellow socks and bracelet for high fall risk patients  - Consider moving patient to room near nurses station  Outcome: Progressing  Goal: Maintain or return to baseline ADL function  Description: INTERVENTIONS:  -  Assess patient's ability to carry out ADLs; assess patient's baseline for ADL function and identify physical deficits which impact ability to perform ADLs (bathing, care of mouth/teeth, toileting, grooming, dressing, etc )  - Assess/evaluate cause of self-care deficits   - Assess range of motion  - Assess patient's mobility; develop plan if impaired  - Assess patient's need for assistive devices and provide as appropriate  - Encourage maximum independence but intervene and supervise when necessary  - Involve family in performance of ADLs  - Assess for home care needs following discharge   - Consider OT consult to assist with ADL evaluation and planning for discharge  - Provide patient education as appropriate  Outcome: Progressing  Goal: Maintains/Returns to pre admission functional level  Description: INTERVENTIONS:  - Perform BMAT or MOVE assessment daily    - Set and communicate daily mobility goal to care team and patient/family/caregiver  - Collaborate with rehabilitation services on mobility goals if consulted  - Perform Range of Motion  times a day  - Reposition patient every  hours    - Dangle patient  times a day  - Stand patient  times a day  - Ambulate patient  times a day  - Out of bed to chair  times a day   - Out of bed for meals times a day  - Out of bed for toileting  - Record patient progress and toleration of activity level   Outcome: Progressing     Problem: DISCHARGE PLANNING  Goal: Discharge to home or other facility with appropriate resources  Description: INTERVENTIONS:  - Identify barriers to discharge w/patient and caregiver  - Arrange for needed discharge resources and transportation as appropriate  - Identify discharge learning needs (meds, wound care, etc )  - Arrange for interpretive services to assist at discharge as needed  - Refer to Case Management Department for coordinating discharge planning if the patient needs post-hospital services based on physician/advanced practitioner order or complex needs related to functional status, cognitive ability, or social support system  Outcome: Progressing     Problem: MOBILITY - ADULT  Goal: Maintain or return to baseline ADL function  Description: INTERVENTIONS:  -  Assess patient's ability to carry out ADLs; assess patient's baseline for ADL function and identify physical deficits which impact ability to perform ADLs (bathing, care of mouth/teeth, toileting, grooming, dressing, etc )  - Assess/evaluate cause of self-care deficits   - Assess range of motion  - Assess patient's mobility; develop plan if impaired  - Assess patient's need for assistive devices and provide as appropriate  - Encourage maximum independence but intervene and supervise when necessary  - Involve family in performance of ADLs  - Assess for home care needs following discharge   - Consider OT consult to assist with ADL evaluation and planning for discharge  - Provide patient education as appropriate  Outcome: Progressing  Goal: Maintains/Returns to pre admission functional level  Description: INTERVENTIONS:  - Perform BMAT or MOVE assessment daily    - Set and communicate daily mobility goal to care team and patient/family/caregiver  - Collaborate with rehabilitation services on mobility goals if consulted  - Perform Range of Motion  times a day  - Reposition patient every  hours    - Dangle patient  times a day  - Stand patient  times a day  - Ambulate patient  times a day  - Out of bed to chair  times a day   - Out of bed for meals  times a day  - Out of bed for toileting  - Record patient progress and toleration of activity level   Outcome: Progressing Problem: Nutrition/Hydration-ADULT  Goal: Nutrient/Hydration intake appropriate for improving, restoring or maintaining nutritional needs  Description: Monitor and assess patient's nutrition/hydration status for malnutrition  Collaborate with interdisciplinary team and initiate plan and interventions as ordered  Monitor patient's weight and dietary intake as ordered or per policy  Utilize nutrition screening tool and intervene as necessary  Determine patient's food preferences and provide high-protein, high-caloric foods as appropriate       INTERVENTIONS:  - Monitor oral intake, urinary output, labs, and treatment plans  - Assess nutrition and hydration status and recommend course of action  - Evaluate amount of meals eaten  - Assist patient with eating if necessary   - Allow adequate time for meals  - Recommend/ encourage appropriate diets, oral nutritional supplements, and vitamin/mineral supplements  - Order, calculate, and assess calorie counts as needed  - Recommend, monitor, and adjust tube feedings and TPN/PPN based on assessed needs  - Assess need for intravenous fluids  - Provide specific nutrition/hydration education as appropriate  - Include patient/family/caregiver in decisions related to nutrition  Outcome: Progressing     Problem: Prexisting or High Potential for Compromised Skin Integrity  Goal: Skin integrity is maintained or improved  Description: INTERVENTIONS:  - Identify patients at risk for skin breakdown  - Assess and monitor skin integrity  - Assess and monitor nutrition and hydration status  - Monitor labs   - Assess for incontinence   - Turn and reposition patient  - Assist with mobility/ambulation  - Relieve pressure over bony prominences  - Avoid friction and shearing  - Provide appropriate hygiene as needed including keeping skin clean and dry  - Evaluate need for skin moisturizer/barrier cream  - Collaborate with interdisciplinary team   - Patient/family teaching  - Consider wound care consult   Outcome: Progressing

## 2023-05-30 NOTE — WOUND OSTOMY CARE
Progress Note - Wound   Aysha Almazan 68 y o  female MRN: [id number]  Unit/Bed#: -01 Encounter: [id number]        Assessment:  Patient is seen for weekly wound care assessment of the skin with the daughter at the bedside for teaching and review of the dressings   Patient is on the low air loss mattress P- 500   She does wear the mesh panties and a maxi pad for urinary incontinence   Min - Mod assist to roll in the bed   Assessment Findings  1  Right and left abdominal fold pannus is resolved candidiasis rash   No open areas suggested to continue with the interdry sheets to keep the area dry and prevent skin breakdown   2  Bilateral groin and inner thigh candidiasis rash is resolved   3  Sacral wound - remains a unstageable due to the mixed wound bed of slough and pink tissue   Kay Sanchez is 60% and 40% pink   Noted the wound bed to be improved and small in size   Reviewed the plan and shown the daughter on how to care for the area   4  Bilateral heels dry and intact   5  Right upper thigh area - hospital acquired DTI evolving linear line related to the mesh panties on the thigh area   Applied maxorb and a ABD to protect and then cut the elastic of the mesh panties to prevent from being tight   Discussed and reviewed the plan with the daughter         Skin care plans:  1-Hydraguard to bilateral  heels BID and PRN  2-Elevate heels to offload pressure  3-Ehob cushion in chair when out of bed  4-Moisturize skin daily with skin nourishing cream   5-Turn/reposition q2h or when medically stable for pressure re-distribution on skin  6  Low air loss mattress   7  Cleanse sacral buttocks with soap and water then apply triad paste then top with a small allevyn foam stephanie with a T for treatment change every other day and as needed for soilage or dislodgement    8  Cleanse bilateral abdominal pannus with soap and water then completely dry    Cut strips of the interdry and place flat in the skin folds of the abdominal "area leaving 2 inches outside of the skin fold  Take interdry out and cleanse with with soap and water pat dry well and place back in the fold   Replace every 3 days or sooner if soiled   Do not use creams or powders on the skin with the interdry   9  Right upper thigh area - cleanse with soap and water then apply maxorb on open area then top with a ABD and secure with the mesh panties   Cut a slit in the panties to prevent from being tight on the thigh area change every other day or if soiled   10   Follow up at the wound center upon discharge - order is placed   Vitals: Blood pressure 112/72, pulse 61, temperature 98 3 °F (36 8 °C), temperature source Oral, resp  rate 19, height 4' 10\" (1 473 m), weight 92 9 kg (204 lb 12 9 oz), SpO2 92 %  ,Body mass index is 42 8 kg/m²  @Francois     Wound 05/07/23 Pressure Injury Buttocks Right;Upper (Active)   Wound Image   05/30/23 1359   Wound Description Beefy red;Fragile;Slough 05/30/23 1359   Pressure Injury Stage U 05/30/23 1359   Marta-wound Assessment Clean;Dry; Intact 05/30/23 1359   Wound Length (cm) 1 5 cm 05/30/23 1359   Wound Width (cm) 0 6 cm 05/30/23 1359   Wound Depth (cm) 0 3 cm 05/30/23 1359   Wound Surface Area (cm^2) 0 9 cm^2 05/30/23 1359   Wound Volume (cm^3) 0 27 cm^3 05/30/23 1359   Calculated Wound Volume (cm^3) 0 27 cm^3 05/30/23 1359   Change in Wound Size % 97 75 05/30/23 1359   Drainage Amount Small 05/30/23 1359   Drainage Description Serosanguineous 05/30/23 1359   Non-staged Wound Description Partial thickness 05/21/23 0900   Treatments Cleansed;Site care 05/30/23 1359   Dressing Foam, Silicon (eg  Allevyn, etc); Other (Comment) 05/30/23 1533   Dressing Changed Changed 05/30/23 1359   Patient Tolerance Tolerated well 05/30/23 1359   Dressing Status Clean; Intact;Dry 05/29/23 0830       Wound 05/15/23 Abdomen Anterior; Left (Active)   Wound Image   05/30/23 1353   Wound Description Clean;Dry; Intact 05/30/23 1351   Marta-wound Assessment " Clean;Dry; Intact 05/30/23 1359   Wound Length (cm) 0 cm 05/30/23 1359   Wound Width (cm) 0 cm 05/30/23 1359   Wound Depth (cm) 0 cm 05/30/23 1359   Wound Surface Area (cm^2) 0 cm^2 05/30/23 1359   Wound Volume (cm^3) 0 cm^3 05/30/23 1359   Calculated Wound Volume (cm^3) 0 cm^3 05/30/23 1359   Change in Wound Size % 100 05/30/23 1359   Drainage Amount None 05/30/23 1359   Treatments Cleansed;Site care 05/30/23 1359   Dressing Other (Comment) 05/30/23 1533   Dressing Changed Changed 05/30/23 1359   Patient Tolerance Tolerated well 05/30/23 1359   Dressing Status Clean;Dry; Intact 05/30/23 1533       Wound 05/15/23 Abdomen Right (Active)   Wound Image   05/30/23 1357   Wound Description Clean;Dry; Intact 05/30/23 1357   Marta-wound Assessment Clean;Dry; Intact 05/30/23 1357   Wound Length (cm) 0 cm 05/30/23 1357   Wound Width (cm) 0 cm 05/30/23 1357   Wound Depth (cm) 0 cm 05/30/23 1357   Wound Surface Area (cm^2) 0 cm^2 05/30/23 1357   Wound Volume (cm^3) 0 cm^3 05/30/23 1357   Calculated Wound Volume (cm^3) 0 cm^3 05/30/23 1357   Change in Wound Size % 100 05/30/23 1357   Drainage Amount None 05/30/23 1357   Treatments Site care 05/30/23 1357   Dressing Other (Comment) 05/30/23 1533   Dressing Changed Changed 05/28/23 2011   Patient Tolerance Tolerated well 05/30/23 1357   Dressing Status Clean;Dry 05/30/23 1533       Wound 05/30/23 Pressure Injury Thigh Anterior;Proximal;Right (Active)   Wound Image   05/30/23 1358   Wound Description Beefy red;Fragile;Light purple 05/30/23 1358   Pressure Injury Stage DTPI 05/30/23 1358   Marta-wound Assessment Clean;Dry; Intact 05/30/23 1358   Wound Length (cm) 1 cm 05/30/23 1358   Wound Width (cm) 16 cm 05/30/23 1358   Wound Depth (cm) 0 2 cm 05/30/23 1358   Wound Surface Area (cm^2) 16 cm^2 05/30/23 1358   Wound Volume (cm^3) 3 2 cm^3 05/30/23 1358   Calculated Wound Volume (cm^3) 3 2 cm^3 05/30/23 1358   Drainage Amount Scant 05/30/23 1358   Drainage Description Serosanguineous 05/30/23 1358   Treatments Cleansed;Site care 05/30/23 1358   Dressing Calcium Alginate;ABD 05/30/23 1358   Dressing Changed Changed 05/30/23 1358   Patient Tolerance Tolerated well 05/30/23 1358       Wound care will follow weekly call or tiger text with questions     Arley Herrera RN BSN [facility]OCN

## 2023-05-30 NOTE — PROGRESS NOTES
PM&R PROGRESS NOTE:  Kay Babinski 68 y o  female MRN: [id number]  Unit/Bed#: -01 Encounter: [id number]        Rehab Diagnosis: Impairment of mobility, safety and Activities of Daily Living (ADLs) due to Major Multiple Trauma:  14 9  Other Multiple Trauma Multiple rib fractures with acute pain, improved acute hypoxic respiratory failure, and L humeral fracture NWB in coaptation splint       SUBJECTIVE: Patient seen face to face  No acute issues overnight  Preparing for discharge on Thursday  Family training again today  Doing well  ASSESSMENT: Stable, progressing      PLAN:    Rehabilitation  • Functional deficits: Left upper extremity weakness, impaired mobility, impaired self-care, impaired endurance, impaired strength  • Continue current rehabilitation plan of care to maximize function  I personally attended, reviewed, and discussed medical and functional updates in team conference today  Please refer to advance care planning note for details    • Expected Discharge: Discharge Thursday, 6/1/2023 Home health care RN, PT, OT  o Family training with her daughter Angelica Brunson     Physical Therapy Occupational Therapy Speech Therapy   Weight Bearing Status: Non-weight bearing (LUE)  Transfers: Minimal Assistance, Incidental Touching, Supervision  Bed Mobility: Minimal Assistance  Amulation Distance (ft): 60 feet  Ambulation: Supervision  Assistive Device for Ambulation: Single Point Cane  Wheelchair Mobility Distance:  (due to body habitus, limited UE ROM/weakness unable to self propel w/c however pt will benefit from a w/c for community distance mobilties for still inconsistent with gait tolerance and family will propel w/c )  Assistive Device for Stairs:  (n/a - only has a small threshold at home)  Assistive Device for Ramp: Wheelchair  Discharge Recommendations: Home with:  [address] with[de-identified] 24 Hour Assisteance, 24 Hour Supervision, Family Support, First Floor Setup, Home Physical Therapy   Eating: Independent  Grooming: Supervision  Bathing: Moderate Assistance  Bathing: Moderate Assistance  Upper Body Dressing: Maximum Assistance  Lower Body Dressing: Minimal Assistance  Toileting: Minimal Assistance  Toilet Transfer: Incidental Touching  Cognition: Within Defined Limits  Orientation: Person, Place, Time, Situation                 DVT prophylaxis  • Managed on warfarin  • Goal INR 2 5-3 5  • Last INR = 2 2    Bladder plan  • Continent  • Limit pure wick usage if possible    Bowel plan  • Continent      * Humerus fracture  Assessment & Plan  2/2 mechanical fall  Proximal humerus shaft spiral fracture, displaced and angulated  Ortho attempted closed reduction  Ultimately placed in coaptation splint on 4/27 and managed non-operatively  NWB LUE  Pain Control as detailed below  No Vitamin D level in chart or unmerged chart recently   - At home was on daily Vitamin D supplement 1000 units - restarted   - 5/14 Vitamind D level is 50 7   Due for 2 week follow-up  Imaging: Personally reviewed  Redemonstration of oblique spiral fracture of the shaft of the humerus with apex medial angulation  No glenohumeral dislocation  Appreciate orthopedics evaluating patient 5/15/2023  Replaced her splint with a Salgado brace  Cleared by orthopedics to provide range of motion to elbow and wrist without restriction as tolerated  Follow-up with orthopedics in 4 weeks around 6/15/2023    Ventricular arrhythmia  Assessment & Plan  On 5/2 developed Afib with RR which degenerated to VT then VF  She received shock from her AICD ([place], Single Chamber ICD)  Seen by Cards who adjusted her device, and recommended restarting home Metoprolol and Amio load  Was also on digoxin  - Toprol dose adjusted and digoxin discontinued after developing junctional bradycardia later in stay  Here: Toprol XL 25mg daily, Amio 200mg daily starting tomorrow for 28 days  Monitor cardiac status during therapies   Monitor lytes   Keep K > 4, Mag > 2  Will plan for outpatient f/u with EP    Difficult intravenous access  Assessment & Plan  Midline placed by IR on 5/15/23  Need for blood draws frequently to follow electrolytes, INR  Need for IV access given her recent ventricular arrhythmia and AICD firing, importance of optimizing electrolytes, current COVID 19 virus  Acute COVID-19  Assessment & Plan  Diagnosed on screening test on 5/13  Minimally symptomatic (does have a cough)  Afebrile and on RA  10 days isolation per protocol through 5/22/2023 (last day)  Off precautions 5/23/2023  Monitor respiratory status   - Has chronic issues with urinary urgency/frequency  - Added Q4hr timed voids so we can help her get to the commode in a timely manner    -Stable and has not required oxygen   -Compliant with utilizing incentive spirometer    CHAS (obstructive sleep apnea)  Assessment & Plan  Does not use CPAP at home  Consider Noc Ox overnight while here  Class 2 obesity in adult  Assessment & Plan  Nutrition consulted  Counseling    Osteoporosis with current pathological fracture with routine healing  Assessment & Plan  Managed by [facility]  History of L5 pathologic fracture  Has been on Alendronate once weekly since 2017  Would confirm with her what day she normally takes it prior to restarting  Will confirm that it is ok to restart with Orthopedics first, as some surgeons prefer to wait 6 weeks after acute fracture prior to restarting    - Per Dr Jun Trevino ok to resume at anytime  - She normally takes it on Thursdays  Would complete through 6/2023  Per last Endocrine note in 6/2022, they were going to continue for 1 more year and then stop  She was due for DXA this May       Asthma  Assessment & Plan  No acute exacerbation  Home: Singulair 10mg QHS  Here: Same, PRN albuterol  No acute exacerbation  Monitor respiratory status    Stage III pressure ulcer of sacral region [facility])  Assessment & Plan  Present on admission  Much improved - personally assessed and assessed by wound care 5/22/2023    Wound care following while at [address] [place] [address] Place local wound care as follows:  • DTI of the b/l sacro-buttocks has fully evolved to a Stage 3/4 injury  Wound is POA to ARC unit  Wound is improving, less devitalized tissue, decreased measurements  ? Patient desires to defer bedside debridement at this time, due to her concern of not being able to tolerate being on her side for that long  ? Continue with Triad paste and foam dressing  ? Pressure relief   ? P-500 mattress  • Candidiasis rash noted to the b/l medial thighs and perineum - greatly improved, nearly resolved  ? Continue with pascual anti-fungal cream  • ITD with candidasis to the b/l abdominal pannus partial thickness wounds have resolved, mild candidiasis rash remains  ? Continue with interdry sheets  • No s/s of infection present  • Will recommend to continue with preventative nursing skin care measures  • Nutrition is following along - nutritional supplements of Meet  • Patient verbalized understanding of plan of care  • [place] text wound care team with questions or concerns    • Routine wound care follow-up while admitted   • Follow-up with the [facility] wound care center as an outpatient, ambulatory referral placed  Frequent offloading in chair and in bed  P500 mattress  Optimize nutrition    Atrial fibrillation with RVR ([address] )  Assessment & Plan  Initially per AICD interrogation with Afib RVR that degenerated into VT/VF  Unclear burden  Here: Toprol XL 25mg daily, Fully anticoagulated on warfarin (See mechanical AV), amiodarone 200mg daily  Monitor and adjust as appropriate  Currently examines in NSR  IM consulted to assist with management      Outpatient f/u with EP    Abnormal CT scan  Assessment & Plan  - 4/27 CT CAP:           - Incidentally noted is a incarcerated/partially obstructed periumbilic ventral hernia, (image 146 series 9)          - Septated cyst anterior aspect of the right kidney, consider evaluation with ultrasound or MRI for further characterization (image 136 series 9)          - Dilated ascending aorta measuring 4 6 cm, evaluation with cardiothoracic surgery on nonemergent basis  - Follow up with PCP and CT surgery outpatient for findings  - Patient informed of abnormal results on 5/11: she is aware of ventral hernia but not aortic aneurysm or renal cyst         Hyponatremia  Assessment & Plan  Resolved  Na = 138    In acute care, [address] on 5/9  [place] by Trauma to be 2/2 poor PO intake and furosemide  Started on sodium tabs (weaning down)  Monitor and adjust as appropriate  IM consulted and assisting with management  Bradycardia  Assessment & Plan  Developed junctional bradycardia in hospital after restarting home Toprol XL and digoxin  - Cards stopped digoxin  - Toprol XL decreased to 25mg daily  Monitor  Type 2 diabetes mellitus ([facility] [address] )  Assessment & Plan    Home: Sitagliptin-Metformin 50-50mg  Here: Discontinue Levemir due to hypoglycemia, continue sliding scale insulin and diabetic diet  Diabetic Diet  Nutrition Consult  Attempt to resume oral meds here and get off insulin  IM consulted to assist with management  Outpatient f/u with PCP  Fracture of multiple ribs of right side  Assessment & Plan  R 8th and 9th rib possible fractures  Rib fracture protocol  Pain control with lidoderm patch  Incentive Spirometer  Monitor respiratory status  Laceration of right eyebrow  Assessment & Plan  Healing, monitor  Steri-Strips in place    H/O mechanical aortic valve replacement  Assessment & Plan  Managed on Coumadin  Goal INR 2 5-3 5  INR 2 2  IM consulted to assist with management of Coumadin dosing      Acute pain due to trauma  Assessment & Plan  R rib fractures, L humerus fracture  - Lidoderm patch for ribs  - Oxycodone 5-7 5mg PRN  - Robaxin 250mg Q6hr scheduled  - Gabapentin 100mg QHS (may be able to discontinue while here)  - Tylenol 975mg Q8hrs "scheduled  APS assisted inpatient  Adjust as appropriate while here  Appreciate IM consultants medical co-management  Labs, medications, and imaging personally reviewed  ROS:  A ten point review of systems was completed on 05/30/23 and pertinent positives are listed in subjective section  All other systems reviewed were negative  OBJECTIVE:   /58   Pulse 72   Temp (!) 97 2 °F (36 2 °C) (Oral)   Resp 20   Ht 4' 10\" (1 473 m)   Wt 92 9 kg (204 lb 12 9 oz) Comment: pt also has a splint on LUE  SpO2 97%   BMI 42 80 kg/m²       Physical Exam  Vitals and nursing note reviewed  Constitutional:       General: She is not in acute distress  HENT:      Head: Normocephalic and atraumatic  Nose: Nose normal       Mouth/Throat:      Mouth: Mucous membranes are moist    Eyes:      Conjunctiva/sclera: Conjunctivae normal    Cardiovascular:      Rate and Rhythm: Normal rate and regular rhythm  Pulses: Normal pulses  Pulmonary:      Effort: Pulmonary effort is normal       Breath sounds: Normal breath sounds  No wheezing or rales  Abdominal:      General: Bowel sounds are normal  There is no distension  Palpations: Abdomen is soft  Tenderness: There is no abdominal tenderness  Musculoskeletal:         General: No swelling  Cervical back: Neck supple  Comments: Brace in place without skin issues   Skin:     General: Skin is warm  Comments: Improving sacral ulcer and excoriations   Neurological:      Mental Status: She is alert and oriented to person, place, and time        Comments: Seen doing a sit to stand transfer at a supervision - Mod I level today   Psychiatric:         Mood and Affect: Mood normal         Lab Results   Component Value Date    HCT 32 4 (L) 05/29/2023    HGB 10 0 (L) 05/29/2023     (H) 05/29/2023     (L) 05/29/2023    WBC 3 15 (L) 05/29/2023     Lab Results   Component Value Date    BUN 23 05/29/2023    CALCIUM 8 7 " 05/29/2023     05/29/2023    CO2 31 05/29/2023    CREATININE 0 55 (L) 05/29/2023    GLUC 97 05/29/2023    K 3 7 05/29/2023    SODIUM 138 05/29/2023     Lab Results   Component Value Date    INR 2 19 (H) 05/29/2023    INR 1 99 (H) 05/26/2023    INR 2 05 (H) 05/25/2023    PROTIME 24 6 (H) 05/29/2023    PROTIME 22 8 (H) 05/26/2023    PROTIME 23 4 (H) 05/25/2023           Current Facility-Administered Medications:   •  acetaminophen (TYLENOL) tablet 975 mg, 975 mg, Oral, Q8H [address], Alex Aceves MD, 975 mg at 05/30/23 0810  •  albuterol (PROVENTIL HFA,VENTOLIN HFA) inhaler 2 puff, 2 puff, Inhalation, Q4H PRN, Alex Aceves MD  •  allopurinol (ZYLOPRIM) tablet 100 mg, 100 mg, Oral, Daily, Alex Aceves MD, 100 mg at 05/30/23 7641  •  amiodarone tablet 200 mg, 200 mg, Oral, Daily With Breakfast, Alex Aceves MD, 200 mg at 05/30/23 7500  •  atorvastatin (LIPITOR) tablet 40 mg, 40 mg, Oral, Daily With Isis Fang MD, 40 mg at 05/29/23 1628  •  calcium carbonate (TUMS) chewable tablet 500 mg, 500 mg, Oral, TID PRN, Alex Aceves MD  •  cholecalciferol (VITAMIN D3) tablet 1,000 Units, 1,000 Units, Oral, Daily, Alex Aceves MD, 1,000 Units at 05/30/23 0810  •  citalopram (CeleXA) tablet 20 mg, 20 mg, Oral, Daily, Alex Aceves MD, 20 mg at 05/30/23 0013  •  docusate sodium (COLACE) capsule 100 mg, 100 mg, Oral, BID, Alex Aceves MD, 100 mg at 05/30/23 0144  •  furosemide (LASIX) tablet 40 mg, 40 mg, Oral, Daily, Alex Aceves MD, 40 mg at 05/30/23 9948  •  gabapentin (NEURONTIN) capsule 100 mg, 100 mg, Oral, HS, Alex Aceves MD, 100 mg at 05/29/23 2236  •  insulin lispro (HumaLOG) 100 units/mL subcutaneous injection 1-5 Units, 1-5 Units, Subcutaneous, TID AC **AND** Fingerstick Glucose (POCT), , , TID AC, Alex Aceves MD  •  insulin lispro (HumaLOG) 100 units/mL subcutaneous injection 1-5 Units, 1-5 Units, Subcutaneous, HS, Alex Aceves MD, 1 Units at 05/25/23 2100  •  lidocaine (LIDODERM) 5 % patch 1 patch, 1 patch, Topical, Daily, Alex Aceves MD, 1 patch at 05/30/23 0810  •  methocarbamol (ROBAXIN) tablet 250 mg, 250 mg, Oral, Q6H [address], Alex Aceves MD, 250 mg at 05/30/23 0522  •  metoprolol succinate (TOPROL-XL) 24 hr tablet 25 mg, 25 mg, Oral, Daily, Alex Aceves MD, 25 mg at 05/30/23 0810  •  moisture barrier miconazole 2% cream (aka LICO MOISTURE BARRIER ANTIFUNGAL CREAM), , Topical, BID, JOSEPHINE Perez, Given at 05/30/23 3356  •  montelukast (SINGULAIR) tablet 10 mg, 10 mg, Oral, HS, Alex Aceves MD, 10 mg at 05/29/23 2236  •  naloxone (NARCAN) 0 04 mg/mL syringe 0 04 mg, 0 04 mg, Intravenous, Q1MIN PRN, Alex Aceves MD  •  nystatin (MYCOSTATIN) powder, , Topical, BID, Amanda Pearl PA-C, Given at 05/30/23 6631  •  ondansetron (ZOFRAN-ODT) dispersible tablet 4 mg, 4 mg, Oral, Q6H PRN, Alex Aceves MD, 4 mg at 05/24/23 5797  •  oxybutynin (DITROPAN-XL) 24 hr tablet 5 mg, 5 mg, Oral, Daily, Alex Aceves MD, 5 mg at 05/30/23 7258  •  oxyCODONE (ROXICODONE) IR tablet 5 mg, 5 mg, Oral, Q4H PRN, Alex Aceves MD, 5 mg at 05/29/23 9005  •  oxyCODONE (ROXICODONE) split tablet 7 5 mg, 7 5 mg, Oral, Q4H PRN, Alex Aceves MD, 7 5 mg at 05/30/23 0913  •  senna (SENOKOT) tablet 17 2 mg, 2 tablet, Oral, Daily, Alex Aceves MD, 17 2 mg at 05/29/23 0830  •  warfarin (COUMADIN) tablet 5 mg, 5 mg, Oral, Daily (warfarin), JOSEPHINE Su    Past Medical History:   Diagnosis Date   • Asthma    • Diabetes ([facility] [address] )    • Hypertension        Patient Active Problem List    Diagnosis Date Noted   • Humerus fracture 04/27/2023   • Ventricular arrhythmia 05/04/2023   • Difficult intravenous access 05/15/2023   • Acute COVID-19 05/14/2023   • Atrial fibrillation with RVR ([facility] [address] ) 05/13/2023   • Stage III pressure ulcer of sacral region [facility]) 05/13/2023   • Asthma 05/13/2023   • Osteoporosis with current pathological fracture with routine healing 05/13/2023   • Class 2 obesity in adult 05/13/2023   • CHAS (obstructive sleep apnea) 05/13/2023   • Abnormal CT scan 05/10/2023   • Hyponatremia 05/09/2023   • Bradycardia 05/08/2023   • Type 2 diabetes mellitus ([facility] [address] ) 04/28/2023   • Fall 04/27/2023   • Acute pain due to trauma 04/27/2023   • H/O mechanical aortic valve replacement 04/27/2023   • Laceration of right eyebrow 04/27/2023   • Fracture of multiple ribs of right side 04/27/2023          Dorothy Fernandes MD    I have spent a total time of 55 minutes on 05/30/23 in caring for this patient including Impressions, Counseling / Coordination of care, Documenting in the medical record, Reviewing / ordering tests, medicine, procedures  , Communicating with other healthcare professionals  and Weekly team conference  ** Please Note:  voice to text software may have been used in the creation of this document   Although proof errors in transcription or interpretation are a potential of such software**

## 2023-05-30 NOTE — PROGRESS NOTES
Internal Medicine Progress Note  Patient: Liz Ott  Age/sex: 68 y o  female  Medical Record #: [id number]      ASSESSMENT/PLAN: (Interval History)  Liz Ott is seen and examined and management for following issues:    Lt proximal humerus fracture  • Managed non-operatively  • NWB to the LUE  • Immobilizer placed to LUE by orthopedics  • Pain control and therapy per PMR  • Follow-up with Orthopedics in 4 weeks      Rt 8-9 rib fx  • Pain control per PMR  • Encourage IS      Rt eyebrow laceration  • healed     DM type 2  • HA1C 6 4  • Home: [place]um[place]  2x daily  • Here: SSI  • Stable currently w/o meds     History of mechanical AVR  • Goal INR 2 5 - 3 5  • At home, takes 2 5mg Wed/Sat, 5mg all other days of the week   • Will resume home dosing  • INR in am     PAF  · As below  · Continue Coumadin    Ventricular arrhythmia/ICD device  • ICD was originally placed for VT in past  • On 5/2/23, + rapid afib that deteriorated into VT/VF in setting of no beta blocker -->+ ICD shock and reprogrammed  • Continue Toprol 25 mg qd/Amiodarone 200 mg qd      Junctional escape rhythm   • Diltiazem discontinued and Toprol dose lowered in the hospital by EP with resolution  • She will need outpt follow-up with Cardiology  She follows with the [facility]  • HR stable     COVID-19  • Tested positive 5/13/23  • Pt has chronic cough   • Off isolation     Buttock wound/labia wound  • Followed by wound care  • Plan and surveillance per PMR     LE edema  • L>R  • Venous doppler 5/27: 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  • On Lasix 40mg qd  • Will not inc Lasix dose for now  LE edema is a little better today    • Her BPs are soft at times which may limit aggressive diuresis  • Has severe TR/severe RVE by ECHO 5/3/23 likely the cause  • Cont coumadin     Hx hypomagnesemia  • Takes Mag 64 mg tabs - 5 tabs qd at home but on none here  • Mg level today on 5/29 is 2 0      Severe TR/severe RVE  • This is by ECHO 5/3/23  • OP cardiology follow up     Ascending aortic aneurysm  • By ECHO on 5/3/23  • Measures 4 8 cm  • Will need OP followup/surveillence         DC plannin23    The above assessment and plan was reviewed and updated as determined by my evaluation of the patient on 2023      Labs:   Results from last 7 days   Lab Units 23  0603 23  0619   HEMATOCRIT % 32 4* 31 9*   HEMOGLOBIN g/dL 10 0* 10 4*   PLATELETS Thousands/uL 110* 120*   WBC Thousand/uL 3 15* 3 68*     Results from last 7 days   Lab Units 23  0603 23  0619   BUN mg/dL 23 21   CALCIUM mg/dL 8 7 8 6   CHLORIDE mmol/L 108 110*   CO2 mmol/L 31 30   CREATININE mg/dL 0 55* 0 53*   POTASSIUM mmol/L 3 7 3 9   SODIUM mmol/L 138 140         Results from last 7 days   Lab Units 23  0603 23  0619   INR  2 19* 1 99*     Results from last 7 days   Lab Units 23  0634 23  1624   POC GLUCOSE mg/dl 82 147* 116       Review of Scheduled Meds:  Current Facility-Administered Medications   Medication Dose Route Frequency Provider Last Rate   • acetaminophen  975 mg [facility] Silvina Centeno MD     • albuterol  2 puff Inhalation Q4H PRN Silvina Centeno MD     • allopurinol  100 mg Oral Daily Silvina Centeno MD     • amiodarone  200 mg Oral Daily With Breakfast Silvina Centeno MD     • atorvastatin  40 mg Oral Daily With Leyda Allen MD     • calcium carbonate  500 mg Oral TID PRN Silvina Centeno MD     • cholecalciferol  1,000 Units Oral Daily Silvina Centeno MD     • citalopram  20 mg Oral Daily Silvina Centeno MD     • docusate sodium  100 mg Oral BID Silvina Centeno MD     • furosemide  40 mg Oral Daily Silvina Centeno MD     • gabapentin  100 mg Oral HS Silvina Centeno MD     • insulin lispro  1-5 Units Subcutaneous TID [facility] Silvina Centeno MD     • insulin lispro  1-5 Units Subcutaneous HS Silvina Centeno MD     • lidocaine  1 patch Topical Daily Lianna Colon Alberto Hampton MD     • methocarbamol  250 mg Oral Q6H [address] Maria Briones MD     • metoprolol succinate  25 mg Oral Daily Maria Briones MD     • LICO ANTIFUNGAL   Topical BID [facility], MAHAD     • montelukast  10 mg Oral HS Maria Briones MD     • naloxone  0 04 mg Intravenous Q1MIN PRN Maria Briones MD     • nystatin   Topical BID Amanda Pearl PA-C     • ondansetron  4 mg Oral Q6H PRN Maria Briones MD     • oxybutynin  5 mg Oral Daily Maria Briones MD     • oxyCODONE  5 mg Oral Q4H PRN Maria Briones MD     • oxyCODONE  7 5 mg Oral Q4H PRN Maria Briones MD     • senna  2 tablet Oral Daily Maria Briones MD     • warfarin  5 mg Oral Daily (warfarin) JOSEPHINE Martins         Subjective/ HPI: Patient seen and examined  Patients overnight issues or events were reviewed with nursing or staff during rounds or morning huddle session  New or overnight issues include the following:     Pt seen at bedside, no overnight complaints  A little anxious regarding dc later this wk    ROS:   A 10 point ROS was performed; negative except as noted above       *Labs /Radiology studies reviewed  *Medications reviewed and reconciled as needed  *Please refer to order section for additional ordered labs studies  *Case discussed with primary attending during morning huddle case rounds    Physical Examination:  Vitals:   Vitals:    05/29/23 1315 05/29/23 1946 05/30/23 0620 05/30/23 0810   BP: 108/56 109/59  121/58   BP Location: Right arm Right arm     Pulse: 60 60 59 72   Resp: 16 20 20    Temp: 98 1 °F (36 7 °C) 99 1 °F (37 3 °C) (!) 97 2 °F (36 2 °C)    TempSrc: Oral Oral Oral    SpO2: 95% 96% 97%    Weight:       Height:           GEN: NAD; pleasant  NEURO: Alert and oriented x4; appropriate  HEENT: Pupils are equal/reactive; normocephalic, face is symmetrical, hearing normal  CV: S1 S2 regular, no murmur/rub/gallops, 1/4 pedal pulses, trace L >R edema present  RESP: Lungs are clear bilaterally, no wheezes rales or rhonchi, on room air, no distress, respirations are easy and non labored  GI: Abdomen is obese, soft, non tender, +BS x4; non distended  : Voiding without issues  MUSC: Moves all extremities except LUE brace in place; fingers without edema, +cap refill  SKIN: pink, warm, normal turgor, PVD discoloration to b/l LE      The above physical exam was reviewed and updated as determined by my evaluation of the patient on 5/30/2023  Invasive Devices     Peripheral Intravenous Line  Duration           Long-Dwell Peripheral IV (Midline) 38/59/62 Right Cephalic Vein 14 days                   VTE Pharmacologic Prophylaxis: Warfarin (Coumadin)  Code Status: Level 1 - Full Code  Current Length of Stay: 17 day(s)      Total time spent:  30 minutes with more than 50% spent counseling/coordinating care  Counseling includes discussion with patient re: progress  and discussion with patient of his/her current medical state/information  Coordination of patient's care was performed in conjunction with primary service  Time invested included review of patient's labs, vitals, and management of their comorbidities with continued monitoring  In addition, this patient was discussed with medical team including physician and advanced extenders  The care of the patient was extensively discussed and appropriate treatment plan was formulated unique for this patient  Medical decision making for the day was made by supervising physician unless otherwise noted in their attestation statement  ** Please Note:  voice to text software may have been used in the creation of this document   Although proof errors in transcription or interpretation are a potential of such software**

## 2023-05-30 NOTE — PROGRESS NOTES
Occupational Therapy Treatment Note         05/30/23 5338   Pain Assessment   Pain Assessment Tool 0-10   Pain Score 6   Pain Location/Orientation Orientation: Left; Location: Arm   Restrictions/Precautions   Precautions Fall Risk;Pain;Pressure Ulcer;Supervision on toilet/commode   LUE Weight Bearing Per Order NWB   LUE ROM Restriction   (no ROM L shoulder; ok for ROM at elbow and wrist)   Braces or Orthoses   (L nielsen brace at shoulder)   Eating   Type of Assistance Needed Set-up / clean-up   Physical Assistance Level No physical assistance   Eating CARE Score 5   Oral Hygiene   Type of Assistance Needed Supervision   Physical Assistance Level No physical assistance   Comment in stance at sink  provided mouth basin so could complete with setup assist while seated as well   Oral Hygiene CARE Score 4   Shower/Bathe Self   Type of Assistance Needed Physical assistance   Physical Assistance Level 76% or more   Comment Plan for sponge bathing at home  Seated in recliner pt uses R UE to assist in bathing of chest/stomach, L UE and tops of LEs  Pt's dtr assists with bathing of R UE, B/L lower legs/feet and thoroughness of under skin folds on LEs and under breasts  Recommend bathing of groin, under pannus and buttock occur while pt supine / R side lying in bed  Wound care present towards end of session and bathing completed at bed level this way  Shower/Bathe Self CARE Score 2   Bathing   Assessed Bath Style Sponge Bath   Anticipated D/C Bath Style Sponge Bath   Tub/Shower Transfer   Reason Not Assessed Sponge Bath   Upper Body Dressing   Type of Assistance Needed Physical assistance   Physical Assistance Level 76% or more   Comment pt's dtr assists pt in nesha dressing techniques and able to don/doff brace with increased time and min verbal cues  At end of session she then demonstrates good carryover     Upper Body Dressing CARE Score 2   Lower Body Dressing   Type of Assistance Needed Physical assistance   Physical "Assistance Level 26%-50%   Comment contact guard assist while pt in stance, pt able to unthread underwear/pants to below knee level with increased time  pt uses dressing stick to fully doff, she thinks she has one at home  At end of session, to keep wound care at pannus/groin in place, utilized hospital underwear but cut the legs of underwear 1\" on all sides of underwear (4 total cuts) as per wound care appears that underwear caused wound at R groin  OT spoke with MIRANDA Haider from wound care, they placed ABD pad over wound care and ok with underwear being cut to avoid tightness but keep ABD and dressings under this in place  In order to keep this all in place, assisted pt in donning underwear at bed level   Lower Body Dressing CARE Score 3   Putting On/Taking Off Footwear   Type of Assistance Needed Physical assistance   Physical Assistance Level 51%-75%   Comment pt able to use dressing stick to doff shoes or socks, requires assist to don   Putting On/Taking Off Footwear CARE Score 2   Roll Left and Right   Comment Rolls to R with min assist x1   Sit to Lying   Type of Assistance Needed Physical assistance   Physical Assistance Level 51%-75%   Comment +bed rail, assist LEs   Sit to Lying CARE Score 2   Sit to Stand   Type of Assistance Needed Physical assistance; Adaptive equipment   Physical Assistance Level 25% or less   Comment min assist/supervision pending fatigue and surface level  pt's dtr able to provide pt with min assist by boosting at buttock  use of SPC in R UE once in stance   Sit to Stand CARE Score 3   Bed-Chair Transfer   Type of Assistance Needed Adaptive equipment;Physical assistance   Physical Assistance Level 25% or less   Comment min assist/supervision for sit to stand  once in stance pt supervision to/from bathroom using SPC in R UE     Chair/Bed-to-Chair Transfer CARE Score 3   Toileting Hygiene   Type of Assistance Needed Physical assistance   Physical Assistance Level 51%-75%   Comment dtr " "assists with clothing management and rear hygiene, pt completes front hygiene   Toileting Hygiene CARE Score 2   Toilet Transfer   Type of Assistance Needed Physical assistance; Adaptive equipment   Physical Assistance Level 25% or less   Comment min assist sit to stand from platform [facility]  Toilet Transfer CARE Score 3   Cognition   Overall Cognitive Status WFL   Arousal/Participation Alert   Attention Within functional limits   Orientation Level Oriented X4   Memory Within functional limits   Following Commands Follows all commands and directions without difficulty   Activity Tolerance   Activity Tolerance Patient tolerated treatment well   Assessment   Treatment Assessment Pt participated in skilled OT tx session  See above for further details on functional performance  Session focused on family training with pt's dtr Carolyn Hagen, as follow up from next week  Pt's dtr able to physically assist pt with sponge bathing, dressing (including nielsen brace L UE), toileting and transfers while pt uses SPC in R UE  Pt's dtr will be able to assist pt with IADLS at home  OT ordered platform [facility] for home as well as w/c and hospital bed have been ordered  Plan for d/c Thursday with continued home therapy  Dr Krystle Frye will be reaching out to ortho to clarify if pt can complete L shoulder pendulum exercises, but at this time only ROM to L elbow and wrist have been cleared by Dr Krystle Frye  From OT standpoint, no barriers to d/c home Thursday  Plan for d/c ADL to occur tomorrow with pt uses reacher/dressing stick to assist with LB ADLs  Pt/dtr with no further questions/concerns from OT standpoint  Wound nursing present at end of session - pt returned to bed as this is best method to clean/observe skin under pannus/groin and buttock   Per wound care nursing, new wound from hospital underwear at R groin, they applied wound dressing and ABD pad, they said okay to using hospital underwear but cutting 4, 1\" lines around legs of underwear to make " sure it is not tight, this appeared to work well when trialed this afternoon  Given pt's body habitus and occasional urinary incontinence, pt requires some type of underwear/pad to help with this and also keep wound dressings in place  OT notified MIRANDA Gifford this was done  Nursing to continue to assess skin and OT educated pt/dtr to check that ABD is adjusted in right place after using the commode as when underwear comes down the ABD will likely fall and need to be readjusted to ensure it is covering wound  Prognosis Fair   Problem List Decreased strength;Decreased range of motion;Decreased endurance; Impaired balance;Decreased mobility; Decreased skin integrity;Orthopedic restrictions;Pain;Obesity   Barriers to Discharge None   Plan   Treatment/Interventions ADL retraining;Functional transfer training; Therapeutic exercise; Endurance training;Patient/family training;Equipment eval/education; Bed mobility; Compensatory technique education   Progress Progressing toward goals   Recommendation   OT Discharge Recommendation Home with home health rehabilitation   OT Therapy Minutes   OT Time In 1235   OT Time Out 1420   OT Total Time (minutes) 105   OT Mode of treatment - Individual (minutes) 105   OT Mode of treatment - Concurrent (minutes) 0   OT Mode of treatment - Group (minutes) 0   OT Mode of treatment - Co-treat (minutes) 0   OT Mode of Treatment - Total time(minutes) 105 minutes   OT Cumulative Minutes 1330   Therapy Time missed   Time missed?  No

## 2023-05-30 NOTE — PLAN OF CARE
Reviewed    Problem: PAIN - ADULT  Goal: Verbalizes/displays adequate comfort level or baseline comfort level  Description: Interventions:  - Encourage patient to monitor pain and request assistance  - Assess pain using appropriate pain scale  - Administer analgesics based on type and severity of pain and evaluate response  - Implement non-pharmacological measures as appropriate and evaluate response  - Consider cultural and social influences on pain and pain management  - Notify physician/advanced practitioner if interventions unsuccessful or patient reports new pain  Outcome: Progressing     Problem: INFECTION - ADULT  Goal: Absence or prevention of progression during hospitalization  Description: INTERVENTIONS:  - Assess and monitor for signs and symptoms of infection  - Monitor lab/diagnostic results  - Monitor all insertion sites, i e  indwelling lines, tubes, and drains  - Monitor endotracheal if appropriate and nasal secretions for changes in amount and color  - Chromo appropriate cooling/warming therapies per order  - Administer medications as ordered  - Instruct and encourage patient and family to use good hand hygiene technique  - Identify and instruct in appropriate isolation precautions for identified infection/condition  Outcome: Progressing  Goal: Absence of fever/infection during neutropenic period  Description: INTERVENTIONS:  - Monitor WBC    Outcome: Progressing     Problem: SAFETY ADULT  Goal: Patient will remain free of falls  Description: INTERVENTIONS:  - Educate patient/family on patient safety including physical limitations  - Instruct patient to call for assistance with activity   - Consult OT/PT to assist with strengthening/mobility   - Keep Call bell within reach  - Keep bed low and locked with side rails adjusted as appropriate  - Keep care items and personal belongings within reach  - Initiate and maintain comfort rounds  - Make Fall Risk Sign visible to staff  - Offer Toileting every 4 Hours, in advance of need  - Apply yellow socks and bracelet for high fall risk patients  - Consider moving patient to room near nurses station  Outcome: Progressing  Goal: Maintain or return to baseline ADL function  Description: INTERVENTIONS:  -  Assess patient's ability to carry out ADLs; assess patient's baseline for ADL function and identify physical deficits which impact ability to perform ADLs (bathing, care of mouth/teeth, toileting, grooming, dressing, etc )  - Assess/evaluate cause of self-care deficits   - Assess range of motion  - Assess patient's mobility; develop plan if impaired  - Assess patient's need for assistive devices and provide as appropriate  - Encourage maximum independence but intervene and supervise when necessary  - Involve family in performance of ADLs  - Assess for home care needs following discharge   - Consider OT consult to assist with ADL evaluation and planning for discharge  - Provide patient education as appropriate  Outcome: Progressing  Goal: Maintains/Returns to pre admission functional level  Description: INTERVENTIONS:  - Set and communicate daily mobility goal to care team and patient/family/caregiver     - Collaborate with rehabilitation services on mobility goals if consulted  - Out of bed for toileting  - Record patient progress and toleration of activity level   Outcome: Progressing     Problem: DISCHARGE PLANNING  Goal: Discharge to home or other facility with appropriate resources  Description: INTERVENTIONS:  - Identify barriers to discharge w/patient and caregiver  - Arrange for needed discharge resources and transportation as appropriate  - Identify discharge learning needs (meds, wound care, etc )  - Arrange for interpretive services to assist at discharge as needed  - Refer to Case Management Department for coordinating discharge planning if the patient needs post-hospital services based on physician/advanced practitioner order or complex needs related to functional status, cognitive ability, or social support system  Outcome: Progressing     Problem: MOBILITY - ADULT  Goal: Maintain or return to baseline ADL function  Description: INTERVENTIONS:  -  Assess patient's ability to carry out ADLs; assess patient's baseline for ADL function and identify physical deficits which impact ability to perform ADLs (bathing, care of mouth/teeth, toileting, grooming, dressing, etc )  - Assess/evaluate cause of self-care deficits   - Assess range of motion  - Assess patient's mobility; develop plan if impaired  - Assess patient's need for assistive devices and provide as appropriate  - Encourage maximum independence but intervene and supervise when necessary  - Involve family in performance of ADLs  - Assess for home care needs following discharge   - Consider OT consult to assist with ADL evaluation and planning for discharge  - Provide patient education as appropriate  Outcome: Progressing  Goal: Maintains/Returns to pre admission functional level  Description: INTERVENTIONS:  - Set and communicate daily mobility goal to care team and patient/family/caregiver  - Collaborate with rehabilitation services on mobility goals if consulted  - Out of bed for toileting  - Record patient progress and toleration of activity level   Outcome: Progressing     Problem: Nutrition/Hydration-ADULT  Goal: Nutrient/Hydration intake appropriate for improving, restoring or maintaining nutritional needs  Description: Monitor and assess patient's nutrition/hydration status for malnutrition  Collaborate with interdisciplinary team and initiate plan and interventions as ordered  Monitor patient's weight and dietary intake as ordered or per policy  Utilize nutrition screening tool and intervene as necessary  Determine patient's food preferences and provide high-protein, high-caloric foods as appropriate       INTERVENTIONS:  - Monitor oral intake, urinary output, labs, and treatment plans  - Assess nutrition and hydration status and recommend course of action  - Evaluate amount of meals eaten  - Assist patient with eating if necessary   - Allow adequate time for meals  - Recommend/ encourage appropriate diets, oral nutritional supplements, and vitamin/mineral supplements  - Order, calculate, and assess calorie counts as needed  - Recommend, monitor, and adjust tube feedings and TPN/PPN based on assessed needs  - Assess need for intravenous fluids  - Provide specific nutrition/hydration education as appropriate  - Include patient/family/caregiver in decisions related to nutrition  Outcome: Progressing     Problem: Prexisting or High Potential for Compromised Skin Integrity  Goal: Skin integrity is maintained or improved  Description: INTERVENTIONS:  - Identify patients at risk for skin breakdown  - Assess and monitor skin integrity  - Assess and monitor nutrition and hydration status  - Monitor labs   - Assess for incontinence   - Turn and reposition patient  - Assist with mobility/ambulation  - Relieve pressure over bony prominences  - Avoid friction and shearing  - Provide appropriate hygiene as needed including keeping skin clean and dry  - Evaluate need for skin moisturizer/barrier cream  - Collaborate with interdisciplinary team   - Patient/family teaching  - Consider wound care consult   Outcome: Progressing

## 2023-05-30 NOTE — TEAM CONFERENCE
Acute RehabilitationTeam Conference Note  Date: 5/30/2023   Time: 10:25 AM       Patient Name:  Rangel Brewer       Medical Record Number: [id number]   YOB: 1945  Sex: Female          Room/Bed:  [facility] 451/[facility] 451-01  Payor Info:  Payor: Jose Medina / Plan: Iman RODRIGUEZ [facility] REP / Product Type: Medicare PPO /      Admitting Diagnosis: Proximal humerus fracture [S42 209A]  COVID-19 [U07 1]   Admit Date/Time:  5/13/2023  6:28 PM  Admission Comments: No comment available     Primary Diagnosis:  Humerus fracture  Principal Problem: Humerus fracture    Patient Active Problem List    Diagnosis Date Noted   • Difficult intravenous access 05/15/2023   • Acute COVID-19 05/14/2023   • Atrial fibrillation with RVR ([facility] [address] ) 05/13/2023   • Stage III pressure ulcer of sacral region ([facility] [address] ) 05/13/2023   • Asthma 05/13/2023   • Osteoporosis with current pathological fracture with routine healing 05/13/2023   • Class 2 obesity in adult 05/13/2023   • CHAS (obstructive sleep apnea) 05/13/2023   • Abnormal CT scan 05/10/2023   • Hyponatremia 05/09/2023   • Bradycardia 05/08/2023   • Ventricular arrhythmia 05/04/2023   • Type 2 diabetes mellitus ([facility] [address] ) 04/28/2023   • Fall 04/27/2023   • Humerus fracture 04/27/2023   • Acute pain due to trauma 04/27/2023   • H/O mechanical aortic valve replacement 04/27/2023   • Laceration of right eyebrow 04/27/2023   • Fracture of multiple ribs of right side 04/27/2023       Physical Therapy:    Weight Bearing Status: Non-weight bearing (L UE with nielsen brace)  Transfers: Supervision, Minimal Assistance (depends on surface height)  Bed Mobility: Supervision, Moderate Assistance (assist with LE into the bed due to pt's habitus and strength deficits with limited use of L UE due to NWB precautions   S supine to sit with R bedrail)  Amulation Distance (ft): 20 feet (to 60' at best)  Ambulation: Supervision (S-DS)  Assistive Device for Ambulation: [facility]  Wheelchair Mobility Distance:  (due to body habitus, limited UE ROM/weakness unable to self propel w/c however pt will benefit from a w/c for community distance mobilties for still inconsistent with gait tolerance and family will propel w/c )  Assistive Device for Stairs:  (n/a - only has a small threshold at home)  Assistive Device for Ramp: Wheelchair  Discharge Recommendations: Home with:  [address] with[de-identified] 24 Hour Assisteance, 24 Hour Supervision, Family Support, First Floor Setup, Home Physical Therapy    5/29/23  Pt cont to demo slow but consistent progress in PT with ongoing barriers to indep include ongoing  L UE NWB precaution, pt's body habitus, LE/UE strength and ROM deficits, gait dysfunction and dec endurance  Pt also presents with multiple skin issues so will benefit from a hospital bed with low air mattress to prevent worsening of current pressure ulcer on buttocks and prevent additional skin integrity breakdown  Pt is currently functioning at [facility] for level household distance mobilities x 60' at best while still requires mod A for sit to supine and car transfer tasks and varies from min A to CS with sit to stand depending on surface height  Family training initiated and will be completed on 5/30 in preparation for home d/c on 6/1/23 with daughter Gemma Swanson to provide Assist/S at pt's apt  DME for a w/c and hospital bed submitted while pt already has a cane which is to be use for household distance mobilities as tolerated  Recommend home PT services at d/c with HEP previously given  Occupational Therapy:  Eating: Independent  Grooming: Supervision  Bathing: Moderate Assistance  Bathing:  Moderate Assistance  Upper Body Dressing: Maximum Assistance  Lower Body Dressing: Minimal Assistance  Toileting: Minimal Assistance  Toilet Transfer: Incidental Touching  Cognition: Within Defined Limits  Orientation: Person, Place, Time, Situation  Discharge Recommendations: Home with:  [address] with[de-identified] 24 Hour Assistance, Family Support, First Floor Setup, Home Occupational Therapy       Pt continues to present with impairments in activity tolerance, endurance, standing balance/tolerance, UE strength, and UE ROM  Additional functional barriers include LUE pain, and LUE NWB status  Pt is functioning at overall Mod A for ADLs and CS-CGA for fxnl mobility w/ SPC  Pt will continue to benefit from skilled OT services to address above mentioned barriers and maximize functional independence in baseline areas of occupation  Pt's daughter has participated in several FT sessions and able to provide 24 hour A at d/c  From OT standpoint, pt on track to d/c home w/ family support on Thursday, recommending [address]  Speech Therapy:           No notes on file    Nursing Notes:  Appetite: Good  Diet Type: Diabetic                      Diet Patient/Family Education Complete: Yes    Type of Wound (LDA): Wound (sacrum DTI (Triad paste), pannus (Interdry), groin (Den); Need final wound care recommendations from wound care team and education needs to be done with family)                    Type of Wound Patient/Family Education: No (wound care education needs to be done)  Bladder: Continent     Bladder Patient/Family Education: Yes  Bowel: Continent     Bowel Patient/Family Education: Yes  Pain Location/Orientation: Orientation: Left, Location: Arm  Pain Score: 7  Pain 2  Pain Score 2: 6  Pain Location/Orientation 2: Location: [address] [place] [address] Pain Intervention(s) 2: Repositioned                    Hospital Pain Intervention(s): Medication (See MAR)  Pain Patient/Family Education: Yes       Lt proximal humerus fracture NWB to the Lt UE, Immobilizer placed to LUE by orthopedics, Follow-up with Orthopedics in 4 weeks  Rt 8-9 rib fx Encourage IS, O2 as needed  Rt eyebrow laceration, Monitor lacerations, Local wound care  DM type 2, HA1C 6 4, Home: Janumet  2x daily  Stable currently w/o meds  History of mechanical AVR, Goal INR 2 5 - 3 5   INR pending - pt stuck multiple times this AM without success  IR consult for midline placement  Continue Coumadin 2 5mg daily  Pt takes 2 5mg Wednesday and Saturday and 5mg all other days of the week at home  Hyponatremia Na 132, Likely multifactorial  Ventricular arrhythmia One episode 5/2/23 and pt received a shock from ICD, Continue metoprolol and amiodarone, Continue Coumadin  Bradycardia, Diltiazem discontinued and metoprolol dose lowered  She will need outpt follow-up with Cardiology  [facility]  COVID-19 Tested positive 5/13/23, Pt has a cough which she reports is chronic  Contact and airborne precautions for 10 days  Buttock wound/labia wound, Followed by wound care  LE edema L>R, Venous doppler 5/27, On Lasix 40mg qd, Will not inc Lasix dose for now  LE edema is a little better today  Her BPs are soft at times which may limit aggressive diuresis  Has severe TR/severe RVE by ECHO 5/3/23 likely the cause  Ascending aortic aneurysm By ECHO on 5/3/23, Measures 4 8 cm  Will need OP followup/surveillence     5/16- Pt is AAOx4  Pt is sometimes continent, sometimes incontinent of bladder, pt is continent of bowel  Pt's pain is controlled with current pain medication regimen  Pt requires alarms for safety, has been compliant with use of call bell  Pt seen by wound care today, new orders for sacrum, pannus, groin/labia wound care  Contact and airborne precautions for Covid- 19 complete 5/23 5/30- Pt is AAOx4  Pt is mostly continent of bowel and bladder  Pt's pain is controlled with current pain medication regimen  Pt does not require alarms, has been compliant with use of call bell  Pt needs to be seen by wound care today for final wound care recommendations for sacrum, pannus, and groins, and family will need to be educated on wound care for discharge  Pt is no longer on isolation for Covid  This week we will encourage independence with ADLs  We will monitor labs and vital signs    We will educate pt/family about repositioning to prevent skin breakdown  We will assist with repositioning and perform routine skin checks  We will monitor for adequate pain control  We will monitor for constipation and medicate as ordered  We will increase safety awareness and keep pt free from falls  Case Management:     Discharge Planning  Living Arrangements: Lives w/ Family members  Support Systems: Daughter, Family members  Type of Current Residence: Private residence  Current [address] : No  5/23- Pt reports she was independent with ADLs IADLs and driving prior to admission  Pt lives in an apartment by herself but reports she spends most of her time with her [address] [place] [address] Avenue in his home that is 3 stories, 2 LIZ  Pt reports she has been to outpatient therapies in the past and has used [facility] but does not recall where or which agencies  PCP is Octaviano Banks, preferred pharmacy is [facility] on [address]  5/30- Pt to [facility] Thursday 6/1, with [facility] and DME  Is the patient actively participating in therapies? yes  List any modifications to the treatment plan: None    Barriers Interventions   Morbid obesity Education, nutrition   Body habitus Compensatory strategies   Skin integrity Skin checks   Diabetes Education   Anxiety/depression Monitoring, supportive counseling   LIZ Stair training               Pain Medication     Is the patient making expected progress toward goals?  yes  List any update or changes to goals: None    Medical Goals: Patient will be medically stable for discharge to [facility] upon completion of rehab program and Patient will be able to manage medical conditions and comorbid conditions with medications and follow up upon completion of rehab program    Weekly Team Goals:   Rehab Team Goals  ADL Team Goal: Patient will require assist with ADLs with least restrictive device upon completion of rehab program  Transfer Team Goal: Patient will be independent with transfers with least restrictive device upon completion of rehab program  Locomotion Team Goal: Patient will be independent with locomotion with least restrictive device upon completion of rehab program (household distance)    Discussion: Pt participating in therapies and making progress in rehab program  Pt functioning at supervision/min assist with transfers and ambulation, pt functioning at max assist for upper body dressing min assist for lower body dressing  Pt dcing Thursday 6/1 home with [facility] RN PT OT  Anticipated Discharge Date:  Dc Thursday 6/1 with home therapies, RN PT OT   [facility] Team Members Present: The following team members are supervising care for this patient and were present during this Weekly Team Conference      Physician: Dr Ronald Ortiz MD  : FRANK Valencia  Registered Nurse: Brenna Chaney RN  Physical Therapist: Chris Swanson DPT  Occupational Therapist: Reynold Keating MS, OTR/L, CBIS  Speech Therapist: Tiffanie Costa [facility], [address] [facility]

## 2023-05-30 NOTE — PROGRESS NOTES
05/30/23 1000   Pain Assessment   Pain Assessment Tool 0-10   Pain Score 3   Pain Location/Orientation Orientation: Left; Location: Shoulder   Pain Onset/Description Onset: Ongoing; Onset: Gradual;Frequency: Intermittent   Patient's Stated Pain Goal No pain   Restrictions/Precautions   Precautions Fall Risk;Pain;Pressure Ulcer;Supervision on toilet/commode   LUE Weight Bearing Per Order NWB   ROM Restrictions Yes   LUE ROM Restriction Range Limitation  (NO ROM at L shoulder  OK for ROM at elbow and wrist )   Braces or Orthoses Splint   Cognition   Overall Cognitive Status [facility]   Arousal/Participation Alert; Cooperative   Attention Within functional limits   Orientation Level Oriented X4   Memory Within functional limits   Following Commands Follows all commands and directions without difficulty   Sit to Stand   Type of Assistance Needed Physical assistance;Verbal cues; Adaptive equipment   Physical Assistance Level 25% or less   Comment MIN to CS, varied while on WC with cushion, S from recliner  Sit to Stand CARE Score 3   Bed-Chair Transfer   Type of Assistance Needed Supervision; Adaptive equipment   Physical Assistance Level No physical assistance   Comment S/DS  Daughter provied throughout session   Chair/Bed-to-Chair Transfer CARE Score 4   Transfer Bed/Chair/Wheelchair   Adaptive Equipment Cane   Car Transfer   Type of Assistance Needed Physical assistance;Verbal cues; Adaptive equipment   Physical Assistance Level 51%-75%   Comment A to BLE int car with cushion on seat  Daughter Taina Hernandez able to manage pt but required increased A to stand this session  VC's to daughter to place hand under pt's bottom for better A  Car Transfer CARE Score 2   Walk 10 Feet   Type of Assistance Needed Supervision; Adaptive equipment   Comment pt remains S with therapy staff but daughter at times provided CGA  Walk 10 Feet CARE Score 4   Walk 50 Feet with Two Turns   Type of Assistance Needed Supervision; Adaptive equipment   Comment pt remains S with therapy staff but daughter at times provided CGA  Walk 50 Feet with Two Turns CARE Score 4   Walk 150 Feet   Reason if not Attempted Safety concerns   Walk 150 Feet CARE Score 88   Walking 10 Feet on Uneven Surfaces   Reason if not Attempted Safety concerns   Walking 10 Feet on Uneven Surfaces CARE Score 88   Ambulation   Primary Mode of Locomotion Prior to Admission Walk   Distance Walked (feet) 45 ft  (20', 40' x2, 30')   Assist Device Cane   Gait Pattern Inconsistant Barbra;Decreased foot clearance; Slow Barbra; Forward Flexion; Wide NAZIA;Step to; Improper weight shift   Limitations Noted In Coordination;Device Management; Endurance; Heel Strike;Midline Orientation;Posture; Safety;Speed;Strength;Swing   Provided Assistance with: Direction   Walk Assist Level Close Supervision   Does the patient walk? 2  Yes   Wheel 50 Feet with Two Turns   Reason if not Attempted Activity not applicable   Wheel 50 Feet with Two Turns CARE Score 9   Wheel 150 Feet   Reason if not Attempted Activity not applicable   Wheel 515 Feet CARE Score 9   Wheelchair mobility   Findings Daughter will be pushing pt in [facility] when in community  Curb or Single Stair   Reason if not Attempted Safety concerns   1 Step (Curb) CARE Score 88   4 Steps   Reason if not Attempted Activity not applicable   4 Steps CARE Score 9   12 Steps   Reason if not Attempted Activity not applicable   12 Steps CARE Score 9   Therapeutic Interventions   Strengthening x5 STS from [facility]   Other Comments   Comments Pt and daughter present for FT  pt's daughter Jody Kuhn able to provide S to [facility] for pt with all STS transfers, S to [place] [address] for gait and MOD/MAXA into car  No further questions from family and daughter is aware of pt's needs  Pt to have hospital bed and WC for home  Per Jody Kuhn she will be pushing pt in [facility] when going out into community  Assessment   Treatment Assessment Pt and pt's daughter Jody Kuhn present for FT   Pt initially upset that PT was first as she as to do an ADL with therapy as FT  Educated that ADL was not scheduled today but upon discussion from [facility]) she will perform ADL if needed  Pt remained S with cane with HH dist amb, pt's daughter prefers to provide CGA but pt does not require CGA att his time  Pt cont to require MOD/MAXA into car as she is unable to lift BLE into car or scoot back  Pt recommended a plastic bag over car seat to help pt slide back into seat  Pt was able to bring both legs out of car but required NAINA to boost from seat  Pt anticipates discharge home with 24 hour S/care Thursday June 1st, HHPT, [facility] and hospital bed for home  Family/Caregiver Present yes   PT Family training done with: Daughter Chuck Branch   Problem List Decreased strength;Decreased endurance; Impaired balance;Decreased mobility; Decreased range of motion;Orthopedic restrictions; Obesity; Decreased skin integrity   PT Barriers   Functional Limitation Car transfers;Transfers   Plan   Treatment/Interventions Functional transfer training; Endurance training;Gait training   Progress Progressing toward goals   Recommendation   PT Discharge Recommendation Home with home health rehabilitation   [facility]; Wheelchair  (hospital bed)   PT Therapy Minutes   PT Time In 1000   PT Time Out 1130   PT Total Time (minutes) 90   PT Mode of treatment - Individual (minutes) 90   PT Mode of treatment - Concurrent (minutes) 0   PT Mode of treatment - Group (minutes) 0   PT Mode of treatment - Co-treat (minutes) 0   PT Mode of Treatment - Total time(minutes) 90 minutes   PT Cumulative Minutes 1320   Therapy Time missed   Time missed?  No

## 2023-05-31 ENCOUNTER — APPOINTMENT (OUTPATIENT)
Dept: RADIOLOGY | Facility: HOSPITAL | Age: 78
DRG: 559 | End: 2023-05-31
Payer: COMMERCIAL

## 2023-05-31 LAB
DME PARACHUTE DELIVERY DATE ACTUAL: NORMAL
DME PARACHUTE DELIVERY DATE EXPECTED: NORMAL
DME PARACHUTE DELIVERY DATE REQUESTED: NORMAL
DME PARACHUTE ITEM DESCRIPTION: NORMAL
DME PARACHUTE ORDER STATUS: NORMAL
DME PARACHUTE SUPPLIER NAME: NORMAL
DME PARACHUTE SUPPLIER PHONE: NORMAL
GLUCOSE SERPL-MCNC: 108 MG/DL (ref 65–140)
GLUCOSE SERPL-MCNC: 130 MG/DL (ref 65–140)
GLUCOSE SERPL-MCNC: 88 MG/DL (ref 65–140)
GLUCOSE SERPL-MCNC: 93 MG/DL (ref 65–140)
INR PPP: 2.57 (ref 0.84–1.19)
PROTHROMBIN TIME: 27.8 SECONDS (ref 11.6–14.5)

## 2023-05-31 PROCEDURE — 99232 SBSQ HOSP IP/OBS MODERATE 35: CPT | Performed by: INTERNAL MEDICINE

## 2023-05-31 PROCEDURE — 97110 THERAPEUTIC EXERCISES: CPT

## 2023-05-31 PROCEDURE — 97116 GAIT TRAINING THERAPY: CPT

## 2023-05-31 PROCEDURE — 99232 SBSQ HOSP IP/OBS MODERATE 35: CPT | Performed by: PHYSICAL MEDICINE & REHABILITATION

## 2023-05-31 PROCEDURE — 73060 X-RAY EXAM OF HUMERUS: CPT

## 2023-05-31 PROCEDURE — 97530 THERAPEUTIC ACTIVITIES: CPT

## 2023-05-31 PROCEDURE — 82948 REAGENT STRIP/BLOOD GLUCOSE: CPT

## 2023-05-31 PROCEDURE — 97535 SELF CARE MNGMENT TRAINING: CPT

## 2023-05-31 PROCEDURE — 85610 PROTHROMBIN TIME: CPT | Performed by: NURSE PRACTITIONER

## 2023-05-31 RX ADMIN — DOCUSATE SODIUM 100 MG: 100 CAPSULE, LIQUID FILLED ORAL at 07:42

## 2023-05-31 RX ADMIN — DOCUSATE SODIUM 100 MG: 100 CAPSULE, LIQUID FILLED ORAL at 17:36

## 2023-05-31 RX ADMIN — METHOCARBAMOL TABLETS 250 MG: 500 TABLET, COATED ORAL at 11:51

## 2023-05-31 RX ADMIN — FUROSEMIDE 40 MG: 40 TABLET ORAL at 07:42

## 2023-05-31 RX ADMIN — NYSTATIN: 100000 POWDER TOPICAL at 17:37

## 2023-05-31 RX ADMIN — ACETAMINOPHEN 975 MG: 325 TABLET ORAL at 23:09

## 2023-05-31 RX ADMIN — METHOCARBAMOL TABLETS 250 MG: 500 TABLET, COATED ORAL at 06:04

## 2023-05-31 RX ADMIN — GABAPENTIN 100 MG: 100 CAPSULE ORAL at 22:17

## 2023-05-31 RX ADMIN — METHOCARBAMOL TABLETS 250 MG: 500 TABLET, COATED ORAL at 23:10

## 2023-05-31 RX ADMIN — ACETAMINOPHEN 975 MG: 325 TABLET ORAL at 16:05

## 2023-05-31 RX ADMIN — SENNOSIDES 17.2 MG: 8.6 TABLET, FILM COATED ORAL at 07:43

## 2023-05-31 RX ADMIN — METOPROLOL SUCCINATE 25 MG: 25 TABLET, EXTENDED RELEASE ORAL at 07:43

## 2023-05-31 RX ADMIN — ACETAMINOPHEN 975 MG: 325 TABLET ORAL at 07:37

## 2023-05-31 RX ADMIN — OXYBUTYNIN 5 MG: 5 TABLET, FILM COATED, EXTENDED RELEASE ORAL at 07:42

## 2023-05-31 RX ADMIN — Medication 1000 UNITS: at 07:43

## 2023-05-31 RX ADMIN — WARFARIN SODIUM 5 MG: 5 TABLET ORAL at 17:36

## 2023-05-31 RX ADMIN — Medication 7.5 MG: at 07:46

## 2023-05-31 RX ADMIN — NYSTATIN 1 APPLICATION.: 100000 POWDER TOPICAL at 07:49

## 2023-05-31 RX ADMIN — METHOCARBAMOL TABLETS 250 MG: 500 TABLET, COATED ORAL at 17:36

## 2023-05-31 RX ADMIN — ATORVASTATIN CALCIUM 40 MG: 40 TABLET, FILM COATED ORAL at 16:05

## 2023-05-31 RX ADMIN — ALLOPURINOL 100 MG: 100 TABLET ORAL at 07:44

## 2023-05-31 RX ADMIN — LIDOCAINE 5% 1 PATCH: 700 PATCH TOPICAL at 09:34

## 2023-05-31 RX ADMIN — AMIODARONE HYDROCHLORIDE 200 MG: 200 TABLET ORAL at 07:37

## 2023-05-31 RX ADMIN — MONTELUKAST 10 MG: 10 TABLET, FILM COATED ORAL at 22:17

## 2023-05-31 RX ADMIN — CITALOPRAM HYDROBROMIDE 20 MG: 20 TABLET ORAL at 07:44

## 2023-05-31 NOTE — PLAN OF CARE
Problem: PAIN - ADULT  Goal: Verbalizes/displays adequate comfort level or baseline comfort level  Description: Interventions:  - Encourage patient to monitor pain and request assistance  - Assess pain using appropriate pain scale  - Administer analgesics based on type and severity of pain and evaluate response  - Implement non-pharmacological measures as appropriate and evaluate response  - Consider cultural and social influences on pain and pain management  - Notify physician/advanced practitioner if interventions unsuccessful or patient reports new pain  Outcome: Progressing     Problem: INFECTION - ADULT  Goal: Absence or prevention of progression during hospitalization  Description: INTERVENTIONS:  - Assess and monitor for signs and symptoms of infection  - Monitor lab/diagnostic results  - Monitor all insertion sites, i e  indwelling lines, tubes, and drains  - Monitor endotracheal if appropriate and nasal secretions for changes in amount and color  - [place] appropriate cooling/warming therapies per order  - Administer medications as ordered  - Instruct and encourage patient and family to use good hand hygiene technique  - Identify and instruct in appropriate isolation precautions for identified infection/condition  Outcome: Progressing  Goal: Absence of fever/infection during neutropenic period  Description: INTERVENTIONS:  - Monitor WBC    Outcome: Progressing     Problem: SAFETY ADULT  Goal: Patient will remain free of falls  Description: INTERVENTIONS:  - Educate patient/family on patient safety including physical limitations  - Instruct patient to call for assistance with activity   - Consult OT/PT to assist with strengthening/mobility   - Keep Call bell within reach  - Keep bed low and locked with side rails adjusted as appropriate  - Keep care items and personal belongings within reach  - Initiate and maintain comfort rounds  - Make Fall Risk Sign visible to staff  - Offer Toileting every  Hours, in advance of need  - Initiate/Maintain alarm  - Obtain necessary fall risk management equipment:   - Apply yellow socks and bracelet for high fall risk patients  - Consider moving patient to room near nurses station  Outcome: Progressing  Goal: Maintain or return to baseline ADL function  Description: INTERVENTIONS:  -  Assess patient's ability to carry out ADLs; assess patient's baseline for ADL function and identify physical deficits which impact ability to perform ADLs (bathing, care of mouth/teeth, toileting, grooming, dressing, etc )  - Assess/evaluate cause of self-care deficits   - Assess range of motion  - Assess patient's mobility; develop plan if impaired  - Assess patient's need for assistive devices and provide as appropriate  - Encourage maximum independence but intervene and supervise when necessary  - Involve family in performance of ADLs  - Assess for home care needs following discharge   - Consider OT consult to assist with ADL evaluation and planning for discharge  - Provide patient education as appropriate  Outcome: Progressing  Goal: Maintains/Returns to pre admission functional level  Description: INTERVENTIONS:  - Perform BMAT or MOVE assessment daily    - Set and communicate daily mobility goal to care team and patient/family/caregiver  - Collaborate with rehabilitation services on mobility goals if consulted  - Perform Range of Motion  times a day  - Reposition patient every  hours    - Dangle patient times a day  - Stand patient  times a day  - Ambulate patient times a day  - Out of bed to chair  times a day   - Out of bed for meals times a day  - Out of bed for toileting  - Record patient progress and toleration of activity level   Outcome: Progressing     Problem: DISCHARGE PLANNING  Goal: Discharge to home or other facility with appropriate resources  Description: INTERVENTIONS:  - Identify barriers to discharge w/patient and caregiver  - Arrange for needed discharge resources and transportation as appropriate  - Identify discharge learning needs (meds, wound care, etc )  - Arrange for interpretive services to assist at discharge as needed  - Refer to Case Management Department for coordinating discharge planning if the patient needs post-hospital services based on physician/advanced practitioner order or complex needs related to functional status, cognitive ability, or social support system  Outcome: Progressing     Problem: MOBILITY - ADULT  Goal: Maintain or return to baseline ADL function  Description: INTERVENTIONS:  -  Assess patient's ability to carry out ADLs; assess patient's baseline for ADL function and identify physical deficits which impact ability to perform ADLs (bathing, care of mouth/teeth, toileting, grooming, dressing, etc )  - Assess/evaluate cause of self-care deficits   - Assess range of motion  - Assess patient's mobility; develop plan if impaired  - Assess patient's need for assistive devices and provide as appropriate  - Encourage maximum independence but intervene and supervise when necessary  - Involve family in performance of ADLs  - Assess for home care needs following discharge   - Consider OT consult to assist with ADL evaluation and planning for discharge  - Provide patient education as appropriate  Outcome: Progressing  Goal: Maintains/Returns to pre admission functional level  Description: INTERVENTIONS:  - Perform BMAT or MOVE assessment daily    - Set and communicate daily mobility goal to care team and patient/family/caregiver  - Collaborate with rehabilitation services on mobility goals if consulted  - Perform Range of Motion  times a day  - Reposition patient every  hours    - Dangle patient  times a day  - Stand patient  times a day  - Ambulate patient times a day  - Out of bed to chair  times a day   - Out of bed for meals  times a day  - Out of bed for toileting  - Record patient progress and toleration of activity level   Outcome: Progressing Problem: Nutrition/Hydration-ADULT  Goal: Nutrient/Hydration intake appropriate for improving, restoring or maintaining nutritional needs  Description: Monitor and assess patient's nutrition/hydration status for malnutrition  Collaborate with interdisciplinary team and initiate plan and interventions as ordered  Monitor patient's weight and dietary intake as ordered or per policy  Utilize nutrition screening tool and intervene as necessary  Determine patient's food preferences and provide high-protein, high-caloric foods as appropriate       INTERVENTIONS:  - Monitor oral intake, urinary output, labs, and treatment plans  - Assess nutrition and hydration status and recommend course of action  - Evaluate amount of meals eaten  - Assist patient with eating if necessary   - Allow adequate time for meals  - Recommend/ encourage appropriate diets, oral nutritional supplements, and vitamin/mineral supplements  - Order, calculate, and assess calorie counts as needed  - Recommend, monitor, and adjust tube feedings and TPN/PPN based on assessed needs  - Assess need for intravenous fluids  - Provide specific nutrition/hydration education as appropriate  - Include patient/family/caregiver in decisions related to nutrition  Outcome: Progressing     Problem: Prexisting or High Potential for Compromised Skin Integrity  Goal: Skin integrity is maintained or improved  Description: INTERVENTIONS:  - Identify patients at risk for skin breakdown  - Assess and monitor skin integrity  - Assess and monitor nutrition and hydration status  - Monitor labs   - Assess for incontinence   - Turn and reposition patient  - Assist with mobility/ambulation  - Relieve pressure over bony prominences  - Avoid friction and shearing  - Provide appropriate hygiene as needed including keeping skin clean and dry  - Evaluate need for skin moisturizer/barrier cream  - Collaborate with interdisciplinary team   - Patient/family teaching  - Consider wound care consult   Outcome: Progressing

## 2023-05-31 NOTE — PLAN OF CARE
Problem: PAIN - ADULT  Goal: Verbalizes/displays adequate comfort level or baseline comfort level  Description: Interventions:  - Encourage patient to monitor pain and request assistance  - Assess pain using appropriate pain scale  - Administer analgesics based on type and severity of pain and evaluate response  - Implement non-pharmacological measures as appropriate and evaluate response  - Consider cultural and social influences on pain and pain management  - Notify physician/advanced practitioner if interventions unsuccessful or patient reports new pain  Outcome: Progressing     Problem: INFECTION - ADULT  Goal: Absence or prevention of progression during hospitalization  Description: INTERVENTIONS:  - Assess and monitor for signs and symptoms of infection  - Monitor lab/diagnostic results  - Monitor all insertion sites, i e  indwelling lines, tubes, and drains  - Monitor endotracheal if appropriate and nasal secretions for changes in amount and color  - [place] appropriate cooling/warming therapies per order  - Administer medications as ordered  - Instruct and encourage patient and family to use good hand hygiene technique  - Identify and instruct in appropriate isolation precautions for identified infection/condition  Outcome: Progressing  Goal: Absence of fever/infection during neutropenic period  Description: INTERVENTIONS:  - Monitor WBC    Outcome: Progressing     Problem: SAFETY ADULT  Goal: Patient will remain free of falls  Description: INTERVENTIONS:  - Educate patient/family on patient safety including physical limitations  - Instruct patient to call for assistance with activity   - Consult OT/PT to assist with strengthening/mobility   - Keep Call bell within reach  - Keep bed low and locked with side rails adjusted as appropriate  - Keep care items and personal belongings within reach  - Initiate and maintain comfort rounds  - Make Fall Risk Sign visible to staff  - Offer Toileting every 2 Hours, in advance of need  - Initiate/Maintain bed/chair alarm  - Obtain necessary fall risk management equipment: nonskid socks  - Apply yellow socks and bracelet for high fall risk patients  - Consider moving patient to room near nurses station  Outcome: Progressing  Goal: Maintain or return to baseline ADL function  Description: INTERVENTIONS:  -  Assess patient's ability to carry out ADLs; assess patient's baseline for ADL function and identify physical deficits which impact ability to perform ADLs (bathing, care of mouth/teeth, toileting, grooming, dressing, etc )  - Assess/evaluate cause of self-care deficits   - Assess range of motion  - Assess patient's mobility; develop plan if impaired  - Assess patient's need for assistive devices and provide as appropriate  - Encourage maximum independence but intervene and supervise when necessary  - Involve family in performance of ADLs  - Assess for home care needs following discharge   - Consider OT consult to assist with ADL evaluation and planning for discharge  - Provide patient education as appropriate  Outcome: Progressing  Goal: Maintains/Returns to pre admission functional level  Description: INTERVENTIONS:  - Perform BMAT or MOVE assessment daily    - Set and communicate daily mobility goal to care team and patient/family/caregiver  - Collaborate with rehabilitation services on mobility goals if consulted  - Perform Range of Motion 3 times a day  - Reposition patient every 2 hours    - Dangle patient 3 times a day  - Stand patient 3 times a day  - Ambulate patient 3 times a day  - Out of bed to chair 3 times a day   - Out of bed for meals 3 times a day  - Out of bed for toileting  - Record patient progress and toleration of activity level   Outcome: Progressing     Problem: DISCHARGE PLANNING  Goal: Discharge to home or other facility with appropriate resources  Description: INTERVENTIONS:  - Identify barriers to discharge w/patient and caregiver  - Arrange for needed discharge resources and transportation as appropriate  - Identify discharge learning needs (meds, wound care, etc )  - Arrange for interpretive services to assist at discharge as needed  - Refer to Case Management Department for coordinating discharge planning if the patient needs post-hospital services based on physician/advanced practitioner order or complex needs related to functional status, cognitive ability, or social support system  Outcome: Progressing     Problem: MOBILITY - ADULT  Goal: Maintain or return to baseline ADL function  Description: INTERVENTIONS:  -  Assess patient's ability to carry out ADLs; assess patient's baseline for ADL function and identify physical deficits which impact ability to perform ADLs (bathing, care of mouth/teeth, toileting, grooming, dressing, etc )  - Assess/evaluate cause of self-care deficits   - Assess range of motion  - Assess patient's mobility; develop plan if impaired  - Assess patient's need for assistive devices and provide as appropriate  - Encourage maximum independence but intervene and supervise when necessary  - Involve family in performance of ADLs  - Assess for home care needs following discharge   - Consider OT consult to assist with ADL evaluation and planning for discharge  - Provide patient education as appropriate  Outcome: Progressing  Goal: Maintains/Returns to pre admission functional level  Description: INTERVENTIONS:  - Perform BMAT or MOVE assessment daily    - Set and communicate daily mobility goal to care team and patient/family/caregiver  - Collaborate with rehabilitation services on mobility goals if consulted  - Perform Range of Motion 3 times a day  - Reposition patient every 2 hours    - Dangle patient 3 times a day  - Stand patient 3 times a day  - Ambulate patient 3 times a day  - Out of bed to chair 3 times a day   - Out of bed for meals 3 times a day  - Out of bed for toileting  - Record patient progress and toleration of activity level   Outcome: Progressing     Problem: Nutrition/Hydration-ADULT  Goal: Nutrient/Hydration intake appropriate for improving, restoring or maintaining nutritional needs  Description: Monitor and assess patient's nutrition/hydration status for malnutrition  Collaborate with interdisciplinary team and initiate plan and interventions as ordered  Monitor patient's weight and dietary intake as ordered or per policy  Utilize nutrition screening tool and intervene as necessary  Determine patient's food preferences and provide high-protein, high-caloric foods as appropriate       INTERVENTIONS:  - Monitor oral intake, urinary output, labs, and treatment plans  - Assess nutrition and hydration status and recommend course of action  - Evaluate amount of meals eaten  - Assist patient with eating if necessary   - Allow adequate time for meals  - Recommend/ encourage appropriate diets, oral nutritional supplements, and vitamin/mineral supplements  - Order, calculate, and assess calorie counts as needed  - Recommend, monitor, and adjust tube feedings and TPN/PPN based on assessed needs  - Assess need for intravenous fluids  - Provide specific nutrition/hydration education as appropriate  - Include patient/family/caregiver in decisions related to nutrition  Outcome: Progressing     Problem: Prexisting or High Potential for Compromised Skin Integrity  Goal: Skin integrity is maintained or improved  Description: INTERVENTIONS:  - Identify patients at risk for skin breakdown  - Assess and monitor skin integrity  - Assess and monitor nutrition and hydration status  - Monitor labs   - Assess for incontinence   - Turn and reposition patient  - Assist with mobility/ambulation  - Relieve pressure over bony prominences  - Avoid friction and shearing  - Provide appropriate hygiene as needed including keeping skin clean and dry  - Evaluate need for skin moisturizer/barrier cream  - Collaborate with interdisciplinary team   - Patient/family teaching  - Consider wound care consult   Outcome: Progressing

## 2023-05-31 NOTE — PROGRESS NOTES
PM&R PROGRESS NOTE:  Phil Small 68 y o  female MRN: [id number]  Unit/Bed#: -01 Encounter: [id number]        Rehab Diagnosis: Impairment of mobility, safety and Activities of Daily Living (ADLs) due to Major Multiple Trauma:  14 9  Other Multiple Trauma Multiple rib fractures with acute pain, improved acute hypoxic respiratory failure, and L humeral fracture NWB in coaptation splint       SUBJECTIVE: No acute issues reported today  Patient looking forward to discharge at home  ASSESSMENT: Stable, progressing      PLAN:    Rehabilitation  • Functional deficits: Left upper extremity weakness, impaired mobility, impaired self-care, impaired endurance, impaired strength  • Continue current rehabilitation plan of care to maximize function  • Expected Discharge: Discharge Thursday, 6/1/2023 Home health care RN, PT, OT  o Family training with her daughter Petra Pal     Physical Therapy Occupational Therapy Speech Therapy   Weight Bearing Status: Non-weight bearing (LUE)  Transfers: Minimal Assistance, Incidental Touching, Supervision  Bed Mobility: Minimal Assistance  Amulation Distance (ft): 60 feet  Ambulation: Supervision  Assistive Device for Ambulation: Single Point Cane  Wheelchair Mobility Distance:  (due to body habitus, limited UE ROM/weakness unable to self propel w/c however pt will benefit from a w/c for community distance mobilties for still inconsistent with gait tolerance and family will propel w/c )  Assistive Device for Stairs:  (n/a - only has a small threshold at home)  Assistive Device for Ramp: Wheelchair  Discharge Recommendations: Home with:  [address] [facility] [address] with[de-identified] 24 Hour Assisteance, 24 Hour Supervision, Family Support, First Floor Setup, Home Physical Therapy   Eating: Independent  Grooming: Supervision  Bathing: Moderate Assistance  Bathing:  Moderate Assistance  Upper Body Dressing: Maximum Assistance  Lower Body Dressing: Minimal Assistance  Toileting: Minimal Assistance  Toilet Transfer: Incidental Touching  Cognition: Within Defined Limits  Orientation: Person, Place, Time, Situation                 DVT prophylaxis  • Managed on warfarin  • Goal INR 2 5-3 5  • Last INR = 2 57    Bladder plan  • Continent  • Limit pure wick usage if possible    Bowel plan  • Continent      * Humerus fracture  Assessment & Plan  2/2 mechanical fall  Proximal humerus shaft spiral fracture, displaced and angulated  Ortho attempted closed reduction  Ultimately placed in coaptation splint on 4/27 and managed non-operatively  NWB LUE  Pain Control as detailed below  No Vitamin D level in chart or unmerged chart recently   - At home was on daily Vitamin D supplement 1000 units - restarted   - 5/14 Vitamind D level is 50 7   Due for 2 week follow-up  Imaging: Personally reviewed  Redemonstration of oblique spiral fracture of the shaft of the humerus with apex medial angulation  No glenohumeral dislocation  Appreciate orthopedics evaluating patient 5/15/2023  Replaced her splint with a Salgado brace  Cleared by orthopedics to provide range of motion to elbow and wrist without restriction as tolerated  Follow-up with orthopedics in 4 weeks around 6/15/2023    Ventricular arrhythmia  Assessment & Plan  On 5/2 developed Afib with RR which degenerated to VT then VF  She received shock from her AICD ([place], Single Chamber ICD)  Seen by Cards who adjusted her device, and recommended restarting home Metoprolol and Amio load  Was also on digoxin  - Toprol dose adjusted and digoxin discontinued after developing junctional bradycardia later in stay  Here: Toprol XL 25mg daily, Amio 200mg daily starting tomorrow for 28 days  Monitor cardiac status during therapies   Monitor lytes   Keep K > 4, Mag > 2  Will plan for outpatient f/u with EP    Difficult intravenous access  Assessment & Plan  Midline placed by IR on 5/15/23  Need for blood draws frequently to follow electrolytes, INR  Need for IV access given her recent ventricular arrhythmia and AICD firing, importance of optimizing electrolytes, current COVID 19 virus  Acute COVID-19  Assessment & Plan  Diagnosed on screening test on 5/13  Minimally symptomatic (does have a cough)  Afebrile and on RA  10 days isolation per protocol through 5/22/2023 (last day)  Off precautions 5/23/2023  Monitor respiratory status   - Has chronic issues with urinary urgency/frequency  - Added Q4hr timed voids so we can help her get to the commode in a timely manner    -Stable and has not required oxygen   -Compliant with utilizing incentive spirometer    CHAS (obstructive sleep apnea)  Assessment & Plan  Does not use CPAP at home  Consider Noc Ox overnight while here  Class 2 obesity in adult  Assessment & Plan  Nutrition consulted  Counseling    Osteoporosis with current pathological fracture with routine healing  Assessment & Plan  Managed by [address] [facility]  History of L5 pathologic fracture  Has been on Alendronate once weekly since 2017  Would confirm with her what day she normally takes it prior to restarting  Will confirm that it is ok to restart with Orthopedics first, as some surgeons prefer to wait 6 weeks after acute fracture prior to restarting    - Per Dr Padilla [place] ok to resume at anytime  - She normally takes it on Thursdays  Would complete through 6/2023  Per last Endocrine note in 6/2022, they were going to continue for 1 more year and then stop  She was due for DXA this May  Asthma  Assessment & Plan  No acute exacerbation  Home: Singulair 10mg QHS  Here: Same, PRN albuterol  No acute exacerbation  Monitor respiratory status    Stage III pressure ulcer of sacral region [facility])  Assessment & Plan  Present on admission  Much improved    1  Right and left abdominal fold pannus is resolved candidiasis rash   No open areas suggested to continue with the interdry sheets to keep the area dry and prevent skin breakdown   2   Bilateral groin and inner thigh candidiasis rash is resolved   3  Sacral wound - remains a unstageable due to the mixed wound bed of slough and pink tissue   Briannekaterina Perdomo is 60% and 40% pink   Noted the wound bed to be improved and small in size   Reviewed the plan and shown the daughter on how to care for the area   4  Bilateral heels dry and intact   5  Right upper thigh area - hospital acquired DTI evolving linear line related to the mesh panties on the thigh area   Applied maxorb and a ABD to protect and then cut the elastic of the mesh panties to prevent from being tight   Discussed and reviewed the plan with the daughter      Skin care plans:  1-Hydraguard to bilateral  heels BID and PRN  2-Elevate heels to offload pressure  3-Ehob cushion in chair when out of bed  4-Moisturize skin daily with skin nourishing cream   5-Turn/reposition q2h or when medically stable for pressure re-distribution on skin  6  Low air loss mattress   7  Cleanse sacral buttocks with soap and water then apply triad paste then top with a small allevyn foam stephanie with a T for treatment change every other day and as needed for soilage or dislodgement    8  Cleanse bilateral abdominal pannus with soap and water then completely dry   Cut strips of the interdry and place flat in the skin folds of the abdominal area leaving 2 inches outside of the skin fold  Take interdry out and cleanse with with soap and water pat dry well and place back in the fold   Replace every 3 days or sooner if soiled   Do not use creams or powders on the skin with the interdry   9  Right upper thigh area - cleanse with soap and water then apply maxorb on open area then top with a ABD and secure with the mesh panties   Cut a slit in the panties to prevent from being tight on the thigh area change every other day or if soiled   10   Follow up at the wound center upon discharge - order is placed       Atrial fibrillation with RVR ([address] )  Assessment & Plan  Initially per AICD interrogation with Afib RVR that degenerated into VT/VF  Unclear burden  Here: Toprol XL 25mg daily, Fully anticoagulated on warfarin (See mechanical AV), amiodarone 200mg daily  Monitor and adjust as appropriate  Currently examines in NSR  IM consulted to assist with management  Outpatient f/u with EP    Abnormal CT scan  Assessment & Plan  - 4/27 CT CAP:           - Incidentally noted is a incarcerated/partially obstructed periumbilic ventral hernia, (image 146 series 9)          - Septated cyst anterior aspect of the right kidney, consider evaluation with ultrasound or MRI for further characterization (image 136 series 9)          - Dilated ascending aorta measuring 4 6 cm, evaluation with cardiothoracic surgery on nonemergent basis  - Follow up with PCP and CT surgery outpatient for findings  - Patient informed of abnormal results on 5/11: she is aware of ventral hernia but not aortic aneurysm or renal cyst         Hyponatremia  Assessment & Plan  Resolved  Na = 138    In acute care, [address] on 5/9  [place] by Trauma to be 2/2 poor PO intake and furosemide  Started on sodium tabs (weaning down)  Monitor and adjust as appropriate  IM consulted and assisting with management  Bradycardia  Assessment & Plan  Developed junctional bradycardia in hospital after restarting home Toprol XL and digoxin  - Cards stopped digoxin  - Toprol XL decreased to 25mg daily  Monitor  Type 2 diabetes mellitus ([address] )  Assessment & Plan    Home: Sitagliptin-Metformin 50-50mg  Here: Discontinue Levemir due to hypoglycemia, continue sliding scale insulin and diabetic diet  Diabetic Diet  Nutrition Consult  Attempt to resume oral meds here and get off insulin  IM consulted to assist with management  Outpatient f/u with PCP  Fracture of multiple ribs of right side  Assessment & Plan  R 8th and 9th rib possible fractures  Rib fracture protocol  Pain control with lidoderm patch  Incentive Spirometer  Monitor respiratory status      Laceration of right "eyebrow  Assessment & Plan  Healing, monitor  Steri-Strips in place    H/O mechanical aortic valve replacement  Assessment & Plan  Managed on Coumadin  Goal INR 2 5-3 5  Last INR = 2 57  Ok to resume home doing of Coumadin  IM consulted to assist with management of Coumadin dosing  Acute pain due to trauma  Assessment & Plan  R rib fractures, L humerus fracture  - Lidoderm patch for ribs  - Oxycodone 5-7 5mg PRN  - Robaxin 250mg Q6hr scheduled  - Gabapentin 100mg QHS (may be able to discontinue while here)  - Tylenol 975mg Q8hrs scheduled  APS assisted inpatient  Adjust as appropriate while here  Appreciate IM consultants medical co-management  Labs, medications, and imaging personally reviewed  ROS:  A ten point review of systems was completed on 05/31/23 and pertinent positives are listed in subjective section  All other systems reviewed were negative  OBJECTIVE:   /55 (BP Location: Right arm)   Pulse 59   Temp 98 6 °F (37 °C) (Oral)   Resp 20   Ht 4' 10\" (1 473 m)   Wt 92 9 kg (204 lb 12 9 oz) Comment: pt also has a splint on LUE  SpO2 96%   BMI 42 80 kg/m²       Physical Exam  Vitals and nursing note reviewed  Constitutional:       General: She is not in acute distress  HENT:      Head: Normocephalic and atraumatic  Nose: Nose normal       Mouth/Throat:      Mouth: Mucous membranes are moist    Eyes:      Conjunctiva/sclera: Conjunctivae normal    Cardiovascular:      Rate and Rhythm: Normal rate and regular rhythm  Pulses: Normal pulses  Pulmonary:      Effort: Pulmonary effort is normal       Breath sounds: Normal breath sounds  No wheezing or rales  Abdominal:      General: Bowel sounds are normal  There is no distension  Palpations: Abdomen is soft  Tenderness: There is no abdominal tenderness  Musculoskeletal:         General: No swelling  Cervical back: Neck supple        Comments: Sarimiento brace in place   Skin:     General: Skin is " warm    Neurological:      Mental Status: She is alert and oriented to person, place, and time     Psychiatric:         Mood and Affect: Mood normal         Lab Results   Component Value Date    HCT 32 4 (L) 05/29/2023    HGB 10 0 (L) 05/29/2023     (H) 05/29/2023     (L) 05/29/2023    WBC 3 15 (L) 05/29/2023     Lab Results   Component Value Date    BUN 23 05/29/2023    CALCIUM 8 7 05/29/2023     05/29/2023    CO2 31 05/29/2023    CREATININE 0 55 (L) 05/29/2023    GLUC 97 05/29/2023    K 3 7 05/29/2023    SODIUM 138 05/29/2023     Lab Results   Component Value Date    INR 2 57 (H) 05/31/2023    INR 2 19 (H) 05/29/2023    INR 1 99 (H) 05/26/2023    PROTIME 27 8 (H) 05/31/2023    PROTIME 24 6 (H) 05/29/2023    PROTIME 22 8 (H) 05/26/2023           Current Facility-Administered Medications:   •  acetaminophen (TYLENOL) tablet 975 mg, 975 mg, Oral, Q8H [facility], Jeffrey Segal MD, 975 mg at 05/31/23 1605  •  albuterol (PROVENTIL HFA,VENTOLIN HFA) inhaler 2 puff, 2 puff, Inhalation, Q4H PRN, Jeffrey Segal MD  •  allopurinol (ZYLOPRIM) tablet 100 mg, 100 mg, Oral, Daily, Jeffrey Segal MD, 100 mg at 05/31/23 2958  •  amiodarone tablet 200 mg, 200 mg, Oral, Daily With Breakfast, Jeffrey Segal MD, 200 mg at 05/31/23 7696  •  atorvastatin (LIPITOR) tablet 40 mg, 40 mg, Oral, Daily With Mino Green MD, 40 mg at 05/31/23 1605  •  calcium carbonate (TUMS) chewable tablet 500 mg, 500 mg, Oral, TID PRN, Jeffrey Segal MD  •  cholecalciferol (VITAMIN D3) tablet 1,000 Units, 1,000 Units, Oral, Daily, Jeffrey Segal MD, 1,000 Units at 05/31/23 0743  •  citalopram (CeleXA) tablet 20 mg, 20 mg, Oral, Daily, Jeffrey Segal MD, 20 mg at 05/31/23 0744  •  docusate sodium (COLACE) capsule 100 mg, 100 mg, Oral, BID, Jeffrey Segal MD, 100 mg at 05/31/23 6376  •  furosemide (LASIX) tablet 40 mg, 40 mg, Oral, Daily, Jeffrey Segal MD, 40 mg at 05/31/23 8180  •  gabapentin (NEURONTIN) capsule 100 mg, 100 mg, Oral, HS, Santy Glasgow MD, 100 mg at 05/30/23 2228  •  insulin lispro (HumaLOG) 100 units/mL subcutaneous injection 1-5 Units, 1-5 Units, Subcutaneous, TID AC **AND** Fingerstick Glucose (POCT), , , TID AC, Santy Glasgow MD  •  insulin lispro (HumaLOG) 100 units/mL subcutaneous injection 1-5 Units, 1-5 Units, Subcutaneous, HS, Santy Glasgow MD, 1 Units at 05/25/23 2100  •  lidocaine (LIDODERM) 5 % patch 1 patch, 1 patch, Topical, Daily, Santy Glasgow MD, 1 patch at 05/31/23 0934  •  methocarbamol (ROBAXIN) tablet 250 mg, 250 mg, Oral, Q6H [facility], Santy Glasgow MD, 250 mg at 05/31/23 1151  •  metoprolol succinate (TOPROL-XL) 24 hr tablet 25 mg, 25 mg, Oral, Daily, Santy Glasgow MD, 25 mg at 05/31/23 0743  •  montelukast (SINGULAIR) tablet 10 mg, 10 mg, Oral, HS, Santy Glasgow MD, 10 mg at 05/30/23 2228  •  naloxone (NARCAN) 0 04 mg/mL syringe 0 04 mg, 0 04 mg, Intravenous, Q1MIN PRN, Santy Glasgow MD  •  nystatin (MYCOSTATIN) powder, , Topical, BID, Amanda Pearl PA-C, 1 application   at 05/31/23 0749  •  ondansetron (ZOFRAN-ODT) dispersible tablet 4 mg, 4 mg, Oral, Q6H PRN, Santy Glasgow MD, 4 mg at 05/24/23 0635  •  oxybutynin (DITROPAN-XL) 24 hr tablet 5 mg, 5 mg, Oral, Daily, Santy Glasgow MD, 5 mg at 05/31/23 8142  •  oxyCODONE (ROXICODONE) IR tablet 5 mg, 5 mg, Oral, Q4H PRN, Santy Glasgow MD, 5 mg at 05/29/23 1649  •  oxyCODONE (ROXICODONE) split tablet 7 5 mg, 7 5 mg, Oral, Q4H PRN, Santy Glasgow MD, 7 5 mg at 05/31/23 0746  •  senna (SENOKOT) tablet 17 2 mg, 2 tablet, Oral, Daily, Santy Glasgow MD, 17 2 mg at 05/31/23 4566  •  warfarin (COUMADIN) tablet 5 mg, 5 mg, Oral, Daily (warfarin), JOSEPHINE Silva, 5 mg at 05/30/23 1715    Past Medical History:   Diagnosis Date   • Asthma    • Diabetes [facility])    • Hypertension        Patient Active Problem List    Diagnosis Date Noted   • Humerus fracture 04/27/2023   • Ventricular arrhythmia 05/04/2023   • Difficult intravenous access 05/15/2023   • Acute COVID-19 05/14/2023   • Atrial fibrillation with RVR ([facility] [address] ) 05/13/2023   • Stage III pressure ulcer of sacral region ([facility] ) 05/13/2023   • Asthma 05/13/2023   • Osteoporosis with current pathological fracture with routine healing 05/13/2023   • Class 2 obesity in adult 05/13/2023   • CHAS (obstructive sleep apnea) 05/13/2023   • Abnormal CT scan 05/10/2023   • Hyponatremia 05/09/2023   • Bradycardia 05/08/2023   • Type 2 diabetes mellitus ([facility] ) 04/28/2023   • Fall 04/27/2023   • Acute pain due to trauma 04/27/2023   • H/O mechanical aortic valve replacement 04/27/2023   • Laceration of right eyebrow 04/27/2023   • Fracture of multiple ribs of right side 04/27/2023          Rosamaria Rojo MD    I have spent a total time of 35 minutes on 05/31/23 in caring for this patient including Impressions, Counseling / Coordination of care and Documenting in the medical record  ** Please Note:  voice to text software may have been used in the creation of this document   Although proof errors in transcription or interpretation are a potential of such software**

## 2023-05-31 NOTE — PROGRESS NOTES
Internal Medicine Progress Note  Patient: Ada Chan  Age/sex: 68 y o  female  Medical Record #: [id number]      ASSESSMENT/PLAN: (Interval History)  Ada Chan is seen and examined and management for following issues:    Lt proximal humerus fracture  • Managed non-operatively  • NWB to the LUE  • Immobilizer placed to LUE by orthopedics  • Pain control and therapy per PMR  • D/w orthopedics OK for pendulum swings and OK to move elbow and wrist in splint as well  • They are ordering f/u Xrays as well and will f/u as OP  • They will follow up prior to dc     Rt 8-9 rib fx  • Pain control per PMR  • Encourage IS      Rt eyebrow laceration  • healed     DM type 2  • HA1C 6 4  • Home: Janumet  2x daily  • Here: SSI  • Stable currently w/o meds  • Recommend dc on no meds and check BS at home  • If they trend above 150 resume meds  • Close f/u with PCP     History of mechanical AVR  • Goal INR 2 5 - 3 5  • At home, takes 2 5mg Wed/Sat, 5mg all other days of the week   • Will resume home dosing  • INR 2 5     PAF  · As below  · Continue Coumadin    Ventricular arrhythmia/ICD device  • ICD was originally placed for VT in past  • On 5/2/23, + rapid afib that deteriorated into VT/VF in setting of no beta blocker -->+ ICD shock and reprogrammed  • Continue Toprol 25 mg qd/Amiodarone 200 mg qd      Junctional escape rhythm   • Diltiazem discontinued and Toprol dose lowered in the hospital by EP with resolution  • She will need outpt follow-up with Cardiology  She follows with the [facility]  • HR stable     COVID-19  • Tested positive 5/13/23   • Off isolation     Buttock wound/labia wound  • Followed by wound care    • Plan and surveillance per PMR  • All wounds stable refer to Media     LE edema  • L>R  • Venous doppler 5/27: 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  • On Lasix 40mg qd  • Her BPs are soft at times which may limit aggressive diuresis  • Has severe TR/severe RVE by ECHO 5/3/23 likely the cause  • Cont coumadin  • Back to baseline     Hx hypomagnesemia  • Takes Mag 64 mg tabs - 5 tabs qd at home but on none here  • Mg level today on  is 2 0      Severe TR/severe RVE  • This is by ECHO 5/3/23  • OP cardiology follow up     Ascending aortic aneurysm  • By ECHO on 5/3/23  • Measures 4 8 cm  • Will need OP followup/surveillence         DC plannin23    The above assessment and plan was reviewed and updated as determined by my evaluation of the patient on 2023      Labs:   Results from last 7 days   Lab Units 23  0603 23  0619   HEMATOCRIT % 32 4* 31 9*   HEMOGLOBIN g/dL 10 0* 10 4*   PLATELETS Thousands/uL 110* 120*   WBC Thousand/uL 3 15* 3 68*     Results from last 7 days   Lab Units 23  0603 23  0619   BUN mg/dL 23 21   CALCIUM mg/dL 8 7 8 6   CHLORIDE mmol/L 108 110*   CO2 mmol/L 31 30   CREATININE mg/dL 0 55* 0 53*   POTASSIUM mmol/L 3 7 3 9   SODIUM mmol/L 138 140         Results from last 7 days   Lab Units 23  0610 23  0603   INR  2 57* 2 19*     Results from last 7 days   Lab Units 23  0634 23  1620   POC GLUCOSE mg/dl 88 139 105       Review of Scheduled Meds:  Current Facility-Administered Medications   Medication Dose Route Frequency Provider Last Rate   • acetaminophen  975 mg Oral [facility] Maria Briones MD     • albuterol  2 puff Inhalation Q4H PRN Maria Briones MD     • allopurinol  100 mg Oral Daily Maria Briones MD     • amiodarone  200 mg Oral Daily With Breakfast Maria Briones MD     • atorvastatin  40 mg Oral Daily With Ana Maria Larson MD     • calcium carbonate  500 mg Oral TID PRN Maria Briones MD     • cholecalciferol  1,000 Units Oral Daily Maria Briones MD     • citalopram  20 mg Oral Daily Maria Briones MD     • docusate sodium  100 mg Oral BID Maria Briones MD     • furosemide  40 mg Oral Daily Maria Briones MD     • gabapentin  100 mg Oral HS Cheryl Shabazz MD     • insulin lispro  1-5 Units Subcutaneous TID [facility] Cheryl Shabazz MD     • insulin lispro  1-5 Units Subcutaneous HS Cheryl Shabazz MD     • lidocaine  1 patch Topical Daily Cheryl Shabazz MD     • methocarbamol  250 mg Oral Q6H [facility] Cheryl Shabazz MD     • metoprolol succinate  25 mg Oral Daily Cheryl Shabazz MD     • montelukast  10 mg Oral HS Cheryl Shabazz MD     • naloxone  0 04 mg Intravenous Q1MIN PRN Cheryl Shabazz MD     • nystatin   Topical BID Amanda Pearl PA-C     • ondansetron  4 mg Oral Q6H PRN Cheryl Shabazz MD     • oxybutynin  5 mg Oral Daily Cheryl Shabazz MD     • oxyCODONE  5 mg Oral Q4H PRN Cheryl Shabazz MD     • oxyCODONE  7 5 mg Oral Q4H PRN Cheryl Shabazz MD     • senna  2 tablet Oral Daily Cheryl Shabazz MD     • warfarin  5 mg Oral Daily (warfarin) JOSEPHINE Hutchinson         Subjective/ HPI: Patient seen and examined  Patients overnight issues or events were reviewed with nursing or staff during rounds or morning huddle session  New or overnight issues include the following:     Pt seen in therapy  No overnight issues    ROS:   A 10 point ROS was performed; negative except as noted above       *Labs /Radiology studies reviewed  *Medications reviewed and reconciled as needed  *Please refer to order section for additional ordered labs studies  *Case discussed with primary attending during morning huddle case rounds    Physical Examination:  Vitals:   Vitals:    05/30/23 1450 05/30/23 1956 05/31/23 0630 05/31/23 0743   BP: 112/72 101/52 156/76 141/75   BP Location: Right arm Right arm Right arm    Pulse:  56 65 64   Resp:  20 18    Temp:  98 1 °F (36 7 °C) 98 7 °F (37 1 °C)    TempSrc:  Oral Oral    SpO2:  95% 94%    Weight:       Height:           GEN: NAD; conversive  NEURO: Alert and oriented x4; appropriate  HEENT: Pupils are equal/reactive; normocephalic, face is symmetrical, hearing normal  CV: S1 S2 regular, no murmur/rub/gallops, 1/4 pedal pulses, trace L >R edema present  RESP: Lungs are clear bilaterally, no wheezes rales or rhonchi, on room air, no distress, respirations are easy and non labored  GI: Abdomen is obese, soft, non tender, +BS x4; non distended  : Voiding without issues  MUSC: Moves all extremities except LUE brace in place; fingers without edema, +cap refill  SKIN: pink, warm, normal turgor, PVD discoloration to b/l LE; refer to media for skin images      The above physical exam was reviewed and updated as determined by my evaluation of the patient on 5/31/2023  Invasive Devices     Peripheral Intravenous Line  Duration           Long-Dwell Peripheral IV (Midline) 14/22/27 Right Cephalic Vein 15 days                   VTE Pharmacologic Prophylaxis: Warfarin (Coumadin)  Code Status: Level 1 - Full Code  Current Length of Stay: 18 day(s)      Total time spent:  30 minutes with more than 50% spent counseling/coordinating care  Counseling includes discussion with patient re: progress  and discussion with patient of his/her current medical state/information  Coordination of patient's care was performed in conjunction with primary service  Time invested included review of patient's labs, vitals, and management of their comorbidities with continued monitoring  In addition, this patient was discussed with medical team including physician and advanced extenders  The care of the patient was extensively discussed and appropriate treatment plan was formulated unique for this patient  Medical decision making for the day was made by supervising physician unless otherwise noted in their attestation statement  ** Please Note:  voice to text software may have been used in the creation of this document   Although proof errors in transcription or interpretation are a potential of such software**

## 2023-05-31 NOTE — PROGRESS NOTES
05/31/23 1230   Pain Assessment   Pain Assessment Tool 0-10   Pain Score 4   Pain Location/Orientation Orientation: Left; Location: Shoulder   Hospital Pain Intervention(s) Repositioned; Emotional support   Restrictions/Precautions   Precautions Fall Risk;Pain;Pressure Ulcer;Supervision on toilet/commode   LUE Weight Bearing Per Order NWB   ROM Restrictions Yes   LUE ROM Restriction   (no ROM L shoulder, ok elbow -> distal)   Eating   Type of Assistance Needed Set-up / clean-up   Physical Assistance Level No physical assistance   Eating CARE Score 5   Oral Hygiene   Type of Assistance Needed Supervision   Physical Assistance Level No physical assistance   Oral Hygiene CARE Score 4   Shower/Bathe Self   Type of Assistance Needed Physical assistance   Physical Assistance Level 51%-75%   Comment Pt's daughter has   Shower/Bathe Self CARE Score 2   Bathing   Assessed Bath Style Sponge Bath   Anticipated D/C Bath Style Sponge Bath   Able to Wash/Rinse/Dry (body part) Left Arm;L Upper Leg;R Upper Leg;Chest;Abdomen   Tub/Shower Transfer   Reason Not Assessed Sponge Bath   Upper Body Dressing   Type of Assistance Needed Physical assistance   Physical Assistance Level 76% or more   Comment Pt's daughter will A pt with donning/doffing brace and shirt  Upper Body Dressing CARE Score 2   Lower Body Dressing   Type of Assistance Needed Physical assistance   Physical Assistance Level 25% or less   Comment Pt was able to don/doff pants and undergarments with inc time and use of LHAE which she reports having at home, requires Ricco for CM up/down over L hip  Lower Body Dressing CARE Score 3   Putting On/Taking Off Footwear   Type of Assistance Needed Physical assistance   Physical Assistance Level 51%-75%   Comment Able to doff b/l sock, A to don     Putting On/Taking Off Footwear CARE Score 2   Sit to Stand   Type of Assistance Needed Physical assistance   Physical Assistance Level 25% or less   Comment Fluctuates between Ricco/Supervision  Sit to Stand CARE Score 3   Bed-Chair Transfer   Type of Assistance Needed Supervision; Adaptive equipment   Physical Assistance Level No physical assistance   Comment Sup with cane  Chair/Bed-to-Chair Transfer CARE Score 4   Toileting Hygiene   Type of Assistance Needed Physical assistance   Physical Assistance Level 51%-75%   Toileting Hygiene CARE Score 2   Toilet Transfer   Type of Assistance Needed Physical assistance   Physical Assistance Level 25% or less   Toilet Transfer CARE Score 3   Exercise Tools   Other Exercise Tool 1 UB strengthening with use of 2# weight moving through bicep and tricep exercises only no OH, completing 2 x 10 each to inc strength and endurance for inc independence with ADL tasks and functional txfers  Cognition   Overall Cognitive Status WFL   Arousal/Participation Alert; Cooperative   Attention Within functional limits   Orientation Level Oriented X4   Memory Within functional limits   Following Commands Follows all commands and directions without difficulty   Activity Tolerance   Activity Tolerance Patient tolerated treatment well   Assessment   Treatment Assessment Pt participated in skilled OT services with focus on d/c planning, ADL retraining  Pt continues to be limited by orthopedic restrictions to LUE with no shoulder ROM  This limits pt's independence and safety with I/ADL tasks  Pt with supportive family and daughters who have been present for FT and are willing and able to provide for pt at current level of function  Plan for pt to d/c home with family support and home therapy  In prep for d/c home pt to receive drop arm commode, fully electric hospital bed, and w/c  Prognosis Fair   Problem List Decreased strength;Decreased range of motion;Decreased endurance; Impaired balance;Decreased mobility;Obesity;Orthopedic restrictions;Pain   Recommendation   OT Discharge Recommendation Home with home health rehabilitation   OT Therapy Minutes   OT Time In 1230   OT Time Out 1400   OT Total Time (minutes) 90   OT Mode of treatment - Individual (minutes) 90   OT Mode of treatment - Concurrent (minutes) 0   OT Mode of treatment - Group (minutes) 0   OT Mode of treatment - Co-treat (minutes) 0   OT Mode of Treatment - Total time(minutes) 90 minutes   OT Cumulative Minutes 1420   Therapy Time missed   Time missed?  No

## 2023-05-31 NOTE — PROGRESS NOTES
05/31/23 0830   Pain Assessment   Pain Assessment Tool 0-10   Pain Score 6   Pain Location/Orientation Orientation: Left; Location: Shoulder   Pain Radiating Towards no   Pain Onset/Description Onset: Ongoing;Frequency: Constant/Continuous   Hospital Pain Intervention(s) Medication (See MAR)   Restrictions/Precautions   Precautions Fall Risk;Pain;Pressure Ulcer;Supervision on toilet/commode   LUE Weight Bearing Per Order NWB   Braces or Orthoses   (nielsen brace on L UE)   Cognition   Overall Cognitive Status WFL   Arousal/Participation Alert; Cooperative   Attention Within functional limits   Orientation Level Oriented X4   Subjective   Subjective pt reported 6/10 pre-mobility L shoulder pain to 5/10 post tx  Roll Left and Right   Comment NWB on L UE   Reason if not Attempted Safety concerns   Roll Left and Right CARE Score 88   Sit to Lying   Type of Assistance Needed Physical assistance;Verbal cues; Adaptive equipment   Physical Assistance Level 51%-75%   Comment bedrail with assist on bilat LE   Sit to Lying CARE Score 2   Lying to Sitting on Side of Bed   Type of Assistance Needed Supervision;Verbal cues; Adaptive equipment   Comment HOB slightly elevated + bedrail   Lying to Sitting on Side of Bed CARE Score 4   Sit to Stand   Type of Assistance Needed Physical assistance;Supervision;Verbal cues; Adaptive equipment   Physical Assistance Level 25% or less   Comment min-S depending on height of surface, fatigue   Sit to Stand CARE Score 3   Bed-Chair Transfer   Type of Assistance Needed Verbal cues; Adaptive equipment;Supervision   Comment S-DS with cane   Chair/Bed-to-Chair Transfer CARE Score 4   Car Transfer   Type of Assistance Needed Physical assistance;Verbal cues   Physical Assistance Level 51%-75%   Comment simulated on nu step seat   Car Transfer CARE Score 2   Walk 10 Feet   Type of Assistance Needed Adaptive equipment;Supervision   Comment S-DS with SPC   Walk 10 Feet CARE Score 4   Walk 50 Feet with Two Turns   Type of Assistance Needed Supervision;Verbal cues; Adaptive equipment   Comment 65', 48' with cane   Walk 50 Feet with Two Turns CARE Score 4   Walk 150 Feet   Comment fatigue, weakness   Reason if not Attempted Safety concerns   Walk 150 Feet CARE Score 88   Walking 10 Feet on Uneven Surfaces   Type of Assistance Needed Physical assistance;Verbal cues; Adaptive equipment   Physical Assistance Level 25% or less   Comment foam mat with SPC   Walking 10 Feet on Uneven Surfaces CARE Score 3   Wheel 50 Feet with Two Turns   Reason if not Attempted Activity not applicable   Wheel 50 Feet with Two Turns CARE Score 9   Wheel 150 Feet   Comment family will push w/c   Reason if not Attempted Activity not applicable   Wheel 328 Feet CARE Score 9   Curb or Single Stair   Comment will be d/c to her apt with small threshold at doorway only   Reason if not Attempted Safety concerns   1 Step (Curb) CARE Score 88   4 Steps   Reason if not Attempted Activity not applicable   4 Steps CARE Score 9   12 Steps   Reason if not Attempted Activity not applicable   12 Steps CARE Score 9   Picking Up Object   Type of Assistance Needed Supervision; Adaptive equipment   Comment reacher -pen   Picking Up Object CARE Score 4   Therapeutic Interventions   Strengthening seated TE with 3# ankle wts include hip flexion and LAQ x 5 SH x 10 reps x 3 sets each mm group    Equipment Use   NuStep lvl 1 bilat LE and R UE x , SPM>40, SpO2 91%, WV: 88 bpm   Assessment   Treatment Assessment skilled PT focused on functional mobility training and strengthening exercise with pt tolerated nu step and 3# ankle weights really well  At this time pt is functionining at S level for the most part except for inconsistent indep with sit to stand transfer and requiring assist for sit to supine and car transfer which was addressed during family training so pt will have appropriate assist and support at home  no concerns regarding home d/c tomorrow  Family/Caregiver Present no   Problem List Decreased strength;Decreased range of motion;Decreased endurance; Impaired balance;Decreased mobility;Obesity;Orthopedic restrictions;Pain   Barriers to Discharge None   Plan   Treatment/Interventions Functional transfer training;LE strengthening/ROM; Therapeutic exercise; Endurance training;Gait training;Spoke to nursing;OT   Progress Progressing toward goals   Recommendation   PT Discharge Recommendation Home with home health rehabilitation   PT Therapy Minutes   PT Time In 0830   PT Time Out 1000   PT Total Time (minutes) 90   PT Mode of treatment - Individual (minutes) 90   PT Mode of treatment - Concurrent (minutes) 0   PT Mode of treatment - Group (minutes) 0   PT Mode of treatment - Co-treat (minutes) 0   PT Mode of Treatment - Total time(minutes) 90 minutes   PT Cumulative Minutes 1410   Therapy Time missed   Time missed?  No

## 2023-06-01 VITALS
TEMPERATURE: 98.4 F | HEIGHT: 58 IN | WEIGHT: 204.81 LBS | BODY MASS INDEX: 42.99 KG/M2 | RESPIRATION RATE: 18 BRPM | OXYGEN SATURATION: 96 % | SYSTOLIC BLOOD PRESSURE: 154 MMHG | HEART RATE: 70 BPM | DIASTOLIC BLOOD PRESSURE: 68 MMHG

## 2023-06-01 LAB
GLUCOSE SERPL-MCNC: 102 MG/DL (ref 65–140)
GLUCOSE SERPL-MCNC: 124 MG/DL (ref 65–140)

## 2023-06-01 PROCEDURE — 82948 REAGENT STRIP/BLOOD GLUCOSE: CPT

## 2023-06-01 PROCEDURE — 99232 SBSQ HOSP IP/OBS MODERATE 35: CPT | Performed by: INTERNAL MEDICINE

## 2023-06-01 PROCEDURE — 99238 HOSP IP/OBS DSCHRG MGMT 30/<: CPT | Performed by: PHYSICAL MEDICINE & REHABILITATION

## 2023-06-01 RX ORDER — MELATONIN
1000 DAILY
Qty: 30 TABLET | Refills: 0 | Status: SHIPPED | OUTPATIENT
Start: 2023-06-02

## 2023-06-01 RX ORDER — AMIODARONE HYDROCHLORIDE 200 MG/1
200 TABLET ORAL
Qty: 30 TABLET | Refills: 0 | Status: SHIPPED | OUTPATIENT
Start: 2023-06-01

## 2023-06-01 RX ORDER — OXYCODONE HYDROCHLORIDE 5 MG/1
5 TABLET ORAL 2 TIMES DAILY PRN
Qty: 20 TABLET | Refills: 0 | Status: SHIPPED | OUTPATIENT
Start: 2023-06-01 | End: 2023-06-13 | Stop reason: SDUPTHER

## 2023-06-01 RX ORDER — METHOCARBAMOL 500 MG/1
250 TABLET, FILM COATED ORAL 3 TIMES DAILY PRN
Qty: 45 TABLET | Refills: 0 | Status: SHIPPED | OUTPATIENT
Start: 2023-06-01

## 2023-06-01 RX ORDER — DOCUSATE SODIUM 100 MG/1
100 CAPSULE, LIQUID FILLED ORAL 2 TIMES DAILY
Qty: 60 CAPSULE | Refills: 0 | Status: SHIPPED | OUTPATIENT
Start: 2023-06-01

## 2023-06-01 RX ORDER — ACETAMINOPHEN 325 MG/1
650 TABLET ORAL EVERY 6 HOURS PRN
Qty: 63 TABLET | Refills: 0
Start: 2023-06-01

## 2023-06-01 RX ORDER — METOPROLOL SUCCINATE 25 MG/1
25 TABLET, EXTENDED RELEASE ORAL DAILY
Qty: 30 TABLET | Refills: 0 | Status: SHIPPED | OUTPATIENT
Start: 2023-06-02

## 2023-06-01 RX ORDER — GABAPENTIN 100 MG/1
100 CAPSULE ORAL
Qty: 30 CAPSULE | Refills: 0 | Status: SHIPPED | OUTPATIENT
Start: 2023-06-01

## 2023-06-01 RX ADMIN — CITALOPRAM HYDROBROMIDE 20 MG: 20 TABLET ORAL at 10:43

## 2023-06-01 RX ADMIN — OXYBUTYNIN 5 MG: 5 TABLET, FILM COATED, EXTENDED RELEASE ORAL at 10:45

## 2023-06-01 RX ADMIN — ACETAMINOPHEN 975 MG: 325 TABLET ORAL at 06:33

## 2023-06-01 RX ADMIN — DOCUSATE SODIUM 100 MG: 100 CAPSULE, LIQUID FILLED ORAL at 10:43

## 2023-06-01 RX ADMIN — METHOCARBAMOL TABLETS 250 MG: 500 TABLET, COATED ORAL at 06:32

## 2023-06-01 RX ADMIN — AMIODARONE HYDROCHLORIDE 200 MG: 200 TABLET ORAL at 06:33

## 2023-06-01 RX ADMIN — Medication 1000 UNITS: at 10:43

## 2023-06-01 RX ADMIN — ALLOPURINOL 100 MG: 100 TABLET ORAL at 10:43

## 2023-06-01 RX ADMIN — NYSTATIN: 100000 POWDER TOPICAL at 10:46

## 2023-06-01 RX ADMIN — METHOCARBAMOL TABLETS 250 MG: 500 TABLET, COATED ORAL at 12:13

## 2023-06-01 RX ADMIN — FUROSEMIDE 40 MG: 40 TABLET ORAL at 10:43

## 2023-06-01 RX ADMIN — Medication 7.5 MG: at 06:36

## 2023-06-01 RX ADMIN — METOPROLOL SUCCINATE 25 MG: 25 TABLET, EXTENDED RELEASE ORAL at 10:43

## 2023-06-01 RX ADMIN — LIDOCAINE 5% 1 PATCH: 700 PATCH TOPICAL at 12:13

## 2023-06-01 NOTE — PROGRESS NOTES
Pastoral Care Progress Note    2023  Patient: Jed Del Angel : 1945  Admission Date & Time: 2023 1828  MRN: [id number] [facility]: [id number]        Made visit of encouragement/prayer to PT as she anticipates D/C  Listened as she shared thoughts about going home, reinforced her willingness to keep doing therapy, affirmed the help she will get living with her daughter   prayed for blessing and safety in her transition home

## 2023-06-01 NOTE — PROGRESS NOTES
Internal Medicine Progress Note  Patient: Jin Multani  Age/sex: 68 y o  female  Medical Record #: [id number]      ASSESSMENT/PLAN: (Interval History)  Jin Multani is seen and examined and management for following issues:    Lt proximal humerus fracture  • Managed non-operatively  • NWB to the LUE  • Immobilizer placed to LUE by orthopedics  • Pain control and therapy per PMR  • D/w orthopedics OK for pendulum swings and OK to move elbow and wrist in splint as well  • They are ordering f/u Xrays as well and will f/u as OP  • They will follow up prior to dc     Rt 8-9 rib fx  • Pain control per PMR  • Encourage IS      Rt eyebrow laceration  • healed     DM type 2  • HA1C 6 4  • Home: Janumet  2x daily  • Here: SSI  • Stable currently w/o meds  • Recommend dc on no meds and check BS at home  • If they trend above 150 resume meds  • Close f/u with PCP     History of mechanical AVR  • Goal INR 2 5 - 3 5  • At home, takes 2 5mg Wed/Sat, 5mg all other days of the week   • Back on home dose  • INR 2 5     PAF  · As below  · Continue Coumadin    Ventricular arrhythmia/ICD device  • ICD was originally placed for VT in past  • On 5/2/23, + rapid afib that deteriorated into VT/VF in setting of no beta blocker -->+ ICD shock and reprogrammed  • Continue Toprol 25 mg qd/Amiodarone 200 mg qd      Junctional escape rhythm   • Diltiazem discontinued and Toprol dose lowered in the hospital by EP with resolution  • She will need outpt follow-up with Cardiology  She follows with the [facility]  • HR stable     COVID-19  • Tested positive 5/13/23   • Off isolation     Buttock wound/labia wound  • Followed by wound care    • Plan and surveillance per PMR  • All wounds stable refer to Media     LE edema  • L>R  • Venous doppler 5/27: 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  • On Lasix 40mg qd  • Her BPs are soft at times which may limit aggressive diuresis  • Has severe TR/severe RVE by ECHO 5/3/23 likely the cause  • Cont coumadin  • Back to baseline     Hx hypomagnesemia  • Takes Mag 64 mg tabs - 5 tabs qd at home but on none here  • Mg level today on  is 2 0      Severe TR/severe RVE  • This is by ECHO 5/3/23  • OP cardiology follow up     Ascending aortic aneurysm  • By ECHO on 5/3/23  • Measures 4 8 cm  • Will need OP followup/surveillence         DC plannin23    The above assessment and plan was reviewed and updated as determined by my evaluation of the patient on 2023      Labs:   Results from last 7 days   Lab Units 23  0603 23  0619   HEMATOCRIT % 32 4* 31 9*   HEMOGLOBIN g/dL 10 0* 10 4*   PLATELETS Thousands/uL 110* 120*   WBC Thousand/uL 3 15* 3 68*     Results from last 7 days   Lab Units 23  0603 23  0619   BUN mg/dL 23 21   CALCIUM mg/dL 8 7 8 6   CHLORIDE mmol/L 108 110*   CO2 mmol/L 31 30   CREATININE mg/dL 0 55* 0 53*   POTASSIUM mmol/L 3 7 3 9   SODIUM mmol/L 138 140         Results from last 7 days   Lab Units 23  0610 23  0603   INR  2 57* 2 19*     Results from last 7 days   Lab Units 23  0632 23  1959 23  1604   POC GLUCOSE mg/dl 102 130 108       Review of Scheduled Meds:  Current Facility-Administered Medications   Medication Dose Route Frequency Provider Last Rate   • acetaminophen  975 mg Oral [facility] Daisha Hopper MD     • albuterol  2 puff Inhalation Q4H PRN Daisha Hopper MD     • allopurinol  100 mg Oral Daily Daisha Hopper MD     • amiodarone  200 mg Oral Daily With Breakfast Daisha Hopper MD     • atorvastatin  40 mg Oral Daily With Shiva Wallace MD     • calcium carbonate  500 mg Oral TID PRN Daisha Hopper MD     • cholecalciferol  1,000 Units Oral Daily Daisha Hopper MD     • citalopram  20 mg Oral Daily Daisha Hopper MD     • docusate sodium  100 mg Oral BID Daisha Hopper MD     • furosemide  40 mg Oral Daily Daisha Hopper MD     • gabapentin  100 mg Oral HS Jeffrey Segal MD     • insulin lispro  1-5 Units [facility]cutaneous TID [facility] Jeffrey Segal MD     • insulin lispro  1-5 Units Subcutaneous HS Jeffrey Segal MD     • lidocaine  1 patch Topical Daily Jeffrey Segal MD     • methocarbamol  250 mg Oral Q6H [facility] Jeffrey Segal MD     • metoprolol succinate  25 mg Oral Daily Jeffrey Segal MD     • montelukast  10 mg Oral HS Jeffrey Segal MD     • naloxone  0 04 mg Intravenous Q1MIN PRN Jeffrey Segal MD     • nystatin   Topical BID Amanda Pearl PA-C     • ondansetron  4 mg Oral Q6H PRN Jeffrey Segal MD     • oxybutynin  5 mg Oral Daily Jeffrey Segal MD     • oxyCODONE  5 mg Oral Q4H PRN Jeffrey Segal MD     • oxyCODONE  7 5 mg Oral Q4H PRN Jeffrey Segal MD     • senna  2 tablet Oral Daily Jeffrey Segal MD     • warfarin  5 mg Oral Daily (warfarin) JOSEPHINE Santos         Subjective/ HPI: Patient seen and examined  Patients overnight issues or events were reviewed with nursing or staff during rounds or morning huddle session  New or overnight issues include the following:     Pt seen in her room  She admits to some anxiety re DC  She denies any other complaints  ROS:   A 10 point ROS was performed; negative except as noted above       *Labs /Radiology studies reviewed  *Medications reviewed and reconciled as needed  *Please refer to order section for additional ordered labs studies  *Case discussed with primary attending during morning huddle case rounds    Physical Examination:  Vitals:   Vitals:    05/31/23 0630 05/31/23 0743 05/31/23 1450 05/31/23 1954   BP: 156/76 141/75 113/55 117/73   BP Location: Right arm  Right arm Right arm   Pulse: 65 64 59 63   Resp: 18  20 18   Temp: 98 7 °F (37 1 °C)  98 6 °F (37 °C) 98 4 °F (36 9 °C)   TempSrc: Oral  Oral Oral   SpO2: 94%  96% 96%   Weight:       Height:           GEN: NAD; conversive, anxious  NEURO: Alert and oriented x4; appropriate  HEENT: Pupils are equal/reactive; normocephalic, face is symmetrical, hearing normal  CV: S1 S2 regular, no murmur/rub/gallops, 1/4 pedal pulses, trace L >R edema present  RESP: Lungs are clear bilaterally, no wheezes rales or rhonchi, on room air, no distress, respirations are easy and non labored  GI: Abdomen is obese, soft, non tender, +BS x4; non distended  : Voiding without issues  MUSC: Moves all extremities except LUE brace in place; fingers without edema, +cap refill  SKIN: pink, warm, normal turgor, PVD discoloration to b/l LE; refer to media for skin images      The above physical exam was reviewed and updated as determined by my evaluation of the patient on 6/1/2023  Invasive Devices     Peripheral Intravenous Line  Duration           Long-Dwell Peripheral IV (Midline) 15/52/65 Right Cephalic Vein 16 days                   VTE Pharmacologic Prophylaxis: Warfarin (Coumadin)  Code Status: Level 1 - Full Code  Current Length of Stay: 19 day(s)      Total time spent:  30 minutes with more than 50% spent counseling/coordinating care  Counseling includes discussion with patient re: progress  and discussion with patient of his/her current medical state/information  Coordination of patient's care was performed in conjunction with primary service  Time invested included review of patient's labs, vitals, and management of their comorbidities with continued monitoring  In addition, this patient was discussed with medical team including physician and advanced extenders  The care of the patient was extensively discussed and appropriate treatment plan was formulated unique for this patient  Medical decision making for the day was made by supervising physician unless otherwise noted in their attestation statement  ** Please Note:  voice to text software may have been used in the creation of this document   Although proof errors in transcription or interpretation are a potential of such software**

## 2023-06-01 NOTE — PLAN OF CARE
Problem: PAIN - ADULT  Goal: Verbalizes/displays adequate comfort level or baseline comfort level  Description: Interventions:  - Encourage patient to monitor pain and request assistance  - Assess pain using appropriate pain scale  - Administer analgesics based on type and severity of pain and evaluate response  - Implement non-pharmacological measures as appropriate and evaluate response  - Consider cultural and social influences on pain and pain management  - Notify physician/advanced practitioner if interventions unsuccessful or patient reports new pain  Outcome: Adequate for Discharge     Problem: INFECTION - ADULT  Goal: Absence or prevention of progression during hospitalization  Description: INTERVENTIONS:  - Assess and monitor for signs and symptoms of infection  - Monitor lab/diagnostic results  - Monitor all insertion sites, i e  indwelling lines, tubes, and drains  - Monitor endotracheal if appropriate and nasal secretions for changes in amount and color  - [place] appropriate cooling/warming therapies per order  - Administer medications as ordered  - Instruct and encourage patient and family to use good hand hygiene technique  - Identify and instruct in appropriate isolation precautions for identified infection/condition  Outcome: Adequate for Discharge  Goal: Absence of fever/infection during neutropenic period  Description: INTERVENTIONS:  - Monitor WBC    Outcome: Adequate for Discharge     Problem: SAFETY ADULT  Goal: Patient will remain free of falls  Description: INTERVENTIONS:  - Educate patient/family on patient safety including physical limitations  - Instruct patient to call for assistance with activity   - Consult OT/PT to assist with strengthening/mobility   - Keep Call bell within reach  - Keep bed low and locked with side rails adjusted as appropriate  - Keep care items and personal belongings within reach  - Initiate and maintain comfort rounds  - Make Fall Risk Sign visible to staff  - Offer Toileting every  Hours, in advance of need  - Initiate/Maintain alarm  - Obtain necessary fall risk management equipment:   - Apply yellow socks and bracelet for high fall risk patients  - Consider moving patient to room near nurses station  Outcome: Adequate for Discharge  Goal: Maintain or return to baseline ADL function  Description: INTERVENTIONS:  -  Assess patient's ability to carry out ADLs; assess patient's baseline for ADL function and identify physical deficits which impact ability to perform ADLs (bathing, care of mouth/teeth, toileting, grooming, dressing, etc )  - Assess/evaluate cause of self-care deficits   - Assess range of motion  - Assess patient's mobility; develop plan if impaired  - Assess patient's need for assistive devices and provide as appropriate  - Encourage maximum independence but intervene and supervise when necessary  - Involve family in performance of ADLs  - Assess for home care needs following discharge   - Consider OT consult to assist with ADL evaluation and planning for discharge  - Provide patient education as appropriate  Outcome: Adequate for Discharge  Goal: Maintains/Returns to pre admission functional level  Description: INTERVENTIONS:  - Perform BMAT or MOVE assessment daily    - Set and communicate daily mobility goal to care team and patient/family/caregiver  - Collaborate with rehabilitation services on mobility goals if consulted  - Perform Range of Motion  times a day  - Reposition patient every 2 hours    - Dangle patient 3 times a day  - Stand patient 3 times a day  - Ambulate patient 3 times a day  - Out of bed to chair 3 times a day   - Out of bed for meals 3 times a day  - Out of bed for toileting  - Record patient progress and toleration of activity level   Outcome: Adequate for Discharge     Problem: DISCHARGE PLANNING  Goal: Discharge to home or other facility with appropriate resources  Description: INTERVENTIONS:  - Identify barriers to discharge w/patient and caregiver  - Arrange for needed discharge resources and transportation as appropriate  - Identify discharge learning needs (meds, wound care, etc )  - Arrange for interpretive services to assist at discharge as needed  - Refer to Case Management Department for coordinating discharge planning if the patient needs post-hospital services based on physician/advanced practitioner order or complex needs related to functional status, cognitive ability, or social support system  Outcome: Adequate for Discharge     Problem: MOBILITY - ADULT  Goal: Maintain or return to baseline ADL function  Description: INTERVENTIONS:  -  Assess patient's ability to carry out ADLs; assess patient's baseline for ADL function and identify physical deficits which impact ability to perform ADLs (bathing, care of mouth/teeth, toileting, grooming, dressing, etc )  - Assess/evaluate cause of self-care deficits   - Assess range of motion  - Assess patient's mobility; develop plan if impaired  - Assess patient's need for assistive devices and provide as appropriate  - Encourage maximum independence but intervene and supervise when necessary  - Involve family in performance of ADLs  - Assess for home care needs following discharge   - Consider OT consult to assist with ADL evaluation and planning for discharge  - Provide patient education as appropriate  Outcome: Adequate for Discharge  Goal: Maintains/Returns to pre admission functional level  Description: INTERVENTIONS:  - Perform BMAT or MOVE assessment daily    - Set and communicate daily mobility goal to care team and patient/family/caregiver  - Collaborate with rehabilitation services on mobility goals if consulted  - Perform Range of Motion 3 times a day  - Reposition patient every 2 hours    - Dangle patient 3 times a day  - Stand patient 3 times a day  - Ambulate patient 3 times a day  - Out of bed to chair 3 times a day   - Out of bed for meals 3 times a day  - Out of bed for toileting  - Record patient progress and toleration of activity level   Outcome: Adequate for Discharge     Problem: Nutrition/Hydration-ADULT  Goal: Nutrient/Hydration intake appropriate for improving, restoring or maintaining nutritional needs  Description: Monitor and assess patient's nutrition/hydration status for malnutrition  Collaborate with interdisciplinary team and initiate plan and interventions as ordered  Monitor patient's weight and dietary intake as ordered or per policy  Utilize nutrition screening tool and intervene as necessary  Determine patient's food preferences and provide high-protein, high-caloric foods as appropriate       INTERVENTIONS:  - Monitor oral intake, urinary output, labs, and treatment plans  - Assess nutrition and hydration status and recommend course of action  - Evaluate amount of meals eaten  - Assist patient with eating if necessary   - Allow adequate time for meals  - Recommend/ encourage appropriate diets, oral nutritional supplements, and vitamin/mineral supplements  - Order, calculate, and assess calorie counts as needed  - Recommend, monitor, and adjust tube feedings and TPN/PPN based on assessed needs  - Assess need for intravenous fluids  - Provide specific nutrition/hydration education as appropriate  - Include patient/family/caregiver in decisions related to nutrition  Outcome: Adequate for Discharge     Problem: Prexisting or High Potential for Compromised Skin Integrity  Goal: Skin integrity is maintained or improved  Description: INTERVENTIONS:  - Identify patients at risk for skin breakdown  - Assess and monitor skin integrity  - Assess and monitor nutrition and hydration status  - Monitor labs   - Assess for incontinence   - Turn and reposition patient  - Assist with mobility/ambulation  - Relieve pressure over bony prominences  - Avoid friction and shearing  - Provide appropriate hygiene as needed including keeping skin clean and dry  - Evaluate need for skin moisturizer/barrier cream  - Collaborate with interdisciplinary team   - Patient/family teaching  - Consider wound care consult   Outcome: Adequate for Discharge

## 2023-06-01 NOTE — DISCHARGE SUMMARY
Discharge Summary - PMR   Bertha Reyes 68 y o  female MRN: [id number]  Unit/Bed#: -01 Encounter: [id number]    Admission Date: 5/13/2023     Discharge Date: 6/1/23    Rehab Diagnosis: Impairment of mobility, safety and Activities of Daily Living (ADLs) due to Major Multiple Trauma:  14 9  Other Multiple Trauma Multiple rib fractures with acute pain, improved acute hypoxic respiratory failure, and L humeral fracture NWB in coaptation splint     History of Present Illness:   Bertha Reyes is a 68 y o  female with medical history of Asthma, T2DM, Aortic valve replacement on coumadin, and HTN who presented to the [address] [place] [address] on 4/27 after falling at the grocery store - losing balance and falling forward when bent over  No LOC  Pain in L arm and R chest wall with R eyebrow lac  Trauma survey showed comminuted L proximal humerus fracture, and multiple R sided rib fractures  No PTX/hemothorax  Did require O2 (does not need at baseline)  Ortho was consulted and made patient NWB in coaptation splint and plan for Non-op management  APS was consulted for pain - they discussed nerve blocks, but patient declined  Geriatrics also consulted given patient's age  On 5/2, a rapid was called as patient felt her AICD fired  Found to have a narrow complex tachycardia with HR in the 180s,  Afib with RVR, degenerated to VT and then VF prior to AICD shock (after interrogation)  She was given IV metoprolol with immediate response and conversion to NSR and transferred to [facility]  EP was consulted and reprogrammed her device and recommended a TTE and restarting home Metoprolol and Amio load (BID for 1 week since 5/3, then transitioned to daily)  She was noted to febrile and infectious work-up was initiated  Ultimately found to have a UTI and given 5 days of CTX  After initiating amio, INR became supratherapeutic to 4 84, with improvement after her coumadin was held   She was found to have an evolving DTI of her sacro-buttocks on 5/8 and Wound Care was consulted for management  On 5/8 interrogation of her AICD showed AF with RVR at 195 with inappropriate shock rate, but also some junctional bradycardia - they adjusted her toprol xl, and discontinued diltiazem, and adjusted her device again  Her course was also complicated by hyponatremia (down to 125 on 5/9) and was felt 2/2 furosemide and PO intake  She was started on sodium tabs  In addition she was incidentally found to have an incarcerated/partially obstructed periumbilical ventral hernia and a septated R kidney cyst and aortic aneurysm - with recommendations for ultrasound/MRI for her R kidney cyst as an outpatient and outpatient f/u with CT surgery for her aneurysm  She is aware of the hernia and opted not to have surgery - Trauma recommended f/u in 6 weeks  She was initially denied by insurance, delaying her discharge, but after family appeal was approved  Admitted to [facility] Course:   Functional deficits: Left upper extremity weakness, impaired mobility, impaired self-care, impaired endurance, impaired strength  Weight Bearing Status:                                           Non weight bearing left upper extremity  Pendulum swings and elbow and wrist motion in the Salgado brace are all appropriate at this time  Patient participated in a comprehensive interdisciplinary inpatient rehabilitation program which included involvment of MD, therapies (PT, OT, and/or SLP), RN, CM, SW, dietary, and psychology services  She was able to be advanced to an overall supervision - Min A level of assist with mobility and Min-Mod A with self care  Family training with her daughter occurred hands on at [facility] multiple times with therapists and nursing  Patient to continue her rehabilitation with home care RN, PT, OT          Physical Therapy Occupational Therapy Speech Therapy   Weight Bearing Status: Non-weight bearing (LUE)  Transfers: Minimal Assistance, Incidental Touching, Supervision  Bed Mobility: Minimal Assistance  Amulation Distance (ft): 60 feet  Ambulation: Supervision  Assistive Device for Ambulation: Single Point Cane  Wheelchair Mobility Distance:  (due to body habitus, limited UE ROM/weakness unable to self propel w/c however pt will benefit from a w/c for community distance mobilties for still inconsistent with gait tolerance and family will propel w/c )  Assistive Device for Stairs:  (n/a - only has a small threshold at home)  Assistive Device for Ramp: Wheelchair  Discharge Recommendations: Home with:  [address] with[de-identified] 24 Hour Assisteance, 24 Hour Supervision, Family Support, First Floor Setup, Home Physical Therapy   Eating: Independent  Grooming: Supervision  Bathing: Moderate Assistance  Bathing: Moderate Assistance  Upper Body Dressing: Maximum Assistance  Lower Body Dressing: Minimal Assistance  Toileting: Minimal Assistance  Toilet Transfer: Incidental Touching  Cognition: Within Defined Limits  Orientation: Person, Place, Time, Situation                 * Humerus fracture  Assessment & Plan  2/2 mechanical fall  Proximal humerus shaft spiral fracture, displaced and angulated  Ortho attempted closed reduction  Ultimately placed in coaptation splint on 4/27 and managed non-operatively  NWB LUE  Pain Control as detailed below  No Vitamin D level in chart or unmerged chart recently   - At home was on daily Vitamin D supplement 1000 units - restarted   - 5/14 Vitamind D level is 50 7   Due for 2 week follow-up  Imaging: Personally reviewed  Redemonstration of oblique spiral fracture of the shaft of the humerus with apex medial angulation  No glenohumeral dislocation  Appreciate orthopedics evaluating patient 5/15/2023  Replaced her splint with a Salgado brace  Cleared by orthopedics to provide range of motion to elbow and wrist without restriction as tolerated    Pendulum swings and elbow and wrist motion in the Salgado brace are all appropriate at this time  Follow-up with orthopedics in 4 weeks around 6/15/2023    Ventricular arrhythmia  Assessment & Plan  On 5/2 developed Afib with RR which degenerated to VT then VF  She received shock from her AICD (Leon, Single Chamber ICD)  Seen by Cards who adjusted her device, and recommended restarting home Metoprolol and Amio load  Was also on digoxin  - Toprol dose adjusted and digoxin discontinued after developing junctional bradycardia later in stay  Here: Toprol XL 25mg daily, Amio 200mg daily starting tomorrow for 28 days  Monitor cardiac status during therapies   Monitor lytes  Keep K > 4, Mag > 2  Will plan for outpatient f/u with EP  Follows with Dr Gianna Palomares in [facility]      Difficult intravenous access  Assessment & Plan  Midline placed by IR on 5/15/23 - removed prior to discharge  Need for blood draws frequently to follow electrolytes, INR  Need for IV access given her recent ventricular arrhythmia and AICD firing, importance of optimizing electrolytes, current COVID 19 virus  Acute COVID-19  Assessment & Plan  Diagnosed on screening test on 5/13  Minimally symptomatic (does have a cough)  Afebrile and on RA  10 days isolation per protocol through 5/22/2023 (last day)  Off precautions 5/23/2023   -Stable and has not required oxygen   -Compliant with utilizing incentive spirometer    CHAS (obstructive sleep apnea)  Assessment & Plan  Does not use CPAP at home  Class 2 obesity in adult  Assessment & Plan  Nutrition consulted  Counseling    Osteoporosis with current pathological fracture with routine healing  Assessment & Plan  Managed by [facility]  History of L5 pathologic fracture  Has been on Alendronate once weekly since 2017    Would confirm with her what day she normally takes it prior to restarting  Will confirm that it is ok to restart with Orthopedics first, as some surgeons prefer to wait 6 weeks after acute fracture prior to restarting    - Per Dr Dinorah Freire ok to resume at anytime  - She normally takes it on Thursdays  Would complete through 6/2023  Per last Endocrine note in 6/2022, they were going to continue for 1 more year and then stop  She was due for DXA this May  Asthma  Assessment & Plan  No acute exacerbation  Home: Singulair 10mg QHS  Here: Same, PRN albuterol  No acute exacerbation  Monitor respiratory status    Stage III pressure ulcer of sacral region [facility])  Assessment & Plan  Present on admission  Much improved    1  Right and left abdominal fold pannus is resolved candidiasis rash   No open areas suggested to continue with the interdry sheets to keep the area dry and prevent skin breakdown   2  Bilateral groin and inner thigh candidiasis rash is resolved   3  Sacral wound - remains a unstageable due to the mixed wound bed of slough and pink tissue   Guillermina Cooks is 60% and 40% pink   Noted the wound bed to be improved and small in size   Reviewed the plan and shown the daughter on how to care for the area   4  Bilateral heels dry and intact   5  Right upper thigh area - hospital acquired DTI evolving linear line related to the mesh panties on the thigh area   Applied maxorb and a ABD to protect and then cut the elastic of the mesh panties to prevent from being tight   Discussed and reviewed the plan with the daughter      Skin care plans:  1-Hydraguard to bilateral  heels BID and PRN  2-Elevate heels to offload pressure  3-Ehob cushion in chair when out of bed  4-Moisturize skin daily with skin nourishing cream   5-Turn/reposition q2h or when medically stable for pressure re-distribution on skin  6  Low air loss mattress   7  Cleanse sacral buttocks with soap and water then apply triad paste then top with a small allevyn foam stephanie with a T for treatment change every other day and as needed for soilage or dislodgement    8  Cleanse bilateral abdominal pannus with soap and water then completely dry    Cut strips of the interdry and place flat in the skin folds of the abdominal area leaving 2 inches outside of the skin fold  Take interdry out and cleanse with with soap and water pat dry well and place back in the fold   Replace every 3 days or sooner if soiled   Do not use creams or powders on the skin with the interdry   9  Right upper thigh area - cleanse with soap and water then apply maxorb on open area then top with a ABD and secure with the mesh panties   Cut a slit in the panties to prevent from being tight on the thigh area change every other day or if soiled   10   Follow up at the wound center upon discharge - order is placed   Atrial fibrillation with RVR ([address] )  Assessment & Plan  Initially per AICD interrogation with Afib RVR that degenerated into VT/VF  Unclear burden  Here: Toprol XL 25mg daily, Fully anticoagulated on warfarin (See mechanical AV), amiodarone 200mg daily  Monitor and adjust as appropriate  Currently examines in NSR  IM consulted to assist with management    Outpatient f/u with EP/Cardiology     Abnormal CT scan  Assessment & Plan  - 4/27 CT CAP:           - Incidentally noted is a incarcerated/partially obstructed periumbilic ventral hernia, (image 146 series 9)          - Septated cyst anterior aspect of the right kidney, consider evaluation with ultrasound or MRI for further characterization (image 136 series 9)          - Dilated ascending aorta measuring 4 6 cm, evaluation with cardiothoracic surgery on nonemergent basis  - Follow up with PCP and CT surgery outpatient for findings  - Patient informed of abnormal results on 5/11: she is aware of ventral hernia but not aortic aneurysm or renal cyst  - Echocardiogram: Severe tricuspid regurgitation - recommend      Hyponatremia  Assessment & Plan  Resolved  Na = 138    Bradycardia  Assessment & Plan  Developed junctional bradycardia in hospital after restarting home Toprol XL and digoxin  - Cards stopped digoxin  - Toprol XL decreased to 25mg daily  Monitor  Type 2 diabetes mellitus ([address] )  Assessment & Plan  Home: Sitagliptin-Metformin 50-50mg  Here: Discontinue Levemir due to hypoglycemia, continue sliding scale insulin and diabetic diet  Diabetic Diet only  Nutrition Consult  Outpatient f/u with PCP  Fracture of multiple ribs of right side  Assessment & Plan  R 8th and 9th rib possible fractures  Rib fracture protocol  Pain control with lidoderm patch  Incentive Spirometer  Monitor respiratory status  Laceration of right eyebrow  Assessment & Plan  Healing, monitor  Steri-Strips in place    H/O mechanical aortic valve replacement  Assessment & Plan  Managed on Coumadin  Goal INR 2 5-3 5  Last INR = 2 57  Ok to resume home doing of Coumadin  IM consulted to assist with management of Coumadin dosing  Acute pain due to trauma  Assessment & Plan  R rib fractures, L humerus fracture  - Lidoderm patch for ribs  - Oxycodone 5-7 5mg PRN  - Robaxin 250mg Q6hr scheduled  - Gabapentin 100mg QHS (may be able to discontinue while here)  - Tylenol 975mg Q8hrs scheduled  APS assisted inpatient  Adjust as appropriate while here  Discharge Physical Examination:  Physical Exam  Vitals and nursing note reviewed  Constitutional:       General: She is not in acute distress  HENT:      Head: Normocephalic and atraumatic  Nose: Nose normal       Mouth/Throat:      Mouth: Mucous membranes are moist    Eyes:      Conjunctiva/sclera: Conjunctivae normal    Cardiovascular:      Rate and Rhythm: Normal rate and regular rhythm  Pulses: Normal pulses  Pulmonary:      Effort: Pulmonary effort is normal       Breath sounds: Normal breath sounds  No wheezing or rales  Abdominal:      General: Bowel sounds are normal  There is no distension  Palpations: Abdomen is soft  Tenderness: There is no abdominal tenderness  Musculoskeletal:         General: Swelling present  Cervical back: Neck supple        Comments: Sarimiento brace in place   Skin:     General: Skin is warm  Neurological:      Mental Status: She is alert and oriented to person, place, and time  Motor: Weakness present  Gait: Gait abnormal    Psychiatric:         Mood and Affect: Mood normal          Discharge Medications:   See after visit summary for reconciled discharge medications provided to patient and family  Condition at Discharge: stable     Discharge instructions/Information to patient and family:   See after visit summary for information provided to patient and family  Provisions for Follow-Up Care:  See after visit summary for information related to follow-up care and any pertinent home health orders  Future Appointments   Date Time Provider Guerda Rosario   6/5/2023 To Be Determined Brandon Ugalde OT VN  [facility]   6/20/2023 11:00 AM CRISSY Norris [facility]       Disposition: Home      Planned Readmission: No    Discharge Statement   I spent more than 30 minutes discharging the patient  This time was spent on the day of discharge  I had direct contact with the patient on the day of discharge  Greater than 50% of the total time was spent examining patient, answering all patient questions, arranging and discussing plan of care with patient as well as directly providing post-discharge instructions  Additional time then spent on discharge activities  Discharge Medications:  See after visit summary for reconciled discharge medications provided to patient and family

## 2023-06-02 ENCOUNTER — HOME CARE VISIT (OUTPATIENT)
Dept: HOME HEALTH SERVICES | Facility: HOME HEALTHCARE | Age: 78
End: 2023-06-02

## 2023-06-02 ENCOUNTER — TELEPHONE (OUTPATIENT)
Dept: OBGYN CLINIC | Facility: HOSPITAL | Age: 78
End: 2023-06-02

## 2023-06-02 NOTE — PHYSICAL THERAPY NOTE
[facility] PT Discharge Summary  Pt demonstrated good progress in PT presented to [facility] s/p fall with L humeral fracture, NWB precaution with Salgado Brace at all times  Noted impairments on eval include pain, strength and ROM deficits jodee on shoulders, standing static & dynamic balance impairments, gait dysfunction, dec endurance resulting to significant functional declined  Pt improved from requiring 1-2 person on eval to S-DS level  At d/c with household distance level surface mobilities using a cane while mod A for sit to supine and car transfer, min A for uneven surface mobility using cane due to body habitus, bilat UE/LE strength/ROM deficits with LE swelling  DME for w/c submitted to  and issued to the pt prior to d/c  Pt and family education and hands on family training with daughter Karel Ortez completed with good carry over of safety practices and proper techniques noted  Pt also instructed with verbal training and modelling for HEP with handout provided to promote maintenance of increased muscle strength and endurance upon return to home  Pt was d/c on 6/1/23 to home with family support and home PT services to address ongoing rehab needs jodee including improving dynamic balance, increase strength, improve activity tolerance and facilitate improvement of overall functional indep to reduce risk for falls and dec caregiver burden

## 2023-06-02 NOTE — TELEPHONE ENCOUNTER
Provided above earlier appt but patient is not able to get around to get out of the house on 6/5. She will call and let us know if her situation changes. She is working on getting out for 6/13 appt.

## 2023-06-02 NOTE — TELEPHONE ENCOUNTER
Caller: patient and daughter    Doctor: Shine Owen    Reason for call: patient removed the brace from her arm and its now too swollen to put back on, asking what to do.     Call back#: [phone]

## 2023-06-02 NOTE — TELEPHONE ENCOUNTER
Spoke to patient and she removed the brace for bathing. Swelling increased from back of arm at elbow to mid arm, unable to reapply brace. Advised if she is unable to get the swelling down with Ice 20 on 20 off, elbow wrist and hand and finger ROM, she will need to go to ER for evaluation and treatment of the cause of not being able to get her brace on due to swelling. Verbalized understanding. First Dr Stevphen Schilder appt 6/13.

## 2023-06-02 NOTE — TELEPHONE ENCOUNTER
Caller: self and daughter    Doctor: Justino Hanson    Reason for call: Patient is following up on previous message. Patient has not yet seen Dr Justino Hanson.  Triage nurse is aware of message and will call patient back    Call back#: [phone] Daughter

## 2023-06-03 ENCOUNTER — HOME CARE VISIT (OUTPATIENT)
Dept: HOME HEALTH SERVICES | Facility: HOME HEALTHCARE | Age: 78
End: 2023-06-03
Payer: COMMERCIAL

## 2023-06-03 VITALS
DIASTOLIC BLOOD PRESSURE: 78 MMHG | OXYGEN SATURATION: 98 % | HEIGHT: 58 IN | SYSTOLIC BLOOD PRESSURE: 125 MMHG | RESPIRATION RATE: 18 BRPM | TEMPERATURE: 97.7 F | HEART RATE: 63 BPM | BODY MASS INDEX: 42.82 KG/M2 | WEIGHT: 204 LBS

## 2023-06-03 PROCEDURE — G0299 HHS/HOSPICE OF RN EA 15 MIN: HCPCS

## 2023-06-03 PROCEDURE — 10330081 VN NO-PAY CLAIM PROCEDURE

## 2023-06-03 PROCEDURE — 400013 VN SOC

## 2023-06-04 NOTE — CASE COMMUNICATION
[facility] has admitted your patient to [address] [place] [address] service with the following disciplines:      SN, PT, OT and MSW  This report is informational only, no responses is needed  Primary focus of home health care- Musculoskeletal   Patient stated goals of care- improve mobility and sacral ulcer to heal    Anticipated visit pattern and next visit date- Next SN visit for 6/5 1w1 3w7  See medication list - meds in home differ from AVS  Si gnificant clinical findings- Left and right abdominal not present on admission  Medication Discrepancies: Pt will benefit from MSW for assistance with insurance coverage  Pt is using 4% lidocaine patches not listed on AVS     Furosamide is at family members house and will be getting after SN leaves  Pt is taking metamucil 5mg gummy daily  Potential barriers to goal achievement- fatigues easily  Thank you for allowing us to participa te in the care of your patient        [facility] RN

## 2023-06-05 ENCOUNTER — HOME CARE VISIT (OUTPATIENT)
Dept: HOME HEALTH SERVICES | Facility: HOME HEALTHCARE | Age: 78
End: 2023-06-05
Payer: COMMERCIAL

## 2023-06-05 VITALS — HEART RATE: 60 BPM | OXYGEN SATURATION: 98 % | SYSTOLIC BLOOD PRESSURE: 128 MMHG | DIASTOLIC BLOOD PRESSURE: 70 MMHG

## 2023-06-05 VITALS — DIASTOLIC BLOOD PRESSURE: 61 MMHG | SYSTOLIC BLOOD PRESSURE: 122 MMHG

## 2023-06-05 PROCEDURE — G0152 HHCP-SERV OF OT,EA 15 MIN: HCPCS

## 2023-06-05 PROCEDURE — G0151 HHCP-SERV OF PT,EA 15 MIN: HCPCS

## 2023-06-05 PROCEDURE — G0299 HHS/HOSPICE OF RN EA 15 MIN: HCPCS

## 2023-06-05 NOTE — CASE COMMUNICATION
Ship to Pt  Home     Branch: [id number] [facility] REP     Wound 1 sacral wound      Full    All items are ordered by the each unless otherwise noted  PLEASE DO NOT ORDER BY THE BOX  Request specific quantity needed  For Private Insurances order should be for a 2 week period      Cleansers  Francisco Collier [id number]   Gauze 2x2 N/S 200 2x2s per unit  [id number]   Hydrocellular Foam Adh, 4x4 [id number] - 16    Is there an option to order t darryl paste for patient?

## 2023-06-05 NOTE — PROGRESS NOTES
06/05/23 1553   Hello, [Guardian’s Name / Patient’s Keren Bryson, this is [Caller Keren Bryson from [facility], and our clinical care team wanted to check on you / your child after your recent visit to the hospital  It will only take 3-5 minutes  Is this a good time? Discharge Call Type/ Specific Diagnosis: General Call   General Discharge Phone Call   Were your/your child's discharge instructions clear and understandable? Please tell me in your own words how to care for yourself/your child now that you're home Yes;Patient understood instructions   Have you filled your/your child's new prescriptions yet? Yes   What questions do you have about those medications? No questions   Are you/your child having any unusual symptoms or problems? (Specific to problem- i e , dressing, pain, bruising or swelling, procedure, etc ) No reported symptoms/problems   Do you have follow up appointment with your/your child's physician? Yes   Is there anything preventing you from keeping that appointment? No;Patient able to keep appointment   Are there any physicians, nurses, or hospital staff you would like us to recognize for doing a very good job? Nurse;PCA/Tech;PT/OT/RT/SLP   Thank you for taking the time to share with me about your care and recovery  Do you have any suggestions for us? No   This call resulted in: No interventions needed   Call Complete   Attempted Number of Calls 1   Discharge phone call complete?  Complete

## 2023-06-06 ENCOUNTER — HOME CARE VISIT (OUTPATIENT)
Dept: HOME HEALTH SERVICES | Facility: HOME HEALTHCARE | Age: 78
End: 2023-06-06
Payer: COMMERCIAL

## 2023-06-06 VITALS
DIASTOLIC BLOOD PRESSURE: 68 MMHG | SYSTOLIC BLOOD PRESSURE: 122 MMHG | TEMPERATURE: 98.3 F | OXYGEN SATURATION: 97 % | RESPIRATION RATE: 18 BRPM | HEART RATE: 61 BPM

## 2023-06-06 PROCEDURE — G0155 HHCP-SVS OF CSW,EA 15 MIN: HCPCS

## 2023-06-06 NOTE — OCCUPATIONAL THERAPY NOTE
Occupational Therapy Discharge Summary    Pt has made progress during course of OT functioning at overall Min A for ADLs, Min A to supervision for fxnl xfers and fxnl mobility w/ SPC  DME including hospital bed, manual w/c, and drop-arm BSC ordered to maximize fxnl indep and dec fall risk  Pt's daughter participated in several family training sessions and ching PENA understanding of pt's current LOF, plan to temporarily move in w/ pt at d/c to A w/ ADLs/IADLs  From OT standpoint, pt is okay to d/c home w/ family support  Recommending HHOT to maximize fxnl indep, dec caregiver burden, and ease transition to home context  D/C IPOT      Arina Bai MS, OTR/L

## 2023-06-07 ENCOUNTER — HOME CARE VISIT (OUTPATIENT)
Dept: HOME HEALTH SERVICES | Facility: HOME HEALTHCARE | Age: 78
End: 2023-06-07
Payer: COMMERCIAL

## 2023-06-07 VITALS
SYSTOLIC BLOOD PRESSURE: 142 MMHG | OXYGEN SATURATION: 97 % | TEMPERATURE: 98 F | DIASTOLIC BLOOD PRESSURE: 88 MMHG | HEART RATE: 60 BPM | RESPIRATION RATE: 18 BRPM

## 2023-06-07 PROCEDURE — G0299 HHS/HOSPICE OF RN EA 15 MIN: HCPCS

## 2023-06-08 ENCOUNTER — HOME CARE VISIT (OUTPATIENT)
Dept: HOME HEALTH SERVICES | Facility: HOME HEALTHCARE | Age: 78
End: 2023-06-08
Payer: COMMERCIAL

## 2023-06-08 VITALS — DIASTOLIC BLOOD PRESSURE: 76 MMHG | OXYGEN SATURATION: 93 % | SYSTOLIC BLOOD PRESSURE: 124 MMHG | HEART RATE: 61 BPM

## 2023-06-08 PROCEDURE — G0152 HHCP-SERV OF OT,EA 15 MIN: HCPCS

## 2023-06-08 PROCEDURE — G0151 HHCP-SERV OF PT,EA 15 MIN: HCPCS

## 2023-06-09 ENCOUNTER — HOME CARE VISIT (OUTPATIENT)
Dept: HOME HEALTH SERVICES | Facility: HOME HEALTHCARE | Age: 78
End: 2023-06-09
Payer: COMMERCIAL

## 2023-06-09 VITALS
TEMPERATURE: 97.6 F | HEART RATE: 60 BPM | SYSTOLIC BLOOD PRESSURE: 148 MMHG | OXYGEN SATURATION: 98 % | RESPIRATION RATE: 18 BRPM | DIASTOLIC BLOOD PRESSURE: 92 MMHG

## 2023-06-09 PROCEDURE — G0299 HHS/HOSPICE OF RN EA 15 MIN: HCPCS

## 2023-06-12 ENCOUNTER — HOME CARE VISIT (OUTPATIENT)
Dept: HOME HEALTH SERVICES | Facility: HOME HEALTHCARE | Age: 78
End: 2023-06-12
Payer: COMMERCIAL

## 2023-06-12 VITALS
TEMPERATURE: 97.8 F | DIASTOLIC BLOOD PRESSURE: 80 MMHG | SYSTOLIC BLOOD PRESSURE: 148 MMHG | HEART RATE: 70 BPM | OXYGEN SATURATION: 98 % | RESPIRATION RATE: 18 BRPM

## 2023-06-12 PROCEDURE — G0299 HHS/HOSPICE OF RN EA 15 MIN: HCPCS

## 2023-06-12 PROCEDURE — G0180 MD CERTIFICATION HHA PATIENT: HCPCS | Performed by: PHYSICAL MEDICINE & REHABILITATION

## 2023-06-13 ENCOUNTER — OFFICE VISIT (OUTPATIENT)
Dept: OBGYN CLINIC | Facility: HOSPITAL | Age: 78
End: 2023-06-13
Payer: COMMERCIAL

## 2023-06-13 VITALS
HEIGHT: 58 IN | BODY MASS INDEX: 42.82 KG/M2 | HEART RATE: 60 BPM | SYSTOLIC BLOOD PRESSURE: 123 MMHG | DIASTOLIC BLOOD PRESSURE: 78 MMHG | WEIGHT: 204 LBS

## 2023-06-13 DIAGNOSIS — M79.602 LEFT ARM PAIN: ICD-10-CM

## 2023-06-13 DIAGNOSIS — S42.292D OTHER CLOSED DISPLACED FRACTURE OF PROXIMAL END OF LEFT HUMERUS WITH ROUTINE HEALING, SUBSEQUENT ENCOUNTER: Primary | ICD-10-CM

## 2023-06-13 PROBLEM — S42.332D: Status: ACTIVE | Noted: 2023-04-27

## 2023-06-13 LAB
DME PARACHUTE DELIVERY DATE ACTUAL: NORMAL
DME PARACHUTE DELIVERY DATE REQUESTED: NORMAL
DME PARACHUTE DELIVERY NOTE: NORMAL
DME PARACHUTE ITEM DESCRIPTION: NORMAL
DME PARACHUTE ORDER STATUS: NORMAL
DME PARACHUTE SUPPLIER NAME: NORMAL
DME PARACHUTE SUPPLIER PHONE: NORMAL

## 2023-06-13 PROCEDURE — 99213 OFFICE O/P EST LOW 20 MIN: CPT | Performed by: ORTHOPAEDIC SURGERY

## 2023-06-13 RX ORDER — OXYCODONE HYDROCHLORIDE 5 MG/1
5 TABLET ORAL DAILY
Qty: 20 TABLET | Refills: 0 | Status: SHIPPED | OUTPATIENT
Start: 2023-06-13

## 2023-06-13 RX ORDER — BLOOD-GLUCOSE METER
EACH MISCELLANEOUS
COMMUNITY
Start: 2023-06-02

## 2023-06-13 NOTE — PROGRESS NOTES
"Assessment/Plan:    Closed displaced oblique fracture of shaft of left humerus with routine healing  Were going to allow her to discontinue her Salgado brace if she is comfortable  She can use a sling as needed  We will keep her in physical therapy  We will see her back with repeat films  Diagnoses and all orders for this visit:    Other closed displaced fracture of proximal end of left humerus with routine healing, subsequent encounter  -     oxyCODONE (ROXICODONE) 5 immediate release tablet; Take 1 tablet (5 mg total) by mouth in the morning Max Daily Amount: 5 mg  -     Ambulatory Referral to Physical Therapy; Future    Left arm pain  -     XR humerus left; Future    Other orders  -     Blood Glucose Monitoring Suppl (OneTouch Verio Flex System) w/Device KIT; Use as directed          Subjective:      Patient ID: Humaira Jeff is a 68 y o  female  This is a 26-year-old woman who sustained a left proximal third humeral fracture on 27 April 2023  She now follows up  She is doing okay  She does not like her Salgado brace  The following portions of the patient's history were reviewed and updated as appropriate: allergies, current medications, past family history, past medical history, past social history, past surgical history, and problem list     Review of Systems      Objective:      /78   Pulse 60   Ht 4' 10\" (1 473 m)   Wt 92 5 kg (204 lb)   BMI 42 64 kg/m²          Physical Exam      On physical examination she is intact as far as her radial median and ulnar nerves are concerned  Her alignment looks okay  Skin is intact  There is no sign of any issues  Review of radiographs demonstrates interval callus formation although she is in valgus alignment    "

## 2023-06-13 NOTE — ASSESSMENT & PLAN NOTE
Were going to allow her to discontinue her Salgado brace if she is comfortable  She can use a sling as needed  We will keep her in physical therapy  We will see her back with repeat films

## 2023-06-14 ENCOUNTER — HOME CARE VISIT (OUTPATIENT)
Dept: HOME HEALTH SERVICES | Facility: HOME HEALTHCARE | Age: 78
End: 2023-06-14
Payer: COMMERCIAL

## 2023-06-14 VITALS
SYSTOLIC BLOOD PRESSURE: 102 MMHG | DIASTOLIC BLOOD PRESSURE: 72 MMHG | OXYGEN SATURATION: 92 % | TEMPERATURE: 97.9 F | RESPIRATION RATE: 18 BRPM | HEART RATE: 60 BPM

## 2023-06-14 PROCEDURE — G0299 HHS/HOSPICE OF RN EA 15 MIN: HCPCS

## 2023-06-14 PROCEDURE — G0152 HHCP-SERV OF OT,EA 15 MIN: HCPCS

## 2023-06-15 ENCOUNTER — HOME CARE VISIT (OUTPATIENT)
Dept: HOME HEALTH SERVICES | Facility: HOME HEALTHCARE | Age: 78
End: 2023-06-15
Payer: COMMERCIAL

## 2023-06-15 VITALS — OXYGEN SATURATION: 98 % | SYSTOLIC BLOOD PRESSURE: 134 MMHG | HEART RATE: 56 BPM | DIASTOLIC BLOOD PRESSURE: 78 MMHG

## 2023-06-15 PROCEDURE — G0151 HHCP-SERV OF PT,EA 15 MIN: HCPCS

## 2023-06-16 ENCOUNTER — TELEPHONE (OUTPATIENT)
Dept: NEUROLOGY | Facility: CLINIC | Age: 78
End: 2023-06-16

## 2023-06-16 ENCOUNTER — HOME CARE VISIT (OUTPATIENT)
Dept: HOME HEALTH SERVICES | Facility: HOME HEALTHCARE | Age: 78
End: 2023-06-16
Payer: COMMERCIAL

## 2023-06-16 VITALS — HEART RATE: 80 BPM | SYSTOLIC BLOOD PRESSURE: 120 MMHG | OXYGEN SATURATION: 98 % | DIASTOLIC BLOOD PRESSURE: 80 MMHG

## 2023-06-16 VITALS
DIASTOLIC BLOOD PRESSURE: 78 MMHG | OXYGEN SATURATION: 98 % | RESPIRATION RATE: 18 BRPM | TEMPERATURE: 97.6 F | HEART RATE: 86 BPM | SYSTOLIC BLOOD PRESSURE: 142 MMHG

## 2023-06-16 PROCEDURE — G0152 HHCP-SERV OF OT,EA 15 MIN: HCPCS

## 2023-06-16 PROCEDURE — G0299 HHS/HOSPICE OF RN EA 15 MIN: HCPCS

## 2023-06-19 ENCOUNTER — HOME CARE VISIT (OUTPATIENT)
Dept: HOME HEALTH SERVICES | Facility: HOME HEALTHCARE | Age: 78
End: 2023-06-19
Payer: COMMERCIAL

## 2023-06-19 VITALS
DIASTOLIC BLOOD PRESSURE: 84 MMHG | OXYGEN SATURATION: 100 % | TEMPERATURE: 97.7 F | SYSTOLIC BLOOD PRESSURE: 136 MMHG | RESPIRATION RATE: 18 BRPM | HEART RATE: 60 BPM

## 2023-06-19 VITALS — DIASTOLIC BLOOD PRESSURE: 80 MMHG | HEART RATE: 67 BPM | OXYGEN SATURATION: 92 % | SYSTOLIC BLOOD PRESSURE: 120 MMHG

## 2023-06-19 PROCEDURE — G0299 HHS/HOSPICE OF RN EA 15 MIN: HCPCS

## 2023-06-19 PROCEDURE — G0151 HHCP-SERV OF PT,EA 15 MIN: HCPCS

## 2023-06-19 NOTE — PROGRESS NOTES
Electrophysiology Follow Up    [address]  [address] [address]  : 1945  MRN: [id number]    Date of Visit: 2023       Assessment/Plan     1  Atrial fibrillation with RVR (HCC)  POCT ECG    Comprehensive metabolic panel    TSH, 3rd generation with Free T4 reflex    Spirometry with diffusing capacity    Echo complete w/ contrast if indicated      2  Fall  amiodarone 200 mg tablet      3  On amiodarone therapy  Comprehensive metabolic panel    TSH, 3rd generation with Free T4 reflex    Spirometry with diffusing capacity      4  Ventricular arrhythmia  Echo complete w/ contrast if indicated      5  H/O mechanical aortic valve replacement  Echo complete w/ contrast if indicated          ASSESSMENT:  1  Recent inappropriate ICD shock for atrial fibrillation with rapid ventricular response 2023, started on amiodarone antiarrhythmic therapy at that time   A )  Per device interrogation heart rates in the 190s at time of shock   B )  Previously on diltiazem and metoprolol for rate control, diltiazem discontinued and Toprol-XL dose lowered when started on amiodarone   C )  Occurred following mechanical fall with humeral fracture and multiple rib fractures  2  Paroxysmal versus persistent atrial fibrillation with both slow and rapid ventricular response   A )  Unclear overall burden given single chamber device and limited prior records   B )  Device recently reprogrammed to VVIR  with significant symptomatic improvement (intrinsic rates typically in the 40s)   C )  Maintained on Coumadin anticoagulation  3  History of VT/VF with [facility] [employer] single-chamber ICD in situ (implanted in the abdomen)   A )  Patient reported difficulty with subclavian access   B )  Initially placed in  with generator change in   4  Chronic HFpEF with LVEF 60% per echo 2023  5    Aortic stenosis status post mechanical AVR in 2011, maintained on Coumadin  6  Hypertension  7  Diabetes mellitus type 2  8  Asthma  9  Recent mechanical fall 5/2023with left proximal humerus fracture and multiple right-sided rib fractures, managed nonoperatively  10  Obesity with BMI 42      DISCUSSION/SUMMARY:  EKG today shows normal sinus rhythm with prolonged NM interval   She is currently programmed to VVIR 60, and there is occasional competing asynchronous ventricular pacing noted  Her device was interrogated today, which showed no recurrent arrhythmias since her hospitalization  She is ventricularly paced 71% at the time  She feels much better with these changes, however I explained that this kind of pacing could lead to a cardiomyopathy  I have thus recommended that she undergo repeat echocardiogram in 3 months for reevaluation of her LVEF  Ideally, she would benefit from a dual-chamber ICD with atrial pacing, however her history is unclear  She has an abdominal ICD currently, and was told that she had difficult subclavian access  She is feeling much better currently and would like to avoid invasive procedures, thus for now we will not make any changes and simply follow her echocardiogram moving forward  As previously discussed in the hospital, she is going to continue her general cardiology care with Dr Katey Brunner, which includes follow-up of her tricuspid regurgitation and aortic aneurysm  This was explained to the patient and her family  Dr Yoselyn Lubin and our clinic will continue to manage her arrhythmias and monitor her device moving forward  She established with our device clinic today  For close amiodarone monitoring, I have ordered repeat PFTs (previously performed at [facility] in 2021) as well as repeat TSH and CMP  I explained the long-term monitoring necessary with amiodarone, including the importance of yearly eye exams, lung testing, and routine labs    She is currently taking 200 mg daily, if she is still able to maintain normal rhythm this can potentially be decreased to 100 mg daily moving forward  She is maintained on Coumadin anticoagulation given her mechanical AVR, with most recent INR within range  I have asked her to follow-up again in our EP clinic in 6 months for ongoing arrhythmia and amiodarone monitoring  We will continue to monitor her device through routine interrogations, and I will await the results of her labs and echocardiogram   She knows to contact us in the meantime with any questions, concerns, or changes in symptoms  History of Present Illness     CHIEF COMPLAINT: Post hospital follow-up    HPI/INTERVAL HISTORY: Ovidio Loya is a 68 y o  female with history of VT/VF with [employer] single-chamber abdominal ICD in situ, paroxysmal versus persistent atrial fibrillation maintained on Coumadin, chronic HFpEF, prior mechanical AVR, hypertension, diabetes, asthma, and obesity with BMI 42  She typically follows at the [address] [place] [address] Drive  She presented to the hospital 4/2023 following a mechanical fall  She was found to have a left proximal humeral fracture as well as multiple right-sided rib fractures  She was seen by orthopedics, and conservative, nonoperative management was recommended  During her admission, a rapid response was called for tachycardia and ICD shock  Upon review of her history, she has an abdominal single-chamber [address], initially implanted in 1997 for history of VT/VF with a generator change in 2011  Device interrogation showed that it was actually atrial flutter/fibrillation with rapid ventricular response, and she received an inappropriate shock  She was thus started on amiodarone antiarrhythmic therapy, and her diltiazem was discontinued  She was then found to have slow atrial fibrillation versus junctional bradycardia with rates in the 40s, with which she was symptomatic    Her metoprolol was lowered and her device was reprogrammed to VVIR , with which she felt remarkably better  She was discharged on an oral amiodarone taper, and is being seen today in post hospital follow-up  She is feeling much better since hospital discharge, and attributes her improvement with her device reprogramming (changing her lower rate to 60)  She denies chest pain or pressure, worsening shortness of breath, dizziness, lightheadedness, or worsening edema  She is accompanied by her family, who also reports that she is doing very well since hospital discharge  They are very pleased with her care at [facility], and offers no significant complaints today  They have concerns regarding her tricuspid regurgitation and aortic aneurysm noted during her recent hospital stay, however I assured them that this can be addressed by general cardiology (Dr Emmie Perkins)  Review of Systems   Constitutional: Positive for fatigue  Negative for fever  Respiratory: Negative for chest tightness and shortness of breath  Cardiovascular: Negative for chest pain, palpitations and leg swelling  Neurological: Negative for dizziness, syncope and light-headedness  All other systems reviewed and are negative  ROS as noted above, otherwise 12 point review of systems was performed and is negative  Historical Information  - I have personally reviewed and updated this information, including social history, medical history, and family history    Social History     Socioeconomic History   • Marital status:      Spouse name: Not on file   • Number of children: Not on file   • Years of education: Not on file   • Highest education level: Not on file   Occupational History   • Not on file   Tobacco Use   • Smoking status: Never   • Smokeless tobacco: Never   Vaping Use   • Vaping Use: Never used   Substance and Sexual Activity   • Alcohol use: Yes     Comment: socially   • Drug use: Never   • Sexual activity: Not on file   Other Topics Concern   • Not on file Social History Narrative    ** Merged History Encounter **          Social Determinants of Health     Financial Resource Strain: Not on file   Food Insecurity: No Food Insecurity (4/28/2023)    Hunger Vital Sign    • Worried About Running Out of Food in the Last Year: Never true    • Ran Out of Food in the Last Year: Never true   Transportation Needs: No Transportation Needs (4/28/2023)    PRAPARE - Transportation    • Lack of Transportation (Medical): No    • Lack of Transportation (Non-Medical):  No   Physical Activity: Not on file   Stress: Not on file   Social Connections: Not on file   Intimate Partner Violence: Not on file   Housing Stability: Low Risk  (4/28/2023)    Housing Stability Vital Sign    • Unable to Pay for Housing in the Last Year: No    • Number of Places Lived in the Last Year: 1    • Unstable Housing in the Last Year: No     Past Medical History:   Diagnosis Date   • Arthritis    • Asthma    • Diabetes ([facility] [address] )    • Environmental allergies    • Heart murmur    • Hypertension      Past Surgical History:   Procedure Laterality Date   • MECHANICAL AORTIC VALVE REPLACEMENT     • REPLACEMENT TOTAL KNEE N/A    • REPLACEMENT TOTAL KNEE       Social History     Substance and Sexual Activity   Alcohol Use Yes    Comment: socially     Social History     Substance and Sexual Activity   Drug Use Never     Social History     Tobacco Use   Smoking Status Never   Smokeless Tobacco Never     Family History   Problem Relation Age of Onset   • Hypertension Other    • Arthritis Family    • Heart disease Family    • Hypertension Family    • Diabetes type II Family        Meds/Allergies       Current Outpatient Medications:   •  acetaminophen (TYLENOL) 325 mg tablet, Take 2 tablets (650 mg total) by mouth every 6 (six) hours as needed for mild pain, Disp: 63 tablet, Rfl: 0  •  alendronate (FOSAMAX) 70 mg tablet, , Disp: , Rfl:   •  alendronate (FOSAMAX) 70 mg tablet, Take 70 mg by mouth Once a week, Disp: , Rfl:   • alendronate (FOSAMAX) 70 mg tablet, Take 70 mg by mouth every 7 days, Disp: , Rfl:   •  allopurinol (ZYLOPRIM) 100 mg tablet, Take 100 mg by mouth daily, Disp: , Rfl:   •  allopurinol (ZYLOPRIM) 100 mg tablet, Take 100 mg by mouth daily, Disp: , Rfl:   •  amiodarone 200 mg tablet, Take 1 tablet (200 mg total) by mouth daily with breakfast, Disp: 90 tablet, Rfl: 2  •  atorvastatin (LIPITOR) 40 mg tablet, , Disp: , Rfl:   •  atorvastatin (LIPITOR) 40 mg tablet, Take 40 mg by mouth daily, Disp: , Rfl:   •  Blood Glucose Monitoring Suppl (OneTouch Verio Flex System) w/Device KIT, Use as directed, Disp: , Rfl:   •  cholecalciferol (VITAMIN D3) 1,000 units tablet, Take 1 tablet (1,000 Units total) by mouth daily Do not start before June 2, 2023 , Disp: 30 tablet, Rfl: 0  •  Cholecalciferol 25 MCG (1000 UT) capsule, Take 1,000 Units by mouth daily, Disp: , Rfl:   •  citalopram (CeleXA) 20 mg tablet, TAKE 1 AND 1/2 TABLETS BY MOUTH EVERY MORNING, Disp: , Rfl:   •  citalopram (CeleXA) 20 mg tablet, Take 20 mg by mouth daily, Disp: , Rfl:   •  diltiazem (CARDIZEM CD) 120 mg 24 hr capsule, , Disp: , Rfl:   •  diltiazem (TIAZAC) 120 MG 24 hr capsule, Cartia  mg capsule,extended release, Disp: , Rfl:   •  docusate sodium (COLACE) 100 mg capsule, Take 1 capsule (100 mg total) by mouth 2 (two) times a day, Disp: 60 capsule, Rfl: 0  •  furosemide (LASIX) 20 mg tablet, , Disp: , Rfl:   •  furosemide (LASIX) 20 mg tablet, Take 40 mg by mouth daily, Disp: , Rfl:   •  gabapentin (NEURONTIN) 100 mg capsule, Take 1 capsule (100 mg total) by mouth daily at bedtime, Disp: 30 capsule, Rfl: 0  •  Janumet  MG per tablet, , Disp: , Rfl:   •  lisinopril (ZESTRIL) 5 mg tablet, Take 5 mg by mouth daily, Disp: , Rfl:   •  magnesium 30 MG tablet, Take 64 mg by mouth 3 (three) times a day Two pills in the AM, one at lunch, and two with dinner, Disp: , Rfl:   •  magnesium chloride (MAG64) 64 MG TBEC EC tablet, Take 128 mg by mouth, Disp: ", Rfl:   •  Metamucil Fiber CHEW, Chew 3 gums in the morning  Indications: fiber supplement, Disp: , Rfl:   •  methocarbamol (ROBAXIN) 500 mg tablet, Take 0 5 tablets (250 mg total) by mouth 3 (three) times a day as needed for muscle spasms, Disp: 45 tablet, Rfl: 0  •  metoprolol succinate (TOPROL-XL) 25 mg 24 hr tablet, Take 1 tablet (25 mg total) by mouth daily Do not start before June 2, 2023 , Disp: 30 tablet, Rfl: 0  •  metoprolol succinate (TOPROL-XL) 50 mg 24 hr tablet, , Disp: , Rfl:   •  montelukast (SINGULAIR) 10 mg tablet, , Disp: , Rfl:   •  montelukast (SINGULAIR) 10 mg tablet, Take 10 mg by mouth daily at bedtime, Disp: , Rfl:   •  multivitamin (THERAGRAN) TABS, Take 1 tablet by mouth daily, Disp: , Rfl:   •  oxybutynin (DITROPAN-XL) 5 mg 24 hr tablet, , Disp: , Rfl:   •  oxybutynin (DITROPAN-XL) 5 mg 24 hr tablet, Take 5 mg by mouth daily, Disp: , Rfl:   •  oxyCODONE (ROXICODONE) 5 immediate release tablet, Take 1 tablet (5 mg total) by mouth in the morning Max Daily Amount: 5 mg, Disp: 20 tablet, Rfl: 0  •  warfarin (COUMADIN) 5 mg tablet, Take 5 mg by mouth daily, Disp: , Rfl:   •  warfarin (COUMADIN) 5 mg tablet, Take 5 mg by mouth daily 2 5mg on Wed and Sat, 5mg all other days, Disp: , Rfl:     Allergies   Allergen Reactions   • Fentanyl Other (See Comments)     Other reaction(s): Hallucinations  paranoia, confusion  Other reaction(s): Hallucinations  Other reaction(s): Hallucinations  hallucinations   • Fentanyl Confusion       Objective   Vitals: Blood pressure 126/84, pulse 64, height 4' 10\" (1 473 m)          Physical Exam  GEN: NAD, alert and oriented x 3, well appearing  SKIN: dry without significant lesions or rashes  HEENT: NCAT, PERRL, EOMs intact  NECK: No JVD appreciated  CARDIOVASCULAR: RRR, normal S1 with metallic S2 click, no significant murmurs, rubs, or gallops appreciated  LUNGS: Clear to auscultation bilaterally without wheezes, rhonchi, or rales  ABDOMEN: Soft, nontender, " nondistended  EXTREMITIES/VASCULAR: perfused without clubbing, cyanosis; mild LE edema b/l  PSYCH: Normal mood and affect  NEURO: CN ll-Xll grossly intact        Labs:  No results displayed because visit has over 200 results  No results displayed because visit has over 200 results  Imaging: I have personally reviewed pertinent reports  ECHO: No results found for this or any previous visit  Results for orders placed during the hospital encounter of 04/27/23    Echo complete w/ contrast if indicated    Interpretation Summary  •  Left Ventricle: Left ventricular cavity size is normal  Wall thickness is mildly increased  The left ventricular ejection fraction is 60%  Systolic function is normal  Wall motion is normal  Diastolic function is abnormal   •  Right Ventricle: Right ventricular cavity size is severely dilated  Systolic function is moderately reduced  •  Left Atrium: The atrium is moderately dilated  •  Right Atrium: The atrium is moderately dilated  •  Aortic Valve: There is a mechanical valve  There is mild regurgitation  •  Mitral Valve: There is mild regurgitation  •  Tricuspid Valve: There is severe regurgitation  The estimated right ventricular systolic pressure is 97 76 mmHg  •  Aorta: There is an aneurysm in the ascending aorta   4 8cm        CATH/STRESS TEST: no recent studies noted      PULMONARY FUNCTION TESTING 12/2021:            EKG: Normal sinus rhythm with prolonged GA interval, heart rate 64 bpm, incomplete right bundle branch block, nonspecific T wave inversions noted, rhythm strip shows competing asynchronous ventricular pacing at 60 bpm at times

## 2023-06-20 ENCOUNTER — OFFICE VISIT (OUTPATIENT)
Dept: CARDIOLOGY CLINIC | Facility: CLINIC | Age: 78
End: 2023-06-20
Payer: COMMERCIAL

## 2023-06-20 ENCOUNTER — HOME CARE VISIT (OUTPATIENT)
Dept: HOME HEALTH SERVICES | Facility: HOME HEALTHCARE | Age: 78
End: 2023-06-20
Payer: COMMERCIAL

## 2023-06-20 ENCOUNTER — IN-CLINIC DEVICE VISIT (OUTPATIENT)
Dept: CARDIOLOGY CLINIC | Facility: CLINIC | Age: 78
End: 2023-06-20
Payer: COMMERCIAL

## 2023-06-20 VITALS
BODY MASS INDEX: 42.64 KG/M2 | HEIGHT: 58 IN | DIASTOLIC BLOOD PRESSURE: 84 MMHG | HEART RATE: 64 BPM | SYSTOLIC BLOOD PRESSURE: 126 MMHG

## 2023-06-20 DIAGNOSIS — Z95.810 AICD (AUTOMATIC CARDIOVERTER/DEFIBRILLATOR) PRESENT: Primary | ICD-10-CM

## 2023-06-20 DIAGNOSIS — Z95.2 H/O MECHANICAL AORTIC VALVE REPLACEMENT: ICD-10-CM

## 2023-06-20 DIAGNOSIS — Z79.899 ON AMIODARONE THERAPY: ICD-10-CM
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
## 2023-06-20 DIAGNOSIS — I49.9 VENTRICULAR ARRHYTHMIA: ICD-10-CM

## 2023-06-20 DIAGNOSIS — I48.91 ATRIAL FIBRILLATION WITH RVR (HCC): Primary | ICD-10-CM

## 2023-06-20 PROCEDURE — 93282 PRGRMG EVAL IMPLANTABLE DFB: CPT | Performed by: INTERNAL MEDICINE

## 2023-06-20 PROCEDURE — 99214 OFFICE O/P EST MOD 30 MIN: CPT | Performed by: PHYSICIAN ASSISTANT

## 2023-06-20 PROCEDURE — 93000 ELECTROCARDIOGRAM COMPLETE: CPT | Performed by: PHYSICIAN ASSISTANT

## 2023-06-20 RX ORDER — AMIODARONE HYDROCHLORIDE 200 MG/1
200 TABLET ORAL
Qty: 90 TABLET | Refills: 2 | Status: SHIPPED | OUTPATIENT
Start: 2023-06-20

## 2023-06-20 NOTE — PROGRESS NOTES
Results for orders placed or performed in visit on 06/20/23   Cardiac EP device report    Narrative    [employer] SINGLE ICD (ABD PLACEMENT)/ NOT MRI CONDITIONAL  [facility], PA-C APPT  PT NEW TO DEVICE CLINIC  BATTERY VOLTAGE ADEQUATE (1 5 YRS)  -71% (>40% [email or web address])  SMALL RWAVES-1 1-1 4MV  ALL OTHER LEAD PARAMETERS WITHIN NORMAL LIMITS  NO NEW SIGNIFICANT HIGH RATE EPISODES [facility]  INCREASED SENSITIVITY FROM 0 6MV TO 0 4MV  NORMAL DEVICE FUNCTION   GV

## 2023-06-21 ENCOUNTER — HOME CARE VISIT (OUTPATIENT)
Dept: HOME HEALTH SERVICES | Facility: HOME HEALTHCARE | Age: 78
End: 2023-06-21
Payer: COMMERCIAL

## 2023-06-21 VITALS — OXYGEN SATURATION: 97 % | HEART RATE: 58 BPM | SYSTOLIC BLOOD PRESSURE: 130 MMHG | DIASTOLIC BLOOD PRESSURE: 82 MMHG

## 2023-06-21 VITALS
SYSTOLIC BLOOD PRESSURE: 108 MMHG | DIASTOLIC BLOOD PRESSURE: 72 MMHG | RESPIRATION RATE: 18 BRPM | OXYGEN SATURATION: 98 % | TEMPERATURE: 97.9 F | HEART RATE: 60 BPM

## 2023-06-21 PROCEDURE — G0152 HHCP-SERV OF OT,EA 15 MIN: HCPCS

## 2023-06-21 PROCEDURE — G0299 HHS/HOSPICE OF RN EA 15 MIN: HCPCS

## 2023-06-21 PROCEDURE — G0151 HHCP-SERV OF PT,EA 15 MIN: HCPCS

## 2023-06-22 ENCOUNTER — TELEPHONE (OUTPATIENT)
Dept: LAB | Facility: HOSPITAL | Age: 78
End: 2023-06-22

## 2023-06-22 ENCOUNTER — HOME CARE VISIT (OUTPATIENT)
Dept: HOME HEALTH SERVICES | Facility: HOME HEALTHCARE | Age: 78
End: 2023-06-22
Payer: COMMERCIAL

## 2023-06-22 VITALS — SYSTOLIC BLOOD PRESSURE: 140 MMHG | OXYGEN SATURATION: 92 % | DIASTOLIC BLOOD PRESSURE: 80 MMHG | HEART RATE: 63 BPM

## 2023-06-22 PROCEDURE — G0152 HHCP-SERV OF OT,EA 15 MIN: HCPCS

## 2023-06-23 ENCOUNTER — HOME CARE VISIT (OUTPATIENT)
Dept: HOME HEALTH SERVICES | Facility: HOME HEALTHCARE | Age: 78
End: 2023-06-23
Payer: COMMERCIAL

## 2023-06-23 VITALS
RESPIRATION RATE: 18 BRPM | OXYGEN SATURATION: 98 % | HEART RATE: 64 BPM | DIASTOLIC BLOOD PRESSURE: 80 MMHG | TEMPERATURE: 97.9 F | SYSTOLIC BLOOD PRESSURE: 140 MMHG

## 2023-06-23 PROCEDURE — G0299 HHS/HOSPICE OF RN EA 15 MIN: HCPCS

## 2023-06-26 ENCOUNTER — HOME CARE VISIT (OUTPATIENT)
Dept: HOME HEALTH SERVICES | Facility: HOME HEALTHCARE | Age: 78
End: 2023-06-26
Payer: COMMERCIAL

## 2023-06-26 VITALS
TEMPERATURE: 97.7 F | HEART RATE: 64 BPM | OXYGEN SATURATION: 99 % | SYSTOLIC BLOOD PRESSURE: 136 MMHG | DIASTOLIC BLOOD PRESSURE: 80 MMHG | RESPIRATION RATE: 18 BRPM

## 2023-06-26 VITALS — DIASTOLIC BLOOD PRESSURE: 70 MMHG | SYSTOLIC BLOOD PRESSURE: 120 MMHG

## 2023-06-26 PROCEDURE — G0152 HHCP-SERV OF OT,EA 15 MIN: HCPCS

## 2023-06-26 PROCEDURE — G0299 HHS/HOSPICE OF RN EA 15 MIN: HCPCS

## 2023-06-29 ENCOUNTER — HOME CARE VISIT (OUTPATIENT)
Dept: HOME HEALTH SERVICES | Facility: HOME HEALTHCARE | Age: 78
End: 2023-06-29
Payer: COMMERCIAL

## 2023-06-29 VITALS
OXYGEN SATURATION: 94 % | HEART RATE: 72 BPM | SYSTOLIC BLOOD PRESSURE: 126 MMHG | DIASTOLIC BLOOD PRESSURE: 80 MMHG | RESPIRATION RATE: 18 BRPM | TEMPERATURE: 98.2 F

## 2023-06-29 VITALS — HEART RATE: 78 BPM | SYSTOLIC BLOOD PRESSURE: 128 MMHG | DIASTOLIC BLOOD PRESSURE: 80 MMHG | OXYGEN SATURATION: 99 %

## 2023-06-29 PROCEDURE — G0299 HHS/HOSPICE OF RN EA 15 MIN: HCPCS

## 2023-06-29 PROCEDURE — G0152 HHCP-SERV OF OT,EA 15 MIN: HCPCS

## 2023-06-29 PROCEDURE — G0151 HHCP-SERV OF PT,EA 15 MIN: HCPCS

## 2023-06-30 ENCOUNTER — HOME CARE VISIT (OUTPATIENT)
Dept: HOME HEALTH SERVICES | Facility: HOME HEALTHCARE | Age: 78
End: 2023-06-30
Payer: COMMERCIAL

## 2023-06-30 ENCOUNTER — APPOINTMENT (OUTPATIENT)
Dept: LAB | Facility: HOSPITAL | Age: 78
End: 2023-06-30
Payer: COMMERCIAL

## 2023-06-30 VITALS — SYSTOLIC BLOOD PRESSURE: 126 MMHG | OXYGEN SATURATION: 94 % | HEART RATE: 72 BPM | DIASTOLIC BLOOD PRESSURE: 80 MMHG

## 2023-06-30 VITALS — HEART RATE: 61 BPM | DIASTOLIC BLOOD PRESSURE: 72 MMHG | OXYGEN SATURATION: 99 % | SYSTOLIC BLOOD PRESSURE: 130 MMHG

## 2023-06-30 DIAGNOSIS — I50.9 HEART FAILURE, UNSPECIFIED HF CHRONICITY, UNSPECIFIED HEART FAILURE TYPE (HCC): ICD-10-CM

## 2023-06-30 DIAGNOSIS — D50.0 IRON DEFICIENCY ANEMIA SECONDARY TO BLOOD LOSS (CHRONIC): ICD-10-CM

## 2023-06-30 DIAGNOSIS — I51.9 MYXEDEMA HEART DISEASE: ICD-10-CM

## 2023-06-30 DIAGNOSIS — E03.9 MYXEDEMA HEART DISEASE: ICD-10-CM

## 2023-06-30 DIAGNOSIS — I10 ESSENTIAL HYPERTENSION, MALIGNANT: Primary | ICD-10-CM

## 2023-06-30 DIAGNOSIS — E78.5 HYPERLIPIDEMIA, UNSPECIFIED HYPERLIPIDEMIA TYPE: ICD-10-CM

## 2023-06-30 DIAGNOSIS — E13.69 OTHER SPECIFIED DIABETES MELLITUS WITH OTHER SPECIFIED COMPLICATION, UNSPECIFIED WHETHER LONG TERM INSULIN USE (HCC): ICD-10-CM

## 2023-06-30 DIAGNOSIS — E83.42 HYPOMAGNESEMIA: ICD-10-CM

## 2023-06-30 DIAGNOSIS — Z79.01 LONG TERM (CURRENT) USE OF ANTICOAGULANTS: ICD-10-CM

## 2023-06-30 LAB
ALBUMIN SERPL BCP-MCNC: 3.1 G/DL (ref 3.5–5)
ALP SERPL-CCNC: 151 U/L (ref 46–116)
ALT SERPL W P-5'-P-CCNC: 22 U/L (ref 12–78)
ANION GAP SERPL CALCULATED.3IONS-SCNC: 6 MMOL/L
AST SERPL W P-5'-P-CCNC: 44 U/L (ref 5–45)
BASOPHILS # BLD AUTO: 0.02 THOUSANDS/ÂΜL (ref 0–0.1)
BASOPHILS NFR BLD AUTO: 1 % (ref 0–1)
BILIRUB SERPL-MCNC: 0.83 MG/DL (ref 0.2–1)
BNP SERPL-MCNC: 621 PG/ML (ref 0–100)
BUN SERPL-MCNC: 18 MG/DL (ref 5–25)
CALCIUM ALBUM COR SERPL-MCNC: 9.9 MG/DL (ref 8.3–10.1)
CALCIUM SERPL-MCNC: 9.2 MG/DL (ref 8.3–10.1)
CHLORIDE SERPL-SCNC: 107 MMOL/L (ref 96–108)
CHOLEST SERPL-MCNC: 96 MG/DL
CO2 SERPL-SCNC: 27 MMOL/L (ref 21–32)
CREAT SERPL-MCNC: 0.6 MG/DL (ref 0.6–1.3)
EOSINOPHIL # BLD AUTO: 0.07 THOUSAND/ÂΜL (ref 0–0.61)
EOSINOPHIL NFR BLD AUTO: 2 % (ref 0–6)
ERYTHROCYTE [DISTWIDTH] IN BLOOD BY AUTOMATED COUNT: 16.3 % (ref 11.6–15.1)
EST. AVERAGE GLUCOSE BLD GHB EST-MCNC: 123 MG/DL
GFR SERPL CREATININE-BSD FRML MDRD: 88 ML/MIN/1.73SQ M
GLUCOSE P FAST SERPL-MCNC: 105 MG/DL (ref 65–99)
HBA1C MFR BLD: 5.9 %
HCT VFR BLD AUTO: 32.8 % (ref 34.8–46.1)
HDLC SERPL-MCNC: 56 MG/DL
HGB BLD-MCNC: 10 G/DL (ref 11.5–15.4)
IMM GRANULOCYTES # BLD AUTO: 0.01 THOUSAND/UL (ref 0–0.2)
IMM GRANULOCYTES NFR BLD AUTO: 0 % (ref 0–2)
LDLC SERPL CALC-MCNC: 31 MG/DL (ref 0–100)
LYMPHOCYTES # BLD AUTO: 0.56 THOUSANDS/ÂΜL (ref 0.6–4.47)
LYMPHOCYTES NFR BLD AUTO: 18 % (ref 14–44)
MAGNESIUM SERPL-MCNC: 2 MG/DL (ref 1.6–2.6)
MCH RBC QN AUTO: 33.8 PG (ref 26.8–34.3)
MCHC RBC AUTO-ENTMCNC: 30.5 G/DL (ref 31.4–37.4)
MCV RBC AUTO: 111 FL (ref 82–98)
MONOCYTES # BLD AUTO: 0.3 THOUSAND/ÂΜL (ref 0.17–1.22)
MONOCYTES NFR BLD AUTO: 10 % (ref 4–12)
NEUTROPHILS # BLD AUTO: 2.1 THOUSANDS/ÂΜL (ref 1.85–7.62)
NEUTS SEG NFR BLD AUTO: 69 % (ref 43–75)
NRBC BLD AUTO-RTO: 0 /100 WBCS
PLATELET # BLD AUTO: 123 THOUSANDS/UL (ref 149–390)
PMV BLD AUTO: 11.4 FL (ref 8.9–12.7)
POTASSIUM SERPL-SCNC: 3.7 MMOL/L (ref 3.5–5.3)
PROT SERPL-MCNC: 7.6 G/DL (ref 6.4–8.4)
RBC # BLD AUTO: 2.96 MILLION/UL (ref 3.81–5.12)
SODIUM SERPL-SCNC: 140 MMOL/L (ref 135–147)
T4 FREE SERPL-MCNC: 1.39 NG/DL (ref 0.61–1.12)
TRIGL SERPL-MCNC: 43 MG/DL
TSH SERPL DL<=0.05 MIU/L-ACNC: 1.96 UIU/ML (ref 0.45–4.5)
WBC # BLD AUTO: 3.06 THOUSAND/UL (ref 4.31–10.16)

## 2023-06-30 PROCEDURE — 80053 COMPREHEN METABOLIC PANEL: CPT | Performed by: PHYSICIAN ASSISTANT

## 2023-06-30 PROCEDURE — 83735 ASSAY OF MAGNESIUM: CPT

## 2023-06-30 PROCEDURE — 84443 ASSAY THYROID STIM HORMONE: CPT | Performed by: PHYSICIAN ASSISTANT

## 2023-06-30 PROCEDURE — 83036 HEMOGLOBIN GLYCOSYLATED A1C: CPT

## 2023-06-30 PROCEDURE — G0151 HHCP-SERV OF PT,EA 15 MIN: HCPCS

## 2023-06-30 PROCEDURE — 83880 ASSAY OF NATRIURETIC PEPTIDE: CPT

## 2023-06-30 PROCEDURE — 36415 COLL VENOUS BLD VENIPUNCTURE: CPT | Performed by: PHYSICIAN ASSISTANT

## 2023-06-30 PROCEDURE — 85025 COMPLETE CBC W/AUTO DIFF WBC: CPT

## 2023-06-30 PROCEDURE — 80061 LIPID PANEL: CPT

## 2023-06-30 PROCEDURE — 84439 ASSAY OF FREE THYROXINE: CPT

## 2023-07-01 ENCOUNTER — HOME CARE VISIT (OUTPATIENT)
Dept: HOME HEALTH SERVICES | Facility: HOME HEALTHCARE | Age: 78
End: 2023-07-01
Payer: COMMERCIAL

## 2023-07-04 ENCOUNTER — HOME CARE VISIT (OUTPATIENT)
Dept: HOME HEALTH SERVICES | Facility: HOME HEALTHCARE | Age: 78
End: 2023-07-04
Payer: COMMERCIAL

## 2023-07-04 VITALS
TEMPERATURE: 97.7 F | RESPIRATION RATE: 18 BRPM | OXYGEN SATURATION: 96 % | DIASTOLIC BLOOD PRESSURE: 88 MMHG | HEART RATE: 70 BPM | SYSTOLIC BLOOD PRESSURE: 140 MMHG

## 2023-07-04 PROCEDURE — G0299 HHS/HOSPICE OF RN EA 15 MIN: HCPCS

## 2023-07-05 ENCOUNTER — TELEPHONE (OUTPATIENT)
Dept: NEUROLOGY | Facility: CLINIC | Age: 78
End: 2023-07-05

## 2023-07-05 ENCOUNTER — TELEPHONE (OUTPATIENT)
Dept: OBGYN CLINIC | Facility: HOSPITAL | Age: 78
End: 2023-07-05

## 2023-07-05 ENCOUNTER — TELEPHONE (OUTPATIENT)
Dept: CARDIOLOGY CLINIC | Facility: CLINIC | Age: 78
End: 2023-07-05

## 2023-07-05 DIAGNOSIS — S42.292D OTHER CLOSED DISPLACED FRACTURE OF PROXIMAL END OF LEFT HUMERUS WITH ROUTINE HEALING, SUBSEQUENT ENCOUNTER: Primary | ICD-10-CM

## 2023-07-05 RX ORDER — TRAMADOL HYDROCHLORIDE 50 MG/1
50 TABLET ORAL EVERY 6 HOURS PRN
Qty: 25 TABLET | Refills: 0 | Status: SHIPPED | OUTPATIENT
Start: 2023-07-05

## 2023-07-05 NOTE — TELEPHONE ENCOUNTER
Caller: patient    Doctor: Dmitri Hernandez    Reason for call: patient asking for script for pain medication that Dr Dmitri Hernandez recommends since she is now out of Oxycodone    [facility] [email or web address] back#: [phone]

## 2023-07-05 NOTE — TELEPHONE ENCOUNTER
----- Message from Giovani Nicole PA-C sent at 7/3/2023 12:16 PM EDT -----  Please call patient and let them know recent testing was normal - her CMP and TSH were within normal range, and we will just have to keep monitoring regularly while on amiodarone. Thanks!

## 2023-07-07 ENCOUNTER — HOME CARE VISIT (OUTPATIENT)
Dept: HOME HEALTH SERVICES | Facility: HOME HEALTHCARE | Age: 78
End: 2023-07-07
Payer: COMMERCIAL

## 2023-07-10 ENCOUNTER — OFFICE VISIT (OUTPATIENT)
Dept: CARDIAC SURGERY | Facility: CLINIC | Age: 78
End: 2023-07-10
Payer: COMMERCIAL

## 2023-07-10 VITALS
HEART RATE: 60 BPM | BODY MASS INDEX: 40.93 KG/M2 | WEIGHT: 195 LBS | SYSTOLIC BLOOD PRESSURE: 145 MMHG | DIASTOLIC BLOOD PRESSURE: 67 MMHG | OXYGEN SATURATION: 98 % | HEIGHT: 58 IN

## 2023-07-10 DIAGNOSIS — Z95.2 H/O MECHANICAL AORTIC VALVE REPLACEMENT: ICD-10-CM

## 2023-07-10 DIAGNOSIS — R93.89 ABNORMAL CT SCAN: ICD-10-CM

## 2023-07-10 DIAGNOSIS — I71.21 ANEURYSM OF ASCENDING AORTA WITHOUT RUPTURE (HCC): Primary | ICD-10-CM

## 2023-07-10 PROCEDURE — 99205 OFFICE O/P NEW HI 60 MIN: CPT | Performed by: THORACIC SURGERY (CARDIOTHORACIC VASCULAR SURGERY)

## 2023-07-10 RX ORDER — DAPAGLIFLOZIN 5 MG/1
5 TABLET, FILM COATED ORAL DAILY
COMMUNITY
Start: 2023-06-28

## 2023-07-10 NOTE — PROGRESS NOTES
Aortic Clinic  Esa Medina 68 y.o. female MRN: [id number]    Physician Requesting Consult: Claude Estevez PA-C    Reason for Consult / Principal Problem: ascending aortic aneurysm    History of Present Illness: Esa Medina is a 68y.o. year old female referred to our aortic clinic for consultation for an ascending aortic aneurysm. Patient's PMHx is notable for h/o endocarditis/AI s/p Mech AVR (1995- [facility]), VT/VF s/p ICD (7/1995 & device change 2011), Afib, HTN, HLD, DM2, obesity (BMI 40.76), CHAS, asthma, chronic ITP, diverticulitis, remote L LE DVT, arthritis s/p multiple joint replacement surgeries and ambulatory dysfunction. Patient was hospitalized in April after a fall and sustaining left humerus and ribs fractures. While hospitalized she developed SVT/AFib w/ RVR and received an inappropriate shock from her ICD. She had a CT c/a/p performed during this hospitalization that demonstrated maximal ascending aortic diameter measured at 4.6 cm. Echocardiogram in May demonstrated mechanical AVR with normal root size, mild AI, mild MR, severe TR and an EF 60%. Patient arrives via wheelchair today, accompanied by her 2 daughters. She is followed y Dr. Ti Chavez cardiologist.   Patient reports exertional dyspnea and LE edema. She denies angina, palpitations, lightheadedness, upper thoracic back pain or abdominal pain. She is ambulatory at home, using a walker. Patient is a non-smoker.       Past Medical History:  Past Medical History:   Diagnosis Date   • Arthritis    • Asthma    • Diabetes ([address])    • Environmental allergies    • Heart murmur    • Hypertension          Past Surgical History:   Past Surgical History:   Procedure Laterality Date   • MECHANICAL AORTIC VALVE REPLACEMENT     • REPLACEMENT TOTAL KNEE N/A    • REPLACEMENT TOTAL KNEE           Family History:  Family History   Problem Relation Age of Onset   • Hypertension Other    • Arthritis Family    • Heart disease Family    • Hypertension Family    • Diabetes type II Family          Social History:    Social History     Substance and Sexual Activity   Alcohol Use Yes    Comment: socially     Social History     Substance and Sexual Activity   Drug Use Never     Social History     Tobacco Use   Smoking Status Never   Smokeless Tobacco Never         Home Medications:   Prior to Admission medications    Medication Sig Start Date End Date Taking? Authorizing Provider   acetaminophen (TYLENOL) 325 mg tablet Take 2 tablets (650 mg total) by mouth every 6 (six) hours as needed for mild pain 6/1/23  Yes Judge Jose MD   alendronate (FOSAMAX) 70 mg tablet Take 70 mg by mouth Once a week 6/2/22 7/10/23 Yes Historical Provider, MD   allopurinol (ZYLOPRIM) 100 mg tablet Take 100 mg by mouth daily 11/8/22  Yes Historical Provider, MD   amiodarone 200 mg tablet Take 1 tablet (200 mg total) by mouth daily with breakfast 6/20/23  Yes Ceci Barajas PA-C   atorvastatin (LIPITOR) 40 mg tablet  1/11/23  Yes Historical Provider, MD   Blood Glucose Monitoring Suppl (OneTouch Verio Flex System) w/Device KIT Use as directed 6/2/23  Yes Historical Provider, MD   cholecalciferol (VITAMIN D3) 1,000 units tablet Take 1 tablet (1,000 Units total) by mouth daily Do not start before June 2, 2023. 6/2/23  Yes Judge Jose MD   citalopram (CeleXA) 20 mg tablet Take 20 mg by mouth daily   Yes Historical Provider, MD   docusate sodium (COLACE) 100 mg capsule Take 1 capsule (100 mg total) by mouth 2 (two) times a day 6/1/23  Yes Judge Jose MD   Farxiga 5 MG TABS Take 5 mg by mouth daily 6/28/23  Yes Historical Provider, MD   furosemide (LASIX) 20 mg tablet  12/14/22  Yes Historical Provider, MD   furosemide (LASIX) 20 mg tablet Take 40 mg by mouth daily   Yes Historical Provider, MD   magnesium 30 MG tablet Take 64 mg by mouth 3 (three) times a day Takes 3-4 days a week takes 1 tab.    Yes Historical Provider, MD   Metamucil Fiber CHEW Chew 3 gums in the morning. Indications: fiber supplement   Yes Historical Provider, MD   metoprolol succinate (TOPROL-XL) 25 mg 24 hr tablet Take 1 tablet (25 mg total) by mouth daily Do not start before June 2, 2023. 6/2/23  Yes Mic Harley MD   montelukast (SINGULAIR) 10 mg tablet Take 10 mg by mouth daily at bedtime   Yes Historical Provider, MD   multivitamin (THERAGRAN) TABS Take 1 tablet by mouth daily   Yes Historical Provider, MD   traMADol (Ultram) 50 mg tablet Take 1 tablet (50 mg total) by mouth every 6 (six) hours as needed for severe pain  Patient taking differently: Take 50 mg by mouth every 6 (six) hours as needed for severe pain Has not started yet. 7/5/23  Yes Nirali Gomez PA-C   warfarin (COUMADIN) 5 mg tablet Take 5 mg by mouth daily 2.5mg 2 days out of the week. 5 mg all other days. Next pt inr Thursday might change.    Yes Historical Provider, MD   methocarbamol (ROBAXIN) 500 mg tablet Take 0.5 tablets (250 mg total) by mouth 3 (three) times a day as needed for muscle spasms  Patient not taking: Reported on 7/10/2023 6/1/23   Mic Harley MD   alendronate (FOSAMAX) 70 mg tablet  11/23/22 7/10/23  Historical Provider, MD   alendronate (FOSAMAX) 70 mg tablet Take 70 mg by mouth every 7 days  Patient not taking: Reported on 7/10/2023  7/10/23  Historical Provider, MD   allopurinol (ZYLOPRIM) 100 mg tablet Take 100 mg by mouth daily  Patient not taking: Reported on 7/10/2023  7/10/23  Historical Provider, MD   atorvastatin (LIPITOR) 40 mg tablet Take 40 mg by mouth daily  Patient not taking: Reported on 7/10/2023  7/10/23  Historical Provider, MD   Cholecalciferol 25 MCG (1000 UT) capsule Take 1,000 Units by mouth daily  Patient not taking: Reported on 7/10/2023  7/10/23  Historical Provider, MD   citalopram (CeleXA) 20 mg tablet TAKE 1 AND 1/2 TABLETS BY MOUTH EVERY MORNING  Patient not taking: Reported on 7/10/2023 12/12/22 7/10/23  Historical Provider, MD   diltiazem (CARDIZEM CD) 120 mg 24 hr capsule  1/18/23 7/10/23  Historical Provider, MD   diltiazem (TIAZAC) 120 MG 24 hr capsule Cartia  mg capsule,extended release  Patient not taking: Reported on 7/10/2023  7/10/23  Historical Provider, MD   gabapentin (NEURONTIN) 100 mg capsule Take 1 capsule (100 mg total) by mouth daily at bedtime  Patient not taking: Reported on 7/10/2023 6/1/23 7/10/23  Katerina Patel MD   Janumet  MG per tablet  1/3/23 7/10/23  Historical Provider, MD   lisinopril (ZESTRIL) 5 mg tablet Take 5 mg by mouth daily  Patient not taking: Reported on 7/10/2023 11/30/22 7/10/23  Historical Provider, MD   magnesium chloride (MAG64) 64 MG TBEC EC tablet Take 128 mg by mouth  Patient not taking: Reported on 7/10/2023  7/10/23  Historical Provider, MD   metoprolol succinate (TOPROL-XL) 50 mg 24 hr tablet  12/29/22 7/10/23  Historical Provider, MD   montelukast (SINGULAIR) 10 mg tablet  1/11/23 7/10/23  Historical Provider, MD   oxybutynin (DITROPAN-XL) 5 mg 24 hr tablet  1/3/23 7/10/23  Historical Provider, MD   oxybutynin (DITROPAN-XL) 5 mg 24 hr tablet Take 5 mg by mouth daily  Patient not taking: Reported on 7/10/2023  7/10/23  Historical Provider, MD   warfarin (COUMADIN) 5 mg tablet Take 5 mg by mouth daily  Patient not taking: Reported on 7/10/2023 11/30/22 7/10/23  Historical Provider, MD       Allergies:   Allergies   Allergen Reactions   • Fentanyl Other (See Comments)     Other reaction(s): Hallucinations  paranoia, confusion  Other reaction(s): Hallucinations  Other reaction(s): Hallucinations  hallucinations   • Fentanyl Confusion       Review of Systems:   Review of Systems - History obtained from chart review and the patient  General ROS: negative  Psychological ROS: negative  Ophthalmic ROS: positive for - uses glasses  ENT ROS: negative  Allergy and Immunology ROS: negative  Hematological and Lymphatic ROS: negative  Endocrine ROS: negative  Breast ROS: negative  Respiratory ROS: positive for - shortness of breath  negative for - cough, hemoptysis, orthopnea or pleuritic pain  Cardiovascular ROS: positive for - dyspnea on exertion and edema  negative for - chest pain, loss of consciousness, orthopnea, palpitations, paroxysmal nocturnal dyspnea or rapid heart rate  Gastrointestinal ROS: no abdominal pain, change in bowel habits, or black or bloody stools  Genito-Urinary ROS: no dysuria, trouble voiding, or hematuria  Musculoskeletal ROS: positive for - gait disturbance and joint pain  Neurological ROS: no TIA or stroke symptoms  Dermatological ROS: negative    Vital Signs:   Vitals:    07/10/23 1102 07/10/23 1118   BP: 129/60 145/67   BP Location: Left arm Right arm   Patient Position: Sitting Sitting   Cuff Size: Standard Standard   Pulse: 60    SpO2: 98%    Weight: 88.5 kg (195 lb)    Height: 4' 10" (1.473 m)        Physical Exam:  General: Alert, oriented, obese, no acute distress  HEENT/NECK:  PERRLA. No jugular venous distention. Cardiac:Irregular rhythm, crisp mechanical valve click on systole. Carotid arteries: 2+ pulses, no bruits  Pulmonary:  Breath sounds clear bilaterally. Abdomen:  Non-tender, Non-distended. Positive bowel sounds. Upper extremities: 2+ radial pulses; brisk capillary refill  Lower extremities: Extremities warm/dry. PT/DP pulses 2+ bilaterally. 4+ edema B/L  Neuro: Alert and oriented X 3. Sensation is grossly intact. No focal deficits. Musculoskeletal: MAEE, seated in wheelchair, gait not assessed. Skin: Warm/Dry, without rashes or lesions.     Lab Results:   Lab Results   Component Value Date    WBC 3.06 (L) 06/30/2023    HGB 10.0 (L) 06/30/2023    HCT 32.8 (L) 06/30/2023     (H) 06/30/2023     (L) 06/30/2023     Lab Results   Component Value Date    SODIUM 140 06/30/2023    K 3.7 06/30/2023     06/30/2023    CO2 27 06/30/2023    AGAP 6 06/30/2023    BUN 18 06/30/2023    CREATININE 0.60 06/30/2023    GLUC 97 05/29/2023    GLUF 105 (H) 06/30/2023 CALCIUM 9.2 06/30/2023    AST 44 06/30/2023    ALT 22 06/30/2023    ALKPHOS 151 (H) 06/30/2023    TP 7.6 06/30/2023    TBILI 0.83 06/30/2023    EGFR 88 06/30/2023     Lab Results   Component Value Date    CHOLESTEROL 96 06/30/2023     Lab Results   Component Value Date    HDL 56 06/30/2023     Lab Results   Component Value Date    TRIG 43 06/30/2023     No results found for: "[address]"  Lab Results   Component Value Date    HGBA1C 5.9 (H) 06/30/2023     Lab Results   Component Value Date    INR 2.57 (H) 05/31/2023    INR 2.19 (H) 05/29/2023    INR 1.99 (H) 05/26/2023    PROTIME 27.8 (H) 05/31/2023    PROTIME 24.6 (H) 05/29/2023    PROTIME 22.8 (H) 05/26/2023       Imaging Studies:     CT Chest: 4/27/23    HEART/GREAT VESSELS: Dilated ascending aorta measuring about 4.6 cm  The arch appears unremarkable  Tortuous descending thoracic aorta    Echocardiogram: 5/3/23    •  Left Ventricle: Left ventricular cavity size is normal. Wall thickness is mildly increased. The left ventricular ejection fraction is 60%. Systolic function is normal. Wall motion is normal. Diastolic function is abnormal.  •  Right Ventricle: Right ventricular cavity size is severely dilated. Systolic function is moderately reduced. •  Left Atrium: The atrium is moderately dilated. •  Right Atrium: The atrium is moderately dilated. •  Aortic Valve: There is a mechanical valve. There is mild regurgitation. •  Mitral Valve: There is mild regurgitation. •  Tricuspid Valve: There is severe regurgitation. The estimated right ventricular systolic pressure is 48.84 mmHg. •  Aorta: There is an aneurysm in the ascending aorta. 4.8cm     Findings    Left Ventricle Left ventricular cavity size is normal. Wall thickness is mildly increased. The left ventricular ejection fraction is 60%. Systolic function is normal.  Wall motion is normal. Diastolic function is abnormal.      Right Ventricle Right ventricular cavity size is severely dilated.  Systolic function is moderately reduced. Wall thickness is normal.      Left Atrium The atrium is moderately dilated. Right Atrium The atrium is moderately dilated. Aortic Valve There is a mechanical valve. There is mild regurgitation. The aortic valve has no significant stenosis. Mitral Valve Mitral valve structure is normal. There is mild regurgitation. There is no evidence of stenosis. Tricuspid Valve Tricuspid valve structure is normal. There is severe regurgitation. There is no evidence of stenosis. The estimated right ventricular systolic pressure is 34.57 mmHg. Pulmonic Valve Pulmonic valve structure is normal. There is no evidence of regurgitation. There is no evidence of stenosis. Ascending Aorta The aortic root is normal in size. There is an aneurysm in the ascending aorta. 4.8cm      IVC/SVC The right atrial pressure is estimated at 8.0 mmHg. The inferior vena cava is normal in size. Pericardium There is no pericardial effusion. The pericardium is normal in appearance.         Left Ventricle Measurements    Function/Volumes   A4C EF 69 %         Dimensions   LVIDd 5 cm         LVIDS 3.9 cm         IVSd 1.2 cm         LVPWd 1.2 cm         LVOT area 3.46 cm2         FS 22 %         Diastolic Filling   MV E' Tissue Velocity Septal 6 cm/s         E wave deceleration time 267 ms         MV Peak E Ward 78 cm/s         MV Peak A Ward 0.55 m/s               Right Ventricle Measurements    Dimensions   RVID d 6.9 cm         Tricuspid annular plane systolic excursion 0.9 cm               Left Atrium Measurements    Dimensions   LA size 5.9 cm               Right Atrium Measurements    Dimensions   RAA A4C 33.9 cm2               Aortic Valve Measurements    Area/Dimensions   LVOT diameter 2.1 cm         LVOT area 3.46 cm2               Mitral Valve Measurements    Stenosis   MV stenosis pressure 1/2 time 77 ms         MV valve area p 1/2 method 2.86 cm2               Tricuspid Valve Measurements    RVSP Parameters   TR Peak Ward 2.1 m/s         Est. RA pres 8 mmHg         Triscuspid Valve Regurgitation Peak Gradient 17 mmHg         Right Ventricular Peak Systolic Pressure 25 mmHg               Aorta Measurements    Aortic Dimensions   Ao root 3.4 cm         Asc Ao 4.8 cm               IVC/SVC Measurements    IVC/SVC   Est. RA pres 8 mmHg               I have personally reviewed pertinent films in PACS     PCP and Cardiology notes reviewed      Assessment:  Patient Active Problem List    Diagnosis Date Noted   • Aneurysm of ascending aorta without rupture ([address]) 07/10/2023   • Difficult intravenous access 05/15/2023   • Acute COVID-19 05/14/2023   • Atrial fibrillation with RVR ([address]) 05/13/2023   • Stage III pressure ulcer of sacral region ([address]) 05/13/2023   • Asthma 05/13/2023   • Osteoporosis with current pathological fracture with routine healing 05/13/2023   • Class 2 obesity in adult 05/13/2023   • CHAS (obstructive sleep apnea) 05/13/2023   • Abnormal CT scan 05/10/2023   • Hyponatremia 05/09/2023   • Bradycardia 05/08/2023   • Ventricular arrhythmia 05/04/2023   • Type 2 diabetes mellitus ([address]) 04/28/2023   • Fall 04/27/2023   • Closed displaced oblique fracture of shaft of left humerus with routine healing 04/27/2023   • Acute pain due to trauma 04/27/2023   • H/O mechanical aortic valve replacement 04/27/2023   • Laceration of right eyebrow 04/27/2023   • Fracture of multiple ribs of right side 04/27/2023       Impression/Plan:    Ascending aortic aneurysm - 4.6 cm   1) No surgical intervention indicated. In light of age and prior AVR no need for ongoing surveillance in aortic clinic. Patient can be followed by her PCP and only if there is a significant change in the size of the aortic diameter (> 5 cm), referral back to aortic clinic is recommended. 2) Continue routine medical care with PCP and Cardiology      CT scan and echo  findings were reviewed and shared with the patient today. Ressie Shone was comfortable with our recommendations, and their questions were answered to their satisfaction. Aortic Aneurysm Instructions were provided to the patient as follows:    1. No lifting more than 50 pounds. Regular aerobic exercise permitted and recommended. 2. Maintain a controlled blood pressure with a goal of less than 120/80. 3. Follow up in Aortic Clinic as recommended with radiology follow up as instructed  4. Report to the ER or call 911 immediately with the following signs / symptoms: sudden onset of back pain, chest pain or shortness of breath.     Routine referral to gastroenterology for colonoscopy screening was not indicated, as the patient is over 76years old    SIGNATURE: JOSEPHINE Flores  DATE: July 10, 2023  TIME: 11:50 AM

## 2023-07-10 NOTE — LETTER
July 10, 2023     Joaquín Masters DO  [facility]    Patient: Gamaliel Chilel   YOB: 1945   Date of Visit: 7/10/2023       Dear Dr. Chris Edwards: Thank you for referring Raquel Angulo to me for evaluation. Below are my notes for this consultation. If you have questions, please do not hesitate to call me. I look forward to following your patient along with you. Sincerely,        Maia Turner DO        CC: CRISSY Prado, [place]  7/10/2023 12:12 PM  Attested  Aortic Clinic  Gamaliel Chilel 68 y.o. female MRN: [id number]    Physician Requesting Consult: Leslie Larsen PA-C    Reason for Consult / Principal Problem: ascending aortic aneurysm    History of Present Illness: Gamaliel Chilel is a 68y.o. year old female referred to our aortic clinic for consultation for an ascending aortic aneurysm. Patient's PMHx is notable for h/o endocarditis/AI s/p Mech AVR (1995- [facility]), VT/VF s/p ICD (7/1995 & device change 2011), Afib, HTN, HLD, DM2, obesity (BMI 40.76), CHAS, asthma, chronic ITP, diverticulitis, remote L LE DVT, arthritis s/p multiple joint replacement surgeries and ambulatory dysfunction. Patient was hospitalized in April after a fall and sustaining left humerus and ribs fractures. While hospitalized she developed SVT/AFib w/ RVR and received an inappropriate shock from her ICD. She had a CT c/a/p performed during this hospitalization that demonstrated maximal ascending aortic diameter measured at 4.6 cm. Echocardiogram in May demonstrated mechanical AVR with normal root size, mild AI, mild MR, severe TR and an EF 60%. Patient arrives via wheelchair today, accompanied by her 2 daughters. She is followed y Dr. Maida Rizvi cardiologist.   Patient reports exertional dyspnea and LE edema. She denies angina, palpitations, lightheadedness, upper thoracic back pain or abdominal pain. She is ambulatory at home, using a walker. Patient is a non-smoker. Past Medical History:  Past Medical History:   Diagnosis Date   • Arthritis    • Asthma    • Diabetes ([address])    • Environmental allergies    • Heart murmur    • Hypertension          Past Surgical History:   Past Surgical History:   Procedure Laterality Date   • MECHANICAL AORTIC VALVE REPLACEMENT     • REPLACEMENT TOTAL KNEE N/A    • REPLACEMENT TOTAL KNEE           Family History:  Family History   Problem Relation Age of Onset   • Hypertension Other    • Arthritis Family    • Heart disease Family    • Hypertension Family    • Diabetes type II Family          Social History:    Social History     Substance and Sexual Activity   Alcohol Use Yes    Comment: socially     Social History     Substance and Sexual Activity   Drug Use Never     Social History     Tobacco Use   Smoking Status Never   Smokeless Tobacco Never         Home Medications:   Prior to Admission medications    Medication Sig Start Date End Date Taking? Authorizing Provider   acetaminophen (TYLENOL) 325 mg tablet Take 2 tablets (650 mg total) by mouth every 6 (six) hours as needed for mild pain 6/1/23  Yes Jose A Grullon MD   alendronate (FOSAMAX) 70 mg tablet Take 70 mg by mouth Once a week 6/2/22 7/10/23 Yes Historical Provider, MD   allopurinol (ZYLOPRIM) 100 mg tablet Take 100 mg by mouth daily 11/8/22  Yes Historical Provider, MD   amiodarone 200 mg tablet Take 1 tablet (200 mg total) by mouth daily with breakfast 6/20/23  Yes Jasmeet Grant PA-C   atorvastatin (LIPITOR) 40 mg tablet  1/11/23  Yes Historical Provider, MD   Blood Glucose Monitoring Suppl (OneTouch Verio Flex System) w/Device KIT Use as directed 6/2/23  Yes Historical Provider, MD   cholecalciferol (VITAMIN D3) 1,000 units tablet Take 1 tablet (1,000 Units total) by mouth daily Do not start before June 2, 2023.  6/2/23  Yes Jose A Grullon MD   citalopram (CeleXA) 20 mg tablet Take 20 mg by mouth daily   Yes Historical Provider, MD docusate sodium (COLACE) 100 mg capsule Take 1 capsule (100 mg total) by mouth 2 (two) times a day 6/1/23  Yes Landen Mays MD   Farxiga 5 MG TABS Take 5 mg by mouth daily 6/28/23  Yes Historical Provider, MD   furosemide (LASIX) 20 mg tablet  12/14/22  Yes Historical Provider, MD   furosemide (LASIX) 20 mg tablet Take 40 mg by mouth daily   Yes Historical Provider, MD   magnesium 30 MG tablet Take 64 mg by mouth 3 (three) times a day Takes 3-4 days a week takes 1 tab. Yes Historical Provider, MD   Metamucil Fiber CHEW Chew 3 gums in the morning. Indications: fiber supplement   Yes Historical Provider, MD   metoprolol succinate (TOPROL-XL) 25 mg 24 hr tablet Take 1 tablet (25 mg total) by mouth daily Do not start before June 2, 2023. 6/2/23  Yes Landen Mays MD   montelukast (SINGULAIR) 10 mg tablet Take 10 mg by mouth daily at bedtime   Yes Historical Provider, MD   multivitamin (THERAGRAN) TABS Take 1 tablet by mouth daily   Yes Historical Provider, MD   traMADol (Ultram) 50 mg tablet Take 1 tablet (50 mg total) by mouth every 6 (six) hours as needed for severe pain  Patient taking differently: Take 50 mg by mouth every 6 (six) hours as needed for severe pain Has not started yet. 7/5/23  Yes Nirali Gomez PA-C   warfarin (COUMADIN) 5 mg tablet Take 5 mg by mouth daily 2.5mg 2 days out of the week. 5 mg all other days. Next pt inr Thursday might change.    Yes Historical Provider, MD   methocarbamol (ROBAXIN) 500 mg tablet Take 0.5 tablets (250 mg total) by mouth 3 (three) times a day as needed for muscle spasms  Patient not taking: Reported on 7/10/2023 6/1/23   Landen Mays MD   alendronate (FOSAMAX) 70 mg tablet  11/23/22 7/10/23  Historical Provider, MD   alendronate (FOSAMAX) 70 mg tablet Take 70 mg by mouth every 7 days  Patient not taking: Reported on 7/10/2023  7/10/23  Historical Provider, MD   allopurinol (ZYLOPRIM) 100 mg tablet Take 100 mg by mouth daily  Patient not taking: Reported on 7/10/2023  7/10/23  Historical Provider, MD   atorvastatin (LIPITOR) 40 mg tablet Take 40 mg by mouth daily  Patient not taking: Reported on 7/10/2023  7/10/23  Historical Provider, MD   Cholecalciferol 25 MCG (1000 UT) capsule Take 1,000 Units by mouth daily  Patient not taking: Reported on 7/10/2023  7/10/23  Historical Provider, MD   citalopram (CeleXA) 20 mg tablet TAKE 1 AND 1/2 TABLETS BY MOUTH EVERY MORNING  Patient not taking: Reported on 7/10/2023 12/12/22 7/10/23  Historical Provider, MD   diltiazem (CARDIZEM CD) 120 mg 24 hr capsule  1/18/23 7/10/23  Historical Provider, MD   diltiazem (TIAZAC) 120 MG 24 hr capsule Cartia  mg capsule,extended release  Patient not taking: Reported on 7/10/2023  7/10/23  Historical Provider, MD   gabapentin (NEURONTIN) 100 mg capsule Take 1 capsule (100 mg total) by mouth daily at bedtime  Patient not taking: Reported on 7/10/2023 6/1/23 7/10/23  Gaudencio Stauffer MD   Janumet  MG per tablet  1/3/23 7/10/23  Historical Provider, MD   lisinopril (ZESTRIL) 5 mg tablet Take 5 mg by mouth daily  Patient not taking: Reported on 7/10/2023 11/30/22 7/10/23  Historical Provider, MD   magnesium chloride (MAG64) 64 MG TBEC EC tablet Take 128 mg by mouth  Patient not taking: Reported on 7/10/2023  7/10/23  Historical Provider, MD   metoprolol succinate (TOPROL-XL) 50 mg 24 hr tablet  12/29/22 7/10/23  Historical Provider, MD   montelukast (SINGULAIR) 10 mg tablet  1/11/23 7/10/23  Historical Provider, MD   oxybutynin (DITROPAN-XL) 5 mg 24 hr tablet  1/3/23 7/10/23  Historical Provider, MD   oxybutynin (DITROPAN-XL) 5 mg 24 hr tablet Take 5 mg by mouth daily  Patient not taking: Reported on 7/10/2023  7/10/23  Historical Provider, MD   warfarin (COUMADIN) 5 mg tablet Take 5 mg by mouth daily  Patient not taking: Reported on 7/10/2023 11/30/22 7/10/23  Historical Provider, MD       Allergies:   Allergies   Allergen Reactions   • Fentanyl Other (See Comments) Other reaction(s): Hallucinations  paranoia, confusion  Other reaction(s): Hallucinations  Other reaction(s): Hallucinations  hallucinations   • Fentanyl Confusion       Review of Systems:   Review of Systems - History obtained from chart review and the patient  General ROS: negative  Psychological ROS: negative  Ophthalmic ROS: positive for - uses glasses  ENT ROS: negative  Allergy and Immunology ROS: negative  Hematological and Lymphatic ROS: negative  Endocrine ROS: negative  Breast ROS: negative  Respiratory ROS: positive for - shortness of breath  negative for - cough, hemoptysis, orthopnea or pleuritic pain  Cardiovascular ROS: positive for - dyspnea on exertion and edema  negative for - chest pain, loss of consciousness, orthopnea, palpitations, paroxysmal nocturnal dyspnea or rapid heart rate  Gastrointestinal ROS: no abdominal pain, change in bowel habits, or black or bloody stools  Genito-Urinary ROS: no dysuria, trouble voiding, or hematuria  Musculoskeletal ROS: positive for - gait disturbance and joint pain  Neurological ROS: no TIA or stroke symptoms  Dermatological ROS: negative    Vital Signs:   Vitals:    07/10/23 1102 07/10/23 1118   BP: 129/60 145/67   BP Location: Left arm Right arm   Patient Position: Sitting Sitting   Cuff Size: Standard Standard   Pulse: 60    SpO2: 98%    Weight: 88.5 kg (195 lb)    Height: 4' 10" (1.473 m)        Physical Exam:  General: Alert, oriented, obese, no acute distress  HEENT/NECK:  PERRLA. No jugular venous distention. Cardiac:Irregular rhythm, crisp mechanical valve click on systole. Carotid arteries: 2+ pulses, no bruits  Pulmonary:  Breath sounds clear bilaterally. Abdomen:  Non-tender, Non-distended. Positive bowel sounds. Upper extremities: 2+ radial pulses; brisk capillary refill  Lower extremities: Extremities warm/dry. PT/DP pulses 2+ bilaterally. 4+ edema B/L  Neuro: Alert and oriented X 3. Sensation is grossly intact.   No focal deficits. Musculoskeletal: MAEE, seated in wheelchair, gait not assessed. Skin: Warm/Dry, without rashes or lesions. Lab Results:   Lab Results   Component Value Date    WBC 3.06 (L) 06/30/2023    HGB 10.0 (L) 06/30/2023    HCT 32.8 (L) 06/30/2023     (H) 06/30/2023     (L) 06/30/2023     Lab Results   Component Value Date    SODIUM 140 06/30/2023    K 3.7 06/30/2023     06/30/2023    CO2 27 06/30/2023    AGAP 6 06/30/2023    BUN 18 06/30/2023    CREATININE 0.60 06/30/2023    GLUC 97 05/29/2023    GLUF 105 (H) 06/30/2023    CALCIUM 9.2 06/30/2023    AST 44 06/30/2023    ALT 22 06/30/2023    ALKPHOS 151 (H) 06/30/2023    TP 7.6 06/30/2023    TBILI 0.83 06/30/2023    EGFR 88 06/30/2023     Lab Results   Component Value Date    CHOLESTEROL 96 06/30/2023     Lab Results   Component Value Date    HDL 56 06/30/2023     Lab Results   Component Value Date    TRIG 43 06/30/2023     No results found for: "NONHDLC"  Lab Results   Component Value Date    HGBA1C 5.9 (H) 06/30/2023     Lab Results   Component Value Date    INR 2.57 (H) 05/31/2023    INR 2.19 (H) 05/29/2023    INR 1.99 (H) 05/26/2023    PROTIME 27.8 (H) 05/31/2023    PROTIME 24.6 (H) 05/29/2023    PROTIME 22.8 (H) 05/26/2023       Imaging Studies:     CT Chest: 4/27/23    HEART/GREAT VESSELS: Dilated ascending aorta measuring about 4.6 cm  The arch appears unremarkable  Tortuous descending thoracic aorta    Echocardiogram: 5/3/23    •  Left Ventricle: Left ventricular cavity size is normal. Wall thickness is mildly increased. The left ventricular ejection fraction is 60%. Systolic function is normal. Wall motion is normal. Diastolic function is abnormal.  •  Right Ventricle: Right ventricular cavity size is severely dilated. Systolic function is moderately reduced. •  Left Atrium: The atrium is moderately dilated. •  Right Atrium: The atrium is moderately dilated. •  Aortic Valve: There is a mechanical valve.  There is mild regurgitation. •  Mitral Valve: There is mild regurgitation. •  Tricuspid Valve: There is severe regurgitation. The estimated right ventricular systolic pressure is 16.14 mmHg. •  Aorta: There is an aneurysm in the ascending aorta. 4.8cm     Findings    Left Ventricle Left ventricular cavity size is normal. Wall thickness is mildly increased. The left ventricular ejection fraction is 60%. Systolic function is normal.  Wall motion is normal. Diastolic function is abnormal.      Right Ventricle Right ventricular cavity size is severely dilated. Systolic function is moderately reduced. Wall thickness is normal.      Left Atrium The atrium is moderately dilated. Right Atrium The atrium is moderately dilated. Aortic Valve There is a mechanical valve. There is mild regurgitation. The aortic valve has no significant stenosis. Mitral Valve Mitral valve structure is normal. There is mild regurgitation. There is no evidence of stenosis. Tricuspid Valve Tricuspid valve structure is normal. There is severe regurgitation. There is no evidence of stenosis. The estimated right ventricular systolic pressure is 36.33 mmHg. Pulmonic Valve Pulmonic valve structure is normal. There is no evidence of regurgitation. There is no evidence of stenosis. Ascending Aorta The aortic root is normal in size. There is an aneurysm in the ascending aorta. 4.8cm      IVC/SVC The right atrial pressure is estimated at 8.0 mmHg. The inferior vena cava is normal in size. Pericardium There is no pericardial effusion. The pericardium is normal in appearance.         Left Ventricle Measurements    Function/Volumes   A4C EF 69 %         Dimensions   LVIDd 5 cm         LVIDS 3.9 cm         IVSd 1.2 cm         LVPWd 1.2 cm         LVOT area 3.46 cm2         FS 22 %         Diastolic Filling   MV E' Tissue Velocity Septal 6 cm/s         E wave deceleration time 267 ms         MV Peak E Ward 78 cm/s         MV Peak A Ward 0.55 m/s               Right Ventricle Measurements    Dimensions   RVID d 6.9 cm         Tricuspid annular plane systolic excursion 0.9 cm               Left Atrium Measurements    Dimensions   LA size 5.9 cm               Right Atrium Measurements    Dimensions   RAA A4C 33.9 cm2               Aortic Valve Measurements    Area/Dimensions   LVOT diameter 2.1 cm         LVOT area 3.46 cm2               Mitral Valve Measurements    Stenosis   MV stenosis pressure 1/2 time 77 ms         MV valve area p 1/2 method 2.86 cm2               Tricuspid Valve Measurements    RVSP Parameters   TR Peak Ward 2.1 m/s         Est. RA pres 8 mmHg         Triscuspid Valve Regurgitation Peak Gradient 17 mmHg         Right Ventricular Peak Systolic Pressure 25 mmHg               Aorta Measurements    Aortic Dimensions   Ao root 3.4 cm         Asc Ao 4.8 cm               IVC/SVC Measurements    IVC/SVC   Est. RA pres 8 mmHg               I have personally reviewed pertinent films in PACS     PCP and Cardiology notes reviewed      Assessment:  Patient Active Problem List    Diagnosis Date Noted   • Aneurysm of ascending aorta without rupture (HCC) 07/10/2023   • Difficult intravenous access 05/15/2023   • Acute COVID-19 05/14/2023   • Atrial fibrillation with RVR ([address]) 05/13/2023   • Stage III pressure ulcer of sacral region ([address]) 05/13/2023   • Asthma 05/13/2023   • Osteoporosis with current pathological fracture with routine healing 05/13/2023   • Class 2 obesity in adult 05/13/2023   • CHAS (obstructive sleep apnea) 05/13/2023   • Abnormal CT scan 05/10/2023   • Hyponatremia 05/09/2023   • Bradycardia 05/08/2023   • Ventricular arrhythmia 05/04/2023   • Type 2 diabetes mellitus ([address]) 04/28/2023   • Fall 04/27/2023   • Closed displaced oblique fracture of shaft of left humerus with routine healing 04/27/2023   • Acute pain due to trauma 04/27/2023   • H/O mechanical aortic valve replacement 04/27/2023   • Laceration of right eyebrow 04/27/2023   • Fracture of multiple ribs of right side 04/27/2023       Impression/Plan:    Ascending aortic aneurysm - 4.6 cm   1) No surgical intervention indicated. In light of age and prior AVR no need for ongoing surveillance in aortic clinic. Patient can be followed by her PCP and only if there is a significant change in the size of the aortic diameter (> 5 cm), referral back to aortic clinic is recommended. 2) Continue routine medical care with PCP and Cardiology      CT scan and echo  findings were reviewed and shared with the patient today. Tonny Azeb was comfortable with our recommendations, and their questions were answered to their satisfaction. Aortic Aneurysm Instructions were provided to the patient as follows:    1. No lifting more than 50 pounds. Regular aerobic exercise permitted and recommended. 2. Maintain a controlled blood pressure with a goal of less than 120/80. 3. Follow up in Aortic Clinic as recommended with radiology follow up as instructed  4. Report to the ER or call 911 immediately with the following signs / symptoms: sudden onset of back pain, chest pain or shortness of breath. Routine referral to gastroenterology for colonoscopy screening was not indicated, as the patient is over 76years old    SIGNATURE: Amalia Presley, [address] [facility] [address]: July 10, 2023  TIME: 11:50 AM  Attestation signed by Bianca Huynh DO at 7/10/2023 12:17 PM:  I supervised the Advanced Practitioner. ? I performed, in its entirety, the assessment/plan component of the visit. I agree with the Advanced Practitioner's note with the following additions/exceptions:      Ms. Adelia Guzman presents for evaluation of an incidentally found ascending aortic aneurysm. She has no known personal family history of aneurysm or connective tissue disorder. CTA was reviewed by me personally. It showed an ascending aortic aneurysm of 46mm. She has a mechanical aortic valve.  Based on these findings, I recommend continued smoking cessation, blood pressure control, surveillance and aortic precautions. SBP goal should be 110s-120s with resting heart rate goal 60-70. The likelihood of her requiring elective repair is small. This may be followed by her PCP and she can be referred back to us if there is any change or concern.      Guero James,  07/10/23

## 2023-07-12 PROBLEM — S01.111A LACERATION OF RIGHT EYEBROW: Status: RESOLVED | Noted: 2023-04-27 | Resolved: 2023-07-12

## 2023-07-13 ENCOUNTER — HOME CARE VISIT (OUTPATIENT)
Dept: HOME HEALTH SERVICES | Facility: HOME HEALTHCARE | Age: 78
End: 2023-07-13
Payer: COMMERCIAL

## 2023-07-13 VITALS
RESPIRATION RATE: 18 BRPM | TEMPERATURE: 97.7 F | SYSTOLIC BLOOD PRESSURE: 128 MMHG | DIASTOLIC BLOOD PRESSURE: 82 MMHG | OXYGEN SATURATION: 99 % | HEART RATE: 60 BPM

## 2023-07-13 PROCEDURE — G0299 HHS/HOSPICE OF RN EA 15 MIN: HCPCS

## 2023-07-20 ENCOUNTER — HOME CARE VISIT (OUTPATIENT)
Dept: HOME HEALTH SERVICES | Facility: HOME HEALTHCARE | Age: 78
End: 2023-07-20
Payer: COMMERCIAL

## 2023-07-20 VITALS
TEMPERATURE: 97.3 F | DIASTOLIC BLOOD PRESSURE: 76 MMHG | RESPIRATION RATE: 18 BRPM | SYSTOLIC BLOOD PRESSURE: 122 MMHG | HEART RATE: 60 BPM | OXYGEN SATURATION: 97 %

## 2023-07-20 PROCEDURE — G0299 HHS/HOSPICE OF RN EA 15 MIN: HCPCS

## 2023-07-25 ENCOUNTER — HOSPITAL ENCOUNTER (OUTPATIENT)
Dept: RADIOLOGY | Facility: HOSPITAL | Age: 78
Discharge: HOME | End: 2023-07-25
Attending: ORTHOPAEDIC SURGERY
Payer: COMMERCIAL

## 2023-07-25 ENCOUNTER — OFFICE VISIT (OUTPATIENT)
Dept: OBGYN CLINIC | Facility: HOSPITAL | Age: 78
End: 2023-07-25
Payer: COMMERCIAL

## 2023-07-25 VITALS
HEIGHT: 58 IN | BODY MASS INDEX: 40.93 KG/M2 | HEART RATE: 60 BPM | SYSTOLIC BLOOD PRESSURE: 121 MMHG | DIASTOLIC BLOOD PRESSURE: 78 MMHG | WEIGHT: 195 LBS

## 2023-07-25 DIAGNOSIS — S42.332D CLOSED DISPLACED OBLIQUE FRACTURE OF SHAFT OF LEFT HUMERUS WITH ROUTINE HEALING: Primary | ICD-10-CM

## 2023-07-25 DIAGNOSIS — M79.602 LEFT ARM PAIN: ICD-10-CM

## 2023-07-25 PROCEDURE — 73060 X-RAY EXAM OF HUMERUS: CPT

## 2023-07-25 PROCEDURE — 99213 OFFICE O/P EST LOW 20 MIN: CPT | Performed by: ORTHOPAEDIC SURGERY

## 2023-07-25 NOTE — PROGRESS NOTES
Assessment/Plan:    No problem-specific Assessment & Plan notes found for this encounter. There are no diagnoses linked to this encounter. Subjective:      Patient ID: Ashley Herrera is a 68 y.o. female. 69 y/o female who sustained L proximal humerus fracture (DOI 4/27/2023). Patient has no complaints. Has graduated from physical therapy. No complaints regarding function of her left upper extremity. Denies numbness or paresthesias. The following portions of the patient's history were reviewed and updated as appropriate: allergies, current medications, past family history, past medical history, past social history, past surgical history, and problem list.    Review of Systems   All other systems reviewed and are negative. Objective:      /78   Pulse 60   Ht 4' 10" (1.473 m)   Wt 88.5 kg (195 lb)   BMI 40.76 kg/m²          Physical Exam  Eyes:      Pupils: Pupils are equal, round, and reactive to light. Cardiovascular:      Pulses: Normal pulses. Pulmonary:      Effort: Pulmonary effort is normal.   Musculoskeletal:      Comments: Left shoulder non-tender. AROM 80 abduction, 80 FF. NVI. Skin:     General: Skin is warm. Capillary Refill: Capillary refill takes less than 2 seconds. Neurological:      General: No focal deficit present. Mental Status: She is alert. XRs left humerus - Review of radiographs demonstrates interval callus formation although she is in valgus alignment.

## 2023-07-27 ENCOUNTER — HOSPITAL ENCOUNTER (INPATIENT)
Facility: HOSPITAL | Age: 78
LOS: 8 days | Discharge: HOME HEALTH | DRG: 291 | End: 2023-08-04
Attending: EMERGENCY MEDICINE | Admitting: INTERNAL MEDICINE
Payer: COMMERCIAL

## 2023-07-27 ENCOUNTER — APPOINTMENT (EMERGENCY)
Dept: RADIOLOGY | Facility: HOSPITAL | Age: 78
DRG: 291 | End: 2023-07-27
Payer: COMMERCIAL

## 2023-07-27 DIAGNOSIS — I50.9 CHF EXACERBATION (HCC): Primary | ICD-10-CM

## 2023-07-27 DIAGNOSIS — S42.292D OTHER CLOSED DISPLACED FRACTURE OF PROXIMAL END OF LEFT HUMERUS WITH ROUTINE HEALING, SUBSEQUENT ENCOUNTER: ICD-10-CM

## 2023-07-27 DIAGNOSIS — M79.672 LEFT FOOT PAIN: ICD-10-CM

## 2023-07-27 DIAGNOSIS — I50.33 ACUTE ON CHRONIC HEART FAILURE WITH PRESERVED EJECTION FRACTION (HCC): ICD-10-CM

## 2023-07-27 DIAGNOSIS — N39.0 UTI (URINARY TRACT INFECTION): ICD-10-CM

## 2023-07-27 DIAGNOSIS — E87.6 HYPOKALEMIA: ICD-10-CM

## 2023-07-27 DIAGNOSIS — D61.818 PANCYTOPENIA (HCC): ICD-10-CM

## 2023-07-27 PROBLEM — D69.3 CHRONIC ITP (IDIOPATHIC THROMBOCYTOPENIA) (HCC): Status: ACTIVE | Noted: 2023-04-02

## 2023-07-27 PROBLEM — I25.10 CAD (CORONARY ARTERY DISEASE): Status: ACTIVE | Noted: 2017-05-10

## 2023-07-27 LAB
2HR DELTA HS TROPONIN: -1 NG/L
4HR DELTA HS TROPONIN: -1 NG/L
ALBUMIN SERPL BCP-MCNC: 3.2 G/DL (ref 3.5–5)
ALP SERPL-CCNC: 151 U/L (ref 46–116)
ALT SERPL W P-5'-P-CCNC: 28 U/L (ref 12–78)
ANION GAP SERPL CALCULATED.3IONS-SCNC: 6 MMOL/L
AST SERPL W P-5'-P-CCNC: 78 U/L (ref 5–45)
BACTERIA UR QL AUTO: ABNORMAL /HPF
BASOPHILS # BLD AUTO: 0.03 THOUSANDS/ÂΜL (ref 0–0.1)
BASOPHILS NFR BLD AUTO: 1 % (ref 0–1)
BILIRUB SERPL-MCNC: 1.28 MG/DL (ref 0.2–1)
BILIRUB UR QL STRIP: NEGATIVE
BNP SERPL-MCNC: 855 PG/ML (ref 0–100)
BUN SERPL-MCNC: 23 MG/DL (ref 5–25)
CALCIUM ALBUM COR SERPL-MCNC: 9.7 MG/DL (ref 8.3–10.1)
CALCIUM SERPL-MCNC: 9.1 MG/DL (ref 8.3–10.1)
CARDIAC TROPONIN I PNL SERPL HS: 37 NG/L
CARDIAC TROPONIN I PNL SERPL HS: 37 NG/L
CARDIAC TROPONIN I PNL SERPL HS: 38 NG/L
CHLORIDE SERPL-SCNC: 105 MMOL/L (ref 96–108)
CLARITY UR: ABNORMAL
CO2 SERPL-SCNC: 22 MMOL/L (ref 21–32)
COLOR UR: ABNORMAL
CREAT SERPL-MCNC: 0.81 MG/DL (ref 0.6–1.3)
EOSINOPHIL # BLD AUTO: 0.03 THOUSAND/ÂΜL (ref 0–0.61)
EOSINOPHIL NFR BLD AUTO: 1 % (ref 0–6)
ERYTHROCYTE [DISTWIDTH] IN BLOOD BY AUTOMATED COUNT: 15.7 % (ref 11.6–15.1)
FOLATE SERPL-MCNC: >22.3 NG/ML
GFR SERPL CREATININE-BSD FRML MDRD: 70 ML/MIN/1.73SQ M
GLUCOSE SERPL-MCNC: 111 MG/DL (ref 65–140)
GLUCOSE UR STRIP-MCNC: NEGATIVE MG/DL
HCT VFR BLD AUTO: 32.1 % (ref 34.8–46.1)
HGB BLD-MCNC: 10.6 G/DL (ref 11.5–15.4)
HGB UR QL STRIP.AUTO: NEGATIVE
HYALINE CASTS #/AREA URNS LPF: ABNORMAL /LPF
IMM GRANULOCYTES # BLD AUTO: 0.01 THOUSAND/UL (ref 0–0.2)
IMM GRANULOCYTES NFR BLD AUTO: 0 % (ref 0–2)
INR PPP: 2.94 (ref 0.84–1.19)
KETONES UR STRIP-MCNC: NEGATIVE MG/DL
LEUKOCYTE ESTERASE UR QL STRIP: ABNORMAL
LYMPHOCYTES # BLD AUTO: 0.68 THOUSANDS/ÂΜL (ref 0.6–4.47)
LYMPHOCYTES NFR BLD AUTO: 24 % (ref 14–44)
MCH RBC QN AUTO: 33.1 PG (ref 26.8–34.3)
MCHC RBC AUTO-ENTMCNC: 33 G/DL (ref 31.4–37.4)
MCV RBC AUTO: 100 FL (ref 82–98)
MONOCYTES # BLD AUTO: 0.35 THOUSAND/ÂΜL (ref 0.17–1.22)
MONOCYTES NFR BLD AUTO: 12 % (ref 4–12)
MUCOUS THREADS UR QL AUTO: ABNORMAL
NEUTROPHILS # BLD AUTO: 1.79 THOUSANDS/ÂΜL (ref 1.85–7.62)
NEUTS SEG NFR BLD AUTO: 62 % (ref 43–75)
NITRITE UR QL STRIP: POSITIVE
NON-SQ EPI CELLS URNS QL MICRO: ABNORMAL /HPF
NRBC BLD AUTO-RTO: 0 /100 WBCS
PH UR STRIP.AUTO: 5 [PH]
PLATELET # BLD AUTO: 154 THOUSANDS/UL (ref 149–390)
PMV BLD AUTO: 11.6 FL (ref 8.9–12.7)
POTASSIUM SERPL-SCNC: 4.9 MMOL/L (ref 3.5–5.3)
PROT SERPL-MCNC: 8.5 G/DL (ref 6.4–8.4)
PROT UR STRIP-MCNC: ABNORMAL MG/DL
PROTHROMBIN TIME: 30.9 SECONDS (ref 11.6–14.5)
RBC # BLD AUTO: 3.2 MILLION/UL (ref 3.81–5.12)
RBC #/AREA URNS AUTO: ABNORMAL /HPF
SODIUM SERPL-SCNC: 133 MMOL/L (ref 135–147)
SP GR UR STRIP.AUTO: 1.01 (ref 1–1.03)
TSH SERPL DL<=0.05 MIU/L-ACNC: 3.67 UIU/ML (ref 0.45–4.5)
UROBILINOGEN UR STRIP-ACNC: <2 MG/DL
WBC # BLD AUTO: 2.89 THOUSAND/UL (ref 4.31–10.16)
WBC #/AREA URNS AUTO: ABNORMAL /HPF

## 2023-07-27 PROCEDURE — 96365 THER/PROPH/DIAG IV INF INIT: CPT

## 2023-07-27 PROCEDURE — 82746 ASSAY OF FOLIC ACID SERUM: CPT | Performed by: INTERNAL MEDICINE

## 2023-07-27 PROCEDURE — 85025 COMPLETE CBC W/AUTO DIFF WBC: CPT

## 2023-07-27 PROCEDURE — 81001 URINALYSIS AUTO W/SCOPE: CPT

## 2023-07-27 PROCEDURE — 93005 ELECTROCARDIOGRAM TRACING: CPT

## 2023-07-27 PROCEDURE — 36415 COLL VENOUS BLD VENIPUNCTURE: CPT

## 2023-07-27 PROCEDURE — 80053 COMPREHEN METABOLIC PANEL: CPT

## 2023-07-27 PROCEDURE — 99291 CRITICAL CARE FIRST HOUR: CPT | Performed by: EMERGENCY MEDICINE

## 2023-07-27 PROCEDURE — 96375 TX/PRO/DX INJ NEW DRUG ADDON: CPT

## 2023-07-27 PROCEDURE — 99285 EMERGENCY DEPT VISIT HI MDM: CPT

## 2023-07-27 PROCEDURE — 85610 PROTHROMBIN TIME: CPT | Performed by: INTERNAL MEDICINE

## 2023-07-27 PROCEDURE — 83880 ASSAY OF NATRIURETIC PEPTIDE: CPT

## 2023-07-27 PROCEDURE — 87077 CULTURE AEROBIC IDENTIFY: CPT

## 2023-07-27 PROCEDURE — 87086 URINE CULTURE/COLONY COUNT: CPT

## 2023-07-27 PROCEDURE — 71045 X-RAY EXAM CHEST 1 VIEW: CPT

## 2023-07-27 PROCEDURE — 99223 1ST HOSP IP/OBS HIGH 75: CPT | Performed by: INTERNAL MEDICINE

## 2023-07-27 PROCEDURE — 87186 SC STD MICRODIL/AGAR DIL: CPT

## 2023-07-27 PROCEDURE — 84443 ASSAY THYROID STIM HORMONE: CPT | Performed by: INTERNAL MEDICINE

## 2023-07-27 PROCEDURE — 84484 ASSAY OF TROPONIN QUANT: CPT

## 2023-07-27 RX ORDER — MONTELUKAST SODIUM 10 MG/1
10 TABLET ORAL
Status: DISCONTINUED | OUTPATIENT
Start: 2023-07-27 | End: 2023-08-04 | Stop reason: HOSPADM

## 2023-07-27 RX ORDER — METOPROLOL SUCCINATE 25 MG/1
25 TABLET, EXTENDED RELEASE ORAL DAILY
Status: DISCONTINUED | OUTPATIENT
Start: 2023-07-28 | End: 2023-08-04 | Stop reason: HOSPADM

## 2023-07-27 RX ORDER — ALLOPURINOL 100 MG/1
100 TABLET ORAL DAILY
Status: DISCONTINUED | OUTPATIENT
Start: 2023-07-28 | End: 2023-08-04 | Stop reason: HOSPADM

## 2023-07-27 RX ORDER — AMIODARONE HYDROCHLORIDE 200 MG/1
200 TABLET ORAL
Status: DISCONTINUED | OUTPATIENT
Start: 2023-07-28 | End: 2023-08-04 | Stop reason: HOSPADM

## 2023-07-27 RX ORDER — WARFARIN SODIUM 2.5 MG/1
2.5 TABLET ORAL
Status: DISCONTINUED | OUTPATIENT
Start: 2023-07-27 | End: 2023-08-01

## 2023-07-27 RX ORDER — FUROSEMIDE 10 MG/ML
40 INJECTION INTRAMUSCULAR; INTRAVENOUS ONCE
Status: COMPLETED | OUTPATIENT
Start: 2023-07-27 | End: 2023-07-27

## 2023-07-27 RX ORDER — CITALOPRAM 20 MG/1
20 TABLET ORAL DAILY
Status: DISCONTINUED | OUTPATIENT
Start: 2023-07-28 | End: 2023-08-04 | Stop reason: HOSPADM

## 2023-07-27 RX ORDER — WARFARIN SODIUM 5 MG/1
5 TABLET ORAL
Status: DISCONTINUED | OUTPATIENT
Start: 2023-07-27 | End: 2023-07-27

## 2023-07-27 RX ORDER — ATORVASTATIN CALCIUM 40 MG/1
40 TABLET, FILM COATED ORAL
Status: DISCONTINUED | OUTPATIENT
Start: 2023-07-28 | End: 2023-08-04 | Stop reason: HOSPADM

## 2023-07-27 RX ORDER — FUROSEMIDE 10 MG/ML
40 INJECTION INTRAMUSCULAR; INTRAVENOUS
Status: DISCONTINUED | OUTPATIENT
Start: 2023-07-28 | End: 2023-07-28

## 2023-07-27 RX ADMIN — FUROSEMIDE 40 MG: 10 INJECTION, SOLUTION INTRAMUSCULAR; INTRAVENOUS at 17:01

## 2023-07-27 RX ADMIN — WARFARIN SODIUM 2.5 MG: 2.5 TABLET ORAL at 19:55

## 2023-07-27 RX ADMIN — CEFTRIAXONE SODIUM 1000 MG: 10 INJECTION, POWDER, FOR SOLUTION INTRAVENOUS at 17:01

## 2023-07-27 RX ADMIN — MONTELUKAST 10 MG: 10 TABLET, FILM COATED ORAL at 21:54

## 2023-07-27 NOTE — ED NOTES
Please contact Nirali at [address] with any questions regarding pt care.  [id number] Prisca Quan RN  07/27/23 8040

## 2023-07-27 NOTE — ED ATTENDING ATTESTATION
7/27/2023  IVeda MD, saw and evaluated the patient. I have discussed the patient with the resident/non-physician practitioner and agree with the resident's/non-physician practitioner's findings, Plan of Care, and MDM as documented in the resident's/non-physician practitioner's note, except where noted. All available labs and Radiology studies were reviewed. I was present for key portions of any procedure(s) performed by the resident/non-physician practitioner and I was immediately available to provide assistance. At this point I agree with the current assessment done in the Emergency Department. I have conducted an independent evaluation of this patient a history and physical is as follows:  Patient presents for evaluation of congestive heart failure she had a 15 to 20 pound weight gain over the last several weeks she is on diuretics 40 mg Lasix daily  Has a history of aortic valve replacement history of diastolic heart failure history of tricuspid regurgitation  And has had V. tach and has an AICD pacer  No shocks  No chest pain or pressure  Has had orthopnea  Patient's had increasing dyspnea on exertion with several steps  Has had increased leg swelling  History of DVT  Patient is compliant with warfarin and her diuretics  Patient is on amiodarone  Patient had a recent urine culture was positive for greater than 100,000 colonies of E.  Coli 7/21  No dysuria and no back pain and no fever  Cardiac work-up IV diuretic  Will treat UTI with antibiotic    Records have been reviewed  ED Course   EKG is paced  Chest x-ray shows cardiomegaly with some mild cephalization    Impression congestive heart failure  Patient's BUN and creatinine are elevated from baseline    BNP is elevated in the 800 range  High-sensitivity troponin is 38  Urine positive for WBCs  Critical Care Time  Procedures    The patient presented with a condition in which there was a high probability of imminent or life-threatening deterioration, and critical care services (excluding separately billable procedures and total teaching time ) total 30 minutes

## 2023-07-28 ENCOUNTER — APPOINTMENT (INPATIENT)
Dept: NON INVASIVE DIAGNOSTICS | Facility: HOSPITAL | Age: 78
DRG: 291 | End: 2023-07-28
Payer: COMMERCIAL

## 2023-07-28 LAB
ALBUMIN SERPL BCP-MCNC: 2.7 G/DL (ref 3.5–5)
ALBUMIN SERPL ELPH-MCNC: 3.05 G/DL (ref 3.2–5.1)
ALBUMIN SERPL ELPH-MCNC: 46.9 % (ref 48–70)
ALP SERPL-CCNC: 124 U/L (ref 46–116)
ALPHA1 GLOB SERPL ELPH-MCNC: 0.31 G/DL (ref 0.15–0.47)
ALPHA1 GLOB SERPL ELPH-MCNC: 4.8 % (ref 1.8–7)
ALPHA2 GLOB SERPL ELPH-MCNC: 0.59 G/DL (ref 0.42–1.04)
ALPHA2 GLOB SERPL ELPH-MCNC: 9 % (ref 5.9–14.9)
ALT SERPL W P-5'-P-CCNC: 22 U/L (ref 12–78)
ANION GAP SERPL CALCULATED.3IONS-SCNC: 7 MMOL/L
AORTIC VALVE MEAN VELOCITY: 11.3 M/S
APICAL FOUR CHAMBER EJECTION FRACTION: 63 %
AST SERPL W P-5'-P-CCNC: 52 U/L (ref 5–45)
ATRIAL RATE: 60 BPM
AV LVOT MEAN GRADIENT: 2 MMHG
AV LVOT PEAK GRADIENT: 4 MMHG
AV MEAN GRADIENT: 6 MMHG
AV PEAK GRADIENT: 12 MMHG
AV VELOCITY RATIO: 0.56
BASOPHILS # BLD AUTO: 0.02 THOUSANDS/ÂΜL (ref 0–0.1)
BASOPHILS NFR BLD AUTO: 1 % (ref 0–1)
BETA GLOB ABNORMAL SERPL ELPH-MCNC: 0.44 G/DL (ref 0.31–0.57)
BETA1 GLOB SERPL ELPH-MCNC: 6.8 % (ref 4.7–7.7)
BETA2 GLOB SERPL ELPH-MCNC: 6.8 % (ref 3.1–7.9)
BETA2+GAMMA GLOB SERPL ELPH-MCNC: 0.44 G/DL (ref 0.2–0.58)
BILIRUB SERPL-MCNC: 0.96 MG/DL (ref 0.2–1)
BUN SERPL-MCNC: 22 MG/DL (ref 5–25)
CALCIUM ALBUM COR SERPL-MCNC: 9.7 MG/DL (ref 8.3–10.1)
CALCIUM SERPL-MCNC: 8.7 MG/DL (ref 8.3–10.1)
CHLORIDE SERPL-SCNC: 107 MMOL/L (ref 96–108)
CO2 SERPL-SCNC: 25 MMOL/L (ref 21–32)
CREAT SERPL-MCNC: 0.77 MG/DL (ref 0.6–1.3)
DOP CALC AO PEAK VEL: 1.76 M/S
DOP CALC AO VTI: 32.17 CM
DOP CALC LVOT PEAK VEL VTI: 19.76 CM
DOP CALC LVOT PEAK VEL: 0.99 M/S
E WAVE DECELERATION TIME: 213 MS
EOSINOPHIL # BLD AUTO: 0.05 THOUSAND/ÂΜL (ref 0–0.61)
EOSINOPHIL NFR BLD AUTO: 2 % (ref 0–6)
ERYTHROCYTE [DISTWIDTH] IN BLOOD BY AUTOMATED COUNT: 15.7 % (ref 11.6–15.1)
FRACTIONAL SHORTENING: 27 (ref 28–44)
GAMMA GLOB ABNORMAL SERPL ELPH-MCNC: 1.67 G/DL (ref 0.4–1.66)
GAMMA GLOB SERPL ELPH-MCNC: 25.7 % (ref 6.9–22.3)
GFR SERPL CREATININE-BSD FRML MDRD: 74 ML/MIN/1.73SQ M
GLUCOSE SERPL-MCNC: 118 MG/DL (ref 65–140)
HCT VFR BLD AUTO: 29.7 % (ref 34.8–46.1)
HGB BLD-MCNC: 9.4 G/DL (ref 11.5–15.4)
HGB RETIC QN AUTO: 31.3 PG (ref 30–38.3)
IGG/ALB SER: 0.88 {RATIO} (ref 1.1–1.8)
IMM GRANULOCYTES # BLD AUTO: 0.01 THOUSAND/UL (ref 0–0.2)
IMM GRANULOCYTES NFR BLD AUTO: 0 % (ref 0–2)
IMM RETICS NFR: 29.6 % (ref 0–14)
INR PPP: 3.09 (ref 0.84–1.19)
INTERPRETATION UR IFE-IMP: NORMAL
INTERVENTRICULAR SEPTUM IN DIASTOLE (PARASTERNAL SHORT AXIS VIEW): 1.1 CM
INTERVENTRICULAR SEPTUM: 1.1 CM (ref 0.6–1.1)
LEFT INTERNAL DIMENSION IN SYSTOLE: 3.7 CM (ref 2.1–4)
LEFT VENTRICULAR INTERNAL DIMENSION IN DIASTOLE: 5.1 CM (ref 3.5–6)
LEFT VENTRICULAR POSTERIOR WALL IN END DIASTOLE: 1.2 CM
LEFT VENTRICULAR STROKE VOLUME: 65 ML
LVSV (TEICH): 65 ML
LYMPHOCYTES # BLD AUTO: 0.52 THOUSANDS/ÂΜL (ref 0.6–4.47)
LYMPHOCYTES NFR BLD AUTO: 19 % (ref 14–44)
MCH RBC QN AUTO: 32.4 PG (ref 26.8–34.3)
MCHC RBC AUTO-ENTMCNC: 31.6 G/DL (ref 31.4–37.4)
MCV RBC AUTO: 102 FL (ref 82–98)
MONOCYTES # BLD AUTO: 0.36 THOUSAND/ÂΜL (ref 0.17–1.22)
MONOCYTES NFR BLD AUTO: 13 % (ref 4–12)
MV E'TISSUE VEL-SEP: 6 CM/S
MV PEAK A VEL: 0.72 M/S
MV PEAK E VEL: 124 CM/S
MV STENOSIS PRESSURE HALF TIME: 62 MS
MV VALVE AREA P 1/2 METHOD: 3.55
NEUTROPHILS # BLD AUTO: 1.8 THOUSANDS/ÂΜL (ref 1.85–7.62)
NEUTS SEG NFR BLD AUTO: 65 % (ref 43–75)
NRBC BLD AUTO-RTO: 0 /100 WBCS
PLATELET # BLD AUTO: 123 THOUSANDS/UL (ref 149–390)
PMV BLD AUTO: 10.6 FL (ref 8.9–12.7)
POTASSIUM SERPL-SCNC: 4 MMOL/L (ref 3.5–5.3)
PROT PATTERN SERPL ELPH-IMP: ABNORMAL
PROT SERPL-MCNC: 6.5 G/DL (ref 6.4–8.2)
PROT SERPL-MCNC: 6.9 G/DL (ref 6.4–8.4)
PROTHROMBIN TIME: 32.2 SECONDS (ref 11.6–14.5)
QRS AXIS: 254 DEGREES
QRSD INTERVAL: 182 MS
QT INTERVAL: 534 MS
QTC INTERVAL: 534 MS
RA PRESSURE ESTIMATED: 15 MMHG
RBC # BLD AUTO: 2.9 MILLION/UL (ref 3.81–5.12)
RETICS # AUTO: ABNORMAL 10*3/UL (ref 14097–95744)
RETICS # CALC: 2.23 % (ref 0.37–1.87)
RIGHT VENTRICLE ID DIMENSION: 6 CM
RV PSP: 49 MMHG
SL CV LV EF: 60
SL CV PED ECHO LEFT VENTRICLE DIASTOLIC VOLUME (MOD BIPLANE) 2D: 123 ML
SL CV PED ECHO LEFT VENTRICLE SYSTOLIC VOLUME (MOD BIPLANE) 2D: 58 ML
SODIUM SERPL-SCNC: 139 MMOL/L (ref 135–147)
T WAVE AXIS: 68 DEGREES
TR MAX PG: 34 MMHG
TR PEAK VELOCITY: 2.9 M/S
TRICUSPID VALVE PEAK REGURGITATION VELOCITY: 2.9 M/S
VENTRICULAR RATE: 60 BPM
VIT B12 SERPL-MCNC: 1602 PG/ML (ref 180–914)
WBC # BLD AUTO: 2.76 THOUSAND/UL (ref 4.31–10.16)

## 2023-07-28 PROCEDURE — 93321 DOPPLER ECHO F-UP/LMTD STD: CPT

## 2023-07-28 PROCEDURE — 85610 PROTHROMBIN TIME: CPT | Performed by: INTERNAL MEDICINE

## 2023-07-28 PROCEDURE — 80053 COMPREHEN METABOLIC PANEL: CPT | Performed by: INTERNAL MEDICINE

## 2023-07-28 PROCEDURE — 85046 RETICYTE/HGB CONCENTRATE: CPT | Performed by: INTERNAL MEDICINE

## 2023-07-28 PROCEDURE — 36415 COLL VENOUS BLD VENIPUNCTURE: CPT | Performed by: INTERNAL MEDICINE

## 2023-07-28 PROCEDURE — 93308 TTE F-UP OR LMTD: CPT | Performed by: INTERNAL MEDICINE

## 2023-07-28 PROCEDURE — 93308 TTE F-UP OR LMTD: CPT

## 2023-07-28 PROCEDURE — 82607 VITAMIN B-12: CPT | Performed by: INTERNAL MEDICINE

## 2023-07-28 PROCEDURE — 97167 OT EVAL HIGH COMPLEX 60 MIN: CPT

## 2023-07-28 PROCEDURE — 84165 PROTEIN E-PHORESIS SERUM: CPT | Performed by: PATHOLOGY

## 2023-07-28 PROCEDURE — 85025 COMPLETE CBC W/AUTO DIFF WBC: CPT | Performed by: INTERNAL MEDICINE

## 2023-07-28 PROCEDURE — 93325 DOPPLER ECHO COLOR FLOW MAPG: CPT

## 2023-07-28 PROCEDURE — 93010 ELECTROCARDIOGRAM REPORT: CPT | Performed by: INTERNAL MEDICINE

## 2023-07-28 PROCEDURE — 93325 DOPPLER ECHO COLOR FLOW MAPG: CPT | Performed by: INTERNAL MEDICINE

## 2023-07-28 PROCEDURE — 84165 PROTEIN E-PHORESIS SERUM: CPT | Performed by: INTERNAL MEDICINE

## 2023-07-28 PROCEDURE — 99232 SBSQ HOSP IP/OBS MODERATE 35: CPT | Performed by: NURSE PRACTITIONER

## 2023-07-28 PROCEDURE — 86334 IMMUNOFIX E-PHORESIS SERUM: CPT | Performed by: INTERNAL MEDICINE

## 2023-07-28 PROCEDURE — 97163 PT EVAL HIGH COMPLEX 45 MIN: CPT

## 2023-07-28 PROCEDURE — 93321 DOPPLER ECHO F-UP/LMTD STD: CPT | Performed by: INTERNAL MEDICINE

## 2023-07-28 PROCEDURE — 86334 IMMUNOFIX E-PHORESIS SERUM: CPT | Performed by: PATHOLOGY

## 2023-07-28 PROCEDURE — 99223 1ST HOSP IP/OBS HIGH 75: CPT | Performed by: INTERNAL MEDICINE

## 2023-07-28 RX ORDER — POTASSIUM CHLORIDE 20 MEQ/1
40 TABLET, EXTENDED RELEASE ORAL ONCE
Status: COMPLETED | OUTPATIENT
Start: 2023-07-28 | End: 2023-07-28

## 2023-07-28 RX ORDER — FUROSEMIDE 10 MG/ML
80 INJECTION INTRAMUSCULAR; INTRAVENOUS ONCE
Status: COMPLETED | OUTPATIENT
Start: 2023-07-28 | End: 2023-07-28

## 2023-07-28 RX ORDER — FUROSEMIDE 10 MG/ML
10 SYRINGE (ML) INJECTION CONTINUOUS
Status: DISCONTINUED | OUTPATIENT
Start: 2023-07-28 | End: 2023-07-29

## 2023-07-28 RX ADMIN — WARFARIN SODIUM 2.5 MG: 2.5 TABLET ORAL at 17:38

## 2023-07-28 RX ADMIN — ATORVASTATIN CALCIUM 40 MG: 40 TABLET, FILM COATED ORAL at 15:46

## 2023-07-28 RX ADMIN — POTASSIUM CHLORIDE 40 MEQ: 1500 TABLET, EXTENDED RELEASE ORAL at 15:46

## 2023-07-28 RX ADMIN — FUROSEMIDE 40 MG: 10 INJECTION, SOLUTION INTRAMUSCULAR; INTRAVENOUS at 07:34

## 2023-07-28 RX ADMIN — FUROSEMIDE 80 MG: 10 INJECTION, SOLUTION INTRAMUSCULAR; INTRAVENOUS at 15:46

## 2023-07-28 RX ADMIN — Medication 10 MG/HR: at 17:32

## 2023-07-28 RX ADMIN — MONTELUKAST 10 MG: 10 TABLET, FILM COATED ORAL at 22:05

## 2023-07-28 RX ADMIN — CEFTRIAXONE 1000 MG: 1 INJECTION, POWDER, FOR SOLUTION INTRAMUSCULAR; INTRAVENOUS at 17:38

## 2023-07-28 RX ADMIN — METOPROLOL SUCCINATE 25 MG: 25 TABLET, FILM COATED, EXTENDED RELEASE ORAL at 09:19

## 2023-07-28 RX ADMIN — AMIODARONE HYDROCHLORIDE 200 MG: 200 TABLET ORAL at 07:34

## 2023-07-28 RX ADMIN — CITALOPRAM HYDROBROMIDE 20 MG: 20 TABLET ORAL at 09:19

## 2023-07-28 RX ADMIN — ALLOPURINOL 100 MG: 100 TABLET ORAL at 10:59

## 2023-07-28 NOTE — ASSESSMENT & PLAN NOTE
Patient with history of chronic ITP, however also noted leukopenia and anemia, will need outpatient follow-up with heme-onc, will check reticulocyte count, LDH, folate, TSH, spep and upep; pending. Concern for bone marrow abnormality, mds.   B 12 0039

## 2023-07-28 NOTE — ASSESSMENT & PLAN NOTE
Patient with history of chronic ITP, however also noted leukopenia and anemia, will need outpatient follow-up with heme-onc, will check B12, reticulocyte count, LDH, folate, TSH, spep and upep. Concern for bone marrow abnormality, mds.

## 2023-07-28 NOTE — OCCUPATIONAL THERAPY NOTE
Occupational Therapy Evaluation     Patient Name: Farhad Mirza  TCNLT'N Date: 7/28/2023  Problem List  Principal Problem:    Acute on chronic heart failure with preserved ejection fraction ([address])  Active Problems:    H/O mechanical aortic valve replacement    Type 2 diabetes mellitus ([address])    UTI (urinary tract infection)    A-fib (HCC)    Pancytopenia (HCC)    Past Medical History  Past Medical History:   Diagnosis Date    Arthritis     Asthma     Diabetes ([address])     Environmental allergies     Heart murmur     Hypertension      Past Surgical History  Past Surgical History:   Procedure Laterality Date    MECHANICAL AORTIC VALVE REPLACEMENT      REPLACEMENT TOTAL KNEE N/A     REPLACEMENT TOTAL KNEE             07/28/23 0941   OT Last Visit   OT Visit Date 07/28/23   Note Type   Note type Evaluation   Pain Assessment   Pain Assessment Tool 0-10   Pain Score No Pain   Restrictions/Precautions   Weight Bearing Precautions Per Order No   Other Precautions Telemetry; Fall Risk; Chair Alarm   Home Living   Type of Home House; Apartment  (pt's home is an apt, however she spends a lot of time at her SO's house, which is 3 SH)   [address] to enter with rails;Bed/bath upstairs; Two level  (home: 1 LIZ, chair lift to second floor)   Bathroom Shower/Tub Tub/shower unit  (has cut out for entry/exit)   Bathroom Toilet Raised   Bathroom Equipment Grab bars in shower; Shower chair   [address] [place];Cane  (uses [facility] baseline)   Additional Comments above set-up is her significant others home, which is where she is staying primarily. If she were to go to her apt, it is one floor with tub/S toilet. Prior Function   Level of [place] Independent with ADLs; Needs assistance with IADLS; Independent with functional mobility  (reports she has groceries delivered, can cook if she needs to but her daughters are local and assist.)   Lives With Significant other   Receives Help From [facility] IADLs Family/Friend/Other provides transportation; Family/Friend/Other provides meals; Family/Friend/Other provides medication management   Falls in the last 6 months 1 to 4  (1)   Vocational Retired   Lifestyle   Autonomy pta, pt was I W ADL, rq A for IADL (groceries delievered, family is local and can assist as well. Reciprocal Relationships supportive significant other, daughters who can assist   Service to Others retired   Intrinsic Gratification spending time with her family, reading   Subjective   Subjective "I am doing ok"   ADL   Where Assessed Edge of bed   Eating Assistance 6  Modified independent   Grooming Assistance 5  Supervision/Setup   UB Bathing Assistance 4  Minimal Assistance   LB Bathing Assistance 3  Moderate Assistance   UB Dressing Assistance 4  Minimal Assistance   LB Dressing Assistance 3  Moderate Assistance   Toileting Assistance  3  Moderate Assistance   Functional Assistance 3  Moderate Assistance   Bed Mobility   Additional Comments found and left in chair w all needs in reach   Transfers   Sit to Stand 5  Supervision   Additional items Increased time required   Stand to Sit 5  Supervision   Additional items Increased time required   Additional Comments c spc   Functional Mobility   Functional Mobility 4  Minimal assistance   Additional Comments initally S, as session cont she became inc SOB and required min A to complete session. Lowest O2 noted was 84%, quickly recovers to 95 w rest and deep breathing techniques. Additional items SPC   Balance   Static Sitting Fair +   Dynamic Sitting Fair   Static Standing Fair -   Dynamic Standing Poor +   Ambulatory Poor +   Activity Tolerance   Activity Tolerance Patient limited by fatigue   Medical Staff Made Aware DPT Serena 2' pts med complexity, comrobidities and regression from baseline.    Nurse Made Aware ok per RN   RUE Assessment   RUE Assessment WFL   LUE Assessment   LUE Assessment WFL   Hand Function   Gross Motor Coordination Functional   Fine Motor Coordination Functional   Psychosocial   Psychosocial (WDL) WDL   Cognition   Overall Cognitive Status WFL   Arousal/Participation Alert; Responsive; Cooperative   Attention Within functional limits   Orientation Level Oriented X4   Memory Within functional limits   Following Commands Follows all commands and directions without difficulty   Comments pt pleasant and cooperative, overall G safety awareness and insight to condition t/o session. Assessment   Limitation Decreased ADL status; Decreased endurance;Decreased self-care trans;Decreased high-level ADLs   Prognosis Good   Assessment Pt is a 68 y.o. female admitted 7/27/23 w inc SOB, dec urine outpt, and inc LE swelling. Pt final reason for admission is acute on chronic heart failure with preserved EF. PT w active OT eval and treat orders at this time. PMH includes  has a past medical history of Arthritis, Asthma, Diabetes ([address]), Environmental allergies, Heart murmur, and Hypertension. Pt reports she mostly stays at her significant other's home, which is a 3 SH with bed.bath on second floor with stair glide to second floor. Bathroom is a tub shower with cut out, grab bars and shower chair, standard toilet with riser on seat. Her home is an apt w 0 LIZ, tub shower and standard toilet. Pta, pt was independent w/ ADL/, shared IADL tasks and used [facility] for functional mobility, was not driving. Currently, pt is Min Ax1 for UB ADL, Mod Ax1 for LB ADL, and completed transfers/FM w Min Ax1. Pt is limited at this time 2* decreased endurance/activtiy tolerance, decreased cognition, decreased ADL/High-level ADL status, decreased self-care trans, decreased safety awareness, limited home support and is a fall risk. This impacts pt's ability to complete UB and LB dressing and bathing, toileting, transfers, functional mobility, community mobility, home and health maintenance, and safe engagement in typical daily routine.  The patient's raw score on the AM-PAC Daily Activity inpatient short form is 18, standardized score is 38.66, less than 39.4. Patients at this level are likely to benefit from discharge to post-acute rehabilitation services. Please refer to the recommendation of the Occupational Therapist for safe discharge planning. From OT standpoint, pt should D/C to STR when medically stable. Pt will benefit from continued acute OT services 2-3 x/wk for 10-14 days to meet goals. Goals   Patient Goals get better   LTG Time Frame 10-14   Long Term Goal #1 see below   Plan   Treatment Interventions ADL retraining;Functional transfer training; Endurance training;Patient/family training;Equipment evaluation/education; Compensatory technique education; Energy conservation; Activityengagement   Goal Expiration Date 08/11/23   OT Frequency 2-3x/wk   Recommendation   OT Discharge Recommendation Post acute rehabilitation services   Additional Comments  vs home w home health pending cont progress. AM-PAC Daily Activity Inpatient   Lower Body Dressing 2   Bathing 2   Toileting 3   Upper Body Dressing 3   Grooming 4   Eating 4   Daily Activity Raw Score 18   Daily Activity Standardized Score (Calc for Raw Score >=11) 38.66   AM-PAC Applied Cognition Inpatient   Following a Speech/Presentation 4   Understanding Ordinary Conversation 4   Taking Medications 4   Remembering Where Things Are Placed or Put Away 4   Remembering List of 4-5 Errands 4   Taking Care of Complicated Tasks 4   Applied Cognition Raw Score 24   Applied Cognition Standardized Score 62.21   Modified Jim Scale   Modified Gratiot Scale 4   End of Consult   Education Provided Yes   Patient Position at End of Consult Bedside chair; All needs within reach;Bed/Chair alarm activated   Nurse Communication Nurse aware of consult     Pt will complete functional mobility with Mod I using appropriate DME as needed. Pt will complete UB dressing and bathing with Mod I using appropriate DME as needed.      Pt will complete LB dressing and bathing with Mod I using appropriate DME as needed. Pt will complete transfers with Mod I using appropriate DME as needed. Pt will complete toileting with Mod I using appropriate DME as needed. Pt will utilize energy conservation techniques throughout functional activity/ADL s/p skilled education. Pt will demonstrate increased safety awareness during functional tasks/ADL's s/p skilled education. Pt will increase activity tolerance to 30 minutes in order to complete ADL's/ functional tasks, using appropriate DME as needed.          Cherylene Majestic, MOT, OTR/L

## 2023-07-28 NOTE — PROGRESS NOTES
[address] [facility]  Progress Note  Name: Ruth Lema  MRN: [id number]  Unit/Bed#: -71 I Date of Admission: 7/27/2023   Date of Service: 7/28/2023 I Hospital Day: 1    Assessment/Plan   * Acute on chronic heart failure with preserved ejection fraction [facility])  Assessment & Plan  Wt Readings from Last 3 Encounters:   07/28/23 88.5 kg (195 lb)   07/25/23 88.5 kg (195 lb)   07/10/23 88.5 kg (195 lb)       Patient with history of diastolic heart failure presenting with several weeks of worsening shortness of breath. Patient's creatinine is currently at baseline, her last recorded weight was 195 pounds on 7/25, current weight 195 lbs   She takes 40 mg of Lasix as an outpatient, continue on IV Lasix 40 mg twice daily and monitor output  Echocardiogram results pending       A-fib [facility])  Assessment & Plan  We will continue beta-blocker and warfarin for anticoagulation  Monitor INR     H/O mechanical aortic valve replacement  Assessment & Plan  Patient with history of mechanical aortic valve placement, will continue on Coumadin, current INR 3.09, goal INR 2.5-3.5    Pancytopenia ([address])  Assessment & Plan  Patient with history of chronic ITP, however also noted leukopenia and anemia, will need outpatient follow-up with heme-onc, will check reticulocyte count, LDH, folate, TSH, spep and upep; pending. Concern for bone marrow abnormality, mds. B 12 1602     Type 2 diabetes mellitus ([address])  Assessment & Plan  Lab Results   Component Value Date    HGBA1C 5.9 (H) 06/30/2023       No results for input(s): "POCGLU" in the last 72 hours.     Blood Sugar Average: Last 72 hrs:  History of type 2 diabetes on oral meds  Continue sliding scale    UTI (urinary tract infection)  Assessment & Plan  Patient reports decreased urine output and suprapubic discomfort along with fatigue over the past several days  Plan for 3 days of ceftriaxone pending urine culture and sensitivites; day 2/3             VTE Pharmacologic Prophylaxis: VTE Score: 1 Moderate Risk (Score 3-4) - Pharmacological DVT Prophylaxis Ordered: warfarin (Coumadin). Patient Centered Rounds: I performed bedside rounds with nursing staff today. Discussions with Specialists or Other Care Team Provider: Case management     Education and Discussions with Family / Patient: Attempted to update  (daughter) via phone. Unable to contact. Total Time Spent on Date of Encounter in care of patient: 45 minutes This time was spent on one or more of the following: performing physical exam; counseling and coordination of care; obtaining or reviewing history; documenting in the medical record; reviewing/ordering tests, medications or procedures; communicating with other healthcare professionals and discussing with patient's family/caregivers. Current Length of Stay: 1 day(s)  Current Patient Status: Inpatient   Certification Statement: The patient will continue to require additional inpatient hospital stay due to continued diuresis monitor weight and intake/output   Discharge Plan: Anticipate discharge in 24-48 hrs to discharge location to be determined pending rehab evaluations. Code Status: Level 1 - Full Code    Subjective:   Patient seen sitting up in chair, having breakfast. Is concerned about her CHF, is asking if this is something that is preventable. We did discuss diet, daily weights. She did not sleep well last night due to interruptions. Good appetite, no nausea or vomiting. Feels that she has lost some of the water in her abdomen and legs     Objective:     Vitals:   Temp (24hrs), Av.2 °F (36.8 °C), Min:98.1 °F (36.7 °C), Max:98.2 °F (36.8 °C)    Temp:  [98.1 °F (36.7 °C)-98.2 °F (36.8 °C)] 98.2 °F (36.8 °C)  HR:  [60-64] 61  Resp:  [17-22] 17  BP: (115-146)/(52-70) 133/63  SpO2:  [94 %-100 %] 95 %  Body mass index is 40.76 kg/m². Input and Output Summary (last 24 hours):      Intake/Output Summary (Last 24 hours) at 7/28/2023 1234  Last data filed at 7/28/2023 1001  Gross per 24 hour   Intake 300 ml   Output 1300 ml   Net -1000 ml       Physical Exam:   Physical Exam  Vitals and nursing note reviewed. Constitutional:       General: She is not in acute distress. HENT:      Head: Normocephalic. Nose: Nose normal.      Mouth/Throat:      Mouth: Mucous membranes are moist.   Eyes:      Extraocular Movements: Extraocular movements intact. Conjunctiva/sclera: Conjunctivae normal.      Pupils: Pupils are equal, round, and reactive to light. Cardiovascular:      Rate and Rhythm: Normal rate. Rhythm irregular. Heart sounds: Murmur heard. Pulmonary:      Effort: Pulmonary effort is normal.      Breath sounds: Normal breath sounds. Abdominal:      General: Bowel sounds are normal. There is no distension. Palpations: Abdomen is soft. Tenderness: There is no abdominal tenderness. Genitourinary:     Comments: Voiding spontaneously   Musculoskeletal:         General: Normal range of motion. Cervical back: Normal range of motion. Skin:     General: Skin is warm and dry. Capillary Refill: Capillary refill takes less than 2 seconds. Neurological:      General: No focal deficit present. Mental Status: She is alert and oriented to person, place, and time. Psychiatric:         Mood and Affect: Mood normal.         Behavior: Behavior normal.         Thought Content:  Thought content normal.         Judgment: Judgment normal.          Additional Data:     Labs:  Results from last 7 days   Lab Units 07/28/23  0427   WBC Thousand/uL 2.76*   HEMOGLOBIN g/dL 9.4*   HEMATOCRIT % 29.7*   PLATELETS Thousands/uL 123*   NEUTROS PCT % 65   LYMPHS PCT % 19   MONOS PCT % 13*   EOS PCT % 2     Results from last 7 days   Lab Units 07/28/23  0427   SODIUM mmol/L 139   POTASSIUM mmol/L 4.0   CHLORIDE mmol/L 107   CO2 mmol/L 25   BUN mg/dL 22   CREATININE mg/dL 0.77   ANION GAP mmol/L 7   CALCIUM mg/dL 8.7 ALBUMIN g/dL 2.7*   TOTAL BILIRUBIN mg/dL 0.96   ALK PHOS U/L 124*   ALT U/L 22   AST U/L 52*   GLUCOSE RANDOM mg/dL 118     Results from last 7 days   Lab Units 07/28/23  0427   INR  3.09*                   Lines/Drains:  Invasive Devices     Peripheral Intravenous Line  Duration           Peripheral IV 07/27/23 Right Arm <1 day    Peripheral IV 07/28/23 Distal;Dorsal (posterior); Left Forearm <1 day                      Imaging: Reviewed radiology reports from this admission including: chest xray    Recent Cultures (last 7 days):         Last 24 Hours Medication List:   Current Facility-Administered Medications   Medication Dose Route Frequency Provider Last Rate   • allopurinol  100 mg Oral Daily Olaf Lewis, DO     • amiodarone  200 mg Oral Daily With Breakfast Olaf Lewis, DO     • atorvastatin  40 mg Oral Daily With [employer], DO     • cefTRIAXone  1,000 mg Intravenous Q24H Caban An     • citalopram  20 mg Oral Daily Olafivone Lewis, DO     • furosemide  40 mg Intravenous BID (diuretic) Olaf Lewis, DO     • metoprolol succinate  25 mg Oral Daily Olaf Lewis, DO     • montelukast  10 mg Oral HS Olafivone Lewis, DO     • warfarin  2.5 mg Oral Daily (warfarin) Eze Coyle DO          Today, Patient Was Seen By: JOSEPHINE Dsouza    **Please Note: This note may have been constructed using a voice recognition system. **

## 2023-07-28 NOTE — ASSESSMENT & PLAN NOTE
Lab Results   Component Value Date    HGBA1C 5.9 (H) 06/30/2023       No results for input(s): "POCGLU" in the last 72 hours.     Blood Sugar Average: Last 72 hrs:  History of type 2 diabetes on oral meds  Continue sliding scale

## 2023-07-28 NOTE — H&P
[address] [facility]  H&P  Name: Camden Escudero 68 y.o. female I MRN: [id number]  Unit/Bed#: ED 05 I Date of Admission: 7/27/2023   Date of Service: 7/27/2023 I Hospital Day: 0      Assessment/Plan   * Acute on chronic heart failure with preserved ejection fraction [facility])  Assessment & Plan  Wt Readings from Last 3 Encounters:   07/25/23 88.5 kg (195 lb)   07/10/23 88.5 kg (195 lb)   06/13/23 92.5 kg (204 lb)       Patient with history of diastolic heart failure presenting with several weeks of worsening shortness of breath. Patient's creatinine is currently at baseline, her last recorded weight was 195 pounds on 7/25, current weight pending  She takes 40 mg of Lasix as an outpatient, will start on IV Lasix 40 mg twice daily and monitor output  Echocardiogram pending      Pancytopenia [facility])  Assessment & Plan  Patient with history of chronic ITP, however also noted leukopenia and anemia, will need outpatient follow-up with heme-onc, will check B12, reticulocyte count, LDH, folate, TSH, spep and upep. Concern for bone marrow abnormality, mds. A-fib [facility])  Assessment & Plan  We will continue beta-blocker and warfarin for anticoagulation    UTI (urinary tract infection)  Assessment & Plan  Patient reports decreased urine output and suprapubic discomfort along with fatigue over the past several days  Plan for 3 days of ceftriaxone pending urine culture and sensitivites    Type 2 diabetes mellitus ([address])  Assessment & Plan  Lab Results   Component Value Date    HGBA1C 5.9 (H) 06/30/2023       No results for input(s): "POCGLU" in the last 72 hours.     Blood Sugar Average: Last 72 hrs:  History of type 2 diabetes on oral meds  Continue sliding scale    H/O mechanical aortic valve replacement  Assessment & Plan  Patient with history of mechanical aortic valve placement, will continue on Coumadin, current INR 2.9, goal INR 2.5-3.5               VTE Pharmacologic Prophylaxis:   High Risk (Score >/= 5) - Pharmacological DVT Prophylaxis Ordered: warfarin (Coumadin). Sequential Compression Devices Ordered. Code Status: Level 1 - Full Code   Discussion with family: Updated  (daughter) at bedside. Anticipated Length of Stay: Patient will be admitted on an inpatient basis with an anticipated length of stay of greater than 2 midnights secondary to Heart failure. Total Time Spent on Date of Encounter in care of patient: 40 minutes This time was spent on one or more of the following: performing physical exam; counseling and coordination of care; obtaining or reviewing history; documenting in the medical record; reviewing/ordering tests, medications or procedures; communicating with other healthcare professionals and discussing with patient's family/caregivers. Chief Complaint: Shortness of breath    History of Present Illness:  José Antonio Lucas is a 68 y.o. female with a PMH of A-fib on Eliquis, diabetes, hypertension, CAD, aortic metallic valve, history of endocarditis, V. tach status post AICD placement, chronic diastolic heart failure, presents for evaluation of worsening shortness of breath. Patient reports progressively worsening shortness of breath over the past several weeks along with decreased urine output, increased leg swelling and orthopnea. Patient denies fevers chills dysuria urgency frequency flank pain chest pain lightheadedness dizziness syncopal episodes palpitations. Patient also reports gaining 15 to 20 pounds over the past month. During my evaluation she is comfortable on room air, denying any acute complaints apart from shortness of breath. She is a good medical historian, aware of all her medications and reports compliance    Review of Systems:  Review of Systems   Constitutional: Positive for fatigue. Negative for chills and fever. HENT: Negative for ear pain and sore throat. Eyes: Negative for pain and visual disturbance.    Respiratory: Positive for shortness of breath. Negative for cough. Cardiovascular: Positive for leg swelling. Negative for chest pain and palpitations. Gastrointestinal: Negative for abdominal distention, abdominal pain, diarrhea, nausea and vomiting. Endocrine: Negative for polydipsia, polyphagia and polyuria. Genitourinary: Positive for decreased urine volume and difficulty urinating. Negative for dysuria and hematuria. Musculoskeletal: Negative for arthralgias and back pain. Skin: Negative for color change, pallor, rash and wound. Neurological: Negative for dizziness, seizures, syncope, weakness and light-headedness. All other systems reviewed and are negative. Past Medical and Surgical History:   Past Medical History:   Diagnosis Date   • Arthritis    • Asthma    • Diabetes ([address])    • Environmental allergies    • Heart murmur    • Hypertension        Past Surgical History:   Procedure Laterality Date   • MECHANICAL AORTIC VALVE REPLACEMENT     • REPLACEMENT TOTAL KNEE N/A    • REPLACEMENT TOTAL KNEE         Meds/Allergies:  Prior to Admission medications    Medication Sig Start Date End Date Taking?  Authorizing Provider   acetaminophen (TYLENOL) 325 mg tablet Take 2 tablets (650 mg total) by mouth every 6 (six) hours as needed for mild pain 6/1/23   Katerina Patel MD   alendronate (FOSAMAX) 70 mg tablet Take 70 mg by mouth Once a week 6/2/22 7/10/23  Historical Provider, MD   allopurinol (ZYLOPRIM) 100 mg tablet Take 100 mg by mouth daily 11/8/22   Historical Provider, MD   amiodarone 200 mg tablet Take 1 tablet (200 mg total) by mouth daily with breakfast 6/20/23   Matilda Obregon PA-C   atorvastatin (LIPITOR) 40 mg tablet  1/11/23   Historical Provider, MD   Blood Glucose Monitoring Suppl (OneTouch Verio Flex System) w/Device KIT Use as directed 6/2/23   Historical Provider, MD   cholecalciferol (VITAMIN D3) 1,000 units tablet Take 1 tablet (1,000 Units total) by mouth daily Do not start before June 2, 2023. 6/2/23   Ayse Rai MD   citalopram (CeleXA) 20 mg tablet Take 20 mg by mouth daily    Historical Provider, MD   docusate sodium (COLACE) 100 mg capsule Take 1 capsule (100 mg total) by mouth 2 (two) times a day 6/1/23   Ayse Rai MD   Farxiga 5 MG TABS Take 5 mg by mouth daily  Patient not taking: Reported on 7/25/2023 6/28/23   Historical Provider, MD   furosemide (LASIX) 20 mg tablet  12/14/22   Historical Provider, MD   furosemide (LASIX) 20 mg tablet Take 40 mg by mouth daily    Historical Provider, MD   magnesium 30 MG tablet Take 64 mg by mouth 3 (three) times a day Takes 3-4 days a week takes 1 tab. Historical Provider, MD   Metamucil Fiber CHEW Chew 3 gums in the morning. Indications: fiber supplement    Historical Provider, MD   methocarbamol (ROBAXIN) 500 mg tablet Take 0.5 tablets (250 mg total) by mouth 3 (three) times a day as needed for muscle spasms  Patient not taking: Reported on 7/10/2023 6/1/23   Ayse Rai MD   metoprolol succinate (TOPROL-XL) 25 mg 24 hr tablet Take 1 tablet (25 mg total) by mouth daily Do not start before June 2, 2023. 6/2/23   Ayse Rai MD   montelukast (SINGULAIR) 10 mg tablet Take 10 mg by mouth daily at bedtime    Historical Provider, MD   multivitamin (THERAGRAN) TABS Take 1 tablet by mouth daily    Historical Provider, MD   traMADol (Ultram) 50 mg tablet Take 1 tablet (50 mg total) by mouth every 6 (six) hours as needed for severe pain  Patient taking differently: Take 50 mg by mouth every 6 (six) hours as needed for severe pain Has not started yet. 7/5/23   Nirali Gomez PA-C   warfarin (COUMADIN) 5 mg tablet Take 5 mg by mouth daily 2.5mg 2 days out of the week. 5 mg all other days. Next pt inr Thursday might change. Historical Provider, MD     I have reviewed home medications with patient personally. Allergies:    Allergies   Allergen Reactions   • Fentanyl Other (See Comments)     Other reaction(s): Hallucinations  paranoia, confusion  Other reaction(s): Hallucinations  Other reaction(s): Hallucinations  hallucinations   • Fentanyl Confusion       Social History:  Marital Status:    Occupation:   Patient Pre-hospital Living Situation: Home  Patient Pre-hospital Level of Mobility: walks  Patient Pre-hospital Diet Restrictions:   Substance Use History:   Social History     Substance and Sexual Activity   Alcohol Use Yes    Comment: socially     Social History     Tobacco Use   Smoking Status Never   Smokeless Tobacco Never     Social History     Substance and Sexual Activity   Drug Use Never       Family History:  Family History   Problem Relation Age of Onset   • Hypertension Other    • Arthritis Family    • Heart disease Family    • Hypertension Family    • Diabetes type II Family        Physical Exam:     Vitals:   Blood Pressure: 124/60 (07/27/23 1923)  Pulse: 60 (07/27/23 1923)  Temperature: 98.2 °F (36.8 °C) (07/27/23 1556)  Temp Source: Oral (07/27/23 1556)  Respirations: 20 (07/27/23 1923)  SpO2: 97 % (07/27/23 1923)    Physical Exam  Vitals and nursing note reviewed. Constitutional:       General: She is not in acute distress. Appearance: She is well-developed. She is not toxic-appearing or diaphoretic. HENT:      Head: Normocephalic and atraumatic. Eyes:      General: No scleral icterus. Conjunctiva/sclera: Conjunctivae normal.   Cardiovascular:      Rate and Rhythm: Normal rate and regular rhythm. Heart sounds: No murmur heard. No friction rub. No gallop. Pulmonary:      Effort: Pulmonary effort is normal. No respiratory distress. Breath sounds: No stridor. Rales present. No wheezing or rhonchi. Chest:      Chest wall: No tenderness. Abdominal:      General: There is no distension. Palpations: Abdomen is soft. There is no mass. Tenderness: There is no abdominal tenderness. There is no guarding or rebound. Hernia: No hernia is present. Musculoskeletal:         General: No swelling or tenderness. Cervical back: Neck supple. Right lower leg: Edema present. Left lower leg: Edema present. Skin:     General: Skin is warm and dry. Capillary Refill: Capillary refill takes less than 2 seconds. Neurological:      Mental Status: She is alert and oriented to person, place, and time. Psychiatric:         Mood and Affect: Mood normal.          Additional Data:     Lab Results:  Results from last 7 days   Lab Units 07/27/23  1654   WBC Thousand/uL 2.89*   HEMOGLOBIN g/dL 10.6*   HEMATOCRIT % 32.1*   PLATELETS Thousands/uL 154   NEUTROS PCT % 62   LYMPHS PCT % 24   MONOS PCT % 12   EOS PCT % 1     Results from last 7 days   Lab Units 07/27/23  1654   SODIUM mmol/L 133*   POTASSIUM mmol/L 4.9   CHLORIDE mmol/L 105   CO2 mmol/L 22   BUN mg/dL 23   CREATININE mg/dL 0.81   ANION GAP mmol/L 6   CALCIUM mg/dL 9.1   ALBUMIN g/dL 3.2*   TOTAL BILIRUBIN mg/dL 1.28*   ALK PHOS U/L 151*   ALT U/L 28   AST U/L 78*   GLUCOSE RANDOM mg/dL 111     Results from last 7 days   Lab Units 07/27/23  1842   INR  2.94*                   Lines/Drains:  Invasive Devices     Peripheral Intravenous Line  Duration           Peripheral IV 07/27/23 Right Arm <1 day                    Imaging: Reviewed radiology reports from this admission including: chest xray  XR chest 1 view portable    (Results Pending)       EKG and Other Studies Reviewed on Admission:   · EKG: vpaced. ** Please Note: This note has been constructed using a voice recognition system.  **

## 2023-07-28 NOTE — ASSESSMENT & PLAN NOTE
Patient with history of mechanical aortic valve placement, will continue on Coumadin, current INR 2.9, goal INR 2.5-3.5

## 2023-07-28 NOTE — ASSESSMENT & PLAN NOTE
Patient with history of mechanical aortic valve placement, will continue on Coumadin, current INR 3.09, goal INR 2.5-3.5

## 2023-07-28 NOTE — WOUND OSTOMY CARE
Consult Note - Wound   Uli Venegas 68 y.o. female MRN: [id number]  Unit/Bed#: -01 Encounter: [id number]        History and Present Illness:  Patient is a 67 yo female that was admitted to [facility] for treatment of acute on chronic heart failure with preserved ejection fraction. Per H&P, Patient has a PMH of A-fib on Eliquis, diabetes, hypertension, CAD, aortic metallic valve, history of endocarditis, V. tach status post AICD placement, chronic diastolic heart failure. Son-in-Law present at bedside for assessment. Patient is a min assist for turning and repositioning. Patient reports continence of bowel and bladder. On assessment, patient is lying on regular mattress. P-500 specialty mattress ordered for patient. Wound Care was consulted for previous sacral wound. Assessment Findings:   B/L heels are dry intact and maría elena with no skin loss or wounds present. Recommend preventative Hydraguard Cream and proper offloading/ repositioning. 1. Left Foot Great Toe with intact, well adhered scabs. Patient reports that she follows up at podiatrist outpatient for management of area. Recommend preventative hydraguard to area. Picture of area placed below. 2. Left Anterior Pretibial Leg: Irregular in shape, well adhered scabs. Marta-wound area is pink in color. No skin loss or drainage present. Recommend 3M no sting to scabbed area daily. See picture of area below. 3. POA Mid Sacro-Buttocks Stage 3 Pressure Injury: oval in shape area of full thickness skin loss. Wound bed is beefy red granulation tissue. Marta-wound is fragile and macerated. Small serosanguineous drainage noted. Recommend maxorb and allevyn foam dressing to area. Purpose of dressing explained to patient and son in law- stated understanding of teaching. No induration, fluctuance, odor, warmth/temperature differences, redness, or purulence noted to the above noted wounds and skin areas assessed.  New dressings applied per orders listed below. Patient tolerated well- no s/s of non-verbal pain or discomfort observed during the encounter. Bedside nurse aware of plan of care. See flow sheets for more detailed assessment findings. Orders listed below and wound care will continue to follow, call or tiger text with questions. Skin Care Plan:  1-Cleanse sacro-buttocks with soap and water. Pat Dry. Apply maxorb to sacro-buttocks wound bed and cover with allevyn foam dressing. Bao with T for Treatment. Change every other day or PRN soilage/ displacement. 2-Turn/reposition q2h or when medically stable for pressure re-distribution on skin . 3-Elevate heels to offload pressure. 4-Moisturize skin daily with skin nourishing cream  5-Ehob cushion in chair when out of bed. 6-Preventative Hydraguard to bilateral heels, feet and toes BID and PRN.   [id number] mattress ordered for patient      Wounds:  Wound 07/28/23 Sacrum (Active)   Wound Image   07/28/23 1133   Wound Description Beefy red 07/28/23 1200   Pressure Injury Stage 3 07/28/23 1200   Marta-wound Assessment Maceration 07/28/23 1200   Wound Length (cm) 0.5 cm 07/28/23 1133   Wound Width (cm) 0.5 cm 07/28/23 1133   Wound Depth (cm) 0.2 cm 07/28/23 1133   Wound Surface Area (cm^2) 0.25 cm^2 07/28/23 1133   Wound Volume (cm^3) 0.05 cm^3 07/28/23 1133   Calculated Wound Volume (cm^3) 0.05 cm^3 07/28/23 1133   Drainage Amount Small 07/28/23 1200   Drainage Description Serosanguineous 07/28/23 1200   Non-staged Wound Description Full thickness 07/28/23 1200   Treatments Cleansed;Irrigation with NSS;Site care 07/28/23 1200   Dressing Calcium Alginate; Foam, Silicon (eg. Allevyn, etc) 07/28/23 1200   Dressing Changed New 07/28/23 1200   Patient Tolerance Tolerated well 07/28/23 1200   Dressing Status Clean;Dry; Intact 07/28/23 [address], [place] [place] [employer]

## 2023-07-28 NOTE — ED PROVIDER NOTES
History  Chief Complaint   Patient presents with   • Medical Problem     Sent from PCP for fluid retention in lower extremities. Reports also feeling SOB. New diagnosis of CHF     HPI  Patient is a 68 y.o. female with history of new onset CHF, asthma, diabetes, hypertension presenting to the emergency department for CHF exacerbation. Patient states that she was sent here by her cardiologist for UTI treatment and presumed CHF exacerbation. Patient states that she was told she had a UTI that was growing some sort of bacteria. She also states that her cardiologist said that with the amount of fluid that she is retaining she is likely having a CHF exacerbation. Patient compliant with her Lasix. Patient currently experiencing swelling of her extremities orthopnea dyspnea with just 2 or 3 steps, increased urinary output. Patient denies any headache dizziness chest pain abdominal pain nausea vomiting diarrhea. Prior to Admission Medications   Prescriptions Last Dose Informant Patient Reported? Taking? Blood Glucose Monitoring Suppl (OneTouch Verio Flex System) w/Device KIT  Self Yes No   Sig: Use as directed   Farxiga 5 MG TABS  Self Yes No   Sig: Take 5 mg by mouth daily   Patient not taking: Reported on 7/25/2023   Metamucil Fiber CHEW  Self Yes No   Sig: Chew 3 gums in the morning.  Indications: fiber supplement   acetaminophen (TYLENOL) 325 mg tablet  Self No No   Sig: Take 2 tablets (650 mg total) by mouth every 6 (six) hours as needed for mild pain   alendronate (FOSAMAX) 70 mg tablet  Self Yes No   Sig: Take 70 mg by mouth Once a week   allopurinol (ZYLOPRIM) 100 mg tablet  Self Yes No   Sig: Take 100 mg by mouth daily   amiodarone 200 mg tablet  Self No No   Sig: Take 1 tablet (200 mg total) by mouth daily with breakfast   atorvastatin (LIPITOR) 40 mg tablet  Self Yes No   cholecalciferol (VITAMIN D3) 1,000 units tablet  Self No No   Sig: Take 1 tablet (1,000 Units total) by mouth daily Do not start before June 2, 2023. citalopram (CeleXA) 20 mg tablet  Self Yes No   Sig: Take 20 mg by mouth daily   docusate sodium (COLACE) 100 mg capsule  Self No No   Sig: Take 1 capsule (100 mg total) by mouth 2 (two) times a day   furosemide (LASIX) 20 mg tablet  Self Yes No   furosemide (LASIX) 20 mg tablet  Self Yes No   Sig: Take 40 mg by mouth daily   magnesium 30 MG tablet  Self Yes No   Sig: Take 64 mg by mouth 3 (three) times a day Takes 3-4 days a week takes 1 tab. methocarbamol (ROBAXIN) 500 mg tablet  Self No No   Sig: Take 0.5 tablets (250 mg total) by mouth 3 (three) times a day as needed for muscle spasms   Patient not taking: Reported on 7/10/2023   metoprolol succinate (TOPROL-XL) 25 mg 24 hr tablet  Self No No   Sig: Take 1 tablet (25 mg total) by mouth daily Do not start before June 2, 2023. montelukast (SINGULAIR) 10 mg tablet  Self Yes No   Sig: Take 10 mg by mouth daily at bedtime   multivitamin (THERAGRAN) TABS  Self Yes No   Sig: Take 1 tablet by mouth daily   traMADol (Ultram) 50 mg tablet  Self No No   Sig: Take 1 tablet (50 mg total) by mouth every 6 (six) hours as needed for severe pain   Patient taking differently: Take 50 mg by mouth every 6 (six) hours as needed for severe pain Has not started yet. warfarin (COUMADIN) 5 mg tablet  Self Yes No   Sig: Take 5 mg by mouth daily 2.5mg 2 days out of the week. 5 mg all other days. Next pt inr Thursday might change.       Facility-Administered Medications: None       Past Medical History:   Diagnosis Date   • Arthritis    • Asthma    • Diabetes ([address])    • Environmental allergies    • Heart murmur    • Hypertension        Past Surgical History:   Procedure Laterality Date   • MECHANICAL AORTIC VALVE REPLACEMENT     • REPLACEMENT TOTAL KNEE N/A    • REPLACEMENT TOTAL KNEE         Family History   Problem Relation Age of Onset   • Hypertension Other    • Arthritis Family    • Heart disease Family    • Hypertension Family    • Diabetes type II Family I have reviewed and agree with the history as documented. E-Cigarette/Vaping   • E-Cigarette Use Never User      E-Cigarette/Vaping Substances   • Nicotine No    • THC No    • CBD No    • Flavoring No    • Other No    • Unknown No      Social History     Tobacco Use   • Smoking status: Never   • Smokeless tobacco: Never   Vaping Use   • Vaping Use: Never used   Substance Use Topics   • Alcohol use: Yes     Comment: socially   • Drug use: Never        Review of Systems   Constitutional: Positive for fatigue. Negative for chills, diaphoresis and fever. HENT: Negative. Negative for congestion. Eyes: Negative. Negative for photophobia. Respiratory: Negative. Negative for cough and shortness of breath. Dyspnea on exertion   Cardiovascular: Positive for leg swelling. Negative for chest pain and palpitations. Gastrointestinal: Negative. Negative for abdominal distention, abdominal pain, blood in stool, constipation, diarrhea, nausea and vomiting. Genitourinary: Negative. Negative for decreased urine volume, difficulty urinating, dysuria, flank pain, frequency, hematuria and urgency. Musculoskeletal: Negative. Negative for back pain, myalgias, neck pain and neck stiffness. Skin: Negative. Negative for rash. Neurological: Negative. Negative for dizziness, numbness and headaches. All other systems reviewed and are negative. Physical Exam  ED Triage Vitals [07/27/23 1556]   Temperature Pulse Respirations Blood Pressure SpO2   98.2 °F (36.8 °C) 60 18 146/70 100 %      Temp Source Heart Rate Source Patient Position - Orthostatic VS BP Location FiO2 (%)   Oral -- Lying Right arm --      Pain Score       --             Orthostatic Vital Signs  Vitals:    07/27/23 1556 07/27/23 1715 07/27/23 1923   BP: 146/70 137/63 124/60   Pulse: 60 60 60   Patient Position - Orthostatic VS: Lying         Physical Exam  Vitals and nursing note reviewed.    Constitutional:       Appearance: Normal appearance. She is well-developed. HENT:      Head: Normocephalic and atraumatic. Eyes:      Extraocular Movements: Extraocular movements intact. Conjunctiva/sclera: Conjunctivae normal.      Pupils: Pupils are equal, round, and reactive to light. Cardiovascular:      Rate and Rhythm: Normal rate and regular rhythm. Pulses: Normal pulses. Heart sounds: Normal heart sounds. Comments: JVD visible when patient is sitting up  Pulmonary:      Effort: Pulmonary effort is normal.      Comments: Diffusely diminished breath sounds. Abdominal:      General: Bowel sounds are normal. There is no distension. Palpations: Abdomen is soft. Tenderness: There is no abdominal tenderness. There is no guarding or rebound. Musculoskeletal:         General: Swelling present. Normal range of motion. Cervical back: Normal range of motion and neck supple. Skin:     General: Skin is warm and dry. Neurological:      Mental Status: She is alert and oriented to person, place, and time.          ED Medications  Medications   allopurinol (ZYLOPRIM) tablet 100 mg (has no administration in time range)   amiodarone tablet 200 mg (has no administration in time range)   atorvastatin (LIPITOR) tablet 40 mg (has no administration in time range)   citalopram (CeleXA) tablet 20 mg (has no administration in time range)   metoprolol succinate (TOPROL-XL) 24 hr tablet 25 mg (has no administration in time range)   montelukast (SINGULAIR) tablet 10 mg (has no administration in time range)   ceftriaxone (ROCEPHIN) 1 g/50 mL in dextrose IVPB (has no administration in time range)   warfarin (COUMADIN) tablet 2.5 mg (2.5 mg Oral Given 7/27/23 1955)   furosemide (LASIX) injection 40 mg (has no administration in time range)   furosemide (LASIX) injection 40 mg (40 mg Intravenous Given 7/27/23 1701)   ceftriaxone (ROCEPHIN) 1 g/50 mL in dextrose IVPB (0 mg Intravenous Stopped 7/27/23 1815)       Diagnostic Studies  Results Reviewed     Procedure Component Value Units Date/Time    HS Troponin I 2hr [[id number]]  (Normal) Collected: 07/27/23 1847    Lab Status: Final result Specimen: Blood from Arm, Right Updated: 07/27/23 1925     hs TnI 2hr 37 ng/L      Delta 2hr hsTnI -1 ng/L     Protime-INR [[id number]]  (Abnormal) Collected: 07/27/23 1842    Lab Status: Final result Specimen: Blood from Arm, Right Updated: 07/27/23 1916     Protime 30.9 seconds      INR 2.94    HS Troponin I 4hr [[id number]]     Lab Status: No result Specimen: Blood     B-Type Natriuretic Peptide(BNP) [[id number]]  (Abnormal) Collected: 07/27/23 1654    Lab Status: Final result Specimen: Blood from Arm, Right Updated: 07/27/23 1739      pg/mL     Urine Microscopic [[id number]]  (Abnormal) Collected: 07/27/23 1654    Lab Status: Final result Specimen: Urine, Clean Catch Updated: 07/27/23 1735     RBC, UA 1-2 /hpf      WBC, UA 20-30 /hpf      Epithelial Cells Occasional /hpf      Bacteria, UA Moderate /hpf      MUCUS THREADS Occasional     Hyaline Casts, UA 0-3 /lpf     Urine culture [[id number]] Collected: 07/27/23 1654    Lab Status:  In process Specimen: Urine, Clean Catch Updated: 07/27/23 1735    HS Troponin 0hr (reflex protocol) [[id number]]  (Normal) Collected: 07/27/23 1654    Lab Status: Final result Specimen: Blood from Arm, Right Updated: 07/27/23 1729     hs TnI 0hr 38 ng/L     Comprehensive metabolic panel [[id number]]  (Abnormal) Collected: 07/27/23 1654    Lab Status: Final result Specimen: Blood from Arm, Right Updated: 07/27/23 1722     Sodium 133 mmol/L      Potassium 4.9 mmol/L      Chloride 105 mmol/L      CO2 22 mmol/L      ANION GAP 6 mmol/L      BUN 23 mg/dL      Creatinine 0.81 mg/dL      Glucose 111 mg/dL      Calcium 9.1 mg/dL      Corrected Calcium 9.7 mg/dL      AST 78 U/L      ALT 28 U/L      Alkaline Phosphatase 151 U/L      Total Protein 8.5 g/dL      Albumin 3.2 g/dL      Total Bilirubin 1.28 mg/dL      eGFR 70 ml/min/1.73sq m     Narrative:      National Kidney Disease Foundation guidelines for Chronic Kidney Disease (CKD):   •  Stage 1 with normal or high GFR (GFR > 90 mL/min/1.73 square meters)  •  Stage 2 Mild CKD (GFR = 60-89 mL/min/1.73 square meters)  •  Stage 3A Moderate CKD (GFR = 45-59 mL/min/1.73 square meters)  •  Stage 3B Moderate CKD (GFR = 30-44 mL/min/1.73 square meters)  •  Stage 4 Severe CKD (GFR = 15-29 mL/min/1.73 square meters)  •  Stage 5 End Stage CKD (GFR <15 mL/min/1.73 square meters)  Note: GFR calculation is accurate only with a steady state creatinine    UA w Reflex to Microscopic w Reflex to Culture [[id number]]  (Abnormal) Collected: 07/27/23 1654    Lab Status: Final result Specimen: Urine, Clean Catch Updated: 07/27/23 1718     Color, UA Light Yellow     Clarity, UA Turbid     Specific Gravity, UA 1.007     pH, UA 5.0     Leukocytes, UA Large     Nitrite, UA Positive     Protein, UA Trace mg/dl      Glucose, UA Negative mg/dl      Ketones, UA Negative mg/dl      Urobilinogen, UA <2.0 mg/dl      Bilirubin, UA Negative     Occult Blood, UA Negative    CBC and differential [[id number]]  (Abnormal) Collected: 07/27/23 1654    Lab Status: Final result Specimen: Blood from Arm, Right Updated: 07/27/23 1702     WBC 2.89 Thousand/uL      RBC 3.20 Million/uL      Hemoglobin 10.6 g/dL      Hematocrit 32.1 %       fL      MCH 33.1 pg      MCHC 33.0 g/dL      RDW 15.7 %      MPV 11.6 fL      Platelets 804 Thousands/uL      nRBC 0 /100 WBCs      Neutrophils Relative 62 %      Immat GRANS % 0 %      Lymphocytes Relative 24 %      Monocytes Relative 12 %      Eosinophils Relative 1 %      Basophils Relative 1 %      Neutrophils Absolute 1.79 Thousands/µL      Immature Grans Absolute 0.01 Thousand/uL      Lymphocytes Absolute 0.68 Thousands/µL      Monocytes Absolute 0.35 Thousand/µL      Eosinophils Absolute 0.03 Thousand/µL      Basophils Absolute 0.03 Thousands/µL                  XR chest 1 view portable    (Results Pending)         Procedures  Procedures      ED Course                             SBIRT 20yo+    Flowsheet Row Most Recent Value   Initial Alcohol Screen: US AUDIT-C     1. How often do you have a drink containing alcohol? 0 Filed at: 07/27/2023 1557   2. How many drinks containing alcohol do you have on a typical day you are drinking? 0 Filed at: 07/27/2023 1557   3a. Male UNDER 65: How often do you have five or more drinks on one occasion? 0 Filed at: 07/27/2023 1557   3b. FEMALE Any Age, or MALE 65+: How often do you have 4 or more drinks on one occassion? 0 Filed at: 07/27/2023 1557   Audit-C Score 0 Filed at: 07/27/2023 1557   JOEY: How many times in the past year have you. .. Used an illegal drug or used a prescription medication for non-medical reasons? Never Filed at: 07/27/2023 1557                Medical Decision Making  Amount and/or Complexity of Data Reviewed  Labs: ordered. Radiology: ordered. Risk  Prescription drug management. Decision regarding hospitalization. Patient is a 68 y.o. female with PMH of the onset CHF, asthma, diabetes, hypertension, who presents to the ED with CHF exacerbation and UTI. Vital signs within normal limits    On exam patient appears fluid overloaded with elevated JVD, diminished breath sounds, swelling of the extremities, cardiac sounds regular rate and rhythm, abdomen soft nontender nondistended. History and physical exam most consistent with CHF exacerbation and UTI versus worsening heart failure versus ACS versus pneumonia . Plan CBC CMP to look for signs of infection, BMP and troponin to look for heart strain, chest x-ray to look for signs of fluid overload, EKG to look for heart strain, UA to confirm UTI as noted by cardiologist likely admission for CHF management and IV antibiotics for UTI    View ED course above for further discussion on patient workup.      On review of previous records patient was seen at [facility] and had a UA that was positive for greater than 100 K E. coli    All labs reviewed and utilized in the medical decision making process  All radiology studies independently viewed by me and interpreted by the radiologist.  I reviewed all testing with the patient. Disposition  Final diagnoses:   CHF exacerbation ([address])   UTI (urinary tract infection)     Time reflects when diagnosis was documented in both MDM as applicable and the Disposition within this note     Time User Action Codes Description Comment    7/27/2023  6:37 PM Doreen Squires Add [I50.9] CHF exacerbation ([address])     7/27/2023  6:37 PM Doreen Squires Add [N39.0] UTI (urinary tract infection)       ED Disposition     ED Disposition   Admit    Condition   Stable    Date/Time   Thu Jul 27, 2023 0503    Comment   Case was discussed with Dr. Jana Antony and the patient's admission status was agreed to be Admission Status: inpatient status to the service of Dr. Emily Sinclair . Follow-up Information    None         Patient's Medications   Discharge Prescriptions    No medications on file     No discharge procedures on file. PDMP Review       Value Time User    PDMP Reviewed  Yes 7/5/2023  3:12 PM Champ Dowling PA-C           ED Provider  Attending physically available and evaluated Naeem Fears. I managed the patient along with the ED Attending.     Electronically Signed by         Gloria Beltrán MD  07/27/23 2022

## 2023-07-28 NOTE — PLAN OF CARE
Problem: PHYSICAL THERAPY ADULT  Goal: Performs mobility at highest level of function for planned discharge setting. See evaluation for individualized goals. Description: Treatment/Interventions: Functional transfer training, LE strengthening/ROM, Elevations, Therapeutic exercise, Endurance training, Patient/family training, Equipment eval/education, Bed mobility, Gait training, Compensatory technique education, Spoke to nursing, OT          See flowsheet documentation for full assessment, interventions and recommendations. Note: Prognosis: Excellent  Problem List: Decreased strength, Impaired balance, Decreased endurance, Decreased mobility  Assessment: Pt is 68 y.o. female seen for a high complexity PT evaluation s/p admit to [facility] on 7/27/2023. Pt presenting w/ acute on chronic heart failure w/ preserved ejection fraction. Please see above for other active problem list / PMH. PT now consulted to assess functional mobility and needs for safe d/c planning. Prior to admission, pt was Alex w/ ambulation w/ SPC, lives w/ s/o in a house w/ 1 + 1 LIZ, (+) stair glide to 2nd floor. Currently pt requires S for functional transfers; MinAx1 for ambulation w/ SPC. Pt presents functioning below baseline and w/ overall mobility deficits 2* to: decreased LE strength; decreased endurance; impaired balance; gait deviations; SOB/[place]; fatigue. These impairments place pt at risk for falls. Pt will continue to benefit from skilled PT interventions to address stated impairments; to maximize functional potential; for ongoing pt/ family training; and DME needs. PT is currently recommending rehab vs home w/ home PT pending progress. PT Discharge Recommendation: Post acute rehabilitation services (vs HHPT pending progress)    See flowsheet documentation for full assessment.

## 2023-07-28 NOTE — ASSESSMENT & PLAN NOTE
Wt Readings from Last 3 Encounters:   07/28/23 88.5 kg (195 lb)   07/25/23 88.5 kg (195 lb)   07/10/23 88.5 kg (195 lb)       Patient with history of diastolic heart failure presenting with several weeks of worsening shortness of breath.   Patient's creatinine is currently at baseline, her last recorded weight was 195 pounds on 7/25, current weight 195 lbs   She takes 40 mg of Lasix as an outpatient, continue on IV Lasix 40 mg twice daily and monitor output  Echocardiogram results pending

## 2023-07-28 NOTE — CONSULTS
Cardiology   Naeem Walker 68 y.o. female MRN: [id number]  Unit/Bed#: -01 Encounter: [id number]      Reason for Consult / Principal Problem: CHF    Physician Requesting Consult:  Jean Carlos Dennis    Outpatient Cardiologist: Dr. Regan Dalton  1. Acute on chronic HFpEF - possibly secondary to ineffective diuretic dosing as an outpatient. Pt reports compliance with diuretic regimen and no recent dietary or fluid indiscretions.  -Pt is significantly volume overloaded on exam.   - (previously 621)  -Chest x-ray 7/27; mild pulmonary vascular congestion with trace effusions. -TTE 5/2023 - LVEF 60%, wall motion normal, RV severely dilated/systolic function moderately reduced, LA/RA moderately dilated, mild AI, mild MR, severe TR. -Previously on oral furosemide 40 mg daily as an outpatient (currently on hold)  -Currently on IV furosemide 40 mg twice daily  -24-hour CINDY balance; -800 ml; overall -1.5 L  -Baseline dry weight around 185-190 pounds. , no SS weight recorded this admission. 2. Hx of aortic valve stenosis s/p mechanical AVR (in 2011)  -TTE 5/2023 - mechanical aortic valve, mild AI/no significant AS. -Chronically anticoagulated with warfarin, INR 2.9 on admission, currently 3.1.  3. Hx of persistent atrial fibrillation   4. Hx of [place]/VF s/p [facility] [place] single [place] ICD in-situ  5. Recent inappropriate ICD shock in 5/2023 (for rapid afib)  -Device recently reprogrammed to VVIR    -Device interrogation 6/20/2023 - ventricularly paced 71%, no new significant high rate episodes, normal device function.  -On amiodarone 200 mg daily and metoprolol succinate 25 mg daily  -Chronically anticoagulated with warfarin, INR 2.9 on admission, currently 3.1.  6. Hypertension  -BP stable last recorded 133/63  -Outpatient BP regimen; oral furosemide 40 mg daily, and metoprolol succinate 25 mg daily  -Inpatient BP regimen; IV furosemide 40 mg twice daily, and metoprolol tartrate 25 mg every 12 hours  7. Dyslipidemia  -Lipid profile 6/30/2023; cholesterol 96, triglycerides 43, HDL 56, and LDL calculated at 31.  -On atorvastatin 40 mg daily  8. DM type II  -HgbA1c 5.9  9. Ascending aortic aneurysm   -Outpatient surveillance. -CTA chest 4/27/2023 - Dilated ascending aorta measuring 4.6 cm,     Plan  -Obtain limited TTE to reassess LV systolic function. Need to rule out pacer induced cardiomyopathy, see above.   -Significantly volume overloaded on exam, it seems as though she has been responding well to the IV Lasix that has been ordered this admission but certainly needs more aggressive IV diuresis. Would recommend giving a dose of IV Lasix 80 mg now and then initiating a IV Lasix GTT at 10 mg/h. -Continue amiodarone 200 mg daily and metoprolol succinate 25 mg daily.  -Continue warfarin, goal INR 2.5-3.5.  -Strict I's and O's, daily standing weights, 2 g Na+ diet, 1.8 LFR.  -Monitor renal function and electrolytes closely. Replete to maintain K+ level of 4.0 magnesium level at 2.0.  -Place on telemetry monitoring while on continuous IV Lasix infusion. HPI: Jennifer Cooney 68y.o. year old female with a medical history of chronic diastolic CHF, aortic valve stenosis s/p mechanical AVR in 2011, paroxysmal atrial fibrillation (prior inappropriate ICD shock for rapid A-fib), VT/VF s/p [facility] [place] ICD in-situ, hypertension, dyslipidemia, DM type II, and obesity. Lifelong non-smoker, denies excessive alcohol or recreational/illicit drug use. She follows routinely with Dr. Emiliano Hernandez at the [facility] cardiology service last seen as an outpatient in late June of this year. Overall reported to have been doing well from a cardiac perspective. She was noted to have had a relatively high V pacing demand of 70% since her last device interrogation and thus a repeat echocardiogram was ordered for her to be obtained in the outpatient setting to reassess her LV function, however this was not completed.   She presented to the [facility] on 7/27/2023 with complaints of progressively worsening shortness of breath, orthopnea, and increasing lower extremity edema for at least 3-4 weeks. She also reports an approximate 15 to 20 pound weight gain over the past month. She expresses compliance with her usual diuretic regimen and denies any recent dietary indiscretions. She does report stopping her Sandra 2 weeks ago as she was reporting mood changes/behavioral disturbances with this medication. She notes that since stopping this medication she has had a rapid accumulation of fluid retention. Further work-up in the ED  Hemodynamics on admission  -Temp 98.2 degrees F, HR 60, RR 18, /70, sat 100% on RA. Laboratory data on admission  -Na+ 133, K+ 4.9, chloride 105, CO2 22, anion gap 6, BUN 23, creatinine 0.8, glucose 111, calcium 9.1, AST 78, ALT 28, alk phos 151, T. bili 1.28, albumin 3.2, and total protein 8.5, WBC 3.9, Hgb 10.6, platelet count 392  -HS troponin levels; 38--37--37  -  Imaging  -Chest x-ray 7/27; mild pulmonary vascular congestion with trace effusions. ECG on admission demonstrated V paced rhythm, HR 60    Given the concern for an acute CHF exacerbation she was initiated on a course of IV diuresis by the primary team.  Cardiology was consulted for further treatment recommendations/management.     Family History:   Family History   Problem Relation Age of Onset   • Hypertension Other    • Arthritis Family    • Heart disease Family    • Hypertension Family    • Diabetes type II Family      Historical Information   Past Medical History:   Diagnosis Date   • Arthritis    • Asthma    • Diabetes ([address])    • Environmental allergies    • Heart murmur    • Hypertension      Past Surgical History:   Procedure Laterality Date   • MECHANICAL AORTIC VALVE REPLACEMENT     • REPLACEMENT TOTAL KNEE N/A    • REPLACEMENT TOTAL KNEE       Social History   Social History     Substance and Sexual Activity   Alcohol Use Yes    Comment: socially Social History     Substance and Sexual Activity   Drug Use Never     Social History     Tobacco Use   Smoking Status Never   Smokeless Tobacco Never     Family History:   Family History   Problem Relation Age of Onset   • Hypertension Other    • Arthritis Family    • Heart disease Family    • Hypertension Family    • Diabetes type II Family        Review of Systems:  Review of Systems   Constitutional: Negative for chills, fatigue and fever. Eyes: Negative for visual disturbance. Respiratory: Negative for cough, chest tightness and shortness of breath. Cardiovascular: Negative for chest pain, palpitations and leg swelling. Gastrointestinal: Negative for abdominal pain. Neurological: Negative for dizziness, light-headedness and headaches. All other systems reviewed and are negative.           Scheduled Meds:  Current Facility-Administered Medications   Medication Dose Route Frequency Provider Last Rate   • allopurinol  100 mg Oral Daily Olaf Lewis, DO     • amiodarone  200 mg Oral Daily With Breakfast Olaf Lewis, DO     • atorvastatin  40 mg Oral Daily With [employer], DO     • cefTRIAXone  1,000 mg Intravenous Q24H Caban An     • citalopram  20 mg Oral Daily Olafivone Lewis, DO     • furosemide  40 mg Intravenous BID (diuretic) Olaf Lewis, DO     • metoprolol succinate  25 mg Oral Daily Olaf Lewis, DO     • montelukast  10 mg Oral HS Olafivone Lewis, DO     • warfarin  2.5 mg Oral Daily (warfarin) Olaf Lewis, DO       Continuous Infusions:   PRN Meds:.  all current active meds have been reviewed and current meds:   Current Facility-Administered Medications   Medication Dose Route Frequency   • allopurinol (ZYLOPRIM) tablet 100 mg  100 mg Oral Daily   • amiodarone tablet 200 mg  200 mg Oral Daily With Breakfast   • atorvastatin (LIPITOR) tablet 40 mg  40 mg Oral Daily With Dinner   • ceftriaxone (ROCEPHIN) 1 g/50 mL in dextrose IVPB  1,000 mg Intravenous Q24H   • citalopram (CeleXA) tablet 20 mg  20 mg Oral Daily   • furosemide (LASIX) injection 40 mg  40 mg Intravenous BID (diuretic)   • metoprolol succinate (TOPROL-XL) 24 hr tablet 25 mg  25 mg Oral Daily   • montelukast (SINGULAIR) tablet 10 mg  10 mg Oral HS   • warfarin (COUMADIN) tablet 2.5 mg  2.5 mg Oral Daily (warfarin)       Allergies   Allergen Reactions   • Fentanyl Other (See Comments)     Other reaction(s): Hallucinations  paranoia, confusion  Other reaction(s): Hallucinations  Other reaction(s): Hallucinations  hallucinations   • Fentanyl Confusion       Objective   Vitals: Blood pressure 133/63, pulse 61, temperature 98.2 °F (36.8 °C), resp. rate 17, height 4' 10" (1.473 m), weight 88.5 kg (195 lb), SpO2 95 %. , Body mass index is 40.76 kg/m².,   Orthostatic Blood Pressures    Flowsheet Row Most Recent Value   Blood Pressure 133/63 filed at 07/28/2023 1000   Patient Position - Orthostatic VS Lying filed at 07/27/2023 1556            Intake/Output Summary (Last 24 hours) at 7/28/2023 1318  Last data filed at 7/28/2023 1001  Gross per 24 hour   Intake 300 ml   Output 1300 ml   Net -1000 ml       Invasive Devices     Peripheral Intravenous Line  Duration           Peripheral IV 07/27/23 Right Arm <1 day    Peripheral IV 07/28/23 Distal;Dorsal (posterior); Left Forearm <1 day              Physical Exam:  Physical Exam  Vitals and nursing note reviewed. Constitutional:       General: She is not in acute distress. Appearance: She is not diaphoretic. HENT:      Head: Normocephalic and atraumatic. Eyes:      General: No scleral icterus. Cardiovascular:      Rate and Rhythm: Normal rate and regular rhythm. Pulses: Normal pulses. Heart sounds: Normal heart sounds. Pulmonary:      Effort: Pulmonary effort is normal.      Breath sounds: Normal breath sounds. No wheezing or rales. Abdominal:      Palpations: Abdomen is soft. Musculoskeletal:      Right lower leg: No edema. Left lower leg: No edema. Skin:     General: Skin is warm and dry. Capillary Refill: Capillary refill takes less than 2 seconds. Neurological:      General: No focal deficit present. Mental Status: She is alert and oriented to person, place, and time.    Psychiatric:         Mood and Affect: Mood normal.         Lab Results:   Recent Results (from the past 24 hour(s))   ECG 12 lead    Collection Time: 07/27/23  4:51 PM   Result Value Ref Range    Ventricular Rate 60 BPM    Atrial Rate 60 BPM    SC Interval  ms    QRSD Interval 182 ms    QT Interval 534 ms    QTC Interval 534 ms    P Axis  degrees    QRS Axis 254 degrees    T Wave Axis 68 degrees   CBC and differential    Collection Time: 07/27/23  4:54 PM   Result Value Ref Range    WBC 2.89 (L) 4.31 - 10.16 Thousand/uL    RBC 3.20 (L) 3.81 - 5.12 Million/uL    Hemoglobin 10.6 (L) 11.5 - 15.4 g/dL    Hematocrit 32.1 (L) 34.8 - 46.1 %     (H) 82 - 98 fL    MCH 33.1 26.8 - 34.3 pg    MCHC 33.0 31.4 - 37.4 g/dL    RDW 15.7 (H) 11.6 - 15.1 %    MPV 11.6 8.9 - 12.7 fL    Platelets 664 466 - 917 Thousands/uL    nRBC 0 /100 WBCs    Neutrophils Relative 62 43 - 75 %    Immat GRANS % 0 0 - 2 %    Lymphocytes Relative 24 14 - 44 %    Monocytes Relative 12 4 - 12 %    Eosinophils Relative 1 0 - 6 %    Basophils Relative 1 0 - 1 %    Neutrophils Absolute 1.79 (L) 1.85 - 7.62 Thousands/µL    Immature Grans Absolute 0.01 0.00 - 0.20 Thousand/uL    Lymphocytes Absolute 0.68 0.60 - 4.47 Thousands/µL    Monocytes Absolute 0.35 0.17 - 1.22 Thousand/µL    Eosinophils Absolute 0.03 0.00 - 0.61 Thousand/µL    Basophils Absolute 0.03 0.00 - 0.10 Thousands/µL   Comprehensive metabolic panel    Collection Time: 07/27/23  4:54 PM   Result Value Ref Range    Sodium 133 (L) 135 - 147 mmol/L    Potassium 4.9 3.5 - 5.3 mmol/L    Chloride 105 96 - 108 mmol/L    CO2 22 21 - 32 mmol/L    ANION GAP 6 mmol/L    BUN 23 5 - 25 mg/dL    Creatinine 0.81 0.60 - 1.30 mg/dL    Glucose 111 65 - 140 mg/dL Calcium 9.1 8.3 - 10.1 mg/dL    Corrected Calcium 9.7 8.3 - 10.1 mg/dL    AST 78 (H) 5 - 45 U/L    ALT 28 12 - 78 U/L    Alkaline Phosphatase 151 (H) 46 - 116 U/L    Total Protein 8.5 (H) 6.4 - 8.4 g/dL    Albumin 3.2 (L) 3.5 - 5.0 g/dL    Total Bilirubin 1.28 (H) 0.20 - 1.00 mg/dL    eGFR 70 ml/min/1.73sq m   B-Type Natriuretic Peptide(BNP)    Collection Time: 07/27/23  4:54 PM   Result Value Ref Range     (H) 0 - 100 pg/mL   HS Troponin 0hr (reflex protocol)    Collection Time: 07/27/23  4:54 PM   Result Value Ref Range    hs TnI 0hr 38 "Refer to ACS Flowchart"- see link ng/L   UA w Reflex to Microscopic w Reflex to Culture    Collection Time: 07/27/23  4:54 PM    Specimen: Urine, Clean Catch   Result Value Ref Range    Color, UA Light Yellow     Clarity, UA Turbid     Specific Gravity, UA 1.007 1.003 - 1.030    pH, UA 5.0 4.5, 5.0, 5.5, 6.0, 6.5, 7.0, 7.5, 8.0    Leukocytes, UA Large (A) Negative    Nitrite, UA Positive (A) Negative    Protein, UA Trace (A) Negative mg/dl    Glucose, UA Negative Negative mg/dl    Ketones, UA Negative Negative mg/dl    Urobilinogen, UA <2.0 <2.0 mg/dl mg/dl    Bilirubin, UA Negative Negative    Occult Blood, UA Negative Negative   Urine Microscopic    Collection Time: 07/27/23  4:54 PM   Result Value Ref Range    RBC, UA 1-2 None Seen, 1-2 /hpf    WBC, UA 20-30 (A) None Seen, 1-2 /hpf    Epithelial Cells Occasional None Seen, Occasional /hpf    Bacteria, UA Moderate (A) None Seen, Occasional /hpf    MUCUS THREADS Occasional (A) None Seen    Hyaline Casts, UA 0-3 (A) None Seen /lpf   Folate    Collection Time: 07/27/23  4:54 PM   Result Value Ref Range    Folate >22.3 >5.9 ng/mL   TSH, 3rd generation with Free T4 reflex    Collection Time: 07/27/23  4:54 PM   Result Value Ref Range    TSH 3RD GENERATON 3.671 0.450 - 4.500 uIU/mL   Protime-INR    Collection Time: 07/27/23  6:42 PM   Result Value Ref Range    Protime 30.9 (H) 11.6 - 14.5 seconds    INR 2.94 (H) 0.84 - 1.19 HS Troponin I 2hr    Collection Time: 07/27/23  6:47 PM   Result Value Ref Range    hs TnI 2hr 37 "Refer to ACS Flowchart"- see link ng/L    Delta 2hr hsTnI -1 <20 ng/L   HS Troponin I 4hr    Collection Time: 07/27/23  9:13 PM   Result Value Ref Range    hs TnI 4hr 37 "Refer to ACS Flowchart"- see link ng/L    Delta 4hr hsTnI -1 <20 ng/L   Comprehensive metabolic panel    Collection Time: 07/28/23  4:27 AM   Result Value Ref Range    Sodium 139 135 - 147 mmol/L    Potassium 4.0 3.5 - 5.3 mmol/L    Chloride 107 96 - 108 mmol/L    CO2 25 21 - 32 mmol/L    ANION GAP 7 mmol/L    BUN 22 5 - 25 mg/dL    Creatinine 0.77 0.60 - 1.30 mg/dL    Glucose 118 65 - 140 mg/dL    Calcium 8.7 8.3 - 10.1 mg/dL    Corrected Calcium 9.7 8.3 - 10.1 mg/dL    AST 52 (H) 5 - 45 U/L    ALT 22 12 - 78 U/L    Alkaline Phosphatase 124 (H) 46 - 116 U/L    Total Protein 6.9 6.4 - 8.4 g/dL    Albumin 2.7 (L) 3.5 - 5.0 g/dL    Total Bilirubin 0.96 0.20 - 1.00 mg/dL    eGFR 74 ml/min/1.73sq m   CBC and differential    Collection Time: 07/28/23  4:27 AM   Result Value Ref Range    WBC 2.76 (L) 4.31 - 10.16 Thousand/uL    RBC 2.90 (L) 3.81 - 5.12 Million/uL    Hemoglobin 9.4 (L) 11.5 - 15.4 g/dL    Hematocrit 29.7 (L) 34.8 - 46.1 %     (H) 82 - 98 fL    MCH 32.4 26.8 - 34.3 pg    MCHC 31.6 31.4 - 37.4 g/dL    RDW 15.7 (H) 11.6 - 15.1 %    MPV 10.6 8.9 - 12.7 fL    Platelets 960 (L) 618 - 390 Thousands/uL    nRBC 0 /100 WBCs    Neutrophils Relative 65 43 - 75 %    Immat GRANS % 0 0 - 2 %    Lymphocytes Relative 19 14 - 44 %    Monocytes Relative 13 (H) 4 - 12 %    Eosinophils Relative 2 0 - 6 %    Basophils Relative 1 0 - 1 %    Neutrophils Absolute 1.80 (L) 1.85 - 7.62 Thousands/µL    Immature Grans Absolute 0.01 0.00 - 0.20 Thousand/uL    Lymphocytes Absolute 0.52 (L) 0.60 - 4.47 Thousands/µL    Monocytes Absolute 0.36 0.17 - 1.22 Thousand/µL    Eosinophils Absolute 0.05 0.00 - 0.61 Thousand/µL    Basophils Absolute 0.02 0.00 - 0.10 Thousands/µL   Protime-INR    Collection Time: 07/28/23  4:27 AM   Result Value Ref Range    Protime 32.2 (H) 11.6 - 14.5 seconds    INR 3.09 (H) 0.84 - 1.19   Vitamin B12    Collection Time: 07/28/23  4:27 AM   Result Value Ref Range    Vitamin B-12 1,602 (H) 180 - 914 pg/mL   Retic Count with Reticulocyte HGB    Collection Time: 07/28/23  4:27 AM   Result Value Ref Range    Immature Retic Fract 29.6 (H) 0.0 - 14.0 %    Retic Ct Pct 2.23 (H) 0.37 - 1.87 %    Retic Ct Abs 64,700 14,097 - 95,744    RETIC HGB 31.3 30.0 - 38.3 pg   Echo follow up/limited w/ contrast if indicated    Collection Time: 07/28/23 10:30 AM   Result Value Ref Range    A4C EF 63 %    LVIDd 5.10 cm    LVIDS 3.70 cm    IVSd 1.10 cm    LVPWd 1.20 cm    FS 27 28 - 44    MV E' Tissue Velocity Septal 6 cm/s    E wave deceleration time 213 ms    MV Peak E Ward 124 cm/s    MV Peak A Ward 0.72 m/s    AV LVOT peak gradient 4 mmHg    LVOT peak VTI 19.76 cm    LVOT peak ward 0.99 m/s    Aortic valve peak velocity 1.76 m/s    Ao VTI 32.17 cm    AV mean gradient 6 mmHg    LVOT mn grad 2.0 mmHg    AV peak gradient 12 mmHg    MV stenosis pressure 1/2 time 62 ms    MV valve area p 1/2 method 3.55     TR Peak Ward 2.9 m/s    Triscuspid Valve Regurgitation Peak Gradient 34.0 mmHg    Aortic valve mean velocity 11.30 m/s    Tricuspid valve peak regurgitation velocity 2.90 m/s    Left ventricular stroke volume (2D) 65.00 mL    IVS 1.1 cm    LEFT VENTRICLE SYSTOLIC VOLUME (MOD BIPLANE) 2D 58 mL    LV DIASTOLIC VOLUME (MOD BIPLANE) 2D 123 mL    LVSV, 2D 65 mL    DVI 0.56      Imaging: I have personally reviewed pertinent reports. and I have personally reviewed pertinent films in PACS  Code Status: LVL 1 Full Code   Epic/ Allscripts/Care Everywhere records reviewed: Yes    * Please Note: Fluency DirectDictation voice to text software may have been used in the creation of this document.  **

## 2023-07-28 NOTE — MALNUTRITION/BMI
This medical record reflects one or more clinical indicators suggestive of morbid obesity. BMI Findings:  Adult BMI Classifications: Morbid Obesity 40-44.9        Body mass index is 40.76 kg/m². See Nutrition note dated 7/28/23 for additional details. Completed nutrition assessment is viewable in the nutrition documentation.

## 2023-07-28 NOTE — UTILIZATION REVIEW
Initial Clinical Review    Admission: Date/Time/Statement:   Admission Orders (From admission, onward)     Ordered        07/27/23 1838  INPATIENT ADMISSION  Once                      Orders Placed This Encounter   Procedures   • INPATIENT ADMISSION     Standing Status:   Standing     Number of Occurrences:   1     Order Specific Question:   Level of Care     Answer:   Med Surg [16]     Order Specific Question:   Estimated length of stay     Answer:   More than 2 Midnights     Order Specific Question:   Certification     Answer:   I certify that inpatient services are medically necessary for this patient for a duration of greater than two midnights. See H&P and MD Progress Notes for additional information about the patient's course of treatment. ED Arrival Information     Expected   -    Arrival   7/27/2023 15:51    Acuity   Urgent            Means of arrival   Walk-In    Escorted by   Family Member    Service   Hospitalist    Admission type   Emergency            Arrival complaint   Dr sent over - fluid retention           Chief Complaint   Patient presents with   • Medical Problem     Sent from PCP for fluid retention in lower extremities. Reports also feeling SOB. New diagnosis of CHF       Initial Presentation: 68 y.o. female presents to ed from home for evaluation and treatment of fluid retention in lower extremities and shortness of breath. PCP recommends evaluation of new diagnosis CHF. Patient is compliant with lasix. Experiencing dyspnea when ambulating 2-3 steps. 15-20 lb wt gain on lasix in a few weeks. PMHX: DM, HTN. Clinical assessment significant for JVD, BLLE edema. EKG: paced. Imaging shows cardiomegaly. UA: + nitrite, large leukocytes, moderate bacteria, wbc 20-30,  WBC 2.89, . Initially treated with iv lasix , iv ceftriaxone. Admit to inpatient med surg for acute on chronic CHF, UTI . Date: 7- 28-23   Day 2: inpatient med surg  Echo pending. Continue iv lasix 40 mg bid. Pancytopenia with history ITP. Check B12, RETICULOCYTE CT, LDH, FOLATE, TSH, SPEP, UPEP. Concern for bone marrow abnormality, MDS. Refer to Heme oncology for outpatient follow up. Treat UTI with 3 days iv ceftriaxone and follow up urine culture and sensitivities. ED Triage Vitals   07/27/23 1556 07/27/23 1556 07/27/23 1556 07/27/23 1556 07/27/23 1556   98.2 °F (36.8 °C) 60 18 146/70 100 %      Oral Monitor         No Pain          07/25/23 88.5 kg (195 lb)     Additional Vital Signs:     Date/Time Temp Pulse Resp BP MAP (mmHg) SpO2 O2 Device   07/28/23 07:28:37 98.2 °F (36.8 °C) 61 17 133/63 86 95 % --   07/28/23 0500 -- 60 -- 127/56 80 94 % --   07/28/23 04:49:16 98.1 °F (36.7 °C) 64 -- 127/56 80 95 % --   07/28/23 0315 -- 60 22 -- -- 95 % None (Room air)   07/27/23 2155 -- 60 22 115/52 -- 100 % None (Room air)   07/27/23 1923 -- 60 20 124/60 -- 97 % None (Room air)   07/27/23 1715 -- 60 21 137/63 91 98 % None (Room air)   07/27/23 1556 98.2 °F (36.8 °C) 60 18 146/70 -- 100 % None (Room air)         Pertinent Labs/Diagnostic Test Results:     XR chest 1 view portable   Final (07/28 0818)      Mild pulmonary venous congestion with trace effusions.             Results from last 7 days   Lab Units 07/28/23 0427 07/27/23  1654   WBC Thousand/uL 2.76* 2.89*   HEMOGLOBIN g/dL 9.4* 10.6*   HEMATOCRIT % 29.7* 32.1*   PLATELETS Thousands/uL 123* 154   NEUTROS ABS Thousands/µL 1.80* 1.79*     Results from last 7 days   Lab Units 07/28/23 0427   RETIC CT ABS  64,700   RETIC CT PCT % 2.23*     Results from last 7 days   Lab Units 07/28/23 0427 07/27/23  1654   SODIUM mmol/L 139 133*   POTASSIUM mmol/L 4.0 4.9   CHLORIDE mmol/L 107 105   CO2 mmol/L 25 22   ANION GAP mmol/L 7 6   BUN mg/dL 22 23   CREATININE mg/dL 0.77 0.81   EGFR ml/min/1.73sq m 74 70   CALCIUM mg/dL 8.7 9.1     Results from last 7 days   Lab Units 07/28/23  0427 07/27/23  1654   AST U/L 52* 78*   ALT U/L 22 28   ALK PHOS U/L 124* 151*   TOTAL PROTEIN g/dL 6.9 8.5*   ALBUMIN g/dL 2.7* 3.2*   TOTAL BILIRUBIN mg/dL 0.96 1.28*         Results from last 7 days   Lab Units 07/28/23  0427 07/27/23  1654   GLUCOSE RANDOM mg/dL 118 111       Results from last 7 days   Lab Units 07/27/23  2113 07/27/23  1847 07/27/23  1654   HS TNI 0HR ng/L  --   --  38   HS TNI 2HR ng/L  --  37  --    HSTNI D2 ng/L  --  -1  --    HS TNI 4HR ng/L 37  --   --    HSTNI D4 ng/L -1  --   --          Results from last 7 days   Lab Units 07/28/23 0427 07/27/23  1842   PROTIME seconds 32.2* 30.9*   INR  3.09* 2.94*     Results from last 7 days   Lab Units 07/27/23  1654   TSH 3RD GENERATON uIU/mL 3.671                     Results from last 7 days   Lab Units 07/27/23  1654   BNP pg/mL 855*       Results from last 7 days   Lab Units 07/27/23  1654   CLARITY UA  Turbid   COLOR UA  Light Yellow   SPEC GRAV UA  1.007   PH UA  5.0   GLUCOSE UA mg/dl Negative   KETONES UA mg/dl Negative   BLOOD UA  Negative   PROTEIN UA mg/dl Trace*   NITRITE UA  Positive*   BILIRUBIN UA  Negative   UROBILINOGEN UA (BE) mg/dl <2.0   LEUKOCYTES UA  Large*   WBC UA /hpf 20-30*   RBC UA /hpf 1-2   BACTERIA UA /hpf Moderate*   EPITHELIAL CELLS WET PREP /hpf Occasional   MUCUS THREADS  Occasional*       ED Treatment:   Medication Administration from 07/27/2023 1551 to 07/28/2023 0442       Date/Time Order Dose Route Action     07/27/2023 1701 EDT furosemide (LASIX) injection 40 mg 40 mg Intravenous Given     07/27/2023 1701 EDT ceftriaxone (ROCEPHIN) 1 g/50 mL in dextrose IVPB 1,000 mg Intravenous New Bag     07/27/2023 2154 EDT montelukast (SINGULAIR) tablet 10 mg 10 mg Oral Given     07/27/2023 1955 EDT warfarin (COUMADIN) tablet 2.5 mg 2.5 mg Oral Given        Past Medical History:   Diagnosis   • Arthritis   • Asthma   • Diabetes ([address] )   • Environmental allergies   • Heart murmur   • Hypertension     Present on Admission:  • Type 2 diabetes mellitus ([facility])      Admitting Diagnosis:     UTI (urinary tract infection) [N39.0]  CHF exacerbation ([facility]) [I50.9]    Age/Sex: 68 y.o. female    Scheduled Medications:    allopurinol, 100 mg, Oral, Daily  amiodarone, 200 mg, Oral, Daily With Breakfast  atorvastatin, 40 mg, Oral, Daily With Dinner  cefTRIAXone, 1,000 mg, Intravenous, Q24H  citalopram, 20 mg, Oral, Daily  furosemide, 40 mg, Intravenous, BID (diuretic)  metoprolol succinate, 25 mg, Oral, Daily  montelukast, 10 mg, Oral, HS  warfarin, 2.5 mg, Oral, Daily (warfarin)      Continuous IV Infusions:     PRN Meds:       IP CONSULT TO NUTRITION SERVICES  IP CONSULT TO CASE MANAGEMENT    Network Utilization Review Department  ATTENTION: Please call with any questions or concerns to [phone] and carefully listen to the prompts so that you are directed to the right person. All voicemails are confidential.  Steph Jay all requests for admission clinical reviews, approved or denied determinations and any other requests to dedicated fax number below belonging to the campus where the patient is receiving treatment.  List of dedicated fax numbers for the Facilities:  [place] DENIALS (Administrative/Medical Necessity) [phone]   [address] [facility] (Maternity/NICU/Pediatrics) [phone]   [address] [place] [address] Drive [phone]   [facility] [phone]   [address] 207 [facility] [id number] [place] [address] [phone]   [address] [facility] 1300 [facility] [place] [address] [phone]

## 2023-07-28 NOTE — ASSESSMENT & PLAN NOTE
Wt Readings from Last 3 Encounters:   07/28/23 88.5 kg (195 lb)   07/25/23 88.5 kg (195 lb)   07/10/23 88.5 kg (195 lb)       Patient with history of diastolic heart failure presenting with several weeks of worsening shortness of breath. Patient's creatinine is currently at baseline, her last recorded weight was 195 pounds on 7/25, current weight 195 lbs   She takes 40 mg of Lasix as an outpatient, had received IV bolus Lasix, started on Lasix drip as per cardiology  Echocardiogram shows EF of 29%, normal systolic function, moderate abnormal diastolic dysfunction, right ventricle and atrium dilated as per cardiology report  Patient demonstrating good response to Lasix drip, continue intake and output. Daily weights.

## 2023-07-28 NOTE — ASSESSMENT & PLAN NOTE
Patient reports decreased urine output and suprapubic discomfort along with fatigue over the past several days  Plan for 3 days of ceftriaxone pending urine culture and sensitivites; day 2/3

## 2023-07-28 NOTE — ASSESSMENT & PLAN NOTE
Patient reports decreased urine output and suprapubic discomfort along with fatigue over the past several days  Plan for 3 days of ceftriaxone pending urine culture and sensitivites

## 2023-07-28 NOTE — ASSESSMENT & PLAN NOTE
Wt Readings from Last 3 Encounters:   07/25/23 88.5 kg (195 lb)   07/10/23 88.5 kg (195 lb)   06/13/23 92.5 kg (204 lb)       Patient with history of diastolic heart failure presenting with several weeks of worsening shortness of breath.   Patient's creatinine is currently at baseline, her last recorded weight was 195 pounds on 7/25, current weight pending  She takes 40 mg of Lasix as an outpatient, will start on IV Lasix 40 mg twice daily and monitor output  Echocardiogram pending

## 2023-07-28 NOTE — DISCHARGE INSTR - OTHER ORDERS
Skin Care Plan:  1-Apply Allevyn foam to sacrum, stephanie w/P, peel foam check skin integrity q-shift. Change q3d and PRN for soilage/dislodgement  2-Turn/reposition q2h or when medically stable for pressure re-distribution on skin . 3-Elevate heels to offload pressure. 4-Moisturize skin daily with skin nourishing cream  5-Ehob cushion in chair when out of bed.   6-Preventative Hydraguard to bilateral heels BID and PRN.   [id number]-[id number]-specialty mattress ordered for patient

## 2023-07-28 NOTE — CASE MANAGEMENT
Case Management Assessment & Discharge Planning Note    Patient name Ressie Shone  Location /-60 MRN [id number]  : 1945 Date 2023       Current Admission Date: 2023  Current Admission Diagnosis:Acute on chronic heart failure with preserved ejection fraction [facility])   Patient Active Problem List    Diagnosis Date Noted   • Acute on chronic heart failure with preserved ejection fraction ([address]) 2023   • Pancytopenia ([address]) 2023   • Aneurysm of ascending aorta without rupture ([address]) 07/10/2023   • Difficult intravenous access 05/15/2023   • Acute COVID-19 2023   • A-fib ([address]) 2023   • Stage III pressure ulcer of sacral region ([address]) 2023   • Asthma 2023   • Osteoporosis with current pathological fracture with routine healing 2023   • Class 2 obesity in adult 2023   • CHAS (obstructive sleep apnea) 2023   • Abnormal CT scan 05/10/2023   • Hyponatremia 2023   • Bradycardia 2023   • UTI (urinary tract infection) 2023   • Ventricular arrhythmia 2023   • Type 2 diabetes mellitus ([address]) 2023   • Fall 2023   • Closed displaced oblique fracture of shaft of left humerus with routine healing 2023   • Acute pain due to trauma 2023   • H/O mechanical aortic valve replacement 2023   • Fracture of multiple ribs of right side 2023   • Chronic ITP (idiopathic thrombocytopenia) ([address]) 2023   • CAD (coronary artery disease) 05/10/2017      LOS (days): 1  Geometric Mean LOS (GMLOS) (days):   Days to GMLOS:     OBJECTIVE:    Risk of Unplanned Readmission Score: 20.03         Current admission status: Inpatient       Preferred Pharmacy:   [facility] [address], [address] 328 [facility] [address]  Phone: [phone] Fax: [phone]    Primary Care Provider: Deejay Saravia DO    Primary Insurance: Yanet Whitmore [facility] REP  Secondary Insurance:     ASSESSMENT:  Active Health Care Proxies    There are no active Health Care Proxies on file. Patient Information  Admitted from[de-identified] Home  Mental Status: Alert  During Assessment patient was accompanied by: Not accompanied during assessment  Assessment information provided by[de-identified] Patient  Primary Caregiver: Self  Support Systems: Self, Spouse/significant other, Family members  Home entry access options. Select all that apply.: Stairs  Number of steps to enter home. : 1  Do the steps have railings?: Yes  Type of Current Residence: [address] [place] [address] home  Upon entering residence, is there a bedroom on the main floor (no further steps)?: Yes  Upon entering residence, is there a bathroom on the main floor (no further steps)?: Yes  In the last 12 months, was there a time when you were not able to pay the mortgage or rent on time?: No  In the last 12 months, was there a time when you did not have a steady place to sleep or slept in a shelter (including now)?: No  Homeless/housing insecurity resource given?: N/A  Living Arrangements: Lives w/ Spouse/significant other (Lives with S/O but has own apartment also)  Is patient a ?: No    Activities of Daily Living Prior to Admission  Functional Status: Independent  Completes ADLs independently?: Yes  Ambulates independently?: Yes  Does patient use assisted devices?: Yes  Assisted Devices (DME) used: Straight Cane  Does patient currently own DME?: Yes  What DME does the patient currently own?: Straight Cane, Walker, Shower Chair  Does patient have a history of Outpatient Therapy (PT/OT)?: No  Does the patient have a history of Short-Term Rehab?: No  Does patient have a history of HHC?: Yes ([facility])  Does patient currently have [facility]?: No         Patient Information Continued  Income Source: Pension/group home  Does patient have prescription coverage?: Yes  Within the past 12 months, you worried that your food would run out before you got the money to buy more.: Never true  Within the past 12 months, the food you bought just didn't last and you didn't have money to get more.: Never true  Food insecurity resource given?: N/A  Does patient receive dialysis treatments?: No  Does patient have a history of substance abuse?: No (Per pt)  Does patient have a history of Mental Health Diagnosis?: No (Per pt)         Means of Transportation  Means of Transport to [facility][de-identified] Family transport  In the past 12 months, has lack of transportation kept you from medical appointments or from getting medications?: No  In the past 12 months, has lack of transportation kept you from meetings, work, or from getting things needed for daily living?: No  Was application for public transport provided?: N/A        DISCHARGE DETAILS:  Met at bedside with patient. Resides with s/o at his house but also has own apartment. At [place] she will dc to address [address] [place] [address]. Ambulates with a cane at baseline. Family drives to \[facility]\"" and will drive to home at [place]. Recently on services with [facility] - PT/OT/RN for wound care. Does currently have a small wound to coccyx, wound consulted. CM to continue to support. Update: 1402 - CM noted that therapy recs for rehab. Met at bedside to discuss with patient. She is not agreeable but would like [facility]. Will send blanket referral. CM to continue to support.

## 2023-07-28 NOTE — PHYSICAL THERAPY NOTE
Physical Therapy Evaluation    Patient's Name: Rachel Manjarrez    Admitting Diagnosis  UTI (urinary tract infection) [N39.0]  CHF exacerbation ([facility]) [I50.9]    Problem List  Patient Active Problem List   Diagnosis    Fall    Closed displaced oblique fracture of shaft of left humerus with routine healing    Acute pain due to trauma    H/O mechanical aortic valve replacement    Fracture of multiple ribs of right side    Type 2 diabetes mellitus ([facility])    UTI (urinary tract infection)    Ventricular arrhythmia    Bradycardia    Hyponatremia    Abnormal CT scan    A-fib ([facility])    Stage III pressure ulcer of sacral region ([address])    Asthma    Osteoporosis with current pathological fracture with routine healing    Class 2 obesity in adult    CHAS (obstructive sleep apnea)    Acute COVID-19    Difficult intravenous access    Aneurysm of ascending aorta without rupture ([facility])    CAD (coronary artery disease)    Chronic ITP (idiopathic thrombocytopenia) ([facility])    Acute on chronic heart failure with preserved ejection fraction ([facility])    Pancytopenia ([facility])       Past Medical History  Past Medical History:   Diagnosis Date    Arthritis     Asthma     Diabetes ([address])     Environmental allergies     Heart murmur     Hypertension        Past Surgical History  Past Surgical History:   Procedure Laterality Date    MECHANICAL AORTIC VALVE REPLACEMENT      REPLACEMENT TOTAL KNEE N/A     REPLACEMENT TOTAL KNEE          07/28/23 0940   PT Last Visit   PT Visit Date 07/28/23   Note Type   Note type Evaluation   Pain Assessment   Pain Assessment Tool 0-10   Pain Score No Pain   Restrictions/Precautions   Weight Bearing Precautions Per Order No   Other Precautions Telemetry; Fall Risk   Home Living   Type of [address] [place] [address]    (1 +1 LIZ; (+) stair glide to 2nd floor living area)   Bathroom Shower/Tub Tub/shower unit  (cut-out tub)   Bathroom Toilet Standard   Bathroom Equipment Grab bars in shower; Shower chair; Toilet raiser   Home Equipment Walker;Cane   Additional Comments Pt is currently staying w/ her s/o in above home set-up. Pt does have her own apt w/ 0 LIZ. Prior Function   Level of [place] Independent with ADLs; Independent with functional mobility; Needs assistance with IADLS  (Alex w/ SPC)   Lives With Significant other  (pt reports s/o has balance issues)   Receives Help From Family   IADLs Family/Friend/Other provides transportation; Independent with meal prep  (pt reports ordering out a lot but also makes her own meals sometimes)   Falls in the last 6 months 1 to 4  (1 w/ L humeral fx in April)   General   Family/Caregiver Present No   Cognition   Arousal/Participation Alert   Orientation Level Oriented X4   Comments pleasant + cooperative   Subjective   Subjective Pt agreeable to mobilize. RLE Assessment   RLE Assessment   (grossly 4-/5)   LLE Assessment   LLE Assessment   (grossly 4-/5)   Coordination   Movements are Fluid and Coordinated 1   Bed Mobility   Supine to Sit Unable to assess   Sit to Supine Unable to assess   Additional Comments Pt greeted in chair. Transfers   Sit to Stand 5  Supervision   Additional items Armrests; Increased time required   Stand to Sit 5  Supervision   Additional items Armrests; Increased time required   Additional Comments [facility]   Ambulation/Elevation   Gait pattern Excessively slow; Short stride;Decreased foot clearance; Foward flexed   Gait Assistance 4  Minimal assist   Additional items Assist x 1; Tactile cues; Verbal cues   Assistive Device SPC   Distance 25'x2 w/ seated rest   Stair Management Assistance Not tested   Balance   Static Sitting Fair +   Dynamic Sitting Fair   Static Standing Fair -   Dynamic Standing Poor +   Ambulatory Poor +  (SPC)   Endurance Deficit   Endurance Deficit Yes   Endurance Deficit Description SOB (SpO2 84-95% RA), weakness, fatigue   Activity Tolerance   Activity Tolerance Patient limited by fatigue  (SOB)   Medical Staff Made Aware HAIDER Mooney   Nurse Made Aware yes - cleared + updated   Assessment   Prognosis Excellent   Problem List Decreased strength; Impaired balance;Decreased endurance;Decreased mobility   Assessment Pt is 68 y.o. female seen for a high complexity PT evaluation s/p admit to [address] [place] [address] on 7/27/2023. Pt presenting w/ acute on chronic heart failure w/ preserved ejection fraction. Please see above for other active problem list / PMH. PT now consulted to assess functional mobility and needs for safe d/c planning. Prior to admission, pt was Alex w/ ambulation w/ SPC, lives w/ s/o in a house w/ 1 + 1 LIZ, (+) stair glide to 2nd floor. Currently pt requires S for functional transfers; MinAx1 for ambulation w/ SPC. Pt presents functioning below baseline and w/ overall mobility deficits 2* to: decreased LE strength; decreased endurance; impaired balance; gait deviations; SOB/[place]; fatigue. These impairments place pt at risk for falls. Pt will continue to benefit from skilled PT interventions to address stated impairments; to maximize functional potential; for ongoing pt/ family training; and DME needs. PT is currently recommending rehab vs home w/ home PT pending progress. Goals   Patient Goals feel better   [facility] Expiration Date 08/11/23   Short Term Goal #1 In 14 days pt will complete: 1) Bed mobility skills with Alex to facilitate safe return to previous living environment. 2) Functional transfers with Aelx to facilitate safe return to previous living environment. 3) Ambulation with least restrictive ' w/ Alex without LOB for safe ambulation in home/community environment. 4) Improve balance scores by 1 grade to decrease fall risk. 5) Improve LE strength grades by 1 to increase independence w/ all functional mobility, transfers and gait. 6) PT for ongoing pt and family education; DME needs and D/C planning to promote highest level of function in least restrictive environment.  7) Stair training up/ down 2 steps with most appropriate technique and Alex for safe access to previous living environment and to increase community access. PT Treatment Day 0   Plan   Treatment/Interventions Functional transfer training;LE strengthening/ROM; Elevations; Therapeutic exercise; Endurance training;Patient/family training;Equipment eval/education; Bed mobility;Gait training; Compensatory technique education;Spoke to nursing;OT   PT Frequency 3-5x/wk   Recommendation   PT Discharge Recommendation Post acute rehabilitation services  (vs HHPT pending progress)   AM-PAC Basic Mobility Inpatient   Turning in Flat Bed Without Bedrails 3   Lying on Back to Sitting on Edge of Flat Bed Without Bedrails 3   Moving Bed to Chair 3   Standing Up From Chair Using Arms 3   Walk in Room 3   Climb 3-5 Stairs With Railing 1   Basic Mobility Inpatient Raw Score 16   Basic Mobility Standardized Score 38.32   Highest Level Of Mobility   JH-HLM Goal 5: Stand one or more mins   JH-HLM Achieved 7: Walk 25 feet or more   End of Consult   Patient Position at End of Consult Bedside chair; All needs within reach  (BLE elevated)     Raisa Vega, PT, DPT

## 2023-07-29 ENCOUNTER — HOME HEALTH ADMISSION (OUTPATIENT)
Dept: HOME HEALTH SERVICES | Facility: HOME HEALTHCARE | Age: 78
End: 2023-07-29
Payer: COMMERCIAL

## 2023-07-29 LAB
ALBUMIN SERPL BCP-MCNC: 3 G/DL (ref 3.5–5)
ALP SERPL-CCNC: 145 U/L (ref 46–116)
ALT SERPL W P-5'-P-CCNC: 23 U/L (ref 12–78)
ANION GAP SERPL CALCULATED.3IONS-SCNC: 2 MMOL/L
AST SERPL W P-5'-P-CCNC: 39 U/L (ref 5–45)
BACTERIA UR CULT: ABNORMAL
BACTERIA UR CULT: ABNORMAL
BASOPHILS # BLD AUTO: 0.01 THOUSANDS/ÂΜL (ref 0–0.1)
BASOPHILS NFR BLD AUTO: 0 % (ref 0–1)
BILIRUB SERPL-MCNC: 0.91 MG/DL (ref 0.2–1)
BUN SERPL-MCNC: 18 MG/DL (ref 5–25)
CALCIUM ALBUM COR SERPL-MCNC: 10.1 MG/DL (ref 8.3–10.1)
CALCIUM SERPL-MCNC: 9.3 MG/DL (ref 8.3–10.1)
CHLORIDE SERPL-SCNC: 105 MMOL/L (ref 96–108)
CO2 SERPL-SCNC: 33 MMOL/L (ref 21–32)
CREAT SERPL-MCNC: 0.69 MG/DL (ref 0.6–1.3)
EOSINOPHIL # BLD AUTO: 0.06 THOUSAND/ÂΜL (ref 0–0.61)
EOSINOPHIL NFR BLD AUTO: 2 % (ref 0–6)
ERYTHROCYTE [DISTWIDTH] IN BLOOD BY AUTOMATED COUNT: 15.6 % (ref 11.6–15.1)
GFR SERPL CREATININE-BSD FRML MDRD: 84 ML/MIN/1.73SQ M
GLUCOSE SERPL-MCNC: 132 MG/DL (ref 65–140)
HCT VFR BLD AUTO: 32.8 % (ref 34.8–46.1)
HGB BLD-MCNC: 10.6 G/DL (ref 11.5–15.4)
IMM GRANULOCYTES # BLD AUTO: 0.01 THOUSAND/UL (ref 0–0.2)
IMM GRANULOCYTES NFR BLD AUTO: 0 % (ref 0–2)
INR PPP: 3.26 (ref 0.84–1.19)
LYMPHOCYTES # BLD AUTO: 0.65 THOUSANDS/ÂΜL (ref 0.6–4.47)
LYMPHOCYTES NFR BLD AUTO: 18 % (ref 14–44)
MCH RBC QN AUTO: 32.5 PG (ref 26.8–34.3)
MCHC RBC AUTO-ENTMCNC: 32.3 G/DL (ref 31.4–37.4)
MCV RBC AUTO: 101 FL (ref 82–98)
MONOCYTES # BLD AUTO: 0.47 THOUSAND/ÂΜL (ref 0.17–1.22)
MONOCYTES NFR BLD AUTO: 13 % (ref 4–12)
NEUTROPHILS # BLD AUTO: 2.44 THOUSANDS/ÂΜL (ref 1.85–7.62)
NEUTS SEG NFR BLD AUTO: 67 % (ref 43–75)
NRBC BLD AUTO-RTO: 0 /100 WBCS
PLATELET # BLD AUTO: 141 THOUSANDS/UL (ref 149–390)
PMV BLD AUTO: 10.9 FL (ref 8.9–12.7)
POTASSIUM SERPL-SCNC: 3.4 MMOL/L (ref 3.5–5.3)
PROT SERPL-MCNC: 7.8 G/DL (ref 6.4–8.4)
PROTHROMBIN TIME: 33.5 SECONDS (ref 11.6–14.5)
RBC # BLD AUTO: 3.26 MILLION/UL (ref 3.81–5.12)
SODIUM SERPL-SCNC: 140 MMOL/L (ref 135–147)
WBC # BLD AUTO: 3.64 THOUSAND/UL (ref 4.31–10.16)

## 2023-07-29 PROCEDURE — 80053 COMPREHEN METABOLIC PANEL: CPT | Performed by: STUDENT IN AN ORGANIZED HEALTH CARE EDUCATION/TRAINING PROGRAM

## 2023-07-29 PROCEDURE — 99232 SBSQ HOSP IP/OBS MODERATE 35: CPT | Performed by: NURSE PRACTITIONER

## 2023-07-29 PROCEDURE — 85025 COMPLETE CBC W/AUTO DIFF WBC: CPT | Performed by: STUDENT IN AN ORGANIZED HEALTH CARE EDUCATION/TRAINING PROGRAM

## 2023-07-29 PROCEDURE — 99232 SBSQ HOSP IP/OBS MODERATE 35: CPT | Performed by: INTERNAL MEDICINE

## 2023-07-29 PROCEDURE — 85610 PROTHROMBIN TIME: CPT | Performed by: STUDENT IN AN ORGANIZED HEALTH CARE EDUCATION/TRAINING PROGRAM

## 2023-07-29 RX ORDER — FUROSEMIDE 10 MG/ML
60 INJECTION INTRAMUSCULAR; INTRAVENOUS
Status: DISCONTINUED | OUTPATIENT
Start: 2023-07-29 | End: 2023-07-31

## 2023-07-29 RX ORDER — POTASSIUM CHLORIDE 20 MEQ/1
40 TABLET, EXTENDED RELEASE ORAL ONCE
Status: COMPLETED | OUTPATIENT
Start: 2023-07-29 | End: 2023-07-29

## 2023-07-29 RX ORDER — POTASSIUM CHLORIDE 20 MEQ/1
40 TABLET, EXTENDED RELEASE ORAL 2 TIMES DAILY
Status: DISCONTINUED | OUTPATIENT
Start: 2023-07-29 | End: 2023-08-01

## 2023-07-29 RX ADMIN — CITALOPRAM HYDROBROMIDE 20 MG: 20 TABLET ORAL at 09:52

## 2023-07-29 RX ADMIN — ATORVASTATIN CALCIUM 40 MG: 40 TABLET, FILM COATED ORAL at 17:38

## 2023-07-29 RX ADMIN — AMIODARONE HYDROCHLORIDE 200 MG: 200 TABLET ORAL at 09:52

## 2023-07-29 RX ADMIN — CEFTRIAXONE 1000 MG: 1 INJECTION, POWDER, FOR SOLUTION INTRAMUSCULAR; INTRAVENOUS at 17:37

## 2023-07-29 RX ADMIN — POTASSIUM CHLORIDE 40 MEQ: 1500 TABLET, EXTENDED RELEASE ORAL at 17:37

## 2023-07-29 RX ADMIN — POTASSIUM CHLORIDE 40 MEQ: 1500 TABLET, EXTENDED RELEASE ORAL at 09:52

## 2023-07-29 RX ADMIN — POTASSIUM CHLORIDE 40 MEQ: 1500 TABLET, EXTENDED RELEASE ORAL at 04:13

## 2023-07-29 RX ADMIN — FUROSEMIDE 60 MG: 10 INJECTION, SOLUTION INTRAMUSCULAR; INTRAVENOUS at 09:52

## 2023-07-29 RX ADMIN — MONTELUKAST 10 MG: 10 TABLET, FILM COATED ORAL at 21:46

## 2023-07-29 RX ADMIN — ALLOPURINOL 100 MG: 100 TABLET ORAL at 09:52

## 2023-07-29 RX ADMIN — METOPROLOL SUCCINATE 25 MG: 25 TABLET, FILM COATED, EXTENDED RELEASE ORAL at 09:52

## 2023-07-29 RX ADMIN — FUROSEMIDE 60 MG: 10 INJECTION, SOLUTION INTRAMUSCULAR; INTRAVENOUS at 17:37

## 2023-07-29 RX ADMIN — WARFARIN SODIUM 2.5 MG: 2.5 TABLET ORAL at 17:38

## 2023-07-29 NOTE — PROGRESS NOTES
[address] [facility]  Progress Note  Name: Jaylene Rogers  MRN: [id number]  Unit/Bed#: -31 I Date of Admission: 7/27/2023   Date of Service: 7/29/2023 I Hospital Day: 2    Assessment/Plan   * Acute on chronic heart failure with preserved ejection fraction [facility])  Assessment & Plan  Wt Readings from Last 3 Encounters:   07/28/23 88.5 kg (195 lb)   07/25/23 88.5 kg (195 lb)   07/10/23 88.5 kg (195 lb)       Patient with history of diastolic heart failure presenting with several weeks of worsening shortness of breath. Patient's creatinine is currently at baseline, her last recorded weight was 195 pounds on 7/25, current weight 195 lbs   She takes 40 mg of Lasix as an outpatient, had received IV bolus Lasix, started on Lasix drip as per cardiology  Echocardiogram shows EF of 66%, normal systolic function, moderate abnormal diastolic dysfunction, right ventricle and atrium dilated as per cardiology report  Patient demonstrating good response to Lasix drip, continue intake and output. Daily weights. A-fib [facility])  Assessment & Plan  We will continue beta-blocker and warfarin for anticoagulation  Monitor INR     H/O mechanical aortic valve replacement  Assessment & Plan  Patient with history of mechanical aortic valve placement, will continue on Coumadin, current INR 3.26, goal INR 2.5-3.5    Pancytopenia ([address])  Assessment & Plan  Patient with history of chronic ITP, however also noted leukopenia and anemia, will need outpatient follow-up with heme-onc, will check reticulocyte count, LDH, folate, TSH, spep and upep. Concern for bone marrow abnormality, mds. B 12 1602     Type 2 diabetes mellitus ([address])  Assessment & Plan  Lab Results   Component Value Date    HGBA1C 5.9 (H) 06/30/2023       No results for input(s): "POCGLU" in the last 72 hours.     Blood Sugar Average: Last 72 hrs:  History of type 2 diabetes on oral meds  Continue sliding scale  Diabetic diet     UTI (urinary tract infection)  Assessment & Plan  Patient reports decreased urine output and suprapubic discomfort along with fatigue over the past several days  Plan for 3 days of ceftriaxone pending urine culture and sensitivites; day 3/3               VTE Pharmacologic Prophylaxis: VTE Score: 1 Moderate Risk (Score 3-4) - Pharmacological DVT Prophylaxis Ordered: warfarin (Coumadin). Patient Centered Rounds: I performed bedside rounds with nursing staff today. Discussions with Specialists or Other Care Team Provider: cardiology     Education and Discussions with Family / Patient: Updated  (daughter) via phone. Total Time Spent on Date of Encounter in care of patient: 45 minutes This time was spent on one or more of the following: performing physical exam; counseling and coordination of care; obtaining or reviewing history; documenting in the medical record; reviewing/ordering tests, medications or procedures; communicating with other healthcare professionals and discussing with patient's family/caregivers. Current Length of Stay: 2 day(s)  Current Patient Status: Inpatient   Certification Statement: The patient will continue to require additional inpatient hospital stay due to IV lasix drip, I and O, repeat labs   Discharge Plan: Anticipate discharge in 24-48 hrs to discharge location to be determined pending rehab evaluations. Code Status: Level 1 - Full Code    Subjective:   Patient seen sitting up in chair, just finished having breakfast.  Reports that she did not sleep very well last night as she had multiple events of urination. Feels that the swelling in her lower extremities has improved, less firm in her abdomen. No nausea or vomiting.     Objective:     Vitals:   Temp (24hrs), Av.9 °F (36.6 °C), Min:97.4 °F (36.3 °C), Max:98.3 °F (36.8 °C)    Temp:  [97.4 °F (36.3 °C)-98.3 °F (36.8 °C)] 98.3 °F (36.8 °C)  HR:  [57-74] 60  Resp:  [18] 18  BP: (117-133)/(67-79) 133/79  SpO2:  [94 %-96 %] 96 %  Body mass index is 40.34 kg/m². Input and Output Summary (last 24 hours): Intake/Output Summary (Last 24 hours) at 7/29/2023 1048  Last data filed at 7/29/2023 0918  Gross per 24 hour   Intake 1178.85 ml   Output 5550 ml   Net -4371.15 ml       Physical Exam:   Physical Exam  Vitals and nursing note reviewed. Constitutional:       General: She is not in acute distress. Comments: Tired appearing; reports little sleep last night    HENT:      Head: Normocephalic. Nose: Nose normal.      Mouth/Throat:      Mouth: Mucous membranes are moist.   Eyes:      Extraocular Movements: Extraocular movements intact. Conjunctiva/sclera: Conjunctivae normal.   Cardiovascular:      Rate and Rhythm: Normal rate. Heart sounds: Murmur heard. Pulmonary:      Effort: Pulmonary effort is normal.      Breath sounds: Normal breath sounds. Abdominal:      General: Bowel sounds are normal. There is no distension. Palpations: Abdomen is soft. Tenderness: There is no abdominal tenderness. Genitourinary:     Comments: Voiding spontaneously   Musculoskeletal:      Cervical back: Normal range of motion. Right lower leg: Edema present. Left lower leg: Edema present. Skin:     General: Skin is warm and dry. Capillary Refill: Capillary refill takes less than 2 seconds. Neurological:      General: No focal deficit present. Mental Status: She is alert and oriented to person, place, and time. Psychiatric:         Mood and Affect: Mood normal.         Behavior: Behavior normal.         Thought Content:  Thought content normal.         Judgment: Judgment normal.         Additional Data:     Labs:  Results from last 7 days   Lab Units 07/29/23  0219   WBC Thousand/uL 3.64*   HEMOGLOBIN g/dL 10.6*   HEMATOCRIT % 32.8*   PLATELETS Thousands/uL 141*   NEUTROS PCT % 67   LYMPHS PCT % 18   MONOS PCT % 13*   EOS PCT % 2     Results from last 7 days   Lab Units 07/29/23  0219   SODIUM mmol/L 140   POTASSIUM mmol/L 3.4*   CHLORIDE mmol/L 105   CO2 mmol/L 33*   BUN mg/dL 18   CREATININE mg/dL 0.69   ANION GAP mmol/L 2   CALCIUM mg/dL 9.3   ALBUMIN g/dL 3.0*   TOTAL BILIRUBIN mg/dL 0.91   ALK PHOS U/L 145*   ALT U/L 23   AST U/L 39   GLUCOSE RANDOM mg/dL 132     Results from last 7 days   Lab Units 07/29/23  0219   INR  3.26*                   Lines/Drains:  Invasive Devices     Peripheral Intravenous Line  Duration           Peripheral IV 07/27/23 Right Arm 1 day    Peripheral IV 07/28/23 Distal;Dorsal (posterior); Left Forearm 1 day                  Telemetry:  Telemetry Orders (From admission, onward)             24 Hour Telemetry Monitoring  Continuous x 24 Hours (Telem)        Question:  Reason for 24 Hour Telemetry  Answer:  Decompensated CHF- and any one of the following: continuous diuretic infusion or total diuretic dose >200 mg daily, associated electrolyte derangement (I.e. K < 3.0), ionotropic drip (continuous infusion), hx of ventricular arrhythmia, or new EF < 35%                 Telemetry Reviewed: pacing and capture 60  Indication for Continued Telemetry Use: Acute CHF on >200 mg lasix/day or equivalent dose or with new reduced EF. Imaging: No pertinent imaging reviewed.     Recent Cultures (last 7 days):   Results from last 7 days   Lab Units 07/27/23  1654   URINE CULTURE  >100,000 cfu/ml Escherichia coli*  <10,000 cfu/ml Gram Negative Christopher Enteric Like*       Last 24 Hours Medication List:   Current Facility-Administered Medications   Medication Dose Route Frequency Provider Last Rate   • allopurinol  100 mg Oral Daily Olaf Lewis DO     • amiodarone  200 mg Oral Daily With Breakfast Olaf Lewis DO     • atorvastatin  40 mg Oral Daily With [employer], DO     • cefTRIAXone  1,000 mg Intravenous Q24H Gomez Mendoza 1,000 mg (07/28/23 7756)   • citalopram  20 mg Oral Daily Olaf Lewis DO     • furosemide  60 mg Intravenous BID (diuretic) Sunni Arana JOSEPHINE Colon     • metoprolol succinate  25 mg Oral Daily Olaf Lewis DO     • montelukast  10 mg Oral HS Olaf Lewis DO     • potassium chloride  40 mEq Oral BID JOSEPHINE Stoll     • warfarin  2.5 mg Oral Daily (warfarin) Garfield Brooks DO          Today, Patient Was Seen By: JOSEPHINE Ralph    **Please Note: This note may have been constructed using a voice recognition system. **

## 2023-07-29 NOTE — ASSESSMENT & PLAN NOTE
Patient reports decreased urine output and suprapubic discomfort along with fatigue over the past several days  Plan for 3 days of ceftriaxone pending urine culture and sensitivites; day 3/3

## 2023-07-29 NOTE — PROGRESS NOTES
-- Patient: Jarrell Morse  -- MRN: [id number]  -- Aidin Request ID: [id number]  -- Level of care reserved: Naye Hong  -- Partner Reserved: [facility]  [facility] [phone]  -- Clinical needs requested:  -- Geography searched: [phone]  -- Start of Service:  -- Request sent: 2:06pm EDT on 7/28/2023 by Alex Davis  -- Partner reserved: 8:13am EDT on 7/29/2023 by Ramonita Zimmerman  -- Choice list shared: 8:13am EDT on 7/29/2023 by Ramonita Zimmerman

## 2023-07-29 NOTE — ASSESSMENT & PLAN NOTE
Patient with history of chronic ITP, however also noted leukopenia and anemia, will need outpatient follow-up with heme-onc, will check reticulocyte count, LDH, folate, TSH, spep and upep. Concern for bone marrow abnormality, mds.   B 12 6250

## 2023-07-29 NOTE — ASSESSMENT & PLAN NOTE
Lab Results   Component Value Date    HGBA1C 5.9 (H) 06/30/2023       No results for input(s): "POCGLU" in the last 72 hours.     Blood Sugar Average: Last 72 hrs:  History of type 2 diabetes on oral meds  Continue sliding scale  Diabetic diet

## 2023-07-29 NOTE — ASSESSMENT & PLAN NOTE
Patient with history of mechanical aortic valve placement, will continue on Coumadin, current INR 3.26, goal INR 2.5-3.5

## 2023-07-29 NOTE — PLAN OF CARE
Problem: CARDIOVASCULAR - ADULT  Goal: Maintains optimal cardiac output and hemodynamic stability  Description: INTERVENTIONS:  - Monitor I/O, vital signs and rhythm  - Monitor for S/S and trends of decreased cardiac output  - Administer and titrate ordered vasoactive medications to optimize hemodynamic stability  - Assess quality of pulses, skin color and temperature  - Assess for signs of decreased coronary artery perfusion  - Instruct patient to report change in severity of symptoms  Outcome: Progressing  Goal: Absence of cardiac dysrhythmias or at baseline rhythm  Description: INTERVENTIONS:  - Continuous cardiac monitoring, vital signs, obtain 12 lead EKG if ordered  - Administer antiarrhythmic and heart rate control medications as ordered  - Monitor electrolytes and administer replacement therapy as ordered  Outcome: Progressing     Problem: RESPIRATORY - ADULT  Goal: Achieves optimal ventilation and oxygenation  Description: INTERVENTIONS:  - Assess for changes in respiratory status  - Assess for changes in mentation and behavior  - Position to facilitate oxygenation and minimize respiratory effort  - Oxygen administered by appropriate delivery if ordered  - Initiate smoking cessation education as indicated  - Encourage broncho-pulmonary hygiene including cough, deep breathe, Incentive Spirometry  - Assess the need for suctioning and aspirate as needed  - Assess and instruct to report SOB or any respiratory difficulty  - Respiratory Therapy support as indicated  Outcome: Progressing     Problem: DISCHARGE PLANNING  Goal: Discharge to home or other facility with appropriate resources  Description: INTERVENTIONS:  - Identify barriers to discharge w/patient and caregiver  - Arrange for needed discharge resources and transportation as appropriate  - Identify discharge learning needs (meds, wound care, etc.)  - Arrange for interpretive services to assist at discharge as needed  - Refer to Case Management Department for coordinating discharge planning if the patient needs post-hospital services based on physician/advanced practitioner order or complex needs related to functional status, cognitive ability, or social support system  Outcome: Progressing     Problem: Knowledge Deficit  Goal: Patient/family/caregiver demonstrates understanding of disease process, treatment plan, medications, and discharge instructions  Description: Complete learning assessment and assess knowledge base.   Interventions:  - Provide teaching at level of understanding  - Provide teaching via preferred learning methods  Outcome: Progressing

## 2023-07-29 NOTE — CASE MANAGEMENT
Case Management Discharge Planning Note    Patient name Jennifer [facility] /-64 MRN [id number]  : 1945 Date 2023       Current Admission Date: 2023  Current Admission Diagnosis:Acute on chronic heart failure with preserved ejection fraction [facility])   Patient Active Problem List    Diagnosis Date Noted   • Acute on chronic heart failure with preserved ejection fraction ([address]) 2023   • Pancytopenia ([address]) 2023   • Aneurysm of ascending aorta without rupture ([address]) 07/10/2023   • Difficult intravenous access 05/15/2023   • Acute COVID-19 2023   • A-fib ([address]) 2023   • Stage III pressure ulcer of sacral region ([address]) 2023   • Asthma 2023   • Osteoporosis with current pathological fracture with routine healing 2023   • Class 2 obesity in adult 2023   • CHAS (obstructive sleep apnea) 2023   • Abnormal CT scan 05/10/2023   • Hyponatremia 2023   • Bradycardia 2023   • UTI (urinary tract infection) 2023   • Ventricular arrhythmia 2023   • Type 2 diabetes mellitus ([address]) 2023   • Fall 2023   • Closed displaced oblique fracture of shaft of left humerus with routine healing 2023   • Acute pain due to trauma 2023   • H/O mechanical aortic valve replacement 2023   • Fracture of multiple ribs of right side 2023   • Chronic ITP (idiopathic thrombocytopenia) ([address]) 2023   • CAD (coronary artery disease) 05/10/2017      LOS (days): 2  Geometric Mean LOS (GMLOS) (days): 3.90  Days to GMLOS:2.3     OBJECTIVE:  Risk of Unplanned Readmission Score: 20.72         Current admission status: Inpatient   Preferred Pharmacy:   [address] [facility] [address], [address] [address]  Phone: [phone] Fax: [phone]    Primary Care Provider: Janie Orosco DO    Primary Insurance: [facility]  Secondary Insurance: DISCHARGE DETAILS:    Discharge planning discussed with[de-identified] Patient  Freedom of Choice: Yes  Comments - Freedom of Choice: Choice is for [facility] for [address] provider. CM contacted family/caregiver?: No- see comments (VM left for daughter to review DCP.)  Were Treatment Team discharge recommendations reviewed with patient/caregiver?: Yes  Did patient/caregiver verbalize understanding of patient care needs?: Yes  Were patient/caregiver advised of the risks associated with not following Treatment Team discharge recommendations?: Yes    Requested [facility]         Is the patient interested in [address] at discharge?: Yes  [facility] requested[de-identified] Nursing, Occupational Therapy, Physical [address] [facility] Name[de-identified] Camryn Provider[de-identified] PCP  Home Health Services Needed[de-identified] Evaluate Functional Status and Safety, Gait/ADL Training, Wound/Ostomy Care, Strengthening/Theraputic Exercises to Improve Function, Heart Failure Management, Diabetes Management  Homebound Criteria Met[de-identified] Uses an Assist Device (i.e. cane, walker, etc), Requires the Assistance of Another Person for Safe Ambulation or to Leave the Home  Supporting Clincal Findings[de-identified] Fatigues Easliy in [employer], Limited Endurance    DME Referral Provided  Referral made for DME?: No    Other Referral/Resources/Interventions Provided:  Interventions: C  Referral Comments: [facility] options reviewed. Choice is for [facility] for [address] provider. Agency reserved in [address] AVS updated.     Would you like to participate in our [address] service program?  : No - Declined    Treatment Team Recommendation: Short Term Rehab (REFUSING)  Discharge Destination Plan[de-identified] Home with [facility]  Transport at Discharge : Family  ETA of Transport (Date): 07/29/23

## 2023-07-29 NOTE — UTILIZATION REVIEW
Date: 7/29   Day 3: Has surpassed a 2nd midnight with active treatments and services, which include   Continued tx of acute HF with need for continued diuresis --  Per cardiology Stop IV lasix drip --> continue IV Lasix 60 mg bid today. Continue po meds.  Monitor I/Os, daily wts

## 2023-07-29 NOTE — PROGRESS NOTES
General Cardiology   Progress Note -  Team One   Ressie Shone 68 y.o. female MRN: [id number]    Unit/Bed#: -01 Encounter: [id number]    Assessment  1. Acute on chronic HFpEF - possibly secondary to ineffective diuretic dosing as an outpatient. Pt reports compliance with diuretic regimen and no recent dietary or fluid indiscretions.  -Pt is significantly volume overloaded on exam.   - (previously 621)  -Chest x-ray 7/27; mild pulmonary vascular congestion with trace effusions. -TTE 7/28/2023; LVEF 60%, grade 2 DD, RV dilated/systolic function moderately reduced, LA moderately dilated, RA severely dilated, normal functioning mechanical AV, mild to moderate MR, severe TR. -Previously on oral furosemide 40 mg daily as an outpatient (currently on hold)  -Currently on IV Lasix GTT at 10 mg/hr  -24-hour CINDY balance; -3.9L overall -5.1L  -Baseline dry weight around 185-190 pounds.,  SS weight 207 pounds on admission, currently 193 pounds. 2. Hx of aortic valve stenosis s/p mechanical AVR (in 2011)  -TTE 7/28/2023; mechanical AV, prosthetic valve leaflet motion is normal, no evidence of perivalvular regurgitation, mild transvalvular regurgitation, no significant AS  -Chronically anticoagulated with warfarin, INR 2.9 on admission, currently 3.1.  3. Hx of persistent atrial fibrillation   4. Hx of VT/VF s/p [facility] [place] single chamber ICD in-situ  5. Recent inappropriate ICD shock in 5/2023 (for rapid afib)  -Device recently reprogrammed to VVIR    -Device interrogation 6/20/2023 - ventricularly paced 71%, no new significant high rate episodes, normal device function.  -Telemetry review -V paced  -On amiodarone 200 mg daily and metoprolol succinate 25 mg daily  -Chronically anticoagulated with warfarin, INR 2.9 on admission, currently 3.26  6.  Hypertension  -Average /69, last recorded at 133/79.  -Outpatient BP regimen; oral furosemide 40 mg daily, and metoprolol succinate 25 mg daily  -Inpatient BP regimen; IV Lasix GTT at 10 mg/hr and metoprolol succinate 25 mg daily  7. Dyslipidemia  -Lipid profile 6/30/2023; cholesterol 96, triglycerides 43, HDL 56, and LDL calculated at 31.  -On atorvastatin 40 mg daily  8. DM type II  -HgbA1c 5.9  9. Ascending aortic aneurysm   -Outpatient surveillance. -CTA chest 4/27/2023 - Dilated ascending aorta measuring 4.6 cm,      Plan  -Volume/respiratory status significantly improved over the past 24 hours. Robust urinary response with implementation of IV Lasix GTT yesterday -this was placed on hold this a.m. as the patient was feeling very weak overnight/early a.m. We can discontinue the IV Lasix infusion and initiate IV Lasix 60 mg BID for today - replete potassium; K+ level 3.4 this a.m., start K. Dur 40 mEq BID. -Continue amiodarone 200 mg daily and metoprolol succinate 25 mg daily.  -Continue warfarin, goal INR 2.5-3.5.  -Strict I's and O's, daily standing weights, 2 g Na+ diet, 1.8 LFR.  -Monitor renal function and electrolytes closely. Replete to maintain K+ level of 4.0 magnesium level at 2.0.  -Continue telemetry monitoring while on continuous IV Lasix infusion.     Subjective  Review of Systems   Constitutional: Positive for malaise/fatigue. Negative for chills and fever. Eyes: Negative for visual disturbance. Cardiovascular: Positive for leg swelling. Negative for chest pain, dyspnea on exertion, orthopnea and palpitations. Respiratory: Negative for cough and shortness of breath. Gastrointestinal: Positive for bloating. Negative for abdominal pain. Neurological: Negative for dizziness, headaches and light-headedness. Objective:   Physical Exam  Vitals and nursing note reviewed. Constitutional:       General: She is not in acute distress. Appearance: She is not diaphoretic. HENT:      Head: Normocephalic and atraumatic. Eyes:      General: No scleral icterus.   Neck:      Comments: jvd  Cardiovascular:      Rate and Rhythm: Normal rate and regular rhythm. Pulses: Normal pulses. Heart sounds: Normal heart sounds. Pulmonary:      Effort: Pulmonary effort is normal.      Breath sounds: Normal breath sounds. No wheezing or rales. Abdominal:      General: There is distension. Palpations: Abdomen is soft. Tenderness: There is no abdominal tenderness. Musculoskeletal:         General: Swelling present. Right lower leg: Edema present. Left lower leg: Edema present. Skin:     General: Skin is warm and dry. Capillary Refill: Capillary refill takes less than 2 seconds. Neurological:      General: No focal deficit present. Mental Status: She is alert and oriented to person, place, and time. Psychiatric:         Mood and Affect: Mood normal.         Vitals: Blood pressure 133/79, pulse 60, temperature 98.3 °F (36.8 °C), temperature source Oral, resp. rate 18, height 4' 10" (1.473 m), weight 87.5 kg (193 lb), SpO2 96 %. ,     Body mass index is 40.34 kg/m². ,   Systolic (77CKJ), UNU:084 , Min:117 , QCT:543     Diastolic (84FPM), OGU:13, Min:63, Max:79      Intake/Output Summary (Last 24 hours) at 7/29/2023 0914  Last data filed at 7/29/2023 0701  Gross per 24 hour   Intake 1208.85 ml   Output 5550 ml   Net -4341.15 ml     Weight (last 2 days)     Date/Time Weight    07/29/23 0551 87.5 (193)    07/28/23 1000 88.5 (195)    07/28/23 0600 94.3 (207.8)          LABORATORY RESULTS      CBC with diff:   Results from last 7 days   Lab Units 07/29/23  0219 07/28/23  0427 07/27/23  1654   WBC Thousand/uL 3.64* 2.76* 2.89*   HEMOGLOBIN g/dL 10.6* 9.4* 10.6*   HEMATOCRIT % 32.8* 29.7* 32.1*   MCV fL 101* 102* 100*   PLATELETS Thousands/uL 141* 123* 154   RBC Million/uL 3.26* 2.90* 3.20*   MCH pg 32.5 32.4 33.1   MCHC g/dL 32.3 31.6 33.0   RDW % 15.6* 15.7* 15.7*   MPV fL 10.9 10.6 11.6   NRBC AUTO /100 WBCs 0 0 0       CMP:  Results from last 7 days   Lab Units 07/29/23  0219 07/28/23  0427 07/27/23  1654   POTASSIUM mmol/L 3. 4* 4.0 4.9   CHLORIDE mmol/L 105 107 105   CO2 mmol/L 33* 25 22   BUN mg/dL 18 22 23   CREATININE mg/dL 0.69 0.77 0.81   CALCIUM mg/dL 9.3 8.7 9.1   AST U/L 39 52* 78*   ALT U/L 23 22 28   ALK PHOS U/L 145* 124* 151*   EGFR ml/min/1.73sq m 84 74 70       BMP:  Results from last 7 days   Lab Units 07/29/23  0219 07/28/23  0427 07/27/23  1654   POTASSIUM mmol/L 3.4* 4.0 4.9   CHLORIDE mmol/L 105 107 105   CO2 mmol/L 33* 25 22   BUN mg/dL 18 22 23   CREATININE mg/dL 0.69 0.77 0.81   CALCIUM mg/dL 9.3 8.7 9.1       No results found for: "NTBNP"                 Results from last 7 days   Lab Units 07/27/23  1654   TSH 3RD GENERATON uIU/mL 3.671       Results from last 7 days   Lab Units 07/29/23  0219 07/28/23  0427 07/27/23  1842   INR  3.26* 3.09* 2.94*       Lipid Profile:   No results found for: "CHOL"  Lab Results   Component Value Date    HDL 56 06/30/2023     Lab Results   Component Value Date    LDLCALC 31 06/30/2023     Lab Results   Component Value Date    TRIG 43 06/30/2023       Cardiac testing:   No results found for this or any previous visit. No results found for this or any previous visit. No results found for this or any previous visit. No valid procedures specified. No results found for this or any previous visit. Meds/Allergies   all current active meds have been reviewed  furosemide, 10 mg/hr, Last Rate: Stopped (07/29/23 3036)      EKG personally reviewed by JOSEPHINE Olguin    Assessment:  Principal Problem:    Acute on chronic heart failure with preserved ejection fraction (HCC)  Active Problems:    H/O mechanical aortic valve replacement    Type 2 diabetes mellitus (HCC)    UTI (urinary tract infection)    A-fib (HCC)    Pancytopenia ([address])    Counseling / Coordination of Care  Total floor / unit time spent today 20 minutes. Greater than 50% of total time was spent with the patient and / or family counseling and / or coordination of care.       ** Please Note: Dragon 360 Dictation voice to text software may have been used in the creation of this document.  **

## 2023-07-30 LAB
ANION GAP SERPL CALCULATED.3IONS-SCNC: 3 MMOL/L
BASOPHILS # BLD AUTO: 0.03 THOUSANDS/ÂΜL (ref 0–0.1)
BASOPHILS NFR BLD AUTO: 1 % (ref 0–1)
BUN SERPL-MCNC: 17 MG/DL (ref 5–25)
CALCIUM SERPL-MCNC: 9 MG/DL (ref 8.3–10.1)
CHLORIDE SERPL-SCNC: 105 MMOL/L (ref 96–108)
CO2 SERPL-SCNC: 34 MMOL/L (ref 21–32)
CREAT SERPL-MCNC: 0.72 MG/DL (ref 0.6–1.3)
EOSINOPHIL # BLD AUTO: 0.09 THOUSAND/ÂΜL (ref 0–0.61)
EOSINOPHIL NFR BLD AUTO: 3 % (ref 0–6)
ERYTHROCYTE [DISTWIDTH] IN BLOOD BY AUTOMATED COUNT: 15.7 % (ref 11.6–15.1)
GFR SERPL CREATININE-BSD FRML MDRD: 81 ML/MIN/1.73SQ M
GLUCOSE SERPL-MCNC: 111 MG/DL (ref 65–140)
HCT VFR BLD AUTO: 32.5 % (ref 34.8–46.1)
HGB BLD-MCNC: 10.4 G/DL (ref 11.5–15.4)
IMM GRANULOCYTES # BLD AUTO: 0.01 THOUSAND/UL (ref 0–0.2)
IMM GRANULOCYTES NFR BLD AUTO: 0 % (ref 0–2)
LYMPHOCYTES # BLD AUTO: 0.71 THOUSANDS/ÂΜL (ref 0.6–4.47)
LYMPHOCYTES NFR BLD AUTO: 21 % (ref 14–44)
MAGNESIUM SERPL-MCNC: 1.6 MG/DL (ref 1.6–2.6)
MCH RBC QN AUTO: 32.4 PG (ref 26.8–34.3)
MCHC RBC AUTO-ENTMCNC: 32 G/DL (ref 31.4–37.4)
MCV RBC AUTO: 101 FL (ref 82–98)
MONOCYTES # BLD AUTO: 0.5 THOUSAND/ÂΜL (ref 0.17–1.22)
MONOCYTES NFR BLD AUTO: 14 % (ref 4–12)
NEUTROPHILS # BLD AUTO: 2.13 THOUSANDS/ÂΜL (ref 1.85–7.62)
NEUTS SEG NFR BLD AUTO: 61 % (ref 43–75)
NRBC BLD AUTO-RTO: 0 /100 WBCS
PLATELET # BLD AUTO: 130 THOUSANDS/UL (ref 149–390)
PMV BLD AUTO: 10.6 FL (ref 8.9–12.7)
POTASSIUM SERPL-SCNC: 4.1 MMOL/L (ref 3.5–5.3)
RBC # BLD AUTO: 3.21 MILLION/UL (ref 3.81–5.12)
SODIUM SERPL-SCNC: 142 MMOL/L (ref 135–147)
WBC # BLD AUTO: 3.47 THOUSAND/UL (ref 4.31–10.16)

## 2023-07-30 PROCEDURE — 99232 SBSQ HOSP IP/OBS MODERATE 35: CPT | Performed by: NURSE PRACTITIONER

## 2023-07-30 PROCEDURE — 80048 BASIC METABOLIC PNL TOTAL CA: CPT | Performed by: NURSE PRACTITIONER

## 2023-07-30 PROCEDURE — 99232 SBSQ HOSP IP/OBS MODERATE 35: CPT | Performed by: INTERNAL MEDICINE

## 2023-07-30 PROCEDURE — 83735 ASSAY OF MAGNESIUM: CPT | Performed by: NURSE PRACTITIONER

## 2023-07-30 PROCEDURE — 85025 COMPLETE CBC W/AUTO DIFF WBC: CPT | Performed by: NURSE PRACTITIONER

## 2023-07-30 RX ORDER — METOLAZONE 2.5 MG/1
2.5 TABLET ORAL ONCE
Status: COMPLETED | OUTPATIENT
Start: 2023-07-30 | End: 2023-07-30

## 2023-07-30 RX ORDER — MAGNESIUM SULFATE HEPTAHYDRATE 40 MG/ML
2 INJECTION, SOLUTION INTRAVENOUS ONCE
Status: COMPLETED | OUTPATIENT
Start: 2023-07-30 | End: 2023-07-30

## 2023-07-30 RX ADMIN — MONTELUKAST 10 MG: 10 TABLET, FILM COATED ORAL at 21:44

## 2023-07-30 RX ADMIN — ALLOPURINOL 100 MG: 100 TABLET ORAL at 09:30

## 2023-07-30 RX ADMIN — WARFARIN SODIUM 2.5 MG: 2.5 TABLET ORAL at 18:28

## 2023-07-30 RX ADMIN — AMIODARONE HYDROCHLORIDE 200 MG: 200 TABLET ORAL at 07:46

## 2023-07-30 RX ADMIN — POTASSIUM CHLORIDE 40 MEQ: 1500 TABLET, EXTENDED RELEASE ORAL at 09:28

## 2023-07-30 RX ADMIN — ATORVASTATIN CALCIUM 40 MG: 40 TABLET, FILM COATED ORAL at 16:13

## 2023-07-30 RX ADMIN — METOPROLOL SUCCINATE 25 MG: 25 TABLET, FILM COATED, EXTENDED RELEASE ORAL at 09:28

## 2023-07-30 RX ADMIN — FUROSEMIDE 60 MG: 10 INJECTION, SOLUTION INTRAMUSCULAR; INTRAVENOUS at 07:47

## 2023-07-30 RX ADMIN — METOLAZONE 2.5 MG: 2.5 TABLET ORAL at 16:13

## 2023-07-30 RX ADMIN — POTASSIUM CHLORIDE 40 MEQ: 1500 TABLET, EXTENDED RELEASE ORAL at 18:28

## 2023-07-30 RX ADMIN — FUROSEMIDE 60 MG: 10 INJECTION, SOLUTION INTRAMUSCULAR; INTRAVENOUS at 16:14

## 2023-07-30 RX ADMIN — MAGNESIUM SULFATE HEPTAHYDRATE 2 G: 2 INJECTION, SOLUTION INTRAVENOUS at 09:29

## 2023-07-30 RX ADMIN — CITALOPRAM HYDROBROMIDE 20 MG: 20 TABLET ORAL at 09:28

## 2023-07-30 NOTE — ASSESSMENT & PLAN NOTE
Wt Readings from Last 3 Encounters:   07/30/23 86.2 kg (190 lb)   07/25/23 88.5 kg (195 lb)   07/10/23 88.5 kg (195 lb)       Patient with history of diastolic heart failure presenting with several weeks of worsening shortness of breath. Patient's creatinine is currently at baseline, her last recorded weight was 195 pounds on 7/25, current weight 195 lbs   She takes 40 mg of Lasix as an outpatient, had received IV bolus Lasix, started on Lasix drip as per cardiology  Echocardiogram shows EF of 53%, normal systolic function, moderate abnormal diastolic dysfunction, right ventricle and atrium dilated as per cardiology report  Patient demonstrating good response to Lasix 60 mg IV BID, continue intake and output. Daily weights.

## 2023-07-30 NOTE — ASSESSMENT & PLAN NOTE
Patient with history of chronic ITP, however also noted leukopenia and anemia, will need outpatient follow-up with heme-onc, will check reticulocyte count, LDH, folate, TSH, spep and upep. Concern for bone marrow abnormality, mds.   B 12 6005

## 2023-07-30 NOTE — PROGRESS NOTES
General Cardiology   Progress Note -  Team One   Elaine Rodger 68 y.o. female MRN: [id number]    Unit/Bed#: -[facility] Encounter: [id number]    Assessment  1. Acute on chronic HFpEF - possibly secondary to ineffective diuretic dosing as an outpatient.  Pt reports compliance with diuretic regimen and no recent dietary or fluid indiscretions. -Volume /respiratory status continues to improve with aggressive IV diuresis. Still remains volume overloaded on exam.  No respiratory complaints. On RA saturating 95%  - (previously 621)  -Chest x-ray 7/27; mild pulmonary vascular congestion with trace effusions. -TTE 7/28/2023; LVEF 60%, grade 2 DD, RV dilated/systolic function moderately reduced, LA moderately dilated, RA severely dilated, normal functioning mechanical AV, mild to moderate MR, severe TR. -Previously on oral furosemide 40 mg daily as an outpatient (currently on hold)  -Currently on IV furosemide 60 mg BID  -24-hour CINDY balance; -1.2L overall -6.3L  -Baseline dry weight around 185-190 pounds.,  SS weight 207 pounds on admission, currently 190 pounds. 2. Hx of aortic valve stenosis s/p mechanical AVR (in 2011)  -TTE 7/28/2023; mechanical AV, prosthetic valve leaflet motion is normal, no evidence of perivalvular regurgitation, mild transvalvular regurgitation, no significant AS  -Chronically anticoagulated with warfarin, INR 2.9 on admission, currently 3.1.  3. Hx of persistent atrial fibrillation   4. Hx of VT/VF s/p [facility] [place] [facility] single chamber ICD in-situ  5. Recent inappropriate ICD shock in 5/2023 (for rapid afib)  -Device recently reprogrammed to VVIR    -Device interrogation 6/20/2023 - ventricularly paced 71%, no new significant high rate episodes, normal device function.  -Telemetry review -V paced  -On amiodarone 200 mg daily and metoprolol succinate 25 mg daily  -Chronically anticoagulated with warfarin, INR 2.9 on admission, currently 3.26  6.  Hypertension  -Average /69, last recorded 139/59, HR 60.  -Outpatient BP regimen; oral furosemide 40 mg daily, and metoprolol succinate 25 mg daily  -Inpatient BP regimen; IV Lasix 60 mg twice daily, and metoprolol succinate 25 mg daily  7. Dyslipidemia  -Lipid profile 6/30/2023; cholesterol 96, triglycerides 43, HDL 56, and LDL calculated at 31.  -On atorvastatin 40 mg daily  8. DM type II  -HgbA1c 5.9  9. Ascending aortic aneurysm   -Outpatient surveillance. -CTA chest 4/27/2023 - Dilated ascending aorta measuring 4.6 cm,      Plan  -Volume/respiratory status continues to gradually improve. Still remains volume overloaded on exam.  No respiratory complaints. On RA saturating 95%. Renal function remains stable. -Continue IV Lasix 60 mg BID today; possibly can transition to oral diuretics in the a.m, but will reassess diuretic need in the a.m.   -Continue amiodarone 200 mg daily and metoprolol succinate 25 mg daily.  -Continue warfarin, goal INR 2.5-3.5.  -Strict I's and O's, daily standing weights, 2 g Na+ diet, 1.8 LFR.  -Monitor renal function and electrolytes closely.  Replete to maintain K+ level of 4.0 magnesium level at 2.0.  -Can DC telemetry monitoring. Subjective  Review of Systems   Constitutional: Negative for chills, fever and malaise/fatigue. Eyes: Negative for visual disturbance. Cardiovascular: Positive for leg swelling. Negative for chest pain, dyspnea on exertion, orthopnea and palpitations. Respiratory: Negative for cough and shortness of breath. Gastrointestinal: Negative for abdominal pain. Neurological: Negative for dizziness, headaches and light-headedness. All other systems reviewed and are negative. Objective:   Physical Exam  Vitals and nursing note reviewed. Constitutional:       General: She is not in acute distress. Appearance: She is obese. She is not diaphoretic. HENT:      Head: Normocephalic and atraumatic. Eyes:      General: No scleral icterus.   Cardiovascular:      Rate and Rhythm: Normal rate and regular rhythm. Pulses: Normal pulses. Heart sounds: Normal heart sounds. Pulmonary:      Effort: Pulmonary effort is normal.      Breath sounds: Normal breath sounds. No wheezing or rales. Abdominal:      Palpations: Abdomen is soft. Musculoskeletal:      Right lower leg: Edema present. Left lower leg: Edema present. Skin:     General: Skin is warm and dry. Capillary Refill: Capillary refill takes less than 2 seconds. Neurological:      General: No focal deficit present. Mental Status: She is alert and oriented to person, place, and time. Psychiatric:         Mood and Affect: Mood normal.         Vitals: Blood pressure 139/59, pulse 60, temperature 98 °F (36.7 °C), resp. rate 18, height 4' 10" (1.473 m), weight 86.2 kg (190 lb), SpO2 95 %. ,     Body mass index is 39.71 kg/m². ,   Systolic (34THU), HOE:318 , Min:109 , NONA:226     Diastolic (69ORK), FC, Min:59, Max:75      Intake/Output Summary (Last 24 hours) at 2023 1026  Last data filed at 2023 1001  Gross per 24 hour   Intake 1020 ml   Output 1950 ml   Net -930 ml     Weight (last 2 days)     Date/Time Weight    23 0600 86.2 (190)    23 0551 87.5 (193)    23 1000 88.5 (195)    23 0600 94.3 (207.8)          LABORATORY RESULTS      CBC with diff:   Results from last 7 days   Lab Units 23  0543 23  0219 23  0427 23  1654   WBC Thousand/uL 3.47* 3.64* 2.76* 2.89*   HEMOGLOBIN g/dL 10.4* 10.6* 9.4* 10.6*   HEMATOCRIT % 32.5* 32.8* 29.7* 32.1*   MCV fL 101* 101* 102* 100*   PLATELETS Thousands/uL 130* 141* 123* 154   RBC Million/uL 3.21* 3.26* 2.90* 3.20*   MCH pg 32.4 32.5 32.4 33.1   MCHC g/dL 32.0 32.3 31.6 33.0   RDW % 15.7* 15.6* 15.7* 15.7*   MPV fL 10.6 10.9 10.6 11.6   NRBC AUTO /100 WBCs 0 0 0 0       CMP:  Results from last 7 days   Lab Units 23  0543 23  0219 23  0427 23  1654   POTASSIUM mmol/L 4.1 3.4* 4.0 4.9   CHLORIDE mmol/L 105 105 107 105   CO2 mmol/L 34* 33* 25 22   BUN mg/dL 17 18 22 23   CREATININE mg/dL 0.72 0.69 0.77 0.81   CALCIUM mg/dL 9.0 9.3 8.7 9.1   AST U/L  --  39 52* 78*   ALT U/L  --  23 22 28   ALK PHOS U/L  --  145* 124* 151*   EGFR ml/min/1.73sq m 81 84 74 70       BMP:  Results from last 7 days   Lab Units 07/30/23  0543 07/29/23  0219 07/28/23  0427 07/27/23  1654   POTASSIUM mmol/L 4.1 3.4* 4.0 4.9   CHLORIDE mmol/L 105 105 107 105   CO2 mmol/L 34* 33* 25 22   BUN mg/dL 17 18 22 23   CREATININE mg/dL 0.72 0.69 0.77 0.81   CALCIUM mg/dL 9.0 9.3 8.7 9.1       No results found for: "NTBNP"     Results from last 7 days   Lab Units 07/30/23  0543   MAGNESIUM mg/dL 1.6             Results from last 7 days   Lab Units 07/27/23  1654   TSH 3RD GENERATON uIU/mL 3.671       Results from last 7 days   Lab Units 07/29/23  0219 07/28/23 0427 07/27/23  1842   INR  3.26* 3.09* 2.94*       Lipid Profile:   No results found for: "CHOL"  Lab Results   Component Value Date    HDL 56 06/30/2023     Lab Results   Component Value Date    LDLCALC 31 06/30/2023     Lab Results   Component Value Date    TRIG 43 06/30/2023       Cardiac testing:   No results found for this or any previous visit. No results found for this or any previous visit. No results found for this or any previous visit. No valid procedures specified. No results found for this or any previous visit.       Meds/Allergies   all current active meds have been reviewed and current meds:   Current Facility-Administered Medications   Medication Dose Route Frequency   • allopurinol (ZYLOPRIM) tablet 100 mg  100 mg Oral Daily   • amiodarone tablet 200 mg  200 mg Oral Daily With Breakfast   • atorvastatin (LIPITOR) tablet 40 mg  40 mg Oral Daily With Dinner   • citalopram (CeleXA) tablet 20 mg  20 mg Oral Daily   • furosemide (LASIX) injection 60 mg  60 mg Intravenous BID (diuretic)   • magnesium sulfate 2 g/50 mL IVPB (premix) 2 g  2 g Intravenous Once   • metoprolol succinate (TOPROL-XL) 24 hr tablet 25 mg  25 mg Oral Daily   • montelukast (SINGULAIR) tablet 10 mg  10 mg Oral HS   • potassium chloride (K-DUR,KLOR-CON) CR tablet 40 mEq  40 mEq Oral BID   • warfarin (COUMADIN) tablet 2.5 mg  2.5 mg Oral Daily (warfarin)          EKG personally reviewed by JOSEPHINE Mccloud    Assessment:  Principal Problem:    Acute on chronic heart failure with preserved ejection fraction (HCC)  Active Problems:    H/O mechanical aortic valve replacement    Type 2 diabetes mellitus ([address])    UTI (urinary tract infection)    A-fib (HCC)    Pancytopenia ([facility])    Counseling / Coordination of Care  Total floor / unit time spent today 20 minutes. Greater than 50% of total time was spent with the patient and / or family counseling and / or coordination of care. ** Please Note: Dragon 360 Dictation voice to text software may have been used in the creation of this document.  **

## 2023-07-30 NOTE — ASSESSMENT & PLAN NOTE
Patient reports decreased urine output and suprapubic discomfort along with fatigue over the past several days  Plan for 3 days of ceftriaxone; completed   Urine culture demonstrated e-coli

## 2023-07-30 NOTE — PROGRESS NOTES
[address] [facility]  Progress Note  Name: Brenda Joya  MRN: [id number]  Unit/Bed#: -45 I Date of Admission: 7/27/2023   Date of Service: 7/30/2023 I Hospital Day: 3    Assessment/Plan   * Acute on chronic heart failure with preserved ejection fraction [facility])  Assessment & Plan  Wt Readings from Last 3 Encounters:   07/30/23 86.2 kg (190 lb)   07/25/23 88.5 kg (195 lb)   07/10/23 88.5 kg (195 lb)       Patient with history of diastolic heart failure presenting with several weeks of worsening shortness of breath. Patient's creatinine is currently at baseline, her last recorded weight was 195 pounds on 7/25, current weight 195 lbs   She takes 40 mg of Lasix as an outpatient, had received IV bolus Lasix, started on Lasix drip as per cardiology  Echocardiogram shows EF of 64%, normal systolic function, moderate abnormal diastolic dysfunction, right ventricle and atrium dilated as per cardiology report  Patient demonstrating good response to Lasix 60 mg IV BID, continue intake and output. Daily weights. A-fib [facility])  Assessment & Plan  We will continue beta-blocker and warfarin for anticoagulation  Monitor INR     H/O mechanical aortic valve replacement  Assessment & Plan  Patient with history of mechanical aortic valve placement, will continue on Coumadin, current INR 3.26, goal INR 2.5-3.5    Pancytopenia ([address])  Assessment & Plan  Patient with history of chronic ITP, however also noted leukopenia and anemia, will need outpatient follow-up with heme-onc, will check reticulocyte count, LDH, folate, TSH, spep and upep. Concern for bone marrow abnormality, mds. B 12 1602     Type 2 diabetes mellitus ([address])  Assessment & Plan  Lab Results   Component Value Date    HGBA1C 5.9 (H) 06/30/2023       No results for input(s): "POCGLU" in the last 72 hours.     Blood Sugar Average: Last 72 hrs:  History of type 2 diabetes on oral meds  Continue sliding scale  Diabetic diet     UTI (urinary tract infection)  Assessment & Plan  Patient reports decreased urine output and suprapubic discomfort along with fatigue over the past several days  Plan for 3 days of ceftriaxone; completed   Urine culture demonstrated e-coli               VTE Pharmacologic Prophylaxis: VTE Score: 1 Moderate Risk (Score 3-4) - Pharmacological DVT Prophylaxis Ordered: warfarin (Coumadin). Patient Centered Rounds: I performed bedside rounds with nursing staff today. Discussions with Specialists or Other Care Team Provider: nursing     Education and Discussions with Family / Patient: Updated  (daughter) at bedside. Total Time Spent on Date of Encounter in care of patient: 45 minutes This time was spent on one or more of the following: performing physical exam; counseling and coordination of care; obtaining or reviewing history; documenting in the medical record; reviewing/ordering tests, medications or procedures; communicating with other healthcare professionals and discussing with patient's family/caregivers. Current Length of Stay: 3 day(s)  Current Patient Status: Inpatient   Certification Statement: The patient will continue to require additional inpatient hospital stay due to continued IV lasix, labs   Discharge Plan: Anticipate discharge in 24-48 hrs to home with home services. Code Status: Level 1 - Full Code    Subjective:   Patient seen sitting up in chair resting comfortably, enjoying breakfast.  Reports her breathing is better feels that the swelling in her lower extremities has gone down and overall is feeling much better. Nausea or vomiting. Denies any chest pain. Objective:     Vitals:   Temp (24hrs), Av.8 °F (36.6 °C), Min:97.6 °F (36.4 °C), Max:98 °F (36.7 °C)    Temp:  [97.6 °F (36.4 °C)-98 °F (36.7 °C)] 97.6 °F (36.4 °C)  HR:  [58-64] 60  Resp:  [17-20] 17  BP: (109-153)/(59-75) 138/63  SpO2:  [92 %-98 %] 95 %  Body mass index is 39.71 kg/m².      Input and Output Summary (last 24 hours): Intake/Output Summary (Last 24 hours) at 7/30/2023 1432  Last data filed at 7/30/2023 1110  Gross per 24 hour   Intake 720 ml   Output 2000 ml   Net -1280 ml       Physical Exam:   Physical Exam  Vitals and nursing note reviewed. HENT:      Head: Normocephalic. Nose: Nose normal.      Mouth/Throat:      Mouth: Mucous membranes are moist.   Eyes:      Extraocular Movements: Extraocular movements intact. Conjunctiva/sclera: Conjunctivae normal.      Pupils: Pupils are equal, round, and reactive to light. Cardiovascular:      Rate and Rhythm: Normal rate. Heart sounds: Murmur heard. Pulmonary:      Effort: Pulmonary effort is normal. No respiratory distress. Abdominal:      General: Bowel sounds are normal. There is no distension. Palpations: Abdomen is soft. Tenderness: There is no abdominal tenderness. Genitourinary:     Comments: Voiding spontaneously   Musculoskeletal:      Cervical back: Normal range of motion. Right lower leg: No edema. Left lower leg: No edema. Comments: Improving LE edema    Skin:     General: Skin is warm and dry. Capillary Refill: Capillary refill takes less than 2 seconds. Comments: Left great toe scabbed region; no drainage. Left shin scabbed area, no drainage. Left shin small skin tear. sacrum pink, healing. Neurological:      General: No focal deficit present. Mental Status: She is alert and oriented to person, place, and time. Psychiatric:         Mood and Affect: Mood normal.         Behavior: Behavior normal.         Thought Content:  Thought content normal.         Judgment: Judgment normal.          Additional Data:     Labs:  Results from last 7 days   Lab Units 07/30/23  0543   WBC Thousand/uL 3.47*   HEMOGLOBIN g/dL 10.4*   HEMATOCRIT % 32.5*   PLATELETS Thousands/uL 130*   NEUTROS PCT % 61   LYMPHS PCT % 21   MONOS PCT % 14*   EOS PCT % 3     Results from last 7 days   Lab Units 07/30/23  0543 07/29/23  0219   SODIUM mmol/L 142 140   POTASSIUM mmol/L 4.1 3.4*   CHLORIDE mmol/L 105 105   CO2 mmol/L 34* 33*   BUN mg/dL 17 18   CREATININE mg/dL 0.72 0.69   ANION GAP mmol/L 3 2   CALCIUM mg/dL 9.0 9.3   ALBUMIN g/dL  --  3.0*   TOTAL BILIRUBIN mg/dL  --  0.91   ALK PHOS U/L  --  145*   ALT U/L  --  23   AST U/L  --  39   GLUCOSE RANDOM mg/dL 111 132     Results from last 7 days   Lab Units 07/29/23  0219   INR  3.26*                   Lines/Drains:  Invasive Devices     Peripheral Intravenous Line  Duration           Peripheral IV 07/27/23 Right Arm 2 days    Peripheral IV 07/28/23 Distal;Dorsal (posterior); Left Forearm 2 days                      Imaging: No pertinent imaging reviewed. Recent Cultures (last 7 days):   Results from last 7 days   Lab Units 07/27/23  1654   URINE CULTURE  >100,000 cfu/ml Escherichia coli*  <10,000 cfu/ml Gram Negative Christopher Enteric Like*       Last 24 Hours Medication List:   Current Facility-Administered Medications   Medication Dose Route Frequency Provider Last Rate   • allopurinol  100 mg Oral Daily Olafivone Lewis, DO     • amiodarone  200 mg Oral Daily With Breakfast Olaf Lewis, DO     • atorvastatin  40 mg Oral Daily With [employer], DO     • citalopram  20 mg Oral Daily Olaf Banai, DO     • furosemide  60 mg Intravenous BID (diuretic) JOSEPHINE Gan     • metolazone  2.5 mg Oral Once Kaitlynn Underwood MD     • metoprolol succinate  25 mg Oral Daily Olaf Banai, DO     • montelukast  10 mg Oral HS Olaf Banai, DO     • potassium chloride  40 mEq Oral BID JOSEPHINE Gan     • warfarin  2.5 mg Oral Daily (warfarin) Fina James DO          Today, Patient Was Seen By: JOSEPHINE Altamirano    **Please Note: This note may have been constructed using a voice recognition system. **

## 2023-07-31 PROBLEM — N39.0 UTI (URINARY TRACT INFECTION): Status: RESOLVED | Noted: 2023-05-04 | Resolved: 2023-07-31

## 2023-07-31 LAB
ANION GAP SERPL CALCULATED.3IONS-SCNC: 3 MMOL/L
BUN SERPL-MCNC: 21 MG/DL (ref 5–25)
CALCIUM SERPL-MCNC: 9.3 MG/DL (ref 8.3–10.1)
CHLORIDE SERPL-SCNC: 100 MMOL/L (ref 96–108)
CO2 SERPL-SCNC: 35 MMOL/L (ref 21–32)
CREAT SERPL-MCNC: 0.82 MG/DL (ref 0.6–1.3)
ERYTHROCYTE [DISTWIDTH] IN BLOOD BY AUTOMATED COUNT: 15.6 % (ref 11.6–15.1)
GFR SERPL CREATININE-BSD FRML MDRD: 69 ML/MIN/1.73SQ M
GLUCOSE SERPL-MCNC: 105 MG/DL (ref 65–140)
GLUCOSE SERPL-MCNC: 127 MG/DL (ref 65–140)
GLUCOSE SERPL-MCNC: 131 MG/DL (ref 65–140)
GLUCOSE SERPL-MCNC: 156 MG/DL (ref 65–140)
GLUCOSE SERPL-MCNC: 194 MG/DL (ref 65–140)
HCT VFR BLD AUTO: 36.1 % (ref 34.8–46.1)
HGB BLD-MCNC: 11 G/DL (ref 11.5–15.4)
INR PPP: 2.55 (ref 0.84–1.19)
MAGNESIUM SERPL-MCNC: 1.9 MG/DL (ref 1.6–2.6)
MCH RBC QN AUTO: 31.6 PG (ref 26.8–34.3)
MCHC RBC AUTO-ENTMCNC: 30.5 G/DL (ref 31.4–37.4)
MCV RBC AUTO: 104 FL (ref 82–98)
PLATELET # BLD AUTO: 142 THOUSANDS/UL (ref 149–390)
PMV BLD AUTO: 10.7 FL (ref 8.9–12.7)
POTASSIUM SERPL-SCNC: 4.5 MMOL/L (ref 3.5–5.3)
PROTHROMBIN TIME: 27.7 SECONDS (ref 11.6–14.5)
RBC # BLD AUTO: 3.48 MILLION/UL (ref 3.81–5.12)
SODIUM SERPL-SCNC: 138 MMOL/L (ref 135–147)
WBC # BLD AUTO: 3.32 THOUSAND/UL (ref 4.31–10.16)

## 2023-07-31 PROCEDURE — 85027 COMPLETE CBC AUTOMATED: CPT | Performed by: NURSE PRACTITIONER

## 2023-07-31 PROCEDURE — 82948 REAGENT STRIP/BLOOD GLUCOSE: CPT

## 2023-07-31 PROCEDURE — 99232 SBSQ HOSP IP/OBS MODERATE 35: CPT | Performed by: INTERNAL MEDICINE

## 2023-07-31 PROCEDURE — 99232 SBSQ HOSP IP/OBS MODERATE 35: CPT | Performed by: PHYSICIAN ASSISTANT

## 2023-07-31 PROCEDURE — 83735 ASSAY OF MAGNESIUM: CPT | Performed by: NURSE PRACTITIONER

## 2023-07-31 PROCEDURE — 85610 PROTHROMBIN TIME: CPT | Performed by: NURSE PRACTITIONER

## 2023-07-31 PROCEDURE — 80048 BASIC METABOLIC PNL TOTAL CA: CPT | Performed by: NURSE PRACTITIONER

## 2023-07-31 RX ORDER — METOLAZONE 2.5 MG/1
2.5 TABLET ORAL ONCE
Status: COMPLETED | OUTPATIENT
Start: 2023-07-31 | End: 2023-07-31

## 2023-07-31 RX ORDER — METOLAZONE 5 MG/1
5 TABLET ORAL ONCE
Status: DISCONTINUED | OUTPATIENT
Start: 2023-07-31 | End: 2023-07-31

## 2023-07-31 RX ORDER — FUROSEMIDE 10 MG/ML
60 INJECTION INTRAMUSCULAR; INTRAVENOUS
Status: DISCONTINUED | OUTPATIENT
Start: 2023-07-31 | End: 2023-08-01

## 2023-07-31 RX ORDER — INSULIN LISPRO 100 [IU]/ML
1-6 INJECTION, SOLUTION INTRAVENOUS; SUBCUTANEOUS
Status: DISCONTINUED | OUTPATIENT
Start: 2023-07-31 | End: 2023-08-04 | Stop reason: HOSPADM

## 2023-07-31 RX ADMIN — INSULIN LISPRO 1 UNITS: 100 INJECTION, SOLUTION INTRAVENOUS; SUBCUTANEOUS at 12:39

## 2023-07-31 RX ADMIN — AMIODARONE HYDROCHLORIDE 200 MG: 200 TABLET ORAL at 08:32

## 2023-07-31 RX ADMIN — POTASSIUM CHLORIDE 40 MEQ: 1500 TABLET, EXTENDED RELEASE ORAL at 08:31

## 2023-07-31 RX ADMIN — METOPROLOL SUCCINATE 25 MG: 25 TABLET, FILM COATED, EXTENDED RELEASE ORAL at 08:31

## 2023-07-31 RX ADMIN — METOLAZONE 2.5 MG: 2.5 TABLET ORAL at 10:12

## 2023-07-31 RX ADMIN — FUROSEMIDE 60 MG: 10 INJECTION, SOLUTION INTRAMUSCULAR; INTRAVENOUS at 16:28

## 2023-07-31 RX ADMIN — WARFARIN SODIUM 2.5 MG: 2.5 TABLET ORAL at 17:51

## 2023-07-31 RX ADMIN — FUROSEMIDE 60 MG: 10 INJECTION, SOLUTION INTRAMUSCULAR; INTRAVENOUS at 10:47

## 2023-07-31 RX ADMIN — POTASSIUM CHLORIDE 40 MEQ: 1500 TABLET, EXTENDED RELEASE ORAL at 17:51

## 2023-07-31 RX ADMIN — ALLOPURINOL 100 MG: 100 TABLET ORAL at 08:31

## 2023-07-31 RX ADMIN — MONTELUKAST 10 MG: 10 TABLET, FILM COATED ORAL at 22:10

## 2023-07-31 RX ADMIN — ATORVASTATIN CALCIUM 40 MG: 40 TABLET, FILM COATED ORAL at 17:50

## 2023-07-31 RX ADMIN — CITALOPRAM HYDROBROMIDE 20 MG: 20 TABLET ORAL at 08:31

## 2023-07-31 NOTE — ASSESSMENT & PLAN NOTE
Lab Results   Component Value Date    HGBA1C 5.9 (H) 06/30/2023       Recent Labs     07/31/23  0620   POCGLU 127       Blood Sugar Average: Last 72 hrs:  History of type 2 diabetes on oral meds  · Continue sliding scale  · Diabetic diet

## 2023-07-31 NOTE — ASSESSMENT & PLAN NOTE
Patient with history of chronic ITP, however also noted leukopenia and anemia  · Will need outpatient follow-up with heme-onc  · Reticulocyte count elevated  · SPEP obtained -cannot rule out small monoclonal gammopathy

## 2023-07-31 NOTE — PLAN OF CARE
Pt started on Zaroxolyn and continues on iv lasix. Pt continues to void large quantities in bathroom.

## 2023-07-31 NOTE — PROGRESS NOTES
General Cardiology   Progress Note -  Team One   Tonny Bowie 68 y.o. female MRN: [id number]    Unit/Bed#: -01 Encounter: [id number]    Assessment  1. Acute on chronic HFpEF - possibly secondary to ineffective diuretic dosing as an outpatient.  Pt reports compliance with diuretic regimen and no recent dietary or fluid indiscretions. -Volume /respiratory status continues to improve with aggressive IV diuresis. Still remains volume overloaded on exam.  No respiratory complaints. On RA saturating 94%  - (previously 621)  -Chest x-ray 7/27; mild pulmonary vascular congestion with trace effusions. -TTE 7/28/2023; LVEF 60%, grade 2 DD, RV dilated/systolic function moderately reduced, LA moderately dilated, RA severely dilated, normal functioning mechanical AV, mild to moderate MR, severe TR. -Previously on oral furosemide 40 mg daily as an outpatient (currently on hold)  -Currently on IV furosemide 60 mg BID + received 1 dose of metolazone 2.5 mg on 7/30  -24-hour CINDY balance; -2.3L overall -8.1L  -Baseline dry weight around 185-190 pounds. ,  SS weight 207 pounds on admission, currently 184 pounds. 2. Hx of aortic valve stenosis s/p mechanical AVR (in 2011)  -TTE 7/28/2023; mechanical AV, prosthetic valve leaflet motion is normal, no evidence of perivalvular regurgitation, mild transvalvular regurgitation, no significant AS  -Chronically anticoagulated with warfarin, INR 2.9 on admission, currently 3.1.  3. Hx of persistent atrial fibrillation   4. Hx of VT/VF s/p [facility] single chamber ICD in-situ  5.  Recent inappropriate ICD shock in 5/2023 (for rapid afib)  -Device recently reprogrammed to VVIR    -Device interrogation 6/20/2023 - ventricularly paced 71%, no new significant high rate episodes, normal device function.  -Telemetry review - none telemetry  -On amiodarone 200 mg daily and metoprolol succinate 25 mg daily  -Chronically anticoagulated with warfarin, INR 2.9 on admission, currently 2. 55  6. Hypertension  -Average /66, last recorded 123/68, HR 60.  -Outpatient BP regimen; oral furosemide 40 mg daily, and metoprolol succinate 25 mg daily  -Inpatient BP regimen; IV Lasix 60 mg twice daily, and metoprolol succinate 25 mg daily  7. Dyslipidemia  -Lipid profile 6/30/2023; cholesterol 96, triglycerides 43, HDL 56, and LDL calculated at 31.  -On atorvastatin 40 mg daily  8. DM type II  -HgbA1c 5.9  9. Ascending aortic aneurysm   -Outpatient surveillance. -CTA chest 4/27/2023 - Dilated ascending aorta measuring 4.6 cm,      Plan  -Volume/respiratory status continues to gradually improve. Still remains volume overloaded on exam.  No respiratory complaints. On RA saturating 94%. Renal function remains stable. -Continue IV Lasix 60 mg BID today + add another dose of metolazone 2.5 mg prior to this mornings IV lasix dose. -Continue amiodarone 200 mg daily and metoprolol succinate 25 mg daily.  -Continue warfarin, goal INR 2.5-3.5.  -Strict I's and O's, daily standing weights, 2 g Na+ diet, 1.8 LFR.  -Monitor renal function and electrolytes closely.  Replete to maintain K+ level of 4.0 magnesium level at 2.0.  -No indication for telemetry monitoring. Subjective  Review of Systems   Constitutional: Negative for chills, fever and malaise/fatigue. Eyes: Negative for visual disturbance. Cardiovascular: Positive for leg swelling. Negative for chest pain, dyspnea on exertion, orthopnea and palpitations. Respiratory: Negative for cough and shortness of breath. Gastrointestinal: Negative for abdominal pain. Neurological: Negative for dizziness, headaches and light-headedness. Objective:   Physical Exam  Vitals and nursing note reviewed. Constitutional:       General: She is not in acute distress. Appearance: She is obese. She is not diaphoretic. HENT:      Head: Normocephalic. Eyes:      General: No scleral icterus. Cardiovascular:      Rate and Rhythm: Normal rate. Pulses: Normal pulses. Heart sounds: Normal heart sounds. Pulmonary:      Effort: Pulmonary effort is normal.      Breath sounds: No wheezing or rales. Abdominal:      Palpations: Abdomen is soft. Musculoskeletal:      Right lower leg: Edema present. Left lower leg: Edema present. Skin:     General: Skin is warm and dry. Capillary Refill: Capillary refill takes less than 2 seconds. Neurological:      General: No focal deficit present. Mental Status: She is alert and oriented to person, place, and time. Psychiatric:         Mood and Affect: Mood normal.         Vitals: Blood pressure 123/68, pulse 60, temperature 97.6 °F (36.4 °C), resp. rate 18, height 4' 10" (1.473 m), weight 83.7 kg (184 lb 9.6 oz), SpO2 (!) 89 %. ,     Body mass index is 38.58 kg/m². ,   Systolic (52PQR), SQA:368 , Min:123 , OYS:407     Diastolic (76SGW), CNZ:66, Min:63, Max:68      Intake/Output Summary (Last 24 hours) at 7/31/2023 0812  Last data filed at 7/31/2023 0601  Gross per 24 hour   Intake 840 ml   Output 2800 ml   Net -1960 ml     Weight (last 2 days)     Date/Time Weight    07/31/23 0547 83.7 (184.6)    07/31/23 0400 83.7 (184.6)    07/30/23 0600 86.2 (190)    07/29/23 0551 87.5 (193)          LABORATORY RESULTS      CBC with diff:   Results from last 7 days   Lab Units 07/31/23  0451 07/30/23  0543 07/29/23  0219 07/28/23  0427 07/27/23  1654   WBC Thousand/uL 3.32* 3.47* 3.64* 2.76* 2.89*   HEMOGLOBIN g/dL 11.0* 10.4* 10.6* 9.4* 10.6*   HEMATOCRIT % 36.1 32.5* 32.8* 29.7* 32.1*   MCV fL 104* 101* 101* 102* 100*   PLATELETS Thousands/uL 142* 130* 141* 123* 154   RBC Million/uL 3.48* 3.21* 3.26* 2.90* 3.20*   MCH pg 31.6 32.4 32.5 32.4 33.1   MCHC g/dL 30.5* 32.0 32.3 31.6 33.0   RDW % 15.6* 15.7* 15.6* 15.7* 15.7*   MPV fL 10.7 10.6 10.9 10.6 11.6   NRBC AUTO /100 WBCs  --  0 0 0 0       CMP:  Results from last 7 days   Lab Units 07/31/23  0451 07/30/23  0543 07/29/23  0219 07/28/23  0427 07/27/23  1659 POTASSIUM mmol/L 4.5 4.1 3.4* 4.0 4.9   CHLORIDE mmol/L 100 105 105 107 105   CO2 mmol/L 35* 34* 33* 25 22   BUN mg/dL 21 17 18 22 23   CREATININE mg/dL 0.82 0.72 0.69 0.77 0.81   CALCIUM mg/dL 9.3 9.0 9.3 8.7 9.1   AST U/L  --   --  39 52* 78*   ALT U/L  --   --  23 22 28   ALK PHOS U/L  --   --  145* 124* 151*   EGFR ml/min/1.73sq m 69 81 84 74 70       BMP:  Results from last 7 days   Lab Units 07/31/23  0451 07/30/23  0543 07/29/23 0219 07/28/23 0427 07/27/23  1654   POTASSIUM mmol/L 4.5 4.1 3.4* 4.0 4.9   CHLORIDE mmol/L 100 105 105 107 105   CO2 mmol/L 35* 34* 33* 25 22   BUN mg/dL 21 17 18 22 23   CREATININE mg/dL 0.82 0.72 0.69 0.77 0.81   CALCIUM mg/dL 9.3 9.0 9.3 8.7 9.1       No results found for: "NTBNP"     Results from last 7 days   Lab Units 07/31/23  0451 07/30/23  0543   MAGNESIUM mg/dL 1.9 1.6             Results from last 7 days   Lab Units 07/27/23  1654   TSH 3RD GENERATON uIU/mL 3.671       Results from last 7 days   Lab Units 07/31/23  0451 07/29/23 0219 07/28/23 0427 07/27/23  1842   INR  2.55* 3.26* 3.09* 2.94*       Lipid Profile:   No results found for: "CHOL"  Lab Results   Component Value Date    HDL 56 06/30/2023     Lab Results   Component Value Date    LDLCALC 31 06/30/2023     Lab Results   Component Value Date    TRIG 43 06/30/2023       Cardiac testing:   No results found for this or any previous visit. No results found for this or any previous visit. No results found for this or any previous visit. No valid procedures specified. No results found for this or any previous visit.       Meds/Allergies   all current active meds have been reviewed and current meds:   Current Facility-Administered Medications   Medication Dose Route Frequency   • allopurinol (ZYLOPRIM) tablet 100 mg  100 mg Oral Daily   • amiodarone tablet 200 mg  200 mg Oral Daily With Breakfast   • atorvastatin (LIPITOR) tablet 40 mg  40 mg Oral Daily With Dinner   • citalopram (CeleXA) tablet 20 mg  20 mg Oral Daily   • furosemide (LASIX) injection 60 mg  60 mg Intravenous BID (diuretic)   • insulin lispro (HumaLOG) 100 units/mL subcutaneous injection 1-6 Units  1-6 Units Subcutaneous 4x Daily (AC & HS)   • metoprolol succinate (TOPROL-XL) 24 hr tablet 25 mg  25 mg Oral Daily   • montelukast (SINGULAIR) tablet 10 mg  10 mg Oral HS   • potassium chloride (K-DUR,KLOR-CON) CR tablet 40 mEq  40 mEq Oral BID   • warfarin (COUMADIN) tablet 2.5 mg  2.5 mg Oral Daily (warfarin)            EKG personally reviewed by JOSEPHINE Gan. Counseling / Coordination of Care  Total floor / unit time spent today 20 minutes. Greater than 50% of total time was spent with the patient and / or family counseling and / or coordination of care. ** Please Note: Dragon 360 Dictation voice to text software may have been used in the creation of this document.  **

## 2023-07-31 NOTE — ASSESSMENT & PLAN NOTE
Patient with history of mechanical aortic valve placement, will continue on Coumadin, current INR 3.26, goal INR 2.5-3.5  · Continue to monitor INR  · Most recent INR 2.55

## 2023-07-31 NOTE — ASSESSMENT & PLAN NOTE
We will continue beta-blocker and warfarin for anticoagulation  · Monitor INR   · Recent INR therapeutic at 2.55

## 2023-07-31 NOTE — PHYSICAL THERAPY NOTE
PHYSICAL THERAPY NOTE          Patient Name: Jose Ho  [id number] Date: 7/31/2023 07/31/23 1214   PT Last Visit   PT Visit Date 07/31/23   Note Type   Note Type Cancelled Session   Cancel Reasons Other  (Patient with family on arrival and patient asks for more time to visit. Patient unable to be seen at a later time.  PT will continue to follow as able.)     Isaac Stack, PT, DPT

## 2023-07-31 NOTE — ASSESSMENT & PLAN NOTE
Patient reports decreased urine output and suprapubic discomfort along with fatigue over the past several days  · Plan for 3 days of ceftriaxone; completed   · Urine culture demonstrated e-coli

## 2023-07-31 NOTE — ASSESSMENT & PLAN NOTE
Wt Readings from Last 3 Encounters:   07/31/23 83.7 kg (184 lb 9.6 oz)   07/25/23 88.5 kg (195 lb)   07/10/23 88.5 kg (195 lb)     Patient with history of diastolic heart failure presenting with several weeks of worsening shortness of breath.   · Patient's creatinine is currently at baseline  · Maintained on Lasix 40 mg daily as an outpatient  · Initiated on Lasix drip however since transition to Lasix 60 mg twice daily with good output  · Received a dose of metolazone yesterday, additional dose to be administered today  · Echocardiogram shows EF of 91%, normal systolic function, moderate abnormal diastolic dysfunction, right ventricle and atrium dilated as per cardiology report  · Continue daily weights and I's and O's  · Weight today 184, 207 on admission

## 2023-08-01 LAB
ANION GAP SERPL CALCULATED.3IONS-SCNC: 2 MMOL/L
BUN SERPL-MCNC: 26 MG/DL (ref 5–25)
CALCIUM SERPL-MCNC: 9.8 MG/DL (ref 8.3–10.1)
CHLORIDE SERPL-SCNC: 95 MMOL/L (ref 96–108)
CO2 SERPL-SCNC: 38 MMOL/L (ref 21–32)
CREAT SERPL-MCNC: 0.85 MG/DL (ref 0.6–1.3)
ERYTHROCYTE [DISTWIDTH] IN BLOOD BY AUTOMATED COUNT: 15.5 % (ref 11.6–15.1)
GFR SERPL CREATININE-BSD FRML MDRD: 66 ML/MIN/1.73SQ M
GLUCOSE SERPL-MCNC: 101 MG/DL (ref 65–140)
GLUCOSE SERPL-MCNC: 140 MG/DL (ref 65–140)
GLUCOSE SERPL-MCNC: 152 MG/DL (ref 65–140)
GLUCOSE SERPL-MCNC: 161 MG/DL (ref 65–140)
GLUCOSE SERPL-MCNC: 182 MG/DL (ref 65–140)
GLUCOSE SERPL-MCNC: 98 MG/DL (ref 65–140)
HCT VFR BLD AUTO: 35.5 % (ref 34.8–46.1)
HGB BLD-MCNC: 11.2 G/DL (ref 11.5–15.4)
INR PPP: 2.07 (ref 0.84–1.19)
MCH RBC QN AUTO: 32.5 PG (ref 26.8–34.3)
MCHC RBC AUTO-ENTMCNC: 31.5 G/DL (ref 31.4–37.4)
MCV RBC AUTO: 103 FL (ref 82–98)
PLATELET # BLD AUTO: 141 THOUSANDS/UL (ref 149–390)
PMV BLD AUTO: 11.2 FL (ref 8.9–12.7)
POTASSIUM SERPL-SCNC: 3.7 MMOL/L (ref 3.5–5.3)
PROTHROMBIN TIME: 23.6 SECONDS (ref 11.6–14.5)
RBC # BLD AUTO: 3.45 MILLION/UL (ref 3.81–5.12)
SODIUM SERPL-SCNC: 135 MMOL/L (ref 135–147)
WBC # BLD AUTO: 3.33 THOUSAND/UL (ref 4.31–10.16)

## 2023-08-01 PROCEDURE — 85027 COMPLETE CBC AUTOMATED: CPT | Performed by: PHYSICIAN ASSISTANT

## 2023-08-01 PROCEDURE — 82948 REAGENT STRIP/BLOOD GLUCOSE: CPT

## 2023-08-01 PROCEDURE — 97116 GAIT TRAINING THERAPY: CPT

## 2023-08-01 PROCEDURE — 99232 SBSQ HOSP IP/OBS MODERATE 35: CPT | Performed by: PHYSICIAN ASSISTANT

## 2023-08-01 PROCEDURE — 99232 SBSQ HOSP IP/OBS MODERATE 35: CPT | Performed by: INTERNAL MEDICINE

## 2023-08-01 PROCEDURE — 85610 PROTHROMBIN TIME: CPT | Performed by: PHYSICIAN ASSISTANT

## 2023-08-01 PROCEDURE — 80048 BASIC METABOLIC PNL TOTAL CA: CPT | Performed by: PHYSICIAN ASSISTANT

## 2023-08-01 RX ORDER — POTASSIUM CHLORIDE 20 MEQ/1
20 TABLET, EXTENDED RELEASE ORAL 2 TIMES DAILY
Status: DISCONTINUED | OUTPATIENT
Start: 2023-08-01 | End: 2023-08-02

## 2023-08-01 RX ORDER — WARFARIN SODIUM 2.5 MG/1
2.5 TABLET ORAL
Status: COMPLETED | OUTPATIENT
Start: 2023-08-01 | End: 2023-08-01

## 2023-08-01 RX ORDER — WARFARIN SODIUM 2.5 MG/1
2.5 TABLET ORAL
Status: DISCONTINUED | OUTPATIENT
Start: 2023-08-01 | End: 2023-08-02

## 2023-08-01 RX ORDER — TORSEMIDE 20 MG/1
20 TABLET ORAL
Status: DISCONTINUED | OUTPATIENT
Start: 2023-08-01 | End: 2023-08-04 | Stop reason: HOSPADM

## 2023-08-01 RX ORDER — TORSEMIDE 20 MG/1
40 TABLET ORAL DAILY
Status: DISCONTINUED | OUTPATIENT
Start: 2023-08-01 | End: 2023-08-04 | Stop reason: HOSPADM

## 2023-08-01 RX ADMIN — POTASSIUM CHLORIDE 20 MEQ: 1500 TABLET, EXTENDED RELEASE ORAL at 18:01

## 2023-08-01 RX ADMIN — AMIODARONE HYDROCHLORIDE 200 MG: 200 TABLET ORAL at 09:06

## 2023-08-01 RX ADMIN — INSULIN LISPRO 1 UNITS: 100 INJECTION, SOLUTION INTRAVENOUS; SUBCUTANEOUS at 18:03

## 2023-08-01 RX ADMIN — TORSEMIDE 20 MG: 20 TABLET ORAL at 18:01

## 2023-08-01 RX ADMIN — INSULIN LISPRO 1 UNITS: 100 INJECTION, SOLUTION INTRAVENOUS; SUBCUTANEOUS at 21:29

## 2023-08-01 RX ADMIN — CITALOPRAM HYDROBROMIDE 20 MG: 20 TABLET ORAL at 09:02

## 2023-08-01 RX ADMIN — WARFARIN SODIUM 2.5 MG: 2.5 TABLET ORAL at 18:01

## 2023-08-01 RX ADMIN — ALLOPURINOL 100 MG: 100 TABLET ORAL at 09:06

## 2023-08-01 RX ADMIN — ATORVASTATIN CALCIUM 40 MG: 40 TABLET, FILM COATED ORAL at 18:01

## 2023-08-01 RX ADMIN — TORSEMIDE 40 MG: 20 TABLET ORAL at 09:01

## 2023-08-01 RX ADMIN — POTASSIUM CHLORIDE 20 MEQ: 1500 TABLET, EXTENDED RELEASE ORAL at 09:01

## 2023-08-01 RX ADMIN — INSULIN LISPRO 1 UNITS: 100 INJECTION, SOLUTION INTRAVENOUS; SUBCUTANEOUS at 12:58

## 2023-08-01 RX ADMIN — MONTELUKAST 10 MG: 10 TABLET, FILM COATED ORAL at 21:28

## 2023-08-01 RX ADMIN — WARFARIN SODIUM 2.5 MG: 2.5 TABLET ORAL at 18:02

## 2023-08-01 NOTE — PLAN OF CARE
Problem: PHYSICAL THERAPY ADULT  Goal: Performs mobility at highest level of function for planned discharge setting. See evaluation for individualized goals. Description: Treatment/Interventions: Functional transfer training, LE strengthening/ROM, Elevations, Therapeutic exercise, Endurance training, Patient/family training, Equipment eval/education, Bed mobility, Gait training, Compensatory technique education, Spoke to nursing, OT          See flowsheet documentation for full assessment, interventions and recommendations. Outcome: Progressing  Note: Prognosis: Good  Problem List: Decreased strength, Impaired balance, Decreased endurance, Decreased mobility  Assessment: Patient received in bedside chair. Patient agreeable to therapy. Patient performs functional transfers with Supervision using SPC. Patient performs ambulation with Min A using SPC, 35'x2. Patient requires seated rest period between gait trials. SPO2 fluctuations 85-95% on 4LO2 via NC. Patient left in bedside chair with all needs met and call bell/personal items within reach. The patient's AM-PAC Basic Mobility Inpatient Short Form Raw Score is 16 showing further need for skilled PT services in order to improve functional mobility, decrease need for assistance, and return to PLOF. A Raw score of greater than or equal to 16 suggests the patient may benefit from discharge to home. PT continues to recommend rehab vs HHPT pending progress. Will continue to follow as able. PT Discharge Recommendation: Post acute rehabilitation services (vs HHPT pending progress)    See flowsheet documentation for full assessment.

## 2023-08-01 NOTE — ASSESSMENT & PLAN NOTE
Patient with history of mechanical aortic valve placement, will continue on Coumadin, goal INR 2.5-3.5  · Continue to monitor INR  · Most recent INR 2.05 and subtherapeutic  · We will administer additional 2.5 mg dose of warfarin  · Continue warfarin 2.5 mg at bedtime  · INR in a.m.

## 2023-08-01 NOTE — PLAN OF CARE
Problem: Prexisting or High Potential for Compromised Skin Integrity  Goal: Skin integrity is maintained or improved  Description: INTERVENTIONS:  - Identify patients at risk for skin breakdown  - Assess and monitor skin integrity  - Assess and monitor nutrition and hydration status  - Monitor labs   - Assess for incontinence   - Turn and reposition patient  - Assist with mobility/ambulation  - Relieve pressure over bony prominences  - Avoid friction and shearing  - Provide appropriate hygiene as needed including keeping skin clean and dry  - Evaluate need for skin moisturizer/barrier cream  - Collaborate with interdisciplinary team   - Patient/family teaching  - Consider wound care consult   Outcome: Progressing     Problem: MOBILITY - ADULT  Goal: Maintain or return to baseline ADL function  Description: INTERVENTIONS:  -  Assess patient's ability to carry out ADLs; assess patient's baseline for ADL function and identify physical deficits which impact ability to perform ADLs (bathing, care of mouth/teeth, toileting, grooming, dressing, etc.)  - Assess/evaluate cause of self-care deficits   - Assess range of motion  - Assess patient's mobility; develop plan if impaired  - Assess patient's need for assistive devices and provide as appropriate  - Encourage maximum independence but intervene and supervise when necessary  - Involve family in performance of ADLs  - Assess for home care needs following discharge   - Consider OT consult to assist with ADL evaluation and planning for discharge  - Provide patient education as appropriate  Outcome: Progressing  Goal: Maintains/Returns to pre admission functional level  Description: INTERVENTIONS:  - Perform BMAT or MOVE assessment daily.   - Set and communicate daily mobility goal to care team and patient/family/caregiver. - Collaborate with rehabilitation services on mobility goals if consulted  - Perform Range of Motion  times a day.   - Reposition patient every hours.  - Dangle patient  times a day  - Stand patient  times a day  - Ambulate patient  times a day  - Out of bed to chair  times a day   - Out of bed for meals  times a day  - Out of bed for toileting  - Record patient progress and toleration of activity level   Outcome: Progressing     Problem: Nutrition/Hydration-ADULT  Goal: Nutrient/Hydration intake appropriate for improving, restoring or maintaining nutritional needs  Description: Monitor and assess patient's nutrition/hydration status for malnutrition. Collaborate with interdisciplinary team and initiate plan and interventions as ordered. Monitor patient's weight and dietary intake as ordered or per policy. Utilize nutrition screening tool and intervene as necessary. Determine patient's food preferences and provide high-protein, high-caloric foods as appropriate.      INTERVENTIONS:  - Monitor oral intake, urinary output, labs, and treatment plans  - Assess nutrition and hydration status and recommend course of action  - Evaluate amount of meals eaten  - Assist patient with eating if necessary   - Allow adequate time for meals  - Recommend/ encourage appropriate diets, oral nutritional supplements, and vitamin/mineral supplements  - Order, calculate, and assess calorie counts as needed  - Recommend, monitor, and adjust tube feedings and TPN/PPN based on assessed needs  - Assess need for intravenous fluids  - Provide specific nutrition/hydration education as appropriate  - Include patient/family/caregiver in decisions related to nutrition  Outcome: Progressing     Problem: CARDIOVASCULAR - ADULT  Goal: Maintains optimal cardiac output and hemodynamic stability  Description: INTERVENTIONS:  - Monitor I/O, vital signs and rhythm  - Monitor for S/S and trends of decreased cardiac output  - Administer and titrate ordered vasoactive medications to optimize hemodynamic stability  - Assess quality of pulses, skin color and temperature  - Assess for signs of decreased coronary artery perfusion  - Instruct patient to report change in severity of symptoms  Outcome: Progressing  Goal: Absence of cardiac dysrhythmias or at baseline rhythm  Description: INTERVENTIONS:  - Continuous cardiac monitoring, vital signs, obtain 12 lead EKG if ordered  - Administer antiarrhythmic and heart rate control medications as ordered  - Monitor electrolytes and administer replacement therapy as ordered  Outcome: Progressing     Problem: RESPIRATORY - ADULT  Goal: Achieves optimal ventilation and oxygenation  Description: INTERVENTIONS:  - Assess for changes in respiratory status  - Assess for changes in mentation and behavior  - Position to facilitate oxygenation and minimize respiratory effort  - Oxygen administered by appropriate delivery if ordered  - Initiate smoking cessation education as indicated  - Encourage broncho-pulmonary hygiene including cough, deep breathe, Incentive Spirometry  - Assess the need for suctioning and aspirate as needed  - Assess and instruct to report SOB or any respiratory difficulty  - Respiratory Therapy support as indicated  Outcome: Progressing     Problem: DISCHARGE PLANNING  Goal: Discharge to home or other facility with appropriate resources  Description: INTERVENTIONS:  - Identify barriers to discharge w/patient and caregiver  - Arrange for needed discharge resources and transportation as appropriate  - Identify discharge learning needs (meds, wound care, etc.)  - Arrange for interpretive services to assist at discharge as needed  - Refer to Case Management Department for coordinating discharge planning if the patient needs post-hospital services based on physician/advanced practitioner order or complex needs related to functional status, cognitive ability, or social support system  Outcome: Progressing     Problem: Knowledge Deficit  Goal: Patient/family/caregiver demonstrates understanding of disease process, treatment plan, medications, and discharge instructions  Description: Complete learning assessment and assess knowledge base.   Interventions:  - Provide teaching at level of understanding  - Provide teaching via preferred learning methods  Outcome: Progressing

## 2023-08-01 NOTE — PROGRESS NOTES
General Cardiology   Progress Note -  Team One   Kassie Arechiga 68 y.o. female MRN: [id number]    Unit/Bed#: -[facility] Encounter: [id number]    Assessment  1. Acute on chronic HFpEF - possibly secondary to ineffective diuretic dosing as an outpatient.  Pt reports compliance with diuretic regimen and no recent dietary or fluid indiscretions. -Volume /respiratory status stable. Appears euvolemic this a.m. No respiratory complaints. - (previously 621)  -Chest x-ray 7/27; mild pulmonary vascular congestion with trace effusions. -TTE 7/28/2023; LVEF 60%, grade 2 DD, RV dilated/systolic function moderately reduced, LA moderately dilated, RA severely dilated, normal functioning mechanical AV, mild to moderate MR, severe TR. -Previously on oral furosemide 40 mg daily as an outpatient (currently on hold)  -Currently on IV furosemide 60 mg BID + received 1 dose of metolazone 2.5 mg on 7/30 and 7/31  -24-hour CINDY balance; -1.1L overall -9.3L  -Baseline dry weight around 185-190 pounds. ,  SS weight 207 pounds on admission, currently 178 pounds. 2. Hx of aortic valve stenosis s/p mechanical AVR (in 2011)  -TTE 7/28/2023; mechanical AV, prosthetic valve leaflet motion is normal, no evidence of perivalvular regurgitation, mild transvalvular regurgitation, no significant AS  -Chronically anticoagulated with warfarin, INR 2.9 on admission, currently 3.1.  3. Hx of persistent atrial fibrillation   4. Hx of VT/VF s/p [facility] single chamber ICD in-situ  5. Recent inappropriate ICD shock in 5/2023 (for rapid afib)  -Device recently reprogrammed to VVIR    -Device interrogation 6/20/2023 - ventricularly paced 71%, no new significant high rate episodes, normal device function.  -Telemetry review - non telemetry  -On amiodarone 200 mg daily and metoprolol succinate 25 mg daily  -Chronically anticoagulated with warfarin, INR 2.9 on admission, currently 2.55  6.  Hypertension  -Average BP 111/50, last recorded 131/58, HR 60.  -Outpatient BP regimen; oral furosemide 40 mg daily, and metoprolol succinate 25 mg daily  -Inpatient BP regimen; IV Lasix 60 mg twice daily, and metoprolol succinate 25 mg daily  7. Dyslipidemia  -Lipid profile 6/30/2023; cholesterol 96, triglycerides 43, HDL 56, and LDL calculated at 31.  -On atorvastatin 40 mg daily  8. DM type II  -HgbA1c 5.9  9. Ascending aortic aneurysm   -Outpatient surveillance. -CTA chest 4/27/2023 - Dilated ascending aorta measuring 4.6 cm,      Plan  -Pt denies any specific cardiac complaints this a.m. Appears euvolemic and respiratory status is stable. -DC IV diuretics, start torsemide 40 mg in the a.m and 20 mg in the p.m  + kdur 20 meq BID  -Continue amiodarone 200 mg daily and metoprolol succinate 25 mg daily.  -Continue warfarin, goal INR 2.5-3.5.  -Strict I's and O's, daily standing weights, 2 g Na+ diet, 1.8 LFR.  -Monitor renal function and electrolytes closely.  Replete to maintain K+ level of 4.0 magnesium level at 2.0.  -No indication for telemetry monitoring. Subjective  Review of Systems   Constitutional: Negative for chills, fever and malaise/fatigue. Eyes: Negative for visual disturbance. Cardiovascular: Negative for chest pain, dyspnea on exertion, leg swelling, orthopnea and palpitations. Respiratory: Negative for cough and shortness of breath. Gastrointestinal: Negative for abdominal pain. Neurological: Negative for dizziness, headaches and light-headedness. Objective:   Physical Exam  Vitals and nursing note reviewed. Constitutional:       General: She is not in acute distress. Appearance: She is obese. She is not diaphoretic. HENT:      Head: Normocephalic and atraumatic. Eyes:      General: No scleral icterus. Cardiovascular:      Rate and Rhythm: Normal rate and regular rhythm. Pulses: Normal pulses. Heart sounds: Normal heart sounds.       Comments: +av click  Pulmonary:      Effort: Pulmonary effort is normal. Breath sounds: Normal breath sounds. Abdominal:      Palpations: Abdomen is soft. Tenderness: There is no abdominal tenderness. Musculoskeletal:      Right lower leg: No edema. Left lower leg: No edema. Skin:     General: Skin is warm and dry. Capillary Refill: Capillary refill takes less than 2 seconds. Neurological:      General: No focal deficit present. Mental Status: She is alert and oriented to person, place, and time. Psychiatric:         Mood and Affect: Mood normal.         Vitals: Blood pressure 131/58, pulse 60, temperature 97.8 °F (36.6 °C), temperature source Oral, resp. rate 17, height 4' 10" (1.473 m), weight 81.1 kg (178 lb 12.8 oz), SpO2 99 %. ,     Body mass index is 37.37 kg/m². ,   Systolic (22ELA), GQF:813 , Min:99 , LOQ:031     Diastolic (09ISM), FYS:25, Min:45, Max:58      Intake/Output Summary (Last 24 hours) at 8/1/2023 0806  Last data filed at 8/1/2023 0501  Gross per 24 hour   Intake 1197 ml   Output 2325 ml   Net -1128 ml     Weight (last 2 days)     Date/Time Weight    08/01/23 0555 81.1 (178.8)    08/01/23 0500 81.1 (178.8)    07/31/23 0547 83.7 (184.6)    07/31/23 0400 83.7 (184.6)    07/30/23 0600 86.2 (190)          LABORATORY RESULTS      CBC with diff:   Results from last 7 days   Lab Units 08/01/23  0528 07/31/23  0451 07/30/23  0543 07/29/23  0219 07/28/23  0427 07/27/23  1654   WBC Thousand/uL 3.33* 3.32* 3.47* 3.64* 2.76* 2.89*   HEMOGLOBIN g/dL 11.2* 11.0* 10.4* 10.6* 9.4* 10.6*   HEMATOCRIT % 35.5 36.1 32.5* 32.8* 29.7* 32.1*   MCV fL 103* 104* 101* 101* 102* 100*   PLATELETS Thousands/uL 141* 142* 130* 141* 123* 154   RBC Million/uL 3.45* 3.48* 3.21* 3.26* 2.90* 3.20*   MCH pg 32.5 31.6 32.4 32.5 32.4 33.1   MCHC g/dL 31.5 30.5* 32.0 32.3 31.6 33.0   RDW % 15.5* 15.6* 15.7* 15.6* 15.7* 15.7*   MPV fL 11.2 10.7 10.6 10.9 10.6 11.6   NRBC AUTO /100 WBCs  --   --  0 0 0 0       CMP:  Results from last 7 days   Lab Units 08/01/23  0528 07/31/23  0452 07/30/23  0543 07/29/23  0219 07/28/23  0427 07/27/23  1654   POTASSIUM mmol/L 3.7 4.5 4.1 3.4* 4.0 4.9   CHLORIDE mmol/L 95* 100 105 105 107 105   CO2 mmol/L 38* 35* 34* 33* 25 22   BUN mg/dL 26* 21 17 18 22 23   CREATININE mg/dL 0.85 0.82 0.72 0.69 0.77 0.81   CALCIUM mg/dL 9.8 9.3 9.0 9.3 8.7 9.1   AST U/L  --   --   --  39 52* 78*   ALT U/L  --   --   --  23 22 28   ALK PHOS U/L  --   --   --  145* 124* 151*   EGFR ml/min/1.73sq m 66 69 81 84 74 70       BMP:  Results from last 7 days   Lab Units 08/01/23  0528 07/31/23  0451 07/30/23  0543 07/29/23 0219 07/28/23 0427 07/27/23  1654   POTASSIUM mmol/L 3.7 4.5 4.1 3.4* 4.0 4.9   CHLORIDE mmol/L 95* 100 105 105 107 105   CO2 mmol/L 38* 35* 34* 33* 25 22   BUN mg/dL 26* 21 17 18 22 23   CREATININE mg/dL 0.85 0.82 0.72 0.69 0.77 0.81   CALCIUM mg/dL 9.8 9.3 9.0 9.3 8.7 9.1       No results found for: "NTBNP"     Results from last 7 days   Lab Units 07/31/23  0451 07/30/23  0543   MAGNESIUM mg/dL 1.9 1.6             Results from last 7 days   Lab Units 07/27/23  1654   TSH 3RD GENERATON uIU/mL 3.671       Results from last 7 days   Lab Units 07/31/23  0451 07/29/23 0219 07/28/23 0427 07/27/23  1842   INR  2.55* 3.26* 3.09* 2.94*       Lipid Profile:   No results found for: "CHOL"  Lab Results   Component Value Date    HDL 56 06/30/2023     Lab Results   Component Value Date    LDLCALC 31 06/30/2023     Lab Results   Component Value Date    TRIG 43 06/30/2023       Cardiac testing:   No results found for this or any previous visit. No results found for this or any previous visit. No results found for this or any previous visit. No valid procedures specified. No results found for this or any previous visit.       Meds/Allergies   all current active meds have been reviewed and current meds:   Current Facility-Administered Medications   Medication Dose Route Frequency   • allopurinol (ZYLOPRIM) tablet 100 mg  100 mg Oral Daily   • amiodarone tablet 200 mg 200 mg Oral Daily With Breakfast   • atorvastatin (LIPITOR) tablet 40 mg  40 mg Oral Daily With Dinner   • citalopram (CeleXA) tablet 20 mg  20 mg Oral Daily   • furosemide (LASIX) injection 60 mg  60 mg Intravenous BID (diuretic)   • insulin lispro (HumaLOG) 100 units/mL subcutaneous injection 1-6 Units  1-6 Units Subcutaneous 4x Daily (AC & HS)   • metoprolol succinate (TOPROL-XL) 24 hr tablet 25 mg  25 mg Oral Daily   • montelukast (SINGULAIR) tablet 10 mg  10 mg Oral HS   • potassium chloride (K-DUR,KLOR-CON) CR tablet 40 mEq  40 mEq Oral BID   • warfarin (COUMADIN) tablet 2.5 mg  2.5 mg Oral Daily (warfarin)              EKG personally reviewed by JOSEPHINE Garza. Assessment:  Principal Problem:    Acute on chronic heart failure with preserved ejection fraction (HCC)  Active Problems:    H/O mechanical aortic valve replacement    Type 2 diabetes mellitus (HCC)    A-fib (HCC)    Pancytopenia (HCC)    Counseling / Coordination of Care  Total floor / unit time spent today 20 minutes. Greater than 50% of total time was spent with the patient and / or family counseling and / or coordination of care. ** Please Note: Dragon 360 Dictation voice to text software may have been used in the creation of this document.  **

## 2023-08-01 NOTE — ASSESSMENT & PLAN NOTE
We will continue beta-blocker and warfarin for anticoagulation  · Monitor INR   · Recent INR therapeutic at 2.05  · Increase warfarin  · INR in AM

## 2023-08-01 NOTE — RESTORATIVE TECHNICIAN NOTE
Restorative Technician Note      Patient Name: Jerrell Tolentino     Note Type: Mobility  Patient Position Upon Consult: Bedside chair  Activity Performed: Ambulated  Assistive Device: Four point cane  Patient Position at End of Consult: Bedside chair;  All needs within reach

## 2023-08-01 NOTE — PROGRESS NOTES
[address] [place] [address]  Progress Note  Name: Evy Elizabeth  MRN: [id number]  Unit/Bed#: -56 I Date of Admission: 7/27/2023   Date of Service: 8/1/2023  Hospital Day: 5    Assessment/Plan   * Acute on chronic heart failure with preserved ejection fraction [facility])  Assessment & Plan  Wt Readings from Last 3 Encounters:   08/01/23 81.1 kg (178 lb 12.8 oz)   07/25/23 88.5 kg (195 lb)   07/10/23 88.5 kg (195 lb)     Patient with history of diastolic heart failure presenting with several weeks of worsening shortness of breath.   · Patient's creatinine is currently at baseline  · Maintained on Lasix 40 mg daily as an outpatient  · Initiated on Lasix drip however since transition to Lasix 60 mg twice daily with good output  · Received 2 doses of metolazone   · Echocardiogram shows EF of 65%, normal systolic function, moderate abnormal diastolic dysfunction, right ventricle and atrium dilated as per cardiology report  · Continue daily weights and I's and O's  · Weight today 178, 207 on admission  · Transition back to oral diuretics of torsemide 40 mg every morning and 20 mg every afternoon with the addition of 20 mEq of potassium twice daily      Pancytopenia ([address])  Assessment & Plan  Patient with history of chronic ITP, however also noted leukopenia and anemia  · Will need outpatient follow-up with heme-onc  · Reticulocyte count elevated  · SPEP obtained -cannot rule out small monoclonal gammopathy    Stage III pressure ulcer of sacral region [facility])  Assessment & Plan  Present on admission, stage III pressure ulcer of sacral region  · Continue Maxorb and allevyn foam dressing to area  · Continue wound care    A-fib [facility])  Assessment & Plan  We will continue beta-blocker and warfarin for anticoagulation  · Monitor INR   · Recent INR therapeutic at 2.05  · Increase warfarin  · INR in AM    Type 2 diabetes mellitus ([address])  Assessment & Plan  Lab Results   Component Value Date    HGBA1C 5.9 (H) 06/30/2023       Recent Labs     07/31/23  1720 07/31/23  2103 07/31/23  2212 08/01/23  0627   POCGLU 131 194* 140 101       Blood Sugar Average: Last 72 hrs:  History of type 2 diabetes on oral meds  · Continue sliding scale  · Diabetic diet     H/O mechanical aortic valve replacement  Assessment & Plan  Patient with history of mechanical aortic valve placement, will continue on Coumadin, goal INR 2.5-3.5  · Continue to monitor INR  · Most recent INR 2.05 and subtherapeutic  · We will administer additional 2.5 mg dose of warfarin  · Continue warfarin 2.5 mg at bedtime  · INR in a.m. VTE Pharmacologic Prophylaxis: VTE Score: 1 Moderate Risk (Score 3-4) - Pharmacological DVT Prophylaxis Ordered: warfarin (Coumadin). Patient Centered Rounds: I performed bedside rounds with nursing staff today. Discussions with Specialists or Other Care Team Provider: Case management    Education and Discussions with Family / Patient: Updated  (daughter) via phone. Total Time Spent on Date of Encounter in care of patient: 35 minutes This time was spent on one or more of the following: performing physical exam; counseling and coordination of care; obtaining or reviewing history; documenting in the medical record; reviewing/ordering tests, medications or procedures; communicating with other healthcare professionals and discussing with patient's family/caregivers. Current Length of Stay: 5 day(s)  Current Patient Status: Inpatient   Certification Statement: The patient will continue to require additional inpatient hospital stay due to Diuresis, INR  Discharge Plan: Anticipate discharge in 24-48 hrs to home. Code Status: Level 1 - Full Code    Subjective: The patient is seen resting comfortably in bed. She offers no complaints at this time, she reports that her breathing is much improved when she initially presented, she denies any worsening lower extremity edema.     Objective:     Vitals:   Temp (24hrs), Av.9 °F (36.6 °C), Min:97.8 °F (36.6 °C), Max:98 °F (36.7 °C)    Temp:  [97.8 °F (36.6 °C)-98 °F (36.7 °C)] 97.8 °F (36.6 °C)  HR:  [58-61] 61  Resp:  [17] 17  BP: ()/(45-58) 109/48  SpO2:  [86 %-99 %] 92 %  Body mass index is 37.37 kg/m². Input and Output Summary (last 24 hours): Intake/Output Summary (Last 24 hours) at 2023 1149  Last data filed at 2023 0933  Gross per 24 hour   Intake 1197 ml   Output 2575 ml   Net -1378 ml       Physical Exam:   Physical Exam  Vitals and nursing note reviewed. Constitutional:       General: She is not in acute distress. Appearance: Normal appearance. She is not ill-appearing, toxic-appearing or diaphoretic. HENT:      Head: Normocephalic and atraumatic. Cardiovascular:      Rate and Rhythm: Normal rate and regular rhythm. Heart sounds: No murmur heard. No friction rub. No gallop. Comments: Mechanical click  Pulmonary:      Effort: Pulmonary effort is normal. No respiratory distress. Breath sounds: Normal breath sounds. No wheezing, rhonchi or rales. Abdominal:      General: Abdomen is flat. Bowel sounds are normal. There is no distension. Palpations: Abdomen is soft. Tenderness: There is no abdominal tenderness. Musculoskeletal:      Right lower leg: No edema. Left lower leg: No edema. Comments: Trace bilateral pedal edema   Skin:     General: Skin is warm and dry. Coloration: Skin is not jaundiced or pale. Neurological:      General: No focal deficit present. Mental Status: She is alert. Mental status is at baseline.           Additional Data:     Labs:  Results from last 7 days   Lab Units 23  0528 23  0451 23  0543   WBC Thousand/uL 3.33*   < > 3.47*   HEMOGLOBIN g/dL 11.2*   < > 10.4*   HEMATOCRIT % 35.5   < > 32.5*   PLATELETS Thousands/uL 141*   < > 130*   NEUTROS PCT %  --   --  61   LYMPHS PCT %  --   --  21   MONOS PCT %  --   --  14*   EOS PCT %  --   -- 3    < > = values in this interval not displayed. Results from last 7 days   Lab Units 08/01/23  0528 07/30/23  0543 07/29/23  0219   SODIUM mmol/L 135   < > 140   POTASSIUM mmol/L 3.7   < > 3.4*   CHLORIDE mmol/L 95*   < > 105   CO2 mmol/L 38*   < > 33*   BUN mg/dL 26*   < > 18   CREATININE mg/dL 0.85   < > 0.69   ANION GAP mmol/L 2   < > 2   CALCIUM mg/dL 9.8   < > 9.3   ALBUMIN g/dL  --   --  3.0*   TOTAL BILIRUBIN mg/dL  --   --  0.91   ALK PHOS U/L  --   --  145*   ALT U/L  --   --  23   AST U/L  --   --  39   GLUCOSE RANDOM mg/dL 98   < > 132    < > = values in this interval not displayed. Results from last 7 days   Lab Units 08/01/23  0950   INR  2.07*     Results from last 7 days   Lab Units 08/01/23  0627 07/31/23  2212 07/31/23  2103 07/31/23  1720 07/31/23  1152 07/31/23  0620   POC GLUCOSE mg/dl 101 140 194* 131 156* 127               Lines/Drains:  Invasive Devices     Peripheral Intravenous Line  Duration           Peripheral IV 07/28/23 Distal;Dorsal (posterior); Left Forearm 4 days                      Imaging: No pertinent imaging reviewed.     Recent Cultures (last 7 days):   Results from last 7 days   Lab Units 07/27/23  1654   URINE CULTURE  >100,000 cfu/ml Escherichia coli*  <10,000 cfu/ml Gram Negative Christopher Enteric Like*       Last 24 Hours Medication List:   Current Facility-Administered Medications   Medication Dose Route Frequency Provider Last Rate   • allopurinol  100 mg Oral Daily Olaf Lewis, DO     • amiodarone  200 mg Oral Daily With Breakfast Olaf Lewis, DO     • atorvastatin  40 mg Oral Daily With [employer], DO     • citalopram  20 mg Oral Daily Olaf Lewis, DO     • insulin lispro  1-6 Units Subcutaneous 4x Daily (AC & HS) Fernando Evans PA-C     • metoprolol succinate  25 mg Oral Daily Olaf Lewis, DO     • montelukast  10 mg Oral HS Olaf Lewis, DO     • potassium chloride  20 mEq Oral BID Stefanie Peak Spironello, CRNP     • torsemide  20 mg Oral Before Dinner JOSEPHINE Arnold     • torsemide  40 mg Oral Daily JOSEPHINE Arnold     • warfarin  2.5 mg Oral Daily (warfarin) Chadwick Tariq PA-C     • warfarin  2.5 mg Oral Once (warfarin) Chadwick Tariq PA-C          Today, Patient Was Seen By: Chadwick Tariq PA-C    **Please Note: This note may have been constructed using a voice recognition system. **

## 2023-08-01 NOTE — ASSESSMENT & PLAN NOTE
Present on admission, stage III pressure ulcer of sacral region  · Continue Maxorb and allevyn foam dressing to area  · Continue wound care

## 2023-08-01 NOTE — ASSESSMENT & PLAN NOTE
Wt Readings from Last 3 Encounters:   08/01/23 81.1 kg (178 lb 12.8 oz)   07/25/23 88.5 kg (195 lb)   07/10/23 88.5 kg (195 lb)     Patient with history of diastolic heart failure presenting with several weeks of worsening shortness of breath.   · Patient's creatinine is currently at baseline  · Maintained on Lasix 40 mg daily as an outpatient  · Initiated on Lasix drip however since transition to Lasix 60 mg twice daily with good output  · Received 2 doses of metolazone   · Echocardiogram shows EF of 79%, normal systolic function, moderate abnormal diastolic dysfunction, right ventricle and atrium dilated as per cardiology report  · Continue daily weights and I's and O's  · Weight today 178, 207 on admission  · Transition back to oral diuretics of torsemide 40 mg every morning and 20 mg every afternoon with the addition of 20 mEq of potassium twice daily

## 2023-08-01 NOTE — PHYSICAL THERAPY NOTE
PHYSICAL THERAPY NOTE          Patient Name: Esa Medina  [id number]'[id number] Date: 8/1/2023 08/01/23 1347   PT Last Visit   PT Visit Date 08/01/23   Note Type   Note Type Treatment   Pain Assessment   Pain Assessment Tool 0-10   Pain Score No Pain   Restrictions/Precautions   Weight Bearing Precautions Per Order No   Other Precautions Telemetry;O2;Fall Risk; Chair Alarm   General   Chart Reviewed Yes   Family/Caregiver Present No   Cognition   Overall Cognitive Status WFL   Arousal/Participation Alert; Responsive; Cooperative   Attention Within functional limits   Orientation Level Oriented to person;Oriented to place;Oriented to time;Oriented to situation   Memory Within functional limits   Following Commands Follows all commands and directions without difficulty   Comments Patient pleasant and cooperative. Subjective   Subjective Patient agreeable to therapy   Bed Mobility   Supine to Sit Unable to assess   Sit to Supine Unable to assess   Additional Comments found and left in bedside chair   Transfers   Sit to Stand 5  Supervision   Additional items Increased time required   Stand to Sit 5  Supervision   Additional items Increased time required   Additional Comments [facility]   Ambulation/Elevation   Gait pattern Forward Flexion;Decreased foot clearance;Shuffling; Short stride; Excessively slow   Gait Assistance 4  Minimal assist   Additional items Assist x 1;Verbal cues   Assistive Device [facility]   Distance 35'x2   Stair Management Assistance Not tested   Balance   Static Sitting Fair +   Dynamic Sitting Fair   Static Standing Fair -   Dynamic Standing Poor +   Ambulatory Poor +   Endurance Deficit   Endurance Deficit Yes   Activity Tolerance   Activity Tolerance Patient limited by fatigue   Nurse Made Aware RN cleared   Assessment   Prognosis Good   Problem List Decreased strength; Impaired balance;Decreased endurance;Decreased mobility Assessment Patient received in bedside chair. Patient agreeable to therapy. Patient performs functional transfers with Supervision using SPC. Patient performs ambulation with Min A using SPC, 35'x2. Patient requires seated rest period between gait trials. SPO2 fluctuations 85-95% on 4LO2 via NC. Patient left in bedside chair with all needs met and call bell/personal items within reach. The patient's AM-PAC Basic Mobility Inpatient Short Form Raw Score is 16 showing further need for skilled PT services in order to improve functional mobility, decrease need for assistance, and return to PLOF. A Raw score of greater than or equal to 16 suggests the patient may benefit from discharge to home. PT continues to recommend rehab vs HHPT pending progress. Will continue to follow as able. Goals   Patient Goals to go home   PT Treatment Day 1   Plan   Treatment/Interventions ADL retraining;Functional transfer training;LE strengthening/ROM; Therapeutic exercise; Endurance training;Patient/family training;Equipment eval/education; Bed mobility;Gait training;Spoke to nursing;Spoke to case management;OT   Progress Progressing toward goals   PT Frequency 3-5x/wk   Recommendation   PT Discharge Recommendation Post acute rehabilitation services  (vs HHPT pending progress)   AM-PAC Basic Mobility Inpatient   Turning in Flat Bed Without Bedrails 3   Lying on Back to Sitting on Edge of Flat Bed Without Bedrails 3   Moving Bed to Chair 3   Standing Up From Chair Using Arms 3   Walk in Room 3   Climb 3-5 Stairs With Railing 1   Basic Mobility Inpatient Raw Score 16   Basic Mobility Standardized Score 38.32   Highest Level Of Mobility   JH-HLM Goal 5: Stand one or more mins   JH-HLM Achieved 7: Walk 25 feet or more   End of Consult   Patient Position at End of Consult Bedside chair; All needs within reach       Brett Pearce, PT, DPT

## 2023-08-01 NOTE — ASSESSMENT & PLAN NOTE
Lab Results   Component Value Date    HGBA1C 5.9 (H) 06/30/2023       Recent Labs     07/31/23  1720 07/31/23  2103 07/31/23  2212 08/01/23  0627   POCGLU 131 194* 140 101       Blood Sugar Average: Last 72 hrs:  History of type 2 diabetes on oral meds  · Continue sliding scale  · Diabetic diet

## 2023-08-02 PROBLEM — E87.6 HYPOKALEMIA: Status: ACTIVE | Noted: 2023-08-02

## 2023-08-02 LAB
ANION GAP SERPL CALCULATED.3IONS-SCNC: 7 MMOL/L
APTT PPP: 150 SECONDS (ref 23–37)
APTT PPP: 38 SECONDS (ref 23–37)
BUN SERPL-MCNC: 29 MG/DL (ref 5–25)
CALCIUM SERPL-MCNC: 9.4 MG/DL (ref 8.3–10.1)
CHLORIDE SERPL-SCNC: 94 MMOL/L (ref 96–108)
CO2 SERPL-SCNC: 37 MMOL/L (ref 21–32)
CREAT SERPL-MCNC: 0.82 MG/DL (ref 0.6–1.3)
ERYTHROCYTE [DISTWIDTH] IN BLOOD BY AUTOMATED COUNT: 15.2 % (ref 11.6–15.1)
GFR SERPL CREATININE-BSD FRML MDRD: 69 ML/MIN/1.73SQ M
GLUCOSE SERPL-MCNC: 128 MG/DL (ref 65–140)
GLUCOSE SERPL-MCNC: 146 MG/DL (ref 65–140)
GLUCOSE SERPL-MCNC: 181 MG/DL (ref 65–140)
GLUCOSE SERPL-MCNC: 74 MG/DL (ref 65–140)
GLUCOSE SERPL-MCNC: 95 MG/DL (ref 65–140)
HCT VFR BLD AUTO: 36 % (ref 34.8–46.1)
HGB BLD-MCNC: 11.6 G/DL (ref 11.5–15.4)
INR PPP: 1.86 (ref 0.84–1.19)
MCH RBC QN AUTO: 32 PG (ref 26.8–34.3)
MCHC RBC AUTO-ENTMCNC: 32.2 G/DL (ref 31.4–37.4)
MCV RBC AUTO: 99 FL (ref 82–98)
PLATELET # BLD AUTO: 116 THOUSANDS/UL (ref 149–390)
PMV BLD AUTO: 11.6 FL (ref 8.9–12.7)
POTASSIUM SERPL-SCNC: 2.8 MMOL/L (ref 3.5–5.3)
PROTHROMBIN TIME: 21.7 SECONDS (ref 11.6–14.5)
RBC # BLD AUTO: 3.63 MILLION/UL (ref 3.81–5.12)
SODIUM SERPL-SCNC: 138 MMOL/L (ref 135–147)
WBC # BLD AUTO: 3.03 THOUSAND/UL (ref 4.31–10.16)

## 2023-08-02 PROCEDURE — 85730 THROMBOPLASTIN TIME PARTIAL: CPT | Performed by: INTERNAL MEDICINE

## 2023-08-02 PROCEDURE — 97530 THERAPEUTIC ACTIVITIES: CPT

## 2023-08-02 PROCEDURE — 85027 COMPLETE CBC AUTOMATED: CPT | Performed by: PHYSICIAN ASSISTANT

## 2023-08-02 PROCEDURE — 80048 BASIC METABOLIC PNL TOTAL CA: CPT | Performed by: PHYSICIAN ASSISTANT

## 2023-08-02 PROCEDURE — 84166 PROTEIN E-PHORESIS/URINE/CSF: CPT | Performed by: INTERNAL MEDICINE

## 2023-08-02 PROCEDURE — 85610 PROTHROMBIN TIME: CPT | Performed by: PHYSICIAN ASSISTANT

## 2023-08-02 PROCEDURE — 99232 SBSQ HOSP IP/OBS MODERATE 35: CPT | Performed by: INTERNAL MEDICINE

## 2023-08-02 PROCEDURE — 82948 REAGENT STRIP/BLOOD GLUCOSE: CPT

## 2023-08-02 PROCEDURE — 85730 THROMBOPLASTIN TIME PARTIAL: CPT | Performed by: PHYSICIAN ASSISTANT

## 2023-08-02 PROCEDURE — 99232 SBSQ HOSP IP/OBS MODERATE 35: CPT | Performed by: PHYSICIAN ASSISTANT

## 2023-08-02 PROCEDURE — 97535 SELF CARE MNGMENT TRAINING: CPT

## 2023-08-02 RX ORDER — HEPARIN SODIUM 1000 [USP'U]/ML
6000 INJECTION, SOLUTION INTRAVENOUS; SUBCUTANEOUS ONCE
Status: DISCONTINUED | OUTPATIENT
Start: 2023-08-02 | End: 2023-08-04 | Stop reason: HOSPADM

## 2023-08-02 RX ORDER — HEPARIN SODIUM 1000 [USP'U]/ML
3000 INJECTION, SOLUTION INTRAVENOUS; SUBCUTANEOUS EVERY 6 HOURS PRN
Status: DISCONTINUED | OUTPATIENT
Start: 2023-08-02 | End: 2023-08-04 | Stop reason: HOSPADM

## 2023-08-02 RX ORDER — HEPARIN SODIUM 10000 [USP'U]/100ML
3-30 INJECTION, SOLUTION INTRAVENOUS
Status: DISCONTINUED | OUTPATIENT
Start: 2023-08-02 | End: 2023-08-04 | Stop reason: HOSPADM

## 2023-08-02 RX ORDER — ACETAMINOPHEN 325 MG/1
650 TABLET ORAL EVERY 6 HOURS PRN
Status: DISCONTINUED | OUTPATIENT
Start: 2023-08-02 | End: 2023-08-04 | Stop reason: HOSPADM

## 2023-08-02 RX ORDER — POTASSIUM CHLORIDE 20 MEQ/1
40 TABLET, EXTENDED RELEASE ORAL 2 TIMES DAILY
Status: DISCONTINUED | OUTPATIENT
Start: 2023-08-02 | End: 2023-08-04 | Stop reason: HOSPADM

## 2023-08-02 RX ORDER — POTASSIUM CHLORIDE 14.9 MG/ML
20 INJECTION INTRAVENOUS
Status: DISPENSED | OUTPATIENT
Start: 2023-08-02 | End: 2023-08-02

## 2023-08-02 RX ORDER — WARFARIN SODIUM 5 MG/1
5 TABLET ORAL
Status: DISCONTINUED | OUTPATIENT
Start: 2023-08-02 | End: 2023-08-04 | Stop reason: HOSPADM

## 2023-08-02 RX ORDER — HEPARIN SODIUM 1000 [USP'U]/ML
6000 INJECTION, SOLUTION INTRAVENOUS; SUBCUTANEOUS EVERY 6 HOURS PRN
Status: DISCONTINUED | OUTPATIENT
Start: 2023-08-02 | End: 2023-08-04 | Stop reason: HOSPADM

## 2023-08-02 RX ADMIN — TORSEMIDE 40 MG: 20 TABLET ORAL at 08:49

## 2023-08-02 RX ADMIN — ACETAMINOPHEN 650 MG: 325 TABLET, FILM COATED ORAL at 08:41

## 2023-08-02 RX ADMIN — WARFARIN SODIUM 5 MG: 5 TABLET ORAL at 17:09

## 2023-08-02 RX ADMIN — POTASSIUM CHLORIDE 20 MEQ: 14.9 INJECTION, SOLUTION INTRAVENOUS at 09:05

## 2023-08-02 RX ADMIN — INSULIN LISPRO 1 UNITS: 100 INJECTION, SOLUTION INTRAVENOUS; SUBCUTANEOUS at 22:19

## 2023-08-02 RX ADMIN — METOPROLOL SUCCINATE 25 MG: 25 TABLET, FILM COATED, EXTENDED RELEASE ORAL at 08:49

## 2023-08-02 RX ADMIN — TORSEMIDE 20 MG: 20 TABLET ORAL at 17:07

## 2023-08-02 RX ADMIN — ATORVASTATIN CALCIUM 40 MG: 40 TABLET, FILM COATED ORAL at 17:04

## 2023-08-02 RX ADMIN — AMIODARONE HYDROCHLORIDE 200 MG: 200 TABLET ORAL at 08:49

## 2023-08-02 RX ADMIN — POTASSIUM CHLORIDE 40 MEQ: 1500 TABLET, EXTENDED RELEASE ORAL at 09:18

## 2023-08-02 RX ADMIN — CITALOPRAM HYDROBROMIDE 20 MG: 20 TABLET ORAL at 08:49

## 2023-08-02 RX ADMIN — ALLOPURINOL 100 MG: 100 TABLET ORAL at 08:51

## 2023-08-02 RX ADMIN — MONTELUKAST 10 MG: 10 TABLET, FILM COATED ORAL at 22:19

## 2023-08-02 RX ADMIN — HEPARIN SODIUM 18 UNITS/KG/HR: 10000 INJECTION, SOLUTION INTRAVENOUS at 09:01

## 2023-08-02 RX ADMIN — POTASSIUM CHLORIDE 40 MEQ: 1500 TABLET, EXTENDED RELEASE ORAL at 17:05

## 2023-08-02 RX ADMIN — ACETAMINOPHEN 650 MG: 325 TABLET, FILM COATED ORAL at 17:06

## 2023-08-02 NOTE — PROGRESS NOTES
[address] [facility]  Progress Note  Name: Joselo Medina  MRN: [id number]  Unit/Bed#: -73 I Date of Admission: 7/27/2023   Date of Service: 8/2/2023  Hospital Day: 6    Assessment/Plan   * Acute on chronic heart failure with preserved ejection fraction [facility])  Assessment & Plan  Wt Readings from Last 3 Encounters:   08/02/23 77.5 kg (170 lb 12.8 oz)   07/25/23 88.5 kg (195 lb)   07/10/23 88.5 kg (195 lb)     Patient with history of diastolic heart failure presenting with several weeks of worsening shortness of breath. · Patient's creatinine is currently at baseline  · Maintained on Lasix 40 mg daily as an outpatient  · Initiated on Lasix drip however since transition to Lasix 60 mg twice daily with good output  · Received 2 doses of metolazone   · Echocardiogram shows EF of 63%, normal systolic function, moderate abnormal diastolic dysfunction, right ventricle and atrium dilated as per cardiology report  · Continue daily weights and I's and O's  · Weight today 170lbs, 207 on admission  · Transition back to oral diuretics of torsemide 40 mg every morning and 20 mg every afternoon with the addition of 20 mEq of potassium twice daily      Hypokalemia  Assessment & Plan  Potassium low this morning at 2.8  · 40 mEq IV potassium ordered to be administered  · Recheck in a.m.     Pancytopenia ([address])  Assessment & Plan  Patient with history of chronic ITP, however also noted leukopenia and anemia  · Will need outpatient follow-up with heme-onc  · Reticulocyte count elevated  · SPEP obtained -cannot rule out small monoclonal gammopathy    Stage III pressure ulcer of sacral region [facility])  Assessment & Plan  Present on admission, stage III pressure ulcer of sacral region  · Continue Maxorb and allevyn foam dressing to area  · Continue wound care    A-fib [facility])  Assessment & Plan  We will continue beta-blocker and warfarin for anticoagulation  · Monitor INR   · Recent INR subtherapeutic at 1.86  · Given history of mechanical valve, will initiate patient on heparin drip  · Warfarin increased yesterday to 5 mg daily, continue with this dose this evening  · INR in AM    Type 2 diabetes mellitus [facility])  Assessment & Plan  Lab Results   Component Value Date    HGBA1C 5.9 (H) 06/30/2023       Recent Labs     08/01/23  1150 08/01/23  1705 08/01/23  2111 08/02/23  0619   POCGLU 161* 152* 182* 95       Blood Sugar Average: Last 72 hrs:  History of type 2 diabetes on oral meds  · Continue sliding scale  · Diabetic diet     H/O mechanical aortic valve replacement  Assessment & Plan  Patient with history of mechanical aortic valve placement, will continue on Coumadin  · Discussed with cardiology patients goal INR has been documented to be 2.5-3.5 however they report not needing to initiate heparin gtt bridge unless drops below 2.0  · Most recent INR subtherapeutic at 1.86  · Received and additional 2.5 mg of warfarin yesterday for total of 5 mg warfarin  · Continue warfarin 5 mg daily this evening  · Initiated on heparin drip for bridging given subtherapeutic INR  · INR in a.m. VTE Pharmacologic Prophylaxis: VTE Score: 1 High Risk (Score >/= 5) - Pharmacological DVT Prophylaxis Ordered: heparin drip. Sequential Compression Devices Ordered. Patient Centered Rounds: I performed bedside rounds with nursing staff today. Discussions with Specialists or Other Care Team Provider: None    Education and Discussions with Family / Patient: Updated  (daughter) via phone. Total Time Spent on Date of Encounter in care of patient: 45 minutes This time was spent on one or more of the following: performing physical exam; counseling and coordination of care; obtaining or reviewing history; documenting in the medical record; reviewing/ordering tests, medications or procedures; communicating with other healthcare professionals and discussing with patient's family/caregivers.     Current Length of Stay: 6 day(s)  Current Patient Status: Inpatient   Certification Statement: The patient will continue to require additional inpatient hospital stay due to hypokalemia, subtherapeutic inr  Discharge Plan: Anticipate discharge in 24-48 hrs to home. Code Status: Level 1 - Full Code    Subjective: The patient is seen resting comfortably in bed. She understands that she has to stay overnight given her hypokalemia as well as her low INR. Denies any worsening shortness of breath, chest pain, palpitations or heart fluttering. She states she is feeling well overall and is eager to go home whenever she is medically stable. Objective:     Vitals:   Temp (24hrs), Av.1 °F (36.7 °C), Min:98 °F (36.7 °C), Max:98.1 °F (36.7 °C)    Temp:  [98 °F (36.7 °C)-98.1 °F (36.7 °C)] 98 °F (36.7 °C)  HR:  [60-63] 62  BP: (114-144)/(50-63) 144/56  SpO2:  [90 %-95 %] 91 %  Body mass index is 35.7 kg/m². Input and Output Summary (last 24 hours): Intake/Output Summary (Last 24 hours) at 2023 1019  Last data filed at 2023 0601  Gross per 24 hour   Intake 420 ml   Output 2100 ml   Net -1680 ml       Physical Exam:   Physical Exam  Vitals and nursing note reviewed. Constitutional:       General: She is not in acute distress. Appearance: Normal appearance. She is not ill-appearing, toxic-appearing or diaphoretic. HENT:      Head: Normocephalic and atraumatic. Cardiovascular:      Rate and Rhythm: Normal rate and regular rhythm. Heart sounds: No murmur heard. No friction rub. No gallop. Comments: Mechanical click  Pulmonary:      Effort: Pulmonary effort is normal. No respiratory distress. Breath sounds: Normal breath sounds. No wheezing, rhonchi or rales. Abdominal:      General: Abdomen is flat. Bowel sounds are normal. There is no distension. Palpations: Abdomen is soft. Tenderness: There is no abdominal tenderness. Musculoskeletal:      Right lower leg: No edema.       Left lower leg: No edema. Skin:     General: Skin is warm and dry. Coloration: Skin is not jaundiced or pale. Neurological:      General: No focal deficit present. Mental Status: She is alert. Mental status is at baseline. Additional Data:     Labs:  Results from last 7 days   Lab Units 08/02/23 0442 07/31/23  0451 07/30/23  0543   WBC Thousand/uL 3.03*   < > 3.47*   HEMOGLOBIN g/dL 11.6   < > 10.4*   HEMATOCRIT % 36.0   < > 32.5*   PLATELETS Thousands/uL 116*   < > 130*   NEUTROS PCT %  --   --  61   LYMPHS PCT %  --   --  21   MONOS PCT %  --   --  14*   EOS PCT %  --   --  3    < > = values in this interval not displayed. Results from last 7 days   Lab Units 08/02/23 0442 07/30/23  0543 07/29/23  0219   SODIUM mmol/L 138   < > 140   POTASSIUM mmol/L 2.8*   < > 3.4*   CHLORIDE mmol/L 94*   < > 105   CO2 mmol/L 37*   < > 33*   BUN mg/dL 29*   < > 18   CREATININE mg/dL 0.82   < > 0.69   ANION GAP mmol/L 7   < > 2   CALCIUM mg/dL 9.4   < > 9.3   ALBUMIN g/dL  --   --  3.0*   TOTAL BILIRUBIN mg/dL  --   --  0.91   ALK PHOS U/L  --   --  145*   ALT U/L  --   --  23   AST U/L  --   --  39   GLUCOSE RANDOM mg/dL 74   < > 132    < > = values in this interval not displayed. Results from last 7 days   Lab Units 08/02/23 0442   INR  1.86*     Results from last 7 days   Lab Units 08/02/23  0619 08/01/23  2111 08/01/23  1705 08/01/23  1150 08/01/23  0627 07/31/23  2212 07/31/23  2103 07/31/23  1720 07/31/23  1152 07/31/23  0620   POC GLUCOSE mg/dl 95 182* 152* 161* 101 140 194* 131 156* 127               Lines/Drains:  Invasive Devices     Peripheral Intravenous Line  Duration           Peripheral IV 08/02/23 Left;Ventral (anterior) Forearm <1 day                      Imaging: No pertinent imaging reviewed.     Recent Cultures (last 7 days):   Results from last 7 days   Lab Units 07/27/23  1654   URINE CULTURE  >100,000 cfu/ml Escherichia coli*  <10,000 cfu/ml Gram Negative Christopher Enteric Like*       Last 24 Hours Medication List:   Current Facility-Administered Medications   Medication Dose Route Frequency Provider Last Rate   • acetaminophen  650 mg Oral Q6H PRN Michael Cordon PA-C     • allopurinol  100 mg Oral Daily Olafivone Lewis, DO     • amiodarone  200 mg Oral Daily With Breakfast Olaf Lewis, DO     • atorvastatin  40 mg Oral Daily With [employer], DO     • citalopram  20 mg Oral Daily Olafivone Lewis, DO     • heparin (porcine)  3-30 Units/kg/hr (Order-Specific) Intravenous Titrated Michael Cordon PA-C 18 Units/kg/hr (08/02/23 0901)   • heparin (porcine)  3,000 Units Intravenous Q6H PRN Michael Cordon PA-C     • heparin (porcine)  6,000 Units Intravenous Once Michael Cordon PA-C     • heparin (porcine)  6,000 Units Intravenous Q6H PRN Michael Cordon PA-C     • insulin lispro  1-6 Units Subcutaneous 4x Daily (AC & HS) Laine Boas, PA-C     • metoprolol succinate  25 mg Oral Daily Olaf Lewis, DO     • montelukast  10 mg Oral HS Olaf Lewis, DO     • potassium chloride  40 mEq Oral BID Courtney Hammonds MD     • potassium chloride  20 mEq Intravenous [facility] m[facility] (08/02/23 0905)   • torsemide  20 mg Oral Before Dinner JOSEPHINE Ledesma     • torsemide  40 mg Oral Daily JOSEPHINE Ledesma     • warfarin  5 mg Oral Daily (warfarin) Courtney Hammonds MD          Today, Patient Was Seen By: Michael Cordon PA-C    **Please Note: This note may have been constructed using a voice recognition system. **

## 2023-08-02 NOTE — ASSESSMENT & PLAN NOTE
Patient with history of mechanical aortic valve placement, will continue on Coumadin  · Discussed with cardiology patients goal INR has been documented to be 2.5-3.5 however they report not needing to initiate heparin gtt bridge unless drops below 2.0  · Most recent INR subtherapeutic at 1.86  · Received and additional 2.5 mg of warfarin yesterday for total of 5 mg warfarin  · Continue warfarin 5 mg daily this evening  · Initiated on heparin drip for bridging given subtherapeutic INR  · INR in a.m.

## 2023-08-02 NOTE — OCCUPATIONAL THERAPY NOTE
Occupational Therapy Progress Note     Patient Name: Georges Boucher  [id number] Date: 8/2/2023  Problem List  Principal Problem:    Acute on chronic heart failure with preserved ejection fraction ([address])  Active Problems:    H/O mechanical aortic valve replacement    Type 2 diabetes mellitus ([address])    A-fib ([facility])    Stage III pressure ulcer of sacral region ([address])    Pancytopenia ([address])    Hypokalemia            08/02/23 1056   OT Last Visit   OT Visit Date 08/02/23   Note Type   Note Type Treatment   Pain Assessment   Pain Assessment Tool 0-10   Pain Score 5   Pain Location/Orientation Location: [facility] Pain Intervention(s) Repositioned   Restrictions/Precautions   Weight Bearing Precautions Per Order No   Other Precautions Telemetry;Multiple lines; Fall Risk;Pain   Lifestyle   Autonomy pta, pt was I W ADL, rq A for IADL (groceries delievered, family is local and can assist as well. Reciprocal Relationships supportive significant other, daughters who can assist   Service to Others retired   Intrinsic Gratification spending time with her family, reading   ADL   Where Assessed Chair   Grooming Assistance 4  Minimal Assistance   Grooming Deficit Setup; Increased time to complete;Wash/dry hands; Wash/dry face; Teeth care   Grooming Comments Pt participated in grooming tasks while standing using table. Pt required min A at times for balance. UB Dressing Assistance 4  Minimal Assistance   UB Dressing Deficit Setup; Increased time to complete; Thread RUE; Thread LUE;Pull around back   UB Dressing Comments seated in chair   Functional Standing Tolerance   Time ~ 2 mins   Activity Dynamic standing   Comments Pt was able to tolerate standing for ~ 2 mins to complete grooming tasks with min Ax1. Bed Mobility   Supine to Sit Unable to assess   Sit to Supine Unable to assess   Additional Comments OOB in chair upon arrival.   Transfers   Sit to Stand 4  Minimal assistance   Additional items Assist x 1; Increased time required Stand to Sit 4  Minimal assistance   Additional items Assist x 1; Increased time required   Additional Comments SPC - pt was encouraged to use RW. Pt reports feeling increased weakness. Functional Mobility   Functional Mobility 4  Minimal assistance   Additional Comments Pt was able to demonstrate short distance household mobility with mod Ax1 and initially single point cane. Pt was encouraged to use RW - pt progressed to min Ax1 with RW. Additional items Rolling walker   Subjective   Subjective "I feel like I have gotten a lot weaker"   Cognition   Overall Cognitive Status [facility]   Arousal/Participation Alert; Responsive;Arousable; Cooperative   Attention Within functional limits   Orientation Level Oriented to person;Oriented to place;Oriented to time;Oriented to situation   Memory Within functional limits   Following Commands Follows all commands and directions without difficulty   Comments Pt was pleasant and cooperative t/o session. Activity Tolerance   Activity Tolerance Patient limited by fatigue   Medical Staff Made Aware RN made aware   Assessment   Assessment Pt was seen on 8/2/2023 for skilled OT session. OT session was focused on ADLs, functional mobility, functional transfers, static/dynamic balance, safety awareness, education, increased activity tolerance, and energy conservation. Pt was agreeable to OT session. Pt was able to demonstrate transfers with min Ax1. Pt was able to tolerate ~ 2 mins of standing to complete grooming tasks. Pt occasionally required min A for balance. Pt initially required mod Ax1 for ambulation with SPC - but progressed to min Ax1 with use of RW. Pt is limited 2* pain, endurance, activity tolerance, functional mobility, functional mobility, balance, functional standing tolerance and decreased I w/ ADLS/IADLS.  The following Occupational Performance Areas to address include: eating, grooming, bathing/shower, toilet hygiene, dressing, health maintenance and functional mobility. Pt will continue to benefit from skilled OT services 2-3x a wk to address functional goals. The patient's raw score on the -PAC Daily Activity Inpatient Short Form is 18. A raw score of less than 19 suggests the patient may benefit from discharge to post-acute rehabilitation services. Please refer to the recommendation of the Occupational Therapist for safe discharge planning. OT recommendations for d/c include post acute rehab. Plan   Treatment Interventions ADL retraining;Functional transfer training; Endurance training; Compensatory technique education;Continued evaluation; Activityengagement; Energy conservation   Goal Expiration Date 08/11/23   OT Treatment Day 1   OT Frequency 2-3x/wk   Recommendation   OT Discharge Recommendation Post acute rehabilitation services   [facility] Daily Activity Inpatient   Lower Body Dressing 2   Bathing 2   Toileting 3   Upper Body Dressing 3   Grooming 4   Eating 4   Daily Activity Raw Score 18   Daily Activity Standardized Score (Calc for Raw Score >=11) 38.66   AM-PAC Applied Cognition Inpatient   Following a Speech/Presentation 4   Understanding Ordinary Conversation 4   Taking Medications 4   Remembering Where Things Are Placed or Put Away 4   Remembering List of 4-5 Errands 4   Taking Care of Complicated Tasks 4   Applied Cognition Raw Score 24   Applied Cognition Standardized Score 62.21   End of Consult   Education Provided Yes   Patient Position at End of Consult All needs within reach; Bedside chair   Nurse Communication Nurse aware of consult       Davis Bennett OTR/L

## 2023-08-02 NOTE — ASSESSMENT & PLAN NOTE
We will continue beta-blocker and warfarin for anticoagulation  · Monitor INR   · Recent INR subtherapeutic at 1.86  · Given history of mechanical valve, will initiate patient on heparin drip  · Warfarin increased yesterday to 5 mg daily, continue with this dose this evening  · INR in AM

## 2023-08-02 NOTE — ASSESSMENT & PLAN NOTE
Potassium low this morning at 2.8  · 40 mEq IV potassium ordered to be administered  · Recheck in a.m.

## 2023-08-02 NOTE — ASSESSMENT & PLAN NOTE
Wt Readings from Last 3 Encounters:   08/02/23 77.5 kg (170 lb 12.8 oz)   07/25/23 88.5 kg (195 lb)   07/10/23 88.5 kg (195 lb)     Patient with history of diastolic heart failure presenting with several weeks of worsening shortness of breath.   · Patient's creatinine is currently at baseline  · Maintained on Lasix 40 mg daily as an outpatient  · Initiated on Lasix drip however since transition to Lasix 60 mg twice daily with good output  · Received 2 doses of metolazone   · Echocardiogram shows EF of 89%, normal systolic function, moderate abnormal diastolic dysfunction, right ventricle and atrium dilated as per cardiology report  · Continue daily weights and I's and O's  · Weight today 170lbs, 207 on admission  · Transition back to oral diuretics of torsemide 40 mg every morning and 20 mg every afternoon with the addition of 20 mEq of potassium twice daily

## 2023-08-02 NOTE — PLAN OF CARE
Problem: OCCUPATIONAL THERAPY ADULT  Goal: Performs self-care activities at highest level of function for planned discharge setting. See evaluation for individualized goals. Description: Treatment Interventions: ADL retraining, Functional transfer training, Endurance training, Patient/family training, Equipment evaluation/education, Compensatory technique education, Energy conservation, Activityengagement          See flowsheet documentation for full assessment, interventions and recommendations. Note: Limitation: Decreased ADL status, Decreased endurance, Decreased self-care trans, Decreased high-level ADLs  Prognosis: Good  Assessment: Pt was seen on 8/2/2023 for skilled OT session. OT session was focused on ADLs, functional mobility, functional transfers, static/dynamic balance, safety awareness, education, increased activity tolerance, and energy conservation. Pt was agreeable to OT session. Pt was able to demonstrate transfers with min Ax1. Pt was able to tolerate ~ 2 mins of standing to complete grooming tasks. Pt occasionally required min A for balance. Pt initially required mod Ax1 for ambulation with SPC - but progressed to min Ax1 with use of RW. Pt is limited 2* pain, endurance, activity tolerance, functional mobility, functional mobility, balance, functional standing tolerance and decreased I w/ ADLS/IADLS. The following Occupational Performance Areas to address include: eating, grooming, bathing/shower, toilet hygiene, dressing, health maintenance and functional mobility. Pt will continue to benefit from skilled OT services 2-3x a wk to address functional goals. The patient's raw score on the AM-PAC Daily Activity Inpatient Short Form is 18. A raw score of less than 19 suggests the patient may benefit from discharge to post-acute rehabilitation services. Please refer to the recommendation of the Occupational Therapist for safe discharge planning.   OT recommendations for d/c include post acute rehab.     OT Discharge Recommendation: Post acute rehabilitation services

## 2023-08-02 NOTE — PROGRESS NOTES
Cardiology Progress Note - Esther Bedford 68 y.o. female MRN: [id number]    Unit/Bed#: -01 Encounter: [id number]      Assessment:  Principal Problem:    Acute on chronic heart failure with preserved ejection fraction (HCC)  Active Problems:    H/O mechanical aortic valve replacement    Type 2 diabetes mellitus (HCC)    A-fib (HCC)    Stage III pressure ulcer of sacral region ([address])    Pancytopenia ([address])      Plan:  Patient is comfortable this morning. She has no chest pain or significant dyspnea. She is off oxygen. She is now on oral diuretic. Vital signs are stable. Potassium today is 2.8 with creatinine of 0.82. Will increase standing dose of potassium. We will recheck BMP in the a.m. Hemoglobin 11.6. PT/INR is 1.86. Will increase Coumadin to 5 mg/day. We will recheck PT/INR in the AM.  We will continue other cardiac medications as is. Subjective:   Patient seen and examined. No significant events overnight.  negative. Objective:     Vitals: Blood pressure 137/52, pulse 63, temperature 98 °F (36.7 °C), resp.  rate 17, height 4' 10" (1.473 m), weight 77.5 kg (170 lb 12.8 oz), SpO2 91 %., Body mass index is 35.7 kg/m².,   Orthostatic Blood Pressures    Flowsheet Row Most Recent Value   Blood Pressure 137/52 filed at 2023 0620   Patient Position - Orthostatic VS Lying filed at 2023 1853      ,      Intake/Output Summary (Last 24 hours) at 2023 8579  Last data filed at 2023 0601  Gross per 24 hour   Intake 600 ml   Output 2350 ml   Net -1750 ml           Physical Exam:    GEN: Esther Bedford   NECK: supple, no carotid bruits, no JVD or HJR  HEART: normal rate, regular rhythm, normal S1 and S2, crisp prosthetic heart sounds  LUNGS: clear to auscultation bilaterally; no wheezes, rales, or rhonchi   ABDOMEN: normal bowel sounds, soft, no tenderness, no distention  EXTREMITIES: peripheral pulses normal; no clubbing, cyanosis, or edema  SKIN: warm and well perfused, no suspicious lesions on exposed skin    Labs & Results:    No results displayed because visit has over 200 results. Echo follow up/limited w/ contrast if indicated    Result Date: 7/28/2023  Narrative: •  Left Ventricle: Left ventricular cavity size is normal. Wall thickness is mildly increased. The left ventricular ejection fraction is 60%. Systolic function is normal. Wall motion is normal. Diastolic function is moderately abnormal, consistent with grade II (pseudonormal) relaxation. Left atrial filling pressure is elevated. •  IVS: There is abnormal septal motion consistent with right ventricular pacing. •  Right Ventricle: Right ventricular cavity size is severely dilated. Systolic function is moderately reduced. •  Left Atrium: The atrium is moderately dilated. •  Right Atrium: The atrium is severely dilated. •  Aortic Valve: There is a bileaflet mechanical valve. Prosthetic valve leaflet motion is normal. There is no evidence of paravalvular regurgitation. There is mild transvalvular regurgitation. The aortic valve has no significant stenosis. The gradient recorded across the prosthetic aortic valve is within the expected range. The aortic valve peak gradient is 12 mmHg. The aortic valve mean gradient is 6 mmHg. •  Mitral Valve: There is severe annular calcification. There is mild to moderate regurgitation with a centrally directed jet. •  Tricuspid Valve: The leaflets are not thickened. The leaflets are not calcified. There appears to be impingement of the anterior leaflet by an RV pacer lead. The tricuspid valve has moderate annular dilation. There is severe regurgitation. The tricuspid valve regurgitation jet is directed toward septum. The right ventricular systolic pressure is moderately elevated. The estimated right ventricular systolic pressure is 30.47 mmHg. •  Pulmonic Valve: There is mild regurgitation.      XR humerus left    Result Date: 7/28/2023  Narrative: LEFT HUMERUS INDICATION:   M79.602: Pain in left arm. COMPARISON: Left humerus radiographs 5/31/2023. VIEWS:  XR HUMERUS LEFT FINDINGS: Again seen is a displaced and angulated fracture of the proximal humeral shaft with evidence of healing with callus formation, increased since prior study. The remainder of the humerus is intact. No significant degenerative changes. No lytic or blastic osseous lesion. Surgical clips in the left axilla. Impression: Healing angulated and displaced fracture of the proximal humeral shaft. Workstation performed: [id number]     XR chest 1 view portable    Result Date: 7/28/2023  Narrative: CHEST INDICATION:   chf exacerbation vs ?. COMPARISON: CXR 5/2/2023 and chest CT 4/27/2023. EXAM PERFORMED/VIEWS:  XR CHEST PORTABLE. FINDINGS: Severe cardiomegaly, median sternotomy, left subclavian ICD lead in right ventricular apex. Mild pulmonary venous congestion with trace effusions. No pneumothorax. Left axillary clips. Upper abdomen normal. Bones normal for age. High riding right humeral head. Old left humerus fracture. Impression: Mild pulmonary venous congestion with trace effusions. Workstation performed: [id number]     XR chest pa & lateral    Result Date: 7/21/2023  Narrative: Study: Two-view chest. COMPARISON: November 30, 2021. HISTORY: Dyspnea. Gross cardiomegaly. No edema or vasculature congestion. No interval infiltrates, edema, or effusions. Left-sided transvenous ICD wire unchanged in position. Oblique angulated fracture proximal humeral shaft with callus formation noted, incompletely visualized. Impression: IMPRESSION: Gross cardiomegaly with no edema or vascular congestion. No interval infiltrates. Workstation:[id number]      EKG personally reviewed by Saranya Silverman MD.     Counseling / Coordination of Care  Total floor / unit time spent today 30 minutes. Greater than 50% of total time was spent with the patient and / or family counseling and / or coordination of care.

## 2023-08-02 NOTE — ASSESSMENT & PLAN NOTE
Lab Results   Component Value Date    HGBA1C 5.9 (H) 06/30/2023       Recent Labs     08/01/23  1150 08/01/23  1705 08/01/23  2111 08/02/23  0619   POCGLU 161* 152* 182* 95       Blood Sugar Average: Last 72 hrs:  History of type 2 diabetes on oral meds  · Continue sliding scale  · Diabetic diet

## 2023-08-03 ENCOUNTER — APPOINTMENT (INPATIENT)
Dept: RADIOLOGY | Facility: HOSPITAL | Age: 78
DRG: 291 | End: 2023-08-03
Payer: COMMERCIAL

## 2023-08-03 PROBLEM — M79.672 LEFT FOOT PAIN: Status: ACTIVE | Noted: 2023-08-03

## 2023-08-03 PROBLEM — M19.072 ARTHRITIS OF LEFT FOOT: Status: ACTIVE | Noted: 2023-08-03

## 2023-08-03 LAB
ANION GAP SERPL CALCULATED.3IONS-SCNC: 2 MMOL/L
APTT PPP: 138 SECONDS (ref 23–37)
APTT PPP: 185 SECONDS (ref 23–37)
APTT PPP: 75 SECONDS (ref 23–37)
APTT PPP: 98 SECONDS (ref 23–37)
BUN SERPL-MCNC: 33 MG/DL (ref 5–25)
CALCIUM SERPL-MCNC: 8.9 MG/DL (ref 8.3–10.1)
CHLORIDE SERPL-SCNC: 94 MMOL/L (ref 96–108)
CO2 SERPL-SCNC: 42 MMOL/L (ref 21–32)
CREAT SERPL-MCNC: 0.96 MG/DL (ref 0.6–1.3)
GFR SERPL CREATININE-BSD FRML MDRD: 57 ML/MIN/1.73SQ M
GLUCOSE SERPL-MCNC: 124 MG/DL (ref 65–140)
GLUCOSE SERPL-MCNC: 169 MG/DL (ref 65–140)
GLUCOSE SERPL-MCNC: 213 MG/DL (ref 65–140)
GLUCOSE SERPL-MCNC: 217 MG/DL (ref 65–140)
GLUCOSE SERPL-MCNC: 65 MG/DL (ref 65–140)
GLUCOSE SERPL-MCNC: 92 MG/DL (ref 65–140)
INR PPP: 2.2 (ref 0.84–1.19)
POTASSIUM SERPL-SCNC: 3.5 MMOL/L (ref 3.5–5.3)
PROTHROMBIN TIME: 24.7 SECONDS (ref 11.6–14.5)
SODIUM SERPL-SCNC: 138 MMOL/L (ref 135–147)

## 2023-08-03 PROCEDURE — 85730 THROMBOPLASTIN TIME PARTIAL: CPT | Performed by: INTERNAL MEDICINE

## 2023-08-03 PROCEDURE — 99222 1ST HOSP IP/OBS MODERATE 55: CPT | Performed by: PODIATRIST

## 2023-08-03 PROCEDURE — 73630 X-RAY EXAM OF FOOT: CPT

## 2023-08-03 PROCEDURE — 85610 PROTHROMBIN TIME: CPT | Performed by: INTERNAL MEDICINE

## 2023-08-03 PROCEDURE — 80048 BASIC METABOLIC PNL TOTAL CA: CPT

## 2023-08-03 PROCEDURE — 99232 SBSQ HOSP IP/OBS MODERATE 35: CPT | Performed by: PHYSICIAN ASSISTANT

## 2023-08-03 PROCEDURE — 99232 SBSQ HOSP IP/OBS MODERATE 35: CPT | Performed by: INTERNAL MEDICINE

## 2023-08-03 PROCEDURE — 82948 REAGENT STRIP/BLOOD GLUCOSE: CPT

## 2023-08-03 PROCEDURE — 85730 THROMBOPLASTIN TIME PARTIAL: CPT | Performed by: OBSTETRICS & GYNECOLOGY

## 2023-08-03 RX ORDER — POTASSIUM CHLORIDE 20 MEQ/1
40 TABLET, EXTENDED RELEASE ORAL ONCE
Status: COMPLETED | OUTPATIENT
Start: 2023-08-03 | End: 2023-08-03

## 2023-08-03 RX ADMIN — INSULIN LISPRO 2 UNITS: 100 INJECTION, SOLUTION INTRAVENOUS; SUBCUTANEOUS at 12:27

## 2023-08-03 RX ADMIN — AMIODARONE HYDROCHLORIDE 200 MG: 200 TABLET ORAL at 08:48

## 2023-08-03 RX ADMIN — ACETAMINOPHEN 650 MG: 325 TABLET, FILM COATED ORAL at 20:51

## 2023-08-03 RX ADMIN — HEPARIN SODIUM 12 UNITS/KG/HR: 10000 INJECTION, SOLUTION INTRAVENOUS at 06:40

## 2023-08-03 RX ADMIN — TORSEMIDE 20 MG: 20 TABLET ORAL at 17:36

## 2023-08-03 RX ADMIN — POTASSIUM CHLORIDE 40 MEQ: 1500 TABLET, EXTENDED RELEASE ORAL at 03:07

## 2023-08-03 RX ADMIN — CITALOPRAM HYDROBROMIDE 20 MG: 20 TABLET ORAL at 08:48

## 2023-08-03 RX ADMIN — WARFARIN SODIUM 5 MG: 5 TABLET ORAL at 17:36

## 2023-08-03 RX ADMIN — POTASSIUM CHLORIDE 40 MEQ: 1500 TABLET, EXTENDED RELEASE ORAL at 17:36

## 2023-08-03 RX ADMIN — ACETAMINOPHEN 650 MG: 325 TABLET, FILM COATED ORAL at 08:52

## 2023-08-03 RX ADMIN — HEPARIN SODIUM 10 UNITS/KG/HR: 10000 INJECTION, SOLUTION INTRAVENOUS at 13:11

## 2023-08-03 RX ADMIN — METOPROLOL SUCCINATE 25 MG: 25 TABLET, FILM COATED, EXTENDED RELEASE ORAL at 08:47

## 2023-08-03 RX ADMIN — TORSEMIDE 40 MG: 20 TABLET ORAL at 08:48

## 2023-08-03 RX ADMIN — ATORVASTATIN CALCIUM 40 MG: 40 TABLET, FILM COATED ORAL at 17:36

## 2023-08-03 RX ADMIN — INSULIN LISPRO 2 UNITS: 100 INJECTION, SOLUTION INTRAVENOUS; SUBCUTANEOUS at 21:36

## 2023-08-03 RX ADMIN — ALLOPURINOL 100 MG: 100 TABLET ORAL at 08:48

## 2023-08-03 RX ADMIN — MONTELUKAST 10 MG: 10 TABLET, FILM COATED ORAL at 21:35

## 2023-08-03 RX ADMIN — POTASSIUM CHLORIDE 40 MEQ: 1500 TABLET, EXTENDED RELEASE ORAL at 08:48

## 2023-08-03 NOTE — PROGRESS NOTES
Cardiology Progress Note - Farhad Mirza 68 y.o. female MRN: [id number]    Unit/Bed#: -01 Encounter: [id number]      Assessment:  Principal Problem:    Acute on chronic heart failure with preserved ejection fraction (HCC)  Active Problems:    H/O mechanical aortic valve replacement    Type 2 diabetes mellitus (HCC)    A-fib (HCC)    Stage III pressure ulcer of sacral region ([address])    Pancytopenia (HCC)    Hypokalemia      Plan:  Patient is comfortable this morning. She has no chest pain or significant dyspnea. She is off oxygen. She is on oral diuretic, torsemide. Patient previously on furosemide. Vital signs are stable. Potassium today is 3.5 with creatinine of 0.96. Hemoglobin 11.6. PT/INR is 2.2. Patient on Coumadin 5 mg/day. At time of discharge planning would restart patient's prior dose of Coumadin which was 5 mg, 5 days a week and 2.5 mg, 2 days a week. We will continue other cardiac medications as is. We will recheck BMP in the a.m. to assess potassium. Subjective:   Patient seen and examined. No significant events overnight.  negative. Objective:     Vitals: Blood pressure 123/64, pulse 60, temperature 97.7 °F (36.5 °C), resp. rate 16, height 4' 10" (1.473 m), weight 75.8 kg (167 lb), SpO2 93 %. , Body mass index is 34.9 kg/m².,   Orthostatic Blood Pressures    Flowsheet Row Most Recent Value   Blood Pressure 123/64 filed at 08/03/2023 0730   Patient Position - Orthostatic VS Lying filed at 08/01/2023 0718      ,      Intake/Output Summary (Last 24 hours) at 8/3/2023 0740  Last data filed at 8/3/2023 0601  Gross per 24 hour   Intake 588.82 ml   Output 2750 ml   Net -2161.18 ml       No significant arrhythmias seen on telemetry review.   Patient paced on telemetry      Physical Exam:    GEN: Farhad Mirza   NECK: supple, no carotid bruits, no JVD or HJR  HEART: normal rate, regular rhythm, normal S1 and S2, no murmurs, clicks, gallops or rubs   LUNGS: clear to auscultation bilaterally; no wheezes, rales, or rhonchi   ABDOMEN: normal bowel sounds, soft, no tenderness, no distention  EXTREMITIES: peripheral pulses normal; no clubbing, cyanosis, or edema  SKIN: warm and well perfused, no suspicious lesions on exposed skin    Labs & Results:    No results displayed because visit has over 200 results. Echo follow up/limited w/ contrast if indicated    Result Date: 7/28/2023  Narrative: •  Left Ventricle: Left ventricular cavity size is normal. Wall thickness is mildly increased. The left ventricular ejection fraction is 60%. Systolic function is normal. Wall motion is normal. Diastolic function is moderately abnormal, consistent with grade II (pseudonormal) relaxation. Left atrial filling pressure is elevated. •  IVS: There is abnormal septal motion consistent with right ventricular pacing. •  Right Ventricle: Right ventricular cavity size is severely dilated. Systolic function is moderately reduced. •  Left Atrium: The atrium is moderately dilated. •  Right Atrium: The atrium is severely dilated. •  Aortic Valve: There is a bileaflet mechanical valve. Prosthetic valve leaflet motion is normal. There is no evidence of paravalvular regurgitation. There is mild transvalvular regurgitation. The aortic valve has no significant stenosis. The gradient recorded across the prosthetic aortic valve is within the expected range. The aortic valve peak gradient is 12 mmHg. The aortic valve mean gradient is 6 mmHg. •  Mitral Valve: There is severe annular calcification. There is mild to moderate regurgitation with a centrally directed jet. •  Tricuspid Valve: The leaflets are not thickened. The leaflets are not calcified. There appears to be impingement of the anterior leaflet by an RV pacer lead. The tricuspid valve has moderate annular dilation. There is severe regurgitation. The tricuspid valve regurgitation jet is directed toward septum. The right ventricular systolic pressure is moderately elevated. The estimated right ventricular systolic pressure is 83.56 mmHg. •  Pulmonic Valve: There is mild regurgitation. XR humerus left    Result Date: 7/28/2023  Narrative: LEFT HUMERUS INDICATION:   M79.602: Pain in left arm. COMPARISON: Left humerus radiographs 5/31/2023. VIEWS:  XR HUMERUS LEFT FINDINGS: Again seen is a displaced and angulated fracture of the proximal humeral shaft with evidence of healing with callus formation, increased since prior study. The remainder of the humerus is intact. No significant degenerative changes. No lytic or blastic osseous lesion. Surgical clips in the left axilla. Impression: Healing angulated and displaced fracture of the proximal humeral shaft. Workstation performed: [id number]     XR chest 1 view portable    Result Date: 7/28/2023  Narrative: CHEST INDICATION:   chf exacerbation vs ?. COMPARISON: CXR 5/2/2023 and chest CT 4/27/2023. EXAM PERFORMED/VIEWS:  XR CHEST PORTABLE. FINDINGS: Severe cardiomegaly, median sternotomy, left subclavian ICD lead in right ventricular apex. Mild pulmonary venous congestion with trace effusions. No pneumothorax. Left axillary clips. Upper abdomen normal. Bones normal for age. High riding right humeral head. Old left humerus fracture. Impression: Mild pulmonary venous congestion with trace effusions. Workstation performed: [id number]     XR chest pa & lateral    Result Date: 7/21/2023  Narrative: Study: Two-view chest. COMPARISON: November 30, 2021. HISTORY: Dyspnea. Gross cardiomegaly. No edema or vasculature congestion. No interval infiltrates, edema, or effusions. Left-sided transvenous ICD wire unchanged in position. Oblique angulated fracture proximal humeral shaft with callus formation noted, incompletely visualized. Impression: IMPRESSION: Gross cardiomegaly with no edema or vascular congestion. No interval infiltrates.  Workstation:[id number]      EKG personally reviewed by Reed Ormond, MD.     Counseling / Coordination of Care  Total floor / unit time spent today 30 minutes. Greater than 50% of total time was spent with the patient and / or family counseling and / or coordination of care.

## 2023-08-03 NOTE — ASSESSMENT & PLAN NOTE
· Patient with history of mechanical aortic valve placement, will continue on Coumadin  · Per Cardiology notes, goal INR has been documented to be 2.5-3.5  · Most recent INR subtherapeutic at 2.20  · Continue warfarin 5 mg daily for now  · On heparin drip for bridging given subtherapeutic INR  · INR in a.m.

## 2023-08-03 NOTE — APP STUDENT NOTE
[facility] STUDENT  Inpatient Progress Note for TRAINING ONLY  Not Part of Legal Medical Record     Progress Note - Joseph Frausto 68 y.o. female MRN: [id number]  Unit/Bed#: -01 Encounter: [id number]    Assessment/Plan:  Acute on chronic heart failure with preserved ejection fraction [facility])  Assessment & Plan      Wt Readings from Last 3 Encounters:   08/03/23 75.8 kg (167 lb)   07/25/23 88.5 kg (195 lb)   07/10/23 88.5 kg (195 lb)      • Patient with history of diastolic heart failure presented with several weeks of worsening shortness of breath. • Patient's creatinine is currently at baseline  • Was maintained on Lasix 40 mg daily as an outpatient  • Echocardiogram shows EF of 60%, normal systolic function, moderate abnormal diastolic dysfunction, right ventricle and atrium dilated as per cardiology report  • Continue daily weights and I's and O's  • IV diuretics have since been transitioned to torsemide 40 mg every morning and 20 mg every afternoon        H/O mechanical aortic valve replacement  Assessment & Plan  • Patient with history of mechanical aortic valve placement, will continue on Coumadin  • Per Cardiology notes, goal INR has been documented to be 2.5-3.5  • Most recent INR subtherapeutic at 2.20  • Continue warfarin 5 mg daily for now  • On heparin drip for bridging given subtherapeutic INR  • INR in AM pending results     A-fib ([address])  Assessment & Plan  • We will continue beta-blocker and warfarin/heparin GTT for [facility] given subtherapeutic INR  • INR in AM pending results     Hypokalemia  Assessment & Plan  • Resolved with repletion  • Currently on 40mEq K-dur BID with his diuretics.   • BMP in AM pending     Type 2 diabetes mellitus [facility])  Assessment & Plan        Lab Results   Component Value Date     HGBA1C 5.9 (H) 06/30/2023                Recent Labs     08/02/23  1104 08/02/23  1234 08/02/23  1715 08/03/23  0615   POCGLU 181* 146* 128 92         Blood Sugar Average: Last 72 hrs:  History of type 2 diabetes on oral meds  • Continue sliding scale  • Diabetic diet      Pancytopenia ([address])  Assessment & Plan  Patient with history of chronic ITP, however also noted leukopenia and anemia  • Will need outpatient follow-up with heme-onc  • Reticulocyte count elevated  • SPEP obtained -cannot rule out small monoclonal gammopathy     Stage III pressure ulcer of sacral region [facility])  Assessment & Plan  Present on admission, stage III pressure ulcer of sacral region  • Continue Maxorb and allevyn foam dressing to area  • Continue wound care. Monitor wound daily.     Left foot pain  Assessment & Plan  • Patient reports following with outpatient Podiatrist  • Check left foot XR  • Consult Podiatry  • Administer Tylenol for pain relief      VTE Pharmacologic Prophylaxis: VTE Score: 1 {VTE Risk:67084}    Patient Centered Rounds: {Pt Centered Care Rounds:34740}  Discussions with Specialists or Other Care Team Provider: ***    Education and Discussions with Family / Patient: {Family Communication:30225}    Total Time Spent on Date of Encounter in care of patient: {Note Time:53726} This time was spent on one or more of the following: performing physical exam; counseling and coordination of care; obtaining or reviewing history; documenting in the medical record; reviewing/ordering tests, medications or procedures; communicating with other healthcare professionals and discussing with patient's family/caregivers. Current Length of Stay: 7 day(s)  Current Patient Status: Inpatient   Certification Statement: {Certification QKCLUCJTS:23794}  Discharge Plan: {Discharge NOMX:08393}    Code Status: Level 1 - Full Code    Subjective:   Patient states she is doing much better since yesterday. Denies any SOB or chest pain today. Patient reports she has been able to walk 160 steps the last two days but can't today due to left foot pain.  Patient reports she sees podiatry regarding the blisters present but states they are now painful and prevent her from walking. Patient reports she also has numbness localized to the big toe that started last night. Patient denies any trauma to the toe or any other sx at this time. Objective:     Vitals:   Temp (24hrs), Av °F (36.7 °C), Min:97.7 °F (36.5 °C), Max:98.3 °F (36.8 °C)    Temp:  [97.7 °F (36.5 °C)-98.3 °F (36.8 °C)] 97.7 °F (36.5 °C)  HR:  [56-60] 60  Resp:  [15-16] 16  BP: ()/(41-64) 123/64  SpO2:  [90 %-98 %] 93 %  Body mass index is 34.9 kg/m². Input and Output Summary (last 24 hours): Intake/Output Summary (Last 24 hours) at 8/3/2023 1037  Last data filed at 8/3/2023 0601  Gross per 24 hour   Intake 588.82 ml   Output 2750 ml   Net -2161.18 ml       Physical Exam:   Physical Exam  Constitutional:       Appearance: Normal appearance. HENT:      Head: Normocephalic and atraumatic. Nose: Nose normal.   Eyes:      Extraocular Movements: Extraocular movements intact. Pupils: Pupils are equal, round, and reactive to light. Neck:      Vascular: No carotid bruit. Comments: JVD visualized  Cardiovascular:      Rate and Rhythm: Normal rate and regular rhythm. Pulses: Normal pulses. Heart sounds: Murmur (Prosthetic aortic heart valve heard on asculation) heard. Pulmonary:      Effort: Pulmonary effort is normal.      Breath sounds: Normal breath sounds. No wheezing, rhonchi or rales. Abdominal:      General: Abdomen is flat. Palpations: Abdomen is soft. Musculoskeletal:         General: Deformity: noted to left lower anterior tibia. Cervical back: Normal range of motion and neck supple. Right lower leg: No edema. Left lower leg: No edema. Left foot: Bunion present. Comments: varicose veins to LLE   Feet:      Left foot:      Skin integrity: Blister (on the medial and lateral aspect of the big toe with yellow, dry crust. No active bleeding noted. ) present. Toenail Condition: Left toenails are abnormally thick.    Skin: General: Skin is warm and dry. Capillary Refill: Capillary refill takes less than 2 seconds. Findings: No rash. Neurological:      General: No focal deficit present. Mental Status: She is alert and oriented to person, place, and time. Psychiatric:         Mood and Affect: Mood normal.         Behavior: Behavior normal.         Additional Data:     Labs:  Results from last 7 days   Lab Units 08/02/23  0442 07/31/23  0451 07/30/23  0543   WBC Thousand/uL 3.03*   < > 3.47*   HEMOGLOBIN g/dL 11.6   < > 10.4*   HEMATOCRIT % 36.0   < > 32.5*   PLATELETS Thousands/uL 116*   < > 130*   NEUTROS PCT %  --   --  61   LYMPHS PCT %  --   --  21   MONOS PCT %  --   --  14*   EOS PCT %  --   --  3    < > = values in this interval not displayed. Results from last 7 days   Lab Units 08/03/23  0151 07/30/23  0543 07/29/23  0219   SODIUM mmol/L 138   < > 140   POTASSIUM mmol/L 3.5   < > 3.4*   CHLORIDE mmol/L 94*   < > 105   CO2 mmol/L 42*   < > 33*   BUN mg/dL 33*   < > 18   CREATININE mg/dL 0.96   < > 0.69   ANION GAP mmol/L 2   < > 2   CALCIUM mg/dL 8.9   < > 9.3   ALBUMIN g/dL  --   --  3.0*   TOTAL BILIRUBIN mg/dL  --   --  0.91   ALK PHOS U/L  --   --  145*   ALT U/L  --   --  23   AST U/L  --   --  39   GLUCOSE RANDOM mg/dL 65   < > 132    < > = values in this interval not displayed.      Results from last 7 days   Lab Units 08/03/23  0000   INR  2.20*     Results from last 7 days   Lab Units 08/03/23  0615 08/02/23  1715 08/02/23  1234 08/02/23  1104 08/02/23  0619 08/01/23  2111 08/01/23  1705 08/01/23  1150 08/01/23  0627 07/31/23  2212 07/31/23  2103 07/31/23  1720   POC GLUCOSE mg/dl 92 128 146* 181* 95 182* 152* 161* 101 140 194* 131               Lines/Drains:  Invasive Devices     Peripheral Intravenous Line  Duration           Peripheral IV 08/02/23 Left;Ventral (anterior) Forearm 1 day                      Imaging: {Radiology Review:73544}    Recent Cultures (last 7 days):   Results from last 7 days   Lab Units 07/27/23  1654   URINE CULTURE  >100,000 cfu/ml Escherichia coli*  <10,000 cfu/ml Gram Negative Christopher Enteric Like*       Last 24 Hours Medication List:   Current Facility-Administered Medications   Medication Dose Route Frequency Provider Last Rate   • acetaminophen  650 mg Oral Q6H PRN Teofilo Amish, PA-C     • allopurinol  100 mg Oral Daily Olaf Banai, DO     • amiodarone  200 mg Oral Daily With Breakfast Olaf Banai, DO     • atorvastatin  40 mg Oral Daily With [employer], DO     • citalopram  20 mg Oral Daily Olaf Banai, DO     • heparin (porcine)  3-30 Units/kg/hr (Order-Specific) Intravenous Titrated Teofilo Amish, PA-C 12 Units/kg/hr (08/03/23 9221)   • heparin (porcine)  3,000 Units Intravenous Q6H PRN [place] Amish PA-C     • heparin (porcine)  6,000 Units Intravenous Once Teofilo Amish PA-C     • heparin (porcine)  6,000 Units Intravenous Q6H PRN [place] Amish PA-C     • insulin lispro  1-6 Units Subcutaneous 4x Daily (AC & HS) Nannette Trevino PA-C     • metoprolol succinate  25 mg Oral Daily Olaf Banai, DO     • montelukast  10 mg Oral HS Olaf Banai, DO     • potassium chloride  40 mEq Oral BID Sherron Sanchez MD     • torsemide  20 mg Oral Before Dinner JOSEPHINE Carmichael     • torsemide  40 mg Oral Daily JOSEPHINE Carmichael     • warfarin  5 mg Oral Daily (warfarin) Sherron Sanchez MD          Today, Patient Was Seen By: Vivian Cardenas    **Please Note: This note may have been constructed using a voice recognition system. **

## 2023-08-03 NOTE — ASSESSMENT & PLAN NOTE
Wt Readings from Last 3 Encounters:   08/03/23 75.8 kg (167 lb)   07/25/23 88.5 kg (195 lb)   07/10/23 88.5 kg (195 lb)     · Patient with history of diastolic heart failure presented with several weeks of worsening shortness of breath.   · Patient's creatinine is currently at baseline  · Was maintained on Lasix 40 mg daily as an outpatient  · Echocardiogram shows EF of 07%, normal systolic function, moderate abnormal diastolic dysfunction, right ventricle and atrium dilated as per cardiology report  · Continue daily weights and I's and O's  · IV diuretics have since been transitioned to torsemide 40 mg every morning and 20 mg every afternoon

## 2023-08-03 NOTE — CASE MANAGEMENT
Case Management Discharge Planning Note    Patient name Uli Venegas  Location /-29 MRN [id number]  : 1945 Date 8/3/2023       Current Admission Date: 2023  Current Admission Diagnosis:Acute on chronic heart failure with preserved ejection fraction [facility])   Patient Active Problem List    Diagnosis Date Noted   • Hypokalemia 2023   • Acute on chronic heart failure with preserved ejection fraction ([address]) 2023   • Pancytopenia ([address]) 2023   • Aneurysm of ascending aorta without rupture ([address]) 07/10/2023   • Difficult intravenous access 05/15/2023   • Acute COVID-19 2023   • A-fib ([address]) 2023   • Stage III pressure ulcer of sacral region ([address]) 2023   • Asthma 2023   • Osteoporosis with current pathological fracture with routine healing 2023   • Class 2 obesity in adult 2023   • CHAS (obstructive sleep apnea) 2023   • Abnormal CT scan 05/10/2023   • Hyponatremia 2023   • Bradycardia 2023   • Ventricular arrhythmia 2023   • Type 2 diabetes mellitus ([address]) 2023   • Fall 2023   • Closed displaced oblique fracture of shaft of left humerus with routine healing 2023   • Acute pain due to trauma 2023   • H/O mechanical aortic valve replacement 2023   • Fracture of multiple ribs of right side 2023   • Chronic ITP (idiopathic thrombocytopenia) ([address]) 2023   • CAD (coronary artery disease) 05/10/2017      LOS (days): 7  Geometric Mean LOS (GMLOS) (days): 3.90  Days to GMLOS:-2.7     OBJECTIVE:  Risk of Unplanned Readmission Score: 23.92         Current admission status: Inpatient   Preferred Pharmacy:   [facility] [address], [address] [address]  Phone: [phone] Fax: [phone]    Primary Care Provider: Maria Elena Diez DO    Primary Insurance: Paty Camacho [facility]  Secondary Insurance:     DISCHARGE DETAILS:  Per [facility] provider - anticipate dc 8/4 with [facility]. CM to continue to support.

## 2023-08-03 NOTE — ASSESSMENT & PLAN NOTE
· We will continue beta-blocker and warfarin/heparin GTT for [facility] given subtherapeutic INR  · INR in AM

## 2023-08-03 NOTE — CONSULTS
Podiatry - Consultation    Patient Information:   Uli Venegas 68 y.o. female MRN: [id number]  Unit/Bed#: -01 Encounter: [id number]  PCP: Maria Elena Diez DO  Date of Admission:  7/27/2023  Date of Consultation: 08/03/23  Requesting Physician: Sharifa Rubi MD      ASSESSMENT:    Uli Venegas is a 68 y.o. female with:    1. Left foot pain secondary to midfoot and forefoot arthritic changes  2. Type 2 Diabetes Mellitus  3. Osteoporosis  4. Calcification of arterial vessels    PLAN:    · Reviewed left foot radiographs and discussed the final read with the patient. No evidence of fracture or dislocation, although arthritis midfoot changes are present. Recommend follow up outpatient with patient's podiatrist for routine diabetic care and accommodative diabetic shoegear for her midfoot arthritis. May perform repeat radiographs at that time to rule out potential stress fracture. · Recommend use of left surgical shoe for adequate midfoot support as patient has been ambulating in hospital socks. Appreciate PT referral for balance training and assistance with learning to ambulate in surgical shoe  · Left great toe hyperkeratotic lesions are stable, no evidence of underlying wound and no acute signs of infection. · Left anterior lower leg eschar is stable, no evidence of underlying wound and no acute signs of infection. · Elevation on green foam wedges or pillows when non-ambulatory  · Rest of care per primary team.  · Will discuss this plan with my attending and update as needed. · Podiatry is signing off at this point. Appreciate primary team reaching out. Please reconsult as needed. Weightbearing status: WBAT in a left surgical shoe    SUBJECTIVE:    History of Present Illness:    Uli Venegas is a 68 y.o. female who is originally admitted 7/27/2023 due to acute on chronic heart failure.  Patient has a past medical history of aortic valve replacement, Type 2 DM, Afib, pancytopenia, hypokalemia, osteoporosis, and CAD. We are consulted for left foot pain without known history of trauma. She states that she began to notice her left foot pain over the last few days after she walks down the hospital hallways. She denies any trauma to the area, and rates the pain 5/10. The pain is localized to the left midfoot/forefoot region and into the first MTPJ. She also states that she sees a podiatrist (Dr. Sally Sparks) for routine diabetic foot care. Recently, she does state that she had developed blisters on her left hallux that have since drained and become calluses. Her podiatrist was aware and gave her instructions to care for them until her next visit. She denies any drainage, redness, pain, or swelling to her left toe. She denies N/V/SOB/CP/F/C    Review of Systems:    Constitutional: Negative. HENT: Negative. Eyes: Negative. Respiratory: Negative. Cardiovascular: Negative. Gastrointestinal: Negative. Musculoskeletal: left foot pain   Skin: left great toe calluses   Neurological: reports mild numbness in feet bilaterally   Psych: Negative.      Past Medical and Surgical History:     Past Medical History:   Diagnosis Date   • Arthritis    • Asthma    • Diabetes ([address])    • Environmental allergies    • Heart murmur    • Hypertension        Past Surgical History:   Procedure Laterality Date   • MECHANICAL AORTIC VALVE REPLACEMENT     • REPLACEMENT TOTAL KNEE N/A    • REPLACEMENT TOTAL KNEE         Meds/Allergies:    Medications Prior to Admission   Medication   • acetaminophen (TYLENOL) 325 mg tablet   • alendronate (FOSAMAX) 70 mg tablet   • allopurinol (ZYLOPRIM) 100 mg tablet   • amiodarone 200 mg tablet   • atorvastatin (LIPITOR) 40 mg tablet   • Blood Glucose Monitoring Suppl (OneTouch Verio Flex System) w/Device KIT   • cholecalciferol (VITAMIN D3) 1,000 units tablet   • citalopram (CeleXA) 20 mg tablet   • docusate sodium (COLACE) 100 mg capsule   • Farxiga 5 MG TABS   • furosemide (LASIX) 20 mg tablet   • furosemide (LASIX) 20 mg tablet   • magnesium 30 MG tablet   • Metamucil Fiber CHEW   • methocarbamol (ROBAXIN) 500 mg tablet   • metoprolol succinate (TOPROL-XL) 25 mg 24 hr tablet   • montelukast (SINGULAIR) 10 mg tablet   • multivitamin (THERAGRAN) TABS   • traMADol (Ultram) 50 mg tablet   • warfarin (COUMADIN) 5 mg tablet       Allergies   Allergen Reactions   • Fentanyl Other (See Comments)     Other reaction(s): Hallucinations  paranoia, confusion  Other reaction(s): Hallucinations  Other reaction(s): Hallucinations  hallucinations   • Fentanyl Confusion       Social History:     Marital Status:     Substance Use History:   Social History     Substance and Sexual Activity   Alcohol Use Yes    Comment: socially     Social History     Tobacco Use   Smoking Status Never   Smokeless Tobacco Never     Social History     Substance and Sexual Activity   Drug Use Never       Family History:    Family History   Problem Relation Age of Onset   • Hypertension Other    • Arthritis Family    • Heart disease Family    • Hypertension Family    • Diabetes type II Family          OBJECTIVE:    Vitals:   Blood Pressure: 111/61 (08/03/23 1520)  Pulse: 60 (08/03/23 1520)  Temperature: 98 °F (36.7 °C) (08/03/23 1520)  Temp Source: Oral (08/03/23 1128)  Respirations: 18 (08/03/23 1520)  Height: 4' 10" (147.3 cm) (07/28/23 1000)  Weight - Scale: 75.8 kg (167 lb) (08/03/23 0600)  SpO2: 96 % (08/03/23 1520)    Physical Exam:    General Appearance: Alert, cooperative, no distress. HEENT: Head normocephalic, atraumatic, without obvious abnormality. Heart: Normal rate and rhythm. Lungs: Non-labored breathing. No respiratory distress. Abdomen: Without distension. Psychiatric: AAOx3  Lower Extremity:  Vascular:   Right DP and PT pulses are biphasic by Doppler. Left DP and PT pulses are biphasic by Doppler  CRT > 3 seconds at the digits.    +0/4 edema noted at bilateral lower extremities  Pedal hair is absent  Skin temperature is slightly cool bilaterally. Musculoskeletal:  MMT is 4/5 in all muscle compartments bilaterally. ROM at the 1st MPJ and ankle joint are significantly reduced bilaterally with the leg extended. Moderate pain on palpation of left 1st MPJ and midfoot/TMTJs  Moderate pain with resisted plantarflexion/loading of forefoot   Gross deformities noted including severe hallux valgus, great toe and lesser digital rigid contractures with overlapping, and pes planus foot type. Dermatological:    LLE: stable hyperkeratotic lesions to medial and lateral aspect of hallux without signs of underlying wound or active infection: no purulence, no malodor, no ascending erythema, no crepitus, no fluctuance. Stable eschar wound to pre-tibial area without signs of active infection: no purulence, no malodor, no ascending erythema, no crepitus, no fluctuance    RLE: no evidence of open lesions or hyperkeratotic lesions, varicosities noted, skin is of normal texture and turgor    Neurological:  Gross sensation is intact. Protective sensation is diminished. Patient Reports mild numbness and/or paresthesias in dorsum of feet bilaterally    Clinical Images 08/03/23: Additional data:     Lab Results: I have personally reviewed pertinent labs including:    Results from last 7 days   Lab Units 08/02/23  0442 07/31/23  0451 07/30/23  0543   WBC Thousand/uL 3.03*   < > 3.47*   HEMOGLOBIN g/dL 11.6   < > 10.4*   HEMATOCRIT % 36.0   < > 32.5*   PLATELETS Thousands/uL 116*   < > 130*   NEUTROS PCT %  --   --  61   LYMPHS PCT %  --   --  21   MONOS PCT %  --   --  14*   EOS PCT %  --   --  3    < > = values in this interval not displayed.      Results from last 7 days   Lab Units 08/03/23  0151 07/30/23  0543 07/29/23  0219   POTASSIUM mmol/L 3.5   < > 3.4*   CHLORIDE mmol/L 94*   < > 105   CO2 mmol/L 42*   < > 33*   BUN mg/dL 33*   < > 18   CREATININE mg/dL 0.96   < > 0.69   CALCIUM mg/dL 8.9   < > 9.3   ALK PHOS U/L  --   --  145*   ALT U/L  --   --  23   AST U/L  --   --  39    < > = values in this interval not displayed. Results from last 7 days   Lab Units 08/03/23  0000   INR  2.20*       Cultures: I have personally reviewed pertinent cultures including:    Results from last 7 days   Lab Units 07/27/23  1654   URINE CULTURE  >100,000 cfu/ml Escherichia coli*  <10,000 cfu/ml Gram Negative Christopher Enteric Like*           Imaging: I have personally reviewed pertinent reports in PACS. EKG, Pathology, and Other Studies: I have personally reviewed pertinent reports. ** Please Note: Portions of the record may have been created with voice recognition software. Occasional wrong word or "sound a like" substitutions may have occurred due to the inherent limitations of voice recognition software. Read the chart carefully and recognize, using context, where substitutions have occurred.  **

## 2023-08-03 NOTE — RESTORATIVE TECHNICIAN NOTE
Restorative Technician Note      Patient Name: Kaye Sanders     Note Type: Mobility  Patient Position Upon Consult: Bedside chair  Activity Performed: Stood; Repositioned  Patient Position at End of Consult: Bedside chair;  All needs within reach        Deferring further ambulation at this time due to foot pain

## 2023-08-03 NOTE — PLAN OF CARE
Pt titrated down with heparin as last ptt 98. Pt continues to c/o severe left foot pain. Unable to bear weight on left side at present.

## 2023-08-03 NOTE — ASSESSMENT & PLAN NOTE
Lab Results   Component Value Date    HGBA1C 5.9 (H) 06/30/2023       Recent Labs     08/02/23  1104 08/02/23  1234 08/02/23  1715 08/03/23  0615   POCGLU 181* 146* 128 92       Blood Sugar Average: Last 72 hrs:  History of type 2 diabetes on oral meds  · Continue sliding scale  · Diabetic diet

## 2023-08-04 VITALS
RESPIRATION RATE: 18 BRPM | DIASTOLIC BLOOD PRESSURE: 60 MMHG | HEART RATE: 60 BPM | BODY MASS INDEX: 35.05 KG/M2 | HEIGHT: 58 IN | OXYGEN SATURATION: 92 % | SYSTOLIC BLOOD PRESSURE: 116 MMHG | TEMPERATURE: 97.1 F | WEIGHT: 167 LBS

## 2023-08-04 LAB
ALBUMIN UR ELPH-MCNC: 100 %
ALPHA1 GLOB MFR UR ELPH: 0 %
ALPHA2 GLOB MFR UR ELPH: 0 %
ANION GAP SERPL CALCULATED.3IONS-SCNC: 0 MMOL/L
APTT PPP: 81 SECONDS (ref 23–37)
B-GLOBULIN MFR UR ELPH: 0 %
BUN SERPL-MCNC: 37 MG/DL (ref 5–25)
CALCIUM SERPL-MCNC: 8.8 MG/DL (ref 8.3–10.1)
CHLORIDE SERPL-SCNC: 98 MMOL/L (ref 96–108)
CO2 SERPL-SCNC: 39 MMOL/L (ref 21–32)
CREAT SERPL-MCNC: 0.9 MG/DL (ref 0.6–1.3)
ERYTHROCYTE [DISTWIDTH] IN BLOOD BY AUTOMATED COUNT: 15.4 % (ref 11.6–15.1)
GAMMA GLOB MFR UR ELPH: 0 %
GFR SERPL CREATININE-BSD FRML MDRD: 61 ML/MIN/1.73SQ M
GLUCOSE SERPL-MCNC: 105 MG/DL (ref 65–140)
GLUCOSE SERPL-MCNC: 158 MG/DL (ref 65–140)
GLUCOSE SERPL-MCNC: 74 MG/DL (ref 65–140)
HCT VFR BLD AUTO: 32.6 % (ref 34.8–46.1)
HGB BLD-MCNC: 10.7 G/DL (ref 11.5–15.4)
INR PPP: 2.34 (ref 0.84–1.19)
MAGNESIUM SERPL-MCNC: 2 MG/DL (ref 1.6–2.6)
MCH RBC QN AUTO: 32.7 PG (ref 26.8–34.3)
MCHC RBC AUTO-ENTMCNC: 32.8 G/DL (ref 31.4–37.4)
MCV RBC AUTO: 100 FL (ref 82–98)
PLATELET # BLD AUTO: 135 THOUSANDS/UL (ref 149–390)
PMV BLD AUTO: 10.8 FL (ref 8.9–12.7)
POTASSIUM SERPL-SCNC: 3.6 MMOL/L (ref 3.5–5.3)
PROT PATTERN UR ELPH-IMP: NORMAL
PROT UR-MCNC: 6 MG/DL
PROTHROMBIN TIME: 25.9 SECONDS (ref 11.6–14.5)
RBC # BLD AUTO: 3.27 MILLION/UL (ref 3.81–5.12)
SODIUM SERPL-SCNC: 137 MMOL/L (ref 135–147)
WBC # BLD AUTO: 3.46 THOUSAND/UL (ref 4.31–10.16)

## 2023-08-04 PROCEDURE — 85730 THROMBOPLASTIN TIME PARTIAL: CPT | Performed by: INTERNAL MEDICINE

## 2023-08-04 PROCEDURE — 83735 ASSAY OF MAGNESIUM: CPT | Performed by: INTERNAL MEDICINE

## 2023-08-04 PROCEDURE — 97530 THERAPEUTIC ACTIVITIES: CPT

## 2023-08-04 PROCEDURE — 99239 HOSP IP/OBS DSCHRG MGMT >30: CPT | Performed by: PHYSICIAN ASSISTANT

## 2023-08-04 PROCEDURE — 84166 PROTEIN E-PHORESIS/URINE/CSF: CPT | Performed by: STUDENT IN AN ORGANIZED HEALTH CARE EDUCATION/TRAINING PROGRAM

## 2023-08-04 PROCEDURE — 97164 PT RE-EVAL EST PLAN CARE: CPT

## 2023-08-04 PROCEDURE — 85610 PROTHROMBIN TIME: CPT | Performed by: INTERNAL MEDICINE

## 2023-08-04 PROCEDURE — 80048 BASIC METABOLIC PNL TOTAL CA: CPT | Performed by: INTERNAL MEDICINE

## 2023-08-04 PROCEDURE — 82948 REAGENT STRIP/BLOOD GLUCOSE: CPT

## 2023-08-04 PROCEDURE — 99221 1ST HOSP IP/OBS SF/LOW 40: CPT

## 2023-08-04 PROCEDURE — 85027 COMPLETE CBC AUTOMATED: CPT | Performed by: INTERNAL MEDICINE

## 2023-08-04 PROCEDURE — 99232 SBSQ HOSP IP/OBS MODERATE 35: CPT | Performed by: INTERNAL MEDICINE

## 2023-08-04 RX ORDER — POTASSIUM CHLORIDE 20 MEQ/1
40 TABLET, EXTENDED RELEASE ORAL 2 TIMES DAILY
Qty: 120 TABLET | Refills: 0 | Status: SHIPPED | OUTPATIENT
Start: 2023-08-04

## 2023-08-04 RX ORDER — TORSEMIDE 20 MG/1
TABLET ORAL
Qty: 90 TABLET | Refills: 0 | Status: SHIPPED | OUTPATIENT
Start: 2023-08-04

## 2023-08-04 RX ADMIN — INSULIN LISPRO 1 UNITS: 100 INJECTION, SOLUTION INTRAVENOUS; SUBCUTANEOUS at 12:29

## 2023-08-04 RX ADMIN — ALLOPURINOL 100 MG: 100 TABLET ORAL at 08:26

## 2023-08-04 RX ADMIN — METOPROLOL SUCCINATE 25 MG: 25 TABLET, FILM COATED, EXTENDED RELEASE ORAL at 08:27

## 2023-08-04 RX ADMIN — AMIODARONE HYDROCHLORIDE 200 MG: 200 TABLET ORAL at 08:27

## 2023-08-04 RX ADMIN — ACETAMINOPHEN 650 MG: 325 TABLET, FILM COATED ORAL at 08:32

## 2023-08-04 RX ADMIN — TORSEMIDE 40 MG: 20 TABLET ORAL at 08:27

## 2023-08-04 RX ADMIN — CITALOPRAM HYDROBROMIDE 20 MG: 20 TABLET ORAL at 08:27

## 2023-08-04 RX ADMIN — POTASSIUM CHLORIDE 40 MEQ: 1500 TABLET, EXTENDED RELEASE ORAL at 08:27

## 2023-08-04 NOTE — CONSULTS
Consultation - [id number]  68 y.o. female MRN: [id number]  Unit/Bed#: -01 Encounter: [id number]    Assessment:  Pressure injury of the sacral region, stage 3, subsequent encounter   Healed wound   Ambulatory dysfunction   Type 2 DM without long term use of insulin     Plan:  • Stage 3 sacral pressure injury is resolved on assessment. o Recommend routine pressure relief for prevention and foam dressing for prevention. •  A1C results reviewed with the patient today. o reviewed from 6/30/23   • Recommend to continue with preventative nursing skin care measures in place as per WOCN team- hydraguard to the b/l heels   • Pressure relief- offloading of pressure with turning/repositioning as patient medically tolerates, heel elevation, foam wedges for offloading/repositioning, and waffle cushion to chair. • Nutrition is following  • Patient verbalized understanding of plan of care. Wound care will sign off at this time, please re-consult if needed. History of Present Illness:  Patient is a 68year old female who is admitted to the hospital with acute on chronic heart failure. History of - DM, aortic valve replacement, a-fib, osteoporosis, CAD, HTN, and asthma. Patient seen today for follow-up visit of sacral pressure injury. Patient has been followed by [facility] team during this hospital stay, with current wound management of maxorb and bordered Allevyn foam dressing. Per chart reviewed wound initially began as a deep tissue injury that was sustained on a previous admission. Patient offers no wound complaints. Denies fever, chills, or increased pain related to the wound. Subjective:    Review of Systems   Constitutional: Negative for chills and fever. HENT: Negative for congestion and sneezing. Respiratory: Negative for cough and shortness of breath. Musculoskeletal: Positive for gait problem. Skin: Positive for wound. Psychiatric/Behavioral: Negative for agitation. Historical Information   Past Medical History:   Diagnosis Date   • Arthritis    • Asthma    • Diabetes ([address])    • Environmental allergies    • Heart murmur    • Hypertension      Past Surgical History:   Procedure Laterality Date   • MECHANICAL AORTIC VALVE REPLACEMENT     • REPLACEMENT TOTAL KNEE N/A    • REPLACEMENT TOTAL KNEE       Social History   Social History     Substance and Sexual Activity   Alcohol Use Yes    Comment: socially     Social History     Substance and Sexual Activity   Drug Use Never     E-Cigarette/Vaping   • E-Cigarette Use Never User      E-Cigarette/Vaping Substances   • Nicotine No    • THC No    • CBD No    • Flavoring No    • Other No    • Unknown No      Social History     Tobacco Use   Smoking Status Never   Smokeless Tobacco Never     Family History:   Family History   Problem Relation Age of Onset   • Hypertension Other    • Arthritis Family    • Heart disease Family    • Hypertension Family    • Diabetes type II Family        Meds/Allergies   current meds:   Current Facility-Administered Medications   Medication Dose Route Frequency   • acetaminophen (TYLENOL) tablet 650 mg  650 mg Oral Q6H PRN   • allopurinol (ZYLOPRIM) tablet 100 mg  100 mg Oral Daily   • amiodarone tablet 200 mg  200 mg Oral Daily With Breakfast   • atorvastatin (LIPITOR) tablet 40 mg  40 mg Oral Daily With Dinner   • citalopram (CeleXA) tablet 20 mg  20 mg Oral Daily   • heparin (porcine) 25,000 units in 0.45% NaCl 250 mL infusion (premix)  3-30 Units/kg/hr (Order-Specific) Intravenous Titrated   • heparin (porcine) injection 3,000 Units  3,000 Units Intravenous Q6H PRN   • heparin (porcine) injection 6,000 Units  6,000 Units Intravenous Once   • heparin (porcine) injection 6,000 Units  6,000 Units Intravenous Q6H PRN   • insulin lispro (HumaLOG) 100 units/mL subcutaneous injection 1-6 Units  1-6 Units Subcutaneous 4x Daily (AC & HS)   • metoprolol succinate (TOPROL-XL) 24 hr tablet 25 mg  25 mg Oral Daily   • montelukast (SINGULAIR) tablet 10 mg  10 mg Oral HS   • potassium chloride (K-DUR,KLOR-CON) CR tablet 40 mEq  40 mEq Oral BID   • torsemide (DEMADEX) tablet 20 mg  20 mg Oral Before Dinner   • torsemide (DEMADEX) tablet 40 mg  40 mg Oral Daily   • warfarin (COUMADIN) tablet 5 mg  5 mg Oral Daily (warfarin)     Allergies   Allergen Reactions   • Fentanyl Other (See Comments)     Other reaction(s): Hallucinations  paranoia, confusion  Other reaction(s): Hallucinations  Other reaction(s): Hallucinations  hallucinations   • Fentanyl Confusion       Objective   Vitals: Blood pressure 116/60, pulse 60, temperature (!) 97.1 °F (36.2 °C), resp. rate 18, height 4' 10" (1.473 m), weight 75.8 kg (167 lb), SpO2 92 %. Physical Exam  Constitutional:       General: She is awake. She is not in acute distress. Appearance: She is obese. She is not diaphoretic. HENT:      Head: Normocephalic and atraumatic. Right Ear: External ear normal.      Left Ear: External ear normal.   Eyes:      Conjunctiva/sclera: Conjunctivae normal.   Pulmonary:      Effort: Pulmonary effort is normal. No respiratory distress. Genitourinary:     Comments: Occasional urinary incontinence, patient wears pads. Denies fecal incontinence. Musculoskeletal:      Comments: Minimal assist of one with standing using cane for the assessment. Seen OOB in recliner chair on [facility] waffle cushion. Skin:     General: Skin is warm and dry. Findings: No erythema, rash or wound. Comments: 1. Mid sacral wound is resolved on assessment as evidenced by dry and intact fragile freshly closed blanchable epithelium. No open aspect, redness, or drainage. Mild maceration present without skin loss. Remainder of skin is dry and intact with blanchable pink/hyperpigmented skin and scar tissue. Skin is non-tender with palpation. 2. B/l heels are dry and intact without redness or wounds.    3. B/l posterior thighs are dry and intact without redness or wounds. No induration, fluctuance, odor, warmth/temperature differences, redness, or purulence noted to the above mentioned wounds and skin areas assessed. Patient tolerated assessment well- denies pain and no s/s of non-verbal pain or discomfort observed during the encounter. Neurological:      Mental Status: She is alert and oriented to person, place, and time. Gait: Gait abnormal.   Psychiatric:         Mood and Affect: Mood normal.         Behavior: Behavior normal. Behavior is cooperative. Lab, Imaging and other studies:     Code Status: Level 1 - Full Code      Counseling / Coordination of Care  Total time spent today:    Total time (face-to-face and non-face-to-face) spent on today's visit was 24 minutes. This includes preparation for the visits (H&P on 7/27/23, wound care note on 7/29/23 and [facility] note on 8/3/23) performance of a medically appropriate history and examination, and orders for medications/treatments or testing. Discussed assessment findings, and plan of care/recommendations with patients RN. Arthur Tsang, JOSEPHINE, FNP-C, MANE      Portions of the record may have been created with voice recognition software. Occasional wrong word or "sound a like" substitutions may have occurred due to the inherent limitations of voice recognition software.   Read the chart carefully and recognize, using context, where substitutions have occurred

## 2023-08-04 NOTE — PHYSICAL THERAPY NOTE
Physical Therapy Re-Evaluation + Treatment     Patient's Name: Farhad Mirza    Admitting Diagnosis  UTI (urinary tract infection) [N39.0]  CHF exacerbation (HCC) [I50.9]    Problem List  Patient Active Problem List   Diagnosis    Fall    Closed displaced oblique fracture of shaft of left humerus with routine healing    Acute pain due to trauma    H/O mechanical aortic valve replacement    Fracture of multiple ribs of right side    Type 2 diabetes mellitus ([facility])    Ventricular arrhythmia    Bradycardia    Hyponatremia    Abnormal CT scan    A-fib ([facility])    Stage III pressure ulcer of sacral region ([address])    Asthma    Osteoporosis with current pathological fracture with routine healing    Class 2 obesity in adult    CHAS (obstructive sleep apnea)    Acute COVID-19    Difficult intravenous access    Aneurysm of ascending aorta without rupture ([facility])    CAD (coronary artery disease)    Chronic ITP (idiopathic thrombocytopenia) ([facility])    Acute on chronic heart failure with preserved ejection fraction ([facility])    Pancytopenia ([facility])    Hypokalemia    Left foot pain    Arthritis of left foot       Past Medical History  Past Medical History:   Diagnosis Date    Arthritis     Asthma     Diabetes ([address])     Environmental allergies     Heart murmur     Hypertension        Past Surgical History  Past Surgical History:   Procedure Laterality Date    MECHANICAL AORTIC VALVE REPLACEMENT      REPLACEMENT TOTAL KNEE N/A     REPLACEMENT TOTAL KNEE            08/04/23 1100   PT Last Visit   PT Visit Date 08/04/23   Note Type   Note type Re-Evaluation   Pain Assessment   Pain Assessment Tool 0-10   Pain Score No Pain   Restrictions/Precautions   Weight Bearing Precautions Per Order Yes   LLE Weight Bearing Per Order WBAT   Braces or Orthoses Other (Comment)  (surgical shoe LLE)   Other Precautions Multiple lines;Telemetry; Fall Risk;Pain   Home Living   Additional Comments see IE 7/28 for home set up   Prior Function   Comments see IE 7/28 for PLOF   General   Family/Caregiver Present No   Cognition   Overall Cognitive Status WFL   Arousal/Participation Alert   Attention Within functional limits   Orientation Level Oriented X4   Memory Within functional limits   Following Commands Follows all commands and directions without difficulty   Comments Patient pleasant and cooperative. Subjective   Subjective Patient agreeable to therapy   RUE Assessment   RUE Assessment WFL   LUE Assessment   LUE Assessment WFL   RLE Assessment   RLE Assessment X  (4-/5 grossly)   LLE Assessment   LLE Assessment X  (4-/5 grossly)   Vision-Basic Assessment   Current Vision Wears glasses all the time   Bed Mobility   Supine to Sit Unable to assess   Sit to Supine Unable to assess   Additional Comments OOB in chair on arrival, stays in chair following activity   Transfers   Sit to Stand 5  Supervision   Additional items Increased time required;Verbal cues   Stand to Sit 5  Supervision   Additional items Increased time required;Verbal cues   Stand pivot   (SBA)   Additional items Increased time required;Verbal cues   Toilet transfer 5  Supervision   Additional items Increased time required;Verbal cues; Commode   Additional Comments SPC   Ambulation/Elevation   Gait pattern Decreased foot clearance;Shuffling; Short stride; Excessively slow; Foward flexed   Gait Assistance   (SBA)   Additional items Assist x 1;Verbal cues   Assistive Device [facility]   Distance 40'x2   Stair Management Assistance Not tested   Ambulation/Elevation Additional Comments seated rest period between gait trials   Balance   Static Sitting Fair +   Dynamic Sitting Fair   Static Standing Fair -   Dynamic Standing Poor +   Ambulatory Poor +   Endurance Deficit   Endurance Deficit Yes   Endurance Deficit Description SPO2 maintained >90% on RA   Activity Tolerance   Activity Tolerance Patient limited by fatigue   Nurse Made Aware RN cleared   Assessment   Prognosis Good   Problem List Decreased strength; Impaired balance;Decreased endurance;Decreased mobility   Assessment Pt is a 68 y.o. female seen for re-evaluation due to Left foot pain and per podiatry is now WBAT LLE in surgical shoe. Patient  has a past medical history of Arthritis, Asthma, Diabetes ([address]), Environmental allergies, Heart murmur, and Hypertension. .     PT now re-consulted to assess functional mobility and balance using surgical shoe. See IE 7/28 for PLOF and home set up. Currently pt requires Supervision for functional STS transfers and SBA for SPT with SPC ; SBA for ambulation with SPC, 40'x2. Pt continues to be functioning below baseline and w/ overall mobility deficits 2* to: decreased strength, decreased endurance, decreased mobility, impaired balance. These impairments place pt at risk for falls. Pt will continue to benefit from skilled PT interventions to address stated impairments; to maximize functional potential; for ongoing pt/family education; and DME needs. The patient's AM-PAC Basic Mobility Inpatient Short Form Raw Score Is 16. A Raw score of greater than or equal to 16 suggests the patient may benefit from discharge to home. PT is currently recommending rehab vs HHPT on d/c from hospital. Will continue to follow as able. Goals   Patient Goals to go home   STG Expiration Date 08/11/23   Short Term Goal #1 Previous goals remain appropriate: pt will complete: 1) Bed mobility skills with Alex to facilitate safe return to previous living environment. 2) Functional transfers with Alex to facilitate safe return to previous living environment. 3) Ambulation with least restrictive ' w/ Alex without LOB for safe ambulation in home/community environment. 4) Improve balance scores by 1 grade to decrease fall risk. 5) Improve LE strength grades by 1 to increase independence w/ all functional mobility, transfers and gait.  6) PT for ongoing pt and family education; DME needs and D/C planning to promote highest level of function in least restrictive environment. 7) Stair training up/ down 2 steps with most appropriate technique and Alex for safe access to previous living environment and to increase community access. Plan   Treatment/Interventions ADL retraining;Functional transfer training;LE strengthening/ROM; Elevations; Therapeutic exercise; Endurance training;Patient/family training;Equipment eval/education;Gait training;Bed mobility;Spoke to nursing;Spoke to case management;OT   PT Frequency 3-5x/wk   Recommendation   PT Discharge Recommendation Post acute rehabilitation services  (VS HHPT pending progress)   AM-PAC Basic Mobility Inpatient   Turning in Flat Bed Without Bedrails 3   Lying on Back to Sitting on Edge of Flat Bed Without Bedrails 3   Moving Bed to Chair 3   Standing Up From Chair Using Arms 3   Walk in Room 3   Climb 3-5 Stairs With Railing 1   Basic Mobility Inpatient Raw Score 16   Basic Mobility Standardized Score 38.32   Highest Level Of Mobility   JH-HLM Goal 5: Stand one or more mins   JH-HLM Achieved 7: Walk 25 feet or more   Modified Jim Scale   Modified [place] Scale 3   Additional Treatment Session   Start Time 1050   End Time 1100   Treatment Assessment Patient performs SPT to BSC using SPC with SBA. Patient performs toilet transfer with Supervision. Patient requires assist for pericare. Patient performs transfer back to bedside chair with SBA using SPC.    Equipment Use SPC         Ailin James PT, DPT

## 2023-08-04 NOTE — ASSESSMENT & PLAN NOTE
· Patient reports following with outpatient Podiatrist  · XR with "Midfoot arthritic changes. Hallux valgus with bunion.  Flexion deformity of the multiple toes"

## 2023-08-04 NOTE — ASSESSMENT & PLAN NOTE
· Patient with history of mechanical aortic valve placement, will continue on Coumadin  · Per Cardiology notes, goal INR has been documented to be 2.5-3.5  · Most recent INR subtherapeutic but up-trending at 2.34  · Patient maintained on coumadin 5mg on Monday and Friday and 2.5mg the other 5 days of the week  · I confirmed this dosing with the patient as well as her PCP (Dr. Wayne Terrazas) office today  · Continue home coumadin dosing on discharge  · Cardiology cleared from their standpoint for discharge today

## 2023-08-04 NOTE — PROGRESS NOTES
Cardiology Progress Note - Tonny Bowie 68 y.o. female MRN: [id number]    Unit/Bed#: -01 Encounter: [id number]      Assessment:  Principal Problem:    Acute on chronic heart failure with preserved ejection fraction (HCC)  Active Problems:    H/O mechanical aortic valve replacement    Type 2 diabetes mellitus (HCC)    A-fib (HCC)    Stage III pressure ulcer of sacral region ([address])    Pancytopenia (HCC)    Hypokalemia    Left foot pain    Arthritis of left foot      Plan:  Patient is comfortable this morning.  She has no chest pain or significant dyspnea.  She is off oxygen.  She is on oral diuretic, torsemide. Patient previously on furosemide. Vital signs are stable.  Potassium today is 3.6 with creatinine of 0.9.  Hemoglobin 10.7.  PT/INR is 2.34. Patient on Coumadin 5 mg/day. At time of discharge planning would restart patient's prior dose of Coumadin which was 5 mg, 5 days a week and 2.5 mg, 2 days a week. We will continue other cardiac medications as is. Patient okay for discharge planning from cardiac point of view. Subjective:   Patient seen and examined. No significant events overnight.  negative. Objective:     Vitals: Blood pressure 116/60, pulse 60, temperature (!) 97.1 °F (36.2 °C), resp. rate 18, height 4' 10" (1.473 m), weight 75.8 kg (167 lb), SpO2 92 %. , Body mass index is 34.9 kg/m².,   Orthostatic Blood Pressures    Flowsheet Row Most Recent Value   Blood Pressure 116/60 filed at 08/04/2023 0721   Patient Position - Orthostatic VS Lying filed at 08/03/2023 1128      ,      Intake/Output Summary (Last 24 hours) at 8/4/2023 0744  Last data filed at 8/3/2023 1801  Gross per 24 hour   Intake 1260 ml   Output 925 ml   Net 335 ml           Physical Exam:    GEN: Tonny Bowie pleasant and cooperative   HEART: normal rate, regular rhythm, normal S1 and S2, crisp prosthetic heart sounds  LUNGS: clear to auscultation bilaterally; no wheezes, rales, or rhonchi   ABDOMEN: normal bowel sounds, soft, no tenderness, no distention  EXTREMITIES: peripheral pulses normal; no clubbing, cyanosis, or edema  SKIN: warm and well perfused, no suspicious lesions on exposed skin    Labs & Results:    No results displayed because visit has over 200 results. XR foot 3+ vw left    Result Date: 8/3/2023  Narrative: LEFT FOOT INDICATION:   left foot pain. COMPARISON:  None VIEWS:  XR FOOT 3+ VW LEFT. 3 images. FINDINGS: Hallux valgus. Distal medial first metatarsal demonstrates an area of cortical irregularity and bony fragment with sclerotic edges. Plantar calcaneal spurring. Flexion toe deformity of the toes There is no acute fracture or dislocation. Midfoot arthritic changes seen No lytic or blastic osseous lesion. Large plantar calcaneal spur seen Extensive blood vessel mineralization. Calcification seen in the Achilles tendon, chronic    Impression: Midfoot arthritic changes Hallux valgus with bunion. Flexion deformity of the multiple toes Resident: Yariel Raymundo I, the attending radiologist, have reviewed the images and agree with the final report above. Workstation performed: [id number]     Echo follow up/limited w/ contrast if indicated    Result Date: 7/28/2023  Narrative: •  Left Ventricle: Left ventricular cavity size is normal. Wall thickness is mildly increased. The left ventricular ejection fraction is 60%. Systolic function is normal. Wall motion is normal. Diastolic function is moderately abnormal, consistent with grade II (pseudonormal) relaxation. Left atrial filling pressure is elevated. •  IVS: There is abnormal septal motion consistent with right ventricular pacing. •  Right Ventricle: Right ventricular cavity size is severely dilated. Systolic function is moderately reduced. •  Left Atrium: The atrium is moderately dilated. •  Right Atrium: The atrium is severely dilated. •  Aortic Valve: There is a bileaflet mechanical valve.  Prosthetic valve leaflet motion is normal. There is no evidence of paravalvular regurgitation. There is mild transvalvular regurgitation. The aortic valve has no significant stenosis. The gradient recorded across the prosthetic aortic valve is within the expected range. The aortic valve peak gradient is 12 mmHg. The aortic valve mean gradient is 6 mmHg. •  Mitral Valve: There is severe annular calcification. There is mild to moderate regurgitation with a centrally directed jet. •  Tricuspid Valve: The leaflets are not thickened. The leaflets are not calcified. There appears to be impingement of the anterior leaflet by an RV pacer lead. The tricuspid valve has moderate annular dilation. There is severe regurgitation. The tricuspid valve regurgitation jet is directed toward septum. The right ventricular systolic pressure is moderately elevated. The estimated right ventricular systolic pressure is 64.44 mmHg. •  Pulmonic Valve: There is mild regurgitation. XR humerus left    Result Date: 7/28/2023  Narrative: LEFT HUMERUS INDICATION:   M79.602: Pain in left arm. COMPARISON: Left humerus radiographs 5/31/2023. VIEWS:  XR HUMERUS LEFT FINDINGS: Again seen is a displaced and angulated fracture of the proximal humeral shaft with evidence of healing with callus formation, increased since prior study. The remainder of the humerus is intact. No significant degenerative changes. No lytic or blastic osseous lesion. Surgical clips in the left axilla. Impression: Healing angulated and displaced fracture of the proximal humeral shaft. Workstation performed: [id number]     XR chest 1 view portable    Result Date: 7/28/2023  Narrative: CHEST INDICATION:   chf exacerbation vs ?. COMPARISON: CXR 5/2/2023 and chest CT 4/27/2023. EXAM PERFORMED/VIEWS:  XR CHEST PORTABLE. FINDINGS: Severe cardiomegaly, median sternotomy, left subclavian ICD lead in right ventricular apex. Mild pulmonary venous congestion with trace effusions. No pneumothorax. Left axillary clips.  Upper abdomen normal. Bones normal for age. High riding right humeral head. Old left humerus fracture. Impression: Mild pulmonary venous congestion with trace effusions. Workstation performed: [id number]     XR chest pa & lateral    Result Date: 7/21/2023  Narrative: Study: Two-view chest. COMPARISON: November 30, 2021. HISTORY: Dyspnea. Gross cardiomegaly. No edema or vasculature congestion. No interval infiltrates, edema, or effusions. Left-sided transvenous ICD wire unchanged in position. Oblique angulated fracture proximal humeral shaft with callus formation noted, incompletely visualized. Impression: IMPRESSION: Gross cardiomegaly with no edema or vascular congestion. No interval infiltrates. Workstation:[id number]      EKG personally reviewed by Trisha Burnette MD.     Counseling / Coordination of Care  Total floor / unit time spent today 30 minutes. Greater than 50% of total time was spent with the patient and / or family counseling and / or coordination of care.

## 2023-08-04 NOTE — APP STUDENT NOTE
DONI STUDENT  Inpatient Progress Note for TRAINING ONLY  Not Part of Legal Medical Record     Progress Note - Sho [place] 68 y.o. female MRN: [id number]  Unit/Bed#: [facility]3-01 Encounter: [id number]      Current Length of Stay: 8 day(s)  Current Patient Status: Inpatient   Certification Statement: The patient will continue to require additional inpatient hospital stay due to INR status  Discharge Plan: Anticipate discharge in 24-48 hrs to home. Code Status: Level 1 - Full Code     Assessment/Plan:  Acute on chronic heart failure with preserved ejection fraction [facility])  Assessment & Plan        Wt Readings from Last 3 Encounters:   08/04/23 08/03/23 75.8 kg (167 lb)  75.8 kg (167 lb)   07/25/23 88.5 kg (195 lb)   07/10/23 88.5 kg (195 lb)      • Patient with history of diastolic heart failure presented with several weeks of worsening shortness of breath. • Patient's creatinine is currently at baseline  • Was maintained on Lasix 40 mg daily as an outpatient  • Echocardiogram shows EF of 72%, normal systolic function, moderate abnormal diastolic dysfunction, right ventricle and atrium dilated as per cardiology report  • Continue daily weights and I's and O's  • IV diuretics have since been transitioned to oral torsemide 40 mg every morning and 20 mg every afternoon. H/O mechanical aortic valve replacement  Assessment & Plan  • Patient with history of mechanical aortic valve placement, will continue on Coumadin  • Per Cardiology notes, [place] Vinnie has been documented to be 2.5-3.5  • Continue warfarin 5 mg daily for now  • On heparin drip for bridging given subtherapeutic INR  • Most recent PT/INR 2.34. Continue medication. Plan for discharge once goal INR is reached.     A-fib [facility])  Assessment & Plan  • We will continue beta-blocker and warfarin/heparin GTT for [facility] given subtherapeutic INR  • Most recent PT/INR 2.34. Continue medication.  Plan for discharge once goal INR is reached.     Hypokalemia  Assessment & Plan  • Resolved with repletion  • Currently on 40mEq K-dur BID with her diuretics. • Potassium 3.6. Continue K supplement until potassium levels are between 4.5-5      Type 2 diabetes mellitus ([address])  Assessment & Plan  History of type 2 diabetes on oral meds  • Continue sliding scale  • Diabetic diet      Pancytopenia ([address])  Assessment & Plan  Patient with history of chronic ITP, however also noted leukopenia and anemia  • Will need outpatient follow-up with heme-onc  • Reticulocyte count elevated  • SPEP obtained -cannot rule out small monoclonal gammopathy     Stage III pressure ulcer of sacral region [facility])  Assessment & Plan  Present on admission, stage III pressure ulcer of sacral region  • Continue Maxorb and allevyn foam dressing to area  • Continue wound care. Monitor wound daily.     Left foot pain  Assessment & Plan  • Patient reports following with outpatient Podiatrist  • Check left foot XR  • Consult Podiatry  • Administer Tylenol for pain relief  • Left foot xray shows no fracture or dislocation. Midfoot arthritic changes. Hallux valgus w/ bunion. Flexion deformity of the multiple toes. • Podiatry r/o any infection, underlying wound or fracture and recommends patient f/u outpatient with podiatrist for routine diabetic care and midfoot arthritis. A surgical shoe was given to patient. Refer to PT for ambulation. Subjective:   Patient reports the boot on her left foot has been helping with her pain when walking. Pain is a 5/10 this morning. Patient denies any SOB, chest pain, cough, constipation or trouble breathing. Patient is feeling well this morning. Patient would like to walk w/ PT. Objective:     Vitals:   Temp (24hrs), Av.9 °F (36.6 °C), Min:97.1 °F (36.2 °C), Max:98.2 °F (36.8 °C)    Temp:  [97.1 °F (36.2 °C)-98.2 °F (36.8 °C)] 97.1 °F (36.2 °C)  HR:  [60] 60  Resp:  [16-18] 18  BP: ()/(42-61) 116/60  SpO2:  [92 %-99 %] 92 %  Body mass index is 34.9 kg/m².      Input and Output Summary (last 24 hours): Intake/Output Summary (Last 24 hours) at 8/4/2023 0840  Last data filed at 8/3/2023 1801  Gross per 24 hour   Intake 1260 ml   Output 925 ml   Net 335 ml       Physical Exam:   Physical Exam  Constitutional:       Appearance: Normal appearance. HENT:      Head: Normocephalic and atraumatic. Nose: Nose normal.   Eyes:      Extraocular Movements: Extraocular movements intact. Pupils: Pupils are equal, round, and reactive to light. Neck:      Vascular: JVD present. No carotid bruit. Cardiovascular:      Rate and Rhythm: Normal rate and regular rhythm. Pulses: Normal pulses. Comments: Crisp prosthetic heart sounds  Pulmonary:      Effort: Pulmonary effort is normal.      Breath sounds: Normal breath sounds. No wheezing, rhonchi or rales. Musculoskeletal:         General: No swelling. Cervical back: Normal range of motion and neck supple. Right lower leg: No edema. Left lower leg: No edema. Skin:     General: Skin is warm and dry. Neurological:      General: No focal deficit present. Mental Status: She is alert and oriented to person, place, and time. Psychiatric:         Mood and Affect: Mood normal.         Behavior: Behavior normal.          Additional Data:     Labs:  Results from last 7 days   Lab Units 08/04/23  0157 07/31/23  0451 07/30/23  0543   WBC Thousand/uL 3.46*   < > 3.47*   HEMOGLOBIN g/dL 10.7*   < > 10.4*   HEMATOCRIT % 32.6*   < > 32.5*   PLATELETS Thousands/uL 135*   < > 130*   NEUTROS PCT %  --   --  61   LYMPHS PCT %  --   --  21   MONOS PCT %  --   --  14*   EOS PCT %  --   --  3    < > = values in this interval not displayed.      Results from last 7 days   Lab Units 08/04/23  0157 07/30/23  0543 07/29/23  0219   SODIUM mmol/L 137   < > 140   POTASSIUM mmol/L 3.6   < > 3.4*   CHLORIDE mmol/L 98   < > 105   CO2 mmol/L 39*   < > 33*   BUN mg/dL 37*   < > 18   CREATININE mg/dL 0.90   < > 0.69   ANION GAP mmol/L 0   < > 2   CALCIUM mg/dL 8.8   < > 9.3   ALBUMIN g/dL  --   --  3.0*   TOTAL BILIRUBIN mg/dL  --   --  0.91   ALK PHOS U/L  --   --  145*   ALT U/L  --   --  23   AST U/L  --   --  39   GLUCOSE RANDOM mg/dL 74   < > 132    < > = values in this interval not displayed.      Results from last 7 days   Lab Units 08/04/23  0157   INR  2.34*     Results from last 7 days   Lab Units 08/04/23  0622 08/03/23  2036 08/03/23  1704 08/03/23  1123 08/03/23  0615 08/02/23  2216 08/02/23  1715 08/02/23  1234 08/02/23  1104 08/02/23  0619 08/01/23  2111 08/01/23  1705   POC GLUCOSE mg/dl 105 217* 124 213* 92 169* 128 146* 181* 95 182* 152*               Lines/Drains:  Invasive Devices     Peripheral Intravenous Line  Duration           Peripheral IV 08/02/23 Left;Ventral (anterior) Forearm 2 days                      Imaging: Reviewed radiology reports from this admission including: xray(s) and Personally reviewed the following imaging: xray(s)    Recent Cultures (last 7 days):         Last 24 Hours Medication List:   Current Facility-Administered Medications   Medication Dose Route Frequency Provider Last Rate   • acetaminophen  650 mg Oral Q6H PRN Lary Colvin PA-C     • allopurinol  100 mg Oral Daily Olaf Banai, DO     • amiodarone  200 mg Oral Daily With Breakfast Olafivone Lewis, DO     • atorvastatin  40 mg Oral Daily With [employer], DO     • citalopram  20 mg Oral Daily Olaf Kristelai, DO     • heparin (porcine)  3-30 Units/kg/hr (Order-Specific) Intravenous Titrated Lary Colvin PA-C 10 Units/kg/hr (08/04/23 0645)   • heparin (porcine)  3,000 Units Intravenous Q6H PRN Lary Colvin PA-C     • heparin (porcine)  6,000 Units Intravenous Once Lary Colvin PA-C     • heparin (porcine)  6,000 Units Intravenous Q6H PRN Lary Colvin PA-C     • insulin lispro  1-6 Units Subcutaneous 4x Daily (AC & HS) Salazar Tovar PA-C     • metoprolol succinate  25 mg Oral Daily Olaf Lewis DO     • montelukast  10 mg Oral TAIWO Babin Joshua, DO     • potassium chloride  40 mEq Oral BID Jeancarlos Mccauley MD     • torsemide  20 mg Oral Before Dinner JOSEPHINE Xavier     • torsemide  40 mg Oral Daily JOSEPHINE Xavier     • warfarin  5 mg Oral Daily (warfarin) Jeancarlos Mccauley MD          Today, Patient Was Seen By: Amanda Hess    **Please Note: This note may have been constructed using a voice recognition system. **

## 2023-08-04 NOTE — PLAN OF CARE
Problem: PHYSICAL THERAPY ADULT  Goal: Performs mobility at highest level of function for planned discharge setting. See evaluation for individualized goals. Description: Treatment/Interventions: Functional transfer training, LE strengthening/ROM, Elevations, Therapeutic exercise, Endurance training, Patient/family training, Equipment eval/education, Bed mobility, Gait training, Compensatory technique education, Spoke to nursing, OT          See flowsheet documentation for full assessment, interventions and recommendations. Outcome: Progressing  Note: Prognosis: Good  Problem List: Decreased strength, Impaired balance, Decreased endurance, Decreased mobility  Assessment: Pt is a 68 y.o. female seen for re-evaluation due to Left foot pain and per podiatry is now WBAT LLE in surgical shoe. Patient  has a past medical history of Arthritis, Asthma, Diabetes ([address]), Environmental allergies, Heart murmur, and Hypertension. .     PT now re-consulted to assess functional mobility and balance using surgical shoe. See IE 7/28 for PLOF and home set up. Currently pt requires Supervision for functional STS transfers and SBA for SPT with SPC ; SBA for ambulation with SPC, 40'x2. Pt continues to be functioning below baseline and w/ overall mobility deficits 2* to: decreased strength, decreased endurance, decreased mobility, impaired balance. These impairments place pt at risk for falls. Pt will continue to benefit from skilled PT interventions to address stated impairments; to maximize functional potential; for ongoing pt/family education; and DME needs. The patient's AM-PAC Basic Mobility Inpatient Short Form Raw Score Is 16. A Raw score of greater than or equal to 16 suggests the patient may benefit from discharge to home. PT is currently recommending rehab vs HHPT on d/c from hospital. Will continue to follow as able.         PT Discharge Recommendation: Post acute rehabilitation services (VS HHPT pending progress)    See flowsheet documentation for full assessment.

## 2023-08-04 NOTE — DISCHARGE SUMMARY
[address] [facility]  [facility]  1945, 68 y.o. female MRN: [id number]  Unit/Bed#: -01 Encounter: [id number]  Primary Care Provider: Adry Peñaloza DO   Date and time admitted to hospital: 2023  4:13 PM    * Acute on chronic heart failure with preserved ejection fraction [facility])  Assessment & Plan  Wt Readings from Last 3 Encounters:   23 75.8 kg (167 lb)   23 88.5 kg (195 lb)   07/10/23 88.5 kg (195 lb)     · Patient with history of diastolic heart failure presented with several weeks of worsening shortness of breath. · Patient's creatinine is currently at baseline  · Was maintained on Lasix 40 mg daily as an outpatient  · Echocardiogram shows EF of 53%, normal systolic function, moderate abnormal diastolic dysfunction, right ventricle and atrium dilated as per cardiology report  · Continue daily weights and I's and O's  · IV diuretics have since been transitioned to torsemide 40 mg every morning and 20 mg every afternoon      H/O mechanical aortic valve replacement  Assessment & Plan  · Patient with history of mechanical aortic valve placement, will continue on Coumadin  · Per Cardiology notes, goal INR has been documented to be 2.5-3.5  · Most recent INR subtherapeutic but up-trending at 2.34  · Patient maintained on coumadin 5mg on Monday and Friday and 2.5mg the other 5 days of the week  · I confirmed this dosing with the patient as well as her PCP (Dr. Giovanny Thompson) office today  · Continue home coumadin dosing on discharge  · Cardiology cleared from their standpoint for discharge today    A-fib [facility])  Assessment & Plan  · Continue BB and coumadin as above    Hypokalemia  Assessment & Plan  · Resolved with repletion  · Currently on 40mEq K-dur BID with her diuretics.      Type 2 diabetes mellitus [facility])  Assessment & Plan  Lab Results   Component Value Date    HGBA1C 5.9 (H) 2023       Recent Labs     23  1704 23 08/04/23  0622 08/04/23  1116   POCGLU 124 217* 105 158*       Blood Sugar Average: Last 72 hrs:  · Continue home meds    Pancytopenia ([address])  Assessment & Plan  Patient with history of chronic ITP, however also noted leukopenia and anemia  · Will need outpatient follow-up with heme-onc  · Reticulocyte count elevated  · SPEP obtained -cannot rule out small monoclonal gammopathy    Stage III pressure ulcer of sacral region [facility])  Assessment & Plan  Present on admission, stage III pressure ulcer of sacral region  · Continue Maxorb and allevyn foam dressing to area  · Continue wound care    Left foot pain  Assessment & Plan  · Patient reports following with outpatient Podiatrist  · XR with "Midfoot arthritic changes. Hallux valgus with bunion. Flexion deformity of the multiple toes"      Medical Problems     Resolved Problems  Date Reviewed: 8/4/2023          Resolved    UTI (urinary tract infection) 7/31/2023     Resolved by  Salomon Whitmore PA-C        Discharging Physician / Practitioner: Vianca Rubi PA-C  PCP: Ivy De Jesus DO  Admission Date:   Admission Orders (From admission, onward)     Ordered        07/27/23 1838  INPATIENT ADMISSION  Once                      Discharge Date: 08/04/23    Consultations During Hospital Stay:  · Case management   · Cardiology  · Wound Care  · PT  · OT  · Podiatry     Procedures Performed:   · CXR  · XR left foot  · Echo  · CBC  · CMP  · BNP  · HS troponin  · Urine culture  · INR  · TSH  · Folate  · B12  · Retic count  · SPEP  · UPEP    Significant Findings / Test Results:   · CXR: "Mild pulmonary venous congestion with trace effusions."  · XR left foot: "Midfoot arthritic changes. Hallux valgus with bunion. Flexion deformity of the multiple toes"  · Echo: LVEF 60%. Wall motion is normal. Grade 2 DD.    · BNP: 855  · HS troponin: 38  · Urine culture: >100,000 E coli  · INR: 2.94, 3.09, 3.26, 2.55, 2.07, 1.86, 2.20, 2.34  · TSH: 3.671  · Folate: >22.3  · B12: 1602  · Retic count: retic ct abs  · SPEP: The SPEP shows a faint possible band in the gamma region. Immunofixation to be performed  · Immunofixation: Cannot rule out small monoclonal gammopathy  · UPEP: No monoclonal bands noted. Incidental Findings:   · None     Test Results Pending at Discharge (will require follow up): · None     Outpatient Tests Requested:  · Follow up with PCP, Cardiology and Heme/Onc    Complications:  None    Reason for Admission: SOB    Hospital Course:   Lisha Devi is a 68 y.o. female with A. Fib, DM2, HTN, CAD, aortic valve, CHF who originally presented to the hospital on 7/27/2023 due to SOB, decreased UOP, leg swelling and orthopnea. CXR revealed mild pulmonary venous congestion with trace effusions. She was placed on IV Lasix. Also reported subprapubic discomfort and a urine culture was sent and she was placed on IV ceftriaxone. Cardiology was consulted. TTE was performed. Was increased to Lasix GTT. Urine culture grew E. Coli and she completed 3 days of ceftriaxone. She was transitioned to PO diuretics. Cardiology cleared the patient from their standpoint for discharge. PT/OT recommended rehab but the patient refused. Please see above list of diagnoses and related plan for additional information. Condition at Discharge: stable    Discharge Day Visit / Exam:   Subjective:    Ms. Vernon Duke reports wanting to speak with CM and for a home scale. She denies foot pain today. Denies CP or SOB.  She report taking 5mg coumadin 2 days per week and 2.5mg coumadin the other 5 days of the week at home which I also confirmed over the phone with her PCP's office     Vitals: Blood Pressure: 116/60 (08/04/23 0721)  Pulse: 60 (08/04/23 0721)  Temperature: (!) 97.1 °F (36.2 °C) (08/04/23 0721)  Temp Source: Oral (08/03/23 1128)  Respirations: 18 (08/03/23 1520)  Height: 4' 10" (147.3 cm) (07/28/23 1000)  Weight - Scale: 75.8 kg (167 lb) (08/04/23 0600)  SpO2: 92 % (08/04/23 0721)  Exam:   Physical Exam  Vitals reviewed. Constitutional:       Comments: Patient seen sitting in bedside chair, NAD   Cardiovascular:      Rate and Rhythm: Normal rate and regular rhythm. Heart sounds: Murmur heard. Pulmonary:      Effort: Pulmonary effort is normal.      Breath sounds: Normal breath sounds. Comments: 99% on room air  Abdominal:      General: Bowel sounds are normal.      Palpations: Abdomen is soft. Tenderness: There is no abdominal tenderness. Musculoskeletal:      Right lower leg: No edema. Left lower leg: No edema. Skin:     General: Skin is warm. Neurological:      Mental Status: She is alert and oriented to person, place, and time. Psychiatric:         Mood and Affect: Mood normal.         Behavior: Behavior normal.          Discussion with Family: Patient declined call to . Discharge instructions/Information to patient and family:   See after visit summary for information provided to patient and family. Provisions for Follow-Up Care:  See after visit summary for information related to follow-up care and any pertinent home health orders. Disposition:   Home with VNA Services (Reminder: Complete face to face encounter)    Planned Readmission: None     Discharge Statement:  I spent 45 minutes discharging the patient. This time was spent on the day of discharge. I had direct contact with the patient on the day of discharge. Greater than 50% of the total time was spent examining patient, answering all patient questions, arranging and discussing plan of care with patient as well as directly providing post-discharge instructions. Additional time then spent on discharge activities. Discharge Medications:  See after visit summary for reconciled discharge medications provided to patient and/or family.       **Please Note: This note may have been constructed using a voice recognition system**

## 2023-08-04 NOTE — ASSESSMENT & PLAN NOTE
Wt Readings from Last 3 Encounters:   08/04/23 75.8 kg (167 lb)   07/25/23 88.5 kg (195 lb)   07/10/23 88.5 kg (195 lb)     · Patient with history of diastolic heart failure presented with several weeks of worsening shortness of breath.    · Patient's creatinine is currently at baseline  · Was maintained on Lasix 40 mg daily as an outpatient  · Echocardiogram shows EF of 65%, normal systolic function, moderate abnormal diastolic dysfunction, right ventricle and atrium dilated as per cardiology report  · Continue daily weights and I's and O's  · IV diuretics have since been transitioned to torsemide 40 mg every morning and 20 mg every afternoon

## 2023-08-04 NOTE — CASE MANAGEMENT
Case Management Discharge Planning Note    Patient name Camden Subiaco  Location /-80 MRN [id number]  : 1945 Date 2023       Current Admission Date: 2023  Current Admission Diagnosis:Acute on chronic heart failure with preserved ejection fraction [facility])   Patient Active Problem List    Diagnosis Date Noted   • Left foot pain 2023   • Arthritis of left foot 2023   • Hypokalemia 2023   • Acute on chronic heart failure with preserved ejection fraction ([address]) 2023   • Pancytopenia ([address]) 2023   • Aneurysm of ascending aorta without rupture ([address]) 07/10/2023   • Difficult intravenous access 05/15/2023   • Acute COVID-19 2023   • A-fib ([address]) 2023   • Stage III pressure ulcer of sacral region ([address]) 2023   • Asthma 2023   • Osteoporosis with current pathological fracture with routine healing 2023   • Class 2 obesity in adult 2023   • CHAS (obstructive sleep apnea) 2023   • Abnormal CT scan 05/10/2023   • Hyponatremia 2023   • Bradycardia 2023   • Ventricular arrhythmia 2023   • Type 2 diabetes mellitus ([address]) 2023   • Fall 2023   • Closed displaced oblique fracture of shaft of left humerus with routine healing 2023   • Acute pain due to trauma 2023   • H/O mechanical aortic valve replacement 2023   • Fracture of multiple ribs of right side 2023   • Chronic ITP (idiopathic thrombocytopenia) ([address]) 2023   • CAD (coronary artery disease) 05/10/2017      LOS (days): 8  Geometric Mean LOS (GMLOS) (days): 3.90  Days to GMLOS:-3.9     OBJECTIVE:  Risk of Unplanned Readmission Score: 25.86         Current admission status: Inpatient   Preferred Pharmacy:   [address] [facility] [address], [address] [facility] [address] [facility] [address]  Phone: [phone] Fax: [phone]    Primary Care Provider: DO Susan Stone Insurance: [employer]  REP  Secondary Insurance:     DISCHARGE DETAILS:CM provided scale to patient from CM office. Updated daughter Boni Adorno. CM to continue to support.

## 2023-08-04 NOTE — PROGRESS NOTES
All discharge instructions reviewed with pt. Roslyn removed. Shawna removed. Scripts called to pharmacy by MD.  Pt discharged to home. Pt calling family to arrange transport home.

## 2023-08-04 NOTE — ASSESSMENT & PLAN NOTE
Lab Results   Component Value Date    HGBA1C 5.9 (H) 06/30/2023       Recent Labs     08/03/23  1704 08/03/23 2036 08/04/23 0622 08/04/23  1116   POCGLU 124 217* 105 158*       Blood Sugar Average: Last 72 hrs:  · Continue home meds

## 2023-08-05 ENCOUNTER — HOME CARE VISIT (OUTPATIENT)
Dept: HOME HEALTH SERVICES | Facility: HOME HEALTHCARE | Age: 78
End: 2023-08-05

## 2023-08-06 ENCOUNTER — HOME CARE VISIT (OUTPATIENT)
Dept: HOME HEALTH SERVICES | Facility: HOME HEALTHCARE | Age: 78
End: 2023-08-06
Payer: COMMERCIAL

## 2023-08-06 VITALS
BODY MASS INDEX: 34.76 KG/M2 | HEIGHT: 58 IN | WEIGHT: 165.6 LBS | HEART RATE: 55 BPM | DIASTOLIC BLOOD PRESSURE: 70 MMHG | TEMPERATURE: 97.9 F | SYSTOLIC BLOOD PRESSURE: 108 MMHG | RESPIRATION RATE: 18 BRPM | OXYGEN SATURATION: 99 %

## 2023-08-06 PROCEDURE — 400013 VN SOC

## 2023-08-06 PROCEDURE — 10330081 VN NO-PAY CLAIM PROCEDURE

## 2023-08-06 PROCEDURE — G0299 HHS/HOSPICE OF RN EA 15 MIN: HCPCS

## 2023-08-06 NOTE — CASE COMMUNICATION
[facility] has admitted your patient to [facility] service with the following disciplines:      SN, PT and OT  This report is informational only, no responses is needed  Primary focus of home health care- Integumentary   Patient stated goals of care- Maintain CHF symptoms with medication/diet and sacral wound to heal.   Anticipated visit pattern and next visit date- Next SN visit for 8/9 3w1 2w2 3w1 2w1  See medication list - meds  in home differ from AVS- all medications in home. Pt does not use colace, tramadol,   and Farxiga. Pt will follow up with PCP to determine if needed. Significant clinical findings- Pt will be getting glucometer and blood pressure cuff from other home. Potential barriers to goal achievement- Sacral wound is located in a difficult location to perform wound care independently. Thank you for allowing us to participate in the ca re of your patient.       [facility] RN

## 2023-08-07 ENCOUNTER — HOME CARE VISIT (OUTPATIENT)
Dept: HOME HEALTH SERVICES | Facility: HOME HEALTHCARE | Age: 78
End: 2023-08-07
Payer: COMMERCIAL

## 2023-08-07 PROCEDURE — G0321 AUDIO-ONLY HHS: HCPCS

## 2023-08-07 NOTE — CASE COMMUNICATION
Telehealth phonecall to patient to assess how she is doing since hospital discharge and provide education. Patient reports she is feeling very well. She has a scale in the home and has been performing daily weights. She was 166lbs today and taught back that she is to notify cardiology of weight gain 2-3lbs in one day or 5lbs in one week. Discussed ss of fluid retention and the CHF disease process and how sodium affects the body. Patient  has been reading nutrition labels and lowering the sodium in her diet. Educated on 2gm NA restriction, low sodium options and importance of checking the serving size. Patient taught back correct dose and frequency of torsemide, potassium and metoprolol. Reviewed where to locate [facility] 24/7 contact phone number should any issues arise. Patient's next SN visit is on 8/9 for wound care and she will be leaving at 315pm for cardiology appt. Plan to visit prior to this appt. She also has an appt with her PCP Dr. Elizabeth Chappell at [facility] on 8/11. Assess need for SN to make visit on this day or if patient is going to have PCP assess wound.
Quality 111:Pneumonia Vaccination Status For Older Adults: Pneumococcal vaccine (PPSV23) administered on or after patient’s 60th birthday and before the end of the measurement period
Detail Level: Detailed
Quality 130: Documentation Of Current Medications In The Medical Record: Current Medications Documented
Additional Notes: Patient has had two Covid-19 vaccinations and booster
Quality 110: Preventive Care And Screening: Influenza Immunization: Influenza Immunization Administered during Influenza season

## 2023-08-07 NOTE — UTILIZATION REVIEW
NOTIFICATION OF ADMISSION DISCHARGE   This is a Notification of Discharge from [address] [place] [address]. Please be advised that this patient has been discharge from our facility. Below you will find the admission and discharge date and time including the patient’s disposition. UTILIZATION REVIEW CONTACT:  Shantelle Olvera  Utilization   Network Utilization Review Department  Phone: [phone] x carefully listen to the prompts. All voicemails are confidential.  Email: [email or web address]     ADMISSION INFORMATION  PRESENTATION DATE: 7/27/2023  4:13 PM  OBERVATION ADMISSION DATE:   INPATIENT ADMISSION DATE: 7/27/23  6:38 PM   DISCHARGE DATE: 8/4/2023  4:36 PM   DISPOSITION:Home with Home Health Care    IMPORTANT INFORMATION:  Send all requests for admission clinical reviews, approved or denied determinations and any other requests to dedicated fax number below belonging to the campus where the patient is receiving treatment.  List of dedicated fax numbers:  [place] DENIALS (Administrative/Medical Necessity) [phone] [address] [facility] (Maternity/NICU/Pediatrics) [phone]   [facility] [phone]   [facility] [phone] [address] [facility] [phone]   [address] [place] [address] [phone]   [facility] [phone]   [address] [facility] [address] [place] [phone]   [address] [facility] [phone]   [address] [facility] [phone]   [address] [facility] [address] [facility] [phone]

## 2023-08-08 ENCOUNTER — HOME CARE VISIT (OUTPATIENT)
Dept: HOME HEALTH SERVICES | Facility: HOME HEALTHCARE | Age: 78
End: 2023-08-08
Payer: COMMERCIAL

## 2023-08-08 ENCOUNTER — TELEPHONE (OUTPATIENT)
Dept: HEMATOLOGY ONCOLOGY | Facility: CLINIC | Age: 78
End: 2023-08-08

## 2023-08-08 VITALS
OXYGEN SATURATION: 98 % | RESPIRATION RATE: 20 BRPM | DIASTOLIC BLOOD PRESSURE: 68 MMHG | SYSTOLIC BLOOD PRESSURE: 122 MMHG | HEART RATE: 64 BPM

## 2023-08-08 PROCEDURE — G0151 HHCP-SERV OF PT,EA 15 MIN: HCPCS

## 2023-08-08 NOTE — TELEPHONE ENCOUNTER
I called Rakan Retana in response to a referral that was received for patient to establish care with Hematology. Outreach was made to schedule a consultation. .    The patient answered the questionaire and when it came to scheduling the patient said she does not have her calendar and will call us back. The referral has been closed. Will reopen and schedule the patient when she calls us back.

## 2023-08-09 ENCOUNTER — LAB (OUTPATIENT)
Dept: LAB | Facility: CLINIC | Age: 78
End: 2023-08-09
Payer: COMMERCIAL

## 2023-08-09 ENCOUNTER — OFFICE VISIT (OUTPATIENT)
Dept: CARDIOLOGY CLINIC | Facility: CLINIC | Age: 78
End: 2023-08-09
Payer: COMMERCIAL

## 2023-08-09 ENCOUNTER — HOME CARE VISIT (OUTPATIENT)
Dept: HOME HEALTH SERVICES | Facility: HOME HEALTHCARE | Age: 78
End: 2023-08-09
Payer: COMMERCIAL

## 2023-08-09 VITALS
HEART RATE: 55 BPM | OXYGEN SATURATION: 100 % | WEIGHT: 162 LBS | SYSTOLIC BLOOD PRESSURE: 116 MMHG | DIASTOLIC BLOOD PRESSURE: 76 MMHG | BODY MASS INDEX: 34 KG/M2 | HEIGHT: 58 IN

## 2023-08-09 DIAGNOSIS — I48.91 ATRIAL FIBRILLATION, UNSPECIFIED TYPE (HCC): ICD-10-CM

## 2023-08-09 DIAGNOSIS — Z95.2 H/O MECHANICAL AORTIC VALVE REPLACEMENT: ICD-10-CM

## 2023-08-09 DIAGNOSIS — Z09 HOSPITAL DISCHARGE FOLLOW-UP: ICD-10-CM

## 2023-08-09 DIAGNOSIS — I49.9 VENTRICULAR ARRHYTHMIA: ICD-10-CM

## 2023-08-09 DIAGNOSIS — I50.32 CHRONIC HEART FAILURE WITH PRESERVED EJECTION FRACTION (HCC): ICD-10-CM

## 2023-08-09 DIAGNOSIS — I50.32 CHRONIC HEART FAILURE WITH PRESERVED EJECTION FRACTION (HCC): Primary | ICD-10-CM

## 2023-08-09 LAB
ANION GAP SERPL CALCULATED.3IONS-SCNC: 6 MMOL/L
BNP SERPL-MCNC: 1033 PG/ML (ref 0–100)
BUN SERPL-MCNC: 34 MG/DL (ref 5–25)
CALCIUM SERPL-MCNC: 9.4 MG/DL (ref 8.4–10.2)
CHLORIDE SERPL-SCNC: 103 MMOL/L (ref 96–108)
CO2 SERPL-SCNC: 31 MMOL/L (ref 21–32)
CREAT SERPL-MCNC: 0.92 MG/DL (ref 0.6–1.3)
GFR SERPL CREATININE-BSD FRML MDRD: 60 ML/MIN/1.73SQ M
GLUCOSE SERPL-MCNC: 110 MG/DL (ref 65–140)
POTASSIUM SERPL-SCNC: 4.5 MMOL/L (ref 3.5–5.3)
SODIUM SERPL-SCNC: 140 MMOL/L (ref 135–147)

## 2023-08-09 PROCEDURE — 36415 COLL VENOUS BLD VENIPUNCTURE: CPT

## 2023-08-09 PROCEDURE — 83880 ASSAY OF NATRIURETIC PEPTIDE: CPT

## 2023-08-09 PROCEDURE — 99215 OFFICE O/P EST HI 40 MIN: CPT | Performed by: INTERNAL MEDICINE

## 2023-08-09 PROCEDURE — 80048 BASIC METABOLIC PNL TOTAL CA: CPT

## 2023-08-09 NOTE — PLAN OF CARE
Problem: OCCUPATIONAL THERAPY ADULT  Goal: Performs self-care activities at highest level of function for planned discharge setting. See evaluation for individualized goals. Description: Treatment Interventions: ADL retraining, Functional transfer training, Endurance training, Patient/family training, Equipment evaluation/education, Compensatory technique education, Energy conservation, Activityengagement          See flowsheet documentation for full assessment, interventions and recommendations. Note: Limitation: Decreased ADL status, Decreased endurance, Decreased self-care trans, Decreased high-level ADLs  Prognosis: Good  Assessment: Pt is a 68 y.o. female admitted 7/27/23 w inc SOB, dec urine outpt, and inc LE swelling. Pt final reason for admission is acute on chronic heart failure with preserved EF. PT w active OT eval and treat orders at this time. PMH includes  has a past medical history of Arthritis, Asthma, Diabetes ([address]), Environmental allergies, Heart murmur, and Hypertension. Pt reports she mostly stays at her significant other's home, which is a 3 SH with bed.bath on second floor with stair glide to second floor. Bathroom is a tub shower with cut out, grab bars and shower chair, standard toilet with riser on seat. Her home is an apt w 0 LIZ, tub shower and standard toilet. Pta, pt was independent w/ ADL/, shared IADL tasks and used [facility] for functional mobility, was not driving. Currently, pt is Min Ax1 for UB ADL, Mod Ax1 for LB ADL, and completed transfers/FM w Min Ax1. Pt is limited at this time 2* decreased endurance/activtiy tolerance, decreased cognition, decreased ADL/High-level ADL status, decreased self-care trans, decreased safety awareness, limited home support and is a fall risk. This impacts pt's ability to complete UB and LB dressing and bathing, toileting, transfers, functional mobility, community mobility, home and health maintenance, and safe engagement in typical daily routine. The patient's raw score on the AM-PAC Daily Activity inpatient short form is 18, standardized score is 38.66, less than 39.4. Patients at this level are likely to benefit from discharge to post-acute rehabilitation services. Please refer to the recommendation of the Occupational Therapist for safe discharge planning. From OT standpoint, pt should D/C to STR when medically stable. Pt will benefit from continued acute OT services 2-3 x/wk for 10-14 days to meet goals.      OT Discharge Recommendation: Post acute rehabilitation services forehead

## 2023-08-09 NOTE — PATIENT INSTRUCTIONS
Continue current dose of torsemide and potassium  Obtain labs as ordered today  2g sodium diet  2L fluid restriction  Daily weights

## 2023-08-09 NOTE — PROGRESS NOTES
Cardiology Outpatient Progress Note - Nova Culver 68 y.o. female MRN: [id number]    Encounter: [id number]      Assessment/Plan:    Patient Active Problem List    Diagnosis Date Noted   • Left foot pain 08/03/2023   • Arthritis of left foot 08/03/2023   • Hypokalemia 08/02/2023   • Acute on chronic heart failure with preserved ejection fraction ([address]) 07/27/2023   • Pancytopenia ([address]) 07/27/2023   • Aneurysm of ascending aorta without rupture ([address]) 07/10/2023   • Difficult intravenous access 05/15/2023   • Acute COVID-19 05/14/2023   • A-fib ([address]) 05/13/2023   • Stage III pressure ulcer of sacral region ([address]) 05/13/2023   • Asthma 05/13/2023   • Osteoporosis with current pathological fracture with routine healing 05/13/2023   • Class 2 obesity in adult 05/13/2023   • CHAS (obstructive sleep apnea) 05/13/2023   • Abnormal CT scan 05/10/2023   • Hyponatremia 05/09/2023   • Bradycardia 05/08/2023   • Ventricular arrhythmia 05/04/2023   • Type 2 diabetes mellitus ([address]) 04/28/2023   • Fall 04/27/2023   • Closed displaced oblique fracture of shaft of left humerus with routine healing 04/27/2023   • Acute pain due to trauma 04/27/2023   • H/O mechanical aortic valve replacement 04/27/2023   • Fracture of multiple ribs of right side 04/27/2023   • Chronic ITP (idiopathic thrombocytopenia) ([address]) 04/02/2023   • CAD (coronary artery disease) 05/10/2017       # Heart failure with preserved ejection fraction  # RV dilation with reduced systolic function, pulmonary hypertension, likely due to group II disease  Diuretic: torsemide 40mg in AM and 20mg in PM with potassium 40 meq BID  Euvolemic    Weight: 167 lbs on discharge 8/4/23; 162 lbs today   7/27/23    Studies- personally reviewed by myself    Echocardiogram 7/28/23  LVEF: 60%  LVIDd: 5.1cm  RV: severely dilated, moderately reduced systolic function  AV: bileaflet mechanical AV, mild transvalvular AI.  Mean gradient 6mmHg  MR: mild to moderate, severe MAC  PASP: 49mmHg, estimated RAP 15mmHg  RVOT: no notching, shortened PAAT  Other: grade 2 diastology    # H/o aortic valve stenosis s/p mechanical AVR in 2011, normally functioning on last echo  [facility]: coumadin - PCP managing dosing  # H/o persistent atrial fibrillation  Rate: metoprolol succinate 25mg daily  Rhythm: amiodarone 200mg daily  AC: coumadin  # H/o VT/VF, s/p ICD  # Inappropriate shock for rapid afib 5/2023  # Hypertension, controlled  # Dyslipidemia  6/30/2023; cholesterol 96, triglycerides 43, HDL 56, and LDL calculated at 31  # DM type 2  # UTI 7/2023  # h/o CHAS with CPAP use in the past  # remote h/o DVT    TODAY'S PLAN:  Continue current dose of torsemide and potassium  Obtain labs as ordered today  2g sodium diet  2L fluid restriction  Daily weights    HPI:     Awilda Barrios is a 68 y.o. female with A. Fib, DM2, HTN, CAD, mechanical aortic valve, CHF who originally presented to the hospital on 7/27/2023 due to SOB, decreased UOP, leg swelling and orthopnea. CXR revealed mild pulmonary venous congestion with trace effusions. She was treated for decompensated heart failure. She also was treated for UTI. Discharged on torsemide 40mg in AM and 20mg in PM with potassium 20 meq daily. On lasix 40mg daily prior to admission. She reports overall doing well since discharge. No leg swelling and shortness of breath on current level of exertion. She is scheduled to start home PT and OT. Home weight 166 on discharge -- 163 lbs today. Review of Systems   Constitutional: Negative for chills, fatigue and fever. HENT: Negative for congestion, nosebleeds and sore throat. Eyes: Negative for discharge and visual disturbance. Respiratory: Negative for cough, chest tightness and shortness of breath. Cardiovascular: Negative for chest pain, palpitations and leg swelling. Gastrointestinal: Negative for abdominal distention, abdominal pain, diarrhea, nausea and vomiting.    Endocrine: Negative for cold intolerance and heat intolerance. Genitourinary: Negative for dysuria, flank pain and hematuria. Musculoskeletal: Negative for arthralgias, back pain and myalgias. Skin: Negative for color change, pallor and rash. Neurological: Negative for dizziness, syncope, weakness and light-headedness. Hematological: Negative for adenopathy. Does not bruise/bleed easily. Psychiatric/Behavioral: Negative for agitation and confusion. Past Medical History:   Diagnosis Date   • Arthritis    • Asthma    • Diabetes ([address])    • Environmental allergies    • Heart murmur    • Hypertension          Allergies   Allergen Reactions   • Fentanyl Other (See Comments)     Other reaction(s): Hallucinations  paranoia, confusion  Other reaction(s): Hallucinations  Other reaction(s): Hallucinations  hallucinations   • Fentanyl Confusion     .     Current Outpatient Medications:   •  acetaminophen (TYLENOL) 325 mg tablet, Take 2 tablets (650 mg total) by mouth every 6 (six) hours as needed for mild pain, Disp: 63 tablet, Rfl: 0  •  alendronate (FOSAMAX) 70 mg tablet, Take 70 mg by mouth Once a week, Disp: , Rfl:   •  allopurinol (ZYLOPRIM) 100 mg tablet, Take 100 mg by mouth daily, Disp: , Rfl:   •  amiodarone 200 mg tablet, Take 1 tablet (200 mg total) by mouth daily with breakfast, Disp: 90 tablet, Rfl: 2  •  atorvastatin (LIPITOR) 40 mg tablet, , Disp: , Rfl:   •  Blood Glucose Monitoring Suppl (OneTouch Verio Flex System) w/Device KIT, Use as directed, Disp: , Rfl:   •  cholecalciferol (VITAMIN D3) 1,000 units tablet, Take 1 tablet (1,000 Units total) by mouth daily Do not start before June 2, 2023., Disp: 30 tablet, Rfl: 0  •  citalopram (CeleXA) 20 mg tablet, Take 20 mg by mouth daily, Disp: , Rfl:   •  docusate sodium (COLACE) 100 mg capsule, Take 1 capsule (100 mg total) by mouth 2 (two) times a day, Disp: 60 capsule, Rfl: 0  •  magnesium 30 MG tablet, Take 64 mg by mouth 3 (three) times a day Takes 3-4 days a week takes 1 tab., Disp: , Rfl:   •  Metamucil Fiber CHEW, Chew 3 gums in the morning.  Indications: fiber supplement, Disp: , Rfl:   •  metoprolol succinate (TOPROL-XL) 25 mg 24 hr tablet, Take 1 tablet (25 mg total) by mouth daily Do not start before June 2, 2023., Disp: 30 tablet, Rfl: 0  •  montelukast (SINGULAIR) 10 mg tablet, Take 10 mg by mouth daily at bedtime, Disp: , Rfl:   •  multivitamin (THERAGRAN) TABS, Take 1 tablet by mouth daily, Disp: , Rfl:   •  potassium chloride (K-DUR,KLOR-CON) 20 mEq tablet, Take 2 tablets (40 mEq total) by mouth 2 (two) times a day, Disp: 120 tablet, Rfl: 0  •  torsemide (DEMADEX) 20 mg tablet, Take 40mg PO QAM and 20mg PO QPM, Disp: 90 tablet, Rfl: 0  •  warfarin (COUMADIN) 5 mg tablet, Take 5 mg by mouth daily Takes 5mg on Monday and Friday and 2.5mg the other 5 days of the week, Disp: , Rfl:   •  Farxiga 5 MG TABS, Take 5 mg by mouth daily (Patient not taking: Reported on 7/25/2023), Disp: , Rfl:   •  traMADol (Ultram) 50 mg tablet, Take 1 tablet (50 mg total) by mouth every 6 (six) hours as needed for severe pain (Patient not taking: Reported on 8/9/2023), Disp: 25 tablet, Rfl: 0    Social History     Socioeconomic History   • Marital status:      Spouse name: Not on file   • Number of children: Not on file   • Years of education: Not on file   • Highest education level: Not on file   Occupational History   • Not on file   Tobacco Use   • Smoking status: Never   • Smokeless tobacco: Never   Vaping Use   • Vaping Use: Never used   Substance and Sexual Activity   • Alcohol use: Yes     Comment: socially   • Drug use: Never   • Sexual activity: Not on file   Other Topics Concern   • Not on file   Social History Narrative    ** Merged History Encounter **          Social Determinants of Health     Financial Resource Strain: Not on file   Food Insecurity: No Food Insecurity (7/28/2023)    Hunger Vital Sign    • Worried About Running Out of Food in the Last Year: Never true    • Ran Out of Food in the Last Year: Never true   Transportation Needs: No Transportation Needs (7/28/2023)    PRAPARE - Transportation    • Lack of Transportation (Medical): No    • Lack of Transportation (Non-Medical): No   Physical Activity: Not on file   Stress: Not on file   Social Connections: Not on file   Intimate Partner Violence: Not on file   Housing Stability: Low Risk  (7/28/2023)    Housing Stability Vital Sign    • Unable to Pay for Housing in the Last Year: No    • Number of Places Lived in the Last Year: 1    • Unstable Housing in the Last Year: No       Family History   Problem Relation Age of Onset   • Hypertension Other    • Arthritis Family    • Heart disease Family    • Hypertension Family    • Diabetes type II Family        Physical Exam:    Vitals:   Vitals:    08/09/23 1535   BP: 116/76   Pulse: 55   SpO2: 100%       Physical Exam  Constitutional:       General: She is not in acute distress. Appearance: Normal appearance. HENT:      Head: Normocephalic and atraumatic. Mouth/Throat:      Mouth: Mucous membranes are moist.   Eyes:      Extraocular Movements: Extraocular movements intact. Conjunctiva/sclera: Conjunctivae normal.   Cardiovascular:      Rate and Rhythm: Normal rate and regular rhythm. Pulses: Normal pulses. Heart sounds: No murmur heard. No friction rub. No gallop. Comments: Prominent v waves, trace pitting edema  Mechanical S2  Pulmonary:      Effort: Pulmonary effort is normal. No respiratory distress. Breath sounds: No wheezing, rhonchi or rales. Abdominal:      General: There is no distension. Palpations: Abdomen is soft. Tenderness: There is no abdominal tenderness. There is no guarding. Musculoskeletal:         General: No deformity or signs of injury. Cervical back: Neck supple. Skin:     General: Skin is warm and dry. Capillary Refill: Capillary refill takes less than 2 seconds.    Neurological:      General: No focal deficit present. Mental Status: She is alert and oriented to person, place, and time. Psychiatric:         Mood and Affect: Mood normal.         Labs & Results:    Lab Results   Component Value Date    SODIUM 140 08/09/2023    K 4.5 08/09/2023     08/09/2023    CO2 31 08/09/2023    BUN 34 (H) 08/09/2023    CREATININE 0.92 08/09/2023    GLUC 110 08/09/2023    CALCIUM 9.4 08/09/2023     Lab Results   Component Value Date    WBC 3.46 (L) 08/04/2023    HGB 10.7 (L) 08/04/2023    HCT 32.6 (L) 08/04/2023     (H) 08/04/2023     (L) 08/04/2023     No results found for: "NTBNP"   Lab Results   Component Value Date    CHOLESTEROL 96 06/30/2023     Lab Results   Component Value Date    HDL 56 06/30/2023     Lab Results   Component Value Date    TRIG 43 06/30/2023     No results found for: "[address]"    EKG personally reviewed by Alexia Johns MD.     Counseling / Coordination of Care  Time spent today 25 minutes. Greater than 50% of total time was spent with the patient and / or family counseling and / or coordination of care. We went over current diagnosis, most recent studies and any changes in treatment. Thank you for the opportunity to participate in the care of this patient.     Alexia Johns MD  ADVANCED HEART FAILURE AND MECHANICAL CIRCULATORY SUPPORT  [address]

## 2023-08-09 NOTE — CASE COMMUNICATION
Ship to Pt. Home   Clinician to take to pt   Branch: [id number] [facility]   Wound 1 sacral wound   All items are ordered by the each unless otherwise noted.    PLEASE DO NOT ORDER BY THE BOX   Request specific quantity needed   For Private Insurances order should be for a 2 week period   For [facility] patients order should be for a 1 week period   [facility] [id number]   Gauze 4x4 N/S 200 4x4s per unit 741988-1   Hydrocellula r Foam Adh, 4x4 [id number]

## 2023-08-10 ENCOUNTER — HOME CARE VISIT (OUTPATIENT)
Dept: HOME HEALTH SERVICES | Facility: HOME HEALTHCARE | Age: 78
End: 2023-08-10
Payer: COMMERCIAL

## 2023-08-10 VITALS — DIASTOLIC BLOOD PRESSURE: 84 MMHG | SYSTOLIC BLOOD PRESSURE: 120 MMHG | OXYGEN SATURATION: 99 % | HEART RATE: 60 BPM

## 2023-08-10 PROCEDURE — G0152 HHCP-SERV OF OT,EA 15 MIN: HCPCS

## 2023-08-11 ENCOUNTER — HOME CARE VISIT (OUTPATIENT)
Dept: HOME HEALTH SERVICES | Facility: HOME HEALTHCARE | Age: 78
End: 2023-08-11
Payer: COMMERCIAL

## 2023-08-11 PROCEDURE — G0299 HHS/HOSPICE OF RN EA 15 MIN: HCPCS

## 2023-08-13 VITALS
DIASTOLIC BLOOD PRESSURE: 78 MMHG | TEMPERATURE: 97.3 F | SYSTOLIC BLOOD PRESSURE: 120 MMHG | OXYGEN SATURATION: 98 % | HEART RATE: 63 BPM | RESPIRATION RATE: 18 BRPM

## 2023-08-13 PROCEDURE — G0180 MD CERTIFICATION HHA PATIENT: HCPCS | Performed by: INTERNAL MEDICINE

## 2023-08-15 ENCOUNTER — HOME CARE VISIT (OUTPATIENT)
Dept: HOME HEALTH SERVICES | Facility: HOME HEALTHCARE | Age: 78
End: 2023-08-15
Payer: COMMERCIAL

## 2023-08-15 VITALS
OXYGEN SATURATION: 97 % | DIASTOLIC BLOOD PRESSURE: 84 MMHG | TEMPERATURE: 97.4 F | RESPIRATION RATE: 18 BRPM | HEART RATE: 60 BPM | SYSTOLIC BLOOD PRESSURE: 140 MMHG

## 2023-08-15 VITALS
HEART RATE: 68 BPM | SYSTOLIC BLOOD PRESSURE: 144 MMHG | DIASTOLIC BLOOD PRESSURE: 78 MMHG | OXYGEN SATURATION: 100 % | RESPIRATION RATE: 30 BRPM

## 2023-08-15 PROCEDURE — G0151 HHCP-SERV OF PT,EA 15 MIN: HCPCS

## 2023-08-15 PROCEDURE — G0299 HHS/HOSPICE OF RN EA 15 MIN: HCPCS

## 2023-08-17 ENCOUNTER — HOME CARE VISIT (OUTPATIENT)
Dept: HOME HEALTH SERVICES | Facility: HOME HEALTHCARE | Age: 78
End: 2023-08-17
Payer: COMMERCIAL

## 2023-08-17 VITALS
RESPIRATION RATE: 20 BRPM | SYSTOLIC BLOOD PRESSURE: 118 MMHG | DIASTOLIC BLOOD PRESSURE: 68 MMHG | OXYGEN SATURATION: 98 % | HEART RATE: 62 BPM

## 2023-08-17 PROCEDURE — G0151 HHCP-SERV OF PT,EA 15 MIN: HCPCS

## 2023-08-21 ENCOUNTER — HOME CARE VISIT (OUTPATIENT)
Dept: HOME HEALTH SERVICES | Facility: HOME HEALTHCARE | Age: 78
End: 2023-08-21
Payer: COMMERCIAL

## 2023-08-21 VITALS — HEART RATE: 60 BPM | OXYGEN SATURATION: 98 % | DIASTOLIC BLOOD PRESSURE: 68 MMHG | SYSTOLIC BLOOD PRESSURE: 122 MMHG

## 2023-08-21 PROCEDURE — G0151 HHCP-SERV OF PT,EA 15 MIN: HCPCS

## 2023-08-21 PROCEDURE — G0152 HHCP-SERV OF OT,EA 15 MIN: HCPCS

## 2023-08-22 VITALS — DIASTOLIC BLOOD PRESSURE: 69 MMHG | HEART RATE: 69 BPM | SYSTOLIC BLOOD PRESSURE: 118 MMHG | OXYGEN SATURATION: 99 %

## 2023-08-23 ENCOUNTER — HOME CARE VISIT (OUTPATIENT)
Dept: HOME HEALTH SERVICES | Facility: HOME HEALTHCARE | Age: 78
End: 2023-08-23
Payer: COMMERCIAL

## 2023-08-23 ENCOUNTER — OFFICE VISIT (OUTPATIENT)
Dept: CARDIOLOGY CLINIC | Facility: CLINIC | Age: 78
End: 2023-08-23
Payer: COMMERCIAL

## 2023-08-23 VITALS
OXYGEN SATURATION: 100 % | SYSTOLIC BLOOD PRESSURE: 104 MMHG | HEART RATE: 60 BPM | DIASTOLIC BLOOD PRESSURE: 60 MMHG | RESPIRATION RATE: 18 BRPM

## 2023-08-23 VITALS
SYSTOLIC BLOOD PRESSURE: 122 MMHG | DIASTOLIC BLOOD PRESSURE: 72 MMHG | WEIGHT: 157.4 LBS | OXYGEN SATURATION: 95 % | HEART RATE: 60 BPM | HEIGHT: 58 IN | BODY MASS INDEX: 33.04 KG/M2

## 2023-08-23 DIAGNOSIS — I50.32 CHRONIC HEART FAILURE WITH PRESERVED EJECTION FRACTION (HCC): Primary | ICD-10-CM

## 2023-08-23 DIAGNOSIS — I48.91 ATRIAL FIBRILLATION, UNSPECIFIED TYPE (HCC): ICD-10-CM

## 2023-08-23 DIAGNOSIS — Z95.810 AICD (AUTOMATIC CARDIOVERTER/DEFIBRILLATOR) PRESENT: ICD-10-CM

## 2023-08-23 DIAGNOSIS — Z95.2 H/O MECHANICAL AORTIC VALVE REPLACEMENT: ICD-10-CM

## 2023-08-23 DIAGNOSIS — E87.6 HYPOKALEMIA: ICD-10-CM

## 2023-08-23 PROCEDURE — G0151 HHCP-SERV OF PT,EA 15 MIN: HCPCS

## 2023-08-23 PROCEDURE — 99214 OFFICE O/P EST MOD 30 MIN: CPT | Performed by: INTERNAL MEDICINE

## 2023-08-23 RX ORDER — POTASSIUM CHLORIDE 20 MEQ/1
40 TABLET, EXTENDED RELEASE ORAL 2 TIMES DAILY
Qty: 120 TABLET | Refills: 5 | Status: SHIPPED | OUTPATIENT
Start: 2023-08-23

## 2023-08-23 RX ORDER — TORSEMIDE 20 MG/1
TABLET ORAL
Qty: 90 TABLET | Refills: 5 | Status: SHIPPED | OUTPATIENT
Start: 2023-08-23 | End: 2023-08-28

## 2023-08-23 NOTE — PROGRESS NOTES
Cardiology Outpatient Progress Note - Sahshank Conrad 68 y.o. female MRN: [id number]    Encounter: [id number]      Assessment/Plan:    Patient Active Problem List    Diagnosis Date Noted   • Left foot pain 08/03/2023   • Arthritis of left foot 08/03/2023   • Hypokalemia 08/02/2023   • Acute on chronic heart failure with preserved ejection fraction ([address]) 07/27/2023   • Pancytopenia ([address]) 07/27/2023   • Aneurysm of ascending aorta without rupture ([address]) 07/10/2023   • Difficult intravenous access 05/15/2023   • Acute COVID-19 05/14/2023   • A-fib ([address]) 05/13/2023   • Stage III pressure ulcer of sacral region ([address]) 05/13/2023   • Asthma 05/13/2023   • Osteoporosis with current pathological fracture with routine healing 05/13/2023   • Class 2 obesity in adult 05/13/2023   • CHAS (obstructive sleep apnea) 05/13/2023   • Abnormal CT scan 05/10/2023   • Hyponatremia 05/09/2023   • Bradycardia 05/08/2023   • Ventricular arrhythmia 05/04/2023   • Type 2 diabetes mellitus ([address]) 04/28/2023   • Fall 04/27/2023   • Closed displaced oblique fracture of shaft of left humerus with routine healing 04/27/2023   • Acute pain due to trauma 04/27/2023   • H/O mechanical aortic valve replacement 04/27/2023   • Fracture of multiple ribs of right side 04/27/2023   • Chronic ITP (idiopathic thrombocytopenia) ([address]) 04/02/2023   • CAD (coronary artery disease) 05/10/2017       # Heart failure with preserved ejection fraction  # RV dilation with reduced systolic function, pulmonary hypertension, likely due to group II disease  Diuretic: torsemide 40mg in AM and 20mg in PM with potassium 40 meq BID  Euvolemic    Weight: 167 lbs on discharge 8/4/23; 162 lbs 8/9/23; 157 lbs 8/23/23   7/27/23    Studies- personally reviewed by myself    Echocardiogram 7/28/23  LVEF: 60%  LVIDd: 5.1cm  RV: severely dilated, moderately reduced systolic function  AV: bileaflet mechanical AV, mild transvalvular AI.  Mean gradient 6mmHg  MR: mild to moderate, severe MAC  PASP: 49mmHg, estimated RAP 15mmHg  RVOT: no notching, shortened PAAT  Other: grade 2 diastology    # H/o aortic valve stenosis s/p mechanical AVR in 2011, normally functioning on last echo  [facility]: coumadin - PCP managing dosing  # H/o persistent atrial fibrillation  Rate: metoprolol succinate 25mg daily  Rhythm: amiodarone 200mg daily  AC: coumadin  # H/o VT/VF, s/p ICD  Interrogation 6/20/23:  71%. No significant high rate episodes since hospital discharge  # Inappropriate shock for rapid afib 5/2023  # Hypertension, controlled  # Dyslipidemia  6/30/2023; cholesterol 96, triglycerides 43, HDL 56, and LDL calculated at 31  # DM type 2  # UTI 7/2023  # h/o CHAS with CPAP use in the past  # remote h/o DVT    TODAY'S PLAN:  Continue current dose of torsemide and potassium  2g sodium diet  2L fluid restriction  Daily weights  Follow up with endocrinology regarding re-initiation of farxiga      HPI:     Susan Bhagat is a 68 y.o. female with A. Fib, DM2, HTN, CAD, mechanical aortic valve, CHF who originally presented to the hospital on 7/27/2023 due to SOB, decreased UOP, leg swelling and orthopnea. CXR revealed mild pulmonary venous congestion with trace effusions. She was treated for decompensated heart failure. She also was treated for UTI. Discharged on torsemide 40mg in AM and 20mg in PM with potassium 20 meq daily. On lasix 40mg daily prior to admission. She reports overall doing well since discharge. No leg swelling and shortness of breath on current level of exertion. She is scheduled to start home PT and OT. Home weight 166 on discharge -- 163 lbs today. Interval History:  8/23/23: Here for follow up. 8/9/23 Na 140 K 4.5 and creatinine 0.92. Weighs 152-154 lbs at home. No leg swelling. No shortness of breath on current level of exertion. Adherent to medications and diet. No dizziness or lightheadedness. Review of Systems   Constitutional: Negative for chills, fatigue and fever.    HENT: Negative for congestion, nosebleeds and sore throat. Eyes: Negative for discharge and visual disturbance. Respiratory: Negative for cough, chest tightness and shortness of breath. Cardiovascular: Negative for chest pain, palpitations and leg swelling. Gastrointestinal: Negative for abdominal distention, abdominal pain, diarrhea, nausea and vomiting. Endocrine: Negative for cold intolerance and heat intolerance. Genitourinary: Negative for dysuria, flank pain and hematuria. Musculoskeletal: Negative for arthralgias, back pain and myalgias. Skin: Negative for color change, pallor and rash. Neurological: Negative for dizziness, syncope, weakness and light-headedness. Hematological: Negative for adenopathy. Does not bruise/bleed easily. Psychiatric/Behavioral: Negative for agitation and confusion. Past Medical History:   Diagnosis Date   • Arthritis    • Asthma    • Diabetes ([address])    • Environmental allergies    • Heart murmur    • Hypertension          Allergies   Allergen Reactions   • Fentanyl Other (See Comments)     Other reaction(s): Hallucinations  paranoia, confusion  Other reaction(s): Hallucinations  Other reaction(s): Hallucinations  hallucinations   • Fentanyl Confusion     .     Current Outpatient Medications:   •  acetaminophen (TYLENOL) 325 mg tablet, Take 2 tablets (650 mg total) by mouth every 6 (six) hours as needed for mild pain, Disp: 63 tablet, Rfl: 0  •  alendronate (FOSAMAX) 70 mg tablet, Take 70 mg by mouth Once a week, Disp: , Rfl:   •  allopurinol (ZYLOPRIM) 100 mg tablet, Take 100 mg by mouth daily, Disp: , Rfl:   •  amiodarone 200 mg tablet, Take 1 tablet (200 mg total) by mouth daily with breakfast, Disp: 90 tablet, Rfl: 2  •  atorvastatin (LIPITOR) 40 mg tablet, , Disp: , Rfl:   •  Blood Glucose Monitoring Suppl (OneTouch Verio Flex System) w/Device KIT, Use as directed, Disp: , Rfl:   •  cholecalciferol (VITAMIN D3) 1,000 units tablet, Take 1 tablet (1,000 Units total) by mouth daily Do not start before June 2, 2023., Disp: 30 tablet, Rfl: 0  •  citalopram (CeleXA) 20 mg tablet, Take 20 mg by mouth daily, Disp: , Rfl:   •  docusate sodium (COLACE) 100 mg capsule, Take 1 capsule (100 mg total) by mouth 2 (two) times a day, Disp: 60 capsule, Rfl: 0  •  magnesium 30 MG tablet, Take 64 mg by mouth 3 (three) times a day Takes 3-4 days a week takes 1 tab., Disp: , Rfl:   •  Metamucil Fiber CHEW, Chew 3 gums in the morning. Indications: fiber supplement, Disp: , Rfl:   •  metoprolol succinate (TOPROL-XL) 25 mg 24 hr tablet, Take 1 tablet (25 mg total) by mouth daily Do not start before June 2, 2023., Disp: 30 tablet, Rfl: 0  •  montelukast (SINGULAIR) 10 mg tablet, Take 10 mg by mouth daily at bedtime, Disp: , Rfl:   •  multivitamin (THERAGRAN) TABS, Take 1 tablet by mouth daily, Disp: , Rfl:   •  potassium chloride (K-DUR,KLOR-CON) 20 mEq tablet, Take 2 tablets (40 mEq total) by mouth 2 (two) times a day, Disp: 120 tablet, Rfl: 5  •  torsemide (DEMADEX) 20 mg tablet, Take 40mg PO QAM and 20mg PO QPM, Disp: 90 tablet, Rfl: 5  •  warfarin (COUMADIN) 5 mg tablet, Take 5 mg by mouth daily Takes 5mg on Monday and Friday and 2.5mg the other 5 days of the week, Disp: , Rfl:   •  Farxiga 5 MG TABS, Take 5 mg by mouth daily (Patient not taking: Reported on 7/25/2023), Disp: , Rfl:   •  traMADol (Ultram) 50 mg tablet, Take 1 tablet (50 mg total) by mouth every 6 (six) hours as needed for severe pain (Patient not taking: Reported on 8/9/2023), Disp: 25 tablet, Rfl: 0    Social History     Socioeconomic History   • Marital status:      Spouse name: Not on file   • Number of children: Not on file   • Years of education: Not on file   • Highest education level: Not on file   Occupational History   • Not on file   Tobacco Use   • Smoking status: Never   • Smokeless tobacco: Never   Vaping Use   • Vaping Use: Never used   Substance and Sexual Activity   • Alcohol use:  Yes Comment: socially   • Drug use: Never   • Sexual activity: Not on file   Other Topics Concern   • Not on file   Social History Narrative    ** Merged History Encounter **          Social Determinants of Health     Financial Resource Strain: Not on file   Food Insecurity: No Food Insecurity (7/28/2023)    Hunger Vital Sign    • Worried About Running Out of Food in the Last Year: Never true    • Ran Out of Food in the Last Year: Never true   Transportation Needs: No Transportation Needs (7/28/2023)    PRAPARE - Transportation    • Lack of Transportation (Medical): No    • Lack of Transportation (Non-Medical): No   Physical Activity: Not on file   Stress: Not on file   Social Connections: Not on file   Intimate Partner Violence: Not on file   Housing Stability: Low Risk  (7/28/2023)    Housing Stability Vital Sign    • Unable to Pay for Housing in the Last Year: No    • Number of Places Lived in the Last Year: 1    • Unstable Housing in the Last Year: No       Family History   Problem Relation Age of Onset   • Hypertension Other    • Arthritis Family    • Heart disease Family    • Hypertension Family    • Diabetes type II Family        Physical Exam:    Vitals:   Vitals:    08/23/23 1031   BP: 122/72   Pulse: 60   SpO2: 95%       Physical Exam  Constitutional:       General: She is not in acute distress. Appearance: Normal appearance. HENT:      Head: Normocephalic and atraumatic. Mouth/Throat:      Mouth: Mucous membranes are moist.   Eyes:      Extraocular Movements: Extraocular movements intact. Conjunctiva/sclera: Conjunctivae normal.   Cardiovascular:      Rate and Rhythm: Normal rate and regular rhythm. Pulses: Normal pulses. Heart sounds: No murmur heard. No friction rub. No gallop. Comments: Prominent v waves, no edema  Mechanical S2  Pulmonary:      Effort: Pulmonary effort is normal. No respiratory distress. Breath sounds: No wheezing, rhonchi or rales.    Abdominal: General: There is no distension. Palpations: Abdomen is soft. Tenderness: There is no abdominal tenderness. There is no guarding. Musculoskeletal:         General: No deformity or signs of injury. Cervical back: Neck supple. Skin:     General: Skin is warm and dry. Capillary Refill: Capillary refill takes less than 2 seconds. Neurological:      General: No focal deficit present. Mental Status: She is alert and oriented to person, place, and time. Psychiatric:         Mood and Affect: Mood normal.         Labs & Results:    Lab Results   Component Value Date    SODIUM 140 08/09/2023    K 4.5 08/09/2023     08/09/2023    CO2 31 08/09/2023    BUN 34 (H) 08/09/2023    CREATININE 0.92 08/09/2023    GLUC 110 08/09/2023    CALCIUM 9.4 08/09/2023     Lab Results   Component Value Date    WBC 3.46 (L) 08/04/2023    HGB 10.7 (L) 08/04/2023    HCT 32.6 (L) 08/04/2023     (H) 08/04/2023     (L) 08/04/2023     No results found for: "NTBNP"   Lab Results   Component Value Date    CHOLESTEROL 96 06/30/2023     Lab Results   Component Value Date    HDL 56 06/30/2023     Lab Results   Component Value Date    TRIG 43 06/30/2023     No results found for: "[address]"    EKG personally reviewed by Jude Murphy MD.     Counseling / Coordination of Care  Time spent today 25 minutes. Greater than 50% of total time was spent with the patient and / or family counseling and / or coordination of care. We went over current diagnosis, most recent studies and any changes in treatment. Thank you for the opportunity to participate in the care of this patient.     Jude Murphy MD  ADVANCED HEART FAILURE AND MECHANICAL CIRCULATORY SUPPORT  [address]

## 2023-08-23 NOTE — PATIENT INSTRUCTIONS
Continue current dose of torsemide and potassium  2g sodium diet  2L fluid restriction  Daily weights  Follow up with endocrinology regarding re-initiation of farxiga

## 2023-08-27 DIAGNOSIS — I50.32 CHRONIC HEART FAILURE WITH PRESERVED EJECTION FRACTION (HCC): ICD-10-CM

## 2023-08-28 RX ORDER — TORSEMIDE 20 MG/1
TABLET ORAL
Qty: 270 TABLET | Refills: 2 | Status: SHIPPED | OUTPATIENT
Start: 2023-08-28

## 2023-08-29 ENCOUNTER — CONSULT (OUTPATIENT)
Dept: HEMATOLOGY ONCOLOGY | Facility: CLINIC | Age: 78
End: 2023-08-29
Payer: COMMERCIAL

## 2023-08-29 VITALS
RESPIRATION RATE: 17 BRPM | TEMPERATURE: 98 F | DIASTOLIC BLOOD PRESSURE: 82 MMHG | HEART RATE: 60 BPM | SYSTOLIC BLOOD PRESSURE: 118 MMHG | HEIGHT: 58 IN | BODY MASS INDEX: 32.95 KG/M2 | WEIGHT: 157 LBS | OXYGEN SATURATION: 98 %

## 2023-08-29 DIAGNOSIS — D69.3 CHRONIC ITP (IDIOPATHIC THROMBOCYTOPENIA) (HCC): Primary | ICD-10-CM

## 2023-08-29 DIAGNOSIS — D53.9 MACROCYTIC ANEMIA: ICD-10-CM

## 2023-08-29 DIAGNOSIS — D61.818 PANCYTOPENIA (HCC): ICD-10-CM

## 2023-08-29 PROCEDURE — 99204 OFFICE O/P NEW MOD 45 MIN: CPT | Performed by: INTERNAL MEDICINE

## 2023-08-29 NOTE — PROGRESS NOTES
Zhen Lizarraga  1945  [address] [facility] [place]  [place] [id number]    Chief Complaint   Patient presents with   • Consult     History of Present Illness:  Lashay Contreras is a 42-year-old female with past medical history significant for atrial fibrillation, asthma, obstructive sleep apnea, CAD and history of chronic ITP. Patient was previously following with hematology at [facility] (Dr. Callum Andrews) and last evaluated on 3/30/2023 (notes reviewed in [facility]). She is noted to have chronic thrombocytopenia dating as far back as 7/2013. Since April 2023 patient has had prolonged hospitalizations. She was initially admitted after a left humerus fracture which was treated conservatively and was admitted to rehab from 5/13/2023 - 6/1/2023. Soon after patient was admitted for acute on chronic heart failure at [facility] (7/27/2023 - 8/4/2023). Blood work drawn during her hospitalizations noted pancytopenia. Most recent blood work done on 7/21/2023 notes pancytopenia. Labs from 7/21/2023 showed WBC count 3.2, H/H 10.4/31.4, , platelets 431, ANC 2.5. No recent labs available after hospitalization. Pt presents for initial consultation accompanied by her daughter. Denies any fever or night sweats. Denies any recurrent infections. Has chronic lower extremity swelling (left greater than right). She denies any dark stools, blood in her stools or hematuria. S/p colonoscopy >5 years, nml per pt. Review of Systems   Constitutional: Negative for fatigue and fever. Respiratory: Negative. Cardiovascular: Positive for leg swelling (left > right). Gastrointestinal: Negative for anal bleeding, blood in stool and rectal pain. Genitourinary: Negative for hematuria. Musculoskeletal: Positive for arthralgias. Hematological: Does not bruise/bleed easily.      Patient Active Problem List   Diagnosis   • Fall   • Closed displaced oblique fracture of shaft of left humerus with routine healing   • Acute pain due to trauma   • H/O mechanical aortic valve replacement   • Fracture of multiple ribs of right side   • Type 2 diabetes mellitus ([facility])   • Ventricular arrhythmia   • Bradycardia   • Hyponatremia   • Abnormal CT scan   • A-fib ([facility])   • Stage III pressure ulcer of sacral region ([facility])   • Asthma   • Osteoporosis with current pathological fracture with routine healing   • Class 2 obesity in adult   • CHAS (obstructive sleep apnea)   • Acute COVID-19   • Difficult intravenous access   • Aneurysm of ascending aorta without rupture ([facility])   • CAD (coronary artery disease)   • Chronic ITP (idiopathic thrombocytopenia) ([facility])   • Acute on chronic heart failure with preserved ejection fraction ([facility])   • Pancytopenia ([facility])   • Hypokalemia   • Left foot pain   • Arthritis of left foot     Past Medical History:   Diagnosis Date   • Arthritis    • Asthma    • Diabetes ([address])    • Environmental allergies    • Heart murmur    • Hypertension      Past Surgical History:   Procedure Laterality Date   • MECHANICAL AORTIC VALVE REPLACEMENT     • REPLACEMENT TOTAL KNEE N/A    • REPLACEMENT TOTAL KNEE       Family History   Problem Relation Age of Onset   • Hypertension Other    • Arthritis Family    • Heart disease Family    • Hypertension Family    • Diabetes type II Family      Social History     Socioeconomic History   • Marital status:      Spouse name: Not on file   • Number of children: Not on file   • Years of education: Not on file   • Highest education level: Not on file   Occupational History   • Not on file   Tobacco Use   • Smoking status: Never   • Smokeless tobacco: Never   Vaping Use   • Vaping Use: Never used   Substance and Sexual Activity   • Alcohol use: Yes     Comment: socially   • Drug use: Never   • Sexual activity: Not on file   Other Topics Concern   • Not on file   Social History Narrative    ** Merged History Encounter **          Social Determinants of Health     Financial Resource Strain: Not on file   Food Insecurity: No Food Insecurity (7/28/2023)    Hunger Vital Sign    • Worried About Running Out of Food in the Last Year: Never true    • Ran Out of Food in the Last Year: Never true   Transportation Needs: No Transportation Needs (7/28/2023)    PRAPARE - Transportation    • Lack of Transportation (Medical): No    • Lack of Transportation (Non-Medical): No   Physical Activity: Not on file   Stress: Not on file   Social Connections: Not on file   Intimate Partner Violence: Not on file   Housing Stability: Low Risk  (7/28/2023)    Housing Stability Vital Sign    • Unable to Pay for Housing in the Last Year: No    • Number of Places Lived in the Last Year: 1    • Unstable Housing in the Last Year: No       Current Outpatient Medications:   •  acetaminophen (TYLENOL) 325 mg tablet, Take 2 tablets (650 mg total) by mouth every 6 (six) hours as needed for mild pain, Disp: 63 tablet, Rfl: 0  •  allopurinol (ZYLOPRIM) 100 mg tablet, Take 100 mg by mouth daily, Disp: , Rfl:   •  amiodarone 200 mg tablet, Take 1 tablet (200 mg total) by mouth daily with breakfast, Disp: 90 tablet, Rfl: 2  •  atorvastatin (LIPITOR) 40 mg tablet, , Disp: , Rfl:   •  Blood Glucose Monitoring Suppl (OneTouch Verio Flex System) w/Device KIT, Use as directed, Disp: , Rfl:   •  cholecalciferol (VITAMIN D3) 1,000 units tablet, Take 1 tablet (1,000 Units total) by mouth daily Do not start before June 2, 2023., Disp: 30 tablet, Rfl: 0  •  citalopram (CeleXA) 20 mg tablet, Take 20 mg by mouth daily, Disp: , Rfl:   •  docusate sodium (COLACE) 100 mg capsule, Take 1 capsule (100 mg total) by mouth 2 (two) times a day, Disp: 60 capsule, Rfl: 0  •  magnesium 30 MG tablet, Take 64 mg by mouth 3 (three) times a day Takes 3-4 days a week takes 1 tab., Disp: , Rfl:   •  Metamucil Fiber CHEW, Chew 3 gums in the morning.  Indications: fiber supplement, Disp: , Rfl: •  metoprolol succinate (TOPROL-XL) 25 mg 24 hr tablet, Take 1 tablet (25 mg total) by mouth daily Do not start before June 2, 2023., Disp: 30 tablet, Rfl: 0  •  montelukast (SINGULAIR) 10 mg tablet, Take 10 mg by mouth daily at bedtime, Disp: , Rfl:   •  multivitamin (THERAGRAN) TABS, Take 1 tablet by mouth daily, Disp: , Rfl:   •  potassium chloride (K-DUR,KLOR-CON) 20 mEq tablet, Take 2 tablets (40 mEq total) by mouth 2 (two) times a day, Disp: 120 tablet, Rfl: 5  •  torsemide (DEMADEX) 20 mg tablet, TAKE 2 TABLETS BY MOUTH EVERY MORNING AND 1 TABLET EVERY EVENING, Disp: 270 tablet, Rfl: 2  •  warfarin (COUMADIN) 5 mg tablet, Take 5 mg by mouth daily Takes 5mg on Monday and Friday and 2.5mg the other 5 days of the week, Disp: , Rfl:   •  alendronate (FOSAMAX) 70 mg tablet, Take 70 mg by mouth Once a week, Disp: , Rfl:   •  Farxiga 5 MG TABS, Take 5 mg by mouth daily (Patient not taking: Reported on 7/25/2023), Disp: , Rfl:   •  traMADol (Ultram) 50 mg tablet, Take 1 tablet (50 mg total) by mouth every 6 (six) hours as needed for severe pain (Patient not taking: Reported on 8/9/2023), Disp: 25 tablet, Rfl: 0  Allergies   Allergen Reactions   • Fentanyl Other (See Comments)     Other reaction(s): Hallucinations  paranoia, confusion  Other reaction(s): Hallucinations  Other reaction(s): Hallucinations  hallucinations   • Fentanyl Confusion     Vitals:    08/29/23 1409   BP: 118/82   Pulse: 60   Resp: 17   Temp: 98 °F (36.7 °C)   SpO2: 98%       Wt Readings from Last 3 Encounters:   08/29/23 71.2 kg (157 lb)   08/23/23 71.4 kg (157 lb 6.4 oz)   08/09/23 73.5 kg (162 lb)     Physical Exam  Vitals reviewed. Constitutional:       General: She is not in acute distress. Appearance: Normal appearance. HENT:      Head: Normocephalic and atraumatic. Mouth/Throat:      Mouth: Mucous membranes are moist.   Eyes:      Extraocular Movements: Extraocular movements intact.       Conjunctiva/sclera: Conjunctivae normal.   Cardiovascular:      Rate and Rhythm: Normal rate. Heart sounds: Murmur heard. Pulmonary:      Effort: Pulmonary effort is normal. No respiratory distress. Breath sounds: No wheezing, rhonchi or rales. Abdominal:      General: Abdomen is flat. There is no distension. Palpations: Abdomen is soft. Musculoskeletal:      Cervical back: Normal range of motion and neck supple. Right lower leg: No edema. Left lower leg: Edema (chronic) present. Skin:     General: Skin is warm. Coloration: Skin is not pale. Neurological:      Mental Status: She is alert and oriented to person, place, and time. Mental status is at baseline. Cranial Nerves: No cranial nerve deficit. Psychiatric:         Thought Content: Thought content normal.       Labs:  7/14/2022: Platelet antibody direct, IgG: Negative. Platelet antibody direct, IgM: Negative  8/12/2022: SPEP: Pattern suggest polyclonal hypergammaglobulinemia. Platelet antibody, indirect: Not detected    7/21/2023: WBC: 3.2, H/H: 10.4/31.4, MCV: 101, platelets: 508. ANC: 2.5. Creatinine: 0.67. AST/ALT: 28/15. Iron: 86, iron saturation: 20%, TIBC: 431, transferrin: 308    7/28/2023: SPEP with STEFANIA: Cannot rule out small monoclonal gammopathy. 7/30/2023: WBC: 3.47, H/H: 10.4/32.5, MCV: 101, Platelets: 077. ANC: 2.13  8/4/2023: WBC: 3.46, H/H: 10.7/32.6, MCV: 100, platelets: 538. No differential available    Discussion/Summary:    1. Pancytopenia  2. History of chronic ITP    4/27/2023-5/13/2023: admitted for left humerus fracture. Treated conservatively. 5/13/2023-6/1/2023: rehab   7/27/2023 - 8/4/2023: admitted for acute on chronic heart failure at [facility].    I met with the patient and her daughter in the office and reviewed her history, chart, recent hospitalizations and labs. Patient noted to have chronic thrombocytopenia since at least 2013 due to ITP.   She has not required treatment for that in the past.  Since April 2023 she has had prolonged hospitalizations after left humerus fracture, rehab and then acute on chronic CHF. During admission she was noted to have pancytopenia. Lengthy discussion with the patient and her daughter regarding the significance of pancytopenia. Suspect abnormalities noted on her CBC are likely in the acute setting. At this juncture, recommend repeat CBC now. If pancytopenia persists or has worsened then will recommend further evaluation. 3. Atrial fibrillation  4. CHF  5. H/O mechanical aortic valve replacement  Currently maintained on coumadin. Following with cariology. Orders entered during encounter:   - Ambulatory Referral to Hematology / Oncology  - CBC and differential; Future  - Iron Panel (Includes Ferritin, Iron Sat%, Iron, and TIBC); Future  - Vitamin B12; Future  - Folate; Future  - Methylmalonic acid, serum; Future    Return in about 3 months (around 11/29/2023) for Office Visit, Labs - See Treatment Plan. 1. Check cbc, iron panel, vitamin B12/folate now  2.  Office f/u in 3 months with labs prior

## 2023-09-20 ENCOUNTER — HOSPITAL ENCOUNTER (OUTPATIENT)
Dept: NON INVASIVE DIAGNOSTICS | Facility: HOSPITAL | Age: 78
Discharge: HOME | End: 2023-09-20
Payer: COMMERCIAL

## 2023-09-20 VITALS
DIASTOLIC BLOOD PRESSURE: 92 MMHG | HEART RATE: 60 BPM | SYSTOLIC BLOOD PRESSURE: 118 MMHG | BODY MASS INDEX: 32.95 KG/M2 | HEIGHT: 58 IN | WEIGHT: 157 LBS

## 2023-09-20 DIAGNOSIS — I48.91 ATRIAL FIBRILLATION WITH RVR (HCC): ICD-10-CM

## 2023-09-20 DIAGNOSIS — I49.9 VENTRICULAR ARRHYTHMIA: ICD-10-CM

## 2023-09-20 DIAGNOSIS — Z95.2 H/O MECHANICAL AORTIC VALVE REPLACEMENT: ICD-10-CM

## 2023-09-20 LAB
AORTIC ROOT: 4 CM
AORTIC VALVE MEAN VELOCITY: 10.5 M/S
APICAL FOUR CHAMBER EJECTION FRACTION: 54 %
ASCENDING AORTA: 4.8 CM
AV AREA BY CONTINUOUS VTI: 1.5 CM2
AV AREA PEAK VELOCITY: 1.2 CM2
AV LVOT MEAN GRADIENT: 1 MMHG
AV LVOT PEAK GRADIENT: 1 MMHG
AV MEAN GRADIENT: 5 MMHG
AV PEAK GRADIENT: 8 MMHG
AV VALVE AREA: 1.51 CM2
AV VELOCITY RATIO: 0.38
DOP CALC AO PEAK VEL: 1.41 M/S
DOP CALC AO VTI: 24.68 CM
DOP CALC LVOT AREA: 3.14 CM2
DOP CALC LVOT CARDIAC INDEX: 1.41 L/MIN/M2
DOP CALC LVOT CARDIAC OUTPUT: 2.32 L/MIN
DOP CALC LVOT DIAMETER: 2 CM
DOP CALC LVOT PEAK VEL VTI: 11.86 CM
DOP CALC LVOT PEAK VEL: 0.54 M/S
DOP CALC LVOT STROKE INDEX: 23.8 ML/M2
DOP CALC LVOT STROKE VOLUME: 37.24 CM3
E WAVE DECELERATION TIME: 183 MS
FRACTIONAL SHORTENING: 35 % (ref 28–44)
INTERVENTRICULAR SEPTUM IN DIASTOLE (PARASTERNAL SHORT AXIS VIEW): 1.1 CM
INTERVENTRICULAR SEPTUM: 1.1 CM (ref 0.6–1.1)
LEFT ATRIUM SIZE: 5.1 CM
LEFT INTERNAL DIMENSION IN SYSTOLE: 3.3 CM (ref 2.1–4)
LEFT VENTRICLE DIASTOLIC VOLUME (MOD BIPLANE): 99 ML
LEFT VENTRICLE SYSTOLIC VOLUME (MOD BIPLANE): 42 ML
LEFT VENTRICULAR INTERNAL DIMENSION IN DIASTOLE: 5.1 CM (ref 3.5–6)
LEFT VENTRICULAR POSTERIOR WALL IN END DIASTOLE: 1.1 CM
LEFT VENTRICULAR STROKE VOLUME: 80 ML
LV EF: 57 %
LVSV (TEICH): 80 ML
MV E'TISSUE VEL-LAT: 9 CM/S
MV E'TISSUE VEL-SEP: 6 CM/S
MV PEAK A VEL: 0.51 M/S
MV PEAK E VEL: 115 CM/S
MV STENOSIS PRESSURE HALF TIME: 53 MS
MV VALVE AREA P 1/2 METHOD: 4.15 CM2
SL CV LV EF: 55
SL CV PED ECHO LEFT VENTRICLE DIASTOLIC VOLUME (MOD BIPLANE) 2D: 126 ML
SL CV PED ECHO LEFT VENTRICLE SYSTOLIC VOLUME (MOD BIPLANE) 2D: 46 ML
TRICUSPID ANNULAR PLANE SYSTOLIC EXCURSION: 1.3 CM
TRICUSPID VALVE PEAK REGURGITATION VELOCITY: 2.24 M/S

## 2023-09-20 PROCEDURE — 93325 DOPPLER ECHO COLOR FLOW MAPG: CPT

## 2023-09-20 PROCEDURE — 93308 TTE F-UP OR LMTD: CPT

## 2023-09-20 PROCEDURE — 93321 DOPPLER ECHO F-UP/LMTD STD: CPT

## 2023-09-20 PROCEDURE — 93321 DOPPLER ECHO F-UP/LMTD STD: CPT | Performed by: INTERNAL MEDICINE

## 2023-09-20 PROCEDURE — 93308 TTE F-UP OR LMTD: CPT | Performed by: INTERNAL MEDICINE

## 2023-09-20 PROCEDURE — 93325 DOPPLER ECHO COLOR FLOW MAPG: CPT | Performed by: INTERNAL MEDICINE

## 2023-09-22 ENCOUNTER — TELEPHONE (OUTPATIENT)
Dept: CARDIOLOGY CLINIC | Facility: CLINIC | Age: 78
End: 2023-09-22

## 2023-09-25 ENCOUNTER — TELEPHONE (OUTPATIENT)
Dept: CARDIOLOGY CLINIC | Facility: CLINIC | Age: 78
End: 2023-09-25

## 2023-09-25 DIAGNOSIS — E87.6 HYPOKALEMIA: ICD-10-CM

## 2023-09-25 RX ORDER — POTASSIUM CHLORIDE 20 MEQ/1
TABLET, EXTENDED RELEASE ORAL
Qty: 360 TABLET | Refills: 0 | Status: SHIPPED | OUTPATIENT
Start: 2023-09-25

## 2023-09-25 NOTE — TELEPHONE ENCOUNTER
----- Message from Maddie Townsend PA-C sent at 9/25/2023 12:05 PM EDT -----  Please call patient and let them know recent testing was normal - please call her to let her know the results of her recent echo. We ordered it to check the overall strength of her heart, and to make sure it did not change with more frequent ventricular pacing. Her EF looks largely unchanged from prior imaging, about 55%. She should still follow up with Dr. Ryann Berry later this week as scheduled. Thanks!

## 2023-09-25 NOTE — TELEPHONE ENCOUNTER
Dinesh Cloud called office returning voicemail. Spoke and discussed with her about recent echo results as per Unruly Kline, PAC.

## 2023-09-27 ENCOUNTER — APPOINTMENT (OUTPATIENT)
Dept: LAB | Facility: CLINIC | Age: 78
End: 2023-09-27
Payer: COMMERCIAL

## 2023-09-27 ENCOUNTER — OFFICE VISIT (OUTPATIENT)
Dept: CARDIOLOGY CLINIC | Facility: CLINIC | Age: 78
End: 2023-09-27
Payer: COMMERCIAL

## 2023-09-27 VITALS
DIASTOLIC BLOOD PRESSURE: 82 MMHG | OXYGEN SATURATION: 99 % | WEIGHT: 156 LBS | HEIGHT: 58 IN | HEART RATE: 54 BPM | SYSTOLIC BLOOD PRESSURE: 144 MMHG | BODY MASS INDEX: 32.75 KG/M2

## 2023-09-27 DIAGNOSIS — Z95.810 AICD (AUTOMATIC CARDIOVERTER/DEFIBRILLATOR) PRESENT: ICD-10-CM

## 2023-09-27 DIAGNOSIS — D61.818 PANCYTOPENIA (HCC): ICD-10-CM

## 2023-09-27 DIAGNOSIS — Z95.2 H/O MECHANICAL AORTIC VALVE REPLACEMENT: ICD-10-CM

## 2023-09-27 DIAGNOSIS — D69.3 CHRONIC ITP (IDIOPATHIC THROMBOCYTOPENIA) (HCC): ICD-10-CM

## 2023-09-27 DIAGNOSIS — D53.9 MACROCYTIC ANEMIA: ICD-10-CM

## 2023-09-27 DIAGNOSIS — I48.91 ATRIAL FIBRILLATION, UNSPECIFIED TYPE (HCC): ICD-10-CM

## 2023-09-27 DIAGNOSIS — I50.32 CHRONIC HEART FAILURE WITH PRESERVED EJECTION FRACTION (HCC): Primary | ICD-10-CM

## 2023-09-27 LAB
ANION GAP SERPL CALCULATED.3IONS-SCNC: 6 MMOL/L
BUN SERPL-MCNC: 34 MG/DL (ref 5–25)
CALCIUM SERPL-MCNC: 9.8 MG/DL (ref 8.4–10.2)
CHLORIDE SERPL-SCNC: 100 MMOL/L (ref 96–108)
CO2 SERPL-SCNC: 33 MMOL/L (ref 21–32)
CREAT SERPL-MCNC: 0.9 MG/DL (ref 0.6–1.3)
GFR SERPL CREATININE-BSD FRML MDRD: 61 ML/MIN/1.73SQ M
GLUCOSE P FAST SERPL-MCNC: 89 MG/DL (ref 65–99)
POTASSIUM SERPL-SCNC: 4.1 MMOL/L (ref 3.5–5.3)
SODIUM SERPL-SCNC: 139 MMOL/L (ref 135–147)

## 2023-09-27 PROCEDURE — 99214 OFFICE O/P EST MOD 30 MIN: CPT | Performed by: INTERNAL MEDICINE

## 2023-09-27 NOTE — PROGRESS NOTES
Cardiology Outpatient Progress Note - Heriberto Brambila 68 y.o. female MRN: [id number]    Encounter: [id number]      Assessment/Plan:    Patient Active Problem List    Diagnosis Date Noted   • Left foot pain 08/03/2023   • Arthritis of left foot 08/03/2023   • Hypokalemia 08/02/2023   • Acute on chronic heart failure with preserved ejection fraction ([address]) 07/27/2023   • Pancytopenia ([address]) 07/27/2023   • Aneurysm of ascending aorta without rupture ([address]) 07/10/2023   • Difficult intravenous access 05/15/2023   • Acute COVID-19 05/14/2023   • A-fib ([address]) 05/13/2023   • Stage III pressure ulcer of sacral region ([address]) 05/13/2023   • Asthma 05/13/2023   • Osteoporosis with current pathological fracture with routine healing 05/13/2023   • Class 2 obesity in adult 05/13/2023   • CHAS (obstructive sleep apnea) 05/13/2023   • Abnormal CT scan 05/10/2023   • Hyponatremia 05/09/2023   • Bradycardia 05/08/2023   • Ventricular arrhythmia 05/04/2023   • Type 2 diabetes mellitus ([address]) 04/28/2023   • Fall 04/27/2023   • Closed displaced oblique fracture of shaft of left humerus with routine healing 04/27/2023   • Acute pain due to trauma 04/27/2023   • H/O mechanical aortic valve replacement 04/27/2023   • Fracture of multiple ribs of right side 04/27/2023   • Chronic ITP (idiopathic thrombocytopenia) ([address]) 04/02/2023   • CAD (coronary artery disease) 05/10/2017       # Heart failure with preserved ejection fraction  # RV dilation with reduced systolic function, pulmonary hypertension, likely due to group II disease, appears improved on echo 9/20/2023. Diuretic: torsemide 40mg in AM and 20mg in PM with potassium 40 meq BID  Euvolemic    Weight: 167 lbs on discharge 8/4/23; 162 lbs 8/9/23; 157 lbs 8/23/23; 156 lbs 9/27/23   7/27/23    Studies- personally reviewed by myself    Echo 9/20/23:  LVEF 55%. Grade 1 diastology.   RV moderately dilated, mildly reduced systolic function  Moderate left atrial enlargement, severe right atrial enlargement  Aortic valve: No evidence of paravalvular regurgitation, trace transvalvular regurgitation, no significant stenosis mean gradient 5 mmHg. Moderate to severe MR, centrally directed jet. Severe MAC  Severe tricuspid regurgitation, there appears to be impingement of the anterior leaflet with RV pacer lead. Ascending aorta is moderately dilated. 4.8 cm. Echocardiogram 7/28/23  LVEF: 60%  LVIDd: 5.1cm  RV: severely dilated, moderately reduced systolic function  AV: bileaflet mechanical AV, mild transvalvular AI. Mean gradient 6mmHg  MR: mild to moderate, severe MAC  PASP: 49mmHg, estimated RAP 15mmHg, severe TR  RVOT: no notching, shortened PAAT  Other: grade 2 diastology    # H/o aortic valve stenosis s/p mechanical AVR in 2011, normally functioning on last echo  [facility]: coumadin - PCP managing dosing  # H/o persistent atrial fibrillation  Rate: metoprolol succinate 25mg daily  Rhythm: amiodarone 200mg daily  AC: coumadin  High ventricular pacing rate on interrogation 6/20/2023. Repeat echocardiogram September 2023 showed preserved EF of 55%. # H/o VT/VF, s/p ICD  Interrogation 6/20/23:  71%. No significant high rate episodes since hospital discharge  # Inappropriate shock for rapid afib 5/2023  # Hypertension, controlled  # Dyslipidemia  6/30/2023; cholesterol 96, triglycerides 43, HDL 56, and LDL calculated at 31  # DM type 2  # UTI 7/2023  # h/o CHAS with CPAP use in the past  # remote h/o DVT    TODAY'S PLAN:  Euvolemic on exam, weight stable  Continue current dose of torsemide and potassium  Obtain BMP  2g sodium diet  2L fluid restriction  Daily weights  Follow up with endocrinology regarding re-initiation of farxiga      HPI:     Vida English is a 68 y.o. female with A. Fib, DM2, HTN, CAD, mechanical aortic valve, CHF who originally presented to the hospital on 7/27/2023 due to SOB, decreased UOP, leg swelling and orthopnea.  CXR revealed mild pulmonary venous congestion with trace effusions. She was treated for decompensated heart failure. She also was treated for UTI. Discharged on torsemide 40mg in AM and 20mg in PM with potassium 20 meq daily. On lasix 40mg daily prior to admission. She reports overall doing well since discharge. No leg swelling and shortness of breath on current level of exertion. She is scheduled to start home PT and OT. Home weight 166 on discharge -- 163 lbs today. 8/23/23: Here for follow up. 8/9/23 Na 140 K 4.5 and creatinine 0.92. Weighs 152-154 lbs at home. No leg swelling. No shortness of breath on current level of exertion. Adherent to medications and diet. No dizziness or lightheadedness. Interval History:  9/27/23: here for follow up. Overall doing well. Exercises on cubii for about an hours. Home weights remain stable at 151-154. Walking more when outside. No shortness of breath or chest pain on current level of exertion. No abdominal distension or leg swelling. Review of Systems   Constitutional: Negative for chills, fatigue and fever. HENT: Negative for congestion, nosebleeds and sore throat. Eyes: Negative for discharge and visual disturbance. Respiratory: Negative for cough, chest tightness and shortness of breath. Cardiovascular: Negative for chest pain, palpitations and leg swelling. Gastrointestinal: Negative for abdominal distention, abdominal pain, diarrhea, nausea and vomiting. Endocrine: Negative for cold intolerance and heat intolerance. Genitourinary: Negative for dysuria, flank pain and hematuria. Musculoskeletal: Negative for arthralgias, back pain and myalgias. Skin: Negative for color change, pallor and rash. Neurological: Negative for dizziness, syncope, weakness and light-headedness. Hematological: Negative for adenopathy. Does not bruise/bleed easily. Psychiatric/Behavioral: Negative for agitation and confusion.        Past Medical History:   Diagnosis Date   • Arthritis    • Asthma    • Diabetes ([address]) • Environmental allergies    • Heart murmur    • Hypertension          Allergies   Allergen Reactions   • Fentanyl Other (See Comments)     Other reaction(s): Hallucinations  paranoia, confusion  Other reaction(s): Hallucinations  Other reaction(s): Hallucinations  hallucinations   • Fentanyl Confusion     . Current Outpatient Medications:   •  acetaminophen (TYLENOL) 325 mg tablet, Take 2 tablets (650 mg total) by mouth every 6 (six) hours as needed for mild pain, Disp: 63 tablet, Rfl: 0  •  allopurinol (ZYLOPRIM) 100 mg tablet, Take 100 mg by mouth daily, Disp: , Rfl:   •  amiodarone 200 mg tablet, Take 1 tablet (200 mg total) by mouth daily with breakfast, Disp: 90 tablet, Rfl: 2  •  atorvastatin (LIPITOR) 40 mg tablet, , Disp: , Rfl:   •  Blood Glucose Monitoring Suppl (OneTouch Verio Flex System) w/Device KIT, Use as directed, Disp: , Rfl:   •  cholecalciferol (VITAMIN D3) 1,000 units tablet, Take 1 tablet (1,000 Units total) by mouth daily Do not start before June 2, 2023., Disp: 30 tablet, Rfl: 0  •  citalopram (CeleXA) 20 mg tablet, Take 20 mg by mouth daily, Disp: , Rfl:   •  docusate sodium (COLACE) 100 mg capsule, Take 1 capsule (100 mg total) by mouth 2 (two) times a day, Disp: 60 capsule, Rfl: 0  •  magnesium 30 MG tablet, Take 64 mg by mouth 3 (three) times a day Takes 3-4 days a week takes 1 tab., Disp: , Rfl:   •  Metamucil Fiber CHEW, Chew 3 gums in the morning.  Indications: fiber supplement, Disp: , Rfl:   •  metoprolol succinate (TOPROL-XL) 25 mg 24 hr tablet, Take 1 tablet (25 mg total) by mouth daily Do not start before June 2, 2023., Disp: 30 tablet, Rfl: 0  •  montelukast (SINGULAIR) 10 mg tablet, Take 10 mg by mouth daily at bedtime, Disp: , Rfl:   •  multivitamin (THERAGRAN) TABS, Take 1 tablet by mouth daily, Disp: , Rfl:   •  potassium chloride (K-DUR,KLOR-CON) 20 mEq tablet, TAKE 2 TABLETS(40 MEQ) BY MOUTH TWICE DAILY, Disp: 360 tablet, Rfl: 0  •  torsemide (DEMADEX) 20 mg tablet, TAKE 2 TABLETS BY MOUTH EVERY MORNING AND 1 TABLET EVERY EVENING, Disp: 270 tablet, Rfl: 2  •  warfarin (COUMADIN) 5 mg tablet, Take 5 mg by mouth daily Takes 5mg on Monday and Friday and 2.5mg the other 5 days of the week, Disp: , Rfl:   •  alendronate (FOSAMAX) 70 mg tablet, Take 70 mg by mouth Once a week (Patient not taking: Reported on 9/27/2023), Disp: , Rfl:   •  Farxiga 5 MG TABS, Take 5 mg by mouth daily (Patient not taking: Reported on 7/25/2023), Disp: , Rfl:   •  traMADol (Ultram) 50 mg tablet, Take 1 tablet (50 mg total) by mouth every 6 (six) hours as needed for severe pain (Patient not taking: Reported on 8/9/2023), Disp: 25 tablet, Rfl: 0    Social History     Socioeconomic History   • Marital status:      Spouse name: Not on file   • Number of children: Not on file   • Years of education: Not on file   • Highest education level: Not on file   Occupational History   • Not on file   Tobacco Use   • Smoking status: Never   • Smokeless tobacco: Never   Vaping Use   • Vaping Use: Never used   Substance and Sexual Activity   • Alcohol use: Yes     Comment: socially   • Drug use: Never   • Sexual activity: Not on file   Other Topics Concern   • Not on file   Social History Narrative    ** Merged History Encounter **          Social Determinants of Health     Financial Resource Strain: Not on file   Food Insecurity: No Food Insecurity (7/28/2023)    Hunger Vital Sign    • Worried About Running Out of Food in the Last Year: Never true    • Ran Out of Food in the Last Year: Never true   Transportation Needs: No Transportation Needs (7/28/2023)    PRAPARE - Transportation    • Lack of Transportation (Medical): No    • Lack of Transportation (Non-Medical):  No   Physical Activity: Not on file   Stress: Not on file   Social Connections: Not on file   Intimate Partner Violence: Not on file   Housing Stability: Low Risk  (7/28/2023)    Housing Stability Vital Sign    • Unable to Pay for Housing in the Last Year: No    • Number of Places Lived in the Last Year: 1    • Unstable Housing in the Last Year: No       Family History   Problem Relation Age of Onset   • Hypertension Other    • Arthritis Family    • Heart disease Family    • Hypertension Family    • Diabetes type II Family        Physical Exam:    Vitals:   Vitals:    09/27/23 1030   BP: 144/82   Pulse: (!) 54   SpO2: 99%       Physical Exam  Constitutional:       General: She is not in acute distress. Appearance: Normal appearance. HENT:      Head: Normocephalic and atraumatic. Mouth/Throat:      Mouth: Mucous membranes are moist.   Eyes:      Extraocular Movements: Extraocular movements intact. Conjunctiva/sclera: Conjunctivae normal.   Cardiovascular:      Rate and Rhythm: Normal rate and regular rhythm. Pulses: Normal pulses. Heart sounds: No murmur heard. No friction rub. No gallop. Comments: Prominent v waves, no edema  Mechanical S2  Pulmonary:      Effort: Pulmonary effort is normal. No respiratory distress. Breath sounds: No wheezing, rhonchi or rales. Abdominal:      General: There is no distension. Palpations: Abdomen is soft. Tenderness: There is no abdominal tenderness. There is no guarding. Musculoskeletal:         General: No deformity or signs of injury. Cervical back: Neck supple. Skin:     General: Skin is warm and dry. Capillary Refill: Capillary refill takes less than 2 seconds. Neurological:      General: No focal deficit present. Mental Status: She is alert and oriented to person, place, and time.    Psychiatric:         Mood and Affect: Mood normal.         Labs & Results:    Lab Results   Component Value Date    SODIUM 140 08/09/2023    K 4.5 08/09/2023     08/09/2023    CO2 31 08/09/2023    BUN 34 (H) 08/09/2023    CREATININE 0.92 08/09/2023    GLUC 110 08/09/2023    CALCIUM 9.4 08/09/2023     Lab Results   Component Value Date    WBC 3.46 (L) 08/04/2023    HGB 10.7 (L) 08/04/2023    HCT 32.6 (L) 08/04/2023     (H) 08/04/2023     (L) 08/04/2023     No results found for: "NTBNP"   Lab Results   Component Value Date    CHOLESTEROL 96 06/30/2023     Lab Results   Component Value Date    HDL 56 06/30/2023     Lab Results   Component Value Date    TRIG 43 06/30/2023     No results found for: "[address]"    EKG personally reviewed by Myra Coffman MD.     Counseling / Coordination of Care  Time spent today 25 minutes. Greater than 50% of total time was spent with the patient and / or family counseling and / or coordination of care. We went over current diagnosis, most recent studies and any changes in treatment. Thank you for the opportunity to participate in the care of this patient.     Myra Coffman MD  [facility]CULAT[facility]  [address]

## 2023-09-27 NOTE — PATIENT INSTRUCTIONS
Continue current dose of torsemide and potassium  2g sodium diet  2L fluid restriction  Daily weights  Obtain BMP as ordered

## 2023-11-17 LAB

## 2023-11-28 ENCOUNTER — APPOINTMENT (OUTPATIENT)
Dept: LAB | Facility: CLINIC | Age: 78
End: 2023-11-28
Payer: COMMERCIAL

## 2023-11-28 LAB
ANISOCYTOSIS BLD QL SMEAR: PRESENT
BASOPHILS # BLD AUTO: 0.03 THOUSANDS/ÂΜL (ref 0–0.1)
BASOPHILS NFR BLD AUTO: 1 % (ref 0–1)
EOSINOPHIL # BLD AUTO: 0.08 THOUSAND/ÂΜL (ref 0–0.61)
EOSINOPHIL NFR BLD AUTO: 2 % (ref 0–6)
ERYTHROCYTE [DISTWIDTH] IN BLOOD BY AUTOMATED COUNT: 17 % (ref 11.6–15.1)
FERRITIN SERPL-MCNC: 47 NG/ML (ref 11–307)
FOLATE SERPL-MCNC: >22.3 NG/ML
HCT VFR BLD AUTO: 41.9 % (ref 34.8–46.1)
HGB BLD-MCNC: 13.5 G/DL (ref 11.5–15.4)
IMM GRANULOCYTES # BLD AUTO: 0.02 THOUSAND/UL (ref 0–0.2)
IMM GRANULOCYTES NFR BLD AUTO: 1 % (ref 0–2)
IRON SATN MFR SERPL: 34 % (ref 15–50)
IRON SERPL-MCNC: 128 UG/DL (ref 50–212)
LYMPHOCYTES # BLD AUTO: 0.83 THOUSANDS/ÂΜL (ref 0.6–4.47)
LYMPHOCYTES NFR BLD AUTO: 24 % (ref 14–44)
MCH RBC QN AUTO: 34 PG (ref 26.8–34.3)
MCHC RBC AUTO-ENTMCNC: 32.2 G/DL (ref 31.4–37.4)
MCV RBC AUTO: 106 FL (ref 82–98)
MONOCYTES # BLD AUTO: 0.27 THOUSAND/ÂΜL (ref 0.17–1.22)
MONOCYTES NFR BLD AUTO: 8 % (ref 4–12)
NEUTROPHILS # BLD AUTO: 2.29 THOUSANDS/ÂΜL (ref 1.85–7.62)
NEUTS SEG NFR BLD AUTO: 64 % (ref 43–75)
NRBC BLD AUTO-RTO: 0 /100 WBCS
PLATELET # BLD AUTO: 95 THOUSANDS/UL (ref 149–390)
PLATELET BLD QL SMEAR: ABNORMAL
PMV BLD AUTO: 11.7 FL (ref 8.9–12.7)
RBC # BLD AUTO: 3.97 MILLION/UL (ref 3.81–5.12)
RBC MORPH BLD: PRESENT
TIBC SERPL-MCNC: 378 UG/DL (ref 250–450)
UIBC SERPL-MCNC: 250 UG/DL (ref 155–355)
VIT B12 SERPL-MCNC: 1275 PG/ML (ref 180–914)
WBC # BLD AUTO: 3.52 THOUSAND/UL (ref 4.31–10.16)

## 2023-11-29 ENCOUNTER — OFFICE VISIT (OUTPATIENT)
Dept: HEMATOLOGY ONCOLOGY | Facility: CLINIC | Age: 78
End: 2023-11-29
Payer: COMMERCIAL

## 2023-11-29 VITALS
SYSTOLIC BLOOD PRESSURE: 132 MMHG | HEIGHT: 58 IN | TEMPERATURE: 98 F | BODY MASS INDEX: 32.54 KG/M2 | DIASTOLIC BLOOD PRESSURE: 78 MMHG | HEART RATE: 76 BPM | WEIGHT: 155 LBS | RESPIRATION RATE: 16 BRPM | OXYGEN SATURATION: 95 %

## 2023-11-29 DIAGNOSIS — D69.3 CHRONIC ITP (IDIOPATHIC THROMBOCYTOPENIA) (HCC): ICD-10-CM

## 2023-11-29 DIAGNOSIS — D53.9 MACROCYTIC ANEMIA: ICD-10-CM

## 2023-11-29 DIAGNOSIS — D61.818 PANCYTOPENIA (HCC): Primary | ICD-10-CM

## 2023-11-29 PROCEDURE — 99214 OFFICE O/P EST MOD 30 MIN: CPT | Performed by: INTERNAL MEDICINE

## 2023-11-29 NOTE — PROGRESS NOTES
Edwin Garcia  1945  [facility]AT[facility] [place] [facility] [id number]    Chief Complaint   Patient presents with    Follow-up     Assessment/plan:   1. Pancytopenia  2. History of chronic ITP     4/27/2023-5/13/2023: admitted for left humerus fracture. Treated conservatively. 5/13/2023-6/1/2023: rehab   7/27/2023 - 8/4/2023: admitted for acute on chronic heart failure at [facility] [place] is a 72-year-old female with past medical history significant for atrial fibrillation, asthma, obstructive sleep apnea, CAD and history of chronic ITP. Patient noted to have chronic thrombocytopenia since at least 2013 due to ITP (previously following with hematology at [facility] Dr. Vonnie Apodaca. She has not required treatment for that in the past.  Since April 2023 she has had prolonged hospitalizations after left humerus fracture, rehab and then acute on chronic CHF. During admission she was noted to have pancytopenia. Suspect prior low counts will likely due to acute illness. Her hemoglobin has improved to 13.5 g/deciliter along with WBC count. Her differential does not show any neutropenia. However, her platelet count is mildly decreased likely due to active foot infection and treatment with antibiotics. I have advised patient to repeat CBC in 4 weeks for continued monitoring. Otherwise, patient will continue follow-up in 6 months. 3. Atrial fibrillation  4. CHF  5. H/O mechanical aortic valve replacement  Currently maintained on coumadin. Following with cariology. History of present illness:   Charlyn Ganser is a 72-year-old female with past medical history significant for atrial fibrillation, asthma, obstructive sleep apnea, CAD and history of chronic ITP. Patient was previously following with hematology at [facility] (Dr. Vonnie Apodaca) and last evaluated on 3/30/2023 (notes reviewed in [facility]).   She is noted to have chronic thrombocytopenia dating as far back as 7/2013. Since April 2023 patient has had prolonged hospitalizations. She was initially admitted after a left humerus fracture which was treated conservatively and was admitted to rehab from 5/13/2023 - 6/1/2023. Soon after patient was admitted for acute on chronic heart failure at [facility] (7/27/2023 - 8/4/2023). Blood work drawn during her hospitalizations noted pancytopenia. Most recent blood work done on 7/21/2023 notes pancytopenia. Labs from 7/21/2023 showed WBC count 3.2, H/H 10.4/31.4, , platelets 301, ANC 2.5. No recent labs available after hospitalization. Pt presents for initial consultation accompanied by her daughter. Denies any fever or night sweats. Denies any recurrent infections. Has chronic lower extremity swelling (left greater than right). She denies any dark stools, blood in her stools or hematuria. S/p colonoscopy >5 years, nml per pt. Interval history:   Pt notes infection in right toe and started antibiotics on 11/28/23. Denies any bleeding or easy bruising. Review of systems:   Review of Systems   Constitutional:  Negative for chills and fever. Eyes:  Negative for pain and visual disturbance. Respiratory:  Negative for cough and shortness of breath. Cardiovascular:  Positive for leg swelling (leg swelling (left > right). ). Negative for chest pain and palpitations. Gastrointestinal:  Negative for abdominal pain and vomiting. Genitourinary:  Negative for dysuria and hematuria. Musculoskeletal:  Negative for arthralgias and back pain. Skin:  Negative for color change and rash. Neurological:  Negative for seizures and syncope. Hematological:  Does not bruise/bleed easily. All other systems reviewed and are negative.     Patient Active Problem List   Diagnosis    Fall    Closed displaced oblique fracture of shaft of left humerus with routine healing    Acute pain due to trauma    H/O mechanical aortic valve replacement    Fracture of multiple ribs of right side    Type 2 diabetes mellitus (HCC)    Ventricular arrhythmia    Bradycardia    Hyponatremia    Abnormal CT scan    A-fib ([facility])    Stage III pressure ulcer of sacral region (HCC)    Asthma    Osteoporosis with current pathological fracture with routine healing    Class 2 obesity in adult    CHAS (obstructive sleep apnea)    Acute COVID-19    Difficult intravenous access    Aneurysm of ascending aorta without rupture (HCC)    CAD (coronary artery disease)    Chronic ITP (idiopathic thrombocytopenia) ([facility])    Acute on chronic heart failure with preserved ejection fraction ([facility])    Pancytopenia ([facility])    Hypokalemia    Left foot pain    Arthritis of left foot     Past Medical History:   Diagnosis Date    Arthritis     Asthma     Diabetes ([address])     Environmental allergies     Heart murmur     Hypertension      Past Surgical History:   Procedure Laterality Date    MECHANICAL AORTIC VALVE REPLACEMENT      REPLACEMENT TOTAL KNEE N/A     REPLACEMENT TOTAL KNEE       Family History   Problem Relation Age of Onset    Hypertension Other     Arthritis Family     Heart disease Family     Hypertension Family     Diabetes type II Family      Social History     Socioeconomic History    Marital status:      Spouse name: Not on file    Number of children: Not on file    Years of education: Not on file    Highest education level: Not on file   Occupational History    Not on file   Tobacco Use    Smoking status: Never    Smokeless tobacco: Never   Vaping Use    Vaping Use: Never used   Substance and Sexual Activity    Alcohol use: Yes     Comment: socially    Drug use: Never    Sexual activity: Not on file   Other Topics Concern    Not on file   Social History Narrative    ** Merged History Encounter **          Social Determinants of Health     Financial Resource Strain: Not on file   Food Insecurity: No Food Insecurity (7/28/2023) Hunger Vital Sign     Worried About Running Out of Food in the Last Year: Never true     Ran Out of Food in the Last Year: Never true   Transportation Needs: No Transportation Needs (7/28/2023)    PRAPARE - Transportation     Lack of Transportation (Medical): No     Lack of Transportation (Non-Medical): No   Physical Activity: Not on file   Stress: Not on file   Social Connections: Not on file   Intimate Partner Violence: Not on file   Housing Stability: Low Risk  (7/28/2023)    Housing Stability Vital Sign     Unable to Pay for Housing in the Last Year: No     Number of Places Lived in the Last Year: 1     Unstable Housing in the Last Year: No       Current Outpatient Medications:     acetaminophen (TYLENOL) 325 mg tablet, Take 2 tablets (650 mg total) by mouth every 6 (six) hours as needed for mild pain, Disp: 63 tablet, Rfl: 0    allopurinol (ZYLOPRIM) 100 mg tablet, Take 100 mg by mouth daily, Disp: , Rfl:     amiodarone 200 mg tablet, Take 1 tablet (200 mg total) by mouth daily with breakfast, Disp: 90 tablet, Rfl: 2    atorvastatin (LIPITOR) 40 mg tablet, , Disp: , Rfl:     Blood Glucose Monitoring Suppl (OneTouch Verio Flex System) w/Device KIT, Use as directed, Disp: , Rfl:     cholecalciferol (VITAMIN D3) 1,000 units tablet, Take 1 tablet (1,000 Units total) by mouth daily Do not start before June 2, 2023., Disp: 30 tablet, Rfl: 0    citalopram (CeleXA) 20 mg tablet, Take 20 mg by mouth daily, Disp: , Rfl:     docusate sodium (COLACE) 100 mg capsule, Take 1 capsule (100 mg total) by mouth 2 (two) times a day, Disp: 60 capsule, Rfl: 0    magnesium 30 MG tablet, Take 64 mg by mouth 3 (three) times a day Takes 3-4 days a week takes 1 tab., Disp: , Rfl:     Metamucil Fiber CHEW, Chew 3 gums in the morning.  Indications: fiber supplement, Disp: , Rfl:     metoprolol succinate (TOPROL-XL) 25 mg 24 hr tablet, Take 1 tablet (25 mg total) by mouth daily Do not start before June 2, 2023., Disp: 30 tablet, Rfl: 0 montelukast (SINGULAIR) 10 mg tablet, Take 10 mg by mouth daily at bedtime, Disp: , Rfl:     multivitamin (THERAGRAN) TABS, Take 1 tablet by mouth daily, Disp: , Rfl:     potassium chloride (K-DUR,KLOR-CON) 20 mEq tablet, TAKE 2 TABLETS(40 MEQ) BY MOUTH TWICE DAILY, Disp: 360 tablet, Rfl: 0    torsemide (DEMADEX) 20 mg tablet, TAKE 2 TABLETS BY MOUTH EVERY MORNING AND 1 TABLET EVERY EVENING, Disp: 270 tablet, Rfl: 2    warfarin (COUMADIN) 5 mg tablet, Take 5 mg by mouth daily Takes 5mg on Monday and Friday and 2.5mg the other 5 days of the week, Disp: , Rfl:     alendronate (FOSAMAX) 70 mg tablet, Take 70 mg by mouth Once a week (Patient not taking: Reported on 9/27/2023), Disp: , Rfl:     Farxiga 5 MG TABS, Take 5 mg by mouth daily (Patient not taking: Reported on 7/25/2023), Disp: , Rfl:     traMADol (Ultram) 50 mg tablet, Take 1 tablet (50 mg total) by mouth every 6 (six) hours as needed for severe pain (Patient not taking: Reported on 8/9/2023), Disp: 25 tablet, Rfl: 0  Allergies   Allergen Reactions    Fentanyl Other (See Comments)     Other reaction(s): Hallucinations  paranoia, confusion  Other reaction(s): Hallucinations  Other reaction(s): Hallucinations  hallucinations    Fentanyl Confusion     Vitals:    11/29/23 1027   BP: 132/78   Pulse: 76   Resp: 16   Temp: 98 °F (36.7 °C)   SpO2: 95%     Wt Readings from Last 3 Encounters:   11/29/23 70.3 kg (155 lb)   09/27/23 70.8 kg (156 lb)   09/20/23 71.2 kg (157 lb)     Physical Exam  Vitals reviewed. Constitutional:       General: She is not in acute distress. Appearance: Normal appearance. HENT:      Head: Normocephalic and atraumatic. Mouth/Throat:      Mouth: Mucous membranes are moist.   Eyes:      Extraocular Movements: Extraocular movements intact. Conjunctiva/sclera: Conjunctivae normal.   Cardiovascular:      Rate and Rhythm: Normal rate. Heart sounds: Murmur heard.    Pulmonary:      Effort: Pulmonary effort is normal. No respiratory distress. Musculoskeletal:      Cervical back: Normal range of motion and neck supple. Right lower leg: No edema. Left lower leg: Edema (chronic) present. Skin:     General: Skin is warm. Coloration: Skin is not pale. Findings: No bruising. Neurological:      Mental Status: She is alert and oriented to person, place, and time. Mental status is at baseline. Cranial Nerves: No cranial nerve deficit. Psychiatric:         Thought Content: Thought content normal.     Labs:  7/14/2022: Platelet antibody direct, IgG: Negative. Platelet antibody direct, IgM: Negative  8/12/2022: SPEP: Pattern suggest polyclonal hypergammaglobulinemia. Platelet antibody, indirect: Not detected     7/21/2023: WBC: 3.2, H/H: 10.4/31.4, MCV: 101, platelets: 102. ANC: 2.5. Creatinine: 0.67. AST/ALT: 28/15. Iron: 86, iron saturation: 20%, TIBC: 431, transferrin: 308     7/28/2023: SPEP with STEFANIA: Cannot rule out small monoclonal gammopathy. Vitamin B12: 1602  7/30/2023: WBC: 3.47, H/H: 10.4/32.5, MCV: 101, Platelets: 899. ANC: 2.13  8/4/2023: WBC: 3.46, H/H: 10.7/32.6, MCV: 100, platelets: 704. No differential available    11/28/2023: WBC: 3.52, H/H: 13.5/41.9, MCV: 106, platelets: 95. ANC: 2.2. Vitamin B12: 1375, folate: >22.3. Ferritin: 47, iron: 128, iron saturation: 34%, TIBC: 378    Orders entered this encounter:  - CBC and differential; Future  - CBC and differential; Future  - Comprehensive metabolic panel; Future  - Protein electrophoresis, serum; Future  - Iron Panel (Includes Ferritin, Iron Sat%, Iron, and TIBC); Future    Return in about 6 months (around 5/29/2024) for Office Visit, Labs - See Treatment Plan. 1. Check CBC in 4 weeks. 2. FU in 6 months with CBC, CMP, SPEP, iron panel for office visit.

## 2023-12-01 LAB — METHYLMALONATE SERPL-SCNC: 347 NMOL/L (ref 0–378)

## 2023-12-08 ENCOUNTER — OFFICE VISIT (OUTPATIENT)
Dept: CARDIOLOGY CLINIC | Facility: CLINIC | Age: 78
End: 2023-12-08
Payer: COMMERCIAL

## 2023-12-08 VITALS
BODY MASS INDEX: 32.12 KG/M2 | SYSTOLIC BLOOD PRESSURE: 134 MMHG | HEART RATE: 60 BPM | DIASTOLIC BLOOD PRESSURE: 76 MMHG | HEIGHT: 58 IN | OXYGEN SATURATION: 98 % | WEIGHT: 153 LBS

## 2023-12-08 DIAGNOSIS — Z95.2 H/O MECHANICAL AORTIC VALVE REPLACEMENT: ICD-10-CM

## 2023-12-08 DIAGNOSIS — Z95.810 AICD (AUTOMATIC CARDIOVERTER/DEFIBRILLATOR) PRESENT: ICD-10-CM

## 2023-12-08 DIAGNOSIS — I48.91 ATRIAL FIBRILLATION, UNSPECIFIED TYPE (HCC): ICD-10-CM

## 2023-12-08 DIAGNOSIS — I50.32 CHRONIC HEART FAILURE WITH PRESERVED EJECTION FRACTION (HCC): Primary | ICD-10-CM

## 2023-12-08 PROCEDURE — 99214 OFFICE O/P EST MOD 30 MIN: CPT | Performed by: INTERNAL MEDICINE

## 2023-12-08 RX ORDER — METOPROLOL SUCCINATE 50 MG/1
TABLET, EXTENDED RELEASE ORAL
COMMUNITY
Start: 2023-09-21

## 2023-12-08 RX ORDER — OXYBUTYNIN CHLORIDE 5 MG/1
TABLET, EXTENDED RELEASE ORAL
COMMUNITY
Start: 2023-11-01

## 2023-12-08 RX ORDER — FUROSEMIDE 20 MG/1
TABLET ORAL
COMMUNITY
Start: 2023-11-23

## 2023-12-08 RX ORDER — DILTIAZEM HYDROCHLORIDE 120 MG/1
CAPSULE, COATED, EXTENDED RELEASE ORAL
COMMUNITY
Start: 2023-09-21

## 2023-12-08 RX ORDER — CEPHALEXIN 500 MG/1
500 CAPSULE ORAL
COMMUNITY
Start: 2023-11-27

## 2023-12-08 NOTE — PROGRESS NOTES
Cardiology Outpatient Progress Note - Macy Servin 66 y.o. female MRN: [id number]    Encounter: [id number]      Assessment/Plan:    Patient Active Problem List    Diagnosis Date Noted    Left foot pain 08/03/2023    Arthritis of left foot 08/03/2023    Hypokalemia 08/02/2023    Acute on chronic heart failure with preserved ejection fraction ([address]) 07/27/2023    Pancytopenia ([address]) 07/27/2023    Aneurysm of ascending aorta without rupture ([address]) 07/10/2023    Difficult intravenous access 05/15/2023    Acute COVID-19 05/14/2023    A-fib ([address]) 05/13/2023    Stage III pressure ulcer of sacral region ([address]) 05/13/2023    Asthma 05/13/2023    Osteoporosis with current pathological fracture with routine healing 05/13/2023    Class 2 obesity in adult 05/13/2023    CHAS (obstructive sleep apnea) 05/13/2023    Abnormal CT scan 05/10/2023    Hyponatremia 05/09/2023    Bradycardia 05/08/2023    Ventricular arrhythmia 05/04/2023    Type 2 diabetes mellitus ([address]) 04/28/2023    Fall 04/27/2023    Closed displaced oblique fracture of shaft of left humerus with routine healing 04/27/2023    Acute pain due to trauma 04/27/2023    H/O mechanical aortic valve replacement 04/27/2023    Fracture of multiple ribs of right side 04/27/2023    Chronic ITP (idiopathic thrombocytopenia) ([address]) 04/02/2023    CAD (coronary artery disease) 05/10/2017       # Heart failure with preserved ejection fraction  # RV dilation with reduced systolic function, pulmonary hypertension, likely due to group II disease, appears improved on echo 9/20/2023. Diuretic: torsemide 40mg in AM and 20mg in PM with potassium 40 meq BID  Euvolemic    Weight: 167 lbs on discharge 8/4/23; 162 lbs 8/9/23; 157 lbs 8/23/23; 156 lbs 9/27/23; 153 lbs 12/8/23   7/27/23; 822 11/2/23    Studies- personally reviewed by myself    Echo 9/20/23:  LVEF 55%. Grade 1 diastology.   RV moderately dilated, mildly reduced systolic function  Moderate left atrial enlargement, severe right atrial enlargement  Aortic valve: No evidence of paravalvular regurgitation, trace transvalvular regurgitation, no significant stenosis mean gradient 5 mmHg. Moderate to severe MR, centrally directed jet. Severe MAC  Severe tricuspid regurgitation, there appears to be impingement of the anterior leaflet with RV pacer lead. Ascending aorta is moderately dilated. 4.8 cm. Echocardiogram 7/28/23  LVEF: 60%  LVIDd: 5.1cm  RV: severely dilated, moderately reduced systolic function  AV: bileaflet mechanical AV, mild transvalvular AI. Mean gradient 6mmHg  MR: mild to moderate, severe MAC  PASP: 49mmHg, estimated RAP 15mmHg, severe TR  RVOT: no notching, shortened PAAT  Other: grade 2 diastology    # H/o aortic valve stenosis s/p mechanical AVR in 2011, normally functioning on last echo  [facility]: coumadin - PCP managing dosing  # H/o persistent atrial fibrillation  Rate: metoprolol succinate 25mg daily  Rhythm: amiodarone 200mg daily  AC: coumadin  High ventricular pacing rate on interrogation 6/20/2023. Repeat echocardiogram September 2023 showed preserved EF of 55%. # H/o VT/VF, s/p ICD  Interrogation 6/20/23:  71%. No significant high rate episodes since hospital discharge  # Inappropriate shock for rapid afib 5/2023  # Hypertension, controlled  # Dyslipidemia  6/30/2023; cholesterol 96, triglycerides 43, HDL 56, and LDL calculated at 31  # DM type 2  # UTI 7/2023  # h/o CHAS with CPAP use in the past  # remote h/o DVT    TODAY'S PLAN:  Continue current torsemide and potassium regimen  Routine device checks as scheduled  2g sodium diet  2L fluid restriction  Daily weights  Follow up with endocrinology regarding re-initiation of farxiga, currently deferring any new medications as she continues to feel well.   Did not feel well with [facility] in the past and history of UTI with no symptoms per patient      HPI:     Archie Parker is a 66 y.o.  with A. Fib, DM2, HTN, CAD, mechanical aortic valve, CHF who originally presented to the hospital on 7/27/2023 due to SOB, decreased UOP, leg swelling and orthopnea. CXR revealed mild pulmonary venous congestion with trace effusions. She was treated for decompensated heart failure. She also was treated for UTI. Discharged on torsemide 40mg in AM and 20mg in PM with potassium 20 meq daily. On lasix 40mg daily prior to admission. She reports overall doing well since discharge. No leg swelling and shortness of breath on current level of exertion. She is scheduled to start home PT and OT. Home weight 166 on discharge -- 163 lbs today. 8/23/23: Here for follow up. 8/9/23 Na 140 K 4.5 and creatinine 0.92. Weighs 152-154 lbs at home. No leg swelling. No shortness of breath on current level of exertion. Adherent to medications and diet. No dizziness or lightheadedness. 9/27/23: here for follow up. Overall doing well. Exercises on cubii for about an hours. Home weights remain stable at 151-154. Walking more when outside. No shortness of breath or chest pain on current level of exertion. No abdominal distension or leg swelling. Interval History:  12/8/23: Here for follow up. 11/2/23: Na 140 K 4.1 and creatinine 0.85  She continues to do well. Weight has been stable. Continues to do exercises on her cube. Weight 148 pounds on home scale. No abdominal distention. No leg swelling. No shortness of breath on current level of exertion. Here to medications and diet. Review of Systems   Constitutional:  Negative for chills, fatigue and fever. HENT:  Negative for congestion, nosebleeds and sore throat. Eyes:  Negative for discharge and visual disturbance. Respiratory:  Negative for cough, chest tightness and shortness of breath. Cardiovascular:  Negative for chest pain, palpitations and leg swelling. Gastrointestinal:  Negative for abdominal distention, abdominal pain, diarrhea, nausea and vomiting. Endocrine: Negative for cold intolerance and heat intolerance. Genitourinary:  Negative for dysuria, flank pain and hematuria. Musculoskeletal:  Negative for arthralgias, back pain and myalgias. Skin:  Negative for color change, pallor and rash. Neurological:  Negative for dizziness, syncope, weakness and light-headedness. Hematological:  Negative for adenopathy. Does not bruise/bleed easily. Psychiatric/Behavioral:  Negative for agitation and confusion. Past Medical History:   Diagnosis Date    Arthritis     Asthma     Diabetes ([address])     Environmental allergies     Heart murmur     Hypertension          Allergies   Allergen Reactions    Fentanyl Other (See Comments)     Other reaction(s): Hallucinations  paranoia, confusion  Other reaction(s): Hallucinations  Other reaction(s): Hallucinations  hallucinations    Fentanyl Confusion     .     Current Outpatient Medications:     acetaminophen (TYLENOL) 325 mg tablet, Take 2 tablets (650 mg total) by mouth every 6 (six) hours as needed for mild pain, Disp: 63 tablet, Rfl: 0    allopurinol (ZYLOPRIM) 100 mg tablet, Take 100 mg by mouth daily, Disp: , Rfl:     amiodarone 200 mg tablet, Take 1 tablet (200 mg total) by mouth daily with breakfast, Disp: 90 tablet, Rfl: 2    atorvastatin (LIPITOR) 40 mg tablet, , Disp: , Rfl:     Blood Glucose Monitoring Suppl (OneTouch Verio Flex System) w/Device KIT, Use as directed, Disp: , Rfl:     cephalexin (KEFLEX) 500 mg capsule, Take 500 mg by mouth, Disp: , Rfl:     cholecalciferol (VITAMIN D3) 1,000 units tablet, Take 1 tablet (1,000 Units total) by mouth daily Do not start before June 2, 2023., Disp: 30 tablet, Rfl: 0    citalopram (CeleXA) 20 mg tablet, Take 20 mg by mouth daily, Disp: , Rfl:     diltiazem (CARDIZEM CD) 120 mg 24 hr capsule, Take by mouth, Disp: , Rfl:     docusate sodium (COLACE) 100 mg capsule, Take 1 capsule (100 mg total) by mouth 2 (two) times a day, Disp: 60 capsule, Rfl: 0    magnesium 30 MG tablet, Take 64 mg by mouth 3 (three) times a day Takes 3-4 days a week takes 1 tab., Disp: , Rfl:     Metamucil Fiber CHEW, Chew 3 gums in the morning.  Indications: fiber supplement, Disp: , Rfl:     metoprolol succinate (TOPROL-XL) 25 mg 24 hr tablet, Take 1 tablet (25 mg total) by mouth daily Do not start before June 2, 2023., Disp: 30 tablet, Rfl: 0    metoprolol succinate (TOPROL-XL) 50 mg 24 hr tablet, Take by mouth, Disp: , Rfl:     montelukast (SINGULAIR) 10 mg tablet, Take 10 mg by mouth daily at bedtime, Disp: , Rfl:     multivitamin (THERAGRAN) TABS, Take 1 tablet by mouth daily, Disp: , Rfl:     oxybutynin (DITROPAN-XL) 5 mg 24 hr tablet, Take by mouth, Disp: , Rfl:     potassium chloride (K-DUR,KLOR-CON) 20 mEq tablet, TAKE 2 TABLETS(40 MEQ) BY MOUTH TWICE DAILY, Disp: 360 tablet, Rfl: 0    torsemide (DEMADEX) 20 mg tablet, TAKE 2 TABLETS BY MOUTH EVERY MORNING AND 1 TABLET EVERY EVENING, Disp: 270 tablet, Rfl: 2    traMADol (Ultram) 50 mg tablet, Take 1 tablet (50 mg total) by mouth every 6 (six) hours as needed for severe pain, Disp: 25 tablet, Rfl: 0    warfarin (COUMADIN) 5 mg tablet, Take 5 mg by mouth daily Takes 5mg on Monday and Friday and 2.5mg the other 5 days of the week, Disp: , Rfl:     alendronate (FOSAMAX) 70 mg tablet, Take 70 mg by mouth Once a week (Patient not taking: Reported on 9/27/2023), Disp: , Rfl:     Farxiga 5 MG TABS, Take 5 mg by mouth daily (Patient not taking: Reported on 7/25/2023), Disp: , Rfl:     furosemide (LASIX) 20 mg tablet, Take by mouth (Patient not taking: Reported on 12/8/2023), Disp: , Rfl:     Social History     Socioeconomic History    Marital status:      Spouse name: Not on file    Number of children: Not on file    Years of education: Not on file    Highest education level: Not on file   Occupational History    Not on file   Tobacco Use    Smoking status: Never    Smokeless tobacco: Never   Vaping Use    Vaping Use: Never used   Substance and Sexual Activity    Alcohol use: Yes     Comment: socially Drug use: Never    Sexual activity: Not on file   Other Topics Concern    Not on file   Social History Narrative    ** Merged History Encounter **          Social Determinants of Health     Financial Resource Strain: Not on file   Food Insecurity: No Food Insecurity (7/28/2023)    Hunger Vital Sign     Worried About Running Out of Food in the Last Year: Never true     Ran Out of Food in the Last Year: Never true   Transportation Needs: No Transportation Needs (7/28/2023)    PRAPARE - Transportation     Lack of Transportation (Medical): No     Lack of Transportation (Non-Medical): No   Physical Activity: Not on file   Stress: Not on file   Social Connections: Not on file   Intimate Partner Violence: Not on file   Housing Stability: Low Risk  (7/28/2023)    Housing Stability Vital Sign     Unable to Pay for Housing in the Last Year: No     Number of Places Lived in the Last Year: 1     Unstable Housing in the Last Year: No       Family History   Problem Relation Age of Onset    Hypertension Other     Arthritis Family     Heart disease Family     Hypertension Family     Diabetes type II Family        Physical Exam:    Vitals:   Vitals:    12/08/23 1248   BP: 134/76   Pulse: 60   SpO2: 98%       Physical Exam  Constitutional:       General: She is not in acute distress. Appearance: Normal appearance. HENT:      Head: Normocephalic and atraumatic. Mouth/Throat:      Mouth: Mucous membranes are moist.   Eyes:      Extraocular Movements: Extraocular movements intact. Conjunctiva/sclera: Conjunctivae normal.   Cardiovascular:      Rate and Rhythm: Normal rate and regular rhythm. Pulses: Normal pulses. Heart sounds: No murmur heard. No friction rub. No gallop. Comments: Prominent v waves, no edema  Mechanical S2  Pulmonary:      Effort: Pulmonary effort is normal. No respiratory distress. Breath sounds: No wheezing, rhonchi or rales. Abdominal:      General: There is no distension. Palpations: Abdomen is soft. Tenderness: There is no abdominal tenderness. There is no guarding. Musculoskeletal:         General: No deformity or signs of injury. Cervical back: Neck supple. Skin:     General: Skin is warm and dry. Capillary Refill: Capillary refill takes less than 2 seconds. Neurological:      General: No focal deficit present. Mental Status: She is alert and oriented to person, place, and time. Psychiatric:         Mood and Affect: Mood normal.         Labs & Results:    Lab Results   Component Value Date    SODIUM 139 09/27/2023    K 4.1 09/27/2023     09/27/2023    CO2 33 (H) 09/27/2023    BUN 34 (H) 09/27/2023    CREATININE 0.90 09/27/2023    GLUC 110 08/09/2023    CALCIUM 9.8 09/27/2023     Lab Results   Component Value Date    WBC 3.52 (L) 11/28/2023    HGB 13.5 11/28/2023    HCT 41.9 11/28/2023     (H) 11/28/2023    PLT 95 (L) 11/28/2023     No results found for: "NTBNP"   Lab Results   Component Value Date    CHOLESTEROL 96 06/30/2023     Lab Results   Component Value Date    HDL 56 06/30/2023     Lab Results   Component Value Date    TRIG 43 06/30/2023     No results found for: "[address]"    EKG personally reviewed by Vandana Wayne MD.     Counseling / Coordination of Care  Time spent today 25 minutes. Greater than 50% of total time was spent with the patient and / or family counseling and / or coordination of care. We went over current diagnosis, most recent studies and any changes in treatment. Thank you for the opportunity to participate in the care of this patient.     Vandana Wayne MD  AD[facility]ANC[facility] HE[facility] FAILURE [facility]AN[facility]CULATORY SU[facility]  [address]

## 2023-12-08 NOTE — PATIENT INSTRUCTIONS
Continue current torsemide and potassium regimen  Routine device checks as scheduled  2g sodium diet  2L fluid restriction  Daily weights

## 2024-01-05 DIAGNOSIS — E87.6 HYPOKALEMIA: ICD-10-CM

## 2024-01-05 RX ORDER — POTASSIUM CHLORIDE 20 MEQ/1
TABLET, EXTENDED RELEASE ORAL
Qty: 360 TABLET | Refills: 3 | Status: SHIPPED | OUTPATIENT
Start: 2024-01-05

## 2024-01-31 ENCOUNTER — REMOTE DEVICE CLINIC VISIT (OUTPATIENT)
Dept: CARDIOLOGY CLINIC | Facility: CLINIC | Age: 79
End: 2024-01-31
Payer: COMMERCIAL

## 2024-01-31 DIAGNOSIS — Z95.810 PRESENCE OF AUTOMATIC CARDIOVERTER/DEFIBRILLATOR (AICD): Primary | ICD-10-CM

## 2024-01-31 PROCEDURE — 93296 REM INTERROG EVL PM/IDS: CPT | Performed by: INTERNAL MEDICINE

## 2024-01-31 PROCEDURE — 93295 DEV INTERROG REMOTE 1/2/MLT: CPT | Performed by: INTERNAL MEDICINE

## 2024-01-31 NOTE — PROGRESS NOTES
Results for orders placed or performed in visit on 01/31/24   Cardiac EP device report    Narrative    [employer] SINGLE ICD (ABD PLACEMENT)/ NOT MRI CONDITIONAL  Jun 21, 2023 13:21 - Yellow Alert - Right ventricular intrinsic amplitude out of range.  LATITUDE TRANSMISSION: BATTERY VOLTAGE ADEQUATE (1 YR). : 87% (>40%~VVIR/60). ALL AVAILABLE LEAD PARAMETERS WITHIN NORMAL LIMITS. NO SIGNIFICANT HIGH RATE EPISODES. NORMAL DEVICE FUNCTION. CH

## 2024-02-27 NOTE — PROGRESS NOTES
Cardiology Outpatient Progress Note - Shonda Sanchez 78 y.o. female MRN: [id number]    Encounter: [id number]      Assessment/Plan:    Patient Active Problem List    Diagnosis Date Noted    Left foot pain 08/03/2023    Arthritis of left foot 08/03/2023    Hypokalemia 08/02/2023    Acute on chronic heart failure with preserved ejection fraction ([facility]) 07/27/2023    Pancytopenia ([facility]) 07/27/2023    Aneurysm of ascending aorta without rupture ([facility]) 07/10/2023    Difficult intravenous access 05/15/2023    Acute COVID-19 05/14/2023    A-fib ([facility]) 05/13/2023    Stage III pressure ulcer of sacral region ([facility]) 05/13/2023    Asthma 05/13/2023    Osteoporosis with current pathological fracture with routine healing 05/13/2023    Class 2 obesity in adult 05/13/2023    CHAS (obstructive sleep apnea) 05/13/2023    Abnormal CT scan 05/10/2023    Hyponatremia 05/09/2023    Bradycardia 05/08/2023    Ventricular arrhythmia 05/04/2023    Type 2 diabetes mellitus ([facility]) 04/28/2023    Fall 04/27/2023    Closed displaced oblique fracture of shaft of left humerus with routine healing 04/27/2023    Acute pain due to trauma 04/27/2023    H/O mechanical aortic valve replacement 04/27/2023    Fracture of multiple ribs of right side 04/27/2023    Chronic ITP (idiopathic thrombocytopenia) ([facility]) 04/02/2023    CAD (coronary artery disease) 05/10/2017       # Heart failure with preserved ejection fraction  # RV dilation with reduced systolic function, pulmonary hypertension, likely due to group II disease, appears improved on echo 9/20/2023.  Diuretic: torsemide 40mg in AM and 20mg in PM with potassium 40 meq BID  Euvolemic    Weight: 167 lbs on discharge 8/4/23; 162 lbs 8/9/23; 157 lbs 8/23/23; 156 lbs 9/27/23; 153 lbs 12/8/23; 160 lbs 2/28/24   7/27/23; 822 11/2/23    Studies- personally reviewed by myself    Echo 9/20/23:  LVEF 55%.  Grade 1 diastology.  RV moderately dilated, mildly reduced systolic function  Moderate left atrial  enlargement, severe right atrial enlargement  Aortic valve: No evidence of paravalvular regurgitation, trace transvalvular regurgitation, no significant stenosis mean gradient 5 mmHg.  Moderate to severe MR, centrally directed jet.  Severe MAC  Severe tricuspid regurgitation, there appears to be impingement of the anterior leaflet with RV pacer lead.  Ascending aorta is moderately dilated.  4.8 cm.    Echocardiogram 7/28/23  LVEF: 60%  LVIDd: 5.1cm  RV: severely dilated, moderately reduced systolic function  AV: bileaflet mechanical AV, mild transvalvular AI. Mean gradient 6mmHg  MR: mild to moderate, severe MAC  PASP: 49mmHg, estimated RAP 15mmHg, severe TR  RVOT: no notching, shortened PAAT  Other: grade 2 diastology    # H/o aortic valve stenosis s/p mechanical AVR in 2011, normally functioning on last echo  AC: coumadin - PCP managing dosing  # H/o persistent atrial fibrillation  Rate: metoprolol succinate 25mg daily  Rhythm: amiodarone 200mg daily  AC: coumadin  High ventricular pacing rate on interrogation 6/20/2023.  Repeat echocardiogram September 2023 showed preserved EF of 55%.  TSH and liver enzymes normal 12/12/23; PFT 2/22/24 with normal spirometry, DLCO not done  # H/o VT/VF, s/p ICD  Interrogation 1/31/24:  87%. No significant high rate episodes  Interrogation 6/20/23:  71%. No significant high rate episodes since hospital discharge  # Inappropriate shock for rapid afib 5/2023  # Hypertension, controlled  # Dyslipidemia  6/30/2023; cholesterol 96, triglycerides 43, HDL 56, and LDL calculated at 31  # DM type 2, HbA1c 6.7 12/12/23  # UTI 7/2023, another one end of Jan 2024  # h/o CHAS with CPAP use in the past  # remote h/o DVT    TODAY'S PLAN:  Intravascularly euvolemic  No medication changes at this time  2g sodium diet  2L fluid restriction  Daily weights  We will hold off on retrial of farxiga due to recent UTI.      HPI:     Shonda Sanchez is a 78 y.o.  with A. Fib, DM2, HTN, CAD, mechanical  aortic valve, CHF who originally presented to the hospital on 7/27/2023 due to SOB, decreased UOP, leg swelling and orthopnea. CXR revealed mild pulmonary venous congestion with trace effusions. She was treated for decompensated heart failure. She also was treated for UTI. Discharged on torsemide 40mg in AM and 20mg in PM with potassium 20 meq daily. On lasix 40mg daily prior to admission. She reports overall doing well since discharge. No leg swelling and shortness of breath on current level of exertion. She is scheduled to start home PT and OT.  Home weight 166 on discharge -- 163 lbs today.    8/23/23: Here for follow up. 8/9/23 Na 140 K 4.5 and creatinine 0.92. Weighs 152-154 lbs at home. No leg swelling. No shortness of breath on current level of exertion. Adherent to medications and diet. No dizziness or lightheadedness.     9/27/23: here for follow up. Overall doing well. Exercises on cubii for about an hours. Home weights remain stable at 151-154. Walking more when outside. No shortness of breath or chest pain on current level of exertion. No abdominal distension or leg swelling.     12/8/23: Here for follow up. 11/2/23: Na 140 K 4.1 and creatinine 0.85  She continues to do well.  Weight has been stable.  Continues to do exercises on her cube.  Weight 148 pounds on home scale.  No abdominal distention.  No leg swelling.  No shortness of breath on current level of exertion.  Here to medications and diet.    Interval History:  2/27/24: Here for follow up. 12/12/23 Na 139 K 4 and creatinine 0.88, stable  Recent home weights 150-155 lbs. No worsening leg swelling, PND or orthopnea. Had UTI end of January. Willing to retrial Farxiga but given recurrent UTI and recent episode, discussed holding off and reassessing in six months. Overall doing well. Adhering to medications, diet and daily weights. She has intermittent episodes of abdominal distension related to gas and constipation.       Review of Systems    Constitutional:  Negative for chills and fever.   HENT:  Negative for ear pain and sore throat.    Eyes:  Negative for pain and visual disturbance.   Respiratory:  Negative for cough and shortness of breath.    Cardiovascular:  Negative for chest pain and palpitations.   Gastrointestinal:  Negative for abdominal pain and vomiting.   Genitourinary:  Negative for dysuria and hematuria.   Musculoskeletal:  Negative for arthralgias and back pain.   Skin:  Negative for color change and rash.   Neurological:  Negative for seizures and syncope.   All other systems reviewed and are negative.      Past Medical History:   Diagnosis Date    Arthritis     Asthma     Diabetes (HCC)     Environmental allergies     Heart murmur     Hypertension          Allergies   Allergen Reactions    Fentanyl Other (See Comments)     Other reaction(s): Hallucinations  paranoia, confusion  Other reaction(s): Hallucinations  Other reaction(s): Hallucinations  hallucinations    Fentanyl Confusion     .    Current Outpatient Medications:     acetaminophen (TYLENOL) 325 mg tablet, Take 2 tablets (650 mg total) by mouth every 6 (six) hours as needed for mild pain, Disp: 63 tablet, Rfl: 0    allopurinol (ZYLOPRIM) 100 mg tablet, Take 100 mg by mouth daily, Disp: , Rfl:     amiodarone 200 mg tablet, Take 1 tablet (200 mg total) by mouth daily with breakfast, Disp: 90 tablet, Rfl: 2    atorvastatin (LIPITOR) 40 mg tablet, , Disp: , Rfl:     cholecalciferol (VITAMIN D3) 1,000 units tablet, Take 1 tablet (1,000 Units total) by mouth daily Do not start before June 2, 2023., Disp: 30 tablet, Rfl: 0    citalopram (CeleXA) 20 mg tablet, Take 20 mg by mouth daily, Disp: , Rfl:     docusate sodium (COLACE) 100 mg capsule, Take 1 capsule (100 mg total) by mouth 2 (two) times a day, Disp: 60 capsule, Rfl: 0    magnesium 30 MG tablet, Take 64 mg by mouth 3 (three) times a day Takes 3-4 days a week takes 1 tab., Disp: , Rfl:     Metamucil Fiber CHEW, Chew 3 gums  in the morning. Indications: fiber supplement, Disp: , Rfl:     metoprolol succinate (TOPROL-XL) 25 mg 24 hr tablet, Take 1 tablet (25 mg total) by mouth daily Do not start before June 2, 2023., Disp: 30 tablet, Rfl: 0    montelukast (SINGULAIR) 10 mg tablet, Take 10 mg by mouth daily at bedtime, Disp: , Rfl:     multivitamin (THERAGRAN) TABS, Take 1 tablet by mouth daily, Disp: , Rfl:     potassium chloride (K-DUR,KLOR-CON) 20 mEq tablet, TAKE 2 TABLETS(40 MEQ) BY MOUTH TWICE DAILY, Disp: 360 tablet, Rfl: 3    torsemide (DEMADEX) 20 mg tablet, TAKE 2 TABLETS BY MOUTH EVERY MORNING AND 1 TABLET EVERY EVENING, Disp: 270 tablet, Rfl: 2    warfarin (COUMADIN) 5 mg tablet, Take 5 mg by mouth daily Takes 5mg on Monday and Friday and 2.5mg the other 5 days of the week, Disp: , Rfl:     alendronate (FOSAMAX) 70 mg tablet, Take 70 mg by mouth Once a week (Patient not taking: Reported on 9/27/2023), Disp: , Rfl:     Blood Glucose Monitoring Suppl (OneTouch Verio Flex System) w/Device KIT, Use as directed (Patient not taking: Reported on 2/28/2024), Disp: , Rfl:     cephalexin (KEFLEX) 500 mg capsule, Take 500 mg by mouth (Patient not taking: Reported on 2/28/2024), Disp: , Rfl:     diltiazem (CARDIZEM CD) 120 mg 24 hr capsule, Take by mouth (Patient not taking: Reported on 2/28/2024), Disp: , Rfl:     Farxiga 5 MG TABS, Take 5 mg by mouth daily (Patient not taking: Reported on 7/25/2023), Disp: , Rfl:     furosemide (LASIX) 20 mg tablet, Take by mouth (Patient not taking: Reported on 12/8/2023), Disp: , Rfl:     metoprolol succinate (TOPROL-XL) 50 mg 24 hr tablet, Take by mouth (Patient not taking: Reported on 2/28/2024), Disp: , Rfl:     oxybutynin (DITROPAN-XL) 5 mg 24 hr tablet, Take by mouth (Patient not taking: Reported on 2/28/2024), Disp: , Rfl:     traMADol (Ultram) 50 mg tablet, Take 1 tablet (50 mg total) by mouth every 6 (six) hours as needed for severe pain (Patient not taking: Reported on 2/28/2024), Disp: 25  tablet, Rfl: 0    Social History     Socioeconomic History    Marital status:      Spouse name: Not on file    Number of children: Not on file    Years of education: Not on file    Highest education level: Not on file   Occupational History    Not on file   Tobacco Use    Smoking status: Never    Smokeless tobacco: Never   Vaping Use    Vaping status: Never Used   Substance and Sexual Activity    Alcohol use: Yes     Comment: socially    Drug use: Never    Sexual activity: Not on file   Other Topics Concern    Not on file   Social History Narrative    ** Merged History Encounter **          Social Determinants of Health     Financial Resource Strain: Not on file   Food Insecurity: No Food Insecurity (7/28/2023)    Hunger Vital Sign     Worried About Running Out of Food in the Last Year: Never true     Ran Out of Food in the Last Year: Never true   Transportation Needs: No Transportation Needs (7/28/2023)    PRAPARE - Transportation     Lack of Transportation (Medical): No     Lack of Transportation (Non-Medical): No   Physical Activity: Not on file   Stress: Not on file   Social Connections: Not on file   Intimate Partner Violence: Not on file   Housing Stability: Low Risk  (7/28/2023)    Housing Stability Vital Sign     Unable to Pay for Housing in the Last Year: No     Number of Places Lived in the Last Year: 1     Unstable Housing in the Last Year: No       Family History   Problem Relation Age of Onset    Hypertension Other     Arthritis Family     Heart disease Family     Hypertension Family     Diabetes type II Family        Physical Exam:    Vitals:   Vitals:    02/28/24 1049   BP: 128/72   Pulse: 61   SpO2: 98%         Physical Exam  Constitutional:       General: She is not in acute distress.     Appearance: Normal appearance.   HENT:      Head: Normocephalic and atraumatic.      Mouth/Throat:      Mouth: Mucous membranes are moist.   Eyes:      Extraocular Movements: Extraocular movements intact.  "     Conjunctiva/sclera: Conjunctivae normal.   Cardiovascular:      Rate and Rhythm: Normal rate and regular rhythm.      Pulses: Normal pulses.      Heart sounds: No murmur heard.     No friction rub. No gallop.      Comments: Prominent v waves, no edema  Mechanical S2  Pulmonary:      Effort: Pulmonary effort is normal. No respiratory distress.      Breath sounds: No wheezing, rhonchi or rales.   Abdominal:      General: There is no distension.      Palpations: Abdomen is soft.      Tenderness: There is no abdominal tenderness. There is no guarding.   Musculoskeletal:         General: No deformity or signs of injury.      Cervical back: Neck supple.   Skin:     General: Skin is warm and dry.      Capillary Refill: Capillary refill takes less than 2 seconds.   Neurological:      General: No focal deficit present.      Mental Status: She is alert and oriented to person, place, and time.   Psychiatric:         Mood and Affect: Mood normal.         Labs & Results:    Lab Results   Component Value Date    SODIUM 139 12/12/2023    K 4.0 12/12/2023    CL 98 (L) 12/12/2023    CO2 31 12/12/2023    BUN 31 (H) 12/12/2023    CREATININE 0.88 12/12/2023    GLUC 104 (H) 12/12/2023    CALCIUM 9.9 12/12/2023     Lab Results   Component Value Date    WBC 3.52 (L) 11/28/2023    HGB 13.5 11/28/2023    HCT 41.9 11/28/2023     (H) 11/28/2023    PLT 95 (L) 11/28/2023     No results found for: \"NTBNP\"   Lab Results   Component Value Date    CHOLESTEROL 96 06/30/2023     Lab Results   Component Value Date    HDL 56 06/30/2023     Lab Results   Component Value Date    TRIG 43 06/30/2023     No results found for: \"NONHDLC\"    EKG personally reviewed by Leigh Odonnell MD.     Counseling / Coordination of Care  Time spent today 25 minutes.  Greater than 50% of total time was spent with the patient and / or family counseling and / or coordination of care. We went over current diagnosis, most recent studies and any changes in " treatment.    Thank you for the opportunity to participate in the care of this patient.    ROSCOE GOLDSTEIN MD  ADVANCED HEART FAILURE AND MECHANICAL CIRCULATORY SUPPORT  [facility]

## 2024-02-28 ENCOUNTER — OFFICE VISIT (OUTPATIENT)
Dept: CARDIOLOGY CLINIC | Facility: CLINIC | Age: 79
End: 2024-02-28
Payer: COMMERCIAL

## 2024-02-28 VITALS
SYSTOLIC BLOOD PRESSURE: 128 MMHG | OXYGEN SATURATION: 98 % | WEIGHT: 160.2 LBS | HEIGHT: 58 IN | HEART RATE: 61 BPM | DIASTOLIC BLOOD PRESSURE: 72 MMHG | BODY MASS INDEX: 33.63 KG/M2

## 2024-02-28 DIAGNOSIS — Z95.2 H/O MECHANICAL AORTIC VALVE REPLACEMENT: ICD-10-CM

## 2024-02-28 DIAGNOSIS — I48.91 ATRIAL FIBRILLATION, UNSPECIFIED TYPE (HCC): ICD-10-CM

## 2024-02-28 DIAGNOSIS — Z95.810 AICD (AUTOMATIC CARDIOVERTER/DEFIBRILLATOR) PRESENT: ICD-10-CM

## 2024-02-28 DIAGNOSIS — I50.32 CHRONIC HEART FAILURE WITH PRESERVED EJECTION FRACTION (HCC): Primary | ICD-10-CM

## 2024-02-28 PROCEDURE — 99214 OFFICE O/P EST MOD 30 MIN: CPT | Performed by: INTERNAL MEDICINE

## 2024-02-28 NOTE — PATIENT INSTRUCTIONS
No medication changes at this time  2g sodium diet  2L fluid restriction  Daily weights  We will hold off on retrial of farxiga due to recent UTI.
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
## 2024-03-22 RX ORDER — AMIODARONE HYDROCHLORIDE 200 MG/1
200 TABLET ORAL
Qty: 90 TABLET | Refills: 2 | Status: SHIPPED | OUTPATIENT
Start: 2024-03-22

## 2024-04-30 ENCOUNTER — REMOTE DEVICE CLINIC VISIT (OUTPATIENT)
Dept: CARDIOLOGY CLINIC | Facility: CLINIC | Age: 79
End: 2024-04-30

## 2024-04-30 DIAGNOSIS — Z95.810 AICD (AUTOMATIC CARDIOVERTER/DEFIBRILLATOR) PRESENT: Primary | ICD-10-CM

## 2024-04-30 PROCEDURE — RECHECK: Performed by: INTERNAL MEDICINE

## 2024-04-30 NOTE — PROGRESS NOTES
Results for orders placed or performed in visit on 04/30/24   Cardiac EP device report    Narrative    BSC[employer] SINGLE ICD (ABD PLACEMENT)/ NOT MRI CONDITIONAL  LATITUDE TRANSMISSION: BATTERY VOLTAGE NEARING MIGUEL ANGEL-8 MONS. WILL SCHEDULE MONTHLY BATTERY CHECKS.  89%. ALL AVAILABLE LEAD PARAMETERS WITHIN NORMAL LIMITS. NO SIGNIFICANT HIGH RATE EPISODES. NORMAL DEVICE FUNCTION. NC

## 2024-05-13 DIAGNOSIS — I50.32 CHRONIC HEART FAILURE WITH PRESERVED EJECTION FRACTION (HCC): ICD-10-CM

## 2024-05-13 NOTE — TELEPHONE ENCOUNTER
Pt contacted Call Center requested refill of their medication.        Doctor Name: Dr. Aguero      Medication Name: torsemide      Dosage of Med: 20 mg     Frequency of Med: 2 tablets in the morning and 1 tablet in the evening      Remaining Medication: 1 week's worth      Pharmacy and Location: [address]        Pt. Preferred Callback Phone Number: [phone]      Thank you.

## 2024-05-14 NOTE — CASE MANAGEMENT
Case Management Discharge Planning Note    Patient name Michael Gay  Location /-60 MRN [id number]  : 1945 Date 2023       Current Admission Date: 2023  Current Admission Diagnosis:Acute on chronic heart failure with preserved ejection fraction [facility])   Patient Active Problem List    Diagnosis Date Noted   • Acute on chronic heart failure with preserved ejection fraction ([address]) 2023   • Pancytopenia ([address]) 2023   • Aneurysm of ascending aorta without rupture ([address]) 07/10/2023   • Difficult intravenous access 05/15/2023   • Acute COVID-19 2023   • A-fib ([address]) 2023   • Stage III pressure ulcer of sacral region ([address]) 2023   • Asthma 2023   • Osteoporosis with current pathological fracture with routine healing 2023   • Class 2 obesity in adult 2023   • CHAS (obstructive sleep apnea) 2023   • Abnormal CT scan 05/10/2023   • Hyponatremia 2023   • Bradycardia 2023   • Ventricular arrhythmia 2023   • Type 2 diabetes mellitus ([address]) 2023   • Fall 2023   • Closed displaced oblique fracture of shaft of left humerus with routine healing 2023   • Acute pain due to trauma 2023   • H/O mechanical aortic valve replacement 2023   • Fracture of multiple ribs of right side 2023   • Chronic ITP (idiopathic thrombocytopenia) ([address]) 2023   • CAD (coronary artery disease) 05/10/2017      LOS (days): 5  Geometric Mean LOS (GMLOS) (days): 3.90  Days to GMLOS:-0.7     OBJECTIVE:  Risk of Unplanned Readmission Score: 23.67         Current admission status: Inpatient   Preferred Pharmacy:   [address] [facility] [address], [address] [address]  Phone: [phone] Fax: [phone]    Primary Care Provider: Devon Merida DO    Primary Insurance: Thais Dos Santos [facility] REP  Secondary Insurance:     DISCHARGE DETAILS:  Per SLIM provider - patient not medically ready, monitor INR. CM to continue to support. Detail Level: Zone General Sunscreen Counseling: I recommended a broad spectrum sunscreen with a SPF of 30 or higher.  I explained that SPF 30 sunscreens block approximately 97 percent of the sun's harmful rays.  Sunscreens should be applied at least 15 minutes prior to expected sun exposure and then every 2 hours after that as long as sun exposure continues. If swimming or exercising sunscreen should be reapplied every 45 minutes to an hour after getting wet or sweating.  One ounce, or the equivalent of a shot glass full of sunscreen, is adequate to protect the skin not covered by a bathing suit. I also recommended a lip balm with a sunscreen as well. Sun protective clothing can be used in lieu of sunscreen but must be worn the entire time you are exposed to the sun's rays.

## 2024-05-15 RX ORDER — TORSEMIDE 20 MG/1
TABLET ORAL
Qty: 270 TABLET | Refills: 1 | Status: SHIPPED | OUTPATIENT
Start: 2024-05-15

## 2024-05-18 ENCOUNTER — TELEPHONE (OUTPATIENT)
Dept: HEMATOLOGY ONCOLOGY | Facility: CLINIC | Age: 79
End: 2024-05-18

## 2024-05-18 NOTE — TELEPHONE ENCOUNTER
I called Shonda regarding an appointment that they have scheduled with Dr. Beckwith scheduled on  6/3/24 11:00 AM      I left a voicemail explaining to patient that this appointment will need to be rescheduled due to a change in the providers schedule. Patient was advised to call [facility] to reschedule.  Another attempt will be made to reschedule

## 2024-05-20 ENCOUNTER — TELEPHONE (OUTPATIENT)
Dept: HEMATOLOGY ONCOLOGY | Facility: CLINIC | Age: 79
End: 2024-05-20

## 2024-05-20 NOTE — TELEPHONE ENCOUNTER
I called Shonda regarding an appointment that they have scheduled with Dr. Beckwith scheduled on  6/3/24 11:00 AM      I left a voicemail explaining to patient that this appointment will need to be rescheduled due to a change in the providers schedule. Patient was advised to call [facility] to reschedule.  Another attempt will be made to reschedule

## 2024-05-21 ENCOUNTER — TELEPHONE (OUTPATIENT)
Dept: HEMATOLOGY ONCOLOGY | Facility: CLINIC | Age: 79
End: 2024-05-21

## 2024-05-21 NOTE — TELEPHONE ENCOUNTER
Appointment Change  Cancel, Reschedule, Change to Virtual      Who are you speaking with? Patient   If it is not the patient, is the caller listed on the communication consent form? N/A   Which provider is the appointment scheduled with? Dr. Beckwith   When was the original appointment scheduled?    Please list date and time 6/3/24 11:00 AM   At which location is the appointment scheduled to take place? [place]   Was the appointment rescheduled?     Was the appointment changed from an in person visit to a virtual visit?    If so, please list the details of the change. No, left message for pt to call back to reschedule.    What is the reason for the appointment change? Provider requested location change.        Was STAR transport scheduled? No   Does STAR transport need to be scheduled for the new visit (if applicable) No   Does the patient need an infusion appointment rescheduled? No   Does the patient have an upcoming infusion appointment scheduled? If so, when? No   Is the patient undergoing chemotherapy? No   For appointments cancelled with less than 24 hours:  Was the no-show policy reviewed? Yes

## 2024-05-31 ENCOUNTER — REMOTE DEVICE CLINIC VISIT (OUTPATIENT)
Dept: CARDIOLOGY CLINIC | Facility: CLINIC | Age: 79
End: 2024-05-31

## 2024-05-31 DIAGNOSIS — Z95.810 AICD (AUTOMATIC CARDIOVERTER/DEFIBRILLATOR) PRESENT: Primary | ICD-10-CM

## 2024-05-31 PROCEDURE — RECHECK: Performed by: INTERNAL MEDICINE

## 2024-06-05 ENCOUNTER — OFFICE VISIT (OUTPATIENT)
Dept: CARDIOLOGY CLINIC | Facility: CLINIC | Age: 79
End: 2024-06-05
Payer: COMMERCIAL

## 2024-06-05 VITALS
OXYGEN SATURATION: 97 % | WEIGHT: 156.6 LBS | SYSTOLIC BLOOD PRESSURE: 124 MMHG | HEIGHT: 58 IN | HEART RATE: 70 BPM | BODY MASS INDEX: 32.87 KG/M2 | DIASTOLIC BLOOD PRESSURE: 72 MMHG

## 2024-06-05 DIAGNOSIS — I48.91 ATRIAL FIBRILLATION, UNSPECIFIED TYPE (HCC): ICD-10-CM

## 2024-06-05 DIAGNOSIS — I50.32 CHRONIC HEART FAILURE WITH PRESERVED EJECTION FRACTION (HCC): Primary | ICD-10-CM

## 2024-06-05 DIAGNOSIS — I50.33 ACUTE ON CHRONIC DIASTOLIC (CONGESTIVE) HEART FAILURE (HCC): ICD-10-CM

## 2024-06-05 DIAGNOSIS — Z95.2 H/O MECHANICAL AORTIC VALVE REPLACEMENT: ICD-10-CM

## 2024-06-05 DIAGNOSIS — Z95.810 PRESENCE OF AUTOMATIC CARDIOVERTER/DEFIBRILLATOR (AICD): ICD-10-CM

## 2024-06-05 PROCEDURE — 99214 OFFICE O/P EST MOD 30 MIN: CPT | Performed by: INTERNAL MEDICINE

## 2024-06-05 PROCEDURE — G2211 COMPLEX E/M VISIT ADD ON: HCPCS | Performed by: INTERNAL MEDICINE

## 2024-06-05 NOTE — PATIENT INSTRUCTIONS
Resume farxiga 5mg daily  BMP in 1 weekj  Monitor for signs of urinary tract infection, please call the office right away if you do  Intravascularly euvolemic  No medication changes at this time  2g sodium diet  2L fluid restriction  Daily weights

## 2024-06-05 NOTE — PROGRESS NOTES
Cardiology Outpatient Progress Note - Shonda Sanchez 78 y.o. female MRN: [id number]    Encounter: [id number]      Assessment/Plan:    Patient Active Problem List    Diagnosis Date Noted    Left foot pain 08/03/2023    Arthritis of left foot 08/03/2023    Hypokalemia 08/02/2023    Acute on chronic heart failure with preserved ejection fraction ([facility]) 07/27/2023    Pancytopenia ([facility]) 07/27/2023    Aneurysm of ascending aorta without rupture ([facility]) 07/10/2023    Difficult intravenous access 05/15/2023    Acute COVID-19 05/14/2023    A-fib ([facility]) 05/13/2023    Stage III pressure ulcer of sacral region ([facility]) 05/13/2023    Asthma 05/13/2023    Osteoporosis with current pathological fracture with routine healing 05/13/2023    Class 2 obesity in adult 05/13/2023    CHAS (obstructive sleep apnea) 05/13/2023    Abnormal CT scan 05/10/2023    Hyponatremia 05/09/2023    Bradycardia 05/08/2023    Ventricular arrhythmia 05/04/2023    Type 2 diabetes mellitus ([facility]) 04/28/2023    Fall 04/27/2023    Closed displaced oblique fracture of shaft of left humerus with routine healing 04/27/2023    Acute pain due to trauma 04/27/2023    H/O mechanical aortic valve replacement 04/27/2023    Fracture of multiple ribs of right side 04/27/2023    Chronic ITP (idiopathic thrombocytopenia) ([facility]) 04/02/2023    CAD (coronary artery disease) 05/10/2017       # Heart failure with preserved ejection fraction  # RV dilation with reduced systolic function, pulmonary hypertension, likely due to group II disease, appears improved on echo 9/20/2023.  Diuretic: torsemide 40mg in AM and 20mg in PM with potassium 40 meq BID  Evidence-based therapy:  --SGLT2 Inhibitor: farxiga 5mg daily - discontinued due to recurrent UTI. Discussed overall long term benefit of farxiga for congestive heart failure. Discussed risk of further UTIs with farxiga. She has overall mild UTIs with frequency and cloudy urine. After discussion of risks and benefits, patient decided to  resume farxiga.  --Aldosterone Receptor Blocker:  --ARNi:   Euvolemic    Weight: 167 lbs on discharge 8/4/23; 162 lbs 8/9/23; 157 lbs 8/23/23; 156 lbs 9/27/23; 153 lbs 12/8/23; 160 lbs 2/28/24; 156 lbs 6/5/24   7/27/23; 822 11/2/23    Studies- personally reviewed by myself    Echo 9/20/23:  LVEF 55%.  Grade 1 diastology.  RV moderately dilated, mildly reduced systolic function  Moderate left atrial enlargement, severe right atrial enlargement  Aortic valve: No evidence of paravalvular regurgitation, trace transvalvular regurgitation, no significant stenosis mean gradient 5 mmHg.  Moderate to severe MR, centrally directed jet.  Severe MAC  Severe tricuspid regurgitation, there appears to be impingement of the anterior leaflet with RV pacer lead.  Ascending aorta is moderately dilated.  4.8 cm.    Echocardiogram 7/28/23  LVEF: 60%  LVIDd: 5.1cm  RV: severely dilated, moderately reduced systolic function  AV: bileaflet mechanical AV, mild transvalvular AI. Mean gradient 6mmHg  MR: mild to moderate, severe MAC  PASP: 49mmHg, estimated RAP 15mmHg, severe TR  RVOT: no notching, shortened PAAT  Other: grade 2 diastology    # Severe TR  # Moderate to severe MR  Currently symptomatically better. Patient would only like to pursue medical management.  # H/o aortic valve stenosis s/p mechanical AVR in 2011, normally functioning on last echo  AC: coumadin - PCP managing dosing  # H/o persistent atrial fibrillation  Rate: metoprolol succinate 25mg daily  Rhythm: amiodarone 200mg daily  AC: coumadin  High ventricular pacing rate on interrogation 6/20/2023.  Repeat echocardiogram September 2023 showed preserved EF of 55%.  TSH and liver enzymes normal 12/12/23; PFT 2/22/24 with normal spirometry, DLCO not done  # H/o VT/VF, s/p ICD  Interrogation 5/31/24:  1%. No significant high rate episodes  Interrogation 1/31/24:  87%. No significant high rate episodes  Interrogation 6/20/23:  71%. No significant high rate episodes  since hospital discharge  # Inappropriate shock for rapid afib 5/2023  # Hypertension, controlled  # Dyslipidemia  5/6/24 TG 63 HDL 52 LDL 45  # DM type 2, HbA1c 6.7 5/6/24  # UTI, last episode 4/3024, reports prior one 1/2024, mild episodes improved with course of oral antibiotic  # h/o CHAS with CPAP use in the past  # remote h/o DVT    TODAY'S PLAN:  Resume farxiga 5mg daily  BMP in 1 week  Advised to monitor for signs or symptoms of urinary tract infection and to call the office right away if with signs or symptoms  Intravascularly euvolemic  2g sodium diet  2L fluid restriction  Daily weights      HPI:     Shonda Sanchez is a 78 y.o.  with A. Fib, DM2, HTN, CAD, mechanical aortic valve, CHF who originally presented to the hospital on 7/27/2023 due to SOB, decreased UOP, leg swelling and orthopnea. CXR revealed mild pulmonary venous congestion with trace effusions. She was treated for decompensated heart failure. She also was treated for UTI. Discharged on torsemide 40mg in AM and 20mg in PM with potassium 20 meq daily. On lasix 40mg daily prior to admission. She reports overall doing well since discharge. No leg swelling and shortness of breath on current level of exertion. She is scheduled to start home PT and OT.  Home weight 166 on discharge -- 163 lbs today.    8/23/23: Here for follow up. 8/9/23 Na 140 K 4.5 and creatinine 0.92. Weighs 152-154 lbs at home. No leg swelling. No shortness of breath on current level of exertion. Adherent to medications and diet. No dizziness or lightheadedness.     9/27/23: here for follow up. Overall doing well. Exercises on cubii for about an hours. Home weights remain stable at 151-154. Walking more when outside. No shortness of breath or chest pain on current level of exertion. No abdominal distension or leg swelling.     12/8/23: Here for follow up. 11/2/23: Na 140 K 4.1 and creatinine 0.85  She continues to do well.  Weight has been stable.  Continues to do exercises on  her cube.  Weight 148 pounds on home scale.  No abdominal distention.  No leg swelling.  No shortness of breath on current level of exertion.  Here to medications and diet.    2/27/24: Here for follow up. 12/12/23 Na 139 K 4 and creatinine 0.88, stable  Recent home weights 150-155 lbs. No worsening leg swelling, PND or orthopnea. Had UTI end of January. Willing to retrial Farxiga but given recurrent UTI and recent episode, discussed holding off and reassessing in six months. Overall doing well. Adhering to medications, diet and daily weights. She has intermittent episodes of abdominal distension related to gas and constipation.     6/4/24: Here for follow up. She overall feels well. Denies shortness of breath or chest pain on current level of exertion. No leg swelling, PND or orthopnea. Taking meds as prescribed. Had UTI in April. She is concerned that she is not getting the benefits of farxiga. Discussed risks and benefits of resuming farxiga and she would like to proceed with monitoring and see how she does.       Review of Systems   Constitutional:  Negative for chills and fever.   HENT:  Negative for ear pain and sore throat.    Eyes:  Negative for pain and visual disturbance.   Respiratory:  Negative for cough and shortness of breath.    Cardiovascular:  Negative for chest pain, palpitations and leg swelling.   Gastrointestinal:  Negative for abdominal pain and vomiting.   Genitourinary:  Negative for dysuria and hematuria.   Musculoskeletal:  Negative for arthralgias and back pain.   Skin:  Negative for color change and rash.   Neurological:  Negative for seizures and syncope.   All other systems reviewed and are negative.      Past Medical History:   Diagnosis Date    Arthritis     Asthma     Diabetes (HCC)     Environmental allergies     Heart murmur     Hypertension          Allergies   Allergen Reactions    Fentanyl Other (See Comments)     Other reaction(s): Hallucinations  paranoia, confusion  Other  reaction(s): Hallucinations  Other reaction(s): Hallucinations  hallucinations    Fentanyl Confusion     .    Current Outpatient Medications:     acetaminophen (TYLENOL) 325 mg tablet, Take 2 tablets (650 mg total) by mouth every 6 (six) hours as needed for mild pain, Disp: 63 tablet, Rfl: 0    allopurinol (ZYLOPRIM) 100 mg tablet, Take 100 mg by mouth daily, Disp: , Rfl:     amiodarone 200 mg tablet, Take 1 tablet (200 mg total) by mouth daily with breakfast, Disp: 90 tablet, Rfl: 2    atorvastatin (LIPITOR) 40 mg tablet, , Disp: , Rfl:     cholecalciferol (VITAMIN D3) 1,000 units tablet, Take 1 tablet (1,000 Units total) by mouth daily Do not start before June 2, 2023., Disp: 30 tablet, Rfl: 0    citalopram (CeleXA) 20 mg tablet, Take 20 mg by mouth daily, Disp: , Rfl:     docusate sodium (COLACE) 100 mg capsule, Take 1 capsule (100 mg total) by mouth 2 (two) times a day, Disp: 60 capsule, Rfl: 0    magnesium 30 MG tablet, Take 64 mg by mouth 3 (three) times a day Takes 3-4 days a week takes 1 tab., Disp: , Rfl:     Metamucil Fiber CHEW, Chew 3 gums in the morning. Indications: fiber supplement, Disp: , Rfl:     metoprolol succinate (TOPROL-XL) 25 mg 24 hr tablet, Take 1 tablet (25 mg total) by mouth daily Do not start before June 2, 2023., Disp: 30 tablet, Rfl: 0    montelukast (SINGULAIR) 10 mg tablet, Take 10 mg by mouth daily at bedtime, Disp: , Rfl:     multivitamin (THERAGRAN) TABS, Take 1 tablet by mouth daily, Disp: , Rfl:     potassium chloride (K-DUR,KLOR-CON) 20 mEq tablet, TAKE 2 TABLETS(40 MEQ) BY MOUTH TWICE DAILY, Disp: 360 tablet, Rfl: 3    torsemide (DEMADEX) 20 mg tablet, TAKE 2 TABLETS BY MOUTH EVERY MORNING AND 1 TABLET EVERY EVENING, Disp: 270 tablet, Rfl: 1    warfarin (COUMADIN) 5 mg tablet, Take 5 mg by mouth daily Takes 5mg on Monday and Friday and 2.5mg the other 5 days of the week, Disp: , Rfl:     alendronate (FOSAMAX) 70 mg tablet, Take 70 mg by mouth Once a week (Patient not taking:  Reported on 9/27/2023), Disp: , Rfl:     Blood Glucose Monitoring Suppl (OneTouch Verio Flex System) w/Device KIT, Use as directed (Patient not taking: Reported on 2/28/2024), Disp: , Rfl:     cephalexin (KEFLEX) 500 mg capsule, Take 500 mg by mouth (Patient not taking: Reported on 2/28/2024), Disp: , Rfl:     Farxiga 5 MG TABS, Take 5 mg by mouth daily (Patient not taking: Reported on 7/25/2023), Disp: , Rfl:     furosemide (LASIX) 20 mg tablet, Take by mouth (Patient not taking: Reported on 12/8/2023), Disp: , Rfl:     metoprolol succinate (TOPROL-XL) 50 mg 24 hr tablet, Take by mouth (Patient not taking: Reported on 2/28/2024), Disp: , Rfl:     oxybutynin (DITROPAN-XL) 5 mg 24 hr tablet, Take by mouth (Patient not taking: Reported on 2/28/2024), Disp: , Rfl:     traMADol (Ultram) 50 mg tablet, Take 1 tablet (50 mg total) by mouth every 6 (six) hours as needed for severe pain (Patient not taking: Reported on 2/28/2024), Disp: 25 tablet, Rfl: 0    Social History     Socioeconomic History    Marital status:      Spouse name: Not on file    Number of children: Not on file    Years of education: Not on file    Highest education level: Not on file   Occupational History    Not on file   Tobacco Use    Smoking status: Never    Smokeless tobacco: Never   Vaping Use    Vaping status: Never Used   Substance and Sexual Activity    Alcohol use: Yes     Comment: socially    Drug use: Never    Sexual activity: Not on file   Other Topics Concern    Not on file   Social History Narrative    ** Merged History Encounter **          Social Determinants of Health     Financial Resource Strain: Not on file   Food Insecurity: No Food Insecurity (7/28/2023)    Hunger Vital Sign     Worried About Running Out of Food in the Last Year: Never true     Ran Out of Food in the Last Year: Never true   Transportation Needs: No Transportation Needs (7/28/2023)    PRAPARE - Transportation     Lack of Transportation (Medical): No     Lack  of Transportation (Non-Medical): No   Physical Activity: Not on file   Stress: Not on file   Social Connections: Not on file   Intimate Partner Violence: Not on file   Housing Stability: Low Risk  (7/28/2023)    Housing Stability Vital Sign     Unable to Pay for Housing in the Last Year: No     Number of Places Lived in the Last Year: 1     Unstable Housing in the Last Year: No       Family History   Problem Relation Age of Onset    Hypertension Other     Arthritis Family     Heart disease Family     Hypertension Family     Diabetes type II Family        Physical Exam:    Vitals:   Vitals:    06/05/24 1037   BP: 124/72   Pulse: 70   SpO2: 97%           Physical Exam  Constitutional:       General: She is not in acute distress.     Appearance: Normal appearance.   HENT:      Head: Normocephalic and atraumatic.      Mouth/Throat:      Mouth: Mucous membranes are moist.   Eyes:      Extraocular Movements: Extraocular movements intact.      Conjunctiva/sclera: Conjunctivae normal.   Cardiovascular:      Rate and Rhythm: Normal rate and regular rhythm.      Pulses: Normal pulses.      Heart sounds: Murmur heard.      Systolic murmur is present with a grade of 3/6.      No friction rub. No gallop.      Comments: Prominent v waves, no edema  Mechanical S2  Pulmonary:      Effort: Pulmonary effort is normal. No respiratory distress.      Breath sounds: No wheezing, rhonchi or rales.   Abdominal:      General: There is no distension.      Palpations: Abdomen is soft.      Tenderness: There is no abdominal tenderness. There is no guarding.   Musculoskeletal:         General: No deformity or signs of injury.      Cervical back: Neck supple.   Skin:     General: Skin is warm and dry.      Capillary Refill: Capillary refill takes less than 2 seconds.   Neurological:      General: No focal deficit present.      Mental Status: She is alert and oriented to person, place, and time.   Psychiatric:         Mood and Affect: Mood  "normal.         Labs & Results:    Lab Results   Component Value Date    SODIUM 139 05/06/2024    K 4.5 05/06/2024    CL 98 (L) 05/06/2024    CO2 32 (H) 05/06/2024    BUN 27 (H) 05/06/2024    CREATININE 0.83 05/06/2024    GLUC 93 05/06/2024    CALCIUM 9.7 05/06/2024     Lab Results   Component Value Date    WBC 3.52 (L) 11/28/2023    HGB 13.5 11/28/2023    HCT 41.9 11/28/2023     (H) 11/28/2023    PLT 95 (L) 11/28/2023     No results found for: \"NTBNP\"   Lab Results   Component Value Date    CHOLESTEROL 96 06/30/2023     Lab Results   Component Value Date    HDL 56 06/30/2023     Lab Results   Component Value Date    TRIG 43 06/30/2023     No results found for: \"NONHDLC\"    EKG personally reviewed by Roscoe Odonnell MD.       ROSCOE ODONNELL MD  ADVANCED HEART FAILURE AND MECHANICAL CIRCULATORY SUPPORT  [facility]        "

## 2024-06-10 NOTE — PROGRESS NOTES
[address] [facility]  Progress Note  Name: Rosaura Garcia  MRN: [id number]  Unit/Bed#: -53 I Date of Admission: 7/27/2023   Date of Service: 8/3/2023  Hospital Day: 7    Assessment/Plan   * Acute on chronic heart failure with preserved ejection fraction [facility])  Assessment & Plan  Wt Readings from Last 3 Encounters:   08/03/23 75.8 kg (167 lb)   07/25/23 88.5 kg (195 lb)   07/10/23 88.5 kg (195 lb)     · Patient with history of diastolic heart failure presented with several weeks of worsening shortness of breath. · Patient's creatinine is currently at baseline  · Was maintained on Lasix 40 mg daily as an outpatient  · Echocardiogram shows EF of 60%, normal systolic function, moderate abnormal diastolic dysfunction, right ventricle and atrium dilated as per cardiology report  · Continue daily weights and I's and O's  · IV diuretics have since been transitioned to torsemide 40 mg every morning and 20 mg every afternoon      H/O mechanical aortic valve replacement  Assessment & Plan  · Patient with history of mechanical aortic valve placement, will continue on Coumadin  · Per Cardiology notes, goal INR has been documented to be 2.5-3.5  · Most recent INR subtherapeutic at 2.20  · Continue warfarin 5 mg daily for now  · On heparin drip for bridging given subtherapeutic INR  · INR in a.m. A-fib [facility])  Assessment & Plan  · We will continue beta-blocker and warfarin/heparin GTT for [facility] given subtherapeutic INR  · INR in AM    Hypokalemia  Assessment & Plan  · Resolved with repletion  · Currently on 40mEq K-dur BID with his diuretics.  BMP in AM.    Type 2 diabetes mellitus [facility])  Assessment & Plan  Lab Results   Component Value Date    HGBA1C 5.9 (H) 06/30/2023       Recent Labs     08/02/23  1104 08/02/23  1234 08/02/23  1715 08/03/23  0615   POCGLU 181* 146* 128 92       Blood Sugar Average: Last 72 hrs:  History of type 2 diabetes on oral meds  · Continue sliding scale  · Diabetic General Question     Subject: REQUESTING A CALL ABOUT THE NEXT STEP AFTER HIS MRI  Patient and /or Facility Request: Nitin Moreno   Contact Number: [phone]      diet     Pancytopenia ([address])  Assessment & Plan  Patient with history of chronic ITP, however also noted leukopenia and anemia  · Will need outpatient follow-up with heme-onc  · Reticulocyte count elevated  · SPEP obtained -cannot rule out small monoclonal gammopathy    Stage III pressure ulcer of sacral region [facility])  Assessment & Plan  Present on admission, stage III pressure ulcer of sacral region  · Continue Maxorb and allevyn foam dressing to area  · Continue wound care    Left foot pain  Assessment & Plan  · Patient reports following with outpatient Podiatrist  · Check left foot XR  · Consult Podiatry           VTE Pharmacologic Prophylaxis: VTE Score: 1 heparin GTT/coumadin as above    Patient Centered Rounds: I performed bedside rounds with nursing staff today. Discussions with Specialists or Other Care Team Provider: TT'd Podiatry and d/w Cardiologist Dr. Rodo Bowling and      Education and Discussions with Family / Patient: Attempted to update  (daughter) via phone. Unable to contact. [email or web address]    Total Time Spent on Date of Encounter in care of patient: 35 minutes This time was spent on one or more of the following: performing physical exam; counseling and coordination of care; obtaining or reviewing history; documenting in the medical record; reviewing/ordering tests, medications or procedures; communicating with other healthcare professionals and discussing with patient's family/caregivers. Current Length of Stay: 7 day(s)  Current Patient Status: Inpatient   Certification Statement: The patient will continue to require additional inpatient hospital stay due to left foot pain, XR, Podiatry, subtherapeutic INR  Discharge Plan: Anticipate discharge tomorrow to home with home services. Code Status: Level 1 - Full Code    Subjective:   Ms. Kay Rodas reports left foot pain. She reports following an outpatient podiatry and recently had 2 blisters on her foot.  She denies CP, SOB    Objective:     Vitals:   Temp (24hrs), Av °F (36.7 °C), Min:97.7 °F (36.5 °C), Max:98.3 °F (36.8 °C)    Temp:  [97.7 °F (36.5 °C)-98.3 °F (36.8 °C)] 97.7 °F (36.5 °C)  HR:  [56-60] 60  Resp:  [15-16] 16  BP: ()/(41-64) 123/64  SpO2:  [90 %-98 %] 93 %  Body mass index is 34.9 kg/m². Input and Output Summary (last 24 hours): Intake/Output Summary (Last 24 hours) at 8/3/2023 1009  Last data filed at 8/3/2023 0601  Gross per 24 hour   Intake 588.82 ml   Output 2750 ml   Net -2161.18 ml       Physical Exam:   Physical Exam  Vitals reviewed. Constitutional:       Comments: Patient seen sitting in bedside chair, NAD   Cardiovascular:      Rate and Rhythm: Normal rate and regular rhythm. Heart sounds: Murmur heard. Pulmonary:      Effort: Pulmonary effort is normal. No respiratory distress. Breath sounds: Normal breath sounds. Abdominal:      General: Bowel sounds are normal.      Palpations: Abdomen is soft. Tenderness: There is no abdominal tenderness. Musculoskeletal:      Right lower leg: No edema. Left lower leg: No edema. Skin:     General: Skin is warm. Neurological:      Mental Status: She is alert and oriented to person, place, and time. Psychiatric:         Mood and Affect: Mood normal.         Behavior: Behavior normal.          Additional Data:     Labs:  Results from last 7 days   Lab Units 23  0442 23  0451 23  0543   WBC Thousand/uL 3.03*   < > 3.47*   HEMOGLOBIN g/dL 11.6   < > 10.4*   HEMATOCRIT % 36.0   < > 32.5*   PLATELETS Thousands/uL 116*   < > 130*   NEUTROS PCT %  --   --  61   LYMPHS PCT %  --   --  21   MONOS PCT %  --   --  14*   EOS PCT %  --   --  3    < > = values in this interval not displayed.      Results from last 7 days   Lab Units 23  0151 23  0543 23  0219   SODIUM mmol/L 138   < > 140   POTASSIUM mmol/L 3.5   < > 3.4*   CHLORIDE mmol/L 94*   < > 105   CO2 mmol/L 42*   < > 33*   BUN mg/dL 33* < > 18   CREATININE mg/dL 0.96   < > 0.69   ANION GAP mmol/L 2   < > 2   CALCIUM mg/dL 8.9   < > 9.3   ALBUMIN g/dL  --   --  3.0*   TOTAL BILIRUBIN mg/dL  --   --  0.91   ALK PHOS U/L  --   --  145*   ALT U/L  --   --  23   AST U/L  --   --  39   GLUCOSE RANDOM mg/dL 65   < > 132    < > = values in this interval not displayed. Results from last 7 days   Lab Units 08/03/23  0000   INR  2.20*     Results from last 7 days   Lab Units 08/03/23  0615 08/02/23  1715 08/02/23  1234 08/02/23  1104 08/02/23  0619 08/01/23  2111 08/01/23  1705 08/01/23  1150 08/01/23  0627 07/31/23  2212 07/31/23  2103 07/31/23  1720   POC GLUCOSE mg/dl 92 128 146* 181* 95 182* 152* 161* 101 140 194* 131               Lines/Drains:  Invasive Devices     Peripheral Intravenous Line  Duration           Peripheral IV 08/02/23 Left;Ventral (anterior) Forearm 1 day                  Telemetry:  Telemetry Orders (From admission, onward)             24 Hour Telemetry Monitoring  Continuous x 24 Hours (Telem)        Expiring   Question:  Reason for 24 Hour Telemetry  Answer:  Metabolic/electrolyte disturbance with high probability of dysrhythmia. K level <3 or >6 OR KCL infusion >10mEq/hr                 Telemetry Reviewed: paced  Indication for Continued Telemetry Use: No indication for continued use. Will discontinue.               Imaging: Reviewed radiology reports from this admission including: chest xray    Recent Cultures (last 7 days):   Results from last 7 days   Lab Units 07/27/23  1654   URINE CULTURE  >100,000 cfu/ml Escherichia coli*  <10,000 cfu/ml Gram Negative Christopher Enteric Like*       Last 24 Hours Medication List:   Current Facility-Administered Medications   Medication Dose Route Frequency Provider Last Rate   • acetaminophen  650 mg Oral Q6H PRN Facundo Hogan PA-C     • allopurinol  100 mg Oral Daily Olaf Lewis DO     • amiodarone  200 mg Oral Daily With Breakfast Olaf Lewis DO     • atorvastatin  40 mg Oral Daily With Dinner [facility], DO     • citalopram  20 mg Oral Daily Olaf Lewis, DO     • heparin (porcine)  3-30 Units/kg/hr (Order-Specific) Intravenous Titrated Katty Manning PA-C 12 Units/kg/hr (08/03/23 9261)   • heparin (porcine)  3,000 Units Intravenous Q6H PRN Katty Manning PA-C     • heparin (porcine)  6,000 Units Intravenous Once Katty Manning PA-C     • heparin (porcine)  6,000 Units Intravenous Q6H PRN Katty Manning PA-C     • insulin lispro  1-6 Units Subcutaneous 4x Daily (AC & HS) Annalee Jones PA-C     • metoprolol succinate  25 mg Oral Daily Olaf Lewis, DO     • montelukast  10 mg Oral HS Olaf Lewis, DO     • potassium chloride  40 mEq Oral BID Wes Camarena MD     • torsemide  20 mg Oral Before Dinner JOSEPHINE Bateman     • torsemide  40 mg Oral Daily JOSEPHINE Bateman     • warfarin  5 mg Oral Daily (warfarin) Wes Camarena MD          Today, Patient Was Seen By: Rizwan Aj PA-C    **Please Note: This note may have been constructed using a voice recognition system. **

## 2024-06-14 ENCOUNTER — APPOINTMENT (OUTPATIENT)
Dept: LAB | Facility: CLINIC | Age: 79
End: 2024-06-14
Payer: COMMERCIAL

## 2024-06-14 DIAGNOSIS — I50.32 CHRONIC HEART FAILURE WITH PRESERVED EJECTION FRACTION (HCC): ICD-10-CM

## 2024-06-14 LAB
ANION GAP SERPL CALCULATED.3IONS-SCNC: 7 MMOL/L (ref 4–13)
BUN SERPL-MCNC: 32 MG/DL (ref 5–25)
CALCIUM SERPL-MCNC: 9.7 MG/DL (ref 8.4–10.2)
CHLORIDE SERPL-SCNC: 102 MMOL/L (ref 96–108)
CO2 SERPL-SCNC: 32 MMOL/L (ref 21–32)
CREAT SERPL-MCNC: 0.84 MG/DL (ref 0.6–1.3)
GFR SERPL CREATININE-BSD FRML MDRD: 66 ML/MIN/1.73SQ M
GLUCOSE P FAST SERPL-MCNC: 94 MG/DL (ref 65–99)
POTASSIUM SERPL-SCNC: 3.4 MMOL/L (ref 3.5–5.3)
SODIUM SERPL-SCNC: 141 MMOL/L (ref 135–147)

## 2024-06-14 PROCEDURE — 80048 BASIC METABOLIC PNL TOTAL CA: CPT

## 2024-06-14 PROCEDURE — 36415 COLL VENOUS BLD VENIPUNCTURE: CPT

## 2024-06-17 DIAGNOSIS — N39.0 URINARY TRACT INFECTION WITHOUT HEMATURIA, SITE UNSPECIFIED: Primary | ICD-10-CM

## 2024-06-17 DIAGNOSIS — E87.6 HYPOKALEMIA: ICD-10-CM

## 2024-06-17 RX ORDER — POTASSIUM CHLORIDE 20 MEQ/1
TABLET, EXTENDED RELEASE ORAL
Qty: 450 TABLET | Refills: 3 | Status: SHIPPED | OUTPATIENT
Start: 2024-06-17

## 2024-06-20 ENCOUNTER — IN-CLINIC DEVICE VISIT (OUTPATIENT)
Dept: CARDIOLOGY CLINIC | Facility: CLINIC | Age: 79
End: 2024-06-20
Payer: COMMERCIAL

## 2024-06-20 DIAGNOSIS — Z95.810 PRESENCE OF AUTOMATIC CARDIOVERTER/DEFIBRILLATOR (AICD): Primary | ICD-10-CM

## 2024-06-20 PROCEDURE — 93282 PRGRMG EVAL IMPLANTABLE DFB: CPT | Performed by: INTERNAL MEDICINE

## 2024-06-20 NOTE — PROGRESS NOTES
BSCI SINGLE ICD (ABD PLACEMENT)/ NOT MRI CONDITIONAL   Jun 20, 2024 10:51 - Approximate time to explant: 6 monthsJun 20, 2024 10:51 - Check RV Lead   DEVICE INTERROGATED IN THE [place] OFFICE: BATTERY VOLTAGE NEARING MIGUEL ANGEL (6 MOS).  WILL SCHEDULE MONTHLY BATTERY CHECKS.  90% (VVIR 60PPM); ALL LEAD PARAMETERS WITHIN NORMAL LIMITS. NO SIGNIFICANT HIGH RATE EPISODES. INCREASE MADE TO RV AMPLITUDE TO PROVIDE ADEQUATE SAFETY MARGIN. NORMAL DEVICE FUNCTION.    ES

## 2024-07-01 ENCOUNTER — APPOINTMENT (OUTPATIENT)
Dept: LAB | Facility: CLINIC | Age: 79
End: 2024-07-01
Payer: COMMERCIAL

## 2024-07-01 DIAGNOSIS — D69.3 CHRONIC ITP (IDIOPATHIC THROMBOCYTOPENIA) (HCC): ICD-10-CM

## 2024-07-01 DIAGNOSIS — E87.6 HYPOKALEMIA: ICD-10-CM

## 2024-07-01 DIAGNOSIS — N39.0 URINARY TRACT INFECTION WITHOUT HEMATURIA, SITE UNSPECIFIED: ICD-10-CM

## 2024-07-01 DIAGNOSIS — D53.9 MACROCYTIC ANEMIA: ICD-10-CM

## 2024-07-01 DIAGNOSIS — D61.818 PANCYTOPENIA (HCC): ICD-10-CM

## 2024-07-01 LAB
ANION GAP SERPL CALCULATED.3IONS-SCNC: 6 MMOL/L (ref 4–13)
BACTERIA UR QL AUTO: ABNORMAL /HPF
BILIRUB UR QL STRIP: NEGATIVE
BUN SERPL-MCNC: 28 MG/DL (ref 5–25)
CALCIUM SERPL-MCNC: 9.8 MG/DL (ref 8.4–10.2)
CHLORIDE SERPL-SCNC: 100 MMOL/L (ref 96–108)
CLARITY UR: CLEAR
CO2 SERPL-SCNC: 35 MMOL/L (ref 21–32)
COLOR UR: ABNORMAL
CREAT SERPL-MCNC: 0.83 MG/DL (ref 0.6–1.3)
GFR SERPL CREATININE-BSD FRML MDRD: 67 ML/MIN/1.73SQ M
GLUCOSE P FAST SERPL-MCNC: 121 MG/DL (ref 65–99)
GLUCOSE UR STRIP-MCNC: ABNORMAL MG/DL
HGB UR QL STRIP.AUTO: NEGATIVE
KETONES UR STRIP-MCNC: NEGATIVE MG/DL
LEUKOCYTE ESTERASE UR QL STRIP: NEGATIVE
NITRITE UR QL STRIP: NEGATIVE
NON-SQ EPI CELLS URNS QL MICRO: ABNORMAL /HPF
PH UR STRIP.AUTO: 6 [PH]
POTASSIUM SERPL-SCNC: 3.8 MMOL/L (ref 3.5–5.3)
PROT UR STRIP-MCNC: NEGATIVE MG/DL
RBC #/AREA URNS AUTO: ABNORMAL /HPF
SODIUM SERPL-SCNC: 141 MMOL/L (ref 135–147)
SP GR UR STRIP.AUTO: 1.01 (ref 1–1.03)
UROBILINOGEN UR STRIP-ACNC: <2 MG/DL
WBC #/AREA URNS AUTO: ABNORMAL /HPF

## 2024-07-01 PROCEDURE — 81001 URINALYSIS AUTO W/SCOPE: CPT

## 2024-07-01 PROCEDURE — 80048 BASIC METABOLIC PNL TOTAL CA: CPT

## 2024-07-01 PROCEDURE — 36415 COLL VENOUS BLD VENIPUNCTURE: CPT

## 2024-07-24 ENCOUNTER — TELEPHONE (OUTPATIENT)
Dept: CARDIOLOGY CLINIC | Facility: CLINIC | Age: 79
End: 2024-07-24

## 2024-07-24 NOTE — TELEPHONE ENCOUNTER
Left message for pt to inquire about her cardiac device follow up. It was noted all appts were canceled.     Her device battery is getting low and typically she should be checked monthly.

## 2024-07-25 ENCOUNTER — REMOTE DEVICE CLINIC VISIT (OUTPATIENT)
Dept: CARDIOLOGY CLINIC | Facility: CLINIC | Age: 79
End: 2024-07-25

## 2024-07-25 DIAGNOSIS — Z95.810 AICD (AUTOMATIC CARDIOVERTER/DEFIBRILLATOR) PRESENT: Primary | ICD-10-CM

## 2024-07-25 PROCEDURE — RECHECK: Performed by: INTERNAL MEDICINE

## 2024-07-25 NOTE — PROGRESS NOTES
Results for orders placed or performed in visit on 07/25/24   Cardiac EP device report    Narrative    BSCI SINGLE ICD (ABD PLACEMENT)/ NOT MRI CONDITIONAL  LATITUDE TRANSMISSION: BATTERY VOLTAGE NEARING MIGUEL ANGEL-3 MONS. WILL SCHEDULE MONTHLY BATTERY CHECKS.  82%. ALL AVAILABLE LEAD PARAMETERS WITHIN NORMAL LIMITS. NO SIGNIFICANT HIGH RATE EPISODES. NORMAL DEVICE FUNCTION. NC

## 2024-08-26 ENCOUNTER — REMOTE DEVICE CLINIC VISIT (OUTPATIENT)
Dept: CARDIOLOGY CLINIC | Facility: CLINIC | Age: 79
End: 2024-08-26

## 2024-08-26 DIAGNOSIS — I44.30 AVB (ATRIOVENTRICULAR BLOCK): ICD-10-CM

## 2024-08-26 DIAGNOSIS — I47.20 VENTRICULAR TACHYCARDIA (HCC): Primary | ICD-10-CM

## 2024-08-26 PROCEDURE — RECHECK: Performed by: INTERNAL MEDICINE

## 2024-08-26 NOTE — PROGRESS NOTES
Results for orders placed or performed in visit on 08/26/24   Cardiac EP device report    Narrative    BSCI SINGLE ICD (ABD PLACEMENT)/ NOT MRI CONDITIONAL  NON-BILLABLE LATITUDE TRANSMISSION: BATTERY STATUS: BATTERY VOLTAGE NEARING MIGUEL ANGEL (3 MOS~CHARGE TIME 11.3 SECS).  WILL SCHEDULE MONTHLY BATTERY CHECKS.  : 85% (>40%~AVB). ALL AVAILABLE LEAD PARAMETERS WITHIN NORMAL LIMITS. NO SIGNIFICANT HIGH RATE EPISODES. NORMAL DEVICE FUNCTION. CH

## 2024-09-18 ENCOUNTER — TELEPHONE (OUTPATIENT)
Age: 79
End: 2024-09-18

## 2024-09-18 NOTE — TELEPHONE ENCOUNTER
Called and spoke with patient. We have ordered this product. Product is 1x3 coverlets from [facility]

## 2024-09-18 NOTE — TELEPHONE ENCOUNTER
Please see below message. Patient returning the office's call. She would like a call back once we get them in. Thank you

## 2024-09-18 NOTE — TELEPHONE ENCOUNTER
Caller: Shonda Sanchez    Doctor and/or Office: Dr. Bangura/Chelsey    #: [phone]    Escalation: Care Patient has a new patient appt in Nov. She would like to know if she can purchase leukoplast elastic from the office? Please return call and advise. Thank you

## 2024-09-27 ENCOUNTER — REMOTE DEVICE CLINIC VISIT (OUTPATIENT)
Dept: CARDIOLOGY CLINIC | Facility: CLINIC | Age: 79
End: 2024-09-27
Payer: COMMERCIAL

## 2024-09-27 DIAGNOSIS — Z95.810 AICD (AUTOMATIC CARDIOVERTER/DEFIBRILLATOR) PRESENT: Primary | ICD-10-CM

## 2024-09-27 PROCEDURE — 93295 DEV INTERROG REMOTE 1/2/MLT: CPT | Performed by: INTERNAL MEDICINE

## 2024-09-27 PROCEDURE — 93296 REM INTERROG EVL PM/IDS: CPT | Performed by: INTERNAL MEDICINE

## 2024-09-27 NOTE — PROGRESS NOTES
Results for orders placed or performed in visit on 09/27/24   Cardiac EP device report    Narrative    BSCI SINGLE ICD (ABD PLACEMENT)/ NOT MRI CONDITIONAL  LATITUDE TRANSMISSION: BATTERY VOLTAGE NEARING MIGUEL ANGEL (3 MOS). WILL SCHEDULE MONTHLY BATTERY CHECKS. -85% (>40% VVIR@60PPM). SMALL RWAVES-1.4MV. ALL OTHER AVAILABLE LEAD PARAMETERS WITHIN NORMAL LIMITS. NO SIGNIFICANT HIGH RATE EPISODES. NORMAL DEVICE FUNCTION. GV

## 2024-10-14 ENCOUNTER — TELEPHONE (OUTPATIENT)
Dept: CARDIOLOGY CLINIC | Facility: CLINIC | Age: 79
End: 2024-10-14

## 2024-10-14 NOTE — TELEPHONE ENCOUNTER
Patient called and notified of elective replacement indicator on ICD has been reached. EP clerical team will be in touch to scheduled H&P in near future.     Verbal understanding with conversation.

## 2024-10-21 ENCOUNTER — OFFICE VISIT (OUTPATIENT)
Dept: CARDIOLOGY CLINIC | Facility: CLINIC | Age: 79
End: 2024-10-21
Payer: COMMERCIAL

## 2024-10-21 ENCOUNTER — TELEPHONE (OUTPATIENT)
Dept: CARDIOLOGY CLINIC | Facility: CLINIC | Age: 79
End: 2024-10-21

## 2024-10-21 VITALS
HEIGHT: 58 IN | DIASTOLIC BLOOD PRESSURE: 62 MMHG | BODY MASS INDEX: 33.33 KG/M2 | SYSTOLIC BLOOD PRESSURE: 122 MMHG | WEIGHT: 158.8 LBS

## 2024-10-21 DIAGNOSIS — I47.20 VENTRICULAR TACHYCARDIA (HCC): Primary | ICD-10-CM

## 2024-10-21 DIAGNOSIS — Z79.4 TYPE 2 DIABETES MELLITUS WITH DIABETIC PERIPHERAL ANGIOPATHY WITHOUT GANGRENE, WITH LONG-TERM CURRENT USE OF INSULIN (HCC): ICD-10-CM

## 2024-10-21 DIAGNOSIS — I48.91 ATRIAL FIBRILLATION, UNSPECIFIED TYPE (HCC): ICD-10-CM

## 2024-10-21 DIAGNOSIS — E66.812 CLASS 2 OBESITY IN ADULT, UNSPECIFIED BMI, UNSPECIFIED OBESITY TYPE, UNSPECIFIED WHETHER SERIOUS COMORBIDITY PRESENT: ICD-10-CM

## 2024-10-21 DIAGNOSIS — E11.51 TYPE 2 DIABETES MELLITUS WITH DIABETIC PERIPHERAL ANGIOPATHY WITHOUT GANGRENE, WITH LONG-TERM CURRENT USE OF INSULIN (HCC): ICD-10-CM

## 2024-10-21 DIAGNOSIS — I25.10 CORONARY ARTERY DISEASE INVOLVING NATIVE HEART WITHOUT ANGINA PECTORIS, UNSPECIFIED VESSEL OR LESION TYPE: ICD-10-CM

## 2024-10-21 DIAGNOSIS — E11.69 TYPE 2 DIABETES MELLITUS WITH OTHER SPECIFIED COMPLICATION, UNSPECIFIED WHETHER LONG TERM INSULIN USE (HCC): ICD-10-CM

## 2024-10-21 DIAGNOSIS — E11.9 TYPE 2 DIABETES MELLITUS WITHOUT COMPLICATION, UNSPECIFIED WHETHER LONG TERM INSULIN USE (HCC): ICD-10-CM

## 2024-10-21 DIAGNOSIS — G47.33 OSA (OBSTRUCTIVE SLEEP APNEA): ICD-10-CM

## 2024-10-21 PROCEDURE — 99214 OFFICE O/P EST MOD 30 MIN: CPT

## 2024-10-21 PROCEDURE — 93000 ELECTROCARDIOGRAM COMPLETE: CPT

## 2024-10-21 NOTE — PROGRESS NOTES
Electrophysiology Follow Up  [facility]  [facility] [address], [place] [address]    Name: Shonda Sanchez  : 1945  MRN: [id number]    ASSESSMENT/PLAN:  Persistent atrial fibrillation w/ prior inappropriate ICD shock   Rate control: Metoprolol succinate 25mg daily  AAD: Amiodarone 200mg daily  AC: Coumadin given mechanical AVR as below  Hx of VT/VF s/p BS single-chamber SubQ ICD   Implanted 2011 for secondary prevention given VT/VF hx  EKG QRS today 204ms  10/12/24 Interrogation:  Reached RRT 10/11/24  V-lead with small r-waves at 1.4mv with other parameters appropriate   V-paced 90%  Programed VVIR 60bpm  EF 55% per 2023 ECHO   Chronic HFpEF w/ EF 55% per 2023 ECHO  Appears euvolemic in office today  HTN  S/p mechanical AVR ()  Maintained on coumadin  T2DM w/o insulin use  Asthma  Obesity      Discussion/Plan:    Patient has a [facility] single-chamber SubQ ICD which was placed 2011 given history of VT/VF (and reported difficult subclavian access per patient)     This device went RRT/MIGUEL ANGEL 10/11/24    The patient is intermittently dependent w/ 90%  per latest interrogation    V-lead with small r-waves at 1.4mv with other parameters appropriate per latest device interrogation    They affirm the device is in a comfortable position at this time    In office today her device was tested at 40bpm and EKG during this showed she had an underlying junctional rhythm at 55bpm    Discussed the gen change procedure, complications, and postop restrictions/wound care with the patient in detail today. She remains agreeable to this procedure.    Plan to check repeat ECHO pre-procedure.    Otherwise a message has been sent to the surgery coordination team to help schedule her procedure.    Patient has been instructed to follow up in our EP office post-procedure or as needed. She will call our office with any questions or concerns in the meantime.    Rhythm  History:   Atrial fibrillation:      Atrial flutter:      SVT:      VT/VF/PVC:     Device history:   Pacemaker:     Defibrillator:     BIV PPM:     BIV ICD:     ILR:    Interim History/HPI:   Interim history: Shonda Sanchez is a 78 y.o. female with a PMH of VT/VF s/p [facility] single-chamber ICD, persistent afib, HTN, s/p mechanical AVR, HTN, T2DM, asthma, and obesity.    She presents today for outpatient EP discussion regarding the generator change procedure, given her [facility] single-chamber ICD reached MIGUEL ANGEL on 1011/24. She reports she has been feeling well from a cardiac standpoint and otherwise denies acute complaint. She does have some nervousness regarding undergoing a surgical procedure, but is otherwise agreeable to gen change     EKG: V-paced rhythm w/ ventricular rate 60bpm    Review of Systems   Constitutional:  Negative for appetite change, chills, fatigue, fever and unexpected weight change.   Respiratory:  Negative for chest tightness and shortness of breath.    Cardiovascular:  Negative for chest pain, palpitations and leg swelling.   Neurological:  Negative for dizziness, syncope, weakness and light-headedness.         OBJECTIVE:   Vitals:   There were no vitals taken for this visit.  There is no height or weight on file to calculate BMI.        Physical Exam:   Physical Exam  Constitutional:       General: She is not in acute distress.     Appearance: Normal appearance. She is not ill-appearing.   HENT:      Head: Normocephalic and atraumatic.      Nose: Nose normal.   Eyes:      General:         Right eye: No discharge.         Left eye: No discharge.   Neck:      Vascular: No carotid bruit.   Cardiovascular:      Rate and Rhythm: Normal rate and regular rhythm.      Pulses: Normal pulses.      Heart sounds: Normal heart sounds.   Pulmonary:      Effort: Pulmonary effort is normal.      Breath sounds: Normal breath sounds.   Musculoskeletal:      Right lower leg: No edema.      Left lower leg: No edema.    Skin:     General: Skin is warm and dry.      Capillary Refill: Capillary refill takes less than 2 seconds.   Neurological:      Mental Status: She is alert.          Medications:      Current Outpatient Medications:     acetaminophen (TYLENOL) 325 mg tablet, Take 2 tablets (650 mg total) by mouth every 6 (six) hours as needed for mild pain, Disp: 63 tablet, Rfl: 0    alendronate (FOSAMAX) 70 mg tablet, Take 70 mg by mouth Once a week (Patient not taking: Reported on 9/27/2023), Disp: , Rfl:     allopurinol (ZYLOPRIM) 100 mg tablet, Take 100 mg by mouth daily, Disp: , Rfl:     amiodarone 200 mg tablet, Take 1 tablet (200 mg total) by mouth daily with breakfast, Disp: 90 tablet, Rfl: 2    atorvastatin (LIPITOR) 40 mg tablet, , Disp: , Rfl:     Blood Glucose Monitoring Suppl (OneTouch Verio Flex System) w/Device KIT, Use as directed (Patient not taking: Reported on 2/28/2024), Disp: , Rfl:     cephalexin (KEFLEX) 500 mg capsule, Take 500 mg by mouth (Patient not taking: Reported on 2/28/2024), Disp: , Rfl:     cholecalciferol (VITAMIN D3) 1,000 units tablet, Take 1 tablet (1,000 Units total) by mouth daily Do not start before June 2, 2023., Disp: 30 tablet, Rfl: 0    citalopram (CeleXA) 20 mg tablet, Take 20 mg by mouth daily, Disp: , Rfl:     docusate sodium (COLACE) 100 mg capsule, Take 1 capsule (100 mg total) by mouth 2 (two) times a day, Disp: 60 capsule, Rfl: 0    Farxiga 5 MG TABS, Take 5 mg by mouth daily (Patient not taking: Reported on 7/25/2023), Disp: , Rfl:     furosemide (LASIX) 20 mg tablet, Take by mouth (Patient not taking: Reported on 12/8/2023), Disp: , Rfl:     magnesium 30 MG tablet, Take 64 mg by mouth 3 (three) times a day Takes 3-4 days a week takes 1 tab., Disp: , Rfl:     Metamucil Fiber CHEW, Chew 3 gums in the morning. Indications: fiber supplement, Disp: , Rfl:     metoprolol succinate (TOPROL-XL) 25 mg 24 hr tablet, Take 1 tablet (25 mg total) by mouth daily Do not start before June 2,  2023., Disp: 30 tablet, Rfl: 0    metoprolol succinate (TOPROL-XL) 50 mg 24 hr tablet, Take by mouth (Patient not taking: Reported on 2/28/2024), Disp: , Rfl:     montelukast (SINGULAIR) 10 mg tablet, Take 10 mg by mouth daily at bedtime, Disp: , Rfl:     multivitamin (THERAGRAN) TABS, Take 1 tablet by mouth daily, Disp: , Rfl:     oxybutynin (DITROPAN-XL) 5 mg 24 hr tablet, Take by mouth (Patient not taking: Reported on 2/28/2024), Disp: , Rfl:     potassium chloride (Klor-Con M20) 20 mEq tablet, Take 3 tablets in AM and 2 tablets in PM, Disp: 450 tablet, Rfl: 3    torsemide (DEMADEX) 20 mg tablet, TAKE 2 TABLETS BY MOUTH EVERY MORNING AND 1 TABLET EVERY EVENING, Disp: 270 tablet, Rfl: 1    traMADol (Ultram) 50 mg tablet, Take 1 tablet (50 mg total) by mouth every 6 (six) hours as needed for severe pain (Patient not taking: Reported on 2/28/2024), Disp: 25 tablet, Rfl: 0    warfarin (COUMADIN) 5 mg tablet, Take 5 mg by mouth daily Takes 5mg on Monday and Friday and 2.5mg the other 5 days of the week, Disp: , Rfl:        Historical Information   Past Medical History:   Diagnosis Date    Arthritis     Asthma     Diabetes (HCC)     Environmental allergies     Heart murmur     Hypertension        Past Surgical History:   Procedure Laterality Date    MECHANICAL AORTIC VALVE REPLACEMENT      REPLACEMENT TOTAL KNEE N/A     REPLACEMENT TOTAL KNEE         Social History     Substance and Sexual Activity   Alcohol Use Yes    Comment: socially     Social History     Substance and Sexual Activity   Drug Use Never     Social History     Tobacco Use   Smoking Status Never   Smokeless Tobacco Never       Family History   Problem Relation Age of Onset    Hypertension Other     Arthritis Family     Heart disease Family     Hypertension Family     Diabetes type II Family          Labs & Results:  Below is the patient's most recent value for Albumin, ALT, AST, BUN, Calcium, Chloride, Cholesterol, CO2, Creatinine, GFR, Glucose, HDL,  Hematocrit, Hemoglobin, Hemoglobin A1C, LDL, Magnesium, Phosphorus, Platelets, Potassium, PSA, Sodium, Triglycerides, and WBC.   Lab Results   Component Value Date    ALT 16 09/06/2024    AST 31 09/06/2024    BUN 26 (H) 09/06/2024    CALCIUM 9.6 09/06/2024    CL 98 (L) 09/06/2024    CO2 31 09/06/2024    CREATININE 0.93 09/06/2024    HDL 56 06/30/2023    HCT 41.9 11/28/2023    HGB 13.5 11/28/2023    HGBA1C 6.7 (H) 09/06/2024    MG 2.0 08/04/2023    PHOS 2.9 05/02/2023    PLT 95 (L) 11/28/2023    K 4.9 09/06/2024    TRIG 43 06/30/2023    WBC 3.52 (L) 11/28/2023     Note: for a comprehensive list of the patient's lab results, access the Results Review activity.

## 2024-10-21 NOTE — TELEPHONE ENCOUNTER
----- Message from Candido Loja PA-C sent at 10/21/2024 11:29 AM EDT -----  Hello,    This patient has a [place] single-chamber subQ ICD which reached MIGUEL ANGEL 10/11/24    She is intermittently dependent on the device w/ 90%     Lead parameters are appropriate per latest interrogation    She affirms the device is positioned comfortably    Ordered repeat ECHO for EF evaluation    Patient is worried about anesthesia given she had a hard time waking up from a prior surgical procedure (she just wanted Dr. Carney to know about this)    Can we please get her scheduled for a gen change w/ Dr. Carney at your earliest convenience    She does take warfarin w/ hold TBD by Dr. Carney     Thanks!  - Candido

## 2024-10-21 NOTE — LETTER
10/23/2024      MRN: [id number]        Shonda Sanchez  [address]  [facility] [address]      You have been referred to our Cardiology department to be scheduled for a ICD GEN CHANGE procedure.     I have been unable to contact you.     Please call me at [phone] to schedule your procedure.       Thank you,   Serena Aviles  Surgery Coordinator  [facility]   [address] [place] [address]  [place] [address]  Teams: [phone]

## 2024-10-23 ENCOUNTER — TELEPHONE (OUTPATIENT)
Age: 79
End: 2024-10-23

## 2024-10-23 NOTE — TELEPHONE ENCOUNTER
"Received call from George at [facility] in regards to the pt's Chest CT from 9/25/24.  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  George is asking for Dr. Pal to review scans and advise if pt needs to follow up.  "

## 2024-10-31 ENCOUNTER — TRANSCRIBE ORDERS (OUTPATIENT)
Dept: CARDIAC SURGERY | Facility: CLINIC | Age: 79
End: 2024-10-31

## 2024-10-31 DIAGNOSIS — I71.21 ANEURYSM OF ASCENDING AORTA WITHOUT RUPTURE (HCC): Primary | ICD-10-CM

## 2024-10-31 NOTE — TELEPHONE ENCOUNTER
Called patient and made her aware of Dr. Pal recommendations. Appts scheduled and patient is aware of dates and times.

## 2024-11-12 ENCOUNTER — HOSPITAL ENCOUNTER (OUTPATIENT)
Dept: RADIOLOGY | Facility: HOSPITAL | Age: 79
Discharge: HOME | End: 2024-11-12
Attending: THORACIC SURGERY (CARDIOTHORACIC VASCULAR SURGERY)
Payer: COMMERCIAL

## 2024-11-12 DIAGNOSIS — I71.21 ANEURYSM OF ASCENDING AORTA WITHOUT RUPTURE (HCC): ICD-10-CM

## 2024-11-12 PROCEDURE — 71275 CT ANGIOGRAPHY CHEST: CPT

## 2024-11-12 RX ADMIN — IOHEXOL 50 ML: 350 INJECTION, SOLUTION INTRAVENOUS at 13:22

## 2024-11-18 ENCOUNTER — OFFICE VISIT (OUTPATIENT)
Dept: CARDIAC SURGERY | Facility: CLINIC | Age: 79
End: 2024-11-18
Payer: COMMERCIAL

## 2024-11-18 ENCOUNTER — HOSPITAL ENCOUNTER (OUTPATIENT)
Dept: NON INVASIVE DIAGNOSTICS | Facility: CLINIC | Age: 79
Discharge: HOME | End: 2024-11-18
Payer: COMMERCIAL

## 2024-11-18 VITALS
SYSTOLIC BLOOD PRESSURE: 162 MMHG | HEART RATE: 60 BPM | WEIGHT: 161.6 LBS | DIASTOLIC BLOOD PRESSURE: 80 MMHG | OXYGEN SATURATION: 100 % | HEIGHT: 58 IN | BODY MASS INDEX: 33.92 KG/M2

## 2024-11-18 VITALS
WEIGHT: 161 LBS | BODY MASS INDEX: 33.8 KG/M2 | HEIGHT: 58 IN | DIASTOLIC BLOOD PRESSURE: 80 MMHG | HEART RATE: 60 BPM | SYSTOLIC BLOOD PRESSURE: 162 MMHG

## 2024-11-18 DIAGNOSIS — I71.21 ANEURYSM OF ASCENDING AORTA WITHOUT RUPTURE (HCC): Primary | ICD-10-CM

## 2024-11-18 DIAGNOSIS — Z95.2 H/O MECHANICAL AORTIC VALVE REPLACEMENT: ICD-10-CM

## 2024-11-18 DIAGNOSIS — I47.20 VENTRICULAR TACHYCARDIA (HCC): ICD-10-CM

## 2024-11-18 DIAGNOSIS — I48.91 ATRIAL FIBRILLATION, UNSPECIFIED TYPE (HCC): ICD-10-CM

## 2024-11-18 LAB
AORTIC ROOT: 4.2 CM
AORTIC VALVE MEAN VELOCITY: 9.8 M/S
APICAL FOUR CHAMBER EJECTION FRACTION: 62 %
ASCENDING AORTA: 4.9 CM
AV AREA BY CONTINUOUS VTI: 1.4 CM2
AV AREA PEAK VELOCITY: 1.2 CM2
AV LVOT MEAN GRADIENT: 1 MMHG
AV LVOT PEAK GRADIENT: 1 MMHG
AV MEAN GRADIENT: 4 MMHG
AV PEAK GRADIENT: 8 MMHG
AV VALVE AREA: 1.43 CM2
AV VELOCITY RATIO: 0.4
BSA FOR ECHO PROCEDURE: 1.66 M2
DOP CALC AO PEAK VEL: 1.44 M/S
DOP CALC AO VTI: 31.28 CM
DOP CALC LVOT AREA: 3.14 CM2
DOP CALC LVOT CARDIAC INDEX: 1.65 L/MIN/M2
DOP CALC LVOT CARDIAC OUTPUT: 2.73 L/MIN
DOP CALC LVOT DIAMETER: 2 CM
DOP CALC LVOT PEAK VEL VTI: 14.29 CM
DOP CALC LVOT PEAK VEL: 0.57 M/S
DOP CALC LVOT STROKE INDEX: 27.1 ML/M2
DOP CALC LVOT STROKE VOLUME: 45
E WAVE DECELERATION TIME: 303 MS
E/A RATIO: 3.37
FRACTIONAL SHORTENING: 39 (ref 28–44)
INTERVENTRICULAR SEPTUM IN DIASTOLE (PARASTERNAL SHORT AXIS VIEW): 0.8 CM
INTERVENTRICULAR SEPTUM: 0.8 CM (ref 0.6–1.1)
LAAS-AP2: 35.8 CM2
LAAS-AP4: 30.9 CM2
LEFT ATRIUM SIZE: 5 CM
LEFT ATRIUM VOLUME (MOD BIPLANE): 125 ML
LEFT ATRIUM VOLUME INDEX (MOD BIPLANE): 75.3 ML/M2
LEFT INTERNAL DIMENSION IN SYSTOLE: 2.8 CM (ref 2.1–4)
LEFT VENTRICULAR INTERNAL DIMENSION IN DIASTOLE: 4.6 CM (ref 3.5–6)
LEFT VENTRICULAR POSTERIOR WALL IN END DIASTOLE: 1.3 CM
LEFT VENTRICULAR STROKE VOLUME: 68 ML
LVSV (TEICH): 68 ML
MITRAL REGURGITATION PEAK VELOCITY: 5.01 M/S
MITRAL VALVE MEAN INFLOW VELOCITY: 3.84 M/S
MITRAL VALVE REGURGITANT PEAK GRADIENT: 101 MMHG
MV E'TISSUE VEL-LAT: 15 CM/S
MV E'TISSUE VEL-SEP: 7 CM/S
MV PEAK A VEL: 0.38 M/S
MV PEAK E VEL: 128 CM/S
MV STENOSIS PRESSURE HALF TIME: 88 MS
MV VALVE AREA P 1/2 METHOD: 2.5
RIGHT ATRIUM AREA SYSTOLE A4C: 48.3 CM2
RIGHT VENTRICLE ID DIMENSION: 5.6 CM
SL CV DOP CALC MV VTI RETROGRADE: 185.5 CM
SL CV LEFT ATRIUM LENGTH A2C: 7.2 CM
SL CV LV EF: 55
SL CV MV MEAN GRADIENT RETROGRADE: 66 MMHG
SL CV PED ECHO LEFT VENTRICLE DIASTOLIC VOLUME (MOD BIPLANE) 2D: 96 ML
SL CV PED ECHO LEFT VENTRICLE SYSTOLIC VOLUME (MOD BIPLANE) 2D: 28 ML
TR MAX PG: 33 MMHG
TR PEAK VELOCITY: 2.9 M/S
TRICUSPID ANNULAR PLANE SYSTOLIC EXCURSION: 2.4 CM
TRICUSPID VALVE PEAK REGURGITATION VELOCITY: 2.85 M/S

## 2024-11-18 PROCEDURE — 99213 OFFICE O/P EST LOW 20 MIN: CPT | Performed by: THORACIC SURGERY (CARDIOTHORACIC VASCULAR SURGERY)

## 2024-11-18 PROCEDURE — 93306 TTE W/DOPPLER COMPLETE: CPT

## 2024-11-18 PROCEDURE — 93306 TTE W/DOPPLER COMPLETE: CPT | Performed by: INTERNAL MEDICINE

## 2024-11-18 RX ORDER — ESTRADIOL 0.1 MG/G
2 CREAM VAGINAL 3 TIMES WEEKLY
COMMUNITY
Start: 2024-08-22

## 2024-11-18 NOTE — LETTER
November 18, 2024     Abdirahman Seals DO  [address] [place] [id number]    Patient: Shonda Sanchez   YOB: 1945   Date of Visit: 11/18/2024       Dear Dr. Seals:    Thank you for referring Shonda Sanchez to me for evaluation. Below are my notes for this consultation.    If you have questions, please do not hesitate to call me. I look forward to following your patient along with you.         Sincerely,        Mario Pal DO        CC: No Recipients    JOSEPHINE Kincaid  11/18/2024 12:36 PM  Attested  [facility]  Shonda Sanchez 79 y.o. female MRN: [id number]      History of Present Illness: Shonda Sanchez is a 79 y.o. year old female who presents today for ongoing surveillance of an ascending aortic aneurysm.  This was initially identified in on CT scan April 2023 and measured 4.6 cm. She was seen in our aortic clinic July 2023 for initial consultation.  Patient's PMHx is notable for h/o endocarditis/AI s/p Mech AVR (1995- [facility]), VT/VF s/p ICD (7/1995 & device change 2011), HFpEF, MR, PAH, Afib, HTN, HLD, DM2, obesity (BMI 33.77), CHAS, asthma, chronic ITP, diverticulitis, remote L LE DVT, arthritis s/p multiple joint replacement surgeries and ambulatory dysfunction.     Patient is accompanied today by 3 female family members. Patient reports she feels well. She states she remains active, uses a cane or walker to ambulate. She denies chest pain, SOB, lightheadedness, palpitations, fatigue or change in activity tolerance.  Her ICD is MIGUEL ANGEL and she is to be scheduled for device change.  Patient denies tobacco use.  No FH of aneurysms    Past Medical History:  Past Medical History:   Diagnosis Date   • Arthritis    • Asthma    • Diabetes (HCC)    • Environmental allergies    • Heart murmur    • Hypertension          Past Surgical History:   Past Surgical History:   Procedure Laterality Date   • MECHANICAL AORTIC VALVE REPLACEMENT     • REPLACEMENT TOTAL KNEE N/A    • REPLACEMENT TOTAL KNEE            Family History:  Family History   Problem Relation Age of Onset   • Diabetes type II Mother    • Heart attack Mother         Multiple heart attacks over a period of 5 years.   • Dementia Father    • Other (heart sp) Father         He had a heart spasm.   • Arthritis Family    • Heart disease Family    • Hypertension Family    • Diabetes type II Family    • Hypertension Other          Social History:    Social History     Substance and Sexual Activity   Alcohol Use Yes    Comment: socially 3-4 drinks a week.     Social History     Substance and Sexual Activity   Drug Use Never     Social History     Tobacco Use   Smoking Status Never   Smokeless Tobacco Never         Home Medications:   Prior to Admission medications    Medication Sig Start Date End Date Taking? Authorizing Provider   acetaminophen (TYLENOL) 325 mg tablet Take 2 tablets (650 mg total) by mouth every 6 (six) hours as needed for mild pain 6/1/23  Yes Mindi Robledo MD   allopurinol (ZYLOPRIM) 100 mg tablet Take 100 mg by mouth daily 11/8/22  Yes Historical Provider, MD   amiodarone 200 mg tablet Take 1 tablet (200 mg total) by mouth daily with breakfast 3/22/24  Yes Leigh Odonnell MD   atorvastatin (LIPITOR) 40 mg tablet  1/11/23  Yes Historical Provider, MD   Blood Glucose Monitoring Suppl (OneTouch Verio Flex System) w/Device KIT Use as directed 6/2/23  Yes Historical Provider, MD   cholecalciferol (VITAMIN D3) 1,000 units tablet Take 1 tablet (1,000 Units total) by mouth daily Do not start before June 2, 2023. 6/2/23  Yes Mindi Robledo MD   citalopram (CeleXA) 20 mg tablet Take 20 mg by mouth daily   Yes Historical Provider, MD   docusate sodium (COLACE) 100 mg capsule Take 1 capsule (100 mg total) by mouth 2 (two) times a day 6/1/23  Yes Mindi Robledo MD   estradiol (ESTRACE) 0.1 mg/g vaginal cream Insert 2 g into the vagina 3 (three) times a week Takes 3 xs a week. 8/22/24  Yes Historical Provider, MD   Farxiga 5 MG TABS  Take 5 mg by mouth daily 6/28/23  Yes Historical Provider, MD   magnesium 30 MG tablet Take 64 mg by mouth 3 (three) times a day Takes 3-4 days a week takes 1 tab.   Yes Historical Provider, MD   Metamucil Fiber CHEW Chew 3 gums in the morning. Indications: fiber supplement   Yes Historical Provider, MD   metoprolol succinate (TOPROL-XL) 25 mg 24 hr tablet Take 1 tablet (25 mg total) by mouth daily Do not start before June 2, 2023. 6/2/23  Yes Mindi Robledo MD   montelukast (SINGULAIR) 10 mg tablet Take 10 mg by mouth daily at bedtime   Yes Historical Provider, MD   multivitamin (THERAGRAN) TABS Take 1 tablet by mouth daily   Yes Historical Provider, MD   NON FORMULARY Takes Uqcora otc for her bladder.   Yes Historical Provider, MD   potassium chloride (Klor-Con M20) 20 mEq tablet Take 3 tablets in AM and 2 tablets in PM 6/17/24  Yes Leigh Odonnell MD   torsemide (DEMADEX) 20 mg tablet TAKE 2 TABLETS BY MOUTH EVERY MORNING AND 1 TABLET EVERY EVENING  Patient taking differently: 20 mg TAKE 2 TABLETS BY MOUTH EVERY MORNING AND 1 TABLET EVERY EVENING 5/15/24  Yes Leigh Odonnell MD   warfarin (COUMADIN) 5 mg tablet Take 5 mg by mouth daily Takes 5mg on Monday and Friday and 2.5mg the other 5 days of the week   Yes Historical Provider, MD   alendronate (FOSAMAX) 70 mg tablet Take 70 mg by mouth Once a week  Patient not taking: Reported on 9/27/2023 6/2/22 8/23/23  Historical Provider, MD   cephalexin (KEFLEX) 500 mg capsule Take 500 mg by mouth  Patient not taking: Reported on 11/18/2024 11/27/23   Historical Provider, MD   furosemide (LASIX) 20 mg tablet Take by mouth  Patient not taking: Reported on 12/8/2023 11/23/23   Historical Provider, MD   metoprolol succinate (TOPROL-XL) 50 mg 24 hr tablet Take by mouth  Patient not taking: Reported on 2/28/2024 9/21/23   Historical Provider, MD   oxybutynin (DITROPAN-XL) 5 mg 24 hr tablet Take by mouth  Patient not taking: Reported on 2/28/2024 11/1/23   Historical  "Provider, MD   traMADol (Ultram) 50 mg tablet Take 1 tablet (50 mg total) by mouth every 6 (six) hours as needed for severe pain  Patient not taking: Reported on 10/21/2024 7/5/23   Nirali Gomez PA-C       Allergies:  Allergies   Allergen Reactions   • Fentanyl Other (See Comments)     Other reaction(s): Hallucinations  paranoia, confusion  Other reaction(s): Hallucinations  Other reaction(s): Hallucinations  hallucinations   • Fentanyl Confusion       Review of Systems:   Review of Systems - History obtained from chart review and the patient  General ROS: negative  Psychological ROS: negative  Ophthalmic ROS: positive for - uses glasses  ENT ROS: negative  Allergy and Immunology ROS: negative  Hematological and Lymphatic ROS: negative  Endocrine ROS: negative  Breast ROS: negative  Respiratory ROS: no cough, shortness of breath, or wheezing  Cardiovascular ROS: no chest pain or dyspnea on exertion  Gastrointestinal ROS: no abdominal pain, change in bowel habits, or black or bloody stools  Genito-Urinary ROS: no dysuria, trouble voiding, or hematuria  Musculoskeletal ROS: positive for - gait disturbance & joint pain; uses cane  Neurological ROS: no TIA or stroke symptoms  Dermatological ROS: negative    Vital Signs:   Vitals:    11/18/24 1135 11/18/24 1152   BP: 154/88 162/80   BP Location: Left arm Right arm   Patient Position: Sitting Sitting   Cuff Size: Standard Standard   Pulse:  60   SpO2:  100%   Weight: 73.3 kg (161 lb 9.6 oz) 73.3 kg (161 lb 9.6 oz)   Height: 4' 10\" (1.473 m) 4' 10\" (1.473 m)       Physical Exam:  General: Alert, oriented, well developed, no acute distress  HEENT/NECK:  PERRLA.  No jugular venous distention.    Cardiac:Regular rate and rhythm, crisp mechanical valve click on systole.  Carotid arteries: 2+ pulses, no bruits  Pulmonary:  Breath sounds clear bilaterally.  Abdomen:  Non-tender, Non-distended.  Positive bowel sounds.   Upper extremities: 2+  radial pulses; brisk capillary " refill  Lower extremities: Extremities warm/dry. PT/DP pulses 2+ bilaterally.  Trace edema B/L  Neuro: Alert and oriented X 3.  Sensation is grossly intact.  No focal deficits.  Musculoskeletal: MAEE, seated in wheelchair, gait not assessed.  Skin: Warm/Dry, without rashes or lesions.    Lab Results:   Lab Results   Component Value Date    SODIUM 139 09/06/2024    K 4.9 09/06/2024    CL 98 (L) 09/06/2024    CO2 31 09/06/2024    AGAP 10 09/06/2024    BUN 26 (H) 09/06/2024    CREATININE 0.93 09/06/2024    GLUC 94 09/06/2024    GLUF 121 (H) 07/01/2024    CALCIUM 9.6 09/06/2024    AST 31 09/06/2024    ALT 16 09/06/2024    ALKPHOS 88 09/06/2024    TP 7.3 09/06/2024    TBILI 1.2 (H) 09/06/2024    EGFR 63 09/06/2024       Lab Results   Component Value Date    HGBA1C 6.7 (H) 09/06/2024         Imaging Studies:     CTA Chest: 11/12/24    Ascending aorta unchanged at 4.6 cm    Echocardiogram: 9/20/23      •  Left Ventricle: Left ventricular cavity size is normal. Wall thickness is mildly increased. The left ventricular ejection fraction is 55%. Systolic function is normal. Wall motion is normal. Diastolic function is moderately abnormal, consistent with grade II (pseudonormal) relaxation.  •  IVS: There is abnormal septal motion consistent with right ventricular pacing.  •  Right Ventricle: Right ventricular cavity size is moderately dilated. Systolic function is mildly reduced.  •  Left Atrium: The atrium is moderately dilated.  •  Right Atrium: The atrium is severely dilated.  •  Aortic Valve: There is a bileaflet mechanical valve. Prosthetic valve leaflet motion is normal. There is no evidence of paravalvular regurgitation. There is trace transvalvular regurgitation. The aortic valve has no significant stenosis. The gradient recorded across the prosthetic aortic valve is within the expected range. The aortic valve mean gradient is 5 mmHg.  •  Mitral Valve: There is severe annular calcification. There is moderate to severe  regurgitation with a centrally directed jet.  •  Tricuspid Valve: There appears to be impingement of the anterior leaflet by an RV pacer lead, causing lack of coaptation. There is severe regurgitation. Reversed hepatic vein systolic flow.  •  Aorta: The ascending aorta is moderately dilated. The aortic root exhibited mild fibrocalcific change. The ascending aorta is 4.8 cm.       Results Review Statement: I personally reviewed the following image studies in PACS and associated radiology reports: CTA and Echocardiogram. My interpretation of the radiology images/reports is: stable 4.6 cm ascending aortic aneurysm.    Assessment:  Patient Active Problem List    Diagnosis Date Noted   • Left foot pain 08/03/2023   • Arthritis of left foot 08/03/2023   • Hypokalemia 08/02/2023   • Acute on chronic heart failure with preserved ejection fraction ([facility]) 07/27/2023   • Pancytopenia ([facility]) 07/27/2023   • Aneurysm of ascending aorta without rupture ([facility]) 07/10/2023   • Difficult intravenous access 05/15/2023   • Acute COVID-19 05/14/2023   • A-fib ([facility]) 05/13/2023   • Stage III pressure ulcer of sacral region ([facility]) 05/13/2023   • Asthma 05/13/2023   • Osteoporosis with current pathological fracture with routine healing 05/13/2023   • Class 2 obesity in adult 05/13/2023   • CHAS (obstructive sleep apnea) 05/13/2023   • Abnormal CT scan 05/10/2023   • Hyponatremia 05/09/2023   • Bradycardia 05/08/2023   • Ventricular arrhythmia 05/04/2023   • Type 2 diabetes mellitus ([facility]) 04/28/2023   • Fall 04/27/2023   • Closed displaced oblique fracture of shaft of left humerus with routine healing 04/27/2023   • Acute pain due to trauma 04/27/2023   • H/O mechanical aortic valve replacement 04/27/2023   • Fracture of multiple ribs of right side 04/27/2023   • Chronic ITP (idiopathic thrombocytopenia) ([facility]) 04/02/2023   • CAD (coronary artery disease) 05/10/2017     Cardiology notes reviewed    Impression/Plan:    Ascending aortic aneurysm -  4.6 cm   1) Stable , unchanged and does not meet surgical criteria   2) Mech AVR- stable on last echo   3) No need for ongoing surveillance or f/u in aortic clinic- based on patients advanced age, comobidities and prior heart surgery status- repeat surgery for aneurysm would be at high surgical risk.    CTA imaging performed prior to this visit demonstrates the ascending aorta measuring 4.6 cm in size at its greatest diameter.   These findings were confirmed and shared with the patient today.      Shonda Sanchez was comfortable with our recommendations, and her questions were answered to her satisfaction.     Routine referral to gastroenterology for colonoscopy screening was not indicated, as the patient is over 75 years old    SIGNATURE: JOSEPHINE Kincaid  DATE: November 18, 2024  TIME: 11:56 AM  Attestation signed by Mario Pal DO at 11/18/2024  1:18 PM:  Attending Attestation:    I supervised the Advanced Practitioner on 11/18/2024.  I discussed the case with the Advanced Practitioner, reviewed the note and agree.     Ms. Sanchez presents for routine surveillance of a known ascending aortic aneurysm.  She was discharged from the clinic, however repeat imaging showed a potential increase in size, so she was referred back to us. CT was read at 51mm and I ordered a repeat CTA.  Both CTS were reviewed by me personally.  I believe the CT without contrast measurement is inaccurate due to the angle of the aorta and her severe kyphosis. CTA shows an ascending aorta of 47mm.  I do not think further surveillance is necessary, as she is not a candidate for re-op ascending aorta replacement.  If additional imaging is obtained for the aorta, would recommend IV contrast because in her situation, it makes measurement easier.  She does not require further follow-up with me. I recommend continued smoking cessation, blood pressure control, surveillance and aortic precautions.  SBP goal should be 110s-120s with resting heart  rate goal 60-70.

## 2024-11-18 NOTE — PROGRESS NOTES
[facility]  Shonda Sanchez 79 y.o. female MRN: [id number]      History of Present Illness: Shonda Sanchez is a 79 y.o. year old female who presents today for ongoing surveillance of an ascending aortic aneurysm.  This was initially identified in on CT scan April 2023 and measured 4.6 cm. She was seen in our aortic clinic July 2023 for initial consultation.  Patient's PMHx is notable for h/o endocarditis/AI s/p Mech AVR (1995- [facility]), VT/VF s/p ICD (7/1995 & device change 2011), HFpEF, MR, PAH, Afib, HTN, HLD, DM2, obesity (BMI 33.77), CHAS, asthma, chronic ITP, diverticulitis, remote L LE DVT, arthritis s/p multiple joint replacement surgeries and ambulatory dysfunction.     Patient is accompanied today by 3 female family members. Patient reports she feels well. She states she remains active, uses a cane or walker to ambulate. She denies chest pain, SOB, lightheadedness, palpitations, fatigue or change in activity tolerance.  Her ICD is MIGUEL ANGEL and she is to be scheduled for device change.  Patient denies tobacco use.  No FH of aneurysms    Past Medical History:  Past Medical History:   Diagnosis Date    Arthritis     Asthma     Diabetes (HCC)     Environmental allergies     Heart murmur     Hypertension          Past Surgical History:   Past Surgical History:   Procedure Laterality Date    MECHANICAL AORTIC VALVE REPLACEMENT      REPLACEMENT TOTAL KNEE N/A     REPLACEMENT TOTAL KNEE           Family History:  Family History   Problem Relation Age of Onset    Diabetes type II Mother     Heart attack Mother         Multiple heart attacks over a period of 5 years.    Dementia Father     Other (heart sp) Father         He had a heart spasm.    Arthritis Family     Heart disease Family     Hypertension Family     Diabetes type II Family     Hypertension Other          Social History:    Social History     Substance and Sexual Activity   Alcohol Use Yes    Comment: socially 3-4 drinks a week.     Social History      Substance and Sexual Activity   Drug Use Never     Social History     Tobacco Use   Smoking Status Never   Smokeless Tobacco Never         Home Medications:   Prior to Admission medications    Medication Sig Start Date End Date Taking? Authorizing Provider   acetaminophen (TYLENOL) 325 mg tablet Take 2 tablets (650 mg total) by mouth every 6 (six) hours as needed for mild pain 6/1/23  Yes Mindi Robledo MD   allopurinol (ZYLOPRIM) 100 mg tablet Take 100 mg by mouth daily 11/8/22  Yes Historical Provider, MD   amiodarone 200 mg tablet Take 1 tablet (200 mg total) by mouth daily with breakfast 3/22/24  Yes Leigh Odonnell MD   atorvastatin (LIPITOR) 40 mg tablet  1/11/23  Yes Historical Provider, MD   Blood Glucose Monitoring Suppl (OneTouch Verio Flex System) w/Device KIT Use as directed 6/2/23  Yes Historical Provider, MD   cholecalciferol (VITAMIN D3) 1,000 units tablet Take 1 tablet (1,000 Units total) by mouth daily Do not start before June 2, 2023. 6/2/23  Yes Mindi Robledo MD   citalopram (CeleXA) 20 mg tablet Take 20 mg by mouth daily   Yes Historical Provider, MD   docusate sodium (COLACE) 100 mg capsule Take 1 capsule (100 mg total) by mouth 2 (two) times a day 6/1/23  Yes Mindi Robledo MD   estradiol (ESTRACE) 0.1 mg/g vaginal cream Insert 2 g into the vagina 3 (three) times a week Takes 3 xs a week. 8/22/24  Yes Historical Provider, MD   Farxiga 5 MG TABS Take 5 mg by mouth daily 6/28/23  Yes Historical Provider, MD   magnesium 30 MG tablet Take 64 mg by mouth 3 (three) times a day Takes 3-4 days a week takes 1 tab.   Yes Historical Provider, MD   Metamucil Fiber CHEW Chew 3 gums in the morning. Indications: fiber supplement   Yes Historical Provider, MD   metoprolol succinate (TOPROL-XL) 25 mg 24 hr tablet Take 1 tablet (25 mg total) by mouth daily Do not start before June 2, 2023. 6/2/23  Yes Mindi Robledo MD   montelukast (SINGULAIR) 10 mg tablet Take 10 mg by mouth daily at  bedtime   Yes Historical Provider, MD   multivitamin (THERAGRAN) TABS Take 1 tablet by mouth daily   Yes Historical Provider, MD   NON FORMULARY Takes Uqcora otc for her bladder.   Yes Historical Provider, MD   potassium chloride (Klor-Con M20) 20 mEq tablet Take 3 tablets in AM and 2 tablets in PM 6/17/24  Yes Leigh Odonnell MD   torsemide (DEMADEX) 20 mg tablet TAKE 2 TABLETS BY MOUTH EVERY MORNING AND 1 TABLET EVERY EVENING  Patient taking differently: 20 mg TAKE 2 TABLETS BY MOUTH EVERY MORNING AND 1 TABLET EVERY EVENING 5/15/24  Yes Leigh Odonnell MD   warfarin (COUMADIN) 5 mg tablet Take 5 mg by mouth daily Takes 5mg on Monday and Friday and 2.5mg the other 5 days of the week   Yes Historical Provider, MD   alendronate (FOSAMAX) 70 mg tablet Take 70 mg by mouth Once a week  Patient not taking: Reported on 9/27/2023 6/2/22 8/23/23  Historical Provider, MD   cephalexin (KEFLEX) 500 mg capsule Take 500 mg by mouth  Patient not taking: Reported on 11/18/2024 11/27/23   Historical Provider, MD   furosemide (LASIX) 20 mg tablet Take by mouth  Patient not taking: Reported on 12/8/2023 11/23/23   Historical Provider, MD   metoprolol succinate (TOPROL-XL) 50 mg 24 hr tablet Take by mouth  Patient not taking: Reported on 2/28/2024 9/21/23   Historical Provider, MD   oxybutynin (DITROPAN-XL) 5 mg 24 hr tablet Take by mouth  Patient not taking: Reported on 2/28/2024 11/1/23   Historical Provider, MD   traMADol (Ultram) 50 mg tablet Take 1 tablet (50 mg total) by mouth every 6 (six) hours as needed for severe pain  Patient not taking: Reported on 10/21/2024 7/5/23   Nirali Gomez PA-C       Allergies:  Allergies   Allergen Reactions    Fentanyl Other (See Comments)     Other reaction(s): Hallucinations  paranoia, confusion  Other reaction(s): Hallucinations  Other reaction(s): Hallucinations  hallucinations    Fentanyl Confusion       Review of Systems:   Review of Systems - History obtained from chart review and  "the patient  General ROS: negative  Psychological ROS: negative  Ophthalmic ROS: positive for - uses glasses  ENT ROS: negative  Allergy and Immunology ROS: negative  Hematological and Lymphatic ROS: negative  Endocrine ROS: negative  Breast ROS: negative  Respiratory ROS: no cough, shortness of breath, or wheezing  Cardiovascular ROS: no chest pain or dyspnea on exertion  Gastrointestinal ROS: no abdominal pain, change in bowel habits, or black or bloody stools  Genito-Urinary ROS: no dysuria, trouble voiding, or hematuria  Musculoskeletal ROS: positive for - gait disturbance & joint pain; uses cane  Neurological ROS: no TIA or stroke symptoms  Dermatological ROS: negative    Vital Signs:   Vitals:    11/18/24 1135 11/18/24 1152   BP: 154/88 162/80   BP Location: Left arm Right arm   Patient Position: Sitting Sitting   Cuff Size: Standard Standard   Pulse:  60   SpO2:  100%   Weight: 73.3 kg (161 lb 9.6 oz) 73.3 kg (161 lb 9.6 oz)   Height: 4' 10\" (1.473 m) 4' 10\" (1.473 m)       Physical Exam:  General: Alert, oriented, well developed, no acute distress  HEENT/NECK:  PERRLA.  No jugular venous distention.    Cardiac:Regular rate and rhythm, crisp mechanical valve click on systole.  Carotid arteries: 2+ pulses, no bruits  Pulmonary:  Breath sounds clear bilaterally.  Abdomen:  Non-tender, Non-distended.  Positive bowel sounds.   Upper extremities: 2+  radial pulses; brisk capillary refill  Lower extremities: Extremities warm/dry. PT/DP pulses 2+ bilaterally.  Trace edema B/L  Neuro: Alert and oriented X 3.  Sensation is grossly intact.  No focal deficits.  Musculoskeletal: MAEE, seated in wheelchair, gait not assessed.  Skin: Warm/Dry, without rashes or lesions.    Lab Results:   Lab Results   Component Value Date    SODIUM 139 09/06/2024    K 4.9 09/06/2024    CL 98 (L) 09/06/2024    CO2 31 09/06/2024    AGAP 10 09/06/2024    BUN 26 (H) 09/06/2024    CREATININE 0.93 09/06/2024    GLUC 94 09/06/2024    GLUF 121 (H) " 07/01/2024    CALCIUM 9.6 09/06/2024    AST 31 09/06/2024    ALT 16 09/06/2024    ALKPHOS 88 09/06/2024    TP 7.3 09/06/2024    TBILI 1.2 (H) 09/06/2024    EGFR 63 09/06/2024       Lab Results   Component Value Date    HGBA1C 6.7 (H) 09/06/2024         Imaging Studies:     CTA Chest: 11/12/24    Ascending aorta unchanged at 4.6 cm    Echocardiogram: 9/20/23        Left Ventricle: Left ventricular cavity size is normal. Wall thickness is mildly increased. The left ventricular ejection fraction is 55%. Systolic function is normal. Wall motion is normal. Diastolic function is moderately abnormal, consistent with grade II (pseudonormal) relaxation.    IVS: There is abnormal septal motion consistent with right ventricular pacing.    Right Ventricle: Right ventricular cavity size is moderately dilated. Systolic function is mildly reduced.    Left Atrium: The atrium is moderately dilated.    Right Atrium: The atrium is severely dilated.    Aortic Valve: There is a bileaflet mechanical valve. Prosthetic valve leaflet motion is normal. There is no evidence of paravalvular regurgitation. There is trace transvalvular regurgitation. The aortic valve has no significant stenosis. The gradient recorded across the prosthetic aortic valve is within the expected range. The aortic valve mean gradient is 5 mmHg.    Mitral Valve: There is severe annular calcification. There is moderate to severe regurgitation with a centrally directed jet.    Tricuspid Valve: There appears to be impingement of the anterior leaflet by an RV pacer lead, causing lack of coaptation. There is severe regurgitation. Reversed hepatic vein systolic flow.    Aorta: The ascending aorta is moderately dilated. The aortic root exhibited mild fibrocalcific change. The ascending aorta is 4.8 cm.       Results Review Statement: I personally reviewed the following image studies in PACS and associated radiology reports: CTA and Echocardiogram. My interpretation of the  radiology images/reports is: stable 4.6 cm ascending aortic aneurysm.    Assessment:  Patient Active Problem List    Diagnosis Date Noted    Left foot pain 08/03/2023    Arthritis of left foot 08/03/2023    Hypokalemia 08/02/2023    Acute on chronic heart failure with preserved ejection fraction ([facility]) 07/27/2023    Pancytopenia ([facility]) 07/27/2023    Aneurysm of ascending aorta without rupture ([facility]) 07/10/2023    Difficult intravenous access 05/15/2023    Acute COVID-19 05/14/2023    A-fib ([facility]) 05/13/2023    Stage III pressure ulcer of sacral region ([facility]) 05/13/2023    Asthma 05/13/2023    Osteoporosis with current pathological fracture with routine healing 05/13/2023    Class 2 obesity in adult 05/13/2023    CHAS (obstructive sleep apnea) 05/13/2023    Abnormal CT scan 05/10/2023    Hyponatremia 05/09/2023    Bradycardia 05/08/2023    Ventricular arrhythmia 05/04/2023    Type 2 diabetes mellitus ([facility]) 04/28/2023    Fall 04/27/2023    Closed displaced oblique fracture of shaft of left humerus with routine healing 04/27/2023    Acute pain due to trauma 04/27/2023    H/O mechanical aortic valve replacement 04/27/2023    Fracture of multiple ribs of right side 04/27/2023    Chronic ITP (idiopathic thrombocytopenia) ([facility]) 04/02/2023    CAD (coronary artery disease) 05/10/2017     Cardiology notes reviewed    Impression/Plan:    Ascending aortic aneurysm - 4.6 cm   1) Stable , unchanged and does not meet surgical criteria   2) [facility] AVR- stable on last echo   3) No need for ongoing surveillance or f/u in aortic clinic- based on patients advanced age, comobidities and prior heart surgery status- repeat surgery for aneurysm would be at high surgical risk.    CTA imaging performed prior to this visit demonstrates the ascending aorta measuring 4.6 cm in size at its greatest diameter.   These findings were confirmed and shared with the patient today.      Shonda Sanchez was comfortable with our recommendations, and her questions  were answered to her satisfaction.     Routine referral to gastroenterology for colonoscopy screening was not indicated, as the patient is over 75 years old    SIGNATURE: JOSEPHINE Kincaid  DATE: November 18, 2024  TIME: 11:56 AM

## 2024-11-27 ENCOUNTER — OFFICE VISIT (OUTPATIENT)
Dept: PODIATRY | Facility: CLINIC | Age: 79
End: 2024-11-27
Payer: COMMERCIAL

## 2024-11-27 VITALS
HEART RATE: 60 BPM | HEIGHT: 58 IN | BODY MASS INDEX: 34.22 KG/M2 | DIASTOLIC BLOOD PRESSURE: 82 MMHG | WEIGHT: 163 LBS | SYSTOLIC BLOOD PRESSURE: 142 MMHG

## 2024-11-27 DIAGNOSIS — M20.41 HAMMERTOE OF RIGHT FOOT: ICD-10-CM

## 2024-11-27 DIAGNOSIS — B35.1 ONYCHOMYCOSIS: ICD-10-CM

## 2024-11-27 DIAGNOSIS — L84 CORNS: ICD-10-CM

## 2024-11-27 DIAGNOSIS — M79.674 PAIN IN TOES OF BOTH FEET: ICD-10-CM

## 2024-11-27 DIAGNOSIS — E11.51 TYPE 2 DIABETES MELLITUS WITH DIABETIC PERIPHERAL ANGIOPATHY WITHOUT GANGRENE, WITHOUT LONG-TERM CURRENT USE OF INSULIN (HCC): Primary | ICD-10-CM

## 2024-11-27 DIAGNOSIS — M79.675 PAIN IN TOES OF BOTH FEET: ICD-10-CM

## 2024-11-27 DIAGNOSIS — I83.93 ASYMPTOMATIC VARICOSE VEINS OF BOTH LOWER EXTREMITIES: ICD-10-CM

## 2024-11-27 DIAGNOSIS — R60.1 GENERALIZED EDEMA: ICD-10-CM

## 2024-11-27 DIAGNOSIS — Z79.01 LONG TERM CURRENT USE OF ANTICOAGULANT: ICD-10-CM

## 2024-11-27 PROCEDURE — RECHECK: Performed by: PODIATRIST

## 2024-11-27 PROCEDURE — 11055 PARING/CUTG B9 HYPRKER LES 1: CPT | Performed by: PODIATRIST

## 2024-11-27 PROCEDURE — 11721 DEBRIDE NAIL 6 OR MORE: CPT | Performed by: PODIATRIST

## 2024-11-28 ENCOUNTER — NURSE TRIAGE (OUTPATIENT)
Dept: OTHER | Facility: OTHER | Age: 79
End: 2024-11-28

## 2024-11-28 NOTE — TELEPHONE ENCOUNTER
Care advice and recommendations given.   Patient verbalized understanding.      Please follow up with patient.

## 2024-11-28 NOTE — TELEPHONE ENCOUNTER
Reason for Disposition  • Third attempt to contact caller AND no contact made. Phone number verified.    Protocols used: No Contact or Duplicate Contact Call-Adult-

## 2024-11-28 NOTE — TELEPHONE ENCOUNTER
Reason for Disposition   [1] Caller has URGENT question AND [2] triager unable to answer question    Protocols used: ICD and Pacemaker Symptoms and Questions-Adult-AH

## 2024-11-28 NOTE — TELEPHONE ENCOUNTER
"Answer Assessment - Initial Assessment Questions  1. DESCRIPTION: \"Please describe the concern you have with your pacemaker or defibrillator\" (e.g.,  delivered a shock, palpitations, beeping heard)      White heart flashing on monitor on bedside, usually has a green light.  2. ONSET: \"When did this occur?\" (e.g., minutes, hours or days)      This morning at some point  3. DURATION: \"How long did it last\" (e.g., seconds, minutes, hours)       Still on  4. RECURRENT SYMPTOM: \"Have you ever had this before?\" If YES,  \"When was the last time?\" and \"What happened that time?\"       Has not happened before.  5. CARDIAC HISTORY: \"What other heart problems do you have?\" (e.g., heart attack, angina, bypass surgery, stents, heart failure, rhythm problem)       Denies  6. OTHER SYMPTOMS: \"Do you have any other symptoms?\" (e.g., dizziness, chest pain, fever, sweating, difficulty breathing)      Denies    Protocols used: ICD and Pacemaker Symptoms and Questions-Adult-AH    "

## 2024-11-28 NOTE — PROGRESS NOTES
Name: Shonda Sanchez      : 1945      MRN: [id number]  Encounter Provider: Sergio Bangura DPM  Encounter Date: 2024   Encounter department: [facility] [place]  :  Assessment & Plan  Type 2 diabetes mellitus with diabetic peripheral angiopathy without gangrene, without long-term current use of insulin (HCC)    Lab Results   Component Value Date    HGBA1C 6.7 (H) 2024            Corns       Debride HD to dermal layer x 1 with the use of a 312 blade and sharp dissection.   Hammertoe of right foot         Asymptomatic varicose veins of both lower extremities         Generalized edema         Onychomycosis       Debride mycotic nails and thin the nail plates x 10 with the use of a nail nipper manually and an electric Dremel bur was used to reduce the thickness of the nail beds and smoothed the distal aspect of the nails.   Pain in toes of both feet         Long term current use of anticoagulant         Pt was instructed to use lotion once a day on both feet such as cerave, Cetaphil or similar thick style of lotion.     Discussed proper shoe gear, daily inspections of feet, and general foot health with patient. Patient has Q8  findings and is recommended for at risk foot care every 9-10 weeks.    Return in about 10 weeks (around 2025).     History of Present Illness     HPI  Shonda Sanchez is a 79 y.o. female who presents for painful thick nails on both feet and a painful corn on the third toe right foot. She is a diabetic whose sugar is fair.  History obtained from: patient    Review of Systems  Past Medical History   Past Medical History:   Diagnosis Date    Arthritis     Asthma     Diabetes (HCC)     Environmental allergies     Heart murmur     Hypertension      Past Surgical History:   Procedure Laterality Date    MECHANICAL AORTIC VALVE REPLACEMENT      REPLACEMENT TOTAL KNEE N/A     REPLACEMENT TOTAL KNEE       Family History   Problem Relation Age of Onset    Diabetes  type II Mother     Heart attack Mother         Multiple heart attacks over a period of 5 years.    Dementia Father     Other (heart sp) Father         He had a heart spasm.    Arthritis Family     Heart disease Family     Hypertension Family     Diabetes type II Family     Hypertension Other       reports that she has never smoked. She has never used smokeless tobacco. She reports current alcohol use. She reports that she does not use drugs.  Current Outpatient Medications on File Prior to Visit   Medication Sig Dispense Refill    acetaminophen (TYLENOL) 325 mg tablet Take 2 tablets (650 mg total) by mouth every 6 (six) hours as needed for mild pain 63 tablet 0    allopurinol (ZYLOPRIM) 100 mg tablet Take 100 mg by mouth daily      amiodarone 200 mg tablet Take 1 tablet (200 mg total) by mouth daily with breakfast 90 tablet 2    atorvastatin (LIPITOR) 40 mg tablet       Blood Glucose Monitoring Suppl (OneTouch Verio Flex System) w/Device KIT Use as directed      cholecalciferol (VITAMIN D3) 1,000 units tablet Take 1 tablet (1,000 Units total) by mouth daily Do not start before June 2, 2023. 30 tablet 0    citalopram (CeleXA) 20 mg tablet Take 20 mg by mouth daily      docusate sodium (COLACE) 100 mg capsule Take 1 capsule (100 mg total) by mouth 2 (two) times a day 60 capsule 0    estradiol (ESTRACE) 0.1 mg/g vaginal cream Insert 2 g into the vagina 3 (three) times a week Takes 3 xs a week.      Farxiga 5 MG TABS Take 5 mg by mouth daily      magnesium 30 MG tablet Take 64 mg by mouth 3 (three) times a day Takes 3-4 days a week takes 1 tab.      Metamucil Fiber CHEW Chew 3 gums in the morning. Indications: fiber supplement      metoprolol succinate (TOPROL-XL) 25 mg 24 hr tablet Take 1 tablet (25 mg total) by mouth daily Do not start before June 2, 2023. 30 tablet 0    montelukast (SINGULAIR) 10 mg tablet Take 10 mg by mouth daily at bedtime      multivitamin (THERAGRAN) TABS Take 1 tablet by mouth daily      NON  FORMULARY Takes Uqcora otc for her bladder.      potassium chloride (Klor-Con M20) 20 mEq tablet Take 3 tablets in AM and 2 tablets in  tablet 3    torsemide (DEMADEX) 20 mg tablet TAKE 2 TABLETS BY MOUTH EVERY MORNING AND 1 TABLET EVERY EVENING (Patient taking differently: 20 mg TAKE 2 TABLETS BY MOUTH EVERY MORNING AND 1 TABLET EVERY EVENING) 270 tablet 1    alendronate (FOSAMAX) 70 mg tablet Take 70 mg by mouth Once a week (Patient not taking: Reported on 11/27/2024)      cephalexin (KEFLEX) 500 mg capsule Take 500 mg by mouth (Patient not taking: Reported on 11/27/2024)      furosemide (LASIX) 20 mg tablet Take by mouth (Patient not taking: Reported on 11/27/2024)      metoprolol succinate (TOPROL-XL) 50 mg 24 hr tablet Take by mouth (Patient not taking: Reported on 11/27/2024)      oxybutynin (DITROPAN-XL) 5 mg 24 hr tablet Take by mouth (Patient not taking: Reported on 11/27/2024)      traMADol (Ultram) 50 mg tablet Take 1 tablet (50 mg total) by mouth every 6 (six) hours as needed for severe pain (Patient not taking: Reported on 11/27/2024) 25 tablet 0    warfarin (COUMADIN) 5 mg tablet Take 5 mg by mouth daily Takes 5mg on Monday and Friday and 2.5mg the other 5 days of the week (Patient not taking: Reported on 11/27/2024)       No current facility-administered medications on file prior to visit.     Allergies   Allergen Reactions    Fentanyl Other (See Comments)     Other reaction(s): Hallucinations  paranoia, confusion  Other reaction(s): Hallucinations  Other reaction(s): Hallucinations  hallucinations    Fentanyl Confusion      Medical History Reviewed by provider this encounter:     .  Current Outpatient Medications on File Prior to Visit   Medication Sig Dispense Refill    acetaminophen (TYLENOL) 325 mg tablet Take 2 tablets (650 mg total) by mouth every 6 (six) hours as needed for mild pain 63 tablet 0    allopurinol (ZYLOPRIM) 100 mg tablet Take 100 mg by mouth daily      amiodarone 200 mg  tablet Take 1 tablet (200 mg total) by mouth daily with breakfast 90 tablet 2    atorvastatin (LIPITOR) 40 mg tablet       Blood Glucose Monitoring Suppl (OneTouch Verio Flex System) w/Device KIT Use as directed      cholecalciferol (VITAMIN D3) 1,000 units tablet Take 1 tablet (1,000 Units total) by mouth daily Do not start before June 2, 2023. 30 tablet 0    citalopram (CeleXA) 20 mg tablet Take 20 mg by mouth daily      docusate sodium (COLACE) 100 mg capsule Take 1 capsule (100 mg total) by mouth 2 (two) times a day 60 capsule 0    estradiol (ESTRACE) 0.1 mg/g vaginal cream Insert 2 g into the vagina 3 (three) times a week Takes 3 xs a week.      Farxiga 5 MG TABS Take 5 mg by mouth daily      magnesium 30 MG tablet Take 64 mg by mouth 3 (three) times a day Takes 3-4 days a week takes 1 tab.      Metamucil Fiber CHEW Chew 3 gums in the morning. Indications: fiber supplement      metoprolol succinate (TOPROL-XL) 25 mg 24 hr tablet Take 1 tablet (25 mg total) by mouth daily Do not start before June 2, 2023. 30 tablet 0    montelukast (SINGULAIR) 10 mg tablet Take 10 mg by mouth daily at bedtime      multivitamin (THERAGRAN) TABS Take 1 tablet by mouth daily      NON FORMULARY Takes Uqcora otc for her bladder.      potassium chloride (Klor-Con M20) 20 mEq tablet Take 3 tablets in AM and 2 tablets in  tablet 3    torsemide (DEMADEX) 20 mg tablet TAKE 2 TABLETS BY MOUTH EVERY MORNING AND 1 TABLET EVERY EVENING (Patient taking differently: 20 mg TAKE 2 TABLETS BY MOUTH EVERY MORNING AND 1 TABLET EVERY EVENING) 270 tablet 1    alendronate (FOSAMAX) 70 mg tablet Take 70 mg by mouth Once a week (Patient not taking: Reported on 11/27/2024)      cephalexin (KEFLEX) 500 mg capsule Take 500 mg by mouth (Patient not taking: Reported on 11/27/2024)      furosemide (LASIX) 20 mg tablet Take by mouth (Patient not taking: Reported on 11/27/2024)      metoprolol succinate (TOPROL-XL) 50 mg 24 hr tablet Take by mouth (Patient  "not taking: Reported on 11/27/2024)      oxybutynin (DITROPAN-XL) 5 mg 24 hr tablet Take by mouth (Patient not taking: Reported on 11/27/2024)      traMADol (Ultram) 50 mg tablet Take 1 tablet (50 mg total) by mouth every 6 (six) hours as needed for severe pain (Patient not taking: Reported on 11/27/2024) 25 tablet 0    warfarin (COUMADIN) 5 mg tablet Take 5 mg by mouth daily Takes 5mg on Monday and Friday and 2.5mg the other 5 days of the week (Patient not taking: Reported on 11/27/2024)       No current facility-administered medications on file prior to visit.      Social History     Tobacco Use    Smoking status: Never    Smokeless tobacco: Never   Vaping Use    Vaping status: Never Used   Substance and Sexual Activity    Alcohol use: Yes     Comment: socially 3-4 drinks a week.    Drug use: Never    Sexual activity: Not on file        Objective   /82 (Patient Position: Sitting, Cuff Size: Large)   Pulse 60   Ht 4' 10\" (1.473 m)   Wt 73.9 kg (163 lb)   BMI 34.07 kg/m²      Physical Exam  Cardiovascular:      Pulses: Pulses are weak.           Dorsalis pedis pulses are 1+ on the right side and 1+ on the left side.        Posterior tibial pulses are 0 on the right side and 0 on the left side.   Feet:      Right foot:      Skin integrity: Callus present. No ulcer, skin breakdown, erythema, warmth or dry skin.      Left foot:      Skin integrity: Callus present. No ulcer, skin breakdown, erythema, warmth or dry skin.       Vascular status is a faint 1/4 DP 0/4 PT negative digital hair normal distal cooling slightly delayed capillary refill bilaterally.  There is edema present bilaterally also and it is +1 bilaterally.  Capillary refill is approximately 2 to 3 seconds.  There is also varicose veins present which are asymptomatic bilaterally.    Derm nails are brittle elongated hypertrophic yellow-white discoloration with subungual debris x 10.  There is an increased thickness and the nails are approximately " 2 to 3 mm.  Hypertrophic tissue is present on the distal aspect of the third toe right foot with a central IPK noted.  No trophic changes under the skin and no dried blood present within the skin.  There is loss of turgor noted bilaterally and slight flaking to the skin especially in the area of the heels.    Ortho hammertoe deformity is present on the second and third digits bilaterally which are in a fixed plantarflexed position at the PIPJ there is also hallux valgus deformity noted bilaterally with the left worse than the right.  The left foot also has a second toe overlapping the hallux.    Neuro is intact and equal bilaterally for light touch and for the 0.5 monofilament test.    Diabetic Foot Exam    Patient's shoes and socks removed.    Right Foot/Ankle   Right Foot Inspection  Skin Exam: callus and callus. Skin not intact, no dry skin, no warmth, no erythema, no maceration, no abnormal color, no pre-ulcer and no ulcer.        Callus Size (cm):0.16    Toe Exam: swelling and right toe deformity. No tenderness and erythema    Sensory   Vibration: intact  Proprioception: intact  Monofilament testing: intact    Vascular  Capillary refills: < 3 seconds  The right DP pulse is 1+. The right PT pulse is 0.     Left Foot/Ankle  Left Foot Inspection  Skin Exam: abnormal color and callus. Skin not intact, no dry skin, no warmth, no erythema, no maceration, no pre-ulcer and no ulcer.        Callus Size (cm): 1    Toe Exam: swelling and left toe deformity. No tenderness and no erythema.     Sensory   Vibration: intact  Proprioception: intact  Monofilament testing: intact    Vascular  Capillary refills: < 3 seconds  The left DP pulse is 1+. The left PT pulse is 0.     Assign Risk Category  Deformity present  No loss of protective sensation  Weak pulses  Risk: 1    Administrative Statements   I have spent a total time of 15 minutes in caring for this patient on the day of the visit/encounter including Risks and benefits of  tx options, Instructions for management, Patient and family education, Importance of tx compliance, Risk factor reductions, Counseling / Coordination of care, Documenting in the medical record, and Reviewing / ordering tests, medicine, procedures  .

## 2024-11-28 NOTE — TELEPHONE ENCOUNTER
"Regarding: cardiac device  ----- Message from Marta WILL sent at 11/28/2024  8:44 AM EST -----  \" There is a big white light flashing on my cardiac device. It's never happened so I don't know what it means. \"    "

## 2024-11-29 ENCOUNTER — TELEPHONE (OUTPATIENT)
Dept: CARDIOLOGY CLINIC | Facility: CLINIC | Age: 79
End: 2024-11-29

## 2024-11-29 ENCOUNTER — PREP FOR PROCEDURE (OUTPATIENT)
Dept: CARDIOLOGY CLINIC | Facility: CLINIC | Age: 79
End: 2024-11-29

## 2024-11-29 DIAGNOSIS — I48.91 ATRIAL FIBRILLATION, UNSPECIFIED TYPE (HCC): Primary | ICD-10-CM

## 2024-11-29 DIAGNOSIS — I48.91 ATRIAL FIBRILLATION WITH RVR (HCC): ICD-10-CM

## 2024-11-29 DIAGNOSIS — I49.9 VENTRICULAR ARRHYTHMIA: ICD-10-CM

## 2024-11-29 NOTE — TELEPHONE ENCOUNTER
Received call from pt, wanting to schedule her procedure as she leaves soon.  Warm transferred pt to Cindy

## 2024-11-29 NOTE — TELEPHONE ENCOUNTER
11/29/24@ 12:45pm spoke to pts spouse and latitude monitor is now reset and no longer flashing. ~ch

## 2024-11-29 NOTE — TELEPHONE ENCOUNTER
Call transferred to me from Nurse Triage. Pt called yesterday. Machine is lighting up and she is worried because she needs to ICD. I researched and messaged Serena in Procedure scheduling. Per Serena she will call her back to schedule.

## 2024-12-01 ENCOUNTER — NURSE TRIAGE (OUTPATIENT)
Dept: OTHER | Facility: OTHER | Age: 79
End: 2024-12-01

## 2024-12-01 NOTE — TELEPHONE ENCOUNTER
"Reason for Disposition  • General information question, no triage required and triager able to answer question    Answer Assessment - Initial Assessment Questions  1. REASON FOR CALL: \"What is the main reason for your call?\" or \"How can I best help you?\"     Patient scheduled for [place] ICD GEN CHANGE device on 12/26/24 at [facility] with Dr. RESTREPO.  .She would like to know if the monitor is still working properly. Patient states monitor is no longer flashing and is working properly.    2. SYMPTOMS : \"Do you have any symptoms?\"       No    Protocols used: Information Only Call - No Triage-Adult-    Patient asymptomatic and no signs of monitor not functioning. ICD should work as normal.   Provided the patient with the [employer] monitor customer support number if needed. Advised  her to call back with any symptoms or concerns. She verbalized understanding.   "

## 2024-12-02 ENCOUNTER — TELEPHONE (OUTPATIENT)
Dept: CARDIOLOGY CLINIC | Facility: CLINIC | Age: 79
End: 2024-12-02

## 2024-12-02 NOTE — TELEPHONE ENCOUNTER
----- Message from Candido Loja PA-C sent at 12/2/2024  1:41 PM EST -----  I'm sorry Arechiga, I don't quite remember if it was able to be palpated.    Clinical team: Can someone please call the patient and ask if she is able to feel the ICD? Or if it is too deep to feel?    Thanks!  ----- Message -----  From: Miller Bello MD  Sent: 12/2/2024  12:47 PM EST  To: Candido Loja PA-C    Was this palpable or not? If palpable, anyone can do it. If not then may need surgery involvement.     Thx  ----- Message -----  From: Candido Loja PA-C  Sent: 11/29/2024  12:21 PM EST  To: Miller Bello MD    This patient got scheduled for an ICD gen change with you in December.     She has [place] single-chamber ICD (abdominal). Can anyone do the abdominal gen changes? Or just Dr. Carney?
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
## 2024-12-11 RX ORDER — AMIODARONE HYDROCHLORIDE 200 MG/1
TABLET ORAL
Qty: 90 TABLET | Refills: 2 | Status: SHIPPED | OUTPATIENT
Start: 2024-12-11

## 2024-12-18 ENCOUNTER — APPOINTMENT (OUTPATIENT)
Dept: LAB | Facility: CLINIC | Age: 79
End: 2024-12-18
Payer: COMMERCIAL

## 2024-12-18 DIAGNOSIS — I47.20 VENTRICULAR TACHYCARDIA (HCC): ICD-10-CM

## 2024-12-18 DIAGNOSIS — I48.91 ATRIAL FIBRILLATION WITH RVR (HCC): ICD-10-CM

## 2024-12-18 DIAGNOSIS — I48.91 ATRIAL FIBRILLATION, UNSPECIFIED TYPE (HCC): ICD-10-CM

## 2024-12-18 LAB
ALBUMIN SERPL BCG-MCNC: 4.1 G/DL (ref 3.5–5)
ALP SERPL-CCNC: 88 U/L (ref 34–104)
ALT SERPL W P-5'-P-CCNC: 16 U/L (ref 7–52)
ANION GAP SERPL CALCULATED.3IONS-SCNC: 7 MMOL/L (ref 4–13)
AST SERPL W P-5'-P-CCNC: 31 U/L (ref 13–39)
BASOPHILS # BLD AUTO: 0.04 THOUSANDS/ÂΜL (ref 0–0.1)
BASOPHILS NFR BLD AUTO: 1 % (ref 0–1)
BILIRUB SERPL-MCNC: 1.58 MG/DL (ref 0.2–1)
BUN SERPL-MCNC: 23 MG/DL (ref 5–25)
CALCIUM SERPL-MCNC: 9.6 MG/DL (ref 8.4–10.2)
CHLORIDE SERPL-SCNC: 98 MMOL/L (ref 96–108)
CO2 SERPL-SCNC: 32 MMOL/L (ref 21–32)
CREAT SERPL-MCNC: 0.83 MG/DL (ref 0.6–1.3)
EOSINOPHIL # BLD AUTO: 0.08 THOUSAND/ÂΜL (ref 0–0.61)
EOSINOPHIL NFR BLD AUTO: 3 % (ref 0–6)
ERYTHROCYTE [DISTWIDTH] IN BLOOD BY AUTOMATED COUNT: 14.4 % (ref 11.6–15.1)
FERRITIN SERPL-MCNC: 33 NG/ML (ref 11–307)
GFR SERPL CREATININE-BSD FRML MDRD: 67 ML/MIN/1.73SQ M
GLUCOSE P FAST SERPL-MCNC: 97 MG/DL (ref 65–99)
HCT VFR BLD AUTO: 43.8 % (ref 34.8–46.1)
HGB BLD-MCNC: 14.2 G/DL (ref 11.5–15.4)
IMM GRANULOCYTES # BLD AUTO: 0.01 THOUSAND/UL (ref 0–0.2)
IMM GRANULOCYTES NFR BLD AUTO: 0 % (ref 0–2)
INR PPP: 2.64 (ref 0.85–1.19)
IRON SATN MFR SERPL: 44 % (ref 15–50)
IRON SERPL-MCNC: 163 UG/DL (ref 50–212)
LYMPHOCYTES # BLD AUTO: 0.73 THOUSANDS/ÂΜL (ref 0.6–4.47)
LYMPHOCYTES NFR BLD AUTO: 23 % (ref 14–44)
MCH RBC QN AUTO: 34.4 PG (ref 26.8–34.3)
MCHC RBC AUTO-ENTMCNC: 32.4 G/DL (ref 31.4–37.4)
MCV RBC AUTO: 106 FL (ref 82–98)
MONOCYTES # BLD AUTO: 0.24 THOUSAND/ÂΜL (ref 0.17–1.22)
MONOCYTES NFR BLD AUTO: 7 % (ref 4–12)
NEUTROPHILS # BLD AUTO: 2.13 THOUSANDS/ÂΜL (ref 1.85–7.62)
NEUTS SEG NFR BLD AUTO: 66 % (ref 43–75)
NRBC BLD AUTO-RTO: 0 /100 WBCS
PLATELET # BLD AUTO: 109 THOUSANDS/UL (ref 149–390)
PMV BLD AUTO: 10.6 FL (ref 8.9–12.7)
POTASSIUM SERPL-SCNC: 4.1 MMOL/L (ref 3.5–5.3)
PROT SERPL-MCNC: 7.7 G/DL (ref 6.4–8.4)
PROTHROMBIN TIME: 28.8 SECONDS (ref 12.3–15)
RBC # BLD AUTO: 4.13 MILLION/UL (ref 3.81–5.12)
SODIUM SERPL-SCNC: 137 MMOL/L (ref 135–147)
TIBC SERPL-MCNC: 371 UG/DL (ref 250–450)
TRANSFERRIN SERPL-MCNC: 265 MG/DL (ref 203–362)
UIBC SERPL-MCNC: 208 UG/DL (ref 155–355)
WBC # BLD AUTO: 3.23 THOUSAND/UL (ref 4.31–10.16)

## 2024-12-18 PROCEDURE — 36415 COLL VENOUS BLD VENIPUNCTURE: CPT

## 2024-12-18 PROCEDURE — 85610 PROTHROMBIN TIME: CPT

## 2024-12-19 LAB
ALBUMIN SERPL ELPH-MCNC: 3.88 G/DL (ref 3.2–5.1)
ALBUMIN SERPL ELPH-MCNC: 53.9 % (ref 48–70)
ALPHA1 GLOB SERPL ELPH-MCNC: 0.25 G/DL (ref 0.15–0.47)
ALPHA1 GLOB SERPL ELPH-MCNC: 3.5 % (ref 1.8–7)
ALPHA2 GLOB SERPL ELPH-MCNC: 0.56 G/DL (ref 0.42–1.04)
ALPHA2 GLOB SERPL ELPH-MCNC: 7.8 % (ref 5.9–14.9)
BETA GLOB ABNORMAL SERPL ELPH-MCNC: 0.44 G/DL (ref 0.31–0.57)
BETA1 GLOB SERPL ELPH-MCNC: 6.1 % (ref 4.7–7.7)
BETA2 GLOB SERPL ELPH-MCNC: 6.5 % (ref 3.1–7.9)
BETA2+GAMMA GLOB SERPL ELPH-MCNC: 0.47 G/DL (ref 0.2–0.58)
GAMMA GLOB ABNORMAL SERPL ELPH-MCNC: 1.6 G/DL (ref 0.4–1.66)
GAMMA GLOB SERPL ELPH-MCNC: 22.2 % (ref 6.9–22.3)
IGG/ALB SER: 1.17 {RATIO} (ref 1.1–1.8)
PROT PATTERN SERPL ELPH-IMP: NORMAL
PROT SERPL-MCNC: 7.2 G/DL (ref 6.4–8.2)

## 2024-12-19 PROCEDURE — 84165 PROTEIN E-PHORESIS SERUM: CPT | Performed by: PATHOLOGY

## 2024-12-23 RX ORDER — ACETAMINOPHEN 325 MG/1
975 TABLET ORAL EVERY 6 HOURS PRN
Status: CANCELLED | OUTPATIENT
Start: 2024-12-23

## 2024-12-26 ENCOUNTER — ANESTHESIA EVENT (OUTPATIENT)
Dept: NON INVASIVE DIAGNOSTICS | Facility: HOSPITAL | Age: 79
End: 2024-12-26
Payer: COMMERCIAL

## 2024-12-26 ENCOUNTER — HOSPITAL ENCOUNTER (OUTPATIENT)
Facility: HOSPITAL | Age: 79
Setting detail: OUTPATIENT SURGERY
Discharge: HOME | End: 2024-12-26
Attending: INTERNAL MEDICINE | Admitting: INTERNAL MEDICINE
Payer: COMMERCIAL

## 2024-12-26 ENCOUNTER — ANESTHESIA (OUTPATIENT)
Dept: NON INVASIVE DIAGNOSTICS | Facility: HOSPITAL | Age: 79
End: 2024-12-26
Payer: COMMERCIAL

## 2024-12-26 VITALS
HEIGHT: 59 IN | OXYGEN SATURATION: 100 % | DIASTOLIC BLOOD PRESSURE: 58 MMHG | RESPIRATION RATE: 16 BRPM | SYSTOLIC BLOOD PRESSURE: 127 MMHG | TEMPERATURE: 98.3 F | WEIGHT: 160 LBS | BODY MASS INDEX: 32.25 KG/M2 | HEART RATE: 61 BPM

## 2024-12-26 DIAGNOSIS — I48.91 ATRIAL FIBRILLATION WITH RVR (HCC): ICD-10-CM

## 2024-12-26 DIAGNOSIS — R00.1 BRADYCARDIA: Primary | ICD-10-CM

## 2024-12-26 DIAGNOSIS — I49.9 VENTRICULAR ARRHYTHMIA: ICD-10-CM

## 2024-12-26 DIAGNOSIS — I48.91 ATRIAL FIBRILLATION, UNSPECIFIED TYPE (HCC): ICD-10-CM

## 2024-12-26 LAB
ANION GAP SERPL CALCULATED.3IONS-SCNC: 7 MMOL/L (ref 4–13)
ATRIAL RATE: 63 BPM
ATRIAL RATE: 70 BPM
BUN SERPL-MCNC: 25 MG/DL (ref 5–25)
CALCIUM SERPL-MCNC: 9.1 MG/DL (ref 8.4–10.2)
CHLORIDE SERPL-SCNC: 104 MMOL/L (ref 96–108)
CO2 SERPL-SCNC: 29 MMOL/L (ref 21–32)
CREAT SERPL-MCNC: 0.77 MG/DL (ref 0.6–1.3)
ERYTHROCYTE [DISTWIDTH] IN BLOOD BY AUTOMATED COUNT: 14.5 % (ref 11.6–15.1)
GFR SERPL CREATININE-BSD FRML MDRD: 73 ML/MIN/1.73SQ M
GLUCOSE P FAST SERPL-MCNC: 181 MG/DL (ref 65–99)
GLUCOSE SERPL-MCNC: 181 MG/DL (ref 65–140)
HCT VFR BLD AUTO: 41.7 % (ref 34.8–46.1)
HGB BLD-MCNC: 13.4 G/DL (ref 11.5–15.4)
INR PPP: 3.21 (ref 0.85–1.19)
MAGNESIUM SERPL-MCNC: 1.8 MG/DL (ref 1.9–2.7)
MCH RBC QN AUTO: 34.4 PG (ref 26.8–34.3)
MCHC RBC AUTO-ENTMCNC: 32.1 G/DL (ref 31.4–37.4)
MCV RBC AUTO: 107 FL (ref 82–98)
PLATELET # BLD AUTO: 97 THOUSANDS/UL (ref 149–390)
PMV BLD AUTO: 11 FL (ref 8.9–12.7)
POTASSIUM SERPL-SCNC: 3.8 MMOL/L (ref 3.5–5.3)
PROTHROMBIN TIME: 32.4 SECONDS (ref 12.3–15)
QRS AXIS: -17 DEGREES
QRS AXIS: -73 DEGREES
QRSD INTERVAL: 208 MS
QRSD INTERVAL: 212 MS
QT INTERVAL: 552 MS
QT INTERVAL: 572 MS
QTC INTERVAL: 552 MS
QTC INTERVAL: 586 MS
RBC # BLD AUTO: 3.9 MILLION/UL (ref 3.81–5.12)
SODIUM SERPL-SCNC: 140 MMOL/L (ref 135–147)
T WAVE AXIS: 83 DEGREES
T WAVE AXIS: 98 DEGREES
VENTRICULAR RATE: 60 BPM
VENTRICULAR RATE: 63 BPM
WBC # BLD AUTO: 3.86 THOUSAND/UL (ref 4.31–10.16)

## 2024-12-26 PROCEDURE — 33262 RMVL& REPLC PULSE GEN 1 LEAD: CPT | Performed by: INTERNAL MEDICINE

## 2024-12-26 PROCEDURE — C1722 AICD, SINGLE CHAMBER: HCPCS | Performed by: INTERNAL MEDICINE

## 2024-12-26 PROCEDURE — 93010 ELECTROCARDIOGRAM REPORT: CPT | Performed by: INTERNAL MEDICINE

## 2024-12-26 PROCEDURE — 85027 COMPLETE CBC AUTOMATED: CPT | Performed by: PHYSICIAN ASSISTANT

## 2024-12-26 PROCEDURE — 93005 ELECTROCARDIOGRAM TRACING: CPT

## 2024-12-26 PROCEDURE — 83735 ASSAY OF MAGNESIUM: CPT | Performed by: PHYSICIAN ASSISTANT

## 2024-12-26 PROCEDURE — NC001 PR NO CHARGE: Performed by: PHYSICIAN ASSISTANT

## 2024-12-26 PROCEDURE — 85610 PROTHROMBIN TIME: CPT | Performed by: PHYSICIAN ASSISTANT

## 2024-12-26 PROCEDURE — 80048 BASIC METABOLIC PNL TOTAL CA: CPT | Performed by: PHYSICIAN ASSISTANT

## 2024-12-26 DEVICE — IMPLANTABLE CARDIOVERTER DEFIBRILLATOR VR
Type: IMPLANTABLE DEVICE | Site: ABDOMEN | Status: FUNCTIONAL
Brand: MOMENTUM™ EL ICD VR

## 2024-12-26 DEVICE — ENVELOPE CMRM6133 ABSORB LRG MR
Type: IMPLANTABLE DEVICE | Site: ABDOMEN | Status: FUNCTIONAL
Brand: TYRX™

## 2024-12-26 RX ORDER — ACETAMINOPHEN 325 MG/1
975 TABLET ORAL EVERY 6 HOURS PRN
Status: DISCONTINUED | OUTPATIENT
Start: 2024-12-26 | End: 2024-12-26 | Stop reason: HOSPADM

## 2024-12-26 RX ORDER — GENTAMICIN 40 MG/ML
INJECTION, SOLUTION INTRAMUSCULAR; INTRAVENOUS CODE/TRAUMA/SEDATION MEDICATION
Status: DISCONTINUED | OUTPATIENT
Start: 2024-12-26 | End: 2024-12-26 | Stop reason: HOSPADM

## 2024-12-26 RX ORDER — SODIUM CHLORIDE 9 MG/ML
20 INJECTION, SOLUTION INTRAVENOUS CONTINUOUS
Status: DISCONTINUED | OUTPATIENT
Start: 2024-12-26 | End: 2024-12-26 | Stop reason: HOSPADM

## 2024-12-26 RX ORDER — PROPOFOL 10 MG/ML
INJECTION, EMULSION INTRAVENOUS CONTINUOUS PRN
Status: DISCONTINUED | OUTPATIENT
Start: 2024-12-26 | End: 2024-12-26

## 2024-12-26 RX ORDER — CEFAZOLIN SODIUM 2 G/50ML
2000 SOLUTION INTRAVENOUS ONCE
Status: COMPLETED | OUTPATIENT
Start: 2024-12-26 | End: 2024-12-26

## 2024-12-26 RX ORDER — PROPOFOL 10 MG/ML
INJECTION, EMULSION INTRAVENOUS AS NEEDED
Status: DISCONTINUED | OUTPATIENT
Start: 2024-12-26 | End: 2024-12-26

## 2024-12-26 RX ORDER — BUPIVACAINE HYDROCHLORIDE 5 MG/ML
INJECTION, SOLUTION EPIDURAL; INTRACAUDAL CODE/TRAUMA/SEDATION MEDICATION
Status: DISCONTINUED | OUTPATIENT
Start: 2024-12-26 | End: 2024-12-26 | Stop reason: HOSPADM

## 2024-12-26 RX ADMIN — PROPOFOL 50 MCG/KG/MIN: 10 INJECTION, EMULSION INTRAVENOUS at 10:45

## 2024-12-26 RX ADMIN — CEFAZOLIN SODIUM 2000 MG: 2 SOLUTION INTRAVENOUS at 10:47

## 2024-12-26 RX ADMIN — PROPOFOL 10 MG: 10 INJECTION, EMULSION INTRAVENOUS at 10:45

## 2024-12-26 RX ADMIN — PROPOFOL 10 MG: 10 INJECTION, EMULSION INTRAVENOUS at 10:47

## 2024-12-26 RX ADMIN — SODIUM CHLORIDE 20 ML/HR: 0.9 INJECTION, SOLUTION INTRAVENOUS at 09:41

## 2024-12-26 NOTE — DISCHARGE INSTR - AVS FIRST PAGE
Please refer to post ICD implantation discharge instructions and restrictions below and your ICD booklet/temporary card.     Keep incision dry for one week. Leave outer bandage in place for 1 week - it is water proof, and as long as it is fully adhered to your skin you may shower with it.  If it appears as though the bandage is coming off and/or there is any communication to the area of device incision, please then keep the whole area dry for the remaining week.  After 1 week, please remove by pulling all edges away from the center of the bandage. After the bandage is removed, you may then shower normally and get the area wet with soap and water, no scrubbing, and pat dry. Do not use lotions/powders/creams on incision.    Please call the office if you notice redness, swelling, bleeding, or drainage from incision or if you develop fevers.    Implantable Cardioverter Defibrillator      If you have any questions, please call [phone] to speak with a nurse (8:30am-4pm, or [phone] after hours). For appointments, please call [phone].    WHAT YOU SHOULD KNOW:    An implantable cardioverter defibrillator (ICD) is a small device that monitors your heart rate and rhythm. It is commonly placed inside your upper chest region. It may be used if you have a ventricular arrhythmia, which is an irregular, dangerous rhythm from the bottom chamber of your heart. Some arrhythmias may cause your heart to suddenly stop beating. An ICD can give a shock to your heart to make it start beating again, or it can give pacing therapy (also known as pain-free therapy) to return your heart to normal rhythm. It is also a pacemaker, so it will pace your heart if needed to prevent it from beating too slowly.           AFTER YOU LEAVE:      Follow up with your primary healthcare provider or cardiologist as directed: You will need to follow-up to have your ICD checked and make sure you are not having problems. Write down your questions so  you remember to ask them during your visits.    Self-care:   Ask about activity: Ask if you need to avoid moving your shoulder or arm, and for how long. Ask if you should avoid lifting heavy objects. Do not play any contact sports, such as football or wrestling, until your primary healthcare provider (PHP) or cardiologist tells you it is okay. You may only be able to drive for a certain amount of time per day, or during certain hours. Ask when you can return to work.   Care for your skin over the ICD: see above instructions for further details     When you get a shock from your ICD: A shock may feel like someone has hit you, or you may feel a thump in the chest. If someone is touching you when you get a shock, they may feel a tingling feeling. The first time you feel a shock, it may scare you. Sit or lie down and stay calm. Ask someone to stay with you if possible. Please either call your cardiologist or report to an emergency room.    Driving: you are ok to drive 48 hours after generator is changed     Safety instructions when you have an ICD:   Carry an ID card for the ICD: This card has important information about your ICD.    Stay away from magnets or machines with electric fields: This includes MRI machines. Avoid leaning into a car engine or doing welding. These things can interfere with how your ICD works.    Tell airport security you have an ICD: You may need to be searched by hand when you go through a security gate. The security gate or handheld wand could harm your ICD.    Keep an ICD diary: Record when you get a shock and what you were doing before you got the shock. Keep track of how you felt before and after the shock, as well as how many shocks you received. Write down the day and time of each shock. Bring the diary with you when you see your PHP or cardiologist.   Carry medical alert identification: Wear medical alert jewelry or carry a card that says you have an ICD. Ask your PHP or cardiologist  where to get these items.    Contact your primary healthcare provider or cardiologist if:   You have a fever.    You feel 1 or more shocks from your ICD and feel fine afterwards.   Your feet or ankles swell.   The area around your ICD is painful or tender after surgery.   The skin around your stitches or staples is red, swollen, or draining pus or fluid.    You have chills, a cough, and feel weak or achy.    You are sad or anxious and find it hard to do your usual activities.   You have questions or concerns about your condition or care.    Seek care immediately or call 911 if:   Your stitches or staples come apart.   Blood soaks through your bandage.   You feel more than 3 shocks in a row from your ICD.   You become weak, dizzy, or faint.   You feel your heart skip beats or beat very fast or slow, but you do not feel a shock from your ICD.   You have chest pain that does not go away with rest or medicine.        © 2014 [employer]. Information is for End User's use only and may not be sold, redistributed or otherwise used for commercial purposes. All illustrations and images included in CareNotes® are the copyrighted property of [employer], [employer]. or [employer].  The above information is an  only. It is not intended as medical advice for individual conditions or treatments. Talk to your doctor, nurse or pharmacist before following any medical regimen to see if it is safe and effective for you.

## 2024-12-26 NOTE — ANESTHESIA POSTPROCEDURE EVALUATION
Post-Op Assessment Note    CV Status:  Stable  Pain Score: 0    Pain management: adequate       Mental Status:  Alert and awake   Hydration Status:  Euvolemic   PONV Controlled:  None   Airway Patency:  Patent     Post Op Vitals Reviewed: Yes    No anethesia notable event occurred.    Staff: Anesthesiologist, CRNA   Comments: report given to RN; VSS; RA          Last Filed PACU Vitals:  Vitals Value Taken Time   Temp     Pulse 60    /62    Resp 16    SpO2 98        Modified Gagandeep:  No data recorded

## 2024-12-26 NOTE — ANESTHESIA POSTPROCEDURE EVALUATION
Post-Op Assessment Note    CV Status:  Stable    Pain management: satisfactory to patient       Mental Status:  Awake   Hydration Status:  Stable   PONV Controlled:  None   Airway Patency:  Patent     Post Op Vitals Reviewed: Yes    No anethesia notable event occurred.    Staff: Anesthesiologist           Last Filed PACU Vitals:  Vitals Value Taken Time   Temp 98.3 °F (36.8 °C) 12/26/24 1145   Pulse 61 12/26/24 1330   /58 12/26/24 1330   Resp 16 12/26/24 1145   SpO2 100 % 12/26/24 1145       Modified Gagandeep:  No data recorded

## 2024-12-26 NOTE — ANESTHESIA PREPROCEDURE EVALUATION
Procedure:  Cardiac icd generator change SINGLE CHAMBER (Chest)    Relevant Problems   CARDIO   (+) A-fib (HCC)   (+) Acute on chronic heart failure with preserved ejection fraction (HCC)   (+) Aneurysm of ascending aorta without rupture (HCC)   (+) CAD (coronary artery disease)   (+) Ventricular arrhythmia      ENDO   (+) Type 2 diabetes mellitus (HCC)      HEMATOLOGY   (+) Chronic ITP (idiopathic thrombocytopenia) (HCC)   (+) Pancytopenia (HCC)      MUSCULOSKELETAL   (+) Arthritis of left foot      PULMONARY   (+) Asthma   (+) CHAS (obstructive sleep apnea)      Ascending aortic aneurysm - 4.6 cm  Mechanical AVR- stable on last echo    Echo 11/18/24:     Left Ventricle: Left ventricular cavity size is normal. Wall thickness is mildly increased. There is mild concentric hypertrophy. The left ventricular ejection fraction is 55%. Systolic function is normal. Wall motion is normal. Diastolic function is normal.    IVS: There is abnormal septal motion consistent with post-operative status.    Right Ventricle: A pacer wire is present.    Left Atrium: The atrium is moderate to severely dilated.    Right Atrium: The atrium is severely dilated.    Aortic Valve: There is a bileaflet mechanical valve. The prosthetic valve appears to be functioning normally. The aortic valve has no significant stenosis. The aortic valve mean gradient is 4 mmHg.    Mitral Valve: There is severe annular calcification. There is moderate to severe regurgitation.    Tricuspid Valve: There is moderate to severe regurgitation. The right ventricular systolic pressure is mildly elevated.    Pulmonic Valve: There is mild regurgitation.    Aorta: The aortic root is mildly dilated. The ascending aorta is severely dilated. The aortic root is 4.20 cm. The ascending aorta is 4.9 cm.    Prior TTE study available for comparison. Prior study date: 9/20/2023. No significant changes noted compared to the prior study.    Physical Exam    Airway    Mallampati  score: II  TM Distance: >3 FB  Neck ROM: full     Dental   No notable dental hx     Cardiovascular      Pulmonary      Other Findings  post-pubertal.      Anesthesia Plan  ASA Score- 3     Anesthesia Type- IV sedation with anesthesia with ASA Monitors.         Additional Monitors:     Airway Plan:            Plan Factors-Exercise tolerance (METS): <4 METS.    Chart reviewed. EKG reviewed.  Existing labs reviewed. Patient summary reviewed.    Patient is not a current smoker.      Obstructive sleep apnea risk education given perioperatively.        Induction- intravenous.    Postoperative Plan-     Perioperative Resuscitation Plan - Level 1 - Full Code.       Informed Consent- Anesthetic plan and risks discussed with patient.  I personally reviewed this patient with the CRNA. Discussed and agreed on the Anesthesia Plan with the CRNA..

## 2024-12-26 NOTE — Clinical Note
The ICD GENERATOR [facility] - [id number] device was inserted. The leads were placed into the connector and visually verified to be in correct position. Injury current obtained.

## 2024-12-26 NOTE — H&P
H&P Exam - Electrophysiology - Cardiology   Shonda Sanchez 79 y.o. female MRN: [id number]  Unit/Bed#: BE CATH LAB ROOM Encounter: [id number]    Assessment & Plan     Assessment:  [employer] single-chamber ICD, abdominal generator  - initially implanted July 2011  - ventricular paces ~85% with low R waves  - reached MIGUEL ANGEL 10/11/2024  History of ventricular tachycardia  - device placed as secondary prevention  Paroxysmal atrial fibrillation  - maintained on Coumadin and amiodarone  Nonobstructive CAD per cardiac cath 2004  Aortic valve endocarditis status post aortic valve replacement with mechanical valve  Non-insulin-dependent diabetes mellitus  Obesity with BMI of 32 and prior gastric band placement 11/2008    Plan:  Patient presents today to undergo straightforward generator change (abdominal).  She has not stopped her Coumadin, blood draw was hemolyzed and will defer to attending if redraw is necessary prior to procedure.      History of Present Illness   HPI:  Shonda Sanchez is a 79 y.o. year old female with history as stated above.    Patient has been followed by our device clinic for her [employer] single-chamber ICD that does have generator in the abdominal region.  Device was placed in July 2011, this appears to be same year of her aortic valve replacement with mechanical valve.  There is some reports of having ventricular tachycardia in the past, unclear if primary or secondary prevention.  Has also had some runs of nonsustained ventricular tachycardia noted.  Through routine interrogations it was noted that her device had reached MIGUEL ANGEL 10/11/2024.  She was seen in the office in October and generator change was set up.  She presents today to undergo this procedure.      EKG:     Review of Systems  ROS as noted above, otherwise 12 point review of systems was performed and is negative.     Historical Information   Past Medical History:   Diagnosis Date    Arthritis     Asthma     Diabetes (HCC)      Environmental allergies     Heart murmur     Hypertension      Past Surgical History:   Procedure Laterality Date    MECHANICAL AORTIC VALVE REPLACEMENT      REPLACEMENT TOTAL KNEE N/A     REPLACEMENT TOTAL KNEE       Family History:   Family History   Problem Relation Age of Onset    Diabetes type II Mother     Heart attack Mother         Multiple heart attacks over a period of 5 years.    Dementia Father     Other (heart sp) Father         He had a heart spasm.    Arthritis Family     Heart disease Family     Hypertension Family     Diabetes type II Family     Hypertension Other        Social History   Social History     Substance and Sexual Activity   Alcohol Use Yes    Comment: socially 3-4 drinks a week.     Social History     Substance and Sexual Activity   Drug Use Never     Social History     Tobacco Use   Smoking Status Never   Smokeless Tobacco Never       Meds/Allergies   all medications and allergies reviewed  Home Medications:   Medications Prior to Admission:     allopurinol (ZYLOPRIM) 100 mg tablet    amiodarone 200 mg tablet    atorvastatin (LIPITOR) 40 mg tablet    Blood Glucose Monitoring Suppl (OneTouch Verio Flex System) w/Device KIT    cholecalciferol (VITAMIN D3) 1,000 units tablet    citalopram (CeleXA) 20 mg tablet    docusate sodium (COLACE) 100 mg capsule    estradiol (ESTRACE) 0.1 mg/g vaginal cream    Farxiga 5 MG TABS    magnesium 30 MG tablet    metoprolol succinate (TOPROL-XL) 25 mg 24 hr tablet    montelukast (SINGULAIR) 10 mg tablet    multivitamin (THERAGRAN) TABS    NON FORMULARY    potassium chloride (Klor-Con M20) 20 mEq tablet    torsemide (DEMADEX) 20 mg tablet    warfarin (COUMADIN) 5 mg tablet    acetaminophen (TYLENOL) 325 mg tablet    alendronate (FOSAMAX) 70 mg tablet    cephalexin (KEFLEX) 500 mg capsule    furosemide (LASIX) 20 mg tablet    Metamucil Fiber CHEW    oxybutynin (DITROPAN-XL) 5 mg 24 hr tablet    traMADol (Ultram) 50 mg tablet    Allergies   Allergen  "Reactions    Fentanyl Other (See Comments)     Other reaction(s): Hallucinations  paranoia, confusion  Other reaction(s): Hallucinations  Other reaction(s): Hallucinations  hallucinations    Fentanyl Confusion       Objective   Vitals: Blood pressure 134/70, pulse 64, temperature 98.5 °F (36.9 °C), temperature source Oral, resp. rate 16, height 4' 10.5\" (1.486 m), weight 72.6 kg (160 lb), SpO2 100%.  Orthostatic Blood Pressures      Flowsheet Row Most Recent Value   Blood Pressure 134/70 filed at 12/26/2024 0952   Patient Position - Orthostatic VS Lying filed at 12/26/2024 0952              Intake/Output Summary (Last 24 hours) at 12/26/2024 1054  Last data filed at 12/26/2024 1047  Gross per 24 hour   Intake 50 ml   Output --   Net 50 ml       Invasive Devices       Peripheral Intravenous Line  Duration             Peripheral IV 12/26/24 Distal;Dorsal (posterior);Right Wrist <1 day                    Physical Exam   GEN: NAD, alert and oriented, well appearing  SKIN: dry without significant lesions or rashes  HEENT: NCAT, PERRL, EOMs intact  NECK: No JVD or carotid bruits appreciated  CARDIOVASCULAR: RRR, normal S1, S2 without murmurs, rubs, or gallops appreciated  LUNGS: Clear to auscultation bilaterally without wheezes, rhonchi, or rales  ABDOMEN: Soft, nontender, nondistended  EXTREMITIES/VASCULAR: perfused without clubbing, cyanosis, or edema b/l  PSYCH: Normal mood and affect  NEURO: CN ll-Xll grossly intact    Lab Results: I have personally reviewed pertinent lab results.    Results from last 7 days   Lab Units 12/26/24  0940   WBC Thousand/uL 3.86*   HEMOGLOBIN g/dL 13.4   HEMATOCRIT % 41.7           Invalid input(s): \"LABGLOM\"              Imaging: Results Review Statement: No pertinent imaging studies reviewed.  No results found for this or any previous visit.      Code Status: Level 1 - Full Code    ** Please Note: Dictation voice to text software may have been used in the creation of this document. " **

## 2025-01-09 ENCOUNTER — IN-CLINIC DEVICE VISIT (OUTPATIENT)
Dept: CARDIOLOGY CLINIC | Facility: CLINIC | Age: 80
End: 2025-01-09

## 2025-01-09 ENCOUNTER — RESULTS FOLLOW-UP (OUTPATIENT)
Dept: CARDIOLOGY CLINIC | Facility: CLINIC | Age: 80
End: 2025-01-09

## 2025-01-09 DIAGNOSIS — Z95.810 PRESENCE OF AUTOMATIC CARDIOVERTER/DEFIBRILLATOR (AICD): Primary | ICD-10-CM

## 2025-01-09 PROCEDURE — 99024 POSTOP FOLLOW-UP VISIT: CPT

## 2025-01-09 NOTE — PROGRESS NOTES
BSC SC ICD (ABD PLACEMENT)/ NOT MRI CONDITIONAL   DEVICE INTERROGATED IN THE HÉCTOR OFFICE: ABDOMINAL WOUND CHECK: INCISION CLEAN AND DRY WITH EDGES APPROXIMATED; WOUND CARE AND RESTRICTIONS REVIEWED WITH PATIENT & FAMILY (PIC IN MEDIA TAB). BATTERY VOLTAGE ADEQUATE (11.5 YRS).  70%. (>40%/AVB-DEPENDENT/VVIR 60 PPM). ALL LEAD PARAMETERS WITHIN NORMAL LIMITS. NO SIGNIFICANT HIGH RATE EPISODES. HEARTLOGIC HEART FAILURE INDEX OPTIMIZING. NORMAL DEVICE FUNCTION. CJC/ES

## 2025-01-14 NOTE — TELEPHONE ENCOUNTER
Detail Level: Detailed Patient calling in to report white heart flashing on her ICD monitor that she has bedside that had a green dot yesterday but not this morning when she checked.  Patient is concerned because she is due for an ICD change soon and is fearful this will affect her ICD.  Secure chat message sent to Dr. Ugalde twice.  Called Dr. Ugalde and spoke to him regarding this issue. Per Dr. Ugalde, this may mean there is a communication problem between the ICD and the monitor and he instructed to press the white heart.  Dr. Ugalde said this does not affect the ICD in any way and it should work as normal.  I called the patient back and told her to press the white heart and the green light came back on.  I informed the patient this is a communication problem between the ICD and monitor but it does not affect the way the ICD works and the ICD will work as usual.     Quality 226: Preventive Care And Screening: Tobacco Use: Screening And Cessation Intervention: Patient screened for tobacco use and is an ex/non-smoker

## 2025-02-10 ENCOUNTER — TELEPHONE (OUTPATIENT)
Dept: CARDIOLOGY CLINIC | Facility: CLINIC | Age: 80
End: 2025-02-10

## 2025-02-10 ENCOUNTER — RESULTS FOLLOW-UP (OUTPATIENT)
Dept: CARDIOLOGY CLINIC | Facility: CLINIC | Age: 80
End: 2025-02-10

## 2025-02-10 NOTE — TELEPHONE ENCOUNTER
Spoke with pt. She is doing well. Wt 152 lbs and denies any CHF symptoms presently.  She will call office with and cardiac questions or concerns.

## 2025-02-19 ENCOUNTER — OFFICE VISIT (OUTPATIENT)
Dept: PODIATRY | Facility: CLINIC | Age: 80
End: 2025-02-19
Payer: COMMERCIAL

## 2025-02-19 DIAGNOSIS — Z79.01 LONG TERM CURRENT USE OF ANTICOAGULANT: ICD-10-CM

## 2025-02-19 DIAGNOSIS — M20.42 HAMMER TOES OF BOTH FEET: ICD-10-CM

## 2025-02-19 DIAGNOSIS — L84 CORNS: ICD-10-CM

## 2025-02-19 DIAGNOSIS — M20.41 HAMMER TOES OF BOTH FEET: ICD-10-CM

## 2025-02-19 DIAGNOSIS — M79.675 PAIN IN TOES OF BOTH FEET: ICD-10-CM

## 2025-02-19 DIAGNOSIS — M79.674 PAIN IN TOES OF BOTH FEET: ICD-10-CM

## 2025-02-19 DIAGNOSIS — E11.51 TYPE 2 DIABETES MELLITUS WITH DIABETIC PERIPHERAL ANGIOPATHY WITHOUT GANGRENE, WITHOUT LONG-TERM CURRENT USE OF INSULIN (HCC): Primary | ICD-10-CM

## 2025-02-19 DIAGNOSIS — I83.93 ASYMPTOMATIC VARICOSE VEINS OF BOTH LOWER EXTREMITIES: ICD-10-CM

## 2025-02-19 DIAGNOSIS — B35.1 ONYCHOMYCOSIS: ICD-10-CM

## 2025-02-19 PROCEDURE — 11721 DEBRIDE NAIL 6 OR MORE: CPT | Performed by: PODIATRIST

## 2025-02-19 PROCEDURE — 11056 PARNG/CUTG B9 HYPRKR LES 2-4: CPT | Performed by: PODIATRIST

## 2025-02-19 PROCEDURE — RECHECK: Performed by: PODIATRIST

## 2025-02-19 NOTE — PROGRESS NOTES
Name: Shonda Sanchez      : 1945      MRN: [id number]  Encounter Provider: Sergio Bangura DPM  Encounter Date: 2025   Encounter department: [facility] [place]  :  Assessment & Plan  Type 2 diabetes mellitus with diabetic peripheral angiopathy without gangrene, without long-term current use of insulin (HCC)    Lab Results   Component Value Date    HGBA1C 6.7 (H) 2024            Corns       Debride HD to dermal layer x 2 with the use of a 312 blade and sharp dissection.   Spent several quarter inch foam toe spacers for the patient to use.  1 was placed on the dorsal aspect of the left hallux pressing under the second toe which overlaps.  Asymptomatic varicose veins of both lower extremities         Onychomycosis       Debride mycotic nails and thin the nail plates x 10 with the use of a nail nipper manually and an electric Dremel bur was used to reduce the thickness of the nail beds and smoothed the distal aspect of the nails.   Long term current use of anticoagulant         Pain in toes of both feet         Hammer toes of both feet         Pt was instructed to use lotion once a day on both feet such as cerave, Cetaphil or similar thick style of lotion.     Discussed proper shoe gear, daily inspections of feet, and general foot health with patient. Patient has Q8  findings and is recommended for at risk foot care every 9-10 weeks.    Patients most recent complete clinical foot exam was on: 2024      Return in about 10 weeks (around 2025).     History of Present Illness   HPI  Shonda Sanchez is a 79 y.o. female who presents with chief complaint of painful thick nails on both feet and a secondary complaint of painful corns on both feet.  She is concerned over the corns that are on the top of the left hallux and underneath the second toe proximal phalanx area.  She is a diabetic with peripheral vascular disease.Patient presents for at-risk foot care.  Patient has no acute  concerns today.  Patient has significant lower extremity risk due to neuropathy, parasthesia, edema, and trophic skin changes to the lower extremity.   History obtained from: patient    Review of Systems  Medical History Reviewed by provider this encounter:     .  Current Outpatient Medications on File Prior to Visit   Medication Sig Dispense Refill    acetaminophen (TYLENOL) 325 mg tablet Take 2 tablets (650 mg total) by mouth every 6 (six) hours as needed for mild pain 63 tablet 0    allopurinol (ZYLOPRIM) 100 mg tablet Take 100 mg by mouth daily      amiodarone 200 mg tablet TAKE 1 TABLET(200 MG) BY MOUTH DAILY WITH BREAKFAST 90 tablet 2    atorvastatin (LIPITOR) 40 mg tablet       Blood Glucose Monitoring Suppl (OneTouch Verio Flex System) w/Device KIT Use as directed      cholecalciferol (VITAMIN D3) 1,000 units tablet Take 1 tablet (1,000 Units total) by mouth daily Do not start before June 2, 2023. 30 tablet 0    citalopram (CeleXA) 20 mg tablet Take 20 mg by mouth daily      docusate sodium (COLACE) 100 mg capsule Take 1 capsule (100 mg total) by mouth 2 (two) times a day 60 capsule 0    estradiol (ESTRACE) 0.1 mg/g vaginal cream Insert 2 g into the vagina 3 (three) times a week Takes 3 xs a week.      Farxiga 5 MG TABS Take 5 mg by mouth daily      magnesium 30 MG tablet Take 64 mg by mouth 3 (three) times a day Takes 3-4 days a week takes 1 tab.      Metamucil Fiber CHEW Chew 3 gums in the morning. Indications: fiber supplement      metoprolol succinate (TOPROL-XL) 25 mg 24 hr tablet Take 1 tablet (25 mg total) by mouth daily Do not start before June 2, 2023. 30 tablet 0    montelukast (SINGULAIR) 10 mg tablet Take 10 mg by mouth daily at bedtime      multivitamin (THERAGRAN) TABS Take 1 tablet by mouth daily      NON FORMULARY Takes Uqcora otc for her bladder.      potassium chloride (Klor-Con M20) 20 mEq tablet Take 3 tablets in AM and 2 tablets in  tablet 3    torsemide (DEMADEX) 20 mg tablet TAKE  2 TABLETS BY MOUTH EVERY MORNING AND 1 TABLET EVERY EVENING (Patient taking differently: TAKE 2 TABLETS BY MOUTH EVERY MORNING AND 1 TABLET EVERY EVENING) 270 tablet 1    alendronate (FOSAMAX) 70 mg tablet Take 70 mg by mouth Once a week (Patient not taking: Reported on 2/14/2025)      cephalexin (KEFLEX) 500 mg capsule Take 500 mg by mouth (Patient not taking: Reported on 2/14/2025)      furosemide (LASIX) 20 mg tablet Take by mouth (Patient not taking: Reported on 2/14/2025)      oxybutynin (DITROPAN-XL) 5 mg 24 hr tablet Take by mouth (Patient not taking: Reported on 2/14/2025)      traMADol (Ultram) 50 mg tablet Take 1 tablet (50 mg total) by mouth every 6 (six) hours as needed for severe pain (Patient not taking: Reported on 2/14/2025) 25 tablet 0    warfarin (COUMADIN) 5 mg tablet Take 2.5 mg by mouth daily (Patient not taking: Reported on 2/14/2025)       No current facility-administered medications on file prior to visit.      Social History     Tobacco Use    Smoking status: Never    Smokeless tobacco: Never   Vaping Use    Vaping status: Never Used   Substance and Sexual Activity    Alcohol use: Yes     Comment: socially 3-4 drinks a week.    Drug use: Never    Sexual activity: Not on file        Objective   There were no vitals taken for this visit.     Physical Exam  Vascular status is a faint 1/4 DP PT negative digital hair, normal distal cooling, delayed capillary refill bilaterally.  The capillary refill is approximately 2 to 3 seconds.  Varicose veins are present on the medial aspect of the left foot and are asymptomatic.    Derm nails are brittle elongated hypertrophic yellow-white discoloration with subungual debris x 10.  There is an increased thickness and the nails are approximately 2 mm.  Hypertrophic tissue is present on the distal aspect of the third toe right foot with a central IPK no signs of dried blood and no trophic changes present under the lesion.  On the left foot there were 2  hypertrophic lesions 1 on the dorsal aspect of the proximal phalanx of the hallux the second on the plantar aspect of the second proximal phalanx.  There is dry eschar present on the hypertrophic tissue that was present on the second digit.  No trophic changes are noted and no signs of any infection under these lesions.  Pain upon palpation of both areas.  There is loss of turgor and shininess to the skin noted bilaterally.    Ortho there is hallux valgus deformity noted bilaterally with overlapping of the second digits seen bilaterally.  The left foot is significantly worse than the right.    Neuro is unchanged from her last visit.    Administrative Statements   I have spent a total time of 15 minutes in caring for this patient on the day of the visit/encounter including Risks and benefits of tx options, Instructions for management, Patient and family education, Importance of tx compliance, Counseling / Coordination of care, and Documenting in the medical record.

## 2025-03-10 ENCOUNTER — REMOTE DEVICE CLINIC VISIT (OUTPATIENT)
Dept: CARDIOLOGY CLINIC | Facility: CLINIC | Age: 80
End: 2025-03-10
Payer: COMMERCIAL

## 2025-03-10 ENCOUNTER — RESULTS FOLLOW-UP (OUTPATIENT)
Dept: CARDIOLOGY CLINIC | Facility: CLINIC | Age: 80
End: 2025-03-10

## 2025-03-10 DIAGNOSIS — I50.33 ACUTE ON CHRONIC DIASTOLIC (CONGESTIVE) HEART FAILURE (HCC): Primary | ICD-10-CM

## 2025-03-10 PROCEDURE — 93297 REM INTERROG DEV EVAL ICPMS: CPT | Performed by: INTERNAL MEDICINE

## 2025-03-10 NOTE — PROGRESS NOTES
Results for orders placed or performed in visit on 03/10/25   Cardiac EP device report    Narrative    BSCI SINGLE ICD (ABD PLACEMENT)/ NOT MRI CONDITIONAL  LATITUDE TRANSMISSION - HEARTLOGIC ONLY: HEARTLOGIC HEART FAILURE INDEX WAS CROSSED BUT NOW WITHIN NORMAL RANGE. BATTERY VOLTAGE ADEQUATE (9.5 YRS).  71% (>40%/AVB/ VVIR 60PPM). ALL AVAILABLE LEAD PARAMETERS WITHIN NORMAL LIMITS. NO SIGNIFICANT HIGH RATE EPISODES. NORMAL DEVICE FUNCTION.  ES

## 2025-04-17 ENCOUNTER — REMOTE DEVICE CLINIC VISIT (OUTPATIENT)
Dept: CARDIOLOGY CLINIC | Facility: CLINIC | Age: 80
End: 2025-04-17
Payer: COMMERCIAL

## 2025-04-17 DIAGNOSIS — Z95.810 AICD (AUTOMATIC CARDIOVERTER/DEFIBRILLATOR) PRESENT: Primary | ICD-10-CM

## 2025-04-17 PROCEDURE — 93295 DEV INTERROG REMOTE 1/2/MLT: CPT | Performed by: INTERNAL MEDICINE

## 2025-04-17 PROCEDURE — 93296 REM INTERROG EVL PM/IDS: CPT | Performed by: INTERNAL MEDICINE

## 2025-04-17 NOTE — PROGRESS NOTES
"Results for orders placed or performed in visit on 04/17/25   Cardiac EP device report    Narrative    BSCI SINGLE ICD (ABD PLACEMENT)/ NOT MRI CONDITIONAL  LATITUDE TRANSMISSION: PRESENTING EGRAM @ 60 BPM. BATTERY STATUS \"9 YRS.\"  77%. ALL LEAD PARAMETERS WITHIN NORMAL LIMITS. NO SIGNIFICANT HIGH RATE EPISODES. HEARTLOGIC HEART FAILURE INDEX WITHIN NORMAL RANGE. NORMAL DEVICE FUNCTION. NC         "

## 2025-04-21 ENCOUNTER — RESULTS FOLLOW-UP (OUTPATIENT)
Dept: NON INVASIVE DIAGNOSTICS | Facility: HOSPITAL | Age: 80
End: 2025-04-21

## 2025-05-21 ENCOUNTER — PROCEDURE VISIT (OUTPATIENT)
Dept: PODIATRY | Facility: CLINIC | Age: 80
End: 2025-05-21
Payer: COMMERCIAL

## 2025-05-21 VITALS — BODY MASS INDEX: 32.25 KG/M2 | HEIGHT: 59 IN | WEIGHT: 160 LBS

## 2025-05-21 DIAGNOSIS — M20.41 HAMMER TOES OF BOTH FEET: ICD-10-CM

## 2025-05-21 DIAGNOSIS — E11.51 TYPE 2 DIABETES MELLITUS WITH DIABETIC PERIPHERAL ANGIOPATHY WITHOUT GANGRENE, WITHOUT LONG-TERM CURRENT USE OF INSULIN (HCC): ICD-10-CM

## 2025-05-21 DIAGNOSIS — M20.42 HAMMER TOES OF BOTH FEET: ICD-10-CM

## 2025-05-21 DIAGNOSIS — L84 CORNS: ICD-10-CM

## 2025-05-21 DIAGNOSIS — M79.675 PAIN IN TOES OF BOTH FEET: ICD-10-CM

## 2025-05-21 DIAGNOSIS — M79.674 PAIN IN TOES OF BOTH FEET: ICD-10-CM

## 2025-05-21 DIAGNOSIS — Z79.01 LONG TERM CURRENT USE OF ANTICOAGULANT: ICD-10-CM

## 2025-05-21 DIAGNOSIS — B35.1 ONYCHOMYCOSIS: Primary | ICD-10-CM

## 2025-05-21 PROCEDURE — 11721 DEBRIDE NAIL 6 OR MORE: CPT | Performed by: PODIATRIST

## 2025-05-21 PROCEDURE — 11056 PARNG/CUTG B9 HYPRKR LES 2-4: CPT | Performed by: PODIATRIST

## 2025-05-21 RX ORDER — WARFARIN SODIUM 2.5 MG/1
TABLET ORAL
COMMUNITY
Start: 2025-05-20

## 2025-05-21 RX ORDER — AMOXICILLIN 500 MG/1
TABLET, FILM COATED ORAL AS NEEDED
COMMUNITY
Start: 2025-05-06

## 2025-05-21 NOTE — PROGRESS NOTES
Name: Shonda Sanchez      : 1945      MRN: [id number]  Encounter Provider: Sergio Bangura DPM  Encounter Date: 2025   Encounter department: [facility] [place]  :  Assessment & Plan  Onychomycosis       Debride mycotic nails and thin the nail plates x 10 with the use of a nail nipper manually and an electric Dremel bur was used to reduce the thickness of the nail beds and smoothed the distal aspect of the nails.   Corns       Debride HD to dermal layer x 2 with the use of a 312 blade and sharp dissection.   Patient is to continue using quarter inch foam pads as toe spacers especially on the left hallux and second digit.  It is recommended for the patient to use a pumice stone on the dorsal aspect of the left proximal phalanx of the hallux to remove the HD that is present or at least keep it under control.  Hammer toes of both feet         Type 2 diabetes mellitus with diabetic peripheral angiopathy without gangrene, without long-term current use of insulin (HCC)    Lab Results   Component Value Date    HGBA1C 6.6 (H) 2025            Long term current use of anticoagulant         Pain in toes of both feet         There is recommended to the patient that she Her next set of diabetic shoes through [employer] since the similar style which she currently has is in their catalog.  Discussed proper shoe gear, daily inspections of feet, and general foot health with patient. Patient has Q8  findings and is recommended for at risk foot care every 9-10 weeks.    Patients most recent complete clinical foot exam was on: 2025    Return in about 10 weeks (around 2025).     History of Present Illness   HPI  Shonda Sanchez is a 79 y.o. female who presents with chief complaint of painful thick nails on both feet and painful corns also present on both feet.  No change in her medical history or medications since her last visit.  She is a diabetic whose last A1c was 6.6 done on 3/5/2025.  Her  "granddaughter was present in the room for today's visit.  History obtained from: patient    Review of Systems  Medical History Reviewed by provider this encounter:     .  Medications Ordered Prior to Encounter[1]   Social History     Tobacco Use    Smoking status: Never    Smokeless tobacco: Never   Vaping Use    Vaping status: Never Used   Substance and Sexual Activity    Alcohol use: Yes     Comment: socially 3-4 drinks a week.    Drug use: Never    Sexual activity: Not on file        Objective   Ht 4' 10.5\" (1.486 m) Comment: verbal  Wt 72.6 kg (160 lb) Comment: Patient refused to be weighed, 160 weight from december  BMI 32.87 kg/m²      Physical Exam  Vascular status is a faint 1/4 DP PT negative digital hair, normal distal cooling, delayed capillary refill bilaterally.  The capillary refill is approximately 2 to 3 seconds.  Varicose veins are present on the medial aspect of the left foot and are asymptomatic.     Derm nails are brittle elongated hypertrophic yellow-white discoloration with subungual debris x 10.  There is an increased thickness in the nails of approximately 2 mm.  Hypertrophic tissue is present on the distal aspect of the third toe right foot with a central IPK no signs of dried blood and no trophic changes present under the lesion.  On the left foot hypertrophic lesion is  on the dorsal aspect of the proximal phalanx of the hallux.  There is dry eschar present on the hypertrophic tissue that was present on the left hallux.  No trophic changes are noted and no signs of any infection under these lesions.  Pain upon palpation of both areas.  There is loss of turgor and shininess to the skin noted bilaterally.     Ortho there is hallux valgus deformity noted bilaterally with overlapping of the second digits seen bilaterally.  The left foot is significantly worse than the right.       [1]   Current Outpatient Medications on File Prior to Visit   Medication Sig Dispense Refill    acetaminophen " (TYLENOL) 325 mg tablet Take 2 tablets (650 mg total) by mouth every 6 (six) hours as needed for mild pain 63 tablet 0    allopurinol (ZYLOPRIM) 100 mg tablet Take 100 mg by mouth daily      amiodarone 200 mg tablet TAKE 1 TABLET(200 MG) BY MOUTH DAILY WITH BREAKFAST 90 tablet 2    amoxicillin (AMOXIL) 500 MG tablet Take by mouth if needed For dental appts only      Blood Glucose Monitoring Suppl (OneTouch Verio Flex System) w/Device KIT Use as directed      cholecalciferol (VITAMIN D3) 1,000 units tablet Take 1 tablet (1,000 Units total) by mouth daily Do not start before June 2, 2023. 30 tablet 0    citalopram (CeleXA) 20 mg tablet Take 20 mg by mouth daily      docusate sodium (COLACE) 100 mg capsule Take 1 capsule (100 mg total) by mouth 2 (two) times a day 60 capsule 0    estradiol (ESTRACE) 0.1 mg/g vaginal cream Insert 2 g into the vagina 3 (three) times a week Takes 3 xs a week.      Farxiga 5 MG TABS Take 5 mg by mouth daily      magnesium 30 MG tablet Take 64 mg by mouth 3 (three) times a day Takes 3-4 days a week takes 1 tab.      Metamucil Fiber CHEW Chew 3 gums in the morning. Indications: fiber supplement      metoprolol succinate (TOPROL-XL) 25 mg 24 hr tablet Take 1 tablet (25 mg total) by mouth daily Do not start before June 2, 2023. 30 tablet 0    montelukast (SINGULAIR) 10 mg tablet Take 10 mg by mouth daily at bedtime      multivitamin (THERAGRAN) TABS Take 1 tablet by mouth daily      NON FORMULARY Takes Uqcora otc for her bladder.      NON FORMULARY Take by mouth (FARXIGA ORAL      potassium chloride (Klor-Con M20) 20 mEq tablet Take 3 tablets in AM and 2 tablets in  tablet 3    torsemide (DEMADEX) 20 mg tablet TAKE 2 TABLETS BY MOUTH EVERY MORNING AND 1 TABLET EVERY EVENING 270 tablet 1    warfarin (COUMADIN) 2.5 mg tablet Taking half of 2.5 every three days, rest of the days are 2.5      alendronate (FOSAMAX) 70 mg tablet Take 70 mg by mouth Once a week (Patient not taking: Reported on  5/16/2025)      atorvastatin (LIPITOR) 40 mg tablet  (Patient not taking: Reported on 5/21/2025)      cephalexin (KEFLEX) 500 mg capsule Take 500 mg by mouth (Patient not taking: Reported on 5/16/2025)      furosemide (LASIX) 20 mg tablet Take by mouth (Patient not taking: Reported on 5/16/2025)      oxybutynin (DITROPAN-XL) 5 mg 24 hr tablet Take by mouth (Patient not taking: Reported on 5/16/2025)      traMADol (Ultram) 50 mg tablet Take 1 tablet (50 mg total) by mouth every 6 (six) hours as needed for severe pain (Patient not taking: Reported on 5/16/2025) 25 tablet 0    warfarin (COUMADIN) 5 mg tablet Take 2.5 mg by mouth daily (Patient not taking: Reported on 5/16/2025)       No current facility-administered medications on file prior to visit.

## 2025-06-05 ENCOUNTER — TELEPHONE (OUTPATIENT)
Dept: CARDIOLOGY CLINIC | Facility: CLINIC | Age: 80
End: 2025-06-05

## 2025-06-05 NOTE — TELEPHONE ENCOUNTER
Spoke with pt. She states she is feeling fine. Denies any CHF symptoms presently. Advised she call her cardiologist if any symptoms occur.  Verbally understood.

## 2025-06-05 NOTE — TELEPHONE ENCOUNTER
----- Message from Mr. Youth sent at 6/5/2025 10:30 AM EDT -----  Regarding: HF INDEX  FYI:HEARTLOGIC HEART FAILURE INDEX CROSSED- 26/6. PT ON TORSEMIDE. WILL RECHECK IN 1 MONTH. Thanks,Kaylie

## 2025-06-05 NOTE — TELEPHONE ENCOUNTER
Caller: Shonda Sanchez     Doctor: Dr. Odonnell    Reason for call: She is returning a call from a few minutes ago. Please call her back.    Thank you    Call back#: [phone]

## 2025-06-07 ENCOUNTER — RESULTS FOLLOW-UP (OUTPATIENT)
Dept: NON INVASIVE DIAGNOSTICS | Facility: HOSPITAL | Age: 80
End: 2025-06-07

## 2025-07-29 ENCOUNTER — CONSULT (OUTPATIENT)
Dept: NEPHROLOGY | Facility: CLINIC | Age: 80
End: 2025-07-29
Attending: PHYSICIAN ASSISTANT
Payer: COMMERCIAL

## 2025-07-29 VITALS
DIASTOLIC BLOOD PRESSURE: 72 MMHG | HEIGHT: 59 IN | WEIGHT: 161 LBS | HEART RATE: 62 BPM | BODY MASS INDEX: 32.46 KG/M2 | SYSTOLIC BLOOD PRESSURE: 114 MMHG

## 2025-07-29 DIAGNOSIS — E11.21 TYPE 2 DIABETES MELLITUS WITH DIABETIC NEPHROPATHY, UNSPECIFIED WHETHER LONG TERM INSULIN USE (HCC): ICD-10-CM

## 2025-07-29 DIAGNOSIS — E87.6 HYPOKALEMIA: ICD-10-CM

## 2025-07-29 DIAGNOSIS — N18.9 CHRONIC KIDNEY DISEASE, UNSPECIFIED CKD STAGE: ICD-10-CM

## 2025-07-29 DIAGNOSIS — N28.1 CYST OF KIDNEY, ACQUIRED: ICD-10-CM

## 2025-07-29 DIAGNOSIS — N18.2 STAGE 2 CHRONIC KIDNEY DISEASE: Primary | ICD-10-CM

## 2025-07-29 PROBLEM — K56.600 PARTIAL SMALL BOWEL OBSTRUCTION (HCC): Status: ACTIVE | Noted: 2017-12-16

## 2025-07-29 PROBLEM — I48.19 ATRIAL FIBRILLATION, PERSISTENT (HCC): Status: ACTIVE | Noted: 2019-03-12

## 2025-07-29 PROBLEM — Z95.2 PRESENCE OF PROSTHETIC HEART VALVE: Status: ACTIVE | Noted: 2017-05-10

## 2025-07-29 PROBLEM — I49.3 PVC'S (PREMATURE VENTRICULAR CONTRACTIONS): Status: ACTIVE | Noted: 2019-03-12

## 2025-07-29 PROBLEM — Z86.79 HISTORY OF ENDOCARDITIS: Status: ACTIVE | Noted: 2018-04-04

## 2025-07-29 PROBLEM — N30.00 ACUTE CYSTITIS: Status: ACTIVE | Noted: 2018-04-04

## 2025-07-29 PROBLEM — M94.9 BONE/CARTILAGE DISORDER: Status: ACTIVE | Noted: 2017-07-10

## 2025-07-29 PROBLEM — M89.9 BONE/CARTILAGE DISORDER: Status: ACTIVE | Noted: 2017-07-10

## 2025-07-29 PROBLEM — S32.059A CLOSED FRACTURE OF FIFTH LUMBAR VERTEBRA (HCC): Status: ACTIVE | Noted: 2017-05-09

## 2025-07-29 PROBLEM — N39.0 RECURRENT UTI: Status: ACTIVE | Noted: 2024-12-02

## 2025-07-29 PROBLEM — R06.02 SHORTNESS OF BREATH: Status: ACTIVE | Noted: 2018-06-12

## 2025-07-29 LAB
SL AMB  POCT GLUCOSE, UA: 250
SL AMB LEUKOCYTE ESTERASE,UA: NORMAL
SL AMB POCT BILIRUBIN,UA: NORMAL
SL AMB POCT BLOOD,UA: NORMAL
SL AMB POCT CLARITY,UA: CLEAR
SL AMB POCT COLOR,UA: YELLOW
SL AMB POCT KETONES,UA: NORMAL
SL AMB POCT NITRITE,UA: NORMAL
SL AMB POCT PH,UA: 5
SL AMB POCT SPECIFIC GRAVITY,UA: 1.01
SL AMB POCT URINE PROTEIN: NORMAL
SL AMB POCT UROBILINOGEN: 0.2

## 2025-07-29 PROCEDURE — 99204 OFFICE O/P NEW MOD 45 MIN: CPT | Performed by: INTERNAL MEDICINE

## 2025-07-29 PROCEDURE — 81002 URINALYSIS NONAUTO W/O SCOPE: CPT | Performed by: INTERNAL MEDICINE

## 2025-08-04 ENCOUNTER — TELEPHONE (OUTPATIENT)
Age: 80
End: 2025-08-04

## 2025-08-05 ENCOUNTER — OFFICE VISIT (OUTPATIENT)
Dept: PODIATRY | Facility: CLINIC | Age: 80
End: 2025-08-05
Payer: COMMERCIAL

## 2025-08-05 VITALS — HEIGHT: 58 IN | BODY MASS INDEX: 33.65 KG/M2

## 2025-08-05 DIAGNOSIS — M20.41 HAMMERTOE OF RIGHT FOOT: ICD-10-CM

## 2025-08-05 DIAGNOSIS — L84 CALLUS: ICD-10-CM

## 2025-08-05 DIAGNOSIS — L97.521 DIABETIC ULCER OF TOE OF LEFT FOOT ASSOCIATED WITH DIABETES MELLITUS DUE TO UNDERLYING CONDITION, LIMITED TO BREAKDOWN OF SKIN (HCC): Primary | ICD-10-CM

## 2025-08-05 DIAGNOSIS — M21.611 BUNION, RIGHT FOOT: ICD-10-CM

## 2025-08-05 DIAGNOSIS — B35.1 ONYCHOMYCOSIS: ICD-10-CM

## 2025-08-05 DIAGNOSIS — E08.621 DIABETIC ULCER OF TOE OF LEFT FOOT ASSOCIATED WITH DIABETES MELLITUS DUE TO UNDERLYING CONDITION, LIMITED TO BREAKDOWN OF SKIN (HCC): Primary | ICD-10-CM

## 2025-08-05 PROCEDURE — 11721 DEBRIDE NAIL 6 OR MORE: CPT | Performed by: PODIATRIST

## 2025-08-05 PROCEDURE — 99213 OFFICE O/P EST LOW 20 MIN: CPT | Performed by: PODIATRIST

## 2025-08-05 PROCEDURE — 11055 PARING/CUTG B9 HYPRKER LES 1: CPT | Performed by: PODIATRIST

## 2025-08-05 PROCEDURE — 11042 DBRDMT SUBQ TIS 1ST 20SQCM/<: CPT | Performed by: PODIATRIST

## 2025-08-12 ENCOUNTER — HOSPITAL ENCOUNTER (OUTPATIENT)
Dept: RADIOLOGY | Facility: HOSPITAL | Age: 80
Discharge: HOME | End: 2025-08-12
Attending: ORTHOPAEDIC SURGERY
Payer: COMMERCIAL

## 2025-08-12 ENCOUNTER — OFFICE VISIT (OUTPATIENT)
Dept: OBGYN CLINIC | Facility: HOSPITAL | Age: 80
End: 2025-08-12
Payer: COMMERCIAL

## (undated) DEVICE — PLASMABLADE PS200-040 4.0: Brand: PLASMABLADE™